# Patient Record
Sex: FEMALE | Employment: OTHER | URBAN - METROPOLITAN AREA
[De-identification: names, ages, dates, MRNs, and addresses within clinical notes are randomized per-mention and may not be internally consistent; named-entity substitution may affect disease eponyms.]

---

## 2017-01-30 ENCOUNTER — APPOINTMENT (OUTPATIENT)
Dept: GENERAL RADIOLOGY | Age: 72
DRG: 291 | End: 2017-01-30
Attending: INTERNAL MEDICINE
Payer: MEDICARE

## 2017-01-30 ENCOUNTER — HOSPITAL ENCOUNTER (INPATIENT)
Age: 72
LOS: 10 days | Discharge: NURSING, REHAB OR LONG-TERM CARE | DRG: 291 | End: 2017-02-09
Attending: INTERNAL MEDICINE | Admitting: HOSPITALIST
Payer: MEDICARE

## 2017-01-30 DIAGNOSIS — R93.89 INFILTRATE NOTED ON IMAGING STUDY: ICD-10-CM

## 2017-01-30 DIAGNOSIS — R06.03 ACUTE RESPIRATORY DISTRESS: Primary | ICD-10-CM

## 2017-01-30 DIAGNOSIS — I50.9 ACUTE CONGESTIVE HEART FAILURE, UNSPECIFIED CONGESTIVE HEART FAILURE TYPE: ICD-10-CM

## 2017-01-30 PROBLEM — R77.8 ELEVATED TROPONIN: Status: ACTIVE | Noted: 2017-01-30

## 2017-01-30 PROBLEM — R06.89 INSUFFICIENCY, RESPIRATORY, ACUTE: Status: ACTIVE | Noted: 2017-01-30

## 2017-01-30 PROBLEM — J96.11 CHRONIC RESPIRATORY FAILURE WITH HYPOXIA (HCC): Status: ACTIVE | Noted: 2017-01-30

## 2017-01-30 PROBLEM — E87.5 HYPERKALEMIA: Status: ACTIVE | Noted: 2017-01-30

## 2017-01-30 PROBLEM — J44.9 COPD (CHRONIC OBSTRUCTIVE PULMONARY DISEASE) (HCC): Status: ACTIVE | Noted: 2017-01-30

## 2017-01-30 LAB
ALBUMIN SERPL BCP-MCNC: 3.6 G/DL (ref 3.4–5)
ALBUMIN/GLOB SERPL: 0.9 {RATIO} (ref 0.8–1.7)
ALP SERPL-CCNC: 177 U/L (ref 45–117)
ALT SERPL-CCNC: 23 U/L (ref 13–56)
AMORPH CRY URNS QL MICRO: ABNORMAL
ANION GAP BLD CALC-SCNC: 5 MMOL/L (ref 3–18)
APPEARANCE UR: CLEAR
APTT PPP: 29.2 SEC (ref 23–36.4)
ARTERIAL PATENCY WRIST A: YES
AST SERPL W P-5'-P-CCNC: 21 U/L (ref 15–37)
ATRIAL RATE: 138 BPM
BACTERIA URNS QL MICRO: ABNORMAL /HPF
BASE EXCESS BLD CALC-SCNC: 4 MMOL/L
BASOPHILS # BLD AUTO: 0 K/UL (ref 0–0.1)
BASOPHILS # BLD: 0 % (ref 0–3)
BDY SITE: ABNORMAL
BILIRUB SERPL-MCNC: 1.5 MG/DL (ref 0.2–1)
BILIRUB UR QL: NEGATIVE
BNP SERPL-MCNC: ABNORMAL PG/ML (ref 0–900)
BUN SERPL-MCNC: 44 MG/DL (ref 7–18)
BUN/CREAT SERPL: 37 (ref 12–20)
CALCIUM SERPL-MCNC: 8.9 MG/DL (ref 8.5–10.1)
CALCULATED R AXIS, ECG10: 63 DEGREES
CALCULATED T AXIS, ECG11: 45 DEGREES
CHLORIDE SERPL-SCNC: 97 MMOL/L (ref 100–108)
CK MB CFR SERPL CALC: 4.4 % (ref 0–4)
CK MB SERPL-MCNC: 1.6 NG/ML (ref 0.5–3.6)
CK SERPL-CCNC: 36 U/L (ref 26–192)
CO2 SERPL-SCNC: 32 MMOL/L (ref 21–32)
COLOR UR: YELLOW
CREAT SERPL-MCNC: 1.2 MG/DL (ref 0.6–1.3)
DIAGNOSIS, 93000: NORMAL
DIFFERENTIAL METHOD BLD: ABNORMAL
EOSINOPHIL # BLD: 0 K/UL (ref 0–0.4)
EOSINOPHIL NFR BLD: 0 % (ref 0–5)
EPITH CASTS URNS QL MICRO: NEGATIVE /LPF (ref 0–5)
ERYTHROCYTE [DISTWIDTH] IN BLOOD BY AUTOMATED COUNT: 16.1 % (ref 11.6–14.5)
EST. AVERAGE GLUCOSE BLD GHB EST-MCNC: 183 MG/DL
GAS FLOW.O2 O2 DELIVERY SYS: ABNORMAL L/MIN
GLOBULIN SER CALC-MCNC: 3.9 G/DL (ref 2–4)
GLUCOSE SERPL-MCNC: 395 MG/DL (ref 74–99)
GLUCOSE UR STRIP.AUTO-MCNC: >1000 MG/DL
HBA1C MFR BLD: 8 % (ref 4.5–5.6)
HCO3 BLD-SCNC: 30.4 MMOL/L (ref 22–26)
HCT VFR BLD AUTO: 37.7 % (ref 35–45)
HGB BLD-MCNC: 11.7 G/DL (ref 12–16)
HGB UR QL STRIP: NEGATIVE
INR PPP: 2.6 (ref 0.8–1.2)
KETONES UR QL STRIP.AUTO: NEGATIVE MG/DL
LACTATE SERPL-SCNC: 1.4 MMOL/L (ref 0.4–2)
LEUKOCYTE ESTERASE UR QL STRIP.AUTO: NEGATIVE
LYMPHOCYTES # BLD AUTO: 1 % (ref 20–51)
LYMPHOCYTES # BLD: 0.1 K/UL (ref 0.8–3.5)
MAGNESIUM SERPL-MCNC: 2.6 MG/DL (ref 1.8–2.4)
MCH RBC QN AUTO: 32.1 PG (ref 24–34)
MCHC RBC AUTO-ENTMCNC: 31 G/DL (ref 31–37)
MCV RBC AUTO: 103.6 FL (ref 74–97)
MONOCYTES # BLD: 0.6 K/UL (ref 0–1)
MONOCYTES NFR BLD AUTO: 5 % (ref 2–9)
MUCOUS THREADS URNS QL MICRO: NEGATIVE /LPF
NEUTS BAND NFR BLD MANUAL: 1 % (ref 0–5)
NEUTS SEG # BLD: 11.1 K/UL (ref 1.8–8)
NEUTS SEG NFR BLD AUTO: 93 % (ref 42–75)
NITRITE UR QL STRIP.AUTO: NEGATIVE
PCO2 BLD: 60 MMHG (ref 35–45)
PH BLD: 7.31 [PH] (ref 7.35–7.45)
PH UR STRIP: 5 [PH] (ref 5–8)
PLATELET # BLD AUTO: 224 K/UL (ref 135–420)
PMV BLD AUTO: 10.3 FL (ref 9.2–11.8)
PO2 BLD: 218 MMHG (ref 80–100)
POTASSIUM SERPL-SCNC: 6.2 MMOL/L (ref 3.5–5.5)
PROT SERPL-MCNC: 7.5 G/DL (ref 6.4–8.2)
PROT UR STRIP-MCNC: ABNORMAL MG/DL
PROTHROMBIN TIME: 26.4 SEC (ref 11.5–15.2)
Q-T INTERVAL, ECG07: 330 MS
QRS DURATION, ECG06: 60 MS
QTC CALCULATION (BEZET), ECG08: 438 MS
RBC # BLD AUTO: 3.64 M/UL (ref 4.2–5.3)
RBC #/AREA URNS HPF: ABNORMAL /HPF (ref 0–5)
RBC MORPH BLD: ABNORMAL
SAO2 % BLD: 100 % (ref 92–97)
SERVICE CMNT-IMP: 10 L/MIN
SERVICE CMNT-IMP: ABNORMAL
SODIUM SERPL-SCNC: 134 MMOL/L (ref 136–145)
SP GR UR REFRACTOMETRY: 1.02 (ref 1–1.03)
SPECIMEN TYPE: ABNORMAL
TOTAL RESP. RATE, ITRR: 27
TROPONIN I SERPL-MCNC: 0.27 NG/ML (ref 0–0.06)
UROBILINOGEN UR QL STRIP.AUTO: 1 EU/DL (ref 0.2–1)
VENTRICULAR RATE, ECG03: 106 BPM
WBC # BLD AUTO: 11.9 K/UL (ref 4.6–13.2)
WBC URNS QL MICRO: NEGATIVE /HPF (ref 0–5)

## 2017-01-30 PROCEDURE — 71010 XR CHEST PORT: CPT

## 2017-01-30 PROCEDURE — 82803 BLOOD GASES ANY COMBINATION: CPT

## 2017-01-30 PROCEDURE — 77030034849

## 2017-01-30 PROCEDURE — 87040 BLOOD CULTURE FOR BACTERIA: CPT | Performed by: INTERNAL MEDICINE

## 2017-01-30 PROCEDURE — 74011250637 HC RX REV CODE- 250/637: Performed by: HOSPITALIST

## 2017-01-30 PROCEDURE — A9270 NON-COVERED ITEM OR SERVICE: HCPCS | Performed by: INTERNAL MEDICINE

## 2017-01-30 PROCEDURE — 83880 ASSAY OF NATRIURETIC PEPTIDE: CPT | Performed by: INTERNAL MEDICINE

## 2017-01-30 PROCEDURE — 74011250636 HC RX REV CODE- 250/636: Performed by: INTERNAL MEDICINE

## 2017-01-30 PROCEDURE — 94640 AIRWAY INHALATION TREATMENT: CPT

## 2017-01-30 PROCEDURE — 80053 COMPREHEN METABOLIC PANEL: CPT | Performed by: INTERNAL MEDICINE

## 2017-01-30 PROCEDURE — 36600 WITHDRAWAL OF ARTERIAL BLOOD: CPT

## 2017-01-30 PROCEDURE — 99285 EMERGENCY DEPT VISIT HI MDM: CPT

## 2017-01-30 PROCEDURE — 74011000250 HC RX REV CODE- 250: Performed by: INTERNAL MEDICINE

## 2017-01-30 PROCEDURE — 83036 HEMOGLOBIN GLYCOSYLATED A1C: CPT | Performed by: HOSPITALIST

## 2017-01-30 PROCEDURE — 81001 URINALYSIS AUTO W/SCOPE: CPT | Performed by: INTERNAL MEDICINE

## 2017-01-30 PROCEDURE — 83605 ASSAY OF LACTIC ACID: CPT | Performed by: INTERNAL MEDICINE

## 2017-01-30 PROCEDURE — 85025 COMPLETE CBC W/AUTO DIFF WBC: CPT | Performed by: INTERNAL MEDICINE

## 2017-01-30 PROCEDURE — 85730 THROMBOPLASTIN TIME PARTIAL: CPT | Performed by: INTERNAL MEDICINE

## 2017-01-30 PROCEDURE — 93005 ELECTROCARDIOGRAM TRACING: CPT

## 2017-01-30 PROCEDURE — 85610 PROTHROMBIN TIME: CPT | Performed by: INTERNAL MEDICINE

## 2017-01-30 PROCEDURE — 65610000006 HC RM INTENSIVE CARE

## 2017-01-30 PROCEDURE — 83735 ASSAY OF MAGNESIUM: CPT | Performed by: INTERNAL MEDICINE

## 2017-01-30 PROCEDURE — 74011636637 HC RX REV CODE- 636/637: Performed by: INTERNAL MEDICINE

## 2017-01-30 PROCEDURE — 82550 ASSAY OF CK (CPK): CPT | Performed by: INTERNAL MEDICINE

## 2017-01-30 RX ORDER — CALCIUM GLUCONATE 94 MG/ML
1 INJECTION, SOLUTION INTRAVENOUS ONCE
Status: COMPLETED | OUTPATIENT
Start: 2017-01-30 | End: 2017-01-30

## 2017-01-30 RX ORDER — LEVOFLOXACIN 5 MG/ML
750 INJECTION, SOLUTION INTRAVENOUS ONCE
Status: COMPLETED | OUTPATIENT
Start: 2017-01-30 | End: 2017-02-05

## 2017-01-30 RX ORDER — DEXTROSE 50 % IN WATER (D50W) INTRAVENOUS SYRINGE
25-50 AS NEEDED
Status: DISCONTINUED | OUTPATIENT
Start: 2017-01-30 | End: 2017-02-09 | Stop reason: HOSPADM

## 2017-01-30 RX ORDER — DOCUSATE SODIUM 100 MG/1
100 CAPSULE, LIQUID FILLED ORAL 2 TIMES DAILY
Status: DISCONTINUED | OUTPATIENT
Start: 2017-01-30 | End: 2017-02-09 | Stop reason: HOSPADM

## 2017-01-30 RX ORDER — BUDESONIDE 0.5 MG/2ML
500 INHALANT ORAL
Status: DISCONTINUED | OUTPATIENT
Start: 2017-01-30 | End: 2017-02-09 | Stop reason: HOSPADM

## 2017-01-30 RX ORDER — LEVALBUTEROL 1.25 MG/.5ML
1.25 SOLUTION, CONCENTRATE RESPIRATORY (INHALATION)
Status: DISCONTINUED | OUTPATIENT
Start: 2017-01-31 | End: 2017-02-09 | Stop reason: HOSPADM

## 2017-01-30 RX ORDER — ACETAMINOPHEN 325 MG/1
650 TABLET ORAL
Status: DISCONTINUED | OUTPATIENT
Start: 2017-01-30 | End: 2017-02-05

## 2017-01-30 RX ORDER — INSULIN GLARGINE 100 [IU]/ML
40 INJECTION, SOLUTION SUBCUTANEOUS
Status: DISCONTINUED | OUTPATIENT
Start: 2017-01-30 | End: 2017-02-02

## 2017-01-30 RX ORDER — FUROSEMIDE 10 MG/ML
40 INJECTION INTRAMUSCULAR; INTRAVENOUS 2 TIMES DAILY
Status: DISCONTINUED | OUTPATIENT
Start: 2017-01-30 | End: 2017-02-06

## 2017-01-30 RX ORDER — FUROSEMIDE 10 MG/ML
40 INJECTION INTRAMUSCULAR; INTRAVENOUS
Status: COMPLETED | OUTPATIENT
Start: 2017-01-30 | End: 2017-01-30

## 2017-01-30 RX ORDER — LEVOFLOXACIN 5 MG/ML
750 INJECTION, SOLUTION INTRAVENOUS EVERY 24 HOURS
Status: DISCONTINUED | OUTPATIENT
Start: 2017-01-31 | End: 2017-02-07 | Stop reason: ALTCHOICE

## 2017-01-30 RX ORDER — DIPHENHYDRAMINE HYDROCHLORIDE 50 MG/ML
12.5 INJECTION, SOLUTION INTRAMUSCULAR; INTRAVENOUS
Status: DISCONTINUED | OUTPATIENT
Start: 2017-01-30 | End: 2017-02-09 | Stop reason: HOSPADM

## 2017-01-30 RX ORDER — WARFARIN SODIUM 5 MG/1
2.5 TABLET ORAL EVERY EVENING
Status: DISCONTINUED | OUTPATIENT
Start: 2017-01-31 | End: 2017-01-31

## 2017-01-30 RX ORDER — IPRATROPIUM BROMIDE AND ALBUTEROL SULFATE 2.5; .5 MG/3ML; MG/3ML
3 SOLUTION RESPIRATORY (INHALATION)
Status: COMPLETED | OUTPATIENT
Start: 2017-01-30 | End: 2017-01-30

## 2017-01-30 RX ORDER — DILTIAZEM HYDROCHLORIDE 60 MG/1
60 TABLET, FILM COATED ORAL
Status: DISCONTINUED | OUTPATIENT
Start: 2017-01-30 | End: 2017-01-31

## 2017-01-30 RX ORDER — ONDANSETRON 2 MG/ML
4 INJECTION INTRAMUSCULAR; INTRAVENOUS
Status: DISCONTINUED | OUTPATIENT
Start: 2017-01-30 | End: 2017-02-09 | Stop reason: HOSPADM

## 2017-01-30 RX ORDER — ARFORMOTEROL TARTRATE 15 UG/2ML
15 SOLUTION RESPIRATORY (INHALATION)
Status: DISCONTINUED | OUTPATIENT
Start: 2017-01-30 | End: 2017-02-09 | Stop reason: HOSPADM

## 2017-01-30 RX ORDER — MAGNESIUM SULFATE 100 %
4 CRYSTALS MISCELLANEOUS AS NEEDED
Status: DISCONTINUED | OUTPATIENT
Start: 2017-01-30 | End: 2017-02-09 | Stop reason: HOSPADM

## 2017-01-30 RX ORDER — INSULIN LISPRO 100 [IU]/ML
INJECTION, SOLUTION INTRAVENOUS; SUBCUTANEOUS
Status: DISCONTINUED | OUTPATIENT
Start: 2017-01-31 | End: 2017-01-31

## 2017-01-30 RX ADMIN — DOCUSATE SODIUM 100 MG: 100 CAPSULE, LIQUID FILLED ORAL at 23:31

## 2017-01-30 RX ADMIN — IPRATROPIUM BROMIDE AND ALBUTEROL SULFATE 3 ML: .5; 3 SOLUTION RESPIRATORY (INHALATION) at 20:21

## 2017-01-30 RX ADMIN — SODIUM CHLORIDE 1000 ML: 9 INJECTION, SOLUTION INTRAVENOUS at 20:22

## 2017-01-30 RX ADMIN — LEVOFLOXACIN 750 MG: 5 INJECTION, SOLUTION INTRAVENOUS at 22:05

## 2017-01-30 RX ADMIN — CALCIUM GLUCONATE 1 G: 94 INJECTION, SOLUTION INTRAVENOUS at 22:05

## 2017-01-30 RX ADMIN — FUROSEMIDE 40 MG: 10 INJECTION, SOLUTION INTRAMUSCULAR; INTRAVENOUS at 22:05

## 2017-01-30 RX ADMIN — DILTIAZEM HYDROCHLORIDE 60 MG: 60 TABLET, FILM COATED ORAL at 23:31

## 2017-01-30 RX ADMIN — INSULIN HUMAN 8 UNITS: 100 INJECTION, SOLUTION PARENTERAL at 22:05

## 2017-01-30 NOTE — IP AVS SNAPSHOT
[place] 
 
 
 [address] [facility] [address] 
[phone] Patient: Karyn Nagy MRN: [id number] You are allergic to the following Allergen Reactions Latex Hives Adhesive Tape-Silicones Unknown (comments) Bees (Sting, Bee) Unknown (comments) Garlic Nausea and Vomiting Zoloft (Sertraline) Hives Recent Documentation Height Weight BMI OB Status Smoking Status 1.651 m 103.4 kg 37.94 kg/m2 Postmenopausal Never Smoker Emergency Contacts Name Discharge Info Relation Home Work Mobile MegaCarlos DISCHARGE CAREGIVER [3] Spouse [3] [phone] About your hospitalization You were admitted on:  January 30, 2017 You last received care in the:  [address] [place] [address] You were discharged on:  February 6, 2017 Unit phone number:  [phone] Why you were hospitalized Your primary diagnosis was:  Acute On Chronic Diastolic Chf (Congestive Heart Failure) (Hcc) Your diagnoses also included:  Hyperkalemia, Elevated Troponin, Paroxysmal Atrial Fibrillation (Hcc), Hypertension, Acute Respiratory Distress (Hcc), Copd (Chronic Obstructive Pulmonary Disease) (Hcc), Chf (Congestive Heart Failure) (Hcc), Insufficiency, Respiratory, Acute (Hcc), Infiltrate Noted On Imaging Study, Chronic Respiratory Failure With Hypoxia (Hcc), Copd Exacerbation (Hcc), Acute On Chronic Respiratory Failure With Hypoxia (Hcc), Chest Pain, Anxiety Providers Seen During Your Hospitalizations Provider Role Specialty Primary office phone Reema Barreto MD Attending Provider Emergency Medicine [phone] Tony Rader MD Attending Provider Internal Medicine [phone] Your Primary Care Physician (PCP) Primary Care Physician Office Phone Office Fax [place], [address]  [phone] Follow-up Information Follow up With Details Comments Contact Info [address]E [address] ([facility])   [address] [place] [address] 
[phone] Current Discharge Medication List  
  
ASK your doctor about these medications Dose & Instructions Dispensing Information Comments Morning Noon Evening Bedtime  
 albuterol-ipratropium 2.5 mg-0.5 mg/3 ml Nebu Commonly known as:  Everrett Barbie Your next dose is: Today, Tomorrow Other:  _________ Dose:  3 mL  
3 mL by Nebulization route every four (4) hours as needed for Shortness of Breath. Quantity:  30 Vial  
Refills:  0  
     
   
   
   
  
 amLODIPine 5 mg tablet Commonly known as:  Evangelina Gables Your next dose is: Today, Tomorrow Other:  _________ Dose:  5 mg Take 5 mg by mouth daily. Refills:  0  
     
   
   
   
  
 aspirin 81 mg tablet Your next dose is: Today, Tomorrow Other:  _________ Dose:  81 mg Take 81 mg by mouth. Refills:  0  
     
   
   
   
  
 carvedilol 25 mg tablet Commonly known as:  Gaylin Corunna Your next dose is: Today, Tomorrow Other:  _________ Dose:  25 mg Take 25 mg by mouth two (2) times daily (with meals). Refills:  0  
     
   
   
   
  
 codeine-butalbital-acetaminophen-caffeine -04-30 mg per capsule Commonly known as:  FIORICET WITH CODEINE Your next dose is: Today, Tomorrow Other:  _________ Take  by mouth every four (4) hours as needed for Headache. Refills:  0 FERROUS SULFATE Your next dose is: Today, Tomorrow Other:  _________  
   
   
 by Does Not Apply route. Refills:  0  
     
   
   
   
  
 furosemide 40 mg tablet Commonly known as:  LASIX Your next dose is: Today, Tomorrow Other:  _________ Dose:  40 mg Take 1 Tab by mouth two (2) times a day. Quantity:  60 Tab Refills:  0 insulin glargine 100 unit/mL injection Commonly known as:  LANTUS Your next dose is: Today, Tomorrow Other:  _________ Dose:  20 Units 20 Units by SubCUTAneous route nightly. Quantity:  1 Vial  
Refills:  0 LIPITOR PO Your next dose is: Today, Tomorrow Other:  _________ Take  by mouth. Refills:  0  
     
   
   
   
  
 lisinopril 5 mg tablet Commonly known as:  Aundra Kristal Your next dose is: Today, Tomorrow Other:  _________ Take  by mouth daily. Refills:  0 OXYCODONE (BULK) Your next dose is: Today, Tomorrow Other:  _________ Dose:  20 mg Take 20 mg by mouth every six (6) hours as needed. Refills:  0  
     
   
   
   
  
 pregabalin 75 mg capsule Commonly known as:  Crooks Clock Your next dose is: Today, Tomorrow Other:  _________ Take  by mouth. Refills:  0 PRILOSEC PO Your next dose is: Today, Tomorrow Other:  _________ Take  by mouth. Refills:  0  
     
   
   
   
  
 spironolactone 25 mg tablet Commonly known as:  ALDACTONE Your next dose is: Today, Tomorrow Other:  _________ Take  by mouth daily. Refills:  0  
     
   
   
   
  
 VITAMIN B-12 PO Your next dose is: Today, Tomorrow Other:  _________ Take  by mouth. Refills:  0  
     
   
   
   
  
 warfarin 2.5 mg tablet Commonly known as:  COUMADIN Your next dose is: Today, Tomorrow Other:  _________ Dose:  2.5 mg Take 2.5 mg by mouth daily. Refills:  0 Discharge Instructions Learning About COPD What is COPD? COPD is a lung disease that makes it hard to breathe. COPD stands for chronic obstructive pulmonary disease.  It is caused by damage to the lungs over many years, usually from smoking. COPD is a mix of two diseases: chronic bronchitis and emphysema. Other things that may put you at risk for COPD include breathing chemical fumes, dust, or air pollution over a long period of time. Secondhand smoke is also bad. In chronic bronchitis, the airways that carry air to the lungs (bronchial tubes) get inflamed and make a lot of mucus. This can narrow or block the airways, making it hard for you to breathe. In emphysema, the air sacs in your lungs are damaged and lose their stretch. Less air gets in and out of your lungs, which makes you feel short of breath. What can you expect when you have COPD? COPD gets worse over time. You cannot undo the damage to your lungs. Over time, you may find that: 
· You get short of breath even when you do simple things like get dressed or fix a meal. 
· It is hard to eat or exercise. · You lose weight and feel weaker. But there are things you can do to prevent more damage and feel better. What are the symptoms? The main symptoms are: · A cough that will not go away. · Mucus that comes up when you cough. · Shortness of breath that gets worse with activity. At times, your symptoms may suddenly flare up and get much worse. This is a called a COPD exacerbation (say \"egg-DINH--BAY-rubens\"). When this happens, your usual symptoms quickly get worse and stay bad. This can be dangerous. You may have to go to the hospital. 
How can you keep COPD from getting worse? Don't smoke. That is the best way to keep COPD from getting worse. If you already smoke, it is never too late to stop. If you need help quitting, talk to your doctor about stop-smoking programs and medicines. These can increase your chances of quitting for good. You can do other things to keep COPD from getting worse: · Avoid bad air. Air pollution, chemical fumes, and dust also can make COPD worse. · Get a flu shot every year.  A shot may keep the flu from turning into something more serious, like pneumonia. A flu shot also may lower your chances of having a COPD flare-up. · Get a pneumococcal shot. Most people need only one shot to prevent pneumonia, but doctors sometimes recommend a second shot for some people who got their first shot before they turned 72. Talk with your doctor about whether you need a second shot. How is COPD treated? COPD is treated with medicines and oxygen. You also can take steps at home to stay healthy and keep your COPD from getting worse. Medicines and oxygen therapy · You may be taking medicines such as: ¨ Bronchodilators. These help open your airways and make breathing easier. Bronchodilators are either short-acting (work for 6 to 9 hours) or long-acting (work for 24 hours). You inhale most bronchodilators, so they start to act quickly. Always carry your quick-relief inhaler with you in case you need it while you are away from home. ¨ Corticosteroids. These reduce airway inflammation. They come in pill or inhaled form. You must take these medicines every day for them to work well. · Take your medicines exactly as prescribed. Call your doctor if you think you are having a problem with your medicine. · Oxygen therapy boosts the amount of oxygen in your blood and helps you breathe easier. Use the flow rate your doctor has recommended, and do not change it without talking to your doctor first. 
Other care at home · If your doctor recommends it, get more exercise. Walking is a good choice. Bit by bit, increase the amount you walk every day. Try for at least 30 minutes on most days of the week. · Learn breathing methodssuch as breathing through pursed lipsto help you become less short of breath. · If your doctor has not set you up with a pulmonary rehabilitation program, talk to him or her about whether rehab is right for you.  Rehab includes exercise programs, education about your disease and how to manage it, help with diet and other changes, and emotional support. · Eat regular, healthy meals. Use bronchodilators about 1 hour before you eat to make it easier to eat. Eat several small meals instead of three large ones. Drink beverages at the end of the meal. Avoid foods that are hard to chew. Follow-up care is a key part of your treatment and safety. Be sure to make and go to all appointments, and call your doctor if you are having problems. It's also a good idea to know your test results and keep a list of the medicines you take. Where can you learn more? Go to http://perez-usrjit.info/. Enter V314 in the search box to learn more about \"Learning About COPD. \" 
Current as of: May 23, 2016 Content Version: 11.1 © 9848-0549 link bird. Care instructions adapted under license by [employer] (which disclaims liability or warranty for this information). If you have questions about a medical condition or this instruction, always ask your healthcare professional. [place] [address] any warranty or liability for your use of this information. Learning About COPD Triggers What are triggers? When you have COPD (chronic obstructive pulmonary disease), certain things can make your symptoms worse. These are called triggers. They include: · Cigarette smoke or air pollution. · Illnesses like colds, flu, or pneumonia. · Cleaning supplies or other chemicals. · Gases, particles, or fumes from wood or kerosene home heaters. Not all people have the same triggers. What may cause symptoms in one person may not be a problem for another person. How do triggers affect COPD? Triggers can make it harder for your lungs to work as they should and can lead to sudden difficulty breathing and other symptoms. When you are around a trigger, a COPD flare-up is more likely.  If your symptoms are severe, you may need emergency treatment or have to go to the hospital for treatment. If you know what your triggers are and can avoid them, you can reduce how often you have flare-ups and how much COPD affects your life. It's also important to be active and to take your daily medicines as prescribed. This helps prevent flare-ups too. What can you do to avoid triggers? The first thing is to know your triggers. When you are having symptoms, note the things around you that might be causing them. Then look for patterns in what may be triggering your symptoms. When you have your list of possible triggers, work with your doctor to find ways to avoid them. Here are some ways to avoid a few common triggers. · Do not smoke or allow others to smoke around you. If you need help quitting, talk to your doctor about stop-smoking programs and medicines. These can increase your chances of quitting for good. · If there is a lot of pollution, pollen, or dust outside, stay at home and keep your windows closed. Use an air conditioner or air filter in your home. Check your local weather report or newspaper for air quality and pollen reports. · Get a flu vaccine every year. Talk to your doctor about getting a pneumococcal shot. Wash your hands often to prevent infections. How can you manage a flare-up? Do not panic if you start to have a COPD flare-up. · If you have a COPD action plan, follow the plan. In general: ¨ Use your quick-relief inhaler as directed by your doctor. If your symptoms do not get better after you use your medicine, have someone take you to the emergency room. Call an ambulance if needed. ¨ Use a spacer with your metered-dose inhaler (MDI). If you have a nebulizer for inhaled medicine, use it. A spacer or nebulizer may help get more medicine to your lungs. ¨ If your doctor has given you other inhaled medicines or steroid pills, take them as directed. ¨ If your doctor has given you a prescription for an antibiotic, fill it if you need to. ¨ Call your doctor if you have to use your antibiotic or steroid pills. Where can you learn more? Go to http://perez-surjit.info/. Enter P874 in the search box to learn more about \"Learning About COPD Triggers. \" Current as of: July 21, 2016 Content Version: 11.1 © 4016-5665 [employer]underdata. Care instructions adapted under license by [employer] (which disclaims liability or warranty for this information). If you have questions about a medical condition or this instruction, always ask your healthcare professional. [address] any warranty or liability for your use of this information. Breathing Techniques for COPD: Care Instructions Your Care Instructions Breathing is hard when you have chronic obstructive pulmonary disease (COPD). You may take quick, short breaths. Breathing this way makes it harder to get air into your lungs. Learning new ways to control your breathing may help. You may feel better and be able to do more. You can try three basic ways to control your breathing. They are pursed-lip breathing, diaphragmatic breathing, and breathing while bending. Use these methods when you are more short of breath than normal. Practice them often so you can do them well. Follow-up care is a key part of your treatment and safety. Be sure to make and go to all appointments, and call your doctor if you are having problems. It's also a good idea to know your test results and keep a list of the medicines you take. How can you care for yourself at home? · Pursed-lip breathing helps you breathe more air out so that your next breath can be deeper. For this type of breathing, you breathe in through your nose and out through your mouth while almost closing your lips. Breathe in for about 2 seconds, and breathe out for 4 to 6 seconds. Pursed-lip breathing decreases shortness of breath and improves your ability to exercise. · Diaphragmatic breathing helps your lungs expand so that they take in more air. ¨ Lie on your back, or prop yourself up on several pillows. ¨ Put one hand on your belly and the other on your chest. When you breathe in, push your belly out as far as possible. You should feel the hand on your belly move out, while the hand on your chest does not move. ¨ When you breathe out, you should feel the hand on your belly move in. When you can do this type of breathing well while lying down, learn to do it while sitting or standing. Many people with COPD find this breathing method helpful. ¨ Practice diaphragmatic breathing for 20 minutes, 2 or 3 times a day. · Breathing while bending forward at the waist may make breathing easier. It can reduce shortness of breath while you exercise or rest. It helps the diaphragm move more easily. The diaphragm is a large muscle that separates your lungs from your belly. It helps draw air into your lungs as you breathe. · Do not smoke. Smoking makes COPD worse. If you need help quitting, talk to your doctor about stop-smoking programs and medicines. These can increase your chances of quitting for good. When should you call for help? Call your doctor now or seek immediate medical care if: 
· Your breathing methods do not help. · Your shortness of breath gets worse. · You cough up blood. · You have swelling in your belly and legs. · You have severe chest pain. Watch closely for changes in your health, and be sure to contact your doctor if you have any problems. Where can you learn more? Go to http://perez-surjit.info/. Enter F850 in the search box to learn more about \"Breathing Techniques for COPD: Care Instructions. \" Current as of: May 23, 2016 Content Version: 11.1 © 4645-0349 NextWidgets.  Care instructions adapted under license by [employer] (which disclaims liability or warranty for this information). If you have questions about a medical condition or this instruction, always ask your healthcare professional. [address] any warranty or liability for your use of this information. Discharge Orders None Introducing [facility] SERVICES! Jane Mondragon introduces Alsbridge patient portal. Now you can access parts of your medical record, email your doctor's office, and request medication refills online. 1. In your internet browser, go to [email or web address] 2. Click on the First Time User? Click Here link in the Sign In box. You will see the New Member Sign Up page. 3. Enter your Alsbridge Access Code exactly as it appears below. You will not need to use this code after youve completed the sign-up process. If you do not sign up before the expiration date, you must request a new code. · Alsbridge Access Code: [email or web address] Expires: 4/30/2017  8:46 PM 
 
4. Enter the last four digits of your Social Security Number (xxxx) and Date of Birth (mm/dd/yyyy) as indicated and click Submit. You will be taken to the next sign-up page. 5. Create a Alsbridge ID. This will be your Alsbridge login ID and cannot be changed, so think of one that is secure and easy to remember. 6. Create a Alsbridge password. You can change your password at any time. 7. Enter your Password Reset Question and Answer. This can be used at a later time if you forget your password. 8. Enter your e-mail address. You will receive e-mail notification when new information is available in [address]. 9. Click Sign Up. You can now view and download portions of your medical record. 10. Click the Download Summary menu link to download a portable copy of your medical information. If you have questions, please visit the Frequently Asked Questions section of the Alsbridge website. Remember, Alsbridge is NOT to be used for urgent needs. For medical emergencies, dial 911. Now available from your iPhone and Android! General Information Please provide this summary of care documentation to your next provider. Patient Signature:  ____________________________________________________________ Date:  ____________________________________________________________  
  
Debra Penngrove Provider Signature:  ____________________________________________________________ Date:  ____________________________________________________________

## 2017-01-30 NOTE — IP AVS SNAPSHOT
Current Discharge Medication List  
  
ASK your doctor about these medications Dose & Instructions Dispensing Information Comments Morning Noon Evening Bedtime  
 albuterol-ipratropium 2.5 mg-0.5 mg/3 ml Nebu Commonly known as:  Kaye Medicus Your next dose is: Today, Tomorrow Other:  ____________ Dose:  3 mL  
3 mL by Nebulization route every four (4) hours as needed for Shortness of Breath. Quantity:  30 Vial  
Refills:  0  
     
   
   
   
  
 amLODIPine 5 mg tablet Commonly known as:  Jaime Alstrom Your next dose is: Today, Tomorrow Other:  ____________ Dose:  5 mg Take 5 mg by mouth daily. Refills:  0  
     
   
   
   
  
 aspirin 81 mg tablet Your next dose is: Today, Tomorrow Other:  ____________ Dose:  81 mg Take 81 mg by mouth. Refills:  0  
     
   
   
   
  
 carvedilol 25 mg tablet Commonly known as:  Jestine Pellet Your next dose is: Today, Tomorrow Other:  ____________ Dose:  25 mg Take 25 mg by mouth two (2) times daily (with meals). Refills:  0  
     
   
   
   
  
 codeine-butalbital-acetaminophen-caffeine -23-30 mg per capsule Commonly known as:  FIORICET WITH CODEINE Your next dose is: Today, Tomorrow Other:  ____________ Take  by mouth every four (4) hours as needed for Headache. Refills:  0 FERROUS SULFATE Your next dose is: Today, Tomorrow Other:  ____________  
   
   
 by Does Not Apply route. Refills:  0  
     
   
   
   
  
 furosemide 40 mg tablet Commonly known as:  LASIX Your next dose is: Today, Tomorrow Other:  ____________ Dose:  40 mg Take 1 Tab by mouth two (2) times a day. Quantity:  60 Tab Refills:  0  
     
   
   
   
  
 insulin glargine 100 unit/mL injection Commonly known as:  LANTUS Your next dose is: Today, Tomorrow Other:  ____________ Dose:  20 Units 20 Units by SubCUTAneous route nightly. Quantity:  1 Vial  
Refills:  0 LIPITOR PO Your next dose is: Today, Tomorrow Other:  ____________ Take  by mouth. Refills:  0  
     
   
   
   
  
 lisinopril 5 mg tablet Commonly known as:  Ora Bee Your next dose is: Today, Tomorrow Other:  ____________ Take  by mouth daily. Refills:  0 OXYCODONE (BULK) Your next dose is: Today, Tomorrow Other:  ____________ Dose:  20 mg Take 20 mg by mouth every six (6) hours as needed. Refills:  0  
     
   
   
   
  
 pregabalin 75 mg capsule Commonly known as:  Tony Dany Your next dose is: Today, Tomorrow Other:  ____________ Take  by mouth. Refills:  0 PRILOSEC PO Your next dose is: Today, Tomorrow Other:  ____________ Take  by mouth. Refills:  0  
     
   
   
   
  
 spironolactone 25 mg tablet Commonly known as:  ALDACTONE Your next dose is: Today, Tomorrow Other:  ____________ Take  by mouth daily. Refills:  0  
     
   
   
   
  
 VITAMIN B-12 PO Your next dose is: Today, Tomorrow Other:  ____________ Take  by mouth. Refills:  0  
     
   
   
   
  
 warfarin 2.5 mg tablet Commonly known as:  COUMADIN Your next dose is: Today, Tomorrow Other:  ____________ Dose:  2.5 mg Take 2.5 mg by mouth daily. Refills:  0

## 2017-01-30 NOTE — Clinical Note
Status[de-identified] Inpatient [101] Type of Bed: Telemetry [19] Inpatient Hospitalization Certified Necessary for the Following Reasons: 8. Other (further clarification in H&P documentation) Admitting Diagnosis: Insufficiency, respiratory, acute (RUST 75.) [[id number]] Admitting Diagnosis: CHF (congestive heart failure) ([facility].) [[id number]] Admitting Diagnosis: Elevated troponin [[id number]] Admitting Diagnosis: COPD (chronic obstructive pulmonary disease) ([facility].) [[id number]] Admitting Diagnosis: Infiltrate noted on imaging study [[id number]] Admitting Physician: Anna Sauceda [[id number]] Attending Physician: Anna Sauceda [[id number]] Estimated Length of Stay: > or = to 2 Midnights Discharge Plan[de-identified] [facility]

## 2017-01-31 PROBLEM — J44.1 COPD EXACERBATION (HCC): Status: ACTIVE | Noted: 2017-01-31

## 2017-01-31 LAB
ALBUMIN SERPL BCP-MCNC: 3.4 G/DL (ref 3.4–5)
ALBUMIN/GLOB SERPL: 0.9 {RATIO} (ref 0.8–1.7)
ALP SERPL-CCNC: 162 U/L (ref 45–117)
ALT SERPL-CCNC: 24 U/L (ref 13–56)
ANION GAP BLD CALC-SCNC: 3 MMOL/L (ref 3–18)
AST SERPL W P-5'-P-CCNC: 17 U/L (ref 15–37)
BASOPHILS # BLD AUTO: 0 K/UL (ref 0–0.1)
BASOPHILS # BLD: 0 % (ref 0–3)
BILIRUB SERPL-MCNC: 1.4 MG/DL (ref 0.2–1)
BUN SERPL-MCNC: 42 MG/DL (ref 7–18)
BUN/CREAT SERPL: 36 (ref 12–20)
CALCIUM SERPL-MCNC: 9.1 MG/DL (ref 8.5–10.1)
CHLORIDE SERPL-SCNC: 99 MMOL/L (ref 100–108)
CK MB CFR SERPL CALC: 7.9 % (ref 0–4)
CK MB CFR SERPL CALC: 7.9 % (ref 0–4)
CK MB SERPL-MCNC: 2.2 NG/ML (ref 0.5–3.6)
CK MB SERPL-MCNC: 2.3 NG/ML (ref 0.5–3.6)
CK SERPL-CCNC: 28 U/L (ref 26–192)
CK SERPL-CCNC: 29 U/L (ref 26–192)
CO2 SERPL-SCNC: 35 MMOL/L (ref 21–32)
CREAT SERPL-MCNC: 1.16 MG/DL (ref 0.6–1.3)
DIFFERENTIAL METHOD BLD: ABNORMAL
EOSINOPHIL # BLD: 0 K/UL (ref 0–0.4)
EOSINOPHIL NFR BLD: 0 % (ref 0–5)
ERYTHROCYTE [DISTWIDTH] IN BLOOD BY AUTOMATED COUNT: 15.8 % (ref 11.6–14.5)
GLOBULIN SER CALC-MCNC: 3.7 G/DL (ref 2–4)
GLUCOSE BLD STRIP.AUTO-MCNC: 133 MG/DL (ref 70–110)
GLUCOSE BLD STRIP.AUTO-MCNC: 134 MG/DL (ref 70–110)
GLUCOSE BLD STRIP.AUTO-MCNC: 210 MG/DL (ref 70–110)
GLUCOSE BLD STRIP.AUTO-MCNC: 249 MG/DL (ref 70–110)
GLUCOSE BLD STRIP.AUTO-MCNC: 268 MG/DL (ref 70–110)
GLUCOSE SERPL-MCNC: 268 MG/DL (ref 74–99)
HCT VFR BLD AUTO: 37.4 % (ref 35–45)
HGB BLD-MCNC: 11.7 G/DL (ref 12–16)
INR PPP: 2.8 (ref 0.8–1.2)
LYMPHOCYTES # BLD AUTO: 1 % (ref 20–51)
LYMPHOCYTES # BLD: 0.1 K/UL (ref 0.8–3.5)
MAGNESIUM SERPL-MCNC: 2.2 MG/DL (ref 1.8–2.4)
MCH RBC QN AUTO: 32.2 PG (ref 24–34)
MCHC RBC AUTO-ENTMCNC: 31.3 G/DL (ref 31–37)
MCV RBC AUTO: 103 FL (ref 74–97)
MONOCYTES # BLD: 0 K/UL (ref 0–1)
MONOCYTES NFR BLD AUTO: 0 % (ref 2–9)
NEUTS SEG # BLD: 10.6 K/UL (ref 1.8–8)
NEUTS SEG NFR BLD AUTO: 99 % (ref 42–75)
PLATELET # BLD AUTO: 202 K/UL (ref 135–420)
PLATELET COMMENTS,PCOM: ABNORMAL
PMV BLD AUTO: 10.7 FL (ref 9.2–11.8)
POTASSIUM SERPL-SCNC: 5.1 MMOL/L (ref 3.5–5.5)
POTASSIUM SERPL-SCNC: 5.3 MMOL/L (ref 3.5–5.5)
POTASSIUM SERPL-SCNC: 5.6 MMOL/L (ref 3.5–5.5)
PROT SERPL-MCNC: 7.1 G/DL (ref 6.4–8.2)
PROTHROMBIN TIME: 28.1 SEC (ref 11.5–15.2)
RBC # BLD AUTO: 3.63 M/UL (ref 4.2–5.3)
RBC MORPH BLD: ABNORMAL
SODIUM SERPL-SCNC: 137 MMOL/L (ref 136–145)
TROPONIN I SERPL-MCNC: 0.41 NG/ML (ref 0–0.06)
TROPONIN I SERPL-MCNC: 0.63 NG/ML (ref 0–0.06)
WBC # BLD AUTO: 10.7 K/UL (ref 4.6–13.2)

## 2017-01-31 PROCEDURE — 85025 COMPLETE CBC W/AUTO DIFF WBC: CPT | Performed by: HOSPITALIST

## 2017-01-31 PROCEDURE — 74011250636 HC RX REV CODE- 250/636: Performed by: HOSPITALIST

## 2017-01-31 PROCEDURE — 85610 PROTHROMBIN TIME: CPT | Performed by: HOSPITALIST

## 2017-01-31 PROCEDURE — 65610000006 HC RM INTENSIVE CARE

## 2017-01-31 PROCEDURE — 84132 ASSAY OF SERUM POTASSIUM: CPT | Performed by: HOSPITALIST

## 2017-01-31 PROCEDURE — 74011250636 HC RX REV CODE- 250/636: Performed by: INTERNAL MEDICINE

## 2017-01-31 PROCEDURE — 94640 AIRWAY INHALATION TREATMENT: CPT

## 2017-01-31 PROCEDURE — 97166 OT EVAL MOD COMPLEX 45 MIN: CPT

## 2017-01-31 PROCEDURE — 74011250637 HC RX REV CODE- 250/637: Performed by: HOSPITALIST

## 2017-01-31 PROCEDURE — 82962 GLUCOSE BLOOD TEST: CPT

## 2017-01-31 PROCEDURE — 97535 SELF CARE MNGMENT TRAINING: CPT

## 2017-01-31 PROCEDURE — 77030009834 HC MSK O2 TRACH VYRM -A

## 2017-01-31 PROCEDURE — 74011000250 HC RX REV CODE- 250: Performed by: HOSPITALIST

## 2017-01-31 PROCEDURE — 94660 CPAP INITIATION&MGMT: CPT

## 2017-01-31 PROCEDURE — 83735 ASSAY OF MAGNESIUM: CPT | Performed by: HOSPITALIST

## 2017-01-31 PROCEDURE — 74011636637 HC RX REV CODE- 636/637: Performed by: HOSPITALIST

## 2017-01-31 PROCEDURE — 74011636637 HC RX REV CODE- 636/637: Performed by: INTERNAL MEDICINE

## 2017-01-31 PROCEDURE — 77010033711 HC HIGH FLOW OXYGEN

## 2017-01-31 PROCEDURE — 36415 COLL VENOUS BLD VENIPUNCTURE: CPT | Performed by: HOSPITALIST

## 2017-01-31 PROCEDURE — 82550 ASSAY OF CK (CPK): CPT | Performed by: HOSPITALIST

## 2017-01-31 PROCEDURE — C9113 INJ PANTOPRAZOLE SODIUM, VIA: HCPCS | Performed by: INTERNAL MEDICINE

## 2017-01-31 PROCEDURE — 74011000250 HC RX REV CODE- 250: Performed by: INTERNAL MEDICINE

## 2017-01-31 PROCEDURE — 77010033678 HC OXYGEN DAILY

## 2017-01-31 PROCEDURE — 93306 TTE W/DOPPLER COMPLETE: CPT

## 2017-01-31 PROCEDURE — 74011250637 HC RX REV CODE- 250/637: Performed by: INTERNAL MEDICINE

## 2017-01-31 RX ORDER — INSULIN LISPRO 100 [IU]/ML
5 INJECTION, SOLUTION INTRAVENOUS; SUBCUTANEOUS EVERY 6 HOURS
Status: DISCONTINUED | OUTPATIENT
Start: 2017-01-31 | End: 2017-01-31

## 2017-01-31 RX ORDER — DILTIAZEM HYDROCHLORIDE 30 MG/1
30 TABLET, FILM COATED ORAL
Status: DISCONTINUED | OUTPATIENT
Start: 2017-01-31 | End: 2017-02-05

## 2017-01-31 RX ORDER — INSULIN LISPRO 100 [IU]/ML
5 INJECTION, SOLUTION INTRAVENOUS; SUBCUTANEOUS
Status: DISCONTINUED | OUTPATIENT
Start: 2017-01-31 | End: 2017-02-01

## 2017-01-31 RX ORDER — WARFARIN SODIUM 5 MG/1
2.5 TABLET ORAL ONCE
Status: COMPLETED | OUTPATIENT
Start: 2017-01-31 | End: 2017-01-31

## 2017-01-31 RX ORDER — INSULIN LISPRO 100 [IU]/ML
INJECTION, SOLUTION INTRAVENOUS; SUBCUTANEOUS
Status: DISCONTINUED | OUTPATIENT
Start: 2017-01-31 | End: 2017-02-02

## 2017-01-31 RX ORDER — LORAZEPAM 0.5 MG/1
0.5 TABLET ORAL
Status: DISCONTINUED | OUTPATIENT
Start: 2017-01-31 | End: 2017-02-04

## 2017-01-31 RX ADMIN — LEVALBUTEROL 1.25 MG: 1.25 SOLUTION, CONCENTRATE RESPIRATORY (INHALATION) at 11:34

## 2017-01-31 RX ADMIN — LEVALBUTEROL 1.25 MG: 1.25 SOLUTION, CONCENTRATE RESPIRATORY (INHALATION) at 07:48

## 2017-01-31 RX ADMIN — ONDANSETRON HYDROCHLORIDE 4 MG: 2 INJECTION INTRAMUSCULAR; INTRAVENOUS at 22:17

## 2017-01-31 RX ADMIN — ARFORMOTEROL TARTRATE 15 MCG: 15 SOLUTION RESPIRATORY (INHALATION) at 07:47

## 2017-01-31 RX ADMIN — INSULIN GLARGINE 40 UNITS: 100 INJECTION, SOLUTION SUBCUTANEOUS at 00:34

## 2017-01-31 RX ADMIN — ARFORMOTEROL TARTRATE 15 MCG: 15 SOLUTION RESPIRATORY (INHALATION) at 20:34

## 2017-01-31 RX ADMIN — BUDESONIDE 500 MCG: 0.5 INHALANT RESPIRATORY (INHALATION) at 00:17

## 2017-01-31 RX ADMIN — METHYLPREDNISOLONE SODIUM SUCCINATE 40 MG: 40 INJECTION, POWDER, FOR SOLUTION INTRAMUSCULAR; INTRAVENOUS at 17:32

## 2017-01-31 RX ADMIN — FUROSEMIDE 40 MG: 10 INJECTION, SOLUTION INTRAMUSCULAR; INTRAVENOUS at 20:00

## 2017-01-31 RX ADMIN — ONDANSETRON HYDROCHLORIDE 4 MG: 2 INJECTION INTRAMUSCULAR; INTRAVENOUS at 02:10

## 2017-01-31 RX ADMIN — WARFARIN SODIUM 2.5 MG: 5 TABLET ORAL at 17:33

## 2017-01-31 RX ADMIN — DOCUSATE SODIUM 100 MG: 100 CAPSULE, LIQUID FILLED ORAL at 20:00

## 2017-01-31 RX ADMIN — INSULIN LISPRO 5 UNITS: 100 INJECTION, SOLUTION INTRAVENOUS; SUBCUTANEOUS at 08:11

## 2017-01-31 RX ADMIN — LEVALBUTEROL 1.25 MG: 1.25 SOLUTION, CONCENTRATE RESPIRATORY (INHALATION) at 04:54

## 2017-01-31 RX ADMIN — METHYLPREDNISOLONE SODIUM SUCCINATE 60 MG: 125 INJECTION, POWDER, FOR SOLUTION INTRAMUSCULAR; INTRAVENOUS at 00:34

## 2017-01-31 RX ADMIN — LEVALBUTEROL 1.25 MG: 1.25 SOLUTION, CONCENTRATE RESPIRATORY (INHALATION) at 20:34

## 2017-01-31 RX ADMIN — LORAZEPAM 0.5 MG: 0.5 TABLET ORAL at 22:49

## 2017-01-31 RX ADMIN — DILTIAZEM HYDROCHLORIDE 30 MG: 30 TABLET, FILM COATED ORAL at 16:43

## 2017-01-31 RX ADMIN — FUROSEMIDE 40 MG: 10 INJECTION, SOLUTION INTRAMUSCULAR; INTRAVENOUS at 08:31

## 2017-01-31 RX ADMIN — METHYLPREDNISOLONE SODIUM SUCCINATE 40 MG: 40 INJECTION, POWDER, FOR SOLUTION INTRAMUSCULAR; INTRAVENOUS at 22:54

## 2017-01-31 RX ADMIN — ARFORMOTEROL TARTRATE 15 MCG: 15 SOLUTION RESPIRATORY (INHALATION) at 00:17

## 2017-01-31 RX ADMIN — BUDESONIDE 500 MCG: 0.5 INHALANT RESPIRATORY (INHALATION) at 07:48

## 2017-01-31 RX ADMIN — DIPHENHYDRAMINE HYDROCHLORIDE 12.5 MG: 50 INJECTION, SOLUTION INTRAMUSCULAR; INTRAVENOUS at 22:51

## 2017-01-31 RX ADMIN — INSULIN LISPRO 6 UNITS: 100 INJECTION, SOLUTION INTRAVENOUS; SUBCUTANEOUS at 22:13

## 2017-01-31 RX ADMIN — DILTIAZEM HYDROCHLORIDE 30 MG: 30 TABLET, FILM COATED ORAL at 11:53

## 2017-01-31 RX ADMIN — FUROSEMIDE 40 MG: 10 INJECTION, SOLUTION INTRAMUSCULAR; INTRAVENOUS at 00:35

## 2017-01-31 RX ADMIN — METHYLPREDNISOLONE SODIUM SUCCINATE 40 MG: 40 INJECTION, POWDER, FOR SOLUTION INTRAMUSCULAR; INTRAVENOUS at 11:49

## 2017-01-31 RX ADMIN — SODIUM CHLORIDE 40 MG: 9 INJECTION, SOLUTION INTRAMUSCULAR; INTRAVENOUS; SUBCUTANEOUS at 10:10

## 2017-01-31 RX ADMIN — DOCUSATE SODIUM 100 MG: 100 CAPSULE, LIQUID FILLED ORAL at 08:31

## 2017-01-31 RX ADMIN — BUDESONIDE 500 MCG: 0.5 INHALANT RESPIRATORY (INHALATION) at 20:34

## 2017-01-31 RX ADMIN — INSULIN LISPRO 5 UNITS: 100 INJECTION, SOLUTION INTRAVENOUS; SUBCUTANEOUS at 12:41

## 2017-01-31 RX ADMIN — LEVOFLOXACIN 750 MG: 5 INJECTION, SOLUTION INTRAVENOUS at 22:12

## 2017-01-31 RX ADMIN — METHYLPREDNISOLONE SODIUM SUCCINATE 60 MG: 125 INJECTION, POWDER, FOR SOLUTION INTRAMUSCULAR; INTRAVENOUS at 06:44

## 2017-01-31 RX ADMIN — LEVALBUTEROL 1.25 MG: 1.25 SOLUTION, CONCENTRATE RESPIRATORY (INHALATION) at 15:39

## 2017-01-31 RX ADMIN — INSULIN LISPRO 5 UNITS: 100 INJECTION, SOLUTION INTRAVENOUS; SUBCUTANEOUS at 22:12

## 2017-01-31 RX ADMIN — LEVALBUTEROL 1.25 MG: 1.25 SOLUTION, CONCENTRATE RESPIRATORY (INHALATION) at 00:17

## 2017-01-31 RX ADMIN — INSULIN GLARGINE 40 UNITS: 100 INJECTION, SOLUTION SUBCUTANEOUS at 22:12

## 2017-01-31 RX ADMIN — INSULIN LISPRO 9 UNITS: 100 INJECTION, SOLUTION INTRAVENOUS; SUBCUTANEOUS at 06:44

## 2017-01-31 RX ADMIN — LORAZEPAM 0.5 MG: 0.5 TABLET ORAL at 17:33

## 2017-01-31 RX ADMIN — INSULIN LISPRO 5 UNITS: 100 INJECTION, SOLUTION INTRAVENOUS; SUBCUTANEOUS at 16:44

## 2017-01-31 NOTE — ED TRIAGE NOTES
Per EMS, called for difficulty breathing, pt on 5L NC continuous at home. Recently discharged from [facility] for COPD. Denies fevers or productive cough, just ongoing difficulty breathing. Denies CP  Sepsis Screening completed    ( xx )Patient meets SIRS criteria. (  )Patient does not meet SIRS criteria.       SIRS Criteria is achieved when two or more of the following are present   Temperature < 96.8°F (36°C) or > 100.9°F (38.3°C)   Heart Rate > 90 beats per minute   Respiratory Rate > 20 beats per minute   WBC count > 12,000 or <4,000 or > 10% bands

## 2017-01-31 NOTE — PROGRESS NOTES
Hospitalist Progress Note-critical care note     Patient: Michaelle Lauren MRN: [id number]  [facility]: [id number]    YOB: 1945  Age: 66 y.o. Sex: female    DOA: 1/30/2017 LOS:  LOS: 1 day            Chief complaint: chf exacerbation,  Acute respiratory distress, elevated trop, copd exacerbation , hyperkalemia     Assessment/Plan         Patient Active Problem List   Diagnosis Code    Acute on chronic diastolic CHF (congestive heart failure) ([facility]) I50.33    ALEJANDRINA (acute kidney injury) ([facility] [address] N17.9    Acute exacerbation of CHF (congestive heart failure) ([facility]) I50.9    DM (diabetes mellitus) ([facility] [address] E11.9    Hypertension I10    Respiratory failure ([facility] [address] J96.90    Acute coronary syndrome ([facility] [address] I24.9    Obstructive sleep apnea, adult G47.33    Paroxysmal atrial fibrillation ([facility]) I48.0    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 ([facility]) P79.22, E11.65    Dyspnea due to congestive heart failure ([facility]) I50.9    Aspiration pneumonia ([facility]) J69.0    Hyperkalemia E87.5    Elevated troponin R79.89    Acute respiratory distress ([facility]) R06.00    COPD (chronic obstructive pulmonary disease) ([facility]) J44.9    CHF (congestive heart failure) ([facility]) I50.9    Insufficiency, respiratory, acute ([facility]) R06.89    Infiltrate noted on imaging study R93.8    Chronic respiratory failure with hypoxia ([facility]) J96.11     1. Acute respiratory distress with chronic respiratory failure  Need  bipap over night,   - hx of intubation in 2016. Case discussed with Dr. Naresh Garland   Multiple factors, copd exacerbation, possible hcap, hermelinda, Pulmonary hypertension (moderated from 2016 echo and chf exacerbation   Case discussed with Dr. Naresh Garland  2. Copd exacerbation and possible hcap  Will continue breathing tx with xopenex. pulmocort and brovana, continue levaquin  3 afib with rvr  Rate controlled per cardizem now, optimize the electrolytes, on warfarin   4.  chf exacerbation -diastolic from previous echo  Will continue lasix ,repeated echo. Case discussed with Dr. Ivan Russell, he will see pt   5. Hyperkalemia  Improving K 5.6 now ,will continue monitor   6.elevated trop  Trending up, case discussed with  Dr. Ivan Russell, no chest pain   7. DM type II ,   -long term insulin ,   -on lantus, ssi, hypoglycemia protocol ,elevated due to steroid, will add 5 units insulin with steroid  8 HTN, accelerated  Continue home medication. 9. Pulmonary hypertension   moderate from 2016 echo, will reevaluate   10 hermelinda   Reported not on cpap     Subjective: sob better   Nurse: desat last night, resolved after bipap     Review of systems:    General: No fevers or chills. Cardiovascular: No chest pain or pressure. No palpitations. Pulmonary: shortness of breath a little bit better,   Gastrointestinal: No nausea, vomiting. Vital signs/Intake and Output:  Visit Vitals    /72    Pulse 75    Temp 97.9 °F (36.6 °C)    Resp 21    Ht 5' 5\" (1.651 m)    Wt 104.3 kg (230 lb)    SpO2 99%    BMI 38.27 kg/m2     Current Shift:     Last three shifts:  01/29 1901 - 01/31 0700  In: -   Out: 1072 [Urine:3550]    Physical Exam:  General: WD, WN. Alert, cooperative, no acute distress    HEENT: NC, Atraumatic. PERRLA, anicteric sclerae. bipap mask noted   Lungs: CTA Bilaterally. No Wheezing/Rhonchi/Rales. Heart:  Regular  rhythm,  No murmur, No Rubs, No Gallops  Abdomen: Soft, Non distended, Non tender.  +Bowel sounds,   Extremities: No c/c/e  Psych:   Not anxious or agitated. Neurologic:  No acute neurological deficit.              Labs: Results:       Chemistry Recent Labs      01/31/17   0416 01/30/17 2000   GLU  268*  395*   NA  137  134*   K  5.6*  6.2*   CL  99*  97*   CO2  35*  32   BUN  42*  44*   CREA  1.16  1.20   CA  9.1  8.9   AGAP  3  5   BUCR  36*  37*   AP  162*  177*   TP  7.1  7.5   ALB  3.4  3.6   GLOB  3.7  3.9   AGRAT  0.9  0.9      CBC w/Diff Recent Labs      01/31/17   0416  01/30/17 2000   WBC  10.7  11.9   RBC  3.63*  3.64*   HGB  11.7* 11.7*   HCT  37.4  37.7   PLT  202  224   GRANS  99*  93*   LYMPH  1*  1*   EOS  0  0      Cardiac Enzymes Recent Labs      01/31/17 0416 01/30/17 2000   CPK  29  36   CKND1  7.9*  4.4*      Coagulation Recent Labs      01/31/17 0416 01/30/17 2000   PTP  28.1*  26.4*   INR  2.8*  2.6*   APTT   --   29.2       Lipid Panel No results found for: CHOL, CHOLPOCT, CHOLX, CHLST, CHOLV, [id number], HDL, LDL, NLDLCT, DLDL, LDLC, DLDLP, 912789, VLDLC, VLDL, TGL, TGLX, TRIGL, [id number], TRIGP, TGLPOCT, 718410, CHHD, CHHDX   BNP No results for input(s): BNPP in the last 72 hours.    Liver Enzymes Recent Labs      01/31/17 0416   TP  7.1   ALB  3.4   AP  162*   SGOT  17      Thyroid Studies Lab Results   Component Value Date/Time    TSH 0.31 01/01/2016 10:20 PM        Procedures/imaging: see electronic medical records for all procedures/Xrays and details which were not copied into this note but were reviewed prior to creation of Anna Wynn MD

## 2017-01-31 NOTE — CONSULTS
[facility] Consult Note      Patient: Delta Horvath MRN: [id number]  SSN: [id number]    YOB: 1945  Age: 66 y.o. Sex: female    Date of Consultation: 01/31/17  Referring Physician: Gerhardt Natter MD  Reason for Consultation: Heart failure and abnormal troponin    Chief complain: Shortness of breath    HPI: 66year old female admitted with shortness of breath and being managed for COPD exacerbation and diastolic heart failure. She was recently at [facility] and recently seen in her cardiologist clinic. C/o worsening of shortness of breath for past few days, denies any orthopnea or PND. C?o leg swelling. Denies any chest pain or palpitation. C/o cough but not sure about fever. Denies excess of fluid intake at home.      Past Medical History   Diagnosis Date    A-fib [facility])     Arthritis     Back injury     Chronic obstructive pulmonary disease ([facility] [address] [facility]     Diabetes ([facility] [address] [facility]     Fibromyalgia     Heart failure (HCC)     Hypertension     MRSA (methicillin resistant Staphylococcus aureus)      Past Surgical History   Procedure Laterality Date    Pr cardiac surg procedure unlist      Hx coronary stent placement      Hx knee replacement      Hx orthopaedic       bilateral knee replacement     Current Facility-Administered Medications   Medication Dose Route Frequency    warfarin (COUMADIN) tablet 2.5 mg  2.5 mg Oral ONCE    dilTIAZem (CARDIZEM) IR tablet 30 mg  30 mg Oral TIDAC    methylPREDNISolone (PF) (SOLU-MEDROL) injection 40 mg  40 mg IntraVENous Q6H    pantoprazole (PROTONIX) 40 mg in sodium chloride 0.9 % 10 mL injection  40 mg IntraVENous DAILY    insulin lispro (HUMALOG) injection   SubCUTAneous AC&HS    insulin lispro (HUMALOG) injection 5 Units  5 Units SubCUTAneous AC&HS    levalbuterol (XOPENEX) nebulizer soln 1.25 mg/0.5 ml  1.25 mg Nebulization Q4H RT    arformoterol (BROVANA) neb solution 15 mcg  15 mcg Nebulization BID RT    budesonide (PULMICORT) 500 mcg/2 ml nebulizer suspension  500 mcg Nebulization BID RT    furosemide (LASIX) injection 40 mg  40 mg IntraVENous BID    acetaminophen (TYLENOL) tablet 650 mg  650 mg Oral Q4H PRN    diphenhydrAMINE (BENADRYL) injection 12.5 mg  12.5 mg IntraVENous Q4H PRN    ondansetron (ZOFRAN) injection 4 mg  4 mg IntraVENous Q4H PRN    docusate sodium (COLACE) capsule 100 mg  100 mg Oral BID    insulin glargine (LANTUS) injection 40 Units  40 Units SubCUTAneous QHS    glucose chewable tablet 16 g  4 Tab Oral PRN    glucagon (GLUCAGEN) injection 1 mg  1 mg IntraMUSCular PRN    dextrose (D50W) injection syrg 12.5-25 g  25-50 mL IntraVENous PRN    levoFLOXacin (LEVAQUIN) 750 mg in D5W IVPB  750 mg IntraVENous Q24H    WARFARIN INFORMATION NOTE (COUMADIN)   Other Rx Dosing/Monitoring       Allergies and Intolerances: Allergies   Allergen Reactions    Latex Hives    Adhesive Tape-Silicones Unknown (comments)    Bees [Sting, Bee] Unknown (comments)    Garlic Nausea and Vomiting    Zoloft [Sertraline] Hives       Family History:   History reviewed. No pertinent family history. Social History:   She  reports that she has never smoked. She does not have any smokeless tobacco history on file. She  reports that she does not drink alcohol. Review of Systems:     Gen: No fever, chills, malaise, weight loss/gain. Heent: No headache, rhinorrhea, epistaxis, ear pain, hearing loss, sinus pain, neck pain/stiffness, sore throat. Heart: Positive shortness of breath and leg edema, No chest pain, palpitations, pnd, or orthopnea. Resp: Positive cough, No hemoptysis, wheezing   GI: No nausea, vomiting, diarrhea, constipation, melena or hematochezia. : No urinary obstruction, dysuria or hematuria. Derm: No rash, new skin lesion or pruritis. Musc/skeletal: no bone or joint complains. Vasc: No edema, cyanosis or claudication. Endo: No heat/cold intolerance, no polyuria,polydipsia or polyphagia.    Neuro: No unilateral weakness, numbness, tingling. No seizures. Heme: No easy bruising or bleeding. Physical:   Patient Vitals for the past 6 hrs:   Temp Pulse Resp BP SpO2   01/31/17 1139 97.5 °F (36.4 °C) 85 21 134/69 92 %   01/31/17 1100 - 82 21 134/69 93 %   01/31/17 1000 - 76 23 149/64 96 %   01/31/17 0900 - 74 19 129/59 94 %   01/31/17 0831 - 70 - - -   01/31/17 0800 98.1 °F (36.7 °C) 86 25 122/64 90 %   01/31/17 0755 98.1 °F (36.7 °C) 82 23 137/72 96 %   01/31/17 0730 - 67 - 122/64 -   01/31/17 0700 - 75 21 137/72 99 %         Exam:   General Appearance: Comfortable, not using accessory muscles of respiration. HEENT: SIRIA. HEAD: Atraumatic  NECK: No JVD, no thyroidomeglay. CAROTIDS: No bruit   LUNGS: Air entry reduced bilateral, no wheezing   HEART: S1+S2 audible, irregularly irregular, no murmur, no pericardial rub. ABD: Non-tender, BS Audible    EXT: + leg edema, and no cyanosis. VASCULAR EXAM: Pulses are intact. PSYCHIATRIC EXAM: Mood is appropriate. MUSCULOSKELETAL: Grossly no joint deformity. NEUROLOGICAL: AAO times 3, Motor and sensory sytem intact.     Review of Data:   LABS:   Lab Results   Component Value Date/Time    WBC 10.7 01/31/2017 04:16 AM    HGB 11.7 01/31/2017 04:16 AM    HCT 37.4 01/31/2017 04:16 AM    PLATELET 015 57/66/1537 04:16 AM     Lab Results   Component Value Date/Time    Sodium 137 01/31/2017 04:16 AM    Potassium 5.3 01/31/2017 10:00 AM    Chloride 99 01/31/2017 04:16 AM    CO2 35 01/31/2017 04:16 AM    Glucose 268 01/31/2017 04:16 AM    BUN 42 01/31/2017 04:16 AM    Creatinine 1.16 01/31/2017 04:16 AM     No results found for: CHOL, CHOLX, CHLST, CHOLV, HDL, LDL, DLDL, LDLC, DLDLP, TGL, TGLX, TRIGL, TRIGP  No results found for: GPT  Lab Results   Component Value Date/Time    Hemoglobin A1c 8.0 01/30/2017 08:00 PM         Cardiology Procedures:   Results for orders placed or performed during the hospital encounter of 01/30/17   EKG, 12 LEAD, INITIAL   Result Value Ref Range    Ventricular Rate 106 BPM    Atrial Rate 138 BPM    QRS Duration 60 ms    Q-T Interval 330 ms    QTC Calculation (Bezet) 438 ms    Calculated R Axis 63 degrees    Calculated T Axis 45 degrees    Diagnosis       Atrial fibrillation with rapid ventricular response  Low voltage QRS  Cannot rule out Anteroseptal infarct (cited on or before 26-OCT-2011)  Abnormal ECG  When compared with ECG of 01-JAN-2016 16:24,  Atrial fibrillation has replaced Atrial flutter    Confirmed by Lucila Barber MD. ([id number]) on 1/30/2017 9:43:20 PM             Impression / Plan:    Patient Active Problem List   Diagnosis Code    Acute on chronic diastolic CHF (congestive heart failure) ([facility]) I50.33    ALEJANDRINA (acute kidney injury) ([facility] [address] N17.9    Acute exacerbation of CHF (congestive heart failure) ([facility] [address] I50.9    DM (diabetes mellitus) ([facility] [address] E11.9    Hypertension I10    Respiratory failure ([facility] [address] J96.90    Acute coronary syndrome ([facility]) I24.9    Obstructive sleep apnea, adult G47.33    Paroxysmal atrial fibrillation ([facility]) I48.0    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 ([facility]) C03.76, E11.65    Dyspnea due to congestive heart failure ([facility]) I50.9    Aspiration pneumonia ([facility]) J69.0    Hyperkalemia E87.5    Elevated troponin R79.89    Acute respiratory distress ([facility]) R06.00    COPD (chronic obstructive pulmonary disease) ([facility]) J44.9    CHF (congestive heart failure) ([facility]) I50.9    Insufficiency, respiratory, acute ([facility]) R06.89    Infiltrate noted on imaging study R93.8    Chronic respiratory failure with hypoxia ([facility]) J96.11    COPD exacerbation ([facility]) J44.1     EKG revealed atrial fibrillation, anteroseptal infarct age undetermined  Abnormal troponin    Last Echocardiogram was done in 01/2016  Continue current management  Echocardiogram  Further cardiac work up as clinically indicated  Continue management as per hospital medicine.          Signed By: Aide Lynch MD     January 31, 2017

## 2017-01-31 NOTE — PROGRESS NOTES
Pharmacy Dosing Services: warfarin    Consult for Warfarin Dosing by Pharmacy by Dr. Kobe Tenorio provided for this 66 y.o.  female , for indication of Venous Thrombosis. Day of Therapy continuing from home  Dose to achieve an INR goal of 2-3    Order entered for  Warfarin  2.5 (mg) ordered to be given 01/31/2017 at 18:00. Significant drug interactions: levofloxacin  LABS    PT/INR Lab Results   Component Value Date/Time    INR 2.6 01/30/2017 08:00 PM      Platelets Lab Results   Component Value Date/Time    PLATELET 154 92/52/3394 08:00 PM      H/H     Lab Results   Component Value Date/Time    HGB 11.7 01/30/2017 08:00 PM        Warfarin Administrations (last 168 hours)       None              Pharmacy to follow daily and will provide subsequent Warfarin dosing based on clinical status.   Niecy Calhoun, ROQUED)  Contact information

## 2017-01-31 NOTE — ED PROVIDER NOTES
HPI Comments: 8: 21 PM  Fatimah Bautista is a 66 y.o. female h/o CHF presenting to the ED C/O respiratory distress PTA. Pt is normally on 5L NC continuous at home. Pt was recently discharged from [facility] for COPD. Pt takes fluid pills. Pt was also seem 2 days ago at [address] [employer] and had a negative US for DVT. Pt states she never smoked tobacco. Pt denies CP, fever, chills, and cough, and any other Sx or complaints. Written by JILLIAN Wan, as dictated by Maria T Hall MD.      Patient is a 66 y.o. female presenting with respiratory distress syndrome. The history is provided by the patient. No  was used. Respiratory Distress   This is a new problem. Pertinent negatives include no fever, no headaches, no rhinorrhea, no sore throat, no cough, no chest pain, no vomiting, no abdominal pain and no rash. She has had prior ED visits. Associated medical issues include COPD. Past Medical History:   Diagnosis Date    A-fib [facility])     Arthritis     Back injury     Chronic obstructive pulmonary disease (HCC)     Diabetes ([facility] [address]     Fibromyalgia     Heart failure (HCC)     Hypertension     MRSA (methicillin resistant Staphylococcus aureus)        Past Surgical History:   Procedure Laterality Date    Pr cardiac surg procedure unlist      Hx coronary stent placement      Hx knee replacement      Hx orthopaedic       bilateral knee replacement         History reviewed. No pertinent family history. Social History     Social History    Marital status:      Spouse name: N/A    Number of children: N/A    Years of education: N/A     Occupational History    Not on file. Social History Main Topics    Smoking status: Never Smoker    Smokeless tobacco: Not on file    Alcohol use No    Drug use: No    Sexual activity: Not on file     Other Topics Concern    Not on file     Social History Narrative         ALLERGIES: Latex; Adhesive tape-silicones;  Bees [sting, bee]; Garlic; and Zoloft [sertraline]    Review of Systems   Constitutional: Negative for chills, fatigue and fever. HENT: Negative for rhinorrhea and sore throat. Respiratory: Positive for shortness of breath. Negative for cough. Cardiovascular: Negative for chest pain and palpitations. Gastrointestinal: Negative for abdominal pain, diarrhea, nausea and vomiting. Genitourinary: Negative for difficulty urinating and dysuria. Musculoskeletal: Negative for arthralgias and myalgias. Skin: Negative for color change and rash. Neurological: Negative for light-headedness and headaches. All other systems reviewed and are negative. Vitals:    01/30/17 2011   BP: (!) 150/117   Pulse: (!) 106   Resp: 30   Temp: 97.7 °F (36.5 °C)   SpO2: 100%   Weight: 104.3 kg (230 lb)   Height: 5' 5\" (1.651 m)            Physical Exam   Constitutional: She is oriented to person, place, and time. She appears well-developed and well-nourished. HENT:   Head: Normocephalic and atraumatic. Right Ear: External ear normal.   Left Ear: External ear normal.   Nose: Nose normal.   Mouth/Throat: Oropharynx is clear and moist.   Eyes: Conjunctivae and EOM are normal. Pupils are equal, round, and reactive to light. Right eye exhibits no discharge. Left eye exhibits no discharge. No scleral icterus. Neck: Normal range of motion. Neck supple. JVD (Questionable) present. No tracheal deviation present. Cardiovascular: Normal rate, regular rhythm, normal heart sounds and intact distal pulses. Pulmonary/Chest: She is in respiratory distress. She has no rales. Crackles right lower lobe. Accessory neck muscle use. No tracheal deviation. Abdominal: Soft. Bowel sounds are normal. She exhibits no distension. There is no tenderness. No HSM   Musculoskeletal: Normal range of motion. She exhibits edema. Trace edema right and 2+ pitting edema on the left. Neurological: She is alert and oriented to person, place, and time.  She has normal reflexes. No cranial nerve deficit. She exhibits normal muscle tone. Coordination normal.   No focal motor weakness. Skin: Skin is warm and dry. No rash noted. Psychiatric: She has a normal mood and affect. Her behavior is normal.   Nursing note and vitals reviewed. RESULTS:    EKG interpretation: (Preliminary)  Afib with rapid ventricular response, Rate: 106 bpm, No STEMI  EKG read by Cammy Joseph MD at 7:58 PM     9:01 PM  RADIOLOGY FINDINGS  Chest X-ray shows CHF with possible infiltrate right lower lobe, plus or minus left lower lobe. Pending review by Radiologist  Recorded by Pete Meter, ED Scribe, as dictated by Cammy Joseph MD       XR CHEST PORT   Final Result   Hypoinflation. Interval extubation and removal of NG tube. Improvement left  basilar process with mild residual. No other definitive change given low lung  Volumes. As read by the radiologist.        Labs Reviewed   CBC WITH AUTOMATED DIFF - Abnormal; Notable for the following:        Result Value    RBC 3.64 (*)     HGB 11.7 (*)     .6 (*)     RDW 16.1 (*)     NEUTROPHILS 93 (*)     LYMPHOCYTES 1 (*)     ABS. NEUTROPHILS 11.1 (*)     ABS. LYMPHOCYTES 0.1 (*)     All other components within normal limits   METABOLIC PANEL, COMPREHENSIVE - Abnormal; Notable for the following:     Sodium 134 (*)     Potassium 6.2 (*)     Chloride 97 (*)     Glucose 395 (*)     BUN 44 (*)     BUN/Creatinine ratio 37 (*)     GFR est AA 54 (*)     GFR est non-AA 44 (*)     Bilirubin, total 1.5 (*)     Alk.  phosphatase 177 (*)     All other components within normal limits   CARDIAC PANEL,(CK, CKMB & TROPONIN) - Abnormal; Notable for the following:     CK-MB Index 4.4 (*)     Troponin-I, Qt. 0.27 (*)     All other components within normal limits   MAGNESIUM - Abnormal; Notable for the following:     Magnesium 2.6 (*)     All other components within normal limits   URINALYSIS W/ RFLX MICROSCOPIC - Abnormal; Notable for the following: Protein TRACE (*)     Glucose >1000 (*)     All other components within normal limits   PROTHROMBIN TIME + INR - Abnormal; Notable for the following:     Prothrombin time 26.4 (*)     INR 2.6 (*)     All other components within normal limits   PRO-BNP - Abnormal; Notable for the following:     NT pro-BNP 13678 (*)     All other components within normal limits   URINE MICROSCOPIC ONLY - Abnormal; Notable for the following:     Bacteria FEW (*)     Amorphous Crystals FEW (*)     All other components within normal limits   POC G3 - Abnormal; Notable for the following:     pH (POC) 7.313 (*)     pCO2 (POC) 60.0 (*)     pO2 (POC) 218 (*)     HCO3 (POC) 30.4 (*)     sO2 (POC) 100 (*)     All other components within normal limits   CULTURE, BLOOD   PTT   LACTIC ACID, PLASMA   POTASSIUM   CARDIAC PANEL,(CK, CKMB & TROPONIN)   POC GLUCOSE       Recent Results (from the past 12 hour(s))   EKG, 12 LEAD, INITIAL    Collection Time: 01/30/17  7:58 PM   Result Value Ref Range    Ventricular Rate 106 BPM    Atrial Rate 138 BPM    QRS Duration 60 ms    Q-T Interval 330 ms    QTC Calculation (Bezet) 438 ms    Calculated R Axis 63 degrees    Calculated T Axis 45 degrees    Diagnosis       Atrial fibrillation with rapid ventricular response  Low voltage QRS  Cannot rule out Anteroseptal infarct (cited on or before 26-OCT-2011)  Abnormal ECG  When compared with ECG of 01-JAN-2016 16:24,  Atrial fibrillation has replaced Atrial flutter    Confirmed by Odilia Rowe MD. ([id number]) on 1/30/2017 9:43:20 PM     CBC WITH AUTOMATED DIFF    Collection Time: 01/30/17  8:00 PM   Result Value Ref Range    WBC 11.9 4.6 - 13.2 K/uL    RBC 3.64 (L) 4.20 - 5.30 M/uL    HGB 11.7 (L) 12.0 - 16.0 g/dL    HCT 37.7 35.0 - 45.0 %    .6 (H) 74.0 - 97.0 FL    MCH 32.1 24.0 - 34.0 PG    MCHC 31.0 31.0 - 37.0 g/dL    RDW 16.1 (H) 11.6 - 14.5 %    PLATELET 259 096 - 334 K/uL    MPV 10.3 9.2 - 11.8 FL    NEUTROPHILS 93 (H) 42 - 75 %    BANDS 1 0 - 5 % LYMPHOCYTES 1 (L) 20 - 51 %    MONOCYTES 5 2 - 9 %    EOSINOPHILS 0 0 - 5 %    BASOPHILS 0 0 - 3 %    ABS. NEUTROPHILS 11.1 (H) 1.8 - 8.0 K/UL    ABS. LYMPHOCYTES 0.1 (L) 0.8 - 3.5 K/UL    ABS. MONOCYTES 0.6 0 - 1.0 K/UL    ABS. EOSINOPHILS 0.0 0.0 - 0.4 K/UL    ABS. BASOPHILS 0.0 0.0 - 0.1 K/UL    RBC COMMENTS ANISOCYTOSIS  1+        RBC COMMENTS MICROCYTOSIS  1+        RBC COMMENTS MACROCYTOSIS  1+        DF MANUAL     METABOLIC PANEL, COMPREHENSIVE    Collection Time: 01/30/17  8:00 PM   Result Value Ref Range    Sodium 134 (L) 136 - 145 mmol/L    Potassium 6.2 (HH) 3.5 - 5.5 mmol/L    Chloride 97 (L) 100 - 108 mmol/L    CO2 32 21 - 32 mmol/L    Anion gap 5 3.0 - 18 mmol/L    Glucose 395 (H) 74 - 99 mg/dL    BUN 44 (H) 7.0 - 18 MG/DL    Creatinine 1.20 0.6 - 1.3 MG/DL    BUN/Creatinine ratio 37 (H) 12 - 20      GFR est AA 54 (L) >60 ml/min/1.73m2    GFR est non-AA 44 (L) >60 ml/min/1.73m2    Calcium 8.9 8.5 - 10.1 MG/DL    Bilirubin, total 1.5 (H) 0.2 - 1.0 MG/DL    ALT 23 13 - 56 U/L    AST 21 15 - 37 U/L    Alk.  phosphatase 177 (H) 45 - 117 U/L    Protein, total 7.5 6.4 - 8.2 g/dL    Albumin 3.6 3.4 - 5.0 g/dL    Globulin 3.9 2.0 - 4.0 g/dL    A-G Ratio 0.9 0.8 - 1.7     CARDIAC PANEL,(CK, CKMB & TROPONIN)    Collection Time: 01/30/17  8:00 PM   Result Value Ref Range    CK 36 26 - 192 U/L    CK - MB 1.6 0.5 - 3.6 ng/ml    CK-MB Index 4.4 (H) 0.0 - 4.0 %    Troponin-I, Qt. 0.27 (H) 0.00 - 0.06 NG/ML   MAGNESIUM    Collection Time: 01/30/17  8:00 PM   Result Value Ref Range    Magnesium 2.6 (H) 1.8 - 2.4 mg/dL   PTT    Collection Time: 01/30/17  8:00 PM   Result Value Ref Range    aPTT 29.2 23.0 - 36.4 SEC   PROTHROMBIN TIME + INR    Collection Time: 01/30/17  8:00 PM   Result Value Ref Range    Prothrombin time 26.4 (H) 11.5 - 15.2 sec    INR 2.6 (H) 0.8 - 1.2     PRO-BNP    Collection Time: 01/30/17  8:00 PM   Result Value Ref Range    NT pro-BNP 08028 (H) 0 - 900 PG/ML   LACTIC ACID, PLASMA    Collection Time: 01/30/17  8:00 PM   Result Value Ref Range    Lactic acid 1.4 0.4 - 2.0 MMOL/L   POC G3    Collection Time: 01/30/17  8:17 PM   Result Value Ref Range    Device: CPAP      pH (POC) 7.313 (L) 7.35 - 7.45      pCO2 (POC) 60.0 (H) 35.0 - 45.0 MMHG    pO2 (POC) 218 (H) 80 - 100 MMHG    HCO3 (POC) 30.4 (H) 22 - 26 MMOL/L    sO2 (POC) 100 (H) 92 - 97 %    Base excess (POC) 4 mmol/L    Allens test (POC) YES      Bleed In 10 L/min    Total resp. rate 27      Site RIGHT RADIAL      Specimen type (POC) ARTERIAL      Performed by Alyssa MCCARTHY/ TYRONE MICROSCOPIC    Collection Time: 01/30/17  8:56 PM   Result Value Ref Range    Color YELLOW      Appearance CLEAR      Specific gravity 1.018 1.005 - 1.030      pH (UA) 5.0 5.0 - 8.0      Protein TRACE (A) NEG mg/dL    Glucose >1000 (A) NEG mg/dL    Ketone NEGATIVE  NEG mg/dL    Bilirubin NEGATIVE  NEG      Blood NEGATIVE  NEG      Urobilinogen 1.0 0.2 - 1.0 EU/dL    Nitrites NEGATIVE  NEG      Leukocyte Esterase NEGATIVE  NEG     URINE MICROSCOPIC ONLY    Collection Time: 01/30/17  8:56 PM   Result Value Ref Range    WBC NEGATIVE  0 - 5 /hpf    RBC 0 to 2 0 - 5 /hpf    Epithelial cells NEGATIVE  0 - 5 /lpf    Bacteria FEW (A) NEG /hpf    Mucus NEGATIVE  NEG /lpf    Amorphous Crystals FEW (A) NEG          MDM  Number of Diagnoses or Management Options  Acute congestive heart failure, unspecified congestive heart failure type [facility]):   Acute respiratory distress ([address]): Infiltrate noted on imaging study:   Diagnosis management comments: DDx: CHF, COPD, MI, plural effusion, mass; doubt PE in the patient is taking Coumadin with a therapeutic INR.         Amount and/or Complexity of Data Reviewed  Clinical lab tests: ordered and reviewed  Tests in the radiology section of CPT®: ordered and reviewed (CXR)  Independent visualization of images, tracings, or specimens: yes (CXR, EKG)    Critical Care  Total time providing critical care: 30-74 minutes (60)    ED Course     MEDICATIONS GIVEN:  Medications   levoFLOXacin (LEVAQUIN) 750 mg in D5W IVPB (750 mg IntraVENous New Bag 1/30/17 2205)   levalbuterol (XOPENEX) nebulizer soln 1.25 mg/0.5 ml (not administered)   arformoterol (BROVANA) neb solution 15 mcg (not administered)   budesonide (PULMICORT) 500 mcg/2 ml nebulizer suspension (not administered)   dilTIAZem (CARDIZEM) IR tablet 60 mg (not administered)   sodium chloride 0.9 % bolus infusion 1,000 mL (1,000 mL IntraVENous New Bag 1/30/17 2022)   albuterol-ipratropium (DUO-NEB) 2.5 MG-0.5 MG/3 ML (3 mL Nebulization Given 1/30/17 2021)   calcium gluconate injection 1 g (1 g IntraVENous Given 1/30/17 2205)   insulin regular (NOVOLIN R, HUMULIN R) injection 8 Units (8 Units IntraVENous Given 1/30/17 2205)   furosemide (LASIX) injection 40 mg (40 mg IntraVENous Given 1/30/17 2205)        Procedures    PROGRESS NOTE:   8:20 PM  Initial assessment performed. Written by Collette Berg, ED Scribe, as dictated by Ada Lepe MD.    CONSULT NOTE:   9:49 Ari Mcmillan MD spoke with Lacey Marley MD   Specialty: Internal Medicine  Discussed pt's hx, disposition, and available diagnostic and imaging results. Reviewed care plans. Consulting physician agrees with plans as outlined. Agrees to admit to telemetry. Written by Collette Berg, ED Scribe, as dictated by Ada Lepe MD    ADMISSION NOTE:  9:49 PM  Patient is being admitted to the hospital by Lacey Marley MD. The results of their tests and reasons for their admission have been discussed with them and/or available family. They convey agreement and understanding for the need to be admitted and for their admission diagnosis. Written by Collette Berg, ED Scribe, as dictated by Ada Lepe MD.    CONDITIONS ON ADMISSION:  Urinary Tract Infection is not present at the time of admission. Pneumonia is present at the time of admission. MRSA is not present at the time of admission.  Wound infection is not present at the time of admission. Pressure Ulcer is not present at the time of admission. CLINICAL IMPRESSION    1. Acute respiratory distress (HCC)    2. Acute congestive heart failure, unspecified congestive heart failure type ([address]    3. Infiltrate noted on imaging study         Attestations: This note is prepared by Syl Davidson, acting as Scribe for Joel Jacobo MD.    Joel Jacobo MD: The scribe's documentation has been prepared under my direction and personally reviewed by me in its entirety. I confirm that the note above accurately reflects all work, treatment, procedures, and medical decision making performed by me.     8:50 PM  I have spent 60 minutes of critical care time involved in lab review, consultations with specialist, family decision-making, and documentation. During this entire length of time I was immediately available to the patient. Critical Care: The reason for providing this level of medical care for this critically ill patient was due a critical illness that impaired one or more vital organ systems such that there was a high probability of imminent or life threatening deterioration in the patients condition. This care involved high complexity decision making to assess, manipulate, and support vital system functions, to treat this degreee vital organ system failure and to prevent further life threatening deterioration of the patients condition.

## 2017-01-31 NOTE — ROUTINE PROCESS
0720- Bedside and Verbal shift change report given to Evelyn Brown RN (oncoming nurse) by Adeel Rush RN (offgoing nurse). Report included the following information SBAR, Kardex, Intake/Output, MAR, Recent Results and Cardiac Rhythm A-fib.     8647- Patient placed on ventimask for O2 levels of 88% on 6LNC. Will continue to monitor. 0984- O2 remains 88%-90%, patient placed on high flow O2 at 40L and 60%. Will continue to monitor. 1000- hi-flow NC decreased to 40L at 55% with O2 sats 94%-95%, will continue to monitor. 1139- hi-flow NC decrease to 40L NC at 50%. 1248- Lunch tray set up for patient. [id number] in at the bedside. 1418- Occupational health therapist in at the bedside. 1500- Echo in at the bedside. 1700- Patient resting quietly with television on, will continue to monitor. 1920- Bedside and Verbal shift change report given to Christina Castro RN (oncoming nurse) by Evelyn Brown RN (offgoing nurse).  Report included the following information SBAR, Kardex, Intake/Output, MAR, Recent Results and Cardiac Rhythm A-FIb.

## 2017-01-31 NOTE — INTERDISCIPLINARY ROUNDS
CRITICAL CARE INTERDISCIPLINARY ROUNDS    Patient Information:    Name:   Leeroy Bowers    Age:   66 y.o. Admission Date:   1/30/2017    Critical Care Day: 2    Surgery Date:      Attending Provider:   David Anna MD    Surgeon:        Consultant(s):   Bessie Harris    Critical Care Physician:  Bessie Harris    Code Status: Full Code    Problem List:     Patient Active Problem List   Diagnosis Code    Acute on chronic diastolic CHF (congestive heart failure) ([facility]) I50.33    ALEJANDRINA (acute kidney injury) ([facility] [address] N17.9    Acute exacerbation of CHF (congestive heart failure) ([facility] [address] I50.9    DM (diabetes mellitus) ([facility] [address] E11.9    Hypertension I10    Respiratory failure ([facility] [address] J96.90    Acute coronary syndrome ([facility] [address] I24.9    Obstructive sleep apnea, adult G47.33    Paroxysmal atrial fibrillation ([facility] [address] I48.0    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 ([facility]) H99.72, E11.65    Dyspnea due to congestive heart failure ([facility]) I50.9    Aspiration pneumonia ([facility]) J69.0    Hyperkalemia E87.5    Elevated troponin R79.89    Acute respiratory distress ([facility]) R06.00    COPD (chronic obstructive pulmonary disease) ([facility]) J44.9    CHF (congestive heart failure) ([facility]) I50.9    Insufficiency, respiratory, acute ([facility]) R06.89    Infiltrate noted on imaging study R93.8    Chronic respiratory failure with hypoxia ([facility]) J96.11    COPD exacerbation ([facility]) J44.1       Principal Problem:  Acute on chronic diastolic CHF (congestive heart failure) ([facility])    During rounds the following quality care indicators and evidence based practices were addressed :  DVT Prophylaxis and PUD Prophylaxis [place] Day 2 ([facility]) ; Central Line Day:na Isolation:na;  Antibiotic Stewardship: reviewed; Probiotics Necessary: na    Ventilator Bundle:      Sepsis Order Set:     Glycemic Control:   Recent Labs      01/31/17   0416  01/30/17 2000   GLU  268*  395*   ;  Recent Labs      01/30/17 2017   PHI  7.313*   PCO2I  60.0*   PO2I  218* Adjustments     Acute MI/PCI:   Not applicable    Door to Balloon: Admission Time: 1956      Heart Failure:    EF:%%     SCIP Measures for other Surgeries:   Not applicable     Pneumonia:    Not applicable    Interdisciplinary team rounds were held with the following team membersCare Management, Diabetes   Treatment Specialist, Nursing, Nutrition, Pharmacy, Physician and Respiratory Therapy. Plan of care discussed. Goals of Care/ Recommendations: Echo today, additional Lasix today, monitoring potassium, restart coumadin, diet changed to carb consistency. Care management aware that she is high risk for readmission and aware of her and her case. Will monitor another day . Need to discuss with goals of care with patient    See clinical pathway and/or care plan for interventions and desired outcomes.     Critical Care Discharge Status:  Red

## 2017-01-31 NOTE — H&P
History & Physical    Patient: Billy Martin MRN: [id number]  CSN: [id number]    YOB: 1945  Age: 66 y.o. Sex: female      DOA: 1/30/2017  Primary Care Provider:  Billy Wilder MD      Assessment/Plan     Patient Active Problem List   Diagnosis Code    Acute on chronic diastolic CHF (congestive heart failure) ([facility]) I50.33    ALEJANDRINA (acute kidney injury) ([facility] [address] N17.9    Acute exacerbation of CHF (congestive heart failure) ([facility]) I50.9    DM (diabetes mellitus) ([facility] [address] E11.9    Hypertension I10    Respiratory failure ([facility] [address] J96.90    Acute coronary syndrome ([facility] [address] I24.9    Obstructive sleep apnea, adult G47.33    Paroxysmal atrial fibrillation ([facility]) I48.0    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 ([facility]) W17.87, E11.65    Dyspnea due to congestive heart failure ([facility]) I50.9    Aspiration pneumonia ([facility]) J69.0    Hyperkalemia E87.5    Elevated troponin R79.89    Acute respiratory distress ([facility]) R06.00         Admit to ICU   1. Acute respiratory distress with chronic respiratory failure  She is off  bipap now, still labor breathing , hx of intubation in 2016. Case discussed with Dr. Evangelist Marcano   Multiple factors, copd exacerbation, possible hcap, hermelinda, Pulmonary hypertension (moderated from 2016 echo and chf exacerbation   2. Copd exacerbation and possible hcap  Will continue breathing tx with xopenex. pulmocort and brovana, continue levaquin  3 afib with rvr   will give cardizem now, optimize the electrolytes, on warfarin   4. chf exacerbation -diastolic from previous echo  Will continue lasix ,repeated echo  5. Hyperkalemia  Received lasix, insulin and breathing tx , Q6h monitor, no tall T, received ca gluconate  6.elevated trop   will monitor the trend ,echo   7. DM type II ,   -long term insulin ,   -on lantus, ssi, hypoglycemia protocol     8 HTN, accelerated  Continue home medication.   9. Pulmonary hypertension   moderate from 2016 echo, will reevaluate   10 hermelinda Reported not on cpap   11  full code        DVT : lovenox. ppi proph  CC: sob        HPI:     Patience Renee is a 66 y.o. female who hx of  Chf, a fib on warfarin, DM II, pulmonary hypertension,copd home O2 use  came to ED due to sob since d/c from [place],. She was admitted to [place] due to sob and was started there for five days, she was d.c home, her sob not improving,but worsening, reported that her medication for afib was hold. She can not breathing with wheezing. In Ed, she was found :  Pro-bnp was 67179. ekg : afib with rvr. . K 6.2. Cxr: hypoinflation . Trop 0.27 wbc 11.9 with left shift, lactic acid wnl. inr 2.6.  UA clear for UTI. ABG indicated hypercapnia, was put on bipap, given lasix, insulin, levaquin ,steroid and calcium gluconate    Denies any slurred speech/headache/cp/n/v/blurred vission/d/c/gait change/bleeding. Denies smoking/ any alcohol or drug use. On admission,  Visit Vitals    BP (!) 150/117 (BP 1 Location: Left arm, BP Patient Position: At rest)    Pulse (!) 106    Temp 97.7 °F (36.5 °C)    Resp 30    Ht 5' 5\" (1.651 m)    Wt 104.3 kg (230 lb)    SpO2 100%    BMI 38.27 kg/m2      O2 Device: BIPAP      Past Medical History   Diagnosis Date    A-fib [facility] [address]     Arthritis     Back injury     Chronic obstructive pulmonary disease (HCC)     Diabetes ([facility] [address]     Fibromyalgia     Heart failure (HCC)     Hypertension     MRSA (methicillin resistant Staphylococcus aureus)        Past Surgical History   Procedure Laterality Date    Pr cardiac surg procedure unlist      Hx coronary stent placement      Hx knee replacement      Hx orthopaedic       bilateral knee replacement       History reviewed. No pertinent family history.     Social History     Social History    Marital status:      Spouse name: N/A    Number of children: N/A    Years of education: N/A     Social History Main Topics    Smoking status: Never Smoker    Smokeless tobacco: None    Alcohol use No    Drug use: No    Sexual activity: Not Asked     Other Topics Concern    None     Social History Narrative       Prior to Admission medications    Medication Sig Start Date End Date Taking? Authorizing Provider   albuterol-ipratropium (DUO-NEB) 2.5 mg-0.5 mg/3 ml nebulizer solution 3 mL by Nebulization route every four (4) hours as needed for Shortness of Breath. 1/12/16  Yes Jerry Dunlap MD   codeine-butalbital-acetaminophen-caffeine (FIORICET WITH CODEINE) -01-30 mg per capsule Take  by mouth every four (4) hours as needed for Headache. Yes Paul Sloan MD   pregabalin (LYRICA) 75 mg capsule Take  by mouth. Yes Paul Sloan MD   lisinopril (PRINIVIL, ZESTRIL) 5 mg tablet Take  by mouth daily. Yes Paul Sloan MD   warfarin (COUMADIN) 2.5 mg tablet Take 2.5 mg by mouth daily. Yes Paul Sloan MD   amLODIPine (NORVASC) 5 mg tablet Take 5 mg by mouth daily. Yes Paul Sloan MD   carvedilol (COREG) 25 mg tablet Take 25 mg by mouth two (2) times daily (with meals). Yes Paul Sloan MD   spironolactone (ALDACTONE) 25 mg tablet Take  by mouth daily. Yes Paul Sloan MD   insulin glargine (LANTUS) 100 unit/mL injection 20 Units by SubCUTAneous route nightly. 9/27/15  Yes Toma Du MD   furosemide (LASIX) 40 mg tablet Take 1 Tab by mouth two (2) times a day. 9/25/15  Yes Toma Du MD   OXYCODONE HCL (OXYCODONE, BULK,) Take 20 mg by mouth every six (6) hours as needed. Yes Paul Sloan MD   ATORVASTATIN CALCIUM (LIPITOR PO) Take  by mouth. Yes Paul Sloan MD   aspirin 81 mg tablet Take 81 mg by mouth. Yes Paul Sloan MD   CYANOCOBALAMIN, VITAMIN B-12, (VITAMIN B-12 PO) Take  by mouth. Yes Paul Sloan MD   OMEPRAZOLE (PRILOSEC PO) Take  by mouth. Yes Paul Sloan MD   FERROUS SULFATE by Does Not Apply route.      Yes Paul Sloan MD       Allergies   Allergen Reactions    Latex Hives    Adhesive Tape-Silicones Unknown (comments)    Bees [Sting, Bee] Unknown (comments)    Garlic Nausea and Vomiting    Zoloft [Sertraline] Hives       Review of Systems  Gen: No fever, chills, malaise, weight loss/gain. Heent: No headache, rhinorrhea, epistaxis, ear pain, hearing loss, sinus pain, neck pain/stiffness, sore throat. Heart: No chest pain, + palpitations, GARIBAY, pnd, or orthopnea. Resp: No cough, hemoptysis, + wheezing and shortness of breath. GI: No nausea, vomiting, diarrhea, constipation, melena or hematochezia. : No urinary obstruction, dysuria or hematuria. Derm: No rash, new skin lesion or pruritis. Musc/skeletal: no bone or joint complains. Vasc: No edema, cyanosis or claudication. Endo: No heat/cold intolerance, no polyuria,polydipsia or polyphagia. Neuro: No unilateral weakness, numbness, tingling. No seizures. Heme: No easy bruising or bleeding. Physical Exam:     Physical Exam:  Visit Vitals    BP (!) 150/117 (BP 1 Location: Left arm, BP Patient Position: At rest)    Pulse (!) 106    Temp 97.7 °F (36.5 °C)    Resp 30    Ht 5' 5\" (1.651 m)    Wt 104.3 kg (230 lb)    SpO2 100%    BMI 38.27 kg/m2      O2 Device: BIPAP    Temp (24hrs), Av.7 °F (36.5 °C), Min:97.7 °F (36.5 °C), Max:97.7 °F (36.5 °C)             General:  Awake, cooperative, in acute  Distress. Labor breathing    Head:  Normocephalic, without obvious abnormality, atraumatic. Eyes:  Conjunctivae/corneas clear, sclera anicteric, PERRL, EOMs intact. Nose: Nares normal. No drainage or sinus tenderness. Throat: Lips, mucosa, and tongue normal. .   Neck: Supple, symmetrical, trachea midline, no adenopathy. Lungs:   Clear to auscultation bilaterally. Heart:  Irregular irrgular,  S1, S2 normal, + murmur, no  click, rub or gallop. Abdomen: Soft, non-tender. Bowel sounds normal. No masses,  No organomegaly. Extremities: Extremities normal, atraumatic, no cyanosis or edema. Pulses: 2+ and symmetric all extremities.    Skin: Skin color-pink, texture, turgor normal. No rashes or lesions. Capillary refill normal    Neurologic: CNII-XII intact. No focal motor or sensory deficit.        Labs Reviewed:    BMP:   Lab Results   Component Value Date/Time     (L) 01/30/2017 08:00 PM    K 6.2 (HH) 01/30/2017 08:00 PM    CL 97 (L) 01/30/2017 08:00 PM    CO2 32 01/30/2017 08:00 PM    AGAP 5 01/30/2017 08:00 PM     (H) 01/30/2017 08:00 PM    BUN 44 (H) 01/30/2017 08:00 PM    CREA 1.20 01/30/2017 08:00 PM    GFRAA 54 (L) 01/30/2017 08:00 PM    GFRNA 44 (L) 01/30/2017 08:00 PM     CMP:   Lab Results   Component Value Date/Time     (L) 01/30/2017 08:00 PM    K 6.2 (HH) 01/30/2017 08:00 PM    CL 97 (L) 01/30/2017 08:00 PM    CO2 32 01/30/2017 08:00 PM    AGAP 5 01/30/2017 08:00 PM     (H) 01/30/2017 08:00 PM    BUN 44 (H) 01/30/2017 08:00 PM    CREA 1.20 01/30/2017 08:00 PM    GFRAA 54 (L) 01/30/2017 08:00 PM    GFRNA 44 (L) 01/30/2017 08:00 PM    CA 8.9 01/30/2017 08:00 PM    MG 2.6 (H) 01/30/2017 08:00 PM    ALB 3.6 01/30/2017 08:00 PM    TP 7.5 01/30/2017 08:00 PM    GLOB 3.9 01/30/2017 08:00 PM    AGRAT 0.9 01/30/2017 08:00 PM    SGOT 21 01/30/2017 08:00 PM    ALT 23 01/30/2017 08:00 PM     CBC:   Lab Results   Component Value Date/Time    WBC 11.9 01/30/2017 08:00 PM    HGB 11.7 (L) 01/30/2017 08:00 PM    HCT 37.7 01/30/2017 08:00 PM     01/30/2017 08:00 PM     All Cardiac Markers in the last 24 hours:   Lab Results   Component Value Date/Time    CPK 36 01/30/2017 08:00 PM    CKMB 1.6 01/30/2017 08:00 PM    CKND1 4.4 (H) 01/30/2017 08:00 PM    TROIQ 0.27 (H) 01/30/2017 08:00 PM     Recent Glucose Results:   Lab Results   Component Value Date/Time     (H) 01/30/2017 08:00 PM     ABG:   Lab Results   Component Value Date/Time    PHI 7.313 (L) 01/30/2017 08:17 PM    PCO2I 60.0 (H) 01/30/2017 08:17 PM    PO2I 218 (H) 01/30/2017 08:17 PM    HCO3I 30.4 (H) 01/30/2017 08:17 PM     COAGS:   Lab Results   Component Value Date/Time    APTT 29.2 01/30/2017 08:00 PM    PTP 26. 4 (H) 01/30/2017 08:00 PM    INR 2.6 (H) 01/30/2017 08:00 PM     Liver Panel:   Lab Results   Component Value Date/Time    ALB 3.6 01/30/2017 08:00 PM    TP 7.5 01/30/2017 08:00 PM    GLOB 3.9 01/30/2017 08:00 PM    AGRAT 0.9 01/30/2017 08:00 PM    SGOT 21 01/30/2017 08:00 PM    ALT 23 01/30/2017 08:00 PM     (H) 01/30/2017 08:00 PM     Pancreatic Markers: No results found for: AMYLPOCT, AML, LIPPOCT, LPSE    Procedures/imaging: see electronic medical records for all procedures/Xrays and details which were not copied into this note but were reviewed prior to creation of Eulogio Peña MD, Internal Medicine     CC: Natalia Deras MD

## 2017-01-31 NOTE — CDMP QUERY
Please clarify if this patient is being treated/managed for:    => Acute on Chronic Respiratory Failure as evidenced by tachypnea, ABG results, requiring Bipap  =>Other Explanation of clinical findings  =>Unable to Determine (no explanation of clinical findings)    The medical record reflects the following clinical findings, treatment, and risk factors:    66yo female with COPD on home O2 @ 5L presented w/ worsening SOB. On arrival, her VS showed tachypnea RR 30, ER exam revealed \"Crackles right lower lobe. Accessory neck muscle use\". She was placed on Bipap and ABG's were obtained. ABG's= ph 7.313, PCo2 60, PO2 218 sats 100% on Bipap. She was transitioned to 5L/NC but later desatted to 88% and required Bipap again @ 60%. Please clarify and document your clinical opinion in the progress notes and discharge summary including the definitive and/or presumptive diagnosis, (suspected or probable), related to the above clinical findings. Please include clinical findings supporting your diagnosis. If you DECLINE this query or would like to communicate with [facility], please utilize the \"[facility] message box\" at the TOP of the Progress Note on the right.       Thank you,    Mars Hall, RN  [phone]

## 2017-01-31 NOTE — ROUTINE PROCESS
EMR entered and reviewed by Yudi Bui for the purpose of chart review in the course of performing edcuational functions and responsibilities related to performance improvement.

## 2017-01-31 NOTE — PROGRESS NOTES
Readmission Risk Assessment:     High Risk and MSSP/Good Help ACO patients    RRAT Score:  21 or greater    Initial Assessment: Chart reviewed and noted Pt currently in ICU. CM following for needs. Previously she has declines assistance in past. Pt would benefit from at a minimum [facility] services to follow along after dc to improve. Pt lives with spouse. Pt currently on Hi-flow O2 at this time. [facility] listing left at bedside. Emergency Contact:  See facesheet- Spouse    Pertinent Medical Hx:    See H&P    PCP/Specialists:  [employer]:  None noted    DME:     None noted    High Risk Care Transition Plan:  1. Evaluate for [facility] or H, SNF, acute rehab, community care coordination of Resources. 2. Involve patient/caregiver in assessment, planning, education and implement of intervention. 3. CM daily patient care huddles/interdisciplinary rounds. 4. PCP/Specialist appointment within 48 hours of discharge unless otherwise specified. 5. Facilitate transportation and logistics for follow-up appointments. 6. Medication reconciliation [id number] [facility]  7. Discharge follow-up phone call within 2  4 days (NN, [facility], Nursing) CM follow up as assigned. 8. Formal handoff between hospital provider and post-acute provider to transition patient  Handoff to [id number] Nurse Navigator or PCP practice.

## 2017-01-31 NOTE — PROGRESS NOTES
@0009 pt admitted from ED on monitor on 5L/nc spo2 93% , BP elevated at present. Davila in situ draining clear yellow urine in large amounts. Pt requesting food and was informed that she is NPO at present due to her respiratory distress. Pt wheezing , lungs sound diminished. Nursing assessment done and documented in appropriate flow sheets. Nursing management continues. @0220 pt spo2  Fluctuating ,remaining more in the 80's, pt short of breath  Dr. Andrés Yanez was called and ordered that pt be placed back on bipap , RT was called and pt was placed on bipap. Pt is much more calm and actually went off to sleep. Spo2 now 97% observation continues. @0400 pt asleep easily aroused vital signs stable , fl acc 0 . Nursing care continues. @0600 no changes care continues  @0740 Bedside and Verbal shift change report given to Keo Payton (oncoming nurse) by Jacquelyne Ormond (offgoing nurse). Report included the following information SBAR, Kardex, Intake/Output, MAR, Recent Results and Med Rec Status.    Alarm parameters reviewed, on and audible Appropriate for patient clinical condition

## 2017-01-31 NOTE — ROUTINE PROCESS
TRANSFER - OUT REPORT:    Verbal report given to [address], RN(name) on Lydia Issa  being transferred to ICU(unit) for routine progression of care       Report consisted of patients Situation, Background, Assessment and   Recommendations(SBAR). Information from the following report(s) SBAR was reviewed with the receiving nurse. Lines:   Peripheral IV 01/30/17 Left; Anterior Antecubital (Active)   Site Assessment Clean, dry, & intact 1/30/2017  8:00 PM   Phlebitis Assessment 0 1/30/2017  8:00 PM   Infiltration Assessment 0 1/30/2017  8:00 PM   Dressing Status Clean, dry, & intact 1/30/2017  8:00 PM   Dressing Type Tape;Transparent 1/30/2017  8:00 PM   Hub Color/Line Status Pink;Flushed 1/30/2017  8:00 PM   Action Taken Blood drawn 1/30/2017  8:00 PM        Opportunity for questions and clarification was provided.       Patient transported with:   Monitor  O2 @ 5 liters  Registered Nurse

## 2017-01-31 NOTE — CONSULTS
[facility]  Pulmonary, Critical Care, and Sleep Medicine    Name: Karyn Nagy MRN: [id number]   : 1945 Hospital: [facility] [place]   Date: 2017        Critical Care Initial Patient Consult      IMPRESSION:   1. Acute on chronic respiratory failure- secondary to exacerbated COPD with diastolic heart failure- improved with BiPAP. Doubt has pneumonia- recently treated as aspiration pneumonia- CXR now with residual changes  2. Pulm HTN- secondary to COPD, IMANI  3. Atrial fibrillation with RVR- rate controlled on Cardizem and anticoagulated on Coumadin  4. Diastolic heart failure- acute on chronic  5. Hyperkalemia- ACE and aldactone induced  6. DM type II ,   7. HTN, accelerated  8. IMANI- not on CPAP  9.  Elevated troponin   Patient Active Problem List   Diagnosis Code    Acute on chronic diastolic CHF (congestive heart failure) ([facility]) I50.33    ALEJANDRINA (acute kidney injury) ([facility] N17.9    Acute exacerbation of CHF (congestive heart failure) ([facility]) I50.9    DM (diabetes mellitus) ([facility] E11.9    Hypertension I10    Respiratory failure ([facility] J96.90    Acute coronary syndrome ([facility]) I24.9    Obstructive sleep apnea, adult G47.33    Paroxysmal atrial fibrillation ([facility]) I48.0    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 ([facility]) G29.16, E11.65    Dyspnea due to congestive heart failure ([facility]) I50.9    Aspiration pneumonia ([facility]) J69.0    Hyperkalemia E87.5    Elevated troponin R79.89    Acute respiratory distress ([facility]) R06.00    COPD (chronic obstructive pulmonary disease) ([facility]) J44.9    CHF (congestive heart failure) ([facility]) I50.9    Insufficiency, respiratory, acute ([facility]) R06.89    Infiltrate noted on imaging study R93.8    Chronic respiratory failure with hypoxia ([facility]) J96.11    COPD exacerbation ([facility]) J44.1        RECOMMENDATIONS:   · Will continue non invasive ventilatory support with BiPAP until further improved  · Bronchodilators- Conrad Pulmicort and Xopenex  · Adjust O2 to maintain SaO2 > 92%  · Continue Levaquin for LRTI- will check cultures and deescalate. Short 3-5 day course  · Continue Cardizem- adjust dose and continue anticoagulation  · Continue diuresis   · Correct hyperkalemia  · Monitor Qtc  · Glycemic control  · Stress ulcer and DVT Prophylaxis  · Davila bundle followed. · Full code  · Will evaluate for Home CPAP     Subjective/History: This patient has been seen and evaluated at the request of Dr. Nay Chun for  Acute respiratory failure. Patient is a 66 y.o. female who has hx of CHF, A fib on warfarin, DM II, pulmonary hypertension,COPD on home O2 use came to ED due to SOB since d/c from [facility],. She was admitted to [facility] due to SOB and was started there for five days, she was d.c home, her sob not improving,but worsening, reported that her medication for afib was hold. She was in respiratory extremis and improved after being placed on BiPAP  ER findings-Pro-bnp was 94386. ekg : afib with rvr. . K 6.2. Cxr: hypoinflation . Trop 0.27 wbc 11.9 with left shift, lactic acid wnl. inr 2.6. ABG indicated hypercapnia, was put on bipap, given lasix, insulin, levaquin ,steroid and calcium gluconate  Denies any slurred speech/headache/cp/n/v/blurred vission/d/c/gait change/bleeding. Denies smoking/ any alcohol or drug use. This am- feels much better with less SOB. Denies chest pain. Denies nausea, vomiting. The patient is critically ill but answers questions with head nodding.     Past Medical History   Diagnosis Date    A-fib [facility])     Arthritis     Back injury     Chronic obstructive pulmonary disease (HCC)     Diabetes ([facility]     Fibromyalgia     Heart failure (HCC)     Hypertension     MRSA (methicillin resistant Staphylococcus aureus)       Past Surgical History   Procedure Laterality Date    Pr cardiac surg procedure unlist      Hx coronary stent placement      Hx knee replacement      Hx orthopaedic bilateral knee replacement      Prior to Admission medications    Medication Sig Start Date End Date Taking? Authorizing Provider   albuterol-ipratropium (DUO-NEB) 2.5 mg-0.5 mg/3 ml nebulizer solution 3 mL by Nebulization route every four (4) hours as needed for Shortness of Breath. 1/12/16  Yes Meli Smith MD   codeine-butalbital-acetaminophen-caffeine (FIORICET WITH CODEINE) -72-30 mg per capsule Take  by mouth every four (4) hours as needed for Headache. Yes Paul Sloan MD   pregabalin (LYRICA) 75 mg capsule Take  by mouth. Yes Paul Sloan MD   lisinopril (PRINIVIL, ZESTRIL) 5 mg tablet Take  by mouth daily. Yes Paul Sloan MD   warfarin (COUMADIN) 2.5 mg tablet Take 2.5 mg by mouth daily. Yes Paul Sloan MD   amLODIPine (NORVASC) 5 mg tablet Take 5 mg by mouth daily. Yes Paul Sloan MD   carvedilol (COREG) 25 mg tablet Take 25 mg by mouth two (2) times daily (with meals). Yes Paul Sloan MD   spironolactone (ALDACTONE) 25 mg tablet Take  by mouth daily. Yes Paul Sloan MD   insulin glargine (LANTUS) 100 unit/mL injection 20 Units by SubCUTAneous route nightly. 9/27/15  Yes Avila Thomson MD   furosemide (LASIX) 40 mg tablet Take 1 Tab by mouth two (2) times a day. 9/25/15  Yes Avila Thomson MD   OXYCODONE HCL (OXYCODONE, BULK,) Take 20 mg by mouth every six (6) hours as needed. Yes Paul Sloan MD   ATORVASTATIN CALCIUM (LIPITOR PO) Take  by mouth. Yes Paul Slaon MD   aspirin 81 mg tablet Take 81 mg by mouth. Yes Paul Sloan MD   CYANOCOBALAMIN, VITAMIN B-12, (VITAMIN B-12 PO) Take  by mouth. Yes Paul Sloan MD   OMEPRAZOLE (PRILOSEC PO) Take  by mouth. Yes Paul Sloan MD   FERROUS SULFATE by Does Not Apply route.      Yes Paul Sloan MD     Current Facility-Administered Medications   Medication Dose Route Frequency    insulin lispro (HUMALOG) injection 5 Units  5 Units SubCUTAneous Q6H    warfarin (COUMADIN) tablet 2.5 mg  2.5 mg Oral ONCE    levalbuterol (XOPENEX) nebulizer soln 1.25 mg/0.5 ml  1.25 mg Nebulization Q4H RT    arformoterol (BROVANA) neb solution 15 mcg  15 mcg Nebulization BID RT    budesonide (PULMICORT) 500 mcg/2 ml nebulizer suspension  500 mcg Nebulization BID RT    dilTIAZem (CARDIZEM) IR tablet 60 mg  60 mg Oral TIDAC    furosemide (LASIX) injection 40 mg  40 mg IntraVENous BID    methylPREDNISolone (PF) (SOLU-MEDROL) injection 60 mg  60 mg IntraVENous Q6H    docusate sodium (COLACE) capsule 100 mg  100 mg Oral BID    insulin glargine (LANTUS) injection 40 Units  40 Units SubCUTAneous QHS    insulin lispro (HUMALOG) injection   SubCUTAneous AC&HS    levoFLOXacin (LEVAQUIN) 750 mg in D5W IVPB  750 mg IntraVENous Q24H    WARFARIN INFORMATION NOTE (COUMADIN)   Other Rx Dosing/Monitoring     Allergies   Allergen Reactions    Latex Hives    Adhesive Tape-Silicones Unknown (comments)    Bees [Sting, Bee] Unknown (comments)    Garlic Nausea and Vomiting    Zoloft [Sertraline] Hives      Social History   Substance Use Topics    Smoking status: Never Smoker    Smokeless tobacco: Not on file    Alcohol use No      History reviewed. No pertinent family history. Review of Systems:  A comprehensive review of systems was negative except for that written in the HPI.     Objective:   Vital Signs:    Visit Vitals    /59    Pulse 74    Temp 98.1 °F (36.7 °C)    Resp 19    Ht 5' 5\" (1.651 m)    Wt 104.3 kg (230 lb)    SpO2 94%    BMI 38.27 kg/m2       O2 Device: Hi flow nasal cannula   O2 Flow Rate (L/min): 40 l/min   Temp (24hrs), Av.9 °F (36.6 °C), Min:97.7 °F (36.5 °C), Max:98.1 °F (36.7 °C)       Intake/Output:   Last shift:      701 - 1900  In: -   Out: 150 [Urine:150]  Last 3 shifts: 1901 - 700  In: -   Out: 8382 [Urine:3550]    Intake/Output Summary (Last 24 hours) at 17 0912  Last data filed at 17 0800   Gross per 24 hour   Intake                0 ml   Output             3700 ml   Net            -3700 ml Ventilator Settings:  Mode Rate Tidal Volume Pressure FiO2 PEEP            60 %       Peak airway pressure:      Minute ventilation: 9.1 l/min        Physical Exam:    General:  Alert, cooperative, no distress, appears stated age. Head:  Normocephalic, without obvious abnormality, atraumatic. Eyes:  Conjunctivae/corneas clear. PERRL, EOMs intact. Nose: Nares normal. Septum midline. Mucosa normal. No drainage or sinus tenderness. Throat: Lips, mucosa, and tongue normal. Teeth and gums normal.   Neck: Supple, symmetrical, trachea midline, no adenopathy, thyroid: no enlargment/tenderness/nodules, no carotid bruit and no JVD. Back:   Symmetric, no curvature. ROM normal.   Lungs:   Bilateral rales and ronchi with expiratory wheezing   Chest wall:  No tenderness or deformity. Heart:  Regular rate and rhythm, S1, S2 normal, no murmur, click, rub or gallop. Abdomen:   Soft, non-tender. Bowel sounds normal. No masses,  No organomegaly. Extremities: Extremities normal, atraumatic, no cyanosis or edema. Pulses: 2+ and symmetric all extremities.    Skin: Skin color, texture, turgor normal. No rashes or lesions   Lymph nodes:      Cervical, supraclavicular, and axillary nodes normal.   Neurologic: Grossly nonfocal       Data:     Recent Results (from the past 24 hour(s))   EKG, 12 LEAD, INITIAL    Collection Time: 01/30/17  7:58 PM   Result Value Ref Range    Ventricular Rate 106 BPM    Atrial Rate 138 BPM    QRS Duration 60 ms    Q-T Interval 330 ms    QTC Calculation (Bezet) 438 ms    Calculated R Axis 63 degrees    Calculated T Axis 45 degrees    Diagnosis       Atrial fibrillation with rapid ventricular response  Low voltage QRS  Cannot rule out Anteroseptal infarct (cited on or before 26-OCT-2011)  Abnormal ECG  When compared with ECG of 01-JAN-2016 16:24,  Atrial fibrillation has replaced Atrial flutter    Confirmed by Cece Wilson MD. ([id number]) on 1/30/2017 9:43:20 PM     CBC WITH AUTOMATED DIFF Collection Time: 01/30/17  8:00 PM   Result Value Ref Range    WBC 11.9 4.6 - 13.2 K/uL    RBC 3.64 (L) 4.20 - 5.30 M/uL    HGB 11.7 (L) 12.0 - 16.0 g/dL    HCT 37.7 35.0 - 45.0 %    .6 (H) 74.0 - 97.0 FL    MCH 32.1 24.0 - 34.0 PG    MCHC 31.0 31.0 - 37.0 g/dL    RDW 16.1 (H) 11.6 - 14.5 %    PLATELET 726 852 - 515 K/uL    MPV 10.3 9.2 - 11.8 FL    NEUTROPHILS 93 (H) 42 - 75 %    BANDS 1 0 - 5 %    LYMPHOCYTES 1 (L) 20 - 51 %    MONOCYTES 5 2 - 9 %    EOSINOPHILS 0 0 - 5 %    BASOPHILS 0 0 - 3 %    ABS. NEUTROPHILS 11.1 (H) 1.8 - 8.0 K/UL    ABS. LYMPHOCYTES 0.1 (L) 0.8 - 3.5 K/UL    ABS. MONOCYTES 0.6 0 - 1.0 K/UL    ABS. EOSINOPHILS 0.0 0.0 - 0.4 K/UL    ABS. BASOPHILS 0.0 0.0 - 0.1 K/UL    RBC COMMENTS ANISOCYTOSIS  1+        RBC COMMENTS MICROCYTOSIS  1+        RBC COMMENTS MACROCYTOSIS  1+        DF MANUAL     METABOLIC PANEL, COMPREHENSIVE    Collection Time: 01/30/17  8:00 PM   Result Value Ref Range    Sodium 134 (L) 136 - 145 mmol/L    Potassium 6.2 (HH) 3.5 - 5.5 mmol/L    Chloride 97 (L) 100 - 108 mmol/L    CO2 32 21 - 32 mmol/L    Anion gap 5 3.0 - 18 mmol/L    Glucose 395 (H) 74 - 99 mg/dL    BUN 44 (H) 7.0 - 18 MG/DL    Creatinine 1.20 0.6 - 1.3 MG/DL    BUN/Creatinine ratio 37 (H) 12 - 20      GFR est AA 54 (L) >60 ml/min/1.73m2    GFR est non-AA 44 (L) >60 ml/min/1.73m2    Calcium 8.9 8.5 - 10.1 MG/DL    Bilirubin, total 1.5 (H) 0.2 - 1.0 MG/DL    ALT 23 13 - 56 U/L    AST 21 15 - 37 U/L    Alk.  phosphatase 177 (H) 45 - 117 U/L    Protein, total 7.5 6.4 - 8.2 g/dL    Albumin 3.6 3.4 - 5.0 g/dL    Globulin 3.9 2.0 - 4.0 g/dL    A-G Ratio 0.9 0.8 - 1.7     CARDIAC PANEL,(CK, CKMB & TROPONIN)    Collection Time: 01/30/17  8:00 PM   Result Value Ref Range    CK 36 26 - 192 U/L    CK - MB 1.6 0.5 - 3.6 ng/ml    CK-MB Index 4.4 (H) 0.0 - 4.0 %    Troponin-I, Qt. 0.27 (H) 0.00 - 0.06 NG/ML   MAGNESIUM    Collection Time: 01/30/17  8:00 PM   Result Value Ref Range    Magnesium 2.6 (H) 1.8 - 2.4 mg/dL   PTT    Collection Time: 01/30/17  8:00 PM   Result Value Ref Range    aPTT 29.2 23.0 - 36.4 SEC   PROTHROMBIN TIME + INR    Collection Time: 01/30/17  8:00 PM   Result Value Ref Range    Prothrombin time 26.4 (H) 11.5 - 15.2 sec    INR 2.6 (H) 0.8 - 1.2     PRO-BNP    Collection Time: 01/30/17  8:00 PM   Result Value Ref Range    NT pro-BNP 19733 (H) 0 - 900 PG/ML   LACTIC ACID, PLASMA    Collection Time: 01/30/17  8:00 PM   Result Value Ref Range    Lactic acid 1.4 0.4 - 2.0 MMOL/L   HEMOGLOBIN A1C WITH EAG    Collection Time: 01/30/17  8:00 PM   Result Value Ref Range    Hemoglobin A1c 8.0 (H) 4.5 - 5.6 %    Est. average glucose 183 mg/dL   POC G3    Collection Time: 01/30/17  8:17 PM   Result Value Ref Range    Device: CPAP      pH (POC) 7.313 (L) 7.35 - 7.45      pCO2 (POC) 60.0 (H) 35.0 - 45.0 MMHG    pO2 (POC) 218 (H) 80 - 100 MMHG    HCO3 (POC) 30.4 (H) 22 - 26 MMOL/L    sO2 (POC) 100 (H) 92 - 97 %    Base excess (POC) 4 mmol/L    Allens test (POC) YES      Bleed In 10 L/min    Total resp.  rate 27      Site RIGHT RADIAL      Specimen type (POC) ARTERIAL      Performed by Kamini Mendez    URINALYSIS W/ RFLX MICROSCOPIC    Collection Time: 01/30/17  8:56 PM   Result Value Ref Range    Color YELLOW      Appearance CLEAR      Specific gravity 1.018 1.005 - 1.030      pH (UA) 5.0 5.0 - 8.0      Protein TRACE (A) NEG mg/dL    Glucose >1000 (A) NEG mg/dL    Ketone NEGATIVE  NEG mg/dL    Bilirubin NEGATIVE  NEG      Blood NEGATIVE  NEG      Urobilinogen 1.0 0.2 - 1.0 EU/dL    Nitrites NEGATIVE  NEG      Leukocyte Esterase NEGATIVE  NEG     URINE MICROSCOPIC ONLY    Collection Time: 01/30/17  8:56 PM   Result Value Ref Range    WBC NEGATIVE  0 - 5 /hpf    RBC 0 to 2 0 - 5 /hpf    Epithelial cells NEGATIVE  0 - 5 /lpf    Bacteria FEW (A) NEG /hpf    Mucus NEGATIVE  NEG /lpf    Amorphous Crystals FEW (A) NEG     GLUCOSE, POC    Collection Time: 01/31/17 12:33 AM   Result Value Ref Range    Glucose (POC) 249 (H) 70 - 110 mg/dL   CARDIAC PANEL,(CK, CKMB & TROPONIN)    Collection Time: 01/31/17  4:16 AM   Result Value Ref Range    CK 29 26 - 192 U/L    CK - MB 2.3 0.5 - 3.6 ng/ml    CK-MB Index 7.9 (H) 0.0 - 4.0 %    Troponin-I, Qt. 0.41 (H) 0.00 - 8.20 NG/ML   METABOLIC PANEL, COMPREHENSIVE    Collection Time: 01/31/17  4:16 AM   Result Value Ref Range    Sodium 137 136 - 145 mmol/L    Potassium 5.6 (H) 3.5 - 5.5 mmol/L    Chloride 99 (L) 100 - 108 mmol/L    CO2 35 (H) 21 - 32 mmol/L    Anion gap 3 3.0 - 18 mmol/L    Glucose 268 (H) 74 - 99 mg/dL    BUN 42 (H) 7.0 - 18 MG/DL    Creatinine 1.16 0.6 - 1.3 MG/DL    BUN/Creatinine ratio 36 (H) 12 - 20      GFR est AA 56 (L) >60 ml/min/1.73m2    GFR est non-AA 46 (L) >60 ml/min/1.73m2    Calcium 9.1 8.5 - 10.1 MG/DL    Bilirubin, total 1.4 (H) 0.2 - 1.0 MG/DL    ALT 24 13 - 56 U/L    AST 17 15 - 37 U/L    Alk. phosphatase 162 (H) 45 - 117 U/L    Protein, total 7.1 6.4 - 8.2 g/dL    Albumin 3.4 3.4 - 5.0 g/dL    Globulin 3.7 2.0 - 4.0 g/dL    A-G Ratio 0.9 0.8 - 1.7     MAGNESIUM    Collection Time: 01/31/17  4:16 AM   Result Value Ref Range    Magnesium 2.2 1.8 - 2.4 mg/dL   CBC WITH AUTOMATED DIFF    Collection Time: 01/31/17  4:16 AM   Result Value Ref Range    WBC 10.7 4.6 - 13.2 K/uL    RBC 3.63 (L) 4.20 - 5.30 M/uL    HGB 11.7 (L) 12.0 - 16.0 g/dL    HCT 37.4 35.0 - 45.0 %    .0 (H) 74.0 - 97.0 FL    MCH 32.2 24.0 - 34.0 PG    MCHC 31.3 31.0 - 37.0 g/dL    RDW 15.8 (H) 11.6 - 14.5 %    PLATELET 132 005 - 523 K/uL    MPV 10.7 9.2 - 11.8 FL    NEUTROPHILS 99 (H) 42 - 75 %    LYMPHOCYTES 1 (L) 20 - 51 %    MONOCYTES 0 (L) 2 - 9 %    EOSINOPHILS 0 0 - 5 %    BASOPHILS 0 0 - 3 %    ABS. NEUTROPHILS 10.6 (H) 1.8 - 8.0 K/UL    ABS. LYMPHOCYTES 0.1 (L) 0.8 - 3.5 K/UL    ABS. MONOCYTES 0.0 0 - 1.0 K/UL    ABS. EOSINOPHILS 0.0 0.0 - 0.4 K/UL    ABS.  BASOPHILS 0.0 0.0 - 0.1 K/UL    PLATELET COMMENTS LARGE PLATELETS      RBC COMMENTS ANISOCYTOSIS  1+        RBC COMMENTS MACROCYTOSIS  1+        RBC COMMENTS OVALOCYTES  1+        RBC COMMENTS ACANTHOCYTES      DF MANUAL     PROTHROMBIN TIME + INR    Collection Time: 01/31/17  4:16 AM   Result Value Ref Range    Prothrombin time 28.1 (H) 11.5 - 15.2 sec    INR 2.8 (H) 0.8 - 1.2     GLUCOSE, POC    Collection Time: 01/31/17  6:36 AM   Result Value Ref Range    Glucose (POC) 268 (H) 70 - 110 mg/dL           Recent Labs      01/30/17 2017   HCO3I  30.4*   PCO2I  60.0*   PHI  7.313*   PO2I  218*     Telemetry:AFIB    Imaging:  I have personally reviewed the patients radiographs and have reviewed the reports:  CXR Results  (Last 48 hours)               01/30/17 2017  XR CHEST PORT Final result    Impression:  IMPRESSION:   Hypoinflation. Interval extubation and removal of NG tube. Improvement left   basilar process with mild residual. No other definitive change given low lung   volumes. Narrative:  AP portable chest, 1/30/2017 at 2008 hours:       INDICATION: Shortness of breath. Comparison 1/6/2016. Interval extubation and removal of the NG tube. Hypoinflation. Mild cardiomegaly. Some prominence of the left hilum potentially   vascular. Interstitial prominence is noted. Mild elevation right hemidiaphragm. No definite new focal consolidation.  Interval improvement of left basilar   density noted previously, atelectasis/infiltrate and/or effusion with perhaps   mild residual.                Best practice :    Glycemic control  Sress ulcer prophylaxis  DVT prophylaxis       Davila Bundle Followed       Total critical care time exclusive of procedures:  40 minutes  Richard Roberts MD

## 2017-01-31 NOTE — PROGRESS NOTES
met with patient completed the initial Spiritual Assessment of the patient, and offered Pastoral Care, see flow sheets for interventions.  extended the assurance of prayer and spiritual care materials. Patient does not have any Bahai/cultural needs that will affect patients preferences in health care. The patient was informed that chaplains will continue to follow and will provide pastoral care on an as needed/requested basis.       Desirae Villa

## 2017-01-31 NOTE — DIABETES MGMT
GLYCEMIC CONTROL & NUTRITION:    - Discussed in rounds, known h/o uncontrolled DM, on Lantus at home  - noted steroids on board (IV Solu -medrol 40 mg q 6 hours) & exacerbating hyperglycemia  - currently eating well, diet modified to Consistent CHO, Humalog modified to AC&HS  - pt with very recent admission (earlier this month)  - anticipate insulin needs to reduce by ~ 1/2 when steroids d/c      Recent Glucose Results:   Lab Results   Component Value Date/Time     (H) 01/31/2017 04:16 AM     (H) 01/30/2017 08:00 PM    GLUCPOC 268 (H) 01/31/2017 06:36 AM    GLUCPOC 249 (H) 01/31/2017 12:33 AM     Lab Results   Component Value Date/Time    Hemoglobin A1c 8.0 01/30/2017 08:00 PM    Hemoglobin A1c 8.7 01/01/2016 04:42 PM    Hemoglobin A1c 9.9 09/22/2015 01:40 AM             Sheeba Snyder, MPH, RD

## 2017-01-31 NOTE — PROGRESS NOTES
Problem: Self Care Deficits Care Plan (Adult)  Goal: *Acute Goals and Plan of Care (Insert Text)  IInitial OT STGs (1/31/2017) Within 7 days:    1. Patient will perform toilet transfer with CGA/Katherin & SPO2>90% in preparation for bowel and bladder management. 2. Patient will perform bowel and bladder management with CGA/Katherin & SPO2>90% for increased independence with ADLs. 3. Patient will grooming standing sinkside for 5 minutes for increased independence ADLs/IADLs. 4. Patient will perform LB/UB dressing with setupA w/ A/E PRN for increased independence with ADLs. 5. Patient will perform UE exercises with SBA for increased independence with ADLs. 6. Patient will utilize energy conservation techniques with 1 verbal cue for increased independence with ADLs. Outcome: Progressing Towards Goal  OCCUPATIONAL THERAPY EVALUATION     Patient: Car Brooke (18 y.o. female)  Date: 1/31/2017  Primary Diagnosis: Insufficiency, respiratory, acute (HCC)  CHF (congestive heart failure) (HCC)  Elevated troponin  COPD (chronic obstructive pulmonary disease) ([address]  Infiltrate noted on imaging study  Insufficiency, respiratory, acute (HCC)  CHF (congestive heart failure) (HCC)  Elevated troponin  COPD (chronic obstructive pulmonary disease) ([address]  Infiltrate noted on imaging study        Precautions:  Fall, Other (comment) (High Flow (SPO2>90%))      ASSESSMENT :  Based on the objective data described below, the patient presents with decreased functional strength, decreased functional balance, decreased overall activity tolerance limiting independence with ADLs. Pt resistive to get OOB siting back pain and supposed to get injection. Pt denies having COPD despite being on 5L home O2. Pt resistive to education regarding co-morbidities. Pt able to complete upright sitting in bed while performing simple ADLs w/ SPO2 92-95% w/ increased to 98% at rest s/p activity.  Instruction to pt on importance of mobility & OOB to build pulmonary function for activity for pt's goal to return home. Pt would benefit from continued OT services to address goals outlined in POC towards baseline functioning. Education: Energy Conservation/Work Simplification Techniques, adaptive strategies, pursed breathing, role of and purpose of OT, benefits of OOB, body mechanics for back protection. Patient will benefit from skilled intervention to address the above impairments. Patients rehabilitation potential is considered to be Good  Factors which may influence rehabilitation potential include:   [ ]             None noted  [X]             Mental ability/status  [X]             Medical condition  [ ]             Home/family situation and support systems  [ ]             Safety awareness  [ ]             Pain tolerance/management  [ ]             Other:        PLAN :  Recommendations and Planned Interventions:  [X]               Self Care Training                  [X]        Therapeutic Activities  [X]               Functional Mobility Training    [X]        Cognitive Retraining  [X]               Therapeutic Exercises           [X]        Endurance Activities  [X]               Balance Training                   [X]        Neuromuscular Re-Education  [ ]               Visual/Perceptual Training     [ ]   Home Safety Training  [X]               Patient Education                 [X]        Family Training/Education  [ ]               Other (comment):     Frequency/Duration: Patient will be followed by occupational therapy 1-2 times per day/4-7 days per week to address goals. Discharge Recommendations: To Be Determined  Further Equipment Recommendations for Discharge: discussed benefits of tub transfer bench and Rollator for Energy Conservation/Work Simplification Techniques however pt denies need. Will continue to assess DME needs. SUBJECTIVE:   Patient stated I can't my back hurts too much.       OBJECTIVE DATA SUMMARY:       Past Medical History Diagnosis Date    A-fib [facility])      Arthritis      Back injury      Chronic obstructive pulmonary disease (HCC)      Diabetes [facility]      Fibromyalgia      Heart failure (HCC)      Hypertension      MRSA (methicillin resistant Staphylococcus aureus)       Past Surgical History   Procedure Laterality Date    Pr cardiac surg procedure unlist        Hx coronary stent placement        Hx knee replacement        Hx orthopaedic           bilateral knee replacement     Barriers to Learning/Limitations: yes;  emotional  Compensate with: visual, verbal, tactile, kinesthetic cues/model     G-Codes (GP)  Self Care   Current  CL= 60-79%   Goal  CJ= 20-39%   D/C  CL= 60-79%  The severity rating is based on the Level of Assistance required for Functional Mobility and ADLs. Eval Complexity: History: MEDIUM Complexity : Expanded review of history including physical, cognitive and psychosocial  history ; Examination: MEDIUM Complexity : 3-5 performance deficits relating to physical, cognitive , or psychosocial skils that result in activity limitations and / or participation restrictions; Decision Making:MEDIUM Complexity : Patient may present with comorbidities that affect occupational performnce. Miniml to moderate modification of tasks or assistance (eg, physical or verbal ) with assesment(s) is necessary to enable patient to complete evaluation      Prior Level of Function/Home Situation: pt reports mod I for ADLs and IADLs as well as driving.  Pt reports not performing IADLs or driving when having a bad day  Home Situation  Home Environment: Private residence  # Steps to Enter: 1  Rails to Enter: No  One/Two Story Residence: One story  Living Alone: No  Support Systems: Spouse/Significant Other/Partner  Patient Expects to be Discharged to[de-identified] Private residence  Current DME Used/Available at Home: Walker, rolling, Shower chair, Oxygen, portable  Tub or Shower Type: Tub/Shower combination  [X] Right hand dominant          [ ]  Left hand dominant  Cognitive/Behavioral Status:  Neurologic State: Alert;Irritable  Orientation Level: Oriented X4  Cognition: Follows commands; Impaired decision making  Safety/Judgement: Lack of insight into deficits  Coordination:  Fine Motor Skills-Upper: Left Intact; Right Intact    Gross Motor Skills-Upper: Left Intact; Right Intact  Balance:  Sitting: Intact  Strength:  Grossly 4/5, but not adequate to manage habitus  Tone & Sensation:  WFL  Range of Motion:  Limited only by habitus  Functional Mobility and Transfers for ADLs:  Bed Mobility:  Supine to Sit: Contact guard assistance (with HOB elevated)  Transfers: declined OOB  ADL Assessment:   Feeding: Setup     Oral Facial Hygiene/Grooming: Setup     Bathing: Maximum assistance     Upper Body Dressing: Minimum assistance     Lower Body Dressing: Maximum assistance; Total assistance     Toileting: Maximum assistance; Total assistance     ADL Intervention:  Feeding  Feeding Assistance: Supervision/set-up     Grooming  Grooming Assistance: Supervision/set up;Maximum assistance  Washing Face: Supervision/set-up  Washing Hands: Supervision/set-up  Brushing Teeth: Supervision/set-up; Minimum assistance  Brushing/Combing Hair: Maximum assistance     Upper Body Bathing  Bathing Assistance: Maximum assistance     Lower Body Bathing  Bathing Assistance: Total assistance(dependent)     Upper Body Dressing Assistance  Dressing Assistance: Minimum assistance  Hospital Gown: Minimum  assistance     Lower Body Dressing Assistance  Dressing Assistance: Total assistance(dependent)  Underpants: Total assistance (dependent)  Socks: Total assistance (dependent)     Toileting  Toileting Assistance: Maximum assistance; Total assistance(dependent)  Bladder Hygiene: Maximum assistance; Total assistance (dependent)  Bowel Hygiene:  Total assistance (dependent)  Clothing Management: Moderate assistance     Cognitive Retraining  Problem Solving: Deductive reason; Identifying the problem  Safety/Judgement: Lack of insight into deficits     Pain:  Pre-treatment: 7/10  Post-treatment: 5/10  Activity Tolerance:   Pt requires frequent rest breaks. Pt able to tolerate ADLs with 2-3 rest breaks. Please refer to the flowsheet for vital signs taken during this treatment. After treatment:   [ ] Patient left in no apparent distress sitting up in chair  [X] Patient left in no apparent distress in bed  [X] Call bell left within reach  [X] Nursing notified  [ ] Caregiver present  [X] Bed alarm activated      COMMUNICATION/EDUCATION:   [X] Home safety education was provided and the patient/caregiver indicated understanding. [X] Patient/family have participated as able in goal setting and plan of care. [X] Patient/family agree to work toward stated goals and plan of care. [X] Patient understands intent and goals of therapy, but is neutral about his/her participation. [ ] Patient is unable to participate in goal setting and plan of care.      Thank you for this referral.  Alaina Ayoub, OTR/L  Time Calculation: 30 mins

## 2017-02-01 PROBLEM — J96.21 ACUTE ON CHRONIC RESPIRATORY FAILURE WITH HYPOXIA (HCC): Status: ACTIVE | Noted: 2017-02-01

## 2017-02-01 LAB
ALBUMIN SERPL BCP-MCNC: 3.2 G/DL (ref 3.4–5)
ALBUMIN/GLOB SERPL: 0.9 {RATIO} (ref 0.8–1.7)
ALP SERPL-CCNC: 125 U/L (ref 45–117)
ALT SERPL-CCNC: 18 U/L (ref 13–56)
ANION GAP BLD CALC-SCNC: 4 MMOL/L (ref 3–18)
AST SERPL W P-5'-P-CCNC: 15 U/L (ref 15–37)
BASOPHILS # BLD AUTO: 0 K/UL (ref 0–0.06)
BASOPHILS # BLD: 0 % (ref 0–2)
BILIRUB SERPL-MCNC: 1.4 MG/DL (ref 0.2–1)
BUN SERPL-MCNC: 45 MG/DL (ref 7–18)
BUN/CREAT SERPL: 31 (ref 12–20)
CALCIUM SERPL-MCNC: 9.3 MG/DL (ref 8.5–10.1)
CHLORIDE SERPL-SCNC: 98 MMOL/L (ref 100–108)
CK MB CFR SERPL CALC: 3.8 % (ref 0–4)
CK MB SERPL-MCNC: 0.9 NG/ML (ref 0.5–3.6)
CK SERPL-CCNC: 24 U/L (ref 26–192)
CO2 SERPL-SCNC: 35 MMOL/L (ref 21–32)
CREAT SERPL-MCNC: 1.47 MG/DL (ref 0.6–1.3)
DIFFERENTIAL METHOD BLD: ABNORMAL
EOSINOPHIL # BLD: 0 K/UL (ref 0–0.4)
EOSINOPHIL NFR BLD: 0 % (ref 0–5)
ERYTHROCYTE [DISTWIDTH] IN BLOOD BY AUTOMATED COUNT: 16.1 % (ref 11.6–14.5)
GLOBULIN SER CALC-MCNC: 3.5 G/DL (ref 2–4)
GLUCOSE BLD STRIP.AUTO-MCNC: 177 MG/DL (ref 70–110)
GLUCOSE BLD STRIP.AUTO-MCNC: 179 MG/DL (ref 70–110)
GLUCOSE BLD STRIP.AUTO-MCNC: 181 MG/DL (ref 70–110)
GLUCOSE BLD STRIP.AUTO-MCNC: 195 MG/DL (ref 70–110)
GLUCOSE SERPL-MCNC: 168 MG/DL (ref 74–99)
HCT VFR BLD AUTO: 35.3 % (ref 35–45)
HGB BLD-MCNC: 11.1 G/DL (ref 12–16)
INR PPP: 2.7 (ref 0.8–1.2)
LYMPHOCYTES # BLD AUTO: 3 % (ref 21–52)
LYMPHOCYTES # BLD: 0.5 K/UL (ref 0.9–3.6)
MAGNESIUM SERPL-MCNC: 2 MG/DL (ref 1.8–2.4)
MCH RBC QN AUTO: 32.1 PG (ref 24–34)
MCHC RBC AUTO-ENTMCNC: 31.4 G/DL (ref 31–37)
MCV RBC AUTO: 102 FL (ref 74–97)
MONOCYTES # BLD: 0.8 K/UL (ref 0.05–1.2)
MONOCYTES NFR BLD AUTO: 5 % (ref 3–10)
NEUTS SEG # BLD: 14.1 K/UL (ref 1.8–8)
NEUTS SEG NFR BLD AUTO: 92 % (ref 40–73)
PLATELET # BLD AUTO: 218 K/UL (ref 135–420)
PMV BLD AUTO: 10.3 FL (ref 9.2–11.8)
POTASSIUM SERPL-SCNC: 4.5 MMOL/L (ref 3.5–5.5)
POTASSIUM SERPL-SCNC: 4.6 MMOL/L (ref 3.5–5.5)
POTASSIUM SERPL-SCNC: 5.2 MMOL/L (ref 3.5–5.5)
POTASSIUM SERPL-SCNC: 5.4 MMOL/L (ref 3.5–5.5)
POTASSIUM SERPL-SCNC: 5.6 MMOL/L (ref 3.5–5.5)
PROT SERPL-MCNC: 6.7 G/DL (ref 6.4–8.2)
PROTHROMBIN TIME: 27.1 SEC (ref 11.5–15.2)
RBC # BLD AUTO: 3.46 M/UL (ref 4.2–5.3)
RBC MORPH BLD: ABNORMAL
SODIUM SERPL-SCNC: 137 MMOL/L (ref 136–145)
TROPONIN I SERPL-MCNC: 0.57 NG/ML (ref 0–0.06)
WBC # BLD AUTO: 15.4 K/UL (ref 4.6–13.2)

## 2017-02-01 PROCEDURE — 36415 COLL VENOUS BLD VENIPUNCTURE: CPT | Performed by: HOSPITALIST

## 2017-02-01 PROCEDURE — 77010033711 HC HIGH FLOW OXYGEN

## 2017-02-01 PROCEDURE — 94640 AIRWAY INHALATION TREATMENT: CPT

## 2017-02-01 PROCEDURE — 94660 CPAP INITIATION&MGMT: CPT

## 2017-02-01 PROCEDURE — 74011636637 HC RX REV CODE- 636/637: Performed by: HOSPITALIST

## 2017-02-01 PROCEDURE — 74011250637 HC RX REV CODE- 250/637: Performed by: INTERNAL MEDICINE

## 2017-02-01 PROCEDURE — 82550 ASSAY OF CK (CPK): CPT | Performed by: INTERNAL MEDICINE

## 2017-02-01 PROCEDURE — 74011636637 HC RX REV CODE- 636/637: Performed by: INTERNAL MEDICINE

## 2017-02-01 PROCEDURE — 74011000250 HC RX REV CODE- 250: Performed by: HOSPITALIST

## 2017-02-01 PROCEDURE — 77030011256 HC DRSG MEPILEX <16IN NO BORD MOLN -A

## 2017-02-01 PROCEDURE — 83735 ASSAY OF MAGNESIUM: CPT | Performed by: HOSPITALIST

## 2017-02-01 PROCEDURE — 74011250637 HC RX REV CODE- 250/637: Performed by: HOSPITALIST

## 2017-02-01 PROCEDURE — 97535 SELF CARE MNGMENT TRAINING: CPT

## 2017-02-01 PROCEDURE — 97162 PT EVAL MOD COMPLEX 30 MIN: CPT

## 2017-02-01 PROCEDURE — 82962 GLUCOSE BLOOD TEST: CPT

## 2017-02-01 PROCEDURE — 97530 THERAPEUTIC ACTIVITIES: CPT

## 2017-02-01 PROCEDURE — 74011250636 HC RX REV CODE- 250/636: Performed by: INTERNAL MEDICINE

## 2017-02-01 PROCEDURE — 85025 COMPLETE CBC W/AUTO DIFF WBC: CPT | Performed by: HOSPITALIST

## 2017-02-01 PROCEDURE — 84132 ASSAY OF SERUM POTASSIUM: CPT | Performed by: HOSPITALIST

## 2017-02-01 PROCEDURE — 85610 PROTHROMBIN TIME: CPT | Performed by: HOSPITALIST

## 2017-02-01 PROCEDURE — 74011000250 HC RX REV CODE- 250: Performed by: INTERNAL MEDICINE

## 2017-02-01 PROCEDURE — C9113 INJ PANTOPRAZOLE SODIUM, VIA: HCPCS | Performed by: INTERNAL MEDICINE

## 2017-02-01 PROCEDURE — 74011250636 HC RX REV CODE- 250/636: Performed by: HOSPITALIST

## 2017-02-01 PROCEDURE — 65610000006 HC RM INTENSIVE CARE

## 2017-02-01 RX ORDER — WARFARIN SODIUM 5 MG/1
2.5 TABLET ORAL ONCE
Status: COMPLETED | OUTPATIENT
Start: 2017-02-01 | End: 2017-02-01

## 2017-02-01 RX ORDER — INSULIN LISPRO 100 [IU]/ML
8 INJECTION, SOLUTION INTRAVENOUS; SUBCUTANEOUS
Status: DISCONTINUED | OUTPATIENT
Start: 2017-02-01 | End: 2017-02-02

## 2017-02-01 RX ORDER — CARVEDILOL 12.5 MG/1
25 TABLET ORAL 2 TIMES DAILY WITH MEALS
Status: DISCONTINUED | OUTPATIENT
Start: 2017-02-01 | End: 2017-02-09 | Stop reason: HOSPADM

## 2017-02-01 RX ADMIN — FUROSEMIDE 40 MG: 10 INJECTION, SOLUTION INTRAMUSCULAR; INTRAVENOUS at 09:00

## 2017-02-01 RX ADMIN — DILTIAZEM HYDROCHLORIDE 30 MG: 30 TABLET, FILM COATED ORAL at 08:13

## 2017-02-01 RX ADMIN — LEVALBUTEROL 1.25 MG: 1.25 SOLUTION, CONCENTRATE RESPIRATORY (INHALATION) at 07:46

## 2017-02-01 RX ADMIN — INSULIN LISPRO 8 UNITS: 100 INJECTION, SOLUTION INTRAVENOUS; SUBCUTANEOUS at 17:33

## 2017-02-01 RX ADMIN — LEVALBUTEROL 1.25 MG: 1.25 SOLUTION, CONCENTRATE RESPIRATORY (INHALATION) at 04:41

## 2017-02-01 RX ADMIN — LEVALBUTEROL 1.25 MG: 1.25 SOLUTION, CONCENTRATE RESPIRATORY (INHALATION) at 20:48

## 2017-02-01 RX ADMIN — INSULIN GLARGINE 40 UNITS: 100 INJECTION, SOLUTION SUBCUTANEOUS at 21:50

## 2017-02-01 RX ADMIN — LORAZEPAM 0.5 MG: 0.5 TABLET ORAL at 15:21

## 2017-02-01 RX ADMIN — SODIUM CHLORIDE 40 MG: 9 INJECTION, SOLUTION INTRAMUSCULAR; INTRAVENOUS; SUBCUTANEOUS at 08:14

## 2017-02-01 RX ADMIN — BUDESONIDE 500 MCG: 0.5 INHALANT RESPIRATORY (INHALATION) at 20:48

## 2017-02-01 RX ADMIN — LEVOFLOXACIN 750 MG: 5 INJECTION, SOLUTION INTRAVENOUS at 21:50

## 2017-02-01 RX ADMIN — LORAZEPAM 0.5 MG: 0.5 TABLET ORAL at 08:17

## 2017-02-01 RX ADMIN — BUDESONIDE 500 MCG: 0.5 INHALANT RESPIRATORY (INHALATION) at 07:46

## 2017-02-01 RX ADMIN — INSULIN LISPRO 8 UNITS: 100 INJECTION, SOLUTION INTRAVENOUS; SUBCUTANEOUS at 21:51

## 2017-02-01 RX ADMIN — INSULIN LISPRO 3 UNITS: 100 INJECTION, SOLUTION INTRAVENOUS; SUBCUTANEOUS at 21:51

## 2017-02-01 RX ADMIN — INSULIN LISPRO 3 UNITS: 100 INJECTION, SOLUTION INTRAVENOUS; SUBCUTANEOUS at 11:45

## 2017-02-01 RX ADMIN — DOCUSATE SODIUM 100 MG: 100 CAPSULE, LIQUID FILLED ORAL at 21:50

## 2017-02-01 RX ADMIN — ONDANSETRON HYDROCHLORIDE 4 MG: 2 INJECTION INTRAMUSCULAR; INTRAVENOUS at 06:04

## 2017-02-01 RX ADMIN — LEVALBUTEROL 1.25 MG: 1.25 SOLUTION, CONCENTRATE RESPIRATORY (INHALATION) at 00:52

## 2017-02-01 RX ADMIN — ARFORMOTEROL TARTRATE 15 MCG: 15 SOLUTION RESPIRATORY (INHALATION) at 07:46

## 2017-02-01 RX ADMIN — METHYLPREDNISOLONE SODIUM SUCCINATE 40 MG: 40 INJECTION, POWDER, FOR SOLUTION INTRAMUSCULAR; INTRAVENOUS at 06:04

## 2017-02-01 RX ADMIN — LORAZEPAM 0.5 MG: 0.5 TABLET ORAL at 03:47

## 2017-02-01 RX ADMIN — DOCUSATE SODIUM 100 MG: 100 CAPSULE, LIQUID FILLED ORAL at 08:13

## 2017-02-01 RX ADMIN — ONDANSETRON HYDROCHLORIDE 4 MG: 2 INJECTION INTRAMUSCULAR; INTRAVENOUS at 10:08

## 2017-02-01 RX ADMIN — FUROSEMIDE 40 MG: 10 INJECTION, SOLUTION INTRAMUSCULAR; INTRAVENOUS at 21:51

## 2017-02-01 RX ADMIN — CARVEDILOL 25 MG: 12.5 TABLET, FILM COATED ORAL at 17:33

## 2017-02-01 RX ADMIN — METHYLPREDNISOLONE SODIUM SUCCINATE 40 MG: 40 INJECTION, POWDER, FOR SOLUTION INTRAMUSCULAR; INTRAVENOUS at 11:44

## 2017-02-01 RX ADMIN — WARFARIN SODIUM 2.5 MG: 5 TABLET ORAL at 17:33

## 2017-02-01 RX ADMIN — METHYLPREDNISOLONE SODIUM SUCCINATE 40 MG: 40 INJECTION, POWDER, FOR SOLUTION INTRAMUSCULAR; INTRAVENOUS at 21:50

## 2017-02-01 RX ADMIN — LEVALBUTEROL 1.25 MG: 1.25 SOLUTION, CONCENTRATE RESPIRATORY (INHALATION) at 11:58

## 2017-02-01 RX ADMIN — DILTIAZEM HYDROCHLORIDE 30 MG: 30 TABLET, FILM COATED ORAL at 11:44

## 2017-02-01 RX ADMIN — INSULIN LISPRO 5 UNITS: 100 INJECTION, SOLUTION INTRAVENOUS; SUBCUTANEOUS at 08:23

## 2017-02-01 RX ADMIN — LORAZEPAM 0.5 MG: 0.5 TABLET ORAL at 20:28

## 2017-02-01 RX ADMIN — INSULIN LISPRO 8 UNITS: 100 INJECTION, SOLUTION INTRAVENOUS; SUBCUTANEOUS at 11:45

## 2017-02-01 RX ADMIN — LEVALBUTEROL 1.25 MG: 1.25 SOLUTION, CONCENTRATE RESPIRATORY (INHALATION) at 16:13

## 2017-02-01 RX ADMIN — INSULIN LISPRO 3 UNITS: 100 INJECTION, SOLUTION INTRAVENOUS; SUBCUTANEOUS at 17:33

## 2017-02-01 RX ADMIN — ARFORMOTEROL TARTRATE 15 MCG: 15 SOLUTION RESPIRATORY (INHALATION) at 20:48

## 2017-02-01 RX ADMIN — DILTIAZEM HYDROCHLORIDE 30 MG: 30 TABLET, FILM COATED ORAL at 17:33

## 2017-02-01 RX ADMIN — INSULIN LISPRO 3 UNITS: 100 INJECTION, SOLUTION INTRAVENOUS; SUBCUTANEOUS at 07:11

## 2017-02-01 NOTE — DIABETES MGMT
GLYCEMIC CONTROL & NUTRITION:    - DM education attempt, pt unavailable at time of visit        BELINDA Batista, MPH, RD

## 2017-02-01 NOTE — PROGRESS NOTES
Chart reviewed, noted 66 yr old  pt with Medicare cont in ICU on high flow oxygen; noted PT eval today with rec of HH vs SNF. Met with pt who stated that: she lived with her  and planned to return home at discharge; she had at home a tolling walker, cane, oxygen which she used at 5 liters (? Company name); and a CPAP (which she did not use as she did not like the mask). Discussed with pt PT rec of home with home health vs a short stay at a facility for strengthening before going home. Pt stated that she hoped to go home; but was agreeable to me sending info to area facilities (except not [facility] facilities) as a back up plan. Provided pt with lists of area SNFs and home health agencies. Referral given to CMS to send to facilities near pt. Will cont to follow for home with [facility] vs [facility]F.

## 2017-02-01 NOTE — PROGRESS NOTES
Problem: Self Care Deficits Care Plan (Adult)  Goal: *Acute Goals and Plan of Care (Insert Text)  IInitial OT STGs (1/31/2017) Within 7 days:    1. Patient will perform toilet transfer with CGA/Katherin & SPO2>90% in preparation for bowel and bladder management. 2. Patient will perform bowel and bladder management with CGA/Katherin & SPO2>90% for increased independence with ADLs. 3. Patient will grooming standing sinkside for 5 minutes for increased independence ADLs/IADLs. 4. Patient will perform LB/UB dressing with setupA w/ A/E PRN for increased independence with ADLs. 5. Patient will perform UE exercises with SBA for increased independence with ADLs. 6. Patient will utilize energy conservation techniques with 1 verbal cue for increased independence with ADLs. Outcome: Progressing Towards Goal  OCCUPATIONAL THERAPY TREATMENT     Patient: Issa Ruiz (38 y.o. female)  Date: 2/1/2017  Diagnosis: Insufficiency, respiratory, acute (HCC)  CHF (congestive heart failure) (HCC)  Elevated troponin  COPD (chronic obstructive pulmonary disease) ([facility]  Infiltrate noted on imaging study  Insufficiency, respiratory, acute (HCC)  CHF (congestive heart failure) (HCC)  Elevated troponin  COPD (chronic obstructive pulmonary disease) (HCC)  Infiltrate noted on imaging study Acute on chronic diastolic CHF (congestive heart failure) ([facility])       Precautions: Fall, Other (comment) (O2, high Flow)  Chart, occupational therapy assessment, plan of care, and goals were reviewed. ASSESSMENT:  Pt making good progress in ADLs w/ SPO2>90% during functional mob and ADLs. Pt continues on High Flow. Pt's spouse present. Pt able to complete LE dressing of socks using c-sitting and Ankle-over-knee technique while in bed. Pt requires extensive assist for hair combing d/t matting & decreased functional endurance to maintain UE's against gravity for task.  Pt continues to be limited by  functional endurance to sustain ADL tasks safely, decreased knowledge of Energy Conservation/Work Simplification techniques, balance in standing ADLs d/t decreased muscle endurance. Progression toward goals:  [X]          Improving appropriately and progressing toward goals  [ ]          Improving slowly and progressing toward goals  [ ]          Not making progress toward goals and plan of care will be adjusted       PLAN:  Patient continues to benefit from skilled intervention to address the above impairments. Continue treatment per established plan of care. Discharge Recommendations:  Home Health  Further Equipment Recommendations for Discharge:  transfer bench and rollator       SUBJECTIVE:   Patient stated I want to be able to go home.       OBJECTIVE DATA SUMMARY:   Cognitive/Behavioral Status:  Neurologic State: Alert, Appropriate for age  Orientation Level: Appropriate for age, Oriented X4  Cognition: Follows commands  Safety/Judgement: Lack of insight into deficits  Functional Mobility and Transfers for ADLs:              Bed Mobility:  Rolling: Contact guard assistance  Supine to Sit: Contact guard assistance              Transfers:  Sit to Stand: Contact guard assistance  Bed to Chair: Contact guard assistance  Balance:  Sitting: Intact  Standing: Intact; With support  Standing - Static: Good  Standing - Dynamic : Fair  ADL Intervention:  Feeding  Feeding Assistance: Independent     Grooming  Grooming Assistance: Maximum assistance  Brushing/Combing Hair: Maximum assistance     Lower Body Dressing Assistance  Dressing Assistance: Supervision/set up  Socks: Supervision/set-up     Pain:  Pain Scale 1: Numeric (0 - 10)  Pain Intensity 1: 0     Activity Tolerance:    Pt requires frequent rest breaks. Pt able to tolerate 1 minute(s) in standing. Pt able to tolerate ADLs with 3-4 rest breaks. Please refer to the flowsheet for vital signs taken during this treatment.   After treatment:   [X]  Patient left in no apparent distress sitting up in chair  [ ]  Patient left in no apparent distress in bed  [X]  Call bell left within reach  [X]  Nursing notified  [X]  Caregiver present  [ ]  Bed alarm activated     Thank you for this referral.  Alaina Ayoub, OTR/L  Time Calculation: 28 mins

## 2017-02-01 NOTE — PROGRESS NOTES
Progress Note  Pulmonary, Critical Care, and Sleep Medicine    Name: Leeroy Bowers MRN: [id number]   : 1945 Hospital: [facility] [place]   Date: 2017        IMPRESSION:   · Acute on chronic hypercapnic and hypoxemic respiratory failure requiring BIPAP  · Possible COPD exacerbation although pt and  deny history of COPD  · IMANI unable to tolerate BIPAP due to claustrophobia  · Pulmonary HTN likely Group 3 on the basis of long standing IMANI and hypoxemia  · Hyperkalemia  · Diastolic heart failure  · Leukocytosis steroid related  · DM II glycemic control suboptimal  · Elevated Troponin  · A fib now rate controlled on Cardizem      PLAN:   · Obtain records from [facility] including recent PFT's  · Titrate supplemental O2  · Taper steroids jac with DM history  · Diurese pt  · Strict glycemic control  · DVT and GI prophylaxis issues addressed  · Pt may benefit from repeat CPAP titration via nasal pillows, outpatient testing for this  · Discussed in ICU interdisciplinary rounds  · Discussed with pt and  at bedside     Subjective/Interval History:   I have reviewed the flowsheet and previous days notes. Reviewed interval history. Discussed management with nursing staff. Pt off BIPAP, feeling better on HFNC. Denies pain or new respiratory symptoms      ROS:Pertinent items are noted in HPI. Orders reviewed including medications. Changes made if indicated. Telemetry monitor reviewed at the bedside.   Objective:   Vital Signs:    Visit Vitals    /66    Pulse (!) 132    Temp 97.9 °F (36.6 °C)    Resp 30    Ht 5' 5\" (1.651 m)    Wt 104.3 kg (230 lb)    SpO2 97%    BMI 38.27 kg/m2       O2 Device: Hi flow nasal cannula   O2 Flow Rate (L/min): 40 l/min   Temp (24hrs), Av.6 °F (36.4 °C), Min:96.5 °F (35.8 °C), Max:98 °F (36.7 °C)       Intake/Output:   Last shift:      701 - 1900  In: -   Out: 100 [Urine:100]  Last 3 shifts: 1901 -  07  In: 590 [P.O.:440; I.V.:150]  Out: 6044 [Urine:6044]    Intake/Output Summary (Last 24 hours) at 02/01/17 1152  Last data filed at 02/01/17 0818   Gross per 24 hour   Intake              590 ml   Output             1569 ml   Net             -979 ml        Physical Exam:    General:  Alert, cooperative, in no distress, appears stated age. Head:  Normocephalic, without obvious abnormality, atraumatic. Eyes:  ANicteric, PERRL,   Nose: Nares normal.  Mucosa normal. No drainage or sinus tenderness. Throat: Lips, mucosa, and tongue normal.  NO Thrush   Neck: Supple, symmetrical, trachea midline, no adenopathy, thyroid: no enlargment/tenderness/nodules    Back:   Symmetric    Lungs:   Diminished breath sounds, no rales or wheezes   Chest wall:  No tenderness or deformity. NO intercostal retractions   Heart:  irregular, S1, S2 normal, no murmur, click, rub or gallop. Abdomen:   Soft, non-tender. Bowel sounds normal. No masses,  No organomegaly. NO paradoxical motion   Extremities: normal, atraumatic, no cyanosis or edema. Pulses: 2+ and symmetric all extremities. Skin: Skin color, texture, turgor normal. No rashes or lesions.  NO clubbing   Lymph nodes: Cervical  nodes normal.   Neurologic: Grossly nonfocal      :        Devices:             Drips:    DATA:  Labs:  Recent Labs      02/01/17   0415  01/31/17   0416  01/30/17 2000   WBC  15.4*  10.7  11.9   HGB  11.1*  11.7*  11.7*   HCT  35.3  37.4  37.7   PLT  218  202  224     Recent Labs      02/01/17   1022  02/01/17   0415  02/01/17   0001   01/31/17   0416  01/30/17 2000   NA   --   137   --    --   137  134*   K  5.6*  5.2  5.4   < >  5.6*  6.2*   CL   --   98*   --    --   99*  97*   CO2   --   35*   --    --   35*  32   GLU   --   168*   --    --   268*  395*   BUN   --   45*   --    --   42*  44*   CREA   --   1.47*   --    --   1.16  1.20   CA   --   9.3   --    --   9.1  8.9   MG   --   2.0   --    --   2.2  2.6*   ALB   --   3.2*   --    --   3.4  3.6   SGOT --   15   --    --   17  21   ALT   --   18   --    --   24  23   INR   --   2.7*   --    --   2.8*  2.6*    < > = values in this interval not displayed. No results for input(s): PH, PCO2, PO2, HCO3, FIO2 in the last 72 hours. Imaging:  []        I have personally reviewed the patients radiographs and reports  []         []        No change from prior, tubes and lines in adequate position  []        Improved   []        Worsening  High complexity decision making was performed during this consultation and evaluation.      Tran Agee MD

## 2017-02-01 NOTE — PROGRESS NOTES
Hospitalist Progress Note-critical care note     Patient: Car Brooke MRN: [id number]  [facility]: [id number]    YOB: 1945  Age: 66 y.o. Sex: female    DOA: 1/30/2017 LOS:  LOS: 2 days            Chief complaint: chf exacerbation,  Acute on chronic respiratory failure , elevated trop, copd exacerbation , hyperkalemia , HTN    Assessment/Plan         Patient Active Problem List   Diagnosis Code    Acute on chronic diastolic CHF (congestive heart failure) ([facility]) I50.33    ALEJANDRINA (acute kidney injury) ([facility] [address] N17.9    Acute exacerbation of CHF (congestive heart failure) ([facility]) I50.9    DM (diabetes mellitus) ([facility] [address] E11.9    Hypertension I10    Respiratory failure ([facility] [address] J96.90    Acute coronary syndrome ([facility]) I24.9    Obstructive sleep apnea, adult G47.33    Paroxysmal atrial fibrillation ([facility]) I48.0    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 ([facility]) G74.29, E11.65    Dyspnea due to congestive heart failure ([facility]) I50.9    Aspiration pneumonia ([facility]) J69.0    Hyperkalemia E87.5    Elevated troponin R79.89    Acute respiratory distress ([facility]) R06.00    COPD (chronic obstructive pulmonary disease) ([facility]) J44.9    CHF (congestive heart failure) ([facility]) I50.9    Insufficiency, respiratory, acute ([facility]) R06.89    Infiltrate noted on imaging study R93.8    Chronic respiratory failure with hypoxia ([facility]) J96.11    COPD exacerbation ([facility]) J44.1     1. Acute on chronic  respiratory failure   Improving, on high flow O2   - hx of intubation in 2016. Appreciated intensive input. Multiple factors, copd exacerbation, possible hcap, hermelinda, Pulmonary hypertension (moderated from 2016 echo and chf exacerbation   2. Copd exacerbation and possible hcap  Will continue breathing tx with xopenex. pulmocort and brovana, continue levaquin  3 afib with rvr  Rate controlled per cardizem now, optimize the electrolytes, on warfarin   4.  chf exacerbation -diastolic from previous echo  Will continue lasix ,ef wnl. Dr. Feng Pederson on board , add coreg   5. Hyperkalemia  Resolved, will continue home spirolactone   6.elevated trop  Trending up, case discussed with  Dr. Feng Pederson, no chest pain    7. DM type II ,   -long term insulin ,   -on lantus, ssi, hypoglycemia protocol ,elevated due to steroid, will increase to 8  units insulin with steroid  8 HTN, accelerated  Continue home medication. 9. Pulmonary hypertension   moderate from echo   10 hermelinda   Reported not on cpap-due to unable to tolerate   11 anxiety  Continue prn ativan     Subjective: sob better   Nurse: no acute issue      was at the bedside. A lot of concerns, all questions have been answered. 40  total min's spent on patient care including >50% on counseling/coordinating care. Discussed the above assessments. also discussed labs, medications and hospital course      Review of systems:    General: No fevers or chills. Cardiovascular: No chest pain or pressure. No palpitations. Pulmonary: shortness of breath better,   Gastrointestinal: No nausea, vomiting. Vital signs/Intake and Output:  Visit Vitals    BP (!) 168/134 (BP 1 Location: Left arm, BP Patient Position: At rest)    Pulse 96    Temp 98 °F (36.7 °C)    Resp 26    Ht 5' 5\" (1.651 m)    Wt 104.3 kg (230 lb)    SpO2 94%    BMI 38.27 kg/m2     Current Shift:  02/01 0701 - 02/01 1900  In: -   Out: 100 [Urine:100]  Last three shifts:  01/30 1901 - 02/01 0700  In: 590 [P.O.:440; I.V.:150]  Out: 6044 [Urine:6044]    Physical Exam:  General: WD, WN. Alert, cooperative, no acute distress    HEENT: NC, Atraumatic. PERRLA, anicteric sclerae. bipap mask noted   Lungs: CTA Bilaterally. No Wheezing/Rhonchi/Rales. Heart:  Irregular irregular ,  No murmur, No Rubs, No Gallops  Abdomen: Soft, Non distended, Non tender.  +Bowel sounds,   Extremities: No c/c/e  Psych:   anxious . No agitated. Neurologic:  No acute neurological deficit.              Labs: Results:       Chemistry Recent Labs      02/01/17 2469  02/01/17   0001  01/31/17   1757   01/31/17 0416  01/30/17 2000   GLU  168*   --    --    --   268*  395*   NA  137   --    --    --   137  134*   K  5.2  5.4  5.1   < >  5.6*  6.2*   CL  98*   --    --    --   99*  97*   CO2  35*   --    --    --   35*  32   BUN  45*   --    --    --   42*  44*   CREA  1.47*   --    --    --   1.16  1.20   CA  9.3   --    --    --   9.1  8.9   AGAP  4   --    --    --   3  5   BUCR  31*   --    --    --   36*  37*   AP  125*   --    --    --   162*  177*   TP  6.7   --    --    --   7.1  7.5   ALB  3.2*   --    --    --   3.4  3.6   GLOB  3.5   --    --    --   3.7  3.9   AGRAT  0.9   --    --    --   0.9  0.9    < > = values in this interval not displayed. CBC w/Diff Recent Labs      02/01/17 0415 01/31/17 0416 01/30/17 2000   WBC  15.4*  10.7  11.9   RBC  3.46*  3.63*  3.64*   HGB  11.1*  11.7*  11.7*   HCT  35.3  37.4  37.7   PLT  218  202  224   GRANS  92*  99*  93*   LYMPH  3*  1*  1*   EOS  0  0  0      Cardiac Enzymes Recent Labs      01/31/17   1000  01/31/17 0416   CPK  28  29   CKND1  7.9*  7.9*      Coagulation Recent Labs      02/01/17 0415 01/31/17 0416 01/30/17 2000   PTP  27.1*  28.1*  26.4*   INR  2.7*  2.8*  2.6*   APTT   --    --   29.2       Lipid Panel No results found for: CHOL, CHOLPOCT, CHOLX, CHLST, CHOLV, 945400, HDL, LDL, NLDLCT, DLDL, LDLC, DLDLP, 887828, VLDLC, VLDL, TGL, TGLX, TRIGL, [id number], TRIGP, TGLPOCT, [id number], CHHD, CHHDX   BNP No results for input(s): BNPP in the last 72 hours.    Liver Enzymes Recent Labs      02/01/17   0415   TP  6.7   ALB  3.2*   AP  125*   SGOT  15      Thyroid Studies Lab Results   Component Value Date/Time    TSH 0.31 01/01/2016 10:20 PM        Procedures/imaging: see electronic medical records for all procedures/Xrays and details which were not copied into this note but were reviewed prior to creation of Rama Phoenix, MD

## 2017-02-01 NOTE — INTERDISCIPLINARY ROUNDS
CRITICAL CARE INTERDISCIPLINARY ROUNDS    Patient Information:    Name:   Sirena Duenas    Age:   66 y.o. Admission Date:   1/30/2017    Critical Care Day: 3    Surgery Date:      Attending Provider:   Rai Birmingham MD    Surgeon:        Consultant(s):   Mary Flowers    Critical Care Physician:  Mary Flowers    Code Status: Full Code    Problem List:     Patient Active Problem List   Diagnosis Code    Acute on chronic diastolic CHF (congestive heart failure) ([facility] I50.33    ALEJANDRINA (acute kidney injury) ([facility] [address] N17.9    Acute exacerbation of CHF (congestive heart failure) ([facility] [address] I50.9    DM (diabetes mellitus) ([facility] [address] E11.9    Hypertension I10    Respiratory failure ([facility] [address] J96.90    Acute coronary syndrome ([facility] [address] I24.9    Obstructive sleep apnea, adult G47.33    Paroxysmal atrial fibrillation ([facility] [address] I48.0    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 ([facility]) C80.38, E11.65    Dyspnea due to congestive heart failure ([facility]) I50.9    Aspiration pneumonia ([facility]) J69.0    Hyperkalemia E87.5    Elevated troponin R79.89    Acute respiratory distress ([facility]) R06.00    COPD (chronic obstructive pulmonary disease) ([facility]) J44.9    CHF (congestive heart failure) ([facility]) I50.9    Insufficiency, respiratory, acute ([facility]) R06.89    Infiltrate noted on imaging study R93.8    Chronic respiratory failure with hypoxia ([facility]) J96.11    COPD exacerbation ([facility]) J44.1       Principal Problem:  Acute on chronic diastolic CHF (congestive heart failure) ([facility])    During rounds the following quality care indicators and evidence based practices were addressed :  DVT Prophylaxis and PUD Prophylaxis Davila Day 3 ([facility]) ; Central Line Day:na Isolation:na;  Antibiotic Stewardship: reviewed; Probiotics Necessary: na    Ventilator Bundle:      Sepsis Order Set:     Glycemic Control:   Recent Labs      02/01/17 0415  01/31/17   0416  01/30/17 2000   GLU  168*  268*  395*   ;  Recent Labs      01/30/17 2017   PHI  7.313*   PCO2I 60.0*   PO2I  218*    Adjustments     Acute MI/PCI:   Not applicable    Door to Balloon: Admission Time: 1956      Heart Failure:    EF:%% 60    SCIP Measures for other Surgeries:   Not applicable     Pneumonia:    Not applicable    Interdisciplinary team rounds were held with the following team members Care Management, Diabetes   Treatment Specialist, Nursing, Nutrition, Pharmacy, Physician and Respiratory Therapy. Plan of care discussed. Goals of Care/ Recommendations: insulin adjusted, cardiology to see. Continue current treatment. Dr Jeff Johnson will need to see    See clinical pathway and/or care plan for interventions and desired outcomes.     Critical Care Discharge Status:  Red

## 2017-02-01 NOTE — PROGRESS NOTES
requested by RN to pray with patient experiencing chronic anxiety.  responded to the patient with listening and guided prayer imagery.  will remain available to patient as requested.     [address], [address] office  [phone] pager

## 2017-02-01 NOTE — PROGRESS NOTES
Problem: Mobility Impaired (Adult and Pediatric)  Goal: *Acute Goals and Plan of Care (Insert Text)  Physical Therapy Goals  Initiated 2/1/2017 and to be accomplished within 7 day(s)  1. Patient will move from supine to sit and sit to supine in bed with supervision/set-up. 2. Patient will transfer from bed to chair and chair to bed with supervision/set-up using the least restrictive device. 3. Patient will perform sit to stand with supervision/set-up. 4. Patient will ambulate with supervision/set-up for >150 feet with the least restrictive device for safe home ambulation. 5. Patient will ascend/descend 1 step without handrail(s) with minimal assistance/contact guard assist for safe home entry. Outcome: Progressing Towards Goal  PHYSICAL THERAPY EVALUATION     Patient: Car Brooke (47 y.o. female)  Date: 2/1/2017  Primary Diagnosis: Insufficiency, respiratory, acute (HCC)  CHF (congestive heart failure) (HCC)  Elevated troponin  COPD (chronic obstructive pulmonary disease) ([address]  Infiltrate noted on imaging study  Insufficiency, respiratory, acute (HCC)  CHF (congestive heart failure) (HCC)  Elevated troponin  COPD (chronic obstructive pulmonary disease) ([address]  Infiltrate noted on imaging study        Precautions:   Fall      ASSESSMENT :  Based on the objective data described below, Patient present to PT with functional mobility impairments with regard to bed mobility, transfers, gait, stair negotiation, balance, weakness, and overall tolerance for activity. Pt on HiFlow NC at this time which limits her gait training distance away from bed. Pt required CGA for bed mobility and functional transfers. During gait training pt ambulated a total of 5 feet with RW use, CGA/Wilber demonstrating a slow/step-to pattern with increased trunk sway. Left pt sitting up in a chair as requested with all needs met and call bell within reach. Pt's SpO2 stayed at or above 91% on HiFlow during entire PT session with GARIBAY. Recommend HHPT vs. SNF at time of discharge depending on pt progress. Patient will benefit from skilled intervention to address the above impairments. Patients rehabilitation potential is considered to be Good  Factors which may influence rehabilitation potential include:   [ ]         None noted  [ ]         Mental ability/status  [X]         Medical condition  [ ]         Home/family situation and support systems  [ ]         Safety awareness  [ ]         Pain tolerance/management  [ ]         Other:        PLAN :  Recommendations and Planned Interventions:  [X]           Bed Mobility Training             [X]    Neuromuscular Re-Education  [X]           Transfer Training                   [ ]    Orthotic/Prosthetic Training  [X]           Gait Training                          [ ]    Modalities  [X]           Therapeutic Exercises          [ ]    Edema Management/Control  [X]           Therapeutic Activities            [X]    Patient and Family Training/Education  [ ]           Other (comment):     Frequency/Duration: Patient will be followed by physical therapy daily to address goals. Discharge Recommendations: [address]  Further Equipment Recommendations for Discharge: rolling walker       SUBJECTIVE:   Patient stated I really want to get up and walk.       OBJECTIVE DATA SUMMARY:       Past Medical History   Diagnosis Date    A-fib ([facility] [address]      Arthritis      Back injury      Chronic obstructive pulmonary disease (HCC)      Diabetes ([facility] [address]      Fibromyalgia      [facility]      Hypertension      MRSA (methicillin resistant Staphylococcus aureus)       Past Surgical History   Procedure Laterality Date    Pr cardiac surg procedure unlist        Hx coronary stent placement        Hx knee replacement        Hx orthopaedic           bilateral knee replacement     Barriers to Learning/Limitations: None  Compensate with: visual, verbal, tactile, kinesthetic cues/model  Prior Level of Function/Home Situation: Pt stated she used a RW for ambulation within the home  Home Situation  Home Environment: Private residence  # Steps to Enter: 1  Rails to Enter: No  One/Two Story Residence: One story  Living Alone: No  Support Systems: Spouse/Significant Other/Partner  Patient Expects to be Discharged to[de-identified] Private residence  Current DME Used/Available at Home: Walker, rolling  Tub or Shower Type: Tub/Shower combination  Critical Behavior:  Neurologic State: Alert; Appropriate for age  Orientation Level: Appropriate for age;Oriented X4  Cognition: Follows commands   Psychosocial  Patient Behaviors: Calm; Cooperative  Family  Behaviors: Calm; Cooperative;Supportive  Purposeful Interaction: Yes  Pt Identified Daily Priority: Clinical issues (comment)  Caritas Process: Nurture loving kindness;Establish trust;Teaching/learning; Attend basic human needs  Caring Interventions: Reassure  Reassure: Therapeutic listening;Caring rounds  Therapeutic Modalities: Intentional therapeutic touch   Family  Behaviors: Calm; Cooperative;Supportive   Strength:    Strength: Generally decreased, functional   Tone & Sensation:   Tone: Abnormal   Sensation: Impaired     Range Of Motion:  AROM: Generally decreased, functional   PROM: Within functional limits   Functional Mobility:  Bed Mobility:  Rolling: Contact guard assistance  Supine to Sit: Contact guard assistance   Transfers:  Sit to Stand: Contact guard assistance; Additional time  Stand to Sit: Contact guard assistance   Balance:   Sitting: Intact  Standing: Impaired  Standing - Static: Good  Standing - Dynamic : Fair  Ambulation/Gait Training:  Distance (ft): 5 Feet (ft)  Assistive Device: Walker, rolling;Gait belt  Ambulation - Level of Assistance: Contact guard assistance   Gait Description (WDL): Exceptions to WDL  Gait Abnormalities: Decreased step clearance   Base of Support: Widened  Stance: Weight shift  Speed/Kristin: Slow;Shuffled  Step Length: Right shortened;Left shortened  Swing Pattern: Right asymmetrical;Left asymmetrical   Interventions: Visual/Demos; Verbal cues; Tactile cues; Safety awareness training   Therapeutic Exercises: Ankle pumps x 20 reps, LAQ x 10 reps, heel slides x 10 reps  Standing marches with RW x 10 reps  Pain:  Pain Scale 1: Numeric (0 - 10)  Pain Intensity 1: 0   Activity Tolerance:   Fair+  Please refer to the flowsheet for vital signs taken during this treatment. After treatment:   [X]         Patient left in no apparent distress sitting up in chair  [ ]         Patient left in no apparent distress in bed  [X]         Call bell left within reach  [X]         Nursing notified  [X]         Caregiver present  [ ]         Bed alarm activated      COMMUNICATION/EDUCATION:   [X]         Fall prevention education was provided and the patient/caregiver indicated understanding. [X]         Patient/family have participated as able in goal setting and plan of care. [X]         Patient/family agree to work toward stated goals and plan of care. [ ]         Patient understands intent and goals of therapy, but is neutral about his/her participation. [ ]         Patient is unable to participate in goal setting and plan of care.      Thank you for this referral.  Jesse Michel, DPT      Time Calculation: 30 mins

## 2017-02-01 NOTE — PROGRESS NOTES
Pharmacy Dosing Services:  Warfarin    Consult for Warfarin Dosing by Pharmacy by Dr. Nathaly Flores provided for this 66 y.o.  female , for indication of Thromboembolism ( Prevent with Chronic A-Fib ). Dose to achieve an INR goal of 2-3    Order entered for  Warfarin  2.5 (mg) ordered to be given today at 18:00. Significant drug interactions:  Levofloxacin    LABS    PT/INR Lab Results   Component Value Date/Time    INR 2.7 02/01/2017 04:15 AM      Platelets Lab Results   Component Value Date/Time    PLATELET 292 39/07/4344 04:15 AM      H/H     Lab Results   Component Value Date/Time    HGB 11.1 02/01/2017 04:15 AM        Warfarin Administrations (last 168 hours)     Date/Time Action Medication Dose    01/31/17 1733 Given    warfarin (COUMADIN) tablet 2.5 mg 2.5 mg          Pharmacy to follow daily and will provide subsequent Warfarin dosing based on clinical status.   [facility]  Contact information     [phone]

## 2017-02-01 NOTE — PROGRESS NOTES
Report received and assumed care of pt. Assessment completed per flowsheet. Pt A&Ox4, denies pain at this time. Pt tachypneic with scattered expiratory wheezes, diminished in the bases, on HFNC. Davila intact and draining. VSS, will continue to monitor. 0000 - Pt obviously anxious and requesting somebody to \"talk with\", suggested that this RN contact the  and pt agreeable. Spoke with hospital  who will come and speak/pray with pt.

## 2017-02-01 NOTE — ROUTINE PROCESS
Bedside and Verbal shift change report given to Hugo Newton RN (oncoming nurse) by Radha Quinonez RN   (offgoing nurse). Report included the following information SBAR, Kardex, Intake/Output and MAR.

## 2017-02-02 LAB
ALBUMIN SERPL BCP-MCNC: 3.1 G/DL (ref 3.4–5)
ALBUMIN/GLOB SERPL: 0.9 {RATIO} (ref 0.8–1.7)
ALP SERPL-CCNC: 115 U/L (ref 45–117)
ALT SERPL-CCNC: 19 U/L (ref 13–56)
ANION GAP BLD CALC-SCNC: 5 MMOL/L (ref 3–18)
AST SERPL W P-5'-P-CCNC: 15 U/L (ref 15–37)
BASOPHILS # BLD AUTO: 0 K/UL (ref 0–0.06)
BASOPHILS # BLD: 0 % (ref 0–2)
BILIRUB SERPL-MCNC: 1.2 MG/DL (ref 0.2–1)
BUN SERPL-MCNC: 49 MG/DL (ref 7–18)
BUN/CREAT SERPL: 36 (ref 12–20)
CALCIUM SERPL-MCNC: 8.8 MG/DL (ref 8.5–10.1)
CHLORIDE SERPL-SCNC: 99 MMOL/L (ref 100–108)
CO2 SERPL-SCNC: 38 MMOL/L (ref 21–32)
CREAT SERPL-MCNC: 1.36 MG/DL (ref 0.6–1.3)
DIFFERENTIAL METHOD BLD: ABNORMAL
EOSINOPHIL # BLD: 0 K/UL (ref 0–0.4)
EOSINOPHIL NFR BLD: 0 % (ref 0–5)
ERYTHROCYTE [DISTWIDTH] IN BLOOD BY AUTOMATED COUNT: 16.3 % (ref 11.6–14.5)
GLOBULIN SER CALC-MCNC: 3.3 G/DL (ref 2–4)
GLUCOSE BLD STRIP.AUTO-MCNC: 162 MG/DL (ref 70–110)
GLUCOSE BLD STRIP.AUTO-MCNC: 185 MG/DL (ref 70–110)
GLUCOSE BLD STRIP.AUTO-MCNC: 221 MG/DL (ref 70–110)
GLUCOSE BLD STRIP.AUTO-MCNC: 69 MG/DL (ref 70–110)
GLUCOSE BLD STRIP.AUTO-MCNC: 99 MG/DL (ref 70–110)
GLUCOSE SERPL-MCNC: 56 MG/DL (ref 74–99)
HCT VFR BLD AUTO: 35.8 % (ref 35–45)
HGB BLD-MCNC: 11.3 G/DL (ref 12–16)
INR PPP: 2.2 (ref 0.8–1.2)
LYMPHOCYTES # BLD AUTO: 6 % (ref 21–52)
LYMPHOCYTES # BLD: 0.7 K/UL (ref 0.9–3.6)
MAGNESIUM SERPL-MCNC: 2.2 MG/DL (ref 1.8–2.4)
MCH RBC QN AUTO: 31.9 PG (ref 24–34)
MCHC RBC AUTO-ENTMCNC: 31.6 G/DL (ref 31–37)
MCV RBC AUTO: 101.1 FL (ref 74–97)
MONOCYTES # BLD: 0.4 K/UL (ref 0.05–1.2)
MONOCYTES NFR BLD AUTO: 3 % (ref 3–10)
NEUTS SEG # BLD: 11.5 K/UL (ref 1.8–8)
NEUTS SEG NFR BLD AUTO: 91 % (ref 40–73)
PLATELET # BLD AUTO: 218 K/UL (ref 135–420)
PMV BLD AUTO: 10.2 FL (ref 9.2–11.8)
POTASSIUM SERPL-SCNC: 3.9 MMOL/L (ref 3.5–5.5)
POTASSIUM SERPL-SCNC: 4.1 MMOL/L (ref 3.5–5.5)
POTASSIUM SERPL-SCNC: 4.2 MMOL/L (ref 3.5–5.5)
POTASSIUM SERPL-SCNC: 4.4 MMOL/L (ref 3.5–5.5)
PROT SERPL-MCNC: 6.4 G/DL (ref 6.4–8.2)
PROTHROMBIN TIME: 23.6 SEC (ref 11.5–15.2)
RBC # BLD AUTO: 3.54 M/UL (ref 4.2–5.3)
SODIUM SERPL-SCNC: 142 MMOL/L (ref 136–145)
WBC # BLD AUTO: 12.5 K/UL (ref 4.6–13.2)

## 2017-02-02 PROCEDURE — 36415 COLL VENOUS BLD VENIPUNCTURE: CPT | Performed by: HOSPITALIST

## 2017-02-02 PROCEDURE — 74011636637 HC RX REV CODE- 636/637: Performed by: INTERNAL MEDICINE

## 2017-02-02 PROCEDURE — C9113 INJ PANTOPRAZOLE SODIUM, VIA: HCPCS | Performed by: INTERNAL MEDICINE

## 2017-02-02 PROCEDURE — 65610000006 HC RM INTENSIVE CARE

## 2017-02-02 PROCEDURE — 74011250637 HC RX REV CODE- 250/637: Performed by: INTERNAL MEDICINE

## 2017-02-02 PROCEDURE — 74011250637 HC RX REV CODE- 250/637: Performed by: HOSPITALIST

## 2017-02-02 PROCEDURE — 85610 PROTHROMBIN TIME: CPT | Performed by: HOSPITALIST

## 2017-02-02 PROCEDURE — 74011250636 HC RX REV CODE- 250/636: Performed by: HOSPITALIST

## 2017-02-02 PROCEDURE — 84132 ASSAY OF SERUM POTASSIUM: CPT | Performed by: HOSPITALIST

## 2017-02-02 PROCEDURE — 82962 GLUCOSE BLOOD TEST: CPT

## 2017-02-02 PROCEDURE — 94660 CPAP INITIATION&MGMT: CPT

## 2017-02-02 PROCEDURE — 74011250636 HC RX REV CODE- 250/636: Performed by: INTERNAL MEDICINE

## 2017-02-02 PROCEDURE — 85025 COMPLETE CBC W/AUTO DIFF WBC: CPT | Performed by: HOSPITALIST

## 2017-02-02 PROCEDURE — 74011000250 HC RX REV CODE- 250: Performed by: HOSPITALIST

## 2017-02-02 PROCEDURE — 94640 AIRWAY INHALATION TREATMENT: CPT

## 2017-02-02 PROCEDURE — 83735 ASSAY OF MAGNESIUM: CPT | Performed by: HOSPITALIST

## 2017-02-02 PROCEDURE — 74011636637 HC RX REV CODE- 636/637: Performed by: HOSPITALIST

## 2017-02-02 PROCEDURE — 74011000250 HC RX REV CODE- 250: Performed by: INTERNAL MEDICINE

## 2017-02-02 PROCEDURE — 77010033711 HC HIGH FLOW OXYGEN

## 2017-02-02 RX ORDER — INSULIN LISPRO 100 [IU]/ML
INJECTION, SOLUTION INTRAVENOUS; SUBCUTANEOUS
Status: DISCONTINUED | OUTPATIENT
Start: 2017-02-02 | End: 2017-02-02

## 2017-02-02 RX ORDER — WARFARIN 3 MG/1
3 TABLET ORAL ONCE
Status: COMPLETED | OUTPATIENT
Start: 2017-02-02 | End: 2017-02-02

## 2017-02-02 RX ORDER — INSULIN LISPRO 100 [IU]/ML
5 INJECTION, SOLUTION INTRAVENOUS; SUBCUTANEOUS
Status: DISCONTINUED | OUTPATIENT
Start: 2017-02-02 | End: 2017-02-02

## 2017-02-02 RX ORDER — INSULIN GLARGINE 100 [IU]/ML
20 INJECTION, SOLUTION SUBCUTANEOUS
Status: DISCONTINUED | OUTPATIENT
Start: 2017-02-02 | End: 2017-02-03

## 2017-02-02 RX ORDER — INSULIN LISPRO 100 [IU]/ML
5 INJECTION, SOLUTION INTRAVENOUS; SUBCUTANEOUS
Status: DISCONTINUED | OUTPATIENT
Start: 2017-02-03 | End: 2017-02-02

## 2017-02-02 RX ORDER — TRAZODONE HYDROCHLORIDE 50 MG/1
25 TABLET ORAL
Status: DISCONTINUED | OUTPATIENT
Start: 2017-02-02 | End: 2017-02-03

## 2017-02-02 RX ORDER — INSULIN LISPRO 100 [IU]/ML
5 INJECTION, SOLUTION INTRAVENOUS; SUBCUTANEOUS
Status: DISCONTINUED | OUTPATIENT
Start: 2017-02-03 | End: 2017-02-05

## 2017-02-02 RX ORDER — HYDROMORPHONE HYDROCHLORIDE 2 MG/1
1 TABLET ORAL ONCE
Status: COMPLETED | OUTPATIENT
Start: 2017-02-02 | End: 2017-02-02

## 2017-02-02 RX ORDER — INSULIN LISPRO 100 [IU]/ML
INJECTION, SOLUTION INTRAVENOUS; SUBCUTANEOUS
Status: DISCONTINUED | OUTPATIENT
Start: 2017-02-02 | End: 2017-02-09 | Stop reason: HOSPADM

## 2017-02-02 RX ADMIN — INSULIN LISPRO 4 UNITS: 100 INJECTION, SOLUTION INTRAVENOUS; SUBCUTANEOUS at 21:42

## 2017-02-02 RX ADMIN — FUROSEMIDE 40 MG: 10 INJECTION, SOLUTION INTRAMUSCULAR; INTRAVENOUS at 08:50

## 2017-02-02 RX ADMIN — LEVALBUTEROL 1.25 MG: 1.25 SOLUTION, CONCENTRATE RESPIRATORY (INHALATION) at 20:32

## 2017-02-02 RX ADMIN — LORAZEPAM 0.5 MG: 0.5 TABLET ORAL at 04:36

## 2017-02-02 RX ADMIN — LORAZEPAM 0.5 MG: 0.5 TABLET ORAL at 13:42

## 2017-02-02 RX ADMIN — TRAZODONE HYDROCHLORIDE 25 MG: 50 TABLET ORAL at 23:32

## 2017-02-02 RX ADMIN — ARFORMOTEROL TARTRATE 15 MCG: 15 SOLUTION RESPIRATORY (INHALATION) at 20:32

## 2017-02-02 RX ADMIN — BUDESONIDE 500 MCG: 0.5 INHALANT RESPIRATORY (INHALATION) at 20:32

## 2017-02-02 RX ADMIN — WARFARIN SODIUM 3 MG: 3 TABLET ORAL at 17:40

## 2017-02-02 RX ADMIN — DILTIAZEM HYDROCHLORIDE 30 MG: 30 TABLET, FILM COATED ORAL at 17:40

## 2017-02-02 RX ADMIN — DILTIAZEM HYDROCHLORIDE 30 MG: 30 TABLET, FILM COATED ORAL at 12:36

## 2017-02-02 RX ADMIN — ONDANSETRON HYDROCHLORIDE 4 MG: 2 INJECTION INTRAMUSCULAR; INTRAVENOUS at 04:36

## 2017-02-02 RX ADMIN — CARVEDILOL 25 MG: 12.5 TABLET, FILM COATED ORAL at 08:50

## 2017-02-02 RX ADMIN — LEVALBUTEROL 1.25 MG: 1.25 SOLUTION, CONCENTRATE RESPIRATORY (INHALATION) at 03:45

## 2017-02-02 RX ADMIN — INSULIN GLARGINE 20 UNITS: 100 INJECTION, SOLUTION SUBCUTANEOUS at 21:42

## 2017-02-02 RX ADMIN — INSULIN LISPRO 2 UNITS: 100 INJECTION, SOLUTION INTRAVENOUS; SUBCUTANEOUS at 17:40

## 2017-02-02 RX ADMIN — LEVALBUTEROL 1.25 MG: 1.25 SOLUTION, CONCENTRATE RESPIRATORY (INHALATION) at 15:12

## 2017-02-02 RX ADMIN — CARVEDILOL 25 MG: 12.5 TABLET, FILM COATED ORAL at 17:40

## 2017-02-02 RX ADMIN — METHYLPREDNISOLONE SODIUM SUCCINATE 40 MG: 40 INJECTION, POWDER, FOR SOLUTION INTRAMUSCULAR; INTRAVENOUS at 13:42

## 2017-02-02 RX ADMIN — ARFORMOTEROL TARTRATE 15 MCG: 15 SOLUTION RESPIRATORY (INHALATION) at 07:26

## 2017-02-02 RX ADMIN — DOCUSATE SODIUM 100 MG: 100 CAPSULE, LIQUID FILLED ORAL at 08:50

## 2017-02-02 RX ADMIN — SODIUM CHLORIDE 40 MG: 9 INJECTION, SOLUTION INTRAMUSCULAR; INTRAVENOUS; SUBCUTANEOUS at 08:49

## 2017-02-02 RX ADMIN — DOCUSATE SODIUM 100 MG: 100 CAPSULE, LIQUID FILLED ORAL at 20:40

## 2017-02-02 RX ADMIN — LEVOFLOXACIN 750 MG: 5 INJECTION, SOLUTION INTRAVENOUS at 21:41

## 2017-02-02 RX ADMIN — LEVALBUTEROL 1.25 MG: 1.25 SOLUTION, CONCENTRATE RESPIRATORY (INHALATION) at 11:25

## 2017-02-02 RX ADMIN — BUDESONIDE 500 MCG: 0.5 INHALANT RESPIRATORY (INHALATION) at 07:26

## 2017-02-02 RX ADMIN — METHYLPREDNISOLONE SODIUM SUCCINATE 40 MG: 40 INJECTION, POWDER, FOR SOLUTION INTRAMUSCULAR; INTRAVENOUS at 05:25

## 2017-02-02 RX ADMIN — HYDROMORPHONE HYDROCHLORIDE 1 MG: 2 TABLET ORAL at 12:36

## 2017-02-02 RX ADMIN — DILTIAZEM HYDROCHLORIDE 30 MG: 30 TABLET, FILM COATED ORAL at 07:01

## 2017-02-02 RX ADMIN — METHYLPREDNISOLONE SODIUM SUCCINATE 40 MG: 40 INJECTION, POWDER, FOR SOLUTION INTRAMUSCULAR; INTRAVENOUS at 21:42

## 2017-02-02 RX ADMIN — FUROSEMIDE 40 MG: 10 INJECTION, SOLUTION INTRAMUSCULAR; INTRAVENOUS at 20:40

## 2017-02-02 RX ADMIN — LEVALBUTEROL 1.25 MG: 1.25 SOLUTION, CONCENTRATE RESPIRATORY (INHALATION) at 07:26

## 2017-02-02 NOTE — PROGRESS NOTES
Progress Note  Pulmonary, Critical Care, and Sleep Medicine    Name: Leeroy Bowers MRN: [id number]   : 1945 Hospital: [facility] [place]   Date: 2017        IMPRESSION:   · Acute on chronic hypercapnic and hypoxemic respiratory failure requiring BIPAP, improving and now on O2 NC  · Possible COPD exacerbation although pt and  deny history of COPD  · IMANI unable to tolerate CPAP due to claustrophobia  · Pulmonary HTN likely Group 3 on the basis of long standing IMANI and hypoxemia  · Hyperkalemia resolved  · Diastolic heart failure  · Leukocytosis steroid related improving  · DM II glycemic control suboptimal, hypoglycemia noted earlier today  · Elevated Troponin  · A fib now rate controlled on po Cardizem      PLAN:   · Obtain records from [facility] including recent PFT's  · Titrate supplemental O2  · Taper steroids jac with DM history  · Diurese pt  · Strict glycemic control, discontinue preprandial insulin  · DVT and GI prophylaxis issues addressed  · Pt may benefit from repeat CPAP titration via nasal pillows, outpatient testing for this  · Discussed with pt and  at bedside     Subjective/Interval History:   I have reviewed the flowsheet and previous days notes. Reviewed interval history. Discussed management with nursing staff. Pt off BIPAP, feeling better on HFNC. Denies pain or new respiratory symptoms      ROS:Pertinent items are noted in HPI. Orders reviewed including medications. Changes made if indicated. Telemetry monitor reviewed at the bedside.   Objective:   Vital Signs:    Visit Vitals    /86    Pulse 90    Temp 95.9 °F (35.5 °C)    Resp 26    Ht 5' 5\" (1.651 m)    Wt 104.3 kg (230 lb)    SpO2 94%    BMI 38.27 kg/m2       O2 Device: Hi flow nasal cannula   O2 Flow Rate (L/min): 40 l/min   Temp (24hrs), Av.2 °F (36.2 °C), Min:95.9 °F (35.5 °C), Max:97.9 °F (36.6 °C)       Intake/Output:   Last shift:      701 -  190  In: 240 [P.O.:240]  Out: -   Last 3 shifts: 01/31 1901 - 02/02 0700  In: 1020 [P.O.:720; I.V.:300]  Out: 3525 [Urine:3525]    Intake/Output Summary (Last 24 hours) at 02/02/17 7519  Last data filed at 02/02/17 8929   Gross per 24 hour   Intake             1110 ml   Output             2450 ml   Net            -1340 ml        Physical Exam:    General:  Asleep but easily rousable, cooperative, in no distress, appears stated age. Head:  Normocephalic, without obvious abnormality, atraumatic. Eyes:  ANicteric, PERRL,   Nose: Nares normal.  Mucosa normal. No drainage or sinus tenderness. Throat: Lips, mucosa, and tongue normal.  NO Thrush   Neck: Supple, symmetrical, trachea midline, no adenopathy, thyroid: no enlargment/tenderness/nodules    Back:   Symmetric    Lungs:   Diminished breath sounds, no rales or wheezes   Chest wall:  No tenderness or deformity. NO intercostal retractions   Heart:  irregular, S1, S2 normal, no murmur, click, rub or gallop. Abdomen:   Soft, non-tender. Bowel sounds normal. No masses,  No organomegaly. NO paradoxical motion   Extremities: normal, atraumatic, no cyanosis or edema. Pulses: 2+ and symmetric all extremities. Skin: Skin color, texture, turgor normal. No rashes or lesions.  NO clubbing   Lymph nodes: Cervical  nodes normal.   Neurologic: Grossly nonfocal      :        Devices:             Drips:    DATA:  Labs:  Recent Labs      02/02/17 0415 02/01/17 0415 01/31/17 0416   WBC  12.5  15.4*  10.7   HGB  11.3*  11.1*  11.7*   HCT  35.8  35.3  37.4   PLT  218  218  202     Recent Labs      02/02/17   0415  02/01/17   2220  02/01/17   1620   02/01/17   0415   01/31/17   0416   NA  142   --    --    --   137   --   137   K  3.9  4.5  4.6   < >  5.2   < >  5.6*   CL  99*   --    --    --   98*   --   99*   CO2  38*   --    --    --   35*   --   35*   GLU  56*   --    --    --   168*   --   268*   BUN  49*   --    --    --   45*   --   42*   CREA  1.36*   --    --    --   1.47*   --   1.16   CA  8.8 --    --    --   9.3   --   9.1   MG  2.2   --    --    --   2.0   --   2.2   ALB  3.1*   --    --    --   3.2*   --   3.4   SGOT  15   --    --    --   15   --   17   ALT  19   --    --    --   18   --   24   INR  2.2*   --    --    --   2.7*   --   2.8*    < > = values in this interval not displayed. No results for input(s): PH, PCO2, PO2, HCO3, FIO2 in the last 72 hours. Imaging:  []        I have personally reviewed the patients radiographs and reports  []         []        No change from prior, tubes and lines in adequate position  []        Improved   []        Worsening  High complexity decision making was performed during this consultation and evaluation.      Anna Zhu MD

## 2017-02-02 NOTE — PROGRESS NOTES
Problem: Mobility Impaired (Adult and Pediatric)  Goal: *Acute Goals and Plan of Care (Insert Text)  Physical Therapy Goals  Initiated 2/1/2017 and to be accomplished within 7 day(s)  1. Patient will move from supine to sit and sit to supine in bed with supervision/set-up. 2. Patient will transfer from bed to chair and chair to bed with supervision/set-up using the least restrictive device. 3. Patient will perform sit to stand with supervision/set-up. 4. Patient will ambulate with supervision/set-up for >150 feet with the least restrictive device for safe home ambulation. 5. Patient will ascend/descend 1 step without handrail(s) with minimal assistance/contact guard assist for safe home entry. Outcome: Not Progressing Towards Goal  PT session held due to:  [ ]  Nausea/vomiting  [X]  RN Communication/ suggestion  [X]  Placed on Bi-PAP  [ ]  Dialysis treatment in progress. Will f/u tomorrow, if appropriate. Thank you.   Jhony Torrez, PTA

## 2017-02-02 NOTE — PROGRESS NOTES
Problem: Self Care Deficits Care Plan (Adult)  Goal: *Acute Goals and Plan of Care (Insert Text)  IInitial OT STGs (1/31/2017) Within 7 days:    1. Patient will perform toilet transfer with CGA/Katherin & SPO2>90% in preparation for bowel and bladder management. 2. Patient will perform bowel and bladder management with CGA/Katherin & SPO2>90% for increased independence with ADLs. 3. Patient will grooming standing sinkside for 5 minutes for increased independence ADLs/IADLs. 4. Patient will perform LB/UB dressing with setupA w/ A/E PRN for increased independence with ADLs. 5. Patient will perform UE exercises with SBA for increased independence with ADLs. 6. Patient will utilize energy conservation techniques with 1 verbal cue for increased independence with ADLs. Occupational Therapy Treatment Attempt     Chart reviewed. Attempted Occupational Therapy Treatment, however, pt working hard to breathe. Pt was changed from HighFlow to BiPap this morning d/t effort. Pt now on nc with great effort to breathe. Discussed w/ RN/Claudia, in agreement to hold OOB and will give Anti anxiety Rx to assist pt's comfort. Will continue to follow.       Thank you for this referral.  Alaina Ayoub, OTR/L

## 2017-02-02 NOTE — PROGRESS NOTES
Hospitalist Progress Note-critical care note     Patient: Salud Melendrez MRN: [id number]  [facility]: [id number]    YOB: 1945  Age: 66 y.o. Sex: female    DOA: 1/30/2017 LOS:  LOS: 3 days            Chief complaint: chf exacerbation,  Acute on chronic respiratory failure , elevated trop, copd exacerbation , hyperkalemia , HTN    Assessment/Plan         Patient Active Problem List   Diagnosis Code    Acute on chronic diastolic CHF (congestive heart failure) ([facility]) I50.33    ALEJANDRINA (acute kidney injury) ([facility] N17.9    Acute exacerbation of CHF (congestive heart failure) ([facility]) I50.9    DM (diabetes mellitus) ([facility] [facility] E11.9    Hypertension I10    Respiratory failure ([facility] [facility] J96.90    Acute coronary syndrome ([facility]) I24.9    Obstructive sleep apnea, adult G47.33    Paroxysmal atrial fibrillation ([facility]) I48.0    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 ([facility]) X97.54, E11.65    Dyspnea due to congestive heart failure ([facility]) I50.9    Aspiration pneumonia ([facility]) J69.0    Hyperkalemia E87.5    Elevated troponin R79.89    Acute respiratory distress ([facility]) R06.00    COPD (chronic obstructive pulmonary disease) ([facility]) J44.9    CHF (congestive heart failure) ([facility]) I50.9    Insufficiency, respiratory, acute ([facility]) R06.89    Infiltrate noted on imaging study R93.8    Chronic respiratory failure with hypoxia ([facility]) J96.11    COPD exacerbation ([facility]) J44.1    Acute on chronic respiratory failure with hypoxia ([facility]) J96.21     1. Acute on chronic  respiratory failure   Improving, on high flow O2, continue weaning off   - hx of intubation in 2016. Appreciated intensive input. Multiple factors, copd exacerbation, possible hcap, hermelinda, Pulmonary hypertension (moderated from 2016 echo and chf exacerbation   2. Copd exacerbation and possible hcap  Will continue breathing tx with xopenex.  pulmocort and brovana, on  levaquin  3 afib with rvr  Rate controlled per cardizem now, optimize the electrolytes, on warfarin , INR 2.2   4. chf exacerbation -diastolic from previous echo  Will continue lasix ,ef wnl. Dr. Aleksey Hawley on board , echo reviewed, EF reserved   5. Hyperkalemia  Resolved,   6.elevated trop  Trending down. Dr. Aleksey Hawley on board. 7. DM type II ,   -long term insulin ,   -low AM. on lantus, will switch to ssi to three times before meal, hold bedtime  8 HTN, accelerated  Continue home medication. 9. Pulmonary hypertension   moderate from echo   10 hermelinda   Not compliance cpap at home   11 anxiety  Continue prn ativan   12. Insomnia   Will add trazodone prn for sleep   Subjective: sob better, need medication for sleep   Nurse: no acute issue       Review of systems:    General: No fevers or chills. Cardiovascular: No chest pain or pressure. No palpitations. Pulmonary: shortness of breath better,   Gastrointestinal: No nausea, vomiting. Vital signs/Intake and Output:  Visit Vitals    /86    Pulse 90    Temp 95.9 °F (35.5 °C)    Resp 26    Ht 5' 5\" (1.651 m)    Wt 104.3 kg (230 lb)    SpO2 94%    BMI 38.27 kg/m2     Current Shift:  02/02 0701 - 02/02 1900  In: 240 [P.O.:240]  Out: -   Last three shifts:  01/31 1901 - 02/02 0700  In: 1020 [P.O.:720; I.V.:300]  Out: 3066 [Urine:3525]    Physical Exam:  General: WD, WN. Alert, cooperative, no acute distress    HEENT: NC, Atraumatic. PERRLA, anicteric sclerae. bipap mask noted   Lungs: CTA Bilaterally. No Wheezing/Rhonchi/Rales. Heart:  Irregular irregular ,  No murmur, No Rubs, No Gallops  Abdomen: Soft, Non distended, Non tender.  +Bowel sounds,   Extremities: No c/c/e  Psych:   anxious . No agitated. Neurologic:  No acute neurological deficit.              Labs: Results:       Chemistry Recent Labs      02/02/17   0415  02/01/17   2220  02/01/17   1620   02/01/17   0415   01/31/17   0416   GLU  56*   --    --    --   168*   --   268*   NA  142   --    --    --   137   --   137   K  3.9  4.5  4.6   < >  5.2   < >  5.6*   CL  99*   --    --    -- 98*   --   99*   CO2  38*   --    --    --   35*   --   35*   BUN  49*   --    --    --   45*   --   42*   CREA  1.36*   --    --    --   1.47*   --   1.16   CA  8.8   --    --    --   9.3   --   9.1   AGAP  5   --    --    --   4   --   3   BUCR  36*   --    --    --   31*   --   36*   AP  115   --    --    --   125*   --   162*   TP  6.4   --    --    --   6.7   --   7.1   ALB  3.1*   --    --    --   3.2*   --   3.4   GLOB  3.3   --    --    --   3.5   --   3.7   AGRAT  0.9   --    --    --   0.9   --   0.9    < > = values in this interval not displayed. CBC w/Diff Recent Labs      02/02/17 0415 02/01/17 0415 01/31/17 0416   WBC  12.5  15.4*  10.7   RBC  3.54*  3.46*  3.63*   HGB  11.3*  11.1*  11.7*   HCT  35.8  35.3  37.4   PLT  218  218  202   GRANS  91*  92*  99*   LYMPH  6*  3*  1*   EOS  0  0  0      Cardiac Enzymes Recent Labs      02/01/17 0420 01/31/17   1000   CPK  24*  28   CKND1  3.8  7.9*      Coagulation Recent Labs      02/02/17 0415 02/01/17 0415 01/30/17   2000   PTP  23.6*  27.1*   < >  26.4*   INR  2.2*  2.7*   < >  2.6*   APTT   --    --    --   29.2    < > = values in this interval not displayed. Lipid Panel No results found for: CHOL, CHOLPOCT, CHOLX, CHLST, CHOLV, [id number], HDL, LDL, NLDLCT, DLDL, LDLC, DLDLP, [id number], VLDLC, VLDL, TGL, TGLX, TRIGL, [id number], TRIGP, TGLPOCT, [id number], CHHD, CHHDX   BNP No results for input(s): BNPP in the last 72 hours.    Liver Enzymes Recent Labs      02/02/17   0415   TP  6.4   ALB  3.1*   AP  115   SGOT  15      Thyroid Studies Lab Results   Component Value Date/Time    TSH 0.31 01/01/2016 10:20 PM        Procedures/imaging: see electronic medical records for all procedures/Xrays and details which were not copied into this note but were reviewed prior to creation of Rama Phoenix, MD

## 2017-02-02 NOTE — PROGRESS NOTES
Cardiology Progress Note        Patient: Issa Ruiz        Sex: female          DOA: 1/30/2017  YOB: 1945      Age:  66 y.o.        LOS:  LOS: 2 days      Patient seen and examined. Chart reviewed. Assessment/Plan     Acute on chronic diastolic heart failure  Acute respiratory failure  COPD  Atrial fibrillation with rapid ventricular rate  Abnormal troponin most likely secondary to heart failure, respiratory failure, atrial fibrillation with rapid ventricular rate, no evidence of ACS, however underlying CAD can not be ruled out. Moderate aortic stenosis   Hyperkalemia    Echocardiogram revealed moderate aortic stenosis as per velocity criteria, no wall motion abnormality and normal LVEF. Plan:    Continue IV diuretics. Continue coreg and Cardizem. Continue coumadin as per PT/INR  Continue management as per hospital medicine. Strict I/O, fluid restriction up to 1200 cc/day  Will follow up                Subjective:    cc: My breathing is better today. Had episode of palpitation this morning. REVIEW OF SYSTEMS:     General: No fevers or chills. Cardiovascular: Positive shortness of breath, leg swelling and palpitation, No chest pain,No orthopnea, No PND, No claudication  Pulmonary: Dyspnea   Gastrointestinal: No nausea, vomiting, bleeding  Neurology: No Dizziness    Objective:      Visit Vitals    /78    Pulse (!) 105    Temp 97.4 °F (36.3 °C)    Resp 26    Ht 5' 5\" (1.651 m)    Wt 104.3 kg (230 lb)    SpO2 93%    BMI 38.27 kg/m2     Body mass index is 38.27 kg/(m^2). Physical Exam:  General Appearance: Comfortable, not using accessory muscles of respiration. HEENT: SIRIA. HEAD: Atraumatic  NECK: No JVD, no thyroidomeglay. CAROTIDS: No bruit  LUNGS: occasional expiratory wheezing   HEART: S1+S2 irregularly irregular, audible, 2/6 systolic murmur in aortic area, no pericardial rub.      ABD: Non-tender, BS Audible    EXT: + leg edema, and no cyanosis. VASCULAR EXAM: Pulses are intact. PSYCHIATRIC EXAM: Mood is appropriate. MUSCULOSKELETAL: Grossly no joint deformity.   NEUROLOGICAL: AAO times 3, No motor and sensory deficit  Medication:  Current Facility-Administered Medications   Medication Dose Route Frequency    carvedilol (COREG) tablet 25 mg  25 mg Oral BID WITH MEALS    insulin lispro (HUMALOG) injection 8 Units  8 Units SubCUTAneous AC&HS    methylPREDNISolone (PF) (SOLU-MEDROL) injection 40 mg  40 mg IntraVENous Q8H    dilTIAZem (CARDIZEM) IR tablet 30 mg  30 mg Oral TIDAC    pantoprazole (PROTONIX) 40 mg in sodium chloride 0.9 % 10 mL injection  40 mg IntraVENous DAILY    insulin lispro (HUMALOG) injection   SubCUTAneous AC&HS    LORazepam (ATIVAN) tablet 0.5 mg  0.5 mg Oral Q4H PRN    levalbuterol (XOPENEX) nebulizer soln 1.25 mg/0.5 ml  1.25 mg Nebulization Q4H RT    arformoterol (BROVANA) neb solution 15 mcg  15 mcg Nebulization BID RT    budesonide (PULMICORT) 500 mcg/2 ml nebulizer suspension  500 mcg Nebulization BID RT    furosemide (LASIX) injection 40 mg  40 mg IntraVENous BID    acetaminophen (TYLENOL) tablet 650 mg  650 mg Oral Q4H PRN    diphenhydrAMINE (BENADRYL) injection 12.5 mg  12.5 mg IntraVENous Q4H PRN    ondansetron (ZOFRAN) injection 4 mg  4 mg IntraVENous Q4H PRN    docusate sodium (COLACE) capsule 100 mg  100 mg Oral BID    insulin glargine (LANTUS) injection 40 Units  40 Units SubCUTAneous QHS    glucose chewable tablet 16 g  4 Tab Oral PRN    glucagon (GLUCAGEN) injection 1 mg  1 mg IntraMUSCular PRN    dextrose (D50W) injection syrg 12.5-25 g  25-50 mL IntraVENous PRN    levoFLOXacin (LEVAQUIN) 750 mg in D5W IVPB  750 mg IntraVENous Q24H    WARFARIN INFORMATION NOTE (COUMADIN)   Other Rx Dosing/Monitoring               Lab/Data Reviewed:       Recent Labs      02/01/17 0415  01/31/17 0416  01/30/17 2000   WBC  15.4*  10.7  11.9   HGB  11.1*  11.7*  11.7*   HCT  35.3  37.4  37.7   PLT [address]      02/01/17   1620  02/01/17   1022  02/01/17   0415   01/31/17   0416  01/30/17 2000   NA   --    --   137   --   137  134*   K  4.6  5.6*  5.2   < >  5.6*  6.2*   CL   --    --   98*   --   99*  97*   CO2   --    --   35*   --   35*  32   GLU   --    --   168*   --   268*  395*   BUN   --    --   45*   --   42*  44*   CREA   --    --   1.47*   --   1.16  1.20   CA   --    --   9.3   --   9.1  8.9    < > = values in this interval not displayed.        Signed By: Damien Soliman MD     February 1, 2017

## 2017-02-02 NOTE — DIABETES MGMT
GLYCEMIC CONTROL & NUTRITION:    - Discussed in rounds, known h/o uncontrolled DM, on Lantus 20 units qhs at home  - noted steroids on board, weaning slowly (IV Solu -medrol 40 mg q 8 hours) & exacerbating hyperglycemia  - hypoglycemic event noted this AM on labs, uncharacteristic, recommend decrease basal insulin to Lantus 20 units qhs this evening and conservative mealtime dose of Humalog 5 units qac to start tomorrow, continue corrective coverage  - currently eating well, Consistent CHO diet  - pt with very recent admission (earlier this month), ed attempt 2/1, will attempt again  - anticipate insulin needs to reduce by ~ 1/2 when steroids d/c (received 81 units of insulin yesterday (40 Lantus, 41 Humalog)      Recent Glucose Results:   Lab Results   Component Value Date/Time    GLU 56 (L) 02/02/2017 04:15 AM    GLUCPOC 185 (H) 02/02/2017 11:24 AM    GLUCPOC 99 02/02/2017 07:10 AM    GLUCPOC 69 (L) 02/02/2017 06:52 AM     Lab Results   Component Value Date/Time    Hemoglobin A1c 8.0 01/30/2017 08:00 PM    Hemoglobin A1c 8.7 01/01/2016 04:42 PM    Hemoglobin A1c 9.9 09/22/2015 01:40 AM             Chloé Arredondo, MPH, RD

## 2017-02-02 NOTE — PROGRESS NOTES
1915: Bedside and Verbal shift change report given to ANUSHA Red, RN (oncoming nurse) by Dora Green, RN   (offgoing nurse). Report included the following information SBAR, Intake/Output, MAR, Recent Results, Cardiac Rhythm AFIB and Alarm Parameters . Pt sitting in chair at the side of the bed. Hi flow O2 in place. Pt tolerating. Call bell in reach. Nursing care continues. 2030: Assessment complete, see appropriate flow sheets. 2230: Incontinent care provided and pt assist to bedpan. Pt tolerated. Pt picking at her right arm, bleeding, small skin tear noted- applied mepilex. 0000: Pt sleeping. Reassessment, no change. 0400: Reassessment, no change. Pt requesting BIPAP to be removed. Hi flow O2 applied. O2 saturation 80's. Paged respiratory to increase O2 from 40-60%. Pt tolerating. Pt requesting zofran and ativan. 9392: Notified by ADAN Christiansen CNA- BS 69. Pt awake and alert, apple juice given. Will recheck. 0710: Recheck BS  99.    0730: Bedside and Verbal shift change report given to GAMALIEL Escobedo RN (oncoming nurse) by Dora Green, RN   (offgoing nurse). Report included the following information SBAR, Intake/Output, MAR, Recent Results and Cardiac Rhythm AFIB.

## 2017-02-02 NOTE — PROGRESS NOTES
0715 - Assumed care of pt.    0730 - RT in room with pt giving a breathing treatment. 0800 - Pt transitioned to [facility] by RT. Will assess to see how she tolerates. 0830 - Bear hugger placed on pt.      1100 - IDR complete. Continue with current plan of care. 1230 - Pt c/o 9/10 back pain. Dr Odilia Crooks informed. One time order for PO dilaudid placed. 1500 - Pt in bed. Tolerating NC, but having some dyspnea with exertion. 1925 - Shift report given to Kylie Bowden RN.

## 2017-02-02 NOTE — PROGRESS NOTES
Pharmacy Dosing Services: Warfarin     Consult for Warfarin Dosing by Pharmacy by Dr. Sharif Clonts provided for this 66 y.o.  female , for indication of Chronic Atrial Fibrillation. Dose to achieve an INR goal of 2-3    Order entered for Warfarin 3 mg to be given today at 18:00. Significant drug interactions: Levaquin   PT/INR Lab Results   Component Value Date/Time    INR 2.2 02/02/2017 04:15 AM      Platelets Lab Results   Component Value Date/Time    PLATELET 531 84/52/8074 04:15 AM      H/H     Lab Results   Component Value Date/Time    HGB 11.3 02/02/2017 04:15 AM        Warfarin Administrations (last 168 hours)       Date/Time Action Medication Dose      02/01/17 1733 Given    warfarin (COUMADIN) tablet 2.5 mg 2.5 mg    01/31/17 1733 Given    warfarin (COUMADIN) tablet 2.5 mg 2.5 mg          Pharmacy to follow daily and will provide subsequent Warfarin dosing based on clinical status.   Zaina Richard, [facility])  Contact information [phone]

## 2017-02-02 NOTE — INTERDISCIPLINARY ROUNDS
CRITICAL CARE INTERDISCIPLINARY ROUNDS    Patient Information:    Name:   Lydia Issa    Age:   66 y.o. Admission Date:   1/30/2017    Critical Care Day: 4    Surgery Date:      Attending Provider:   Carter Acosta MD    Surgeon:        Consultant(s):  Winsome Schaffer Physician:  Mare Jara  Code Status: Full Code    Problem List:     Patient Active Problem List   Diagnosis Code    Acute on chronic diastolic CHF (congestive heart failure) ([facility]) I50.33    ALEJANDRINA (acute kidney injury) ([facility] [address] N17.9    Acute exacerbation of CHF (congestive heart failure) ([facility] [address] I50.9    DM (diabetes mellitus) ([facility] [address] E11.9    Hypertension I10    Respiratory failure ([facility] [address] J96.90    Acute coronary syndrome ([facility] [address] I24.9    Obstructive sleep apnea, adult G47.33    Paroxysmal atrial fibrillation ([facility] [address] I48.0    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 ([facility]) K61.18, E11.65    Dyspnea due to congestive heart failure ([facility]) I50.9    Aspiration pneumonia ([facility]) J69.0    Hyperkalemia E87.5    Elevated troponin R79.89    Acute respiratory distress ([facility]) R06.00    COPD (chronic obstructive pulmonary disease) ([facility]) J44.9    CHF (congestive heart failure) ([facility]) I50.9    Insufficiency, respiratory, acute ([facility]) R06.89    Infiltrate noted on imaging study R93.8    Chronic respiratory failure with hypoxia ([facility]) J96.11    COPD exacerbation ([facility]) J44.1    Acute on chronic respiratory failure with hypoxia ([facility]) J96.21       Principal Problem:  Acute on chronic diastolic CHF (congestive heart failure) ([facility])    During rounds the following quality care indicators and evidence based practices were addressed :  DVT Prophylaxis and PUD Prophylaxis Davila Day na ([facility] na) ; Central Line Day:na Isolation:na;  Antibiotic Stewardship: reviewed; Probiotics Necessary: na    Ventilator Bundle:      Sepsis Order Set:     Glycemic Control:   Recent Labs      02/02/17   0415  02/01/17   0415  01/31/17   0416  01/30/17 2000 GLU  56*  168*  268*  395*   ;  Recent Labs      01/30/17 2017   PHI  7.313*   PCO2I  60.0*   PO2I  218*    Adjustments     Acute MI/PCI:   Not applicable    Door to Balloon: Admission Time: 1956      Heart Failure:    EF:%% 60    SCIP Measures for other Surgeries:   Not applicable     Pneumonia:    Not applicable    Interdisciplinary team rounds were held with the following team members Care Management, Diabetes   Treatment Specialist, Nursing, Nutrition, Pharmacy, Physician, Pastrol,  and Respiratory Therapy. Plan of care discussed. Goals of Care/ Recommendations: insulin adjusted, reduce mealtime to 5 units - give only if she eats 50%. See clinical pathway and/or care plan for interventions and desired outcomes.     Critical Care Discharge Status:  Red

## 2017-02-02 NOTE — PROGRESS NOTES
conducted a Follow up consultation and Spiritual Assessment for Sirena Duenas, who is a 66 y.o.,female. Patient was getting a breathing treatment but grabbed my hand and asked for a prayer. I said one and then she asked for someone to come back and see her tomorrow. I will pass on to the next shift. No family present during visit. The  provided the following Interventions:  Continued the relationship of care and support. Offered prayer and assurance of continued prayer on patients behalf. Chart reviewed. The following outcomes were achieved:  Patient expressed gratitude for Spiritual Care visit. Patient was reviewed in ICU Interdisciplinary Rounds. Assessment:  There are no further spiritual or Jehovah's witness issues which require Spiritual Care Services intervention at this time. Plan:  Chaplains will continue to follow and will provide pastoral care as needed or requested.  recommends bedside caregivers page the  on duty if patient shows signs of acute spiritual or emotional distress. [address], [place].    Board Certified   [phone] - Office

## 2017-02-03 PROBLEM — R07.9 CHEST PAIN: Status: ACTIVE | Noted: 2017-02-03

## 2017-02-03 LAB
ALBUMIN SERPL BCP-MCNC: 3.2 G/DL (ref 3.4–5)
ALBUMIN/GLOB SERPL: 0.9 {RATIO} (ref 0.8–1.7)
ALP SERPL-CCNC: 114 U/L (ref 45–117)
ALT SERPL-CCNC: 21 U/L (ref 13–56)
ANION GAP BLD CALC-SCNC: 6 MMOL/L (ref 3–18)
AST SERPL W P-5'-P-CCNC: 14 U/L (ref 15–37)
BASOPHILS # BLD AUTO: 0 K/UL (ref 0–0.06)
BASOPHILS # BLD: 0 % (ref 0–2)
BILIRUB SERPL-MCNC: 1.4 MG/DL (ref 0.2–1)
BUN SERPL-MCNC: 46 MG/DL (ref 7–18)
BUN/CREAT SERPL: 34 (ref 12–20)
CALCIUM SERPL-MCNC: 8.5 MG/DL (ref 8.5–10.1)
CHLORIDE SERPL-SCNC: 96 MMOL/L (ref 100–108)
CK MB CFR SERPL CALC: 4.3 % (ref 0–4)
CK MB SERPL-MCNC: 1 NG/ML (ref 0.5–3.6)
CK SERPL-CCNC: 23 U/L (ref 26–192)
CO2 SERPL-SCNC: 38 MMOL/L (ref 21–32)
CREAT SERPL-MCNC: 1.36 MG/DL (ref 0.6–1.3)
DIFFERENTIAL METHOD BLD: ABNORMAL
EOSINOPHIL # BLD: 0 K/UL (ref 0–0.4)
EOSINOPHIL NFR BLD: 0 % (ref 0–5)
ERYTHROCYTE [DISTWIDTH] IN BLOOD BY AUTOMATED COUNT: 15.9 % (ref 11.6–14.5)
GLOBULIN SER CALC-MCNC: 3.4 G/DL (ref 2–4)
GLUCOSE BLD STRIP.AUTO-MCNC: 174 MG/DL (ref 70–110)
GLUCOSE BLD STRIP.AUTO-MCNC: 198 MG/DL (ref 70–110)
GLUCOSE BLD STRIP.AUTO-MCNC: 219 MG/DL (ref 70–110)
GLUCOSE BLD STRIP.AUTO-MCNC: 250 MG/DL (ref 70–110)
GLUCOSE SERPL-MCNC: 197 MG/DL (ref 74–99)
HCT VFR BLD AUTO: 36.3 % (ref 35–45)
HGB BLD-MCNC: 11.4 G/DL (ref 12–16)
INR PPP: 1.8 (ref 0.8–1.2)
LYMPHOCYTES # BLD AUTO: 4 % (ref 21–52)
LYMPHOCYTES # BLD: 0.4 K/UL (ref 0.9–3.6)
MAGNESIUM SERPL-MCNC: 2.3 MG/DL (ref 1.8–2.4)
MCH RBC QN AUTO: 31.8 PG (ref 24–34)
MCHC RBC AUTO-ENTMCNC: 31.4 G/DL (ref 31–37)
MCV RBC AUTO: 101.4 FL (ref 74–97)
MONOCYTES # BLD: 0.3 K/UL (ref 0.05–1.2)
MONOCYTES NFR BLD AUTO: 3 % (ref 3–10)
NEUTS SEG # BLD: 9.1 K/UL (ref 1.8–8)
NEUTS SEG NFR BLD AUTO: 93 % (ref 40–73)
PLATELET # BLD AUTO: 191 K/UL (ref 135–420)
PMV BLD AUTO: 10.4 FL (ref 9.2–11.8)
POTASSIUM SERPL-SCNC: 3.9 MMOL/L (ref 3.5–5.5)
POTASSIUM SERPL-SCNC: 3.9 MMOL/L (ref 3.5–5.5)
POTASSIUM SERPL-SCNC: 4.2 MMOL/L (ref 3.5–5.5)
PROT SERPL-MCNC: 6.6 G/DL (ref 6.4–8.2)
PROTHROMBIN TIME: 20.2 SEC (ref 11.5–15.2)
RBC # BLD AUTO: 3.58 M/UL (ref 4.2–5.3)
SODIUM SERPL-SCNC: 140 MMOL/L (ref 136–145)
TROPONIN I SERPL-MCNC: 0.35 NG/ML (ref 0–0.06)
WBC # BLD AUTO: 9.9 K/UL (ref 4.6–13.2)

## 2017-02-03 PROCEDURE — C9113 INJ PANTOPRAZOLE SODIUM, VIA: HCPCS | Performed by: INTERNAL MEDICINE

## 2017-02-03 PROCEDURE — 74011250636 HC RX REV CODE- 250/636: Performed by: INTERNAL MEDICINE

## 2017-02-03 PROCEDURE — 36415 COLL VENOUS BLD VENIPUNCTURE: CPT | Performed by: HOSPITALIST

## 2017-02-03 PROCEDURE — 74011250637 HC RX REV CODE- 250/637: Performed by: INTERNAL MEDICINE

## 2017-02-03 PROCEDURE — 82550 ASSAY OF CK (CPK): CPT | Performed by: HOSPITALIST

## 2017-02-03 PROCEDURE — 65610000006 HC RM INTENSIVE CARE

## 2017-02-03 PROCEDURE — 74011250636 HC RX REV CODE- 250/636: Performed by: HOSPITALIST

## 2017-02-03 PROCEDURE — 82962 GLUCOSE BLOOD TEST: CPT

## 2017-02-03 PROCEDURE — 93005 ELECTROCARDIOGRAM TRACING: CPT

## 2017-02-03 PROCEDURE — 74011250637 HC RX REV CODE- 250/637: Performed by: HOSPITALIST

## 2017-02-03 PROCEDURE — 94640 AIRWAY INHALATION TREATMENT: CPT

## 2017-02-03 PROCEDURE — 77010033678 HC OXYGEN DAILY

## 2017-02-03 PROCEDURE — 74011000250 HC RX REV CODE- 250: Performed by: INTERNAL MEDICINE

## 2017-02-03 PROCEDURE — 83735 ASSAY OF MAGNESIUM: CPT | Performed by: HOSPITALIST

## 2017-02-03 PROCEDURE — 74011636637 HC RX REV CODE- 636/637: Performed by: HOSPITALIST

## 2017-02-03 PROCEDURE — 74011636637 HC RX REV CODE- 636/637: Performed by: INTERNAL MEDICINE

## 2017-02-03 PROCEDURE — 85025 COMPLETE CBC W/AUTO DIFF WBC: CPT | Performed by: HOSPITALIST

## 2017-02-03 PROCEDURE — 74011000250 HC RX REV CODE- 250: Performed by: HOSPITALIST

## 2017-02-03 PROCEDURE — 84132 ASSAY OF SERUM POTASSIUM: CPT | Performed by: HOSPITALIST

## 2017-02-03 PROCEDURE — 85610 PROTHROMBIN TIME: CPT | Performed by: HOSPITALIST

## 2017-02-03 RX ORDER — NITROGLYCERIN 0.4 MG/1
0.4 TABLET SUBLINGUAL AS NEEDED
Status: DISCONTINUED | OUTPATIENT
Start: 2017-02-03 | End: 2017-02-09 | Stop reason: HOSPADM

## 2017-02-03 RX ORDER — WARFARIN 2 MG/1
4 TABLET ORAL ONCE
Status: COMPLETED | OUTPATIENT
Start: 2017-02-03 | End: 2017-02-03

## 2017-02-03 RX ORDER — GUAIFENESIN 100 MG/5ML
324 LIQUID (ML) ORAL
Status: COMPLETED | OUTPATIENT
Start: 2017-02-03 | End: 2017-02-03

## 2017-02-03 RX ORDER — MORPHINE SULFATE 2 MG/ML
1 INJECTION, SOLUTION INTRAMUSCULAR; INTRAVENOUS ONCE
Status: DISCONTINUED | OUTPATIENT
Start: 2017-02-03 | End: 2017-02-03

## 2017-02-03 RX ORDER — INSULIN GLARGINE 100 [IU]/ML
25 INJECTION, SOLUTION SUBCUTANEOUS
Status: DISCONTINUED | OUTPATIENT
Start: 2017-02-03 | End: 2017-02-09 | Stop reason: HOSPADM

## 2017-02-03 RX ORDER — TRAZODONE HYDROCHLORIDE 50 MG/1
50 TABLET ORAL
Status: DISCONTINUED | OUTPATIENT
Start: 2017-02-03 | End: 2017-02-09 | Stop reason: HOSPADM

## 2017-02-03 RX ADMIN — ONDANSETRON HYDROCHLORIDE 4 MG: 2 INJECTION INTRAMUSCULAR; INTRAVENOUS at 21:33

## 2017-02-03 RX ADMIN — WARFARIN SODIUM 4 MG: 2 TABLET ORAL at 17:33

## 2017-02-03 RX ADMIN — METHYLPREDNISOLONE SODIUM SUCCINATE 40 MG: 40 INJECTION, POWDER, FOR SOLUTION INTRAMUSCULAR; INTRAVENOUS at 19:24

## 2017-02-03 RX ADMIN — TRAZODONE HYDROCHLORIDE 50 MG: 50 TABLET ORAL at 23:55

## 2017-02-03 RX ADMIN — FUROSEMIDE 40 MG: 10 INJECTION, SOLUTION INTRAMUSCULAR; INTRAVENOUS at 08:17

## 2017-02-03 RX ADMIN — INSULIN LISPRO 4 UNITS: 100 INJECTION, SOLUTION INTRAVENOUS; SUBCUTANEOUS at 21:28

## 2017-02-03 RX ADMIN — BUDESONIDE 500 MCG: 0.5 INHALANT RESPIRATORY (INHALATION) at 20:02

## 2017-02-03 RX ADMIN — ACETAMINOPHEN 650 MG: 325 TABLET, FILM COATED ORAL at 02:50

## 2017-02-03 RX ADMIN — ARFORMOTEROL TARTRATE 15 MCG: 15 SOLUTION RESPIRATORY (INHALATION) at 07:30

## 2017-02-03 RX ADMIN — INSULIN LISPRO 5 UNITS: 100 INJECTION, SOLUTION INTRAVENOUS; SUBCUTANEOUS at 11:41

## 2017-02-03 RX ADMIN — LEVOFLOXACIN 750 MG: 5 INJECTION, SOLUTION INTRAVENOUS at 21:26

## 2017-02-03 RX ADMIN — LORAZEPAM 0.5 MG: 0.5 TABLET ORAL at 19:37

## 2017-02-03 RX ADMIN — DILTIAZEM HYDROCHLORIDE 30 MG: 30 TABLET, FILM COATED ORAL at 08:17

## 2017-02-03 RX ADMIN — INSULIN LISPRO 5 UNITS: 100 INJECTION, SOLUTION INTRAVENOUS; SUBCUTANEOUS at 07:26

## 2017-02-03 RX ADMIN — SODIUM CHLORIDE 40 MG: 9 INJECTION, SOLUTION INTRAMUSCULAR; INTRAVENOUS; SUBCUTANEOUS at 08:17

## 2017-02-03 RX ADMIN — DILTIAZEM HYDROCHLORIDE 30 MG: 30 TABLET, FILM COATED ORAL at 17:06

## 2017-02-03 RX ADMIN — INSULIN LISPRO 2 UNITS: 100 INJECTION, SOLUTION INTRAVENOUS; SUBCUTANEOUS at 17:29

## 2017-02-03 RX ADMIN — LORAZEPAM 0.5 MG: 0.5 TABLET ORAL at 00:56

## 2017-02-03 RX ADMIN — DOCUSATE SODIUM 100 MG: 100 CAPSULE, LIQUID FILLED ORAL at 08:17

## 2017-02-03 RX ADMIN — INSULIN GLARGINE 25 UNITS: 100 INJECTION, SOLUTION SUBCUTANEOUS at 21:27

## 2017-02-03 RX ADMIN — LORAZEPAM 0.5 MG: 0.5 TABLET ORAL at 08:42

## 2017-02-03 RX ADMIN — NITROGLYCERIN 0.4 MG: 0.4 TABLET SUBLINGUAL at 09:49

## 2017-02-03 RX ADMIN — LEVALBUTEROL 1.25 MG: 1.25 SOLUTION, CONCENTRATE RESPIRATORY (INHALATION) at 20:02

## 2017-02-03 RX ADMIN — FUROSEMIDE 40 MG: 10 INJECTION, SOLUTION INTRAMUSCULAR; INTRAVENOUS at 21:28

## 2017-02-03 RX ADMIN — BUDESONIDE 500 MCG: 0.5 INHALANT RESPIRATORY (INHALATION) at 07:30

## 2017-02-03 RX ADMIN — LEVALBUTEROL 1.25 MG: 1.25 SOLUTION, CONCENTRATE RESPIRATORY (INHALATION) at 04:13

## 2017-02-03 RX ADMIN — DILTIAZEM HYDROCHLORIDE 30 MG: 30 TABLET, FILM COATED ORAL at 11:41

## 2017-02-03 RX ADMIN — NITROGLYCERIN 0.4 MG: 0.4 TABLET SUBLINGUAL at 09:40

## 2017-02-03 RX ADMIN — INSULIN LISPRO 2 UNITS: 100 INJECTION, SOLUTION INTRAVENOUS; SUBCUTANEOUS at 07:26

## 2017-02-03 RX ADMIN — ASPIRIN 81 MG 324 MG: 81 TABLET ORAL at 09:35

## 2017-02-03 RX ADMIN — CARVEDILOL 25 MG: 12.5 TABLET, FILM COATED ORAL at 08:17

## 2017-02-03 RX ADMIN — LEVALBUTEROL 1.25 MG: 1.25 SOLUTION, CONCENTRATE RESPIRATORY (INHALATION) at 15:15

## 2017-02-03 RX ADMIN — LEVALBUTEROL 1.25 MG: 1.25 SOLUTION, CONCENTRATE RESPIRATORY (INHALATION) at 00:02

## 2017-02-03 RX ADMIN — ACETAMINOPHEN 650 MG: 325 TABLET, FILM COATED ORAL at 21:33

## 2017-02-03 RX ADMIN — DOCUSATE SODIUM 100 MG: 100 CAPSULE, LIQUID FILLED ORAL at 21:28

## 2017-02-03 RX ADMIN — LEVALBUTEROL 1.25 MG: 1.25 SOLUTION, CONCENTRATE RESPIRATORY (INHALATION) at 07:30

## 2017-02-03 RX ADMIN — ARFORMOTEROL TARTRATE 15 MCG: 15 SOLUTION RESPIRATORY (INHALATION) at 20:02

## 2017-02-03 RX ADMIN — METHYLPREDNISOLONE SODIUM SUCCINATE 40 MG: 40 INJECTION, POWDER, FOR SOLUTION INTRAMUSCULAR; INTRAVENOUS at 07:30

## 2017-02-03 RX ADMIN — CARVEDILOL 25 MG: 12.5 TABLET, FILM COATED ORAL at 17:06

## 2017-02-03 RX ADMIN — LEVALBUTEROL 1.25 MG: 1.25 SOLUTION, CONCENTRATE RESPIRATORY (INHALATION) at 11:32

## 2017-02-03 RX ADMIN — INSULIN LISPRO 6 UNITS: 100 INJECTION, SOLUTION INTRAVENOUS; SUBCUTANEOUS at 11:41

## 2017-02-03 RX ADMIN — INSULIN LISPRO 5 UNITS: 100 INJECTION, SOLUTION INTRAVENOUS; SUBCUTANEOUS at 17:30

## 2017-02-03 NOTE — PROGRESS NOTES
conducted a Follow up consultation and Spiritual Assessment for Giacomo Garza, who is a 66 y.o.,female. The  provided the following Interventions:  Continued the relationship of care and support. Listened empathically. Provided prayer for patient's healing, recovery and for her life's grief. Chart reviewed. The following outcomes were achieved:  Patient expressed gratitude for pastoral care visit. Assessment:  Patient expressed her frustration concerning her ongoing health needs. She also expressed her continued difficulty with the loss of her son going on 12 years.  had prayer with her for recovery and peace.  also discussed with her the availability of grief support in the community. Patient appeared calmer, and appreciated spiritual support. There are no further spiritual or Hoahaoism issues which require Spiritual Care Services interventions at this time. Plan:  Chaplains will continue to follow and will provide pastoral care on an as needed/requested basis.  recommends bedside caregivers page  on duty if patient shows signs of acute spiritual or emotional distress. Rev.  [facility]  [phone]

## 2017-02-03 NOTE — PROGRESS NOTES
0730  Bedside and verbal report received from Armando Martinez RN. Assumed patient care. Patient sitting up in bed, VSS, NAD.      0844  Patient requested medication \"for my nerves\". Ativan 0.5mg administered per [facility].      0915  Patient reported feeling of heartburn. 9028  Patient reports continuing feeling of heartburn/left-sided chest pain. 0930 STAT 12-lead performed. Dr. Alec Moncada at bedside. Orders received for aspirin and sublingual nitro, as well as STAT cardiac panel. 0940  First nitro tablet administered. 8931  Patient continues to report chest pain, second nitro tablet administered. 2656  Patient reports that she \"feels better\" and has no chest pain at this time. 1102  Patient continues to deny chest pain. Patient sitting up in bed, watching tv, VSS, NAD.      1400  Patient declined to partake in PT as she prefers to work with a female therapist.  PT will attempt to accommodate patient preference. 1901  Bedside and Verbal shift change report given to Armando Martinez RN (oncoming nurse) by Haritha Rutledge. Blake Escobedo RN (offgoing nurse). Report included the following information SBAR, Kardex, Intake/Output, MAR, Recent Results and Cardiac Rhythm a-fib.

## 2017-02-03 NOTE — PROGRESS NOTES
Pharmacy Dosing Services: Warfarin    Consult for Warfarin Dosing by Pharmacy by Dr. Fatou Laird provided for this 66 y.o.  female , for indication of Atrial Fibrillation. Dose to achieve an INR goal of 2-3    INR = 1.8 (decreasing)    Order entered for  Warfarin  4 mg to be given today at 18:00. Significant drug interactions: levofloxacin  LABS    PT/INR Lab Results   Component Value Date/Time    INR 1.8 02/03/2017 04:15 AM      Platelets Lab Results   Component Value Date/Time    PLATELET 896 46/03/0910 04:15 AM      H/H     Lab Results   Component Value Date/Time    HGB 11.4 02/03/2017 04:15 AM        Warfarin Administrations (last 168 hours)       Date/Time Action Medication Dose      02/02/17 1740 Given    warfarin (COUMADIN) tablet 3 mg 3 mg    02/01/17 1733 Given    warfarin (COUMADIN) tablet 2.5 mg 2.5 mg    01/31/17 1733 Given    warfarin (COUMADIN) tablet 2.5 mg 2.5 mg          Pharmacy to follow daily and will provide subsequent Warfarin dosing based on clinical status.   YA Rudolph  Contact information [phone]

## 2017-02-03 NOTE — DIABETES MGMT
GLYCEMIC CONTROL & NUTRITION:    - Discussed in rounds, known h/o uncontrolled DM, on Lantus 20 units qhs at home  - noted steroids on board, weaning slowly (IV Solu -medrol 40 mg q 12 hours, from q 8 yesterday) & exacerbating hyperglycemia  - hypoglycemic event noted on AM (2/2), uncharacteristic, recommend decrease basal insulin to Lantus 20 units qhs this evening and conservative mealtime dose of Humalog 5 units qac to start tomorrow, continue corrective coverage  - currently eating well, Consistent CHO diet  - pt with very recent admission (earlier this month), ed attempt 2/1, will attempt again  - received 26 units of insulin yesterday (20 Lantus, 6 Humalog)  - recommend continue current regimen, as BG should begin to trend down with steroids weaning      Recent Glucose Results:   Lab Results   Component Value Date/Time     (H) 02/03/2017 04:15 AM    GLUCPOC 250 (H) 02/03/2017 11:23 AM    GLUCPOC 174 (H) 02/03/2017 06:48 AM    GLUCPOC 221 (H) 02/02/2017 09:19 PM     Lab Results   Component Value Date/Time    Hemoglobin A1c 8.0 01/30/2017 08:00 PM    Hemoglobin A1c 8.7 01/01/2016 04:42 PM    Hemoglobin A1c 9.9 09/22/2015 01:40 AM             Alexandra Zavala, MPH, RD

## 2017-02-03 NOTE — PROGRESS NOTES
INITIAL NUTRITION ASSESSMENT     RECOMMENDATIONS/PLAN:   - Suggest Glucerna Shakes BID to improve prot/kcal intake while appetite decreased  - Continue Consistent Carb 2000 kcal 2GM NA diet    REASON FOR ASSESSMENT:   [x] ICU admission  NUTRITION ASSESSMENT:   Client History: 66 yrs old Female admitted with acute on chronic respiratory failure (improving, on NC), IMANI, pulmonary HTN, CHF, DM2, Afib. PO intake is fair, variable since adm. PMHx: DM2, fibromyalgia, arthritis, COPD, CHF, HTN   Cultural/Druze Food Preferences: None Identified    FOOD/NUTRITION HISTORY  Diet History: no appetite changes PTA reported   Food Allergies:  [] NKFA     [x] Yes (garlic, latex)     NUTRITION INTAKE   Diet Order:  Consistent Carb 2000 kcal 2GM NA      Average PO Intake:       Patient Vitals for the past 100 hrs:   % Diet Eaten   02/03/17 0900 100 %   02/02/17 1746 25 %   02/02/17 0827 75 %   02/01/17 1254 45 %   01/31/17 1736 30 %   01/31/17 1332 45 %      Pertinent Medications:  [x] Reviewed; lasix, colace, abx, ppi, ativan, solu-medrol  Insulin:  [x] SSI   [x] Pre-meal: 5  [x]  Basal: 20    Pt expected to meet estimated nutrient needs through next review:          [x]  Yes     [] No;  ANTHROPOMETRICS  Height: 5' 5\" (165.1 cm)       Weight: 111.4 kg (245 lb 9.5 oz)    BMI: 38.4 kg/m^2  -  obese (30%-39.9% BMI)        Weight change: gaining wt x1 year                                  Comparison to Reference Standards:  IBW: 125 lbs      %IBW: 196%      AdjBW: 71 kg    NUTRITION-FOCUSED PHYSICAL ASSESSMENT  Skin: skin tear RUE, otherwise intact. 1+ edema to BLE.    GI: last BM 2/01    BIOCHEMICAL DATA & MEDICAL TESTS  Pertinent Labs:  [x] Reviewed; POC BG , BUN 46, HbA1c 8, Tbil 1.4, Alb 3.2     NUTRITION PRESCRIPTION  Calories: 3494-5569 kcal/day based on 25-30 kcal/kg AdjBW  Protein: 71-85 g/day based on 1-1.2 g/kg AdjBW  CHO: 222 g/day based on 50% of total energy  Fluid: 3583-5724 ml/day based on 1 kcal/ml NUTRITION DIAGNOSES:   1- At risk for inadequate oral intake related to respiratory failure as evidenced by PO intake of most meals <50%. 2- Altered nutrition related lab values related to DM2 as evidenced by POC BG , HbA1c 8    NUTRITION INTERVENTIONS:   INTERVENTIONS:        GOALS:  1. Add Glucerna Shakes BID, continue current diet 1. >50% PO intake meals/supplements, maintain dry wt, prevent skin breakdown by next review 1-3 days     LEARNING NEEDS (Diet, Supplementation, Food/Nutrient-Drug Interaction):   [x] None Identified  [] Education provided/documented (refer to Education section of EMR)  [] Identified and patient:  [] Declined     [] Was not appropriate/indicated  NUTRITION MONITORING /EVALUATION:   Follow PO intake  Monitor wt  Monitor for additional supplement needs    [x] Participated in Interdisciplinary Rounds  [x] [address] [place] [address] Reviewed/Documented  [x] Discharge Nutrition Plan: consistent cho low na    NUTRITION RISK:     [x]  At risk                     []  Not currently at risk     Will follow-up per policy.   Franco Chris RD  PAGER:  [phone]

## 2017-02-03 NOTE — PROGRESS NOTES
Progress Note  Pulmonary, Critical Care, and Sleep Medicine    Name: Karyn Nagy MRN: [id number]   : 1945 Hospital: [facility] [place]   Date: 2/3/2017        IMPRESSION:   · Acute on chronic hypercapnic and hypoxemic respiratory failure requiring BIPAP, improving and now on O2 NC  · Possible COPD exacerbation although pt and  deny history of COPD  · IMANI unable to tolerate CPAP due to claustrophobia  · Pulmonary HTN likely Group 3 on the basis of long standing IMANI and hypoxemia vs secondary to left sided failure jac with dilated left Atrium on latest Echo  · Hyperkalemia resolved  · Diastolic heart failure  · Leukocytosis steroid related improving  · DM II glycemic control suboptimal, hypoglycemia noted earlier today  · Elevated Troponin  · A fib now rate controlled on po Cardizem  · Insomnia       PLAN:   · Await  records from outside including recent PFT's  · Titrate supplemental O2  · Trazodone increased, prn Benzodiazepines  · Taper steroids jac with DM history  · Strict glycemic control, hold preprandial insulin  · DVT and GI prophylaxis issues addressed  · Pt may benefit from repeat CPAP titration via nasal pillows, outpatient testing for this  · Discussed with pt and family at bedside  · Discussed in ICU interdisciplinary rounds     Subjective/Interval History:   I have reviewed the flowsheet and previous days notes. Reviewed interval history. Discussed management with nursing staff. Pt off BIPAP, feeling better on HFNC. Denies pain or new respiratory symptoms. Complains of insomnia      ROS:Pertinent items are noted in HPI. Orders reviewed including medications. Changes made if indicated. Telemetry monitor reviewed at the bedside.   Objective:   Vital Signs:    Visit Vitals    BP (!) 157/93    Pulse (!) 112    Temp 98 °F (36.7 °C)    Resp (!) 31    Ht 5' 5\" (1.651 m)    Wt 111.4 kg (245 lb 9.5 oz)    SpO2 96%    BMI 40.87 kg/m2       O2 Device: Nasal cannula   O2 Flow Rate (L/min): 5.5 l/min   Temp (24hrs), Av.7 °F (36.5 °C), Min:97.5 °F (36.4 °C), Max:98.1 °F (36.7 °C)       Intake/Output:   Last shift:      701 - 1900  In: 120 [P.O.:120]  Out: -   Last 3 shifts: 1901 -  07  In: 730 [P.O.:580; I.V.:150]  Out: 3650 [Urine:3650]    Intake/Output Summary (Last 24 hours) at 17 1314  Last data filed at 17 0900   Gross per 24 hour   Intake              220 ml   Output             1700 ml   Net            -1480 ml        Physical Exam:    General:  Awake , cooperative, in no distress, appears anxious. Head:  Normocephalic, without obvious abnormality, atraumatic. Eyes:  ANicteric, PERRL,   Nose: Nares normal.  Mucosa normal. No drainage or sinus tenderness. Throat: Lips, mucosa, and tongue normal.  NO Thrush   Neck: Supple, symmetrical, trachea midline, no adenopathy, thyroid: no enlargment/tenderness/nodules    Back:   Symmetric    Lungs:   Diminished breath sounds, no rales or wheezes   Chest wall:  No tenderness or deformity. NO intercostal retractions   Heart:  irregular, S1, S2 normal, no murmur, click, rub or gallop. Abdomen:   Soft, non-tender. Bowel sounds normal. No masses,  No organomegaly. NO paradoxical motion   Extremities: normal, atraumatic, no cyanosis or edema. Pulses: 2+ and symmetric all extremities.    Skin: Skin color, texture, turgor normal. No rashes, large ecchymosis left proximal humerus, NO clubbing   Lymph nodes: Cervical  nodes normal.   Neurologic: Grossly nonfocal      :        Devices:             Drips:    DATA:  Labs:  Recent Labs      17   0415  17   0415  17   0415   WBC  9.9  12.5  15.4*   HGB  11.4*  11.3*  11.1*   HCT  36.3  35.8  35.3   PLT  191  218  218     Recent Labs      17   1027  17   0415  17   2210   17   0415   17   0415   NA   --   140   --    --   142   --   137   K  3.9  4.2  4.4   < >  3.9   < >  5.2   CL   --   96*   --    --   99* --   98*   CO2   --   38*   --    --   38*   --   35*   GLU   --   197*   --    --   56*   --   168*   BUN   --   46*   --    --   49*   --   45*   CREA   --   1.36*   --    --   1.36*   --   1.47*   CA   --   8.5   --    --   8.8   --   9.3   MG   --   2.3   --    --   2.2   --   2.0   ALB   --   3.2*   --    --   3.1*   --   3.2*   SGOT   --   14*   --    --   15   --   15   ALT   --   21   --    --   19   --   18   INR   --   1.8*   --    --   2.2*   --   2.7*    < > = values in this interval not displayed. No results for input(s): PH, PCO2, PO2, HCO3, FIO2 in the last 72 hours. Imaging:  []        I have personally reviewed the patients radiographs and reports  []         []        No change from prior, tubes and lines in adequate position  []        Improved   []        Worsening  High complexity decision making was performed during this consultation and evaluation.      Nancye Ganser, MD

## 2017-02-03 NOTE — PROGRESS NOTES
@4131 pt taken over awake alert oriented x4 sitting up in bed watching TV, denies pain , vital signs fluctuates, remains on NC /6L . Spo2 95%. Assessment done and documented in appropriate flow sheets. Nursing management continues. @2909 relatives allowed to visit with pt ,pt very happy ,nursing observation continues. @2200 no new changes care continues. @0000 pt unable to sleep meds given as prescribed. @0200 pt up watching TV ,no complaints verbalized, Nursing care continues. @0250 pt complaint of lower back pain , was assisted in repositioning use ed pillows small relief . Tylenol administered as prescribed observation continues. @0330 pt asleep easily aroused denies pain when asked. Nursing care continues. @0500 no changes care continues. @3492 Bedside and Verbal shift change report given to Myesha Maradiaga  (oncoming nurse) by Kristine Hooks (offgoing nurse).  Report included the following information SBAR, Kardex, Intake/Output, MAR, Recent Results and Med Rec Status   Alarm parameters reviewed, on and audible Appropriate for patient clinical condition

## 2017-02-03 NOTE — PROGRESS NOTES
Problem: Mobility Impaired (Adult and Pediatric)  Goal: *Acute Goals and Plan of Care (Insert Text)  Physical Therapy Goals  Initiated 2/1/2017 and to be accomplished within 7 day(s)  1. Patient will move from supine to sit and sit to supine in bed with supervision/set-up. 2. Patient will transfer from bed to chair and chair to bed with supervision/set-up using the least restrictive device. 3. Patient will perform sit to stand with supervision/set-up. 4. Patient will ambulate with supervision/set-up for >150 feet with the least restrictive device for safe home ambulation. 5. Patient will ascend/descend 1 step without handrail(s) with minimal assistance/contact guard assist for safe home entry. Outcome: Not Progressing Towards Goal     Attempted PT treatment session. Patient declined PT services due to not wishing to participate with a male therapist. Will attempt to coordinate with rehab staff members to accommodate the patient's preferences. Will follow up as patient's schedule permits.      Alix Childers PT

## 2017-02-03 NOTE — PROGRESS NOTES
Problem: Mobility Impaired (Adult and Pediatric)  Goal: *Acute Goals and Plan of Care (Insert Text)  Physical Therapy Goals  Initiated 2/1/2017 and to be accomplished within 7 day(s)  1. Patient will move from supine to sit and sit to supine in bed with supervision/set-up. 2. Patient will transfer from bed to chair and chair to bed with supervision/set-up using the least restrictive device. 3. Patient will perform sit to stand with supervision/set-up. 4. Patient will ambulate with supervision/set-up for >150 feet with the least restrictive device for safe home ambulation. 5. Patient will ascend/descend 1 step without handrail(s) with minimal assistance/contact guard assist for safe home entry. 2/3/17  PT treatment attempted. Pt being bathed currently by nursing. Will f/u tomorrow. Valencia Cardona, PT

## 2017-02-03 NOTE — PROGRESS NOTES
Cardiology Progress Note        Patient: Billy Martin        Sex: female          DOA: 1/30/2017  YOB: 1945      Age:  66 y.o.        LOS:  LOS: 3 days    Patient seen and examined. Chart reviewed. Assessment/Plan     Acute on chronic diastolic heart failure  Acute respiratory failure  COPD  Permanent atrial fibrillation   Abnormal troponin most likely secondary to heart failure, respiratory failure, atrial fibrillation with rapid ventricular rate, no evidence of ACS, however underlying CAD can not be ruled out. Moderate aortic stenosis   Hyperkalemia       Plan:       Continue IV diuretics. Continue coreg and Cardizem. Continue coumadin as per PT/INR  Continue management as per hospital medicine. Strict I/O, fluid restriction up to 1200 cc/day  Patient condition and plan discussed with patient and her . She will need ischemia work up once her acute condition resolves. Subjective:     Denies any chest pain or palpitation. REVIEW OF SYSTEMS:     General: No fevers or chills. Cardiovascular: Positive leg swelling, No chest pain,No palpitations, No orthopnea, No PND,  No claudication  Pulmonary: Positive dyspnea  Gastrointestinal: No nausea, vomiting, bleeding  Neurology: No Dizziness    Objective:      Visit Vitals    /63    Pulse 84    Temp 98.1 °F (36.7 °C)    Resp 22    Ht 5' 5\" (1.651 m)    Wt 111.4 kg (245 lb 9.5 oz)    SpO2 98%    BMI 40.87 kg/m2     Body mass index is 40.87 kg/(m^2). Physical Exam:  General Appearance: Comfortable, in mild distress, not using accessory muscles of respiration. HEENT: SIRIA. HEAD: Atraumatic  NECK: No JVD, no thyroidomeglay. CAROTIDS: No bruit  LUNGS: Clear bilaterally, occasional expiratory wwheezing   HEART: S1+S2 audible, irregularly irregular, no murmur, no pericardial rub. ABD: Non-tender, BS Audible    EXT: Left leg + edema, right leg trace edema, and no cyanosis.   VASCULAR EXAM: Pulses are intact. PSYCHIATRIC EXAM: Mood is appropriate. MUSCULOSKELETAL: Grossly no joint deformity.   NEUROLOGICAL: AAO times 3, No motor and sensory deficit  Medication:  Current Facility-Administered Medications   Medication Dose Route Frequency    traZODone (DESYREL) tablet 25 mg  25 mg Oral QHS PRN    insulin glargine (LANTUS) injection 20 Units  20 Units SubCUTAneous QHS    [START ON 2/3/2017] insulin lispro (HUMALOG) injection 5 Units  5 Units SubCUTAneous TIDAC    insulin lispro (HUMALOG) injection   SubCUTAneous AC&HS    carvedilol (COREG) tablet 25 mg  25 mg Oral BID WITH MEALS    methylPREDNISolone (PF) (SOLU-MEDROL) injection 40 mg  40 mg IntraVENous Q8H    dilTIAZem (CARDIZEM) IR tablet 30 mg  30 mg Oral TIDAC    pantoprazole (PROTONIX) 40 mg in sodium chloride 0.9 % 10 mL injection  40 mg IntraVENous DAILY    LORazepam (ATIVAN) tablet 0.5 mg  0.5 mg Oral Q4H PRN    levalbuterol (XOPENEX) nebulizer soln 1.25 mg/0.5 ml  1.25 mg Nebulization Q4H RT    arformoterol (BROVANA) neb solution 15 mcg  15 mcg Nebulization BID RT    budesonide (PULMICORT) 500 mcg/2 ml nebulizer suspension  500 mcg Nebulization BID RT    furosemide (LASIX) injection 40 mg  40 mg IntraVENous BID    acetaminophen (TYLENOL) tablet 650 mg  650 mg Oral Q4H PRN    diphenhydrAMINE (BENADRYL) injection 12.5 mg  12.5 mg IntraVENous Q4H PRN    ondansetron (ZOFRAN) injection 4 mg  4 mg IntraVENous Q4H PRN    docusate sodium (COLACE) capsule 100 mg  100 mg Oral BID    glucose chewable tablet 16 g  4 Tab Oral PRN    glucagon (GLUCAGEN) injection 1 mg  1 mg IntraMUSCular PRN    dextrose (D50W) injection syrg 12.5-25 g  25-50 mL IntraVENous PRN    levoFLOXacin (LEVAQUIN) 750 mg in D5W IVPB  750 mg IntraVENous Q24H    Warfarin - Pharmacy to dose   Other Rx Dosing/Monitoring               Lab/Data Reviewed:       Recent Labs      02/02/17   0415  02/01/17   0415  01/31/17   0416   WBC  12.5  15.4*  10.7   HGB  11.3* 11.1*  11.7*   HCT  35.8  35.3  37.4   PLT  218  218  202     Recent Labs      02/02/17   1639  02/02/17   1125  02/02/17   0415   02/01/17 0415 01/31/17 0416   NA   --    --   142   --   137   --   137   K  4.2  4.1  3.9   < >  5.2   < >  5.6*   CL   --    --   99*   --   98*   --   99*   CO2   --    --   38*   --   35*   --   35*   GLU   --    --   56*   --   168*   --   268*   BUN   --    --   49*   --   45*   --   42*   CREA   --    --   1.36*   --   1.47*   --   1.16   CA   --    --   8.8   --   9.3   --   9.1    < > = values in this interval not displayed.        Signed By: Eulis Curling, MD     February 2, 2017

## 2017-02-03 NOTE — INTERDISCIPLINARY ROUNDS
CRITICAL CARE INTERDISCIPLINARY ROUNDS    Patient Information:    Name:   Valentin Armenta    Age:   66 y.o. Admission Date:   1/30/2017    Critical Care Day: 5    Surgery Date:      Attending Provider:   Vin Frazier MD    Surgeon:        Consultant(s):  [facility] Physician:  Brigido Poole  Code Status: Full Code    Problem List:     Patient Active Problem List   Diagnosis Code    Acute on chronic diastolic CHF (congestive heart failure) ([facility]) I50.33    ALEJANDRINA (acute kidney injury) ([facility] [address] N17.9    Acute exacerbation of CHF (congestive heart failure) ([facility] [address] I50.9    DM (diabetes mellitus) ([facility] [address] [facility] E11.9    Hypertension I10    Respiratory failure ([facility] [address] [facility] J96.90    Acute coronary syndrome ([facility] [address] I24.9    Obstructive sleep apnea, adult G47.33    Paroxysmal atrial fibrillation ([facility] [address] I48.0    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 ([facility]) T44.03, E11.65    Dyspnea due to congestive heart failure ([facility]) I50.9    Aspiration pneumonia ([facility]) J69.0    Hyperkalemia E87.5    Elevated troponin R79.89    Acute respiratory distress ([facility]) R06.00    COPD (chronic obstructive pulmonary disease) ([facility]) J44.9    CHF (congestive heart failure) ([facility]) I50.9    Insufficiency, respiratory, acute ([facility]) R06.89    Infiltrate noted on imaging study R93.8    Chronic respiratory failure with hypoxia ([facility]) J96.11    COPD exacerbation ([facility]) J44.1    Acute on chronic respiratory failure with hypoxia ([facility]) J96.21       Principal Problem:  Acute on chronic diastolic CHF (congestive heart failure) ([facility])    During rounds the following quality care indicators and evidence based practices were addressed :  DVT Prophylaxis and PUD Prophylaxis Davila Day na ([facility] na) ; Central Line Day:na Isolation:na;  Antibiotic Stewardship: reviewed; Probiotics Necessary: na    Ventilator Bundle:      Sepsis Order Set:     Glycemic Control:   Recent Labs      02/03/17 0415  02/02/17 0415 02/01/17 0415   GLU  197*  56* 168*   ;  No results for input(s): PHI, PCO2I, PO2I in the last 72 hours. No lab exists for component: 3 Adjustments     Acute MI/PCI:   Not applicable    Door to Balloon: Admission Time: 1956      Heart Failure:    EF:%% 60    SCIP Measures for other Surgeries:   Not applicable     Pneumonia:    Not applicable    Interdisciplinary team rounds were held with the following team members Care Management, Diabetes   Treatment Specialist, Nursing, Nutrition, Pharmacy, Physician, Pastrol,  and Respiratory Therapy. Plan of care discussed. Goals of Care/ Recommendations: insulin adjusted, reduce mealtime to 5 units - give only if she eats 50%. Bipap - try with nasal mask. Reduce steroids today. See clinical pathway and/or care plan for interventions and desired outcomes.     Critical Care Discharge Status:  Red

## 2017-02-03 NOTE — PROGRESS NOTES
Cardiology Progress Note        Patient: Karen Banegas        Sex: female          DOA: 1/30/2017  YOB: 1945      Age:  66 y.o.        LOS:  LOS: 4 days      Patient seen and examined. Chart reviewed. Assessment/Plan     Acute on chronic diastolic heart failure  Acute respiratory failure  COPD  Permanent atrial fibrillation   Abnormal troponin most likely secondary to heart failure, respiratory failure, atrial fibrillation with rapid ventricular rate, no evidence of ACS, however underlying CAD can not be ruled out. Moderate aortic stenosis   Hyperkalemia  Chest pain mid sternal       C/o chest pain. Chest pain has both typical and atypical features of angina. EKG revealed Atrial fibrillation, anteroseptal infarct age undetermined, non specific ST T changes (No new changed)  Troponin is down trending     LVEF normal.   She is in good negative balance. Plan:         Continue IV diuretics. Continue coreg and Cardizem. Continue coumadin as per PT/INR  Continue management as per hospital medicine. Strict I/O, fluid restriction up to 1200 cc/day  Further cardiac work up as clinically indicated.                              Subjective:    cc: She had episdoe of chest pain this AM. Chest pain was midsternal, chest pain was like indigestion, no radiation, lasted for 15 minutes, no associated symptoms. She had similar symptoms in past at home at rest. My leg swelling is much better and breathing is much better. REVIEW OF SYSTEMS:     General: No fevers or chills.   Cardiovascular: Positive chest pain, leg swelling, No palpitations, No orthopnea, No PND,No claudication  Pulmonary: Positive dyspnea  Gastrointestinal: No nausea, vomiting, bleeding  Neurology: No Dizziness    Objective:      Visit Vitals    BP (!) 135/92    Pulse 99    Temp 97.9 °F (36.6 °C)    Resp (!) 32    Ht 5' 5\" (1.651 m)    Wt 111.4 kg (245 lb 9.5 oz)    SpO2 90%    BMI 40.87 kg/m2     Body mass index is 40.87 kg/(m^2). Physical Exam:  General Appearance: Comfortable, not using accessory muscles of respiration. HEENT: SIRIA. HEAD: Atraumatic  NECK: No JVD, no thyroidomeglay. CAROTIDS: No bruit  LUNGS: Clear bilaterally. HEART: S1+S2 audible, irregularly irregular, 3/6 systolic murmur in aortic area, no pericardial rub. ABD: Non-tender, BS Audible    EXT: trace leg edema, and no cyanosis. VASCULAR EXAM: Pulses are intact. PSYCHIATRIC EXAM: Mood is appropriate. MUSCULOSKELETAL: Grossly no joint deformity.   NEUROLOGICAL: AAO times 3, No motor and sensory deficit  Medication:  Current Facility-Administered Medications   Medication Dose Route Frequency    nitroglycerin (NITROSTAT) tablet 0.4 mg  0.4 mg SubLINGual PRN    methylPREDNISolone (PF) (SOLU-MEDROL) injection 40 mg  40 mg IntraVENous Q12H    traZODone (DESYREL) tablet 50 mg  50 mg Oral QHS PRN    insulin glargine (LANTUS) injection 25 Units  25 Units SubCUTAneous QHS    insulin lispro (HUMALOG) injection 5 Units  5 Units SubCUTAneous TIDAC    insulin lispro (HUMALOG) injection   SubCUTAneous AC&HS    carvedilol (COREG) tablet 25 mg  25 mg Oral BID WITH MEALS    dilTIAZem (CARDIZEM) IR tablet 30 mg  30 mg Oral TIDAC    pantoprazole (PROTONIX) 40 mg in sodium chloride 0.9 % 10 mL injection  40 mg IntraVENous DAILY    LORazepam (ATIVAN) tablet 0.5 mg  0.5 mg Oral Q4H PRN    levalbuterol (XOPENEX) nebulizer soln 1.25 mg/0.5 ml  1.25 mg Nebulization Q4H RT    arformoterol (BROVANA) neb solution 15 mcg  15 mcg Nebulization BID RT    budesonide (PULMICORT) 500 mcg/2 ml nebulizer suspension  500 mcg Nebulization BID RT    furosemide (LASIX) injection 40 mg  40 mg IntraVENous BID    acetaminophen (TYLENOL) tablet 650 mg  650 mg Oral Q4H PRN    diphenhydrAMINE (BENADRYL) injection 12.5 mg  12.5 mg IntraVENous Q4H PRN    ondansetron (ZOFRAN) injection 4 mg  4 mg IntraVENous Q4H PRN    docusate sodium (COLACE) capsule 100 mg 100 mg Oral BID    glucose chewable tablet 16 g  4 Tab Oral PRN    glucagon (GLUCAGEN) injection 1 mg  1 mg IntraMUSCular PRN    dextrose (D50W) injection syrg 12.5-25 g  25-50 mL IntraVENous PRN    levoFLOXacin (LEVAQUIN) 750 mg in D5W IVPB  750 mg IntraVENous Q24H    Warfarin - Pharmacy to dose   Other Rx Dosing/Monitoring               Lab/Data Reviewed:       Recent Labs      02/03/17 0415 02/02/17 0415 02/01/17 0415   WBC  9.9  12.5  15.4*   HGB  11.4*  11.3*  11.1*   HCT  36.3  35.8  35.3   PLT  191  218  218     Recent Labs      02/03/17   1027  02/03/17 0415 02/02/17 2210 02/02/17 0415 02/01/17 0415   NA   --   140   --    --   142   --   137   K  3.9  4.2  4.4   < >  3.9   < >  5.2   CL   --   96*   --    --   99*   --   98*   CO2   --   38*   --    --   38*   --   35*   GLU   --   197*   --    --   56*   --   168*   BUN   --   46*   --    --   49*   --   45*   CREA   --   1.36*   --    --   1.36*   --   1.47*   CA   --   8.5   --    --   8.8   --   9.3    < > = values in this interval not displayed.        Signed By: Aide Lynch MD     February 3, 2017

## 2017-02-04 PROBLEM — F41.9 ANXIETY: Status: ACTIVE | Noted: 2017-02-04

## 2017-02-04 LAB
ALBUMIN SERPL BCP-MCNC: 3 G/DL (ref 3.4–5)
ALBUMIN/GLOB SERPL: 0.9 {RATIO} (ref 0.8–1.7)
ALP SERPL-CCNC: 105 U/L (ref 45–117)
ALT SERPL-CCNC: 25 U/L (ref 13–56)
ANION GAP BLD CALC-SCNC: 5 MMOL/L (ref 3–18)
AST SERPL W P-5'-P-CCNC: 13 U/L (ref 15–37)
BASOPHILS # BLD AUTO: 0 K/UL (ref 0–0.06)
BASOPHILS # BLD: 0 % (ref 0–2)
BILIRUB SERPL-MCNC: 1.4 MG/DL (ref 0.2–1)
BUN SERPL-MCNC: 45 MG/DL (ref 7–18)
BUN/CREAT SERPL: 35 (ref 12–20)
CALCIUM SERPL-MCNC: 8.6 MG/DL (ref 8.5–10.1)
CHLORIDE SERPL-SCNC: 98 MMOL/L (ref 100–108)
CO2 SERPL-SCNC: 37 MMOL/L (ref 21–32)
CREAT SERPL-MCNC: 1.29 MG/DL (ref 0.6–1.3)
DIFFERENTIAL METHOD BLD: ABNORMAL
EOSINOPHIL # BLD: 0 K/UL (ref 0–0.4)
EOSINOPHIL NFR BLD: 0 % (ref 0–5)
ERYTHROCYTE [DISTWIDTH] IN BLOOD BY AUTOMATED COUNT: 15.7 % (ref 11.6–14.5)
GLOBULIN SER CALC-MCNC: 3.3 G/DL (ref 2–4)
GLUCOSE BLD STRIP.AUTO-MCNC: 179 MG/DL (ref 70–110)
GLUCOSE BLD STRIP.AUTO-MCNC: 181 MG/DL (ref 70–110)
GLUCOSE BLD STRIP.AUTO-MCNC: 249 MG/DL (ref 70–110)
GLUCOSE BLD STRIP.AUTO-MCNC: 282 MG/DL (ref 70–110)
GLUCOSE BLD STRIP.AUTO-MCNC: 386 MG/DL (ref 70–110)
GLUCOSE SERPL-MCNC: 195 MG/DL (ref 74–99)
HCT VFR BLD AUTO: 37 % (ref 35–45)
HGB BLD-MCNC: 11.7 G/DL (ref 12–16)
INR PPP: 1.8 (ref 0.8–1.2)
LYMPHOCYTES # BLD AUTO: 6 % (ref 21–52)
LYMPHOCYTES # BLD: 0.6 K/UL (ref 0.9–3.6)
MAGNESIUM SERPL-MCNC: 2.2 MG/DL (ref 1.8–2.4)
MCH RBC QN AUTO: 32 PG (ref 24–34)
MCHC RBC AUTO-ENTMCNC: 31.6 G/DL (ref 31–37)
MCV RBC AUTO: 101.1 FL (ref 74–97)
MONOCYTES # BLD: 0.3 K/UL (ref 0.05–1.2)
MONOCYTES NFR BLD AUTO: 3 % (ref 3–10)
NEUTS SEG # BLD: 9 K/UL (ref 1.8–8)
NEUTS SEG NFR BLD AUTO: 91 % (ref 40–73)
PLATELET # BLD AUTO: 195 K/UL (ref 135–420)
PMV BLD AUTO: 10.5 FL (ref 9.2–11.8)
POTASSIUM SERPL-SCNC: 4 MMOL/L (ref 3.5–5.5)
PROT SERPL-MCNC: 6.3 G/DL (ref 6.4–8.2)
PROTHROMBIN TIME: 20.1 SEC (ref 11.5–15.2)
RBC # BLD AUTO: 3.66 M/UL (ref 4.2–5.3)
SODIUM SERPL-SCNC: 140 MMOL/L (ref 136–145)
WBC # BLD AUTO: 9.9 K/UL (ref 4.6–13.2)

## 2017-02-04 PROCEDURE — 74011250636 HC RX REV CODE- 250/636: Performed by: HOSPITALIST

## 2017-02-04 PROCEDURE — 74011250637 HC RX REV CODE- 250/637: Performed by: HOSPITALIST

## 2017-02-04 PROCEDURE — 36415 COLL VENOUS BLD VENIPUNCTURE: CPT | Performed by: HOSPITALIST

## 2017-02-04 PROCEDURE — 74011250636 HC RX REV CODE- 250/636: Performed by: INTERNAL MEDICINE

## 2017-02-04 PROCEDURE — 74011000250 HC RX REV CODE- 250: Performed by: HOSPITALIST

## 2017-02-04 PROCEDURE — 74011000250 HC RX REV CODE- 250: Performed by: INTERNAL MEDICINE

## 2017-02-04 PROCEDURE — 74011636637 HC RX REV CODE- 636/637: Performed by: HOSPITALIST

## 2017-02-04 PROCEDURE — 82962 GLUCOSE BLOOD TEST: CPT

## 2017-02-04 PROCEDURE — 94660 CPAP INITIATION&MGMT: CPT

## 2017-02-04 PROCEDURE — 85610 PROTHROMBIN TIME: CPT | Performed by: HOSPITALIST

## 2017-02-04 PROCEDURE — 94640 AIRWAY INHALATION TREATMENT: CPT

## 2017-02-04 PROCEDURE — 65610000006 HC RM INTENSIVE CARE

## 2017-02-04 PROCEDURE — C9113 INJ PANTOPRAZOLE SODIUM, VIA: HCPCS | Performed by: INTERNAL MEDICINE

## 2017-02-04 PROCEDURE — 74011250637 HC RX REV CODE- 250/637: Performed by: INTERNAL MEDICINE

## 2017-02-04 PROCEDURE — 80053 COMPREHEN METABOLIC PANEL: CPT | Performed by: HOSPITALIST

## 2017-02-04 PROCEDURE — 74011636637 HC RX REV CODE- 636/637: Performed by: INTERNAL MEDICINE

## 2017-02-04 PROCEDURE — 77010033678 HC OXYGEN DAILY

## 2017-02-04 PROCEDURE — 85025 COMPLETE CBC W/AUTO DIFF WBC: CPT | Performed by: HOSPITALIST

## 2017-02-04 PROCEDURE — 97530 THERAPEUTIC ACTIVITIES: CPT

## 2017-02-04 PROCEDURE — 83735 ASSAY OF MAGNESIUM: CPT | Performed by: HOSPITALIST

## 2017-02-04 RX ORDER — CLONAZEPAM 0.5 MG/1
0.25 TABLET ORAL 2 TIMES DAILY
Status: DISCONTINUED | OUTPATIENT
Start: 2017-02-04 | End: 2017-02-09 | Stop reason: HOSPADM

## 2017-02-04 RX ORDER — WARFARIN SODIUM 5 MG/1
5 TABLET ORAL ONCE
Status: COMPLETED | OUTPATIENT
Start: 2017-02-04 | End: 2017-02-04

## 2017-02-04 RX ORDER — PREDNISONE 20 MG/1
20 TABLET ORAL 2 TIMES DAILY WITH MEALS
Status: DISCONTINUED | OUTPATIENT
Start: 2017-02-04 | End: 2017-02-05

## 2017-02-04 RX ORDER — LABETALOL HYDROCHLORIDE 5 MG/ML
10 INJECTION, SOLUTION INTRAVENOUS
Status: DISCONTINUED | OUTPATIENT
Start: 2017-02-04 | End: 2017-02-09 | Stop reason: HOSPADM

## 2017-02-04 RX ADMIN — ARFORMOTEROL TARTRATE 15 MCG: 15 SOLUTION RESPIRATORY (INHALATION) at 20:04

## 2017-02-04 RX ADMIN — LEVALBUTEROL 1.25 MG: 1.25 SOLUTION, CONCENTRATE RESPIRATORY (INHALATION) at 05:08

## 2017-02-04 RX ADMIN — LEVOFLOXACIN 750 MG: 5 INJECTION, SOLUTION INTRAVENOUS at 22:15

## 2017-02-04 RX ADMIN — SODIUM CHLORIDE 40 MG: 9 INJECTION, SOLUTION INTRAMUSCULAR; INTRAVENOUS; SUBCUTANEOUS at 08:42

## 2017-02-04 RX ADMIN — FUROSEMIDE 40 MG: 10 INJECTION, SOLUTION INTRAMUSCULAR; INTRAVENOUS at 08:42

## 2017-02-04 RX ADMIN — ACETAMINOPHEN 650 MG: 325 TABLET, FILM COATED ORAL at 22:16

## 2017-02-04 RX ADMIN — INSULIN LISPRO 4 UNITS: 100 INJECTION, SOLUTION INTRAVENOUS; SUBCUTANEOUS at 22:16

## 2017-02-04 RX ADMIN — BUDESONIDE 500 MCG: 0.5 INHALANT RESPIRATORY (INHALATION) at 08:10

## 2017-02-04 RX ADMIN — DILTIAZEM HYDROCHLORIDE 30 MG: 30 TABLET, FILM COATED ORAL at 11:31

## 2017-02-04 RX ADMIN — DOCUSATE SODIUM 100 MG: 100 CAPSULE, LIQUID FILLED ORAL at 22:16

## 2017-02-04 RX ADMIN — INSULIN LISPRO 6 UNITS: 100 INJECTION, SOLUTION INTRAVENOUS; SUBCUTANEOUS at 17:22

## 2017-02-04 RX ADMIN — CARVEDILOL 25 MG: 12.5 TABLET, FILM COATED ORAL at 08:43

## 2017-02-04 RX ADMIN — LEVALBUTEROL 1.25 MG: 1.25 SOLUTION, CONCENTRATE RESPIRATORY (INHALATION) at 08:10

## 2017-02-04 RX ADMIN — FUROSEMIDE 40 MG: 10 INJECTION, SOLUTION INTRAMUSCULAR; INTRAVENOUS at 22:15

## 2017-02-04 RX ADMIN — ACETAMINOPHEN 650 MG: 325 TABLET, FILM COATED ORAL at 17:02

## 2017-02-04 RX ADMIN — INSULIN LISPRO 5 UNITS: 100 INJECTION, SOLUTION INTRAVENOUS; SUBCUTANEOUS at 06:49

## 2017-02-04 RX ADMIN — INSULIN LISPRO 5 UNITS: 100 INJECTION, SOLUTION INTRAVENOUS; SUBCUTANEOUS at 11:31

## 2017-02-04 RX ADMIN — DILTIAZEM HYDROCHLORIDE 30 MG: 30 TABLET, FILM COATED ORAL at 17:02

## 2017-02-04 RX ADMIN — DOCUSATE SODIUM 100 MG: 100 CAPSULE, LIQUID FILLED ORAL at 08:42

## 2017-02-04 RX ADMIN — PREDNISONE 20 MG: 20 TABLET ORAL at 17:02

## 2017-02-04 RX ADMIN — LEVALBUTEROL 1.25 MG: 1.25 SOLUTION, CONCENTRATE RESPIRATORY (INHALATION) at 20:04

## 2017-02-04 RX ADMIN — INSULIN LISPRO 5 UNITS: 100 INJECTION, SOLUTION INTRAVENOUS; SUBCUTANEOUS at 17:21

## 2017-02-04 RX ADMIN — CLONAZEPAM 0.25 MG: 0.5 TABLET ORAL at 22:16

## 2017-02-04 RX ADMIN — WARFARIN SODIUM 5 MG: 5 TABLET ORAL at 18:21

## 2017-02-04 RX ADMIN — BUDESONIDE 500 MCG: 0.5 INHALANT RESPIRATORY (INHALATION) at 20:05

## 2017-02-04 RX ADMIN — LORAZEPAM 0.5 MG: 0.5 TABLET ORAL at 02:19

## 2017-02-04 RX ADMIN — DILTIAZEM HYDROCHLORIDE 30 MG: 30 TABLET, FILM COATED ORAL at 08:42

## 2017-02-04 RX ADMIN — INSULIN LISPRO 10 UNITS: 100 INJECTION, SOLUTION INTRAVENOUS; SUBCUTANEOUS at 11:32

## 2017-02-04 RX ADMIN — LEVALBUTEROL 1.25 MG: 1.25 SOLUTION, CONCENTRATE RESPIRATORY (INHALATION) at 00:00

## 2017-02-04 RX ADMIN — ONDANSETRON HYDROCHLORIDE 4 MG: 2 INJECTION INTRAMUSCULAR; INTRAVENOUS at 06:07

## 2017-02-04 RX ADMIN — METHYLPREDNISOLONE SODIUM SUCCINATE 40 MG: 40 INJECTION, POWDER, FOR SOLUTION INTRAMUSCULAR; INTRAVENOUS at 08:42

## 2017-02-04 RX ADMIN — INSULIN GLARGINE 25 UNITS: 100 INJECTION, SOLUTION SUBCUTANEOUS at 22:16

## 2017-02-04 RX ADMIN — INSULIN LISPRO 2 UNITS: 100 INJECTION, SOLUTION INTRAVENOUS; SUBCUTANEOUS at 06:50

## 2017-02-04 RX ADMIN — CLONAZEPAM 0.25 MG: 0.5 TABLET ORAL at 10:54

## 2017-02-04 RX ADMIN — ARFORMOTEROL TARTRATE 15 MCG: 15 SOLUTION RESPIRATORY (INHALATION) at 08:10

## 2017-02-04 RX ADMIN — CARVEDILOL 25 MG: 12.5 TABLET, FILM COATED ORAL at 18:21

## 2017-02-04 RX ADMIN — LEVALBUTEROL 1.25 MG: 1.25 SOLUTION, CONCENTRATE RESPIRATORY (INHALATION) at 15:34

## 2017-02-04 RX ADMIN — LEVALBUTEROL 1.25 MG: 1.25 SOLUTION, CONCENTRATE RESPIRATORY (INHALATION) at 11:54

## 2017-02-04 NOTE — PROGRESS NOTES
Problem: Mobility Impaired (Adult and Pediatric)  Goal: *Acute Goals and Plan of Care (Insert Text)  Physical Therapy Goals  Initiated 2/1/2017 and to be accomplished within 7 day(s)  1. Patient will move from supine to sit and sit to supine in bed with supervision/set-up. 2. Patient will transfer from bed to chair and chair to bed with supervision/set-up using the least restrictive device. 3. Patient will perform sit to stand with supervision/set-up. 4. Patient will ambulate with supervision/set-up for >150 feet with the least restrictive device for safe home ambulation. 5. Patient will ascend/descend 1 step without handrail(s) with minimal assistance/contact guard assist for safe home entry. Outcome: Progressing Towards Goal  PHYSICAL THERAPY TREATMENT     Patient: Kadi Carter (79 y.o. female)  Date: 2/4/2017  Diagnosis: Insufficiency, respiratory, acute (HCC)  CHF (congestive heart failure) (HCC)  Elevated troponin  COPD (chronic obstructive pulmonary disease) ([facility] [address]  Infiltrate noted on imaging study  Insufficiency, respiratory, acute (HCC)  CHF (congestive heart failure) (HCC)  Elevated troponin  COPD (chronic obstructive pulmonary disease) (HCC)  Infiltrate noted on imaging study Acute on chronic diastolic CHF (congestive heart failure) (HCC)  Precautions: Fall, Other (comment) (O2, high Flow)   Chart, physical therapy assessment, plan of care and goals were reviewed. ASSESSMENT:  Pt up in chair on PT arrival and in NAD. Pt reports chronic back pain rated 8/10 pre/post session and received pillow support to back upon completion of session for additional comfort. Pt able to transition to stand with CGA/additonal time. Upon standing, pt noted to have brief saturated to chair pad. Pt able to maintain good standing balance while brief removed, hygiene care needs met and chair pad changed. Pt's PurWick device exposed during removal of brief and nurse Tate Lee notified.   Pt left sitting in chair with nurse present for re-placement of urinary device. No further treatment received at this time as pt fatigued. O2 sat remain in mid 90's during session. Will continue PT for goals as set. Progression toward goals:  [ ]      Improving appropriately and progressing toward goals  [X]      Improving slowly and progressing toward goals  [ ]      Not making progress toward goals and plan of care will be adjusted       PLAN:  Patient continues to benefit from skilled intervention to address the above impairments. Continue treatment per established plan of care. Discharge Recommendations:  Andrés Nelson  Further Equipment Recommendations for Discharge:  N/A       SUBJECTIVE:   Patient stated I been doing my exercises.       OBJECTIVE DATA SUMMARY:   Critical Behavior:  Neurologic State: Alert, Appropriate for age, Eyes open spontaneously  Orientation Level: Oriented X4  Cognition: Appropriate decision making, Appropriate for age attention/concentration, Appropriate safety awareness, Follows commands  Safety/Judgement: Lack of insight into deficits  Functional Mobility Training:  Transfers:  Sit to Stand: Contact guard assistance; Additional time  Stand to Sit: Contact guard assistance  Balance:  Sitting: Intact  Standing: Impaired; With support  Standing - Static: Good  Pain:  Pain Scale 1: Numeric (0 - 10)  Pain Intensity 1: 0  Pain Location 1: Back  Pain Orientation 1: Lower  Pain Description 1: Aching  Pain Intervention(s) 1: Position (pillow support to back in sitting)  Activity Tolerance:   Fair  Please refer to the flowsheet for vital signs taken during this treatment.   After treatment:   [X] Patient left in no apparent distress sitting up in chair  [ ] Patient left in no apparent distress in bed  [X] Call bell left within reach  [X] Nursing present Derral Akash  [ ] Caregiver present  [ ] Bed alarm activated      David Garces, PT   Time Calculation: 14 mins

## 2017-02-04 NOTE — PROGRESS NOTES
0700  Report received from Emily Elder RN. Assumed patient care. 0945  Patient bathed and up in chair. All linens changed. 1240  PT at bedside. 1400  Patient returned to bed, tolerated sitting up in chair for several hours without issue. Patient stated that she was \"very comfortable\" in the chair. 5  Dr. Alejandro Brunner at bedside. 1921  Bedside and Verbal shift change report given to AZIZA Duckworth RN (oncoming nurse) by Yoli Salomon. Jessy Morton Rn (offgoing nurse). Report included the following information SBAR, Kardex, Intake/Output, MAR, Recent Results and Cardiac Rhythm a-fib.

## 2017-02-04 NOTE — PROGRESS NOTES
Pharmacy Dosing Services: Warfarin     Consult for Warfarin Dosing by Pharmacy by Dr. Susie Bellamy provided for this 66 y.o. female , for indication of Atrial Fibrillation. Dose to achieve an INR goal of 2-3     INR = 1.8 (decreasing)   Order entered for Warfarin 5mg to be given today at 18:00. Significant drug interactions: levofloxacin    LABS    PT/INR Lab Results   Component Value Date/Time    INR 1.8 02/04/2017 05:45 AM      Platelets Lab Results   Component Value Date/Time    PLATELET 750 91/89/0936 05:45 AM      H/H     Lab Results   Component Value Date/Time    HGB 11.7 02/04/2017 05:45 AM        Warfarin Administrations (last 168 hours)       Date/Time Action Medication Dose      02/03/17 1733 Given   [INR 1.8]    warfarin (COUMADIN) tablet 4 mg 4 mg    02/02/17 1740 Given    warfarin (COUMADIN) tablet 3 mg 3 mg    02/01/17 1733 Given    warfarin (COUMADIN) tablet 2.5 mg 2.5 mg    01/31/17 1733 Given    warfarin (COUMADIN) tablet 2.5 mg 2.5 mg              Pharmacy to follow daily and will provide subsequent Warfarin dosing based on clinical status.   Allegra Walsh, [address] [facility])  Contact information [phone]

## 2017-02-04 NOTE — PROGRESS NOTES
Problem: Mobility Impaired (Adult and Pediatric)  Goal: *Acute Goals and Plan of Care (Insert Text)  Physical Therapy Goals  Initiated 2/1/2017 and to be accomplished within 7 day(s)  1. Patient will move from supine to sit and sit to supine in bed with supervision/set-up. 2. Patient will transfer from bed to chair and chair to bed with supervision/set-up using the least restrictive device. 3. Patient will perform sit to stand with supervision/set-up. 4. Patient will ambulate with supervision/set-up for >150 feet with the least restrictive device for safe home ambulation. 5. Patient will ascend/descend 1 step without handrail(s) with minimal assistance/contact guard assist for safe home entry. 2/4/17  PT attempted. Respiratory therapist about to begin breathing treatment. Will f/u at later time.   Elan Mendoza, PT

## 2017-02-04 NOTE — PROGRESS NOTES
@1901 Pt taken over awake alert oriented x4 in bed watching TV denies pain at present. Remains on O2 via nasal cannula ,SpO2 98%. Bilateral feet remains edematous . Meplex to rt hand dry and intact. Assessment done and charted in relevant flow sheets . Nursing management continues. [email or web address] pt complaint of feeling of anxiety ativan administered as prescribed observation continues. @2000 pt calm verbalized of improved feelings. @0966 pt complaint of headache and nausea ,tylenol and  Zofran administered as prescribed nursing care continues. @8033 pt verbalized that she was ready to sleep and requested Trazodone ,medication administered as ordered, RT at bedside in process of medicating pt and applying  bipap as ordered. Nursing management continues. @0034 pt BP steadily increasing, Dr. Andrés Rodriguez was called , orders received for PRN labetalol with parameters. Pt observation continues not within parameters to receive to receive at this time. @4301 pt demanded to be taken off BIPAP same done and placed back on 2L/nc . RT informed ,Pt complaint of anxiety at this time and ativan administered as prescribed. @0400 pt asleep ,easily aroused, fl acc 0 vital signs improved,nursing management continues. @5577 pt complaint of nausea ,zofran administered as prescribed ,observation continues. @0700 Bedside and Verbal shift change report given to Greg Muse (oncoming nurse) by Yusuf Lerma (offgoing nurse). Report included the following information SBAR, Kardex, Intake/Output, MAR, Recent Results and Med Rec Status.    Alarm parameters reviewed, on and audible Appropriate for patient clinical condition

## 2017-02-04 NOTE — PROGRESS NOTES
Hospitalist Progress Note-critical care note     Patient: Jorge Luis Anglin MRN: [id number]  [facility]: [id number]    YOB: 1945  Age: 66 y.o. Sex: female    DOA: 1/30/2017 LOS:  LOS: 5 days            Chief complaint: chf exacerbation,  Acute on chronic respiratory failure , chest pain,  copd exacerbation ,  HTN, anxiety     Assessment/Plan         Patient Active Problem List   Diagnosis Code    Acute on chronic diastolic CHF (congestive heart failure) ([facility]) I50.33    ALEJANDRINA (acute kidney injury) ([facility] [address] N17.9    Acute exacerbation of CHF (congestive heart failure) ([facility]) I50.9    DM (diabetes mellitus) ([facility] [address] E11.9    Hypertension I10    Respiratory failure ([facility] [address] J96.90    Acute coronary syndrome ([facility] [address] I24.9    Obstructive sleep apnea, adult G47.33    Paroxysmal atrial fibrillation ([facility]) I48.0    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 ([facility]) S53.08, E11.65    Dyspnea due to congestive heart failure ([facility]) I50.9    Aspiration pneumonia ([facility]) J69.0    Hyperkalemia E87.5    Elevated troponin R79.89    Acute respiratory distress ([facility]) R06.00    COPD (chronic obstructive pulmonary disease) ([facility]) J44.9    CHF (congestive heart failure) ([facility]) I50.9    Insufficiency, respiratory, acute ([facility]) R06.89    Infiltrate noted on imaging study R93.8    Chronic respiratory failure with hypoxia ([facility]) J96.11    COPD exacerbation ([facility]) J44.1    Acute on chronic respiratory failure with hypoxia ([facility]) J96.21    Chest pain R07.9     1. Acute on chronic  respiratory failure   Improving, on nc O2 , continue weaning off and need bipap at night ,  - hx of intubation in 2016. Appreciated intensive input. Multiple factors, copd exacerbation, possible hcap, hermelinda, Pulmonary hypertension (moderated from 2016 echo and chf exacerbation   2. Copd exacerbation and possible hcap  On  breathing tx with xopenex.  pulmocort and brovana, on  levaquin  3 afib with rvr  Rate controlled per cardizem now, optimize the electrolytes, on warfarin ,   4. chf exacerbation -diastolic from previous echo  continue lasix ,ef wnl. Dr. Yanick Garcia on board , echo reviewed, EF reserved   5. Hyperkalemia  Resolved,   6. chest pain am  Case discussed with Dr. Yanick Garcia, resolved per sub L nitro ,repeated EKG no change, ce 0.35 ,lower than before   7. DM type II ,   -long term insulin ,   - will increase lantus to 25 ,  ssi to three times before meal, hold bedtime  8 HTN, accelerated  Continue home medication. 9. Pulmonary hypertension   moderate from echo   10 hermelinda   Not compliance cpap at home   11 anxiety  Scheduled klonipin   12. Insomnia   Continue trazodone prn for sleep     Subjective: feel fine, I used bipap for three hr  Nurse: does not like bipap , anxiety       Review of systems:    General: No fevers or chills. Cardiovascular: no  chest pain , . No palpitations. Pulmonary: shortness of breath better,   Gastrointestinal: No nausea, vomiting. Vital signs/Intake and Output:  Visit Vitals    /57    Pulse 88    Temp 97.9 °F (36.6 °C)    Resp 28    Ht 5' 5\" (1.651 m)    Wt 111.4 kg (245 lb 9.5 oz)    SpO2 94%    BMI 40.87 kg/m2     Current Shift:  02/04 0701 - 02/04 1900  In: 480 [P.O.:480]  Out: -   Last three shifts:  02/02 1901 - 02/04 0700  In: 120 [P.O.:120]  Out: 3350 [Urine:3350]    Physical Exam:  General: WD, WN. Alert, cooperative, no acute distress    HEENT: NC, Atraumatic. PERRLA, anicteric sclerae. bipap mask noted   Lungs: CTA Bilaterally. No Wheezing/Rhonchi/Rales. Heart:  Irregular irregular ,  No murmur, No Rubs, No Gallops  Abdomen: Soft, Non distended, Non tender.  +Bowel sounds,   Extremities: No c/c/e  Psych:   anxious . No agitated. Neurologic:  No acute neurological deficit.              Labs: Results:       Chemistry Recent Labs      02/04/17   0545  02/03/17   1613  02/03/17   1027  02/03/17   0415   02/02/17   0415   GLU  195*   --    --   197*   --   56*   NA  140   --    --   140   -- 142   K  4.0  3.9  3.9  4.2   < >  3.9   CL  98*   --    --   96*   --   99*   CO2  37*   --    --   38*   --   38*   BUN  45*   --    --   46*   --   49*   CREA  1.29   --    --   1.36*   --   1.36*   CA  8.6   --    --   8.5   --   8.8   AGAP  5   --    --   6   --   5   BUCR  35*   --    --   34*   --   36*   AP  105   --    --   114   --   115   TP  6.3*   --    --   6.6   --   6.4   ALB  3.0*   --    --   3.2*   --   3.1*   GLOB  3.3   --    --   3.4   --   3.3   AGRAT  0.9   --    --   0.9   --   0.9    < > = values in this interval not displayed. CBC w/Diff Recent Labs      02/04/17   0545  02/03/17 0415  02/02/17 0415   WBC  9.9  9.9  12.5   RBC  3.66*  3.58*  3.54*   HGB  11.7*  11.4*  11.3*   HCT  37.0  36.3  35.8   PLT  195  191  218   GRANS  91*  93*  91*   LYMPH  6*  4*  6*   EOS  0  0  0      Cardiac Enzymes Recent Labs      02/03/17   0415   CPK  23*   CKND1  4.3*      Coagulation Recent Labs      02/04/17   0545  02/03/17 0415   PTP  20.1*  20.2*   INR  1.8*  1.8*       Lipid Panel No results found for: CHOL, CHOLPOCT, CHOLX, CHLST, CHOLV, [id number], HDL, LDL, NLDLCT, DLDL, LDLC, DLDLP, [id number], VLDLC, VLDL, TGL, TGLX, TRIGL, [id number], TRIGP, TGLPOCT, [id number], CHHD, CHHDX   BNP No results for input(s): BNPP in the last 72 hours.    Liver Enzymes Recent Labs      02/04/17   0545   TP  6.3*   ALB  3.0*   AP  105   SGOT  13*      Thyroid Studies Lab Results   Component Value Date/Time    TSH 0.31 01/01/2016 10:20 PM        Procedures/imaging: see electronic medical records for all procedures/Xrays and details which were not copied into this note but were reviewed prior to creation of Catherne Harada, MD

## 2017-02-04 NOTE — PROGRESS NOTES
Cardiology Progress Note        Patient: Guero Marie        Sex: female          DOA: 1/30/2017  YOB: 1945      Age:  66 y.o.        LOS:  LOS: 5 days   Assessment/Plan     Principal Problem:    Acute on chronic diastolic CHF (congestive heart failure) ([address] (9/3/2014)    Active Problems:    Hypertension (9/27/2015)      Paroxysmal atrial fibrillation ([address] (1/4/2016)      Hyperkalemia (1/30/2017)      Elevated troponin (1/30/2017)      Acute respiratory distress (HCC) (1/30/2017)      COPD (chronic obstructive pulmonary disease) ([address] (1/30/2017)      CHF (congestive heart failure) ([address] (1/30/2017)      Insufficiency, respiratory, acute ([address] (1/30/2017)      Infiltrate noted on imaging study (1/30/2017)      Chronic respiratory failure with hypoxia (HCC) (1/30/2017)      COPD exacerbation ([address] (1/31/2017)      Acute on chronic respiratory failure with hypoxia ([address] (2/1/2017)      Chest pain (2/3/2017)      Anxiety (2/4/2017)        Plan:  Overall feeling better. Cardiac tele: Afib rate controlled and PVCs  Continue with current dose of coreg, Cardizem  Continue with current supportive treatment. Subjective:    cc:  No CP      REVIEW OF SYSTEMS: NO CP, +SOB, N,V,D, F,C  Objective:      Visit Vitals    /80    Pulse 89    Temp 97.9 °F (36.6 °C)    Resp 28    Ht 5' 5\" (1.651 m)    Wt 106.8 kg (235 lb 7.2 oz)    SpO2 94%    BMI 39.18 kg/m2     Body mass index is 39.18 kg/(m^2). Physical Exam:  General Appearance: Comfortable,  HEENT: SIRIA. HEAD: Atraumatic  NECK: No JVD,. LUNGS: Clear bilaterally. HEART: S1 variable +S2 audible,      ABD: Non-tender, BS Audible    EXT: mild  edema, and no cysnosis. VASCULAR EXAM: Pulses are intact. PSYCHIATRIC EXAM: Mood is appropriate.     Medication:  Current Facility-Administered Medications   Medication Dose Route Frequency    labetalol (NORMODYNE;TRANDATE) injection 10 mg  10 mg IntraVENous Q4H PRN    clonazePAM (KlonoPIN) tablet 0.25 mg  0.25 mg Oral BID    predniSONE (DELTASONE) tablet 20 mg  20 mg Oral BID WITH MEALS    nitroglycerin (NITROSTAT) tablet 0.4 mg  0.4 mg SubLINGual PRN    traZODone (DESYREL) tablet 50 mg  50 mg Oral QHS PRN    insulin glargine (LANTUS) injection 25 Units  25 Units SubCUTAneous QHS    insulin lispro (HUMALOG) injection 5 Units  5 Units SubCUTAneous TIDAC    insulin lispro (HUMALOG) injection   SubCUTAneous AC&HS    carvedilol (COREG) tablet 25 mg  25 mg Oral BID WITH MEALS    dilTIAZem (CARDIZEM) IR tablet 30 mg  30 mg Oral TIDAC    pantoprazole (PROTONIX) 40 mg in sodium chloride 0.9 % 10 mL injection  40 mg IntraVENous DAILY    levalbuterol (XOPENEX) nebulizer soln 1.25 mg/0.5 ml  1.25 mg Nebulization Q4H RT    arformoterol (BROVANA) neb solution 15 mcg  15 mcg Nebulization BID RT    budesonide (PULMICORT) 500 mcg/2 ml nebulizer suspension  500 mcg Nebulization BID RT    furosemide (LASIX) injection 40 mg  40 mg IntraVENous BID    acetaminophen (TYLENOL) tablet 650 mg  650 mg Oral Q4H PRN    diphenhydrAMINE (BENADRYL) injection 12.5 mg  12.5 mg IntraVENous Q4H PRN    ondansetron (ZOFRAN) injection 4 mg  4 mg IntraVENous Q4H PRN    docusate sodium (COLACE) capsule 100 mg  100 mg Oral BID    glucose chewable tablet 16 g  4 Tab Oral PRN    glucagon (GLUCAGEN) injection 1 mg  1 mg IntraMUSCular PRN    dextrose (D50W) injection syrg 12.5-25 g  25-50 mL IntraVENous PRN    levoFLOXacin (LEVAQUIN) 750 mg in D5W IVPB  750 mg IntraVENous Q24H    Warfarin - Pharmacy to dose   Other Rx Dosing/Monitoring               Lab/Data Reviewed: All lab results for the last 24 hours reviewed.      Recent Labs      02/04/17 0545  02/03/17 0415  02/02/17 0415   WBC  9.9  9.9  12.5   HGB  11.7*  11.4*  11.3*   HCT  37.0  36.3  35.8   PLT  195  191  218     Recent Labs      02/04/17 0545  02/03/17   1613  02/03/17   1027  02/03/17   0415   02/02/17   0415   NA 140   --    --   140   --   142   K  4.0  3.9  3.9  4.2   < >  3.9   CL  98*   --    --   96*   --   99*   CO2  37*   --    --   38*   --   38*   GLU  195*   --    --   197*   --   56*   BUN  45*   --    --   46*   --   49*   CREA  1.29   --    --   1.36*   --   1.36*   CA  8.6   --    --   8.5   --   8.8    < > = values in this interval not displayed.        Signed By: Thera Sicard, MD     February 4, 2017

## 2017-02-04 NOTE — PROGRESS NOTES
Progress Note  Pulmonary, Critical Care, and Sleep Medicine    Name: Salud Melendrez MRN: [id number]   : 1945 Hospital: [facility] [place]   Date: 2017        IMPRESSION:   · Acute on chronic hypercapnic and hypoxemic respiratory failure requiring BIPAP, improving and now on O2 NC  · Possible COPD/asthma exacerbation although pt and  deny history of COPD  · IMANI unable to tolerate CPAP due to claustrophobia  · Pulmonary HTN likely Group 3 on the basis of long standing IMANI and hypoxemia vs secondary to left sided failure jac with dilated left Atrium on latest Echo  · Hyperkalemia resolved  · Diastolic heart failure  · Leukocytosis steroid related improving  · DM II glycemic control suboptimal, hypoglycemia noted earlier today  · Elevated Troponin  · A fib now rate controlled on po Cardizem  · Insomnia       PLAN:   · Outpatient records reveal that patient has significant restrictive disease with FEV1 = 40% but no obstructive pattern but HRCT showed air trapping so patient treated empirically for asthma. · Titrate supplemental O2  · Trazodone increased, prn Benzodiazepines  · Taper steroids jac with DM history  · Strict glycemic control, hold preprandial insulin  · DVT and GI prophylaxis issues addressed  · Pt may benefit from repeat CPAP titration via nasal pillows, outpatient testing for this  · Discussed with pt at bedside  · Patient stable to transfer to floor. Subjective/Interval History:   I have reviewed the flowsheet and previous days notes. Reviewed interval history. Discussed management with nursing staff. Patient on bipap last night for about 3 hours but complaining that it is making her feel bloated and HA.        ROS:Pertinent items are noted in HPI. Orders reviewed including medications. Changes made if indicated. Telemetry monitor reviewed at the bedside.   Objective:   Vital Signs:    Visit Vitals    /69    Pulse 89    Temp 97.9 °F (36.6 °C)    Resp 28    Ht 5' 5\" (1.651 m)    Wt 111.4 kg (245 lb 9.5 oz)    SpO2 97%    BMI 40.87 kg/m2       O2 Device: Nasal cannula   O2 Flow Rate (L/min): 4.5 l/min   Temp (24hrs), Av.8 °F (36.6 °C), Min:97.6 °F (36.4 °C), Max:98 °F (36.7 °C)       Intake/Output:   Last shift:      701 - 1900  In: 480 [P.O.:480]  Out: -   Last 3 shifts: 1901 -  07  In: 120 [P.O.:120]  Out: 3350 [Urine:3350]    Intake/Output Summary (Last 24 hours) at 17 0856  Last data filed at 17 0830   Gross per 24 hour   Intake              600 ml   Output             2250 ml   Net            -1650 ml        Physical Exam:    General:  Awake , cooperative, in no distress, appears anxious. Head:  Normocephalic, without obvious abnormality, atraumatic. Eyes:  ANicteric, PERRL,   Nose: Nares normal.  Mucosa normal. No drainage or sinus tenderness. Throat: Lips, mucosa, and tongue normal.  NO Thrush   Neck: Supple, symmetrical, trachea midline, no adenopathy, thyroid: no enlargment/tenderness/nodules    Back:   Symmetric    Lungs:   Diminished breath sounds, no rales or wheezes   Chest wall:  No tenderness or deformity. NO intercostal retractions   Heart:  irregular, S1, S2 normal, no murmur, click, rub or gallop. Abdomen:   Soft, non-tender. Bowel sounds normal. No masses,  No organomegaly. NO paradoxical motion   Extremities: normal, atraumatic, no cyanosis or edema. Pulses: 2+ and symmetric all extremities.    Skin: Skin color, texture, turgor normal. No rashes, large ecchymosis left proximal humerus, NO clubbing   Lymph nodes: Cervical  nodes normal.   Neurologic: Grossly nonfocal      :        Devices:             Drips:    DATA:  Labs:  Recent Labs      17   0545  17   0415  17   0415   WBC  9.9  9.9  12.5   HGB  11.7*  11.4*  11.3*   HCT  37.0  36.3  35.8   PLT  195  191  218     Recent Labs      17   0545  17   1613  17   1027  17   0415   17   0415   NA  140   -- --   140   --   142   K  4.0  3.9  3.9  4.2   < >  3.9   CL  98*   --    --   96*   --   99*   CO2  37*   --    --   38*   --   38*   GLU  195*   --    --   197*   --   56*   BUN  45*   --    --   46*   --   49*   CREA  1.29   --    --   1.36*   --   1.36*   CA  8.6   --    --   8.5   --   8.8   MG  2.2   --    --   2.3   --   2.2   ALB  3.0*   --    --   3.2*   --   3.1*   SGOT  13*   --    --   14*   --   15   ALT  25   --    --   21   --   19   INR  1.8*   --    --   1.8*   --   2.2*    < > = values in this interval not displayed. No results for input(s): PH, PCO2, PO2, HCO3, FIO2 in the last 72 hours. Imaging:  []        I have personally reviewed the patients radiographs and reports  []         []        No change from prior, tubes and lines in adequate position  []        Improved   []        Worsening  High complexity decision making was performed during this consultation and evaluation.      Kings Tse MD

## 2017-02-05 LAB
ALBUMIN SERPL BCP-MCNC: 3 G/DL (ref 3.4–5)
ALBUMIN/GLOB SERPL: 0.9 {RATIO} (ref 0.8–1.7)
ALP SERPL-CCNC: 112 U/L (ref 45–117)
ALT SERPL-CCNC: 20 U/L (ref 13–56)
ANION GAP BLD CALC-SCNC: 4 MMOL/L (ref 3–18)
AST SERPL W P-5'-P-CCNC: 13 U/L (ref 15–37)
BACTERIA SPEC CULT: NORMAL
BASOPHILS # BLD AUTO: 0 K/UL (ref 0–0.06)
BASOPHILS # BLD: 0 % (ref 0–2)
BILIRUB SERPL-MCNC: 1.4 MG/DL (ref 0.2–1)
BUN SERPL-MCNC: 41 MG/DL (ref 7–18)
BUN/CREAT SERPL: 35 (ref 12–20)
CALCIUM SERPL-MCNC: 8.6 MG/DL (ref 8.5–10.1)
CHLORIDE SERPL-SCNC: 100 MMOL/L (ref 100–108)
CO2 SERPL-SCNC: 38 MMOL/L (ref 21–32)
CREAT SERPL-MCNC: 1.18 MG/DL (ref 0.6–1.3)
DIFFERENTIAL METHOD BLD: ABNORMAL
EOSINOPHIL # BLD: 0 K/UL (ref 0–0.4)
EOSINOPHIL NFR BLD: 0 % (ref 0–5)
ERYTHROCYTE [DISTWIDTH] IN BLOOD BY AUTOMATED COUNT: 15.5 % (ref 11.6–14.5)
GLOBULIN SER CALC-MCNC: 3.2 G/DL (ref 2–4)
GLUCOSE BLD STRIP.AUTO-MCNC: 195 MG/DL (ref 70–110)
GLUCOSE BLD STRIP.AUTO-MCNC: 209 MG/DL (ref 70–110)
GLUCOSE BLD STRIP.AUTO-MCNC: 221 MG/DL (ref 70–110)
GLUCOSE BLD STRIP.AUTO-MCNC: 240 MG/DL (ref 70–110)
GLUCOSE SERPL-MCNC: 195 MG/DL (ref 74–99)
HCT VFR BLD AUTO: 38.7 % (ref 35–45)
HGB BLD-MCNC: 12.2 G/DL (ref 12–16)
INR PPP: 1.9 (ref 0.8–1.2)
LYMPHOCYTES # BLD AUTO: 6 % (ref 21–52)
LYMPHOCYTES # BLD: 0.6 K/UL (ref 0.9–3.6)
MAGNESIUM SERPL-MCNC: 2.2 MG/DL (ref 1.8–2.4)
MCH RBC QN AUTO: 31.9 PG (ref 24–34)
MCHC RBC AUTO-ENTMCNC: 31.5 G/DL (ref 31–37)
MCV RBC AUTO: 101 FL (ref 74–97)
MONOCYTES # BLD: 0.7 K/UL (ref 0.05–1.2)
MONOCYTES NFR BLD AUTO: 7 % (ref 3–10)
NEUTS SEG # BLD: 8.9 K/UL (ref 1.8–8)
NEUTS SEG NFR BLD AUTO: 87 % (ref 40–73)
PLATELET # BLD AUTO: 195 K/UL (ref 135–420)
PMV BLD AUTO: 10.9 FL (ref 9.2–11.8)
POTASSIUM SERPL-SCNC: 3.9 MMOL/L (ref 3.5–5.5)
PROT SERPL-MCNC: 6.2 G/DL (ref 6.4–8.2)
PROTHROMBIN TIME: 21 SEC (ref 11.5–15.2)
RBC # BLD AUTO: 3.83 M/UL (ref 4.2–5.3)
SERVICE CMNT-IMP: NORMAL
SODIUM SERPL-SCNC: 142 MMOL/L (ref 136–145)
WBC # BLD AUTO: 10.3 K/UL (ref 4.6–13.2)

## 2017-02-05 PROCEDURE — 94640 AIRWAY INHALATION TREATMENT: CPT

## 2017-02-05 PROCEDURE — 77030032490 HC SLV COMPR SCD KNE COVD -B

## 2017-02-05 PROCEDURE — 74011636637 HC RX REV CODE- 636/637: Performed by: HOSPITALIST

## 2017-02-05 PROCEDURE — 74011636637 HC RX REV CODE- 636/637: Performed by: INTERNAL MEDICINE

## 2017-02-05 PROCEDURE — C9113 INJ PANTOPRAZOLE SODIUM, VIA: HCPCS | Performed by: INTERNAL MEDICINE

## 2017-02-05 PROCEDURE — 80053 COMPREHEN METABOLIC PANEL: CPT | Performed by: HOSPITALIST

## 2017-02-05 PROCEDURE — 85610 PROTHROMBIN TIME: CPT | Performed by: HOSPITALIST

## 2017-02-05 PROCEDURE — 97530 THERAPEUTIC ACTIVITIES: CPT

## 2017-02-05 PROCEDURE — 97116 GAIT TRAINING THERAPY: CPT

## 2017-02-05 PROCEDURE — 85025 COMPLETE CBC W/AUTO DIFF WBC: CPT | Performed by: HOSPITALIST

## 2017-02-05 PROCEDURE — 74011250637 HC RX REV CODE- 250/637: Performed by: INTERNAL MEDICINE

## 2017-02-05 PROCEDURE — 74011250636 HC RX REV CODE- 250/636: Performed by: INTERNAL MEDICINE

## 2017-02-05 PROCEDURE — 74011250636 HC RX REV CODE- 250/636: Performed by: HOSPITALIST

## 2017-02-05 PROCEDURE — 82962 GLUCOSE BLOOD TEST: CPT

## 2017-02-05 PROCEDURE — 83735 ASSAY OF MAGNESIUM: CPT | Performed by: HOSPITALIST

## 2017-02-05 PROCEDURE — 74011000250 HC RX REV CODE- 250: Performed by: INTERNAL MEDICINE

## 2017-02-05 PROCEDURE — 77010033678 HC OXYGEN DAILY

## 2017-02-05 PROCEDURE — 65660000000 HC RM CCU STEPDOWN

## 2017-02-05 PROCEDURE — 74011000250 HC RX REV CODE- 250: Performed by: HOSPITALIST

## 2017-02-05 PROCEDURE — 74011250637 HC RX REV CODE- 250/637: Performed by: HOSPITALIST

## 2017-02-05 PROCEDURE — 36415 COLL VENOUS BLD VENIPUNCTURE: CPT | Performed by: HOSPITALIST

## 2017-02-05 RX ORDER — ALPRAZOLAM 0.5 MG/1
0.5 TABLET ORAL
Status: DISCONTINUED | OUTPATIENT
Start: 2017-02-05 | End: 2017-02-09 | Stop reason: HOSPADM

## 2017-02-05 RX ORDER — PREDNISONE 20 MG/1
20 TABLET ORAL
Status: DISCONTINUED | OUTPATIENT
Start: 2017-02-06 | End: 2017-02-09 | Stop reason: HOSPADM

## 2017-02-05 RX ORDER — INSULIN LISPRO 100 [IU]/ML
8 INJECTION, SOLUTION INTRAVENOUS; SUBCUTANEOUS
Status: DISCONTINUED | OUTPATIENT
Start: 2017-02-05 | End: 2017-02-09 | Stop reason: HOSPADM

## 2017-02-05 RX ORDER — DILTIAZEM HYDROCHLORIDE 60 MG/1
60 TABLET, FILM COATED ORAL
Status: DISCONTINUED | OUTPATIENT
Start: 2017-02-06 | End: 2017-02-09 | Stop reason: HOSPADM

## 2017-02-05 RX ORDER — OXYCODONE AND ACETAMINOPHEN 5; 325 MG/1; MG/1
1 TABLET ORAL
Status: DISCONTINUED | OUTPATIENT
Start: 2017-02-05 | End: 2017-02-05

## 2017-02-05 RX ORDER — WARFARIN SODIUM 5 MG/1
5 TABLET ORAL ONCE
Status: COMPLETED | OUTPATIENT
Start: 2017-02-05 | End: 2017-02-05

## 2017-02-05 RX ORDER — PANTOPRAZOLE SODIUM 40 MG/1
40 TABLET, DELAYED RELEASE ORAL
Status: DISCONTINUED | OUTPATIENT
Start: 2017-02-06 | End: 2017-02-09 | Stop reason: HOSPADM

## 2017-02-05 RX ORDER — OXYCODONE AND ACETAMINOPHEN 5; 325 MG/1; MG/1
2 TABLET ORAL
Status: DISCONTINUED | OUTPATIENT
Start: 2017-02-05 | End: 2017-02-09 | Stop reason: HOSPADM

## 2017-02-05 RX ADMIN — INSULIN LISPRO 8 UNITS: 100 INJECTION, SOLUTION INTRAVENOUS; SUBCUTANEOUS at 12:36

## 2017-02-05 RX ADMIN — WARFARIN SODIUM 5 MG: 5 TABLET ORAL at 17:23

## 2017-02-05 RX ADMIN — CLONAZEPAM 0.25 MG: 0.5 TABLET ORAL at 08:13

## 2017-02-05 RX ADMIN — ACETAMINOPHEN 650 MG: 325 TABLET, FILM COATED ORAL at 02:11

## 2017-02-05 RX ADMIN — DILTIAZEM HYDROCHLORIDE 30 MG: 30 TABLET, FILM COATED ORAL at 06:52

## 2017-02-05 RX ADMIN — DILTIAZEM HYDROCHLORIDE 30 MG: 30 TABLET, FILM COATED ORAL at 16:29

## 2017-02-05 RX ADMIN — CARVEDILOL 25 MG: 12.5 TABLET, FILM COATED ORAL at 16:30

## 2017-02-05 RX ADMIN — LEVOFLOXACIN 750 MG: 5 INJECTION, SOLUTION INTRAVENOUS at 22:13

## 2017-02-05 RX ADMIN — INSULIN GLARGINE 25 UNITS: 100 INJECTION, SOLUTION SUBCUTANEOUS at 22:00

## 2017-02-05 RX ADMIN — OXYCODONE HYDROCHLORIDE AND ACETAMINOPHEN 2 TABLET: 5; 325 TABLET ORAL at 12:35

## 2017-02-05 RX ADMIN — INSULIN LISPRO 4 UNITS: 100 INJECTION, SOLUTION INTRAVENOUS; SUBCUTANEOUS at 17:19

## 2017-02-05 RX ADMIN — OXYCODONE HYDROCHLORIDE AND ACETAMINOPHEN 2 TABLET: 5; 325 TABLET ORAL at 23:41

## 2017-02-05 RX ADMIN — ARFORMOTEROL TARTRATE 15 MCG: 15 SOLUTION RESPIRATORY (INHALATION) at 19:54

## 2017-02-05 RX ADMIN — LEVALBUTEROL 1.25 MG: 1.25 SOLUTION, CONCENTRATE RESPIRATORY (INHALATION) at 15:28

## 2017-02-05 RX ADMIN — LEVALBUTEROL 1.25 MG: 1.25 SOLUTION, CONCENTRATE RESPIRATORY (INHALATION) at 19:54

## 2017-02-05 RX ADMIN — FUROSEMIDE 40 MG: 10 INJECTION, SOLUTION INTRAMUSCULAR; INTRAVENOUS at 21:00

## 2017-02-05 RX ADMIN — FUROSEMIDE 40 MG: 10 INJECTION, SOLUTION INTRAMUSCULAR; INTRAVENOUS at 08:13

## 2017-02-05 RX ADMIN — INSULIN LISPRO 4 UNITS: 100 INJECTION, SOLUTION INTRAVENOUS; SUBCUTANEOUS at 12:36

## 2017-02-05 RX ADMIN — LEVALBUTEROL 1.25 MG: 1.25 SOLUTION, CONCENTRATE RESPIRATORY (INHALATION) at 08:33

## 2017-02-05 RX ADMIN — LEVALBUTEROL 1.25 MG: 1.25 SOLUTION, CONCENTRATE RESPIRATORY (INHALATION) at 00:54

## 2017-02-05 RX ADMIN — DILTIAZEM HYDROCHLORIDE 30 MG: 30 TABLET, FILM COATED ORAL at 12:35

## 2017-02-05 RX ADMIN — PREDNISONE 20 MG: 20 TABLET ORAL at 08:13

## 2017-02-05 RX ADMIN — OXYCODONE HYDROCHLORIDE AND ACETAMINOPHEN 2 TABLET: 5; 325 TABLET ORAL at 09:58

## 2017-02-05 RX ADMIN — INSULIN LISPRO 5 UNITS: 100 INJECTION, SOLUTION INTRAVENOUS; SUBCUTANEOUS at 08:12

## 2017-02-05 RX ADMIN — INSULIN LISPRO 4 UNITS: 100 INJECTION, SOLUTION INTRAVENOUS; SUBCUTANEOUS at 06:52

## 2017-02-05 RX ADMIN — OXYCODONE HYDROCHLORIDE AND ACETAMINOPHEN 2 TABLET: 5; 325 TABLET ORAL at 16:31

## 2017-02-05 RX ADMIN — SODIUM CHLORIDE 40 MG: 9 INJECTION, SOLUTION INTRAMUSCULAR; INTRAVENOUS; SUBCUTANEOUS at 08:12

## 2017-02-05 RX ADMIN — ONDANSETRON HYDROCHLORIDE 4 MG: 2 INJECTION INTRAMUSCULAR; INTRAVENOUS at 06:20

## 2017-02-05 RX ADMIN — LEVALBUTEROL 1.25 MG: 1.25 SOLUTION, CONCENTRATE RESPIRATORY (INHALATION) at 12:15

## 2017-02-05 RX ADMIN — CARVEDILOL 25 MG: 12.5 TABLET, FILM COATED ORAL at 08:13

## 2017-02-05 RX ADMIN — OXYCODONE HYDROCHLORIDE AND ACETAMINOPHEN 1 TABLET: 5; 325 TABLET ORAL at 06:03

## 2017-02-05 RX ADMIN — DOCUSATE SODIUM 100 MG: 100 CAPSULE, LIQUID FILLED ORAL at 08:14

## 2017-02-05 RX ADMIN — CLONAZEPAM 0.25 MG: 0.5 TABLET ORAL at 21:08

## 2017-02-05 RX ADMIN — BUDESONIDE 500 MCG: 0.5 INHALANT RESPIRATORY (INHALATION) at 08:33

## 2017-02-05 RX ADMIN — INSULIN LISPRO 2 UNITS: 100 INJECTION, SOLUTION INTRAVENOUS; SUBCUTANEOUS at 22:12

## 2017-02-05 RX ADMIN — ARFORMOTEROL TARTRATE 15 MCG: 15 SOLUTION RESPIRATORY (INHALATION) at 08:33

## 2017-02-05 RX ADMIN — INSULIN LISPRO 8 UNITS: 100 INJECTION, SOLUTION INTRAVENOUS; SUBCUTANEOUS at 17:20

## 2017-02-05 RX ADMIN — BUDESONIDE 500 MCG: 0.5 INHALANT RESPIRATORY (INHALATION) at 19:54

## 2017-02-05 RX ADMIN — LEVALBUTEROL 1.25 MG: 1.25 SOLUTION, CONCENTRATE RESPIRATORY (INHALATION) at 04:00

## 2017-02-05 RX ADMIN — DOCUSATE SODIUM 100 MG: 100 CAPSULE, LIQUID FILLED ORAL at 21:00

## 2017-02-05 NOTE — ROUTINE PROCESS
TRANSFER - OUT REPORT:    Verbal report given to [email or web address] RN(name) on Elizabeth Veronica  being transferred to [address] [place] [address]) for routine progression of care       Report consisted of patients Situation, Background, Assessment and   Recommendations(SBAR). Information from the following report(s) SBAR, Kardex, Intake/Output, MAR, Recent Results, Med Rec Status and Cardiac Rhythm AFib (Controlled) was reviewed with the receiving nurse. Lines:   Peripheral IV 01/30/17 Right Forearm (Active)   Site Assessment Clean, dry, & intact 2/5/2017  8:30 AM   Phlebitis Assessment 0 2/5/2017  8:30 AM   Infiltration Assessment 0 2/5/2017  8:30 AM   Dressing Status Clean, dry, & intact 2/5/2017  8:30 AM   Dressing Type Transparent 2/5/2017  8:30 AM   Hub Color/Line Status Flushed;Patent;Capped 2/5/2017  8:30 AM   Action Taken Open ports on tubing capped 2/5/2017  8:30 AM   Alcohol Cap Used Yes 2/5/2017  8:30 AM       Peripheral IV 02/02/17 Right Hand (Active)   Site Assessment Clean, dry, & intact 2/5/2017  8:30 AM   Phlebitis Assessment 0 2/5/2017  8:30 AM   Infiltration Assessment 0 2/5/2017  8:30 AM   Dressing Status Clean, dry, & intact 2/5/2017  8:30 AM   Dressing Type Transparent 2/5/2017  8:30 AM   Hub Color/Line Status Flushed;Patent;Capped 2/5/2017  8:30 AM   Action Taken Open ports on tubing capped 2/5/2017  8:30 AM   Alcohol Cap Used Yes 2/5/2017  8:30 AM        Opportunity for questions and clarification was provided.       Patient transported with:   Monitor  O2 @ 2 liters  Patient-specific medications from Pharmacy  Registered Nurse  Tech

## 2017-02-05 NOTE — PROGRESS NOTES
Problem: Mobility Impaired (Adult and Pediatric)  Goal: *Acute Goals and Plan of Care (Insert Text)  Physical Therapy Goals  Initiated 2/1/2017 and to be accomplished within 7 day(s)  1. Patient will move from supine to sit and sit to supine in bed with supervision/set-up. 2. Patient will transfer from bed to chair and chair to bed with supervision/set-up using the least restrictive device. 3. Patient will perform sit to stand with supervision/set-up. 4. Patient will ambulate with supervision/set-up for >150 feet with the least restrictive device for safe home ambulation. 5. Patient will ascend/descend 1 step without handrail(s) with minimal assistance/contact guard assist for safe home entry. Outcome: Progressing Towards Goal  PHYSICAL THERAPY TREATMENT     Patient: Delta Horvath (36 y.o. female)  Date: 2/5/2017  Diagnosis: Insufficiency, respiratory, acute (HCC)  CHF (congestive heart failure) (HCC)  Elevated troponin  COPD (chronic obstructive pulmonary disease) ([facility] [address]  Infiltrate noted on imaging study  Insufficiency, respiratory, acute (HCC)  CHF (congestive heart failure) (HCC)  Elevated troponin  COPD (chronic obstructive pulmonary disease) (HCC)  Infiltrate noted on imaging study Acute on chronic diastolic CHF (congestive heart failure) (HCC)  Precautions: Fall, Other (comment) (O2, high Flow)   Chart, physical therapy assessment, plan of care and goals were reviewed. ASSESSMENT:  Pt showing steady improvement with mobility, and able to increase ambulation distance with RW. Pt amb 20' with RW CGA. VCs needed for correct RW management and safety with O2 line management. Pt presents with decrease tolerance to activity and fatigues quickly. Pt willing to remain sitting in chair post tx. Cont POC.    Progression toward goals:  [ ]      Improving appropriately and progressing toward goals  [X]      Improving slowly and progressing toward goals  [ ]      Not making progress toward goals and plan of care will be adjusted       PLAN:  Patient continues to benefit from skilled intervention to address the above impairments. Continue treatment per established plan of care. Discharge Recommendations:  Rehab  Further Equipment Recommendations for Discharge:  rolling walker       SUBJECTIVE:   Patient stated        OBJECTIVE DATA SUMMARY:   Critical Behavior:  Neurologic State: Alert  Orientation Level: Oriented X4  Cognition: Appropriate decision making, Appropriate for age attention/concentration, Appropriate safety awareness, Follows commands  Safety/Judgement: Lack of insight into deficits  Functional Mobility Training:  Transfers:  Sit to Stand: Contact guard assistance  Stand to Sit: Contact guard assistance  Balance:  Sitting: Intact  Standing: Impaired; With support  Standing - Static: Fair;Good  Standing - Dynamic : Fair  Ambulation/Gait Training:  Distance (ft): 20 Feet (ft)  Assistive Device: Walker, rolling;Gait belt  Ambulation - Level of Assistance: Contact guard assistance  Gait Abnormalities: Decreased step clearance  Base of Support: Widened  Stance: Weight shift  Speed/Kristin: Slow;Shuffled  Step Length: Right shortened;Left shortened  Swing Pattern: Left asymmetrical;Right asymmetrical  Interventions: Visual/Demos; Verbal cues; Safety awareness training     Pain:  Pain Scale 1: Numeric (0 - 10)  Pain Intensity 1: 8  Pain Location 1: Back  Pain Intervention(s) 1: Medication (see MAR)  Activity Tolerance:   Fair      After treatment:   [X] Patient left in no apparent distress sitting up in chair  [ ] Patient left in no apparent distress in bed  [X] Call bell left within reach  [ ] Nursing notified  [ ] Caregiver present  [ ] Bed alarm activated      Princess Alejandra PTA   Time Calculation: 23 mins

## 2017-02-05 NOTE — PROGRESS NOTES
Hospitalist Progress Note-critical care note     Patient: Lydia Issa MRN: [id number]  CSN: [id number]    YOB: 1945  Age: 66 y.o. Sex: female    DOA: 1/30/2017 LOS:  LOS: 6 days            Chief complaint: chf exacerbation,  Acute on chronic respiratory failure , chest pain,  copd exacerbation ,  HTN, anxiety     Assessment/Plan         Patient Active Problem List   Diagnosis Code    Acute on chronic diastolic CHF (congestive heart failure) ([facility]) I50.33    ALEJANDRINA (acute kidney injury) ([facility] [address] N17.9    Acute exacerbation of CHF (congestive heart failure) ([facility]) I50.9    DM (diabetes mellitus) ([facility] [address] E11.9    Hypertension I10    Respiratory failure ([facility] [address] J96.90    Acute coronary syndrome ([facility] [address] I24.9    Obstructive sleep apnea, adult G47.33    Paroxysmal atrial fibrillation ([facility]) I48.0    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 ([facility]) C25.08, E11.65    Dyspnea due to congestive heart failure ([facility]) I50.9    Aspiration pneumonia ([facility]) J69.0    Hyperkalemia E87.5    Elevated troponin R79.89    Acute respiratory distress ([facility]) R06.00    COPD (chronic obstructive pulmonary disease) ([facility]) J44.9    CHF (congestive heart failure) ([facility]) I50.9    Insufficiency, respiratory, acute ([facility]) R06.89    Infiltrate noted on imaging study R93.8    Chronic respiratory failure with hypoxia ([facility]) J96.11    COPD exacerbation ([facility]) J44.1    Acute on chronic respiratory failure with hypoxia ([facility]) J96.21    Chest pain R07.9    Anxiety F41.9     1. Acute on chronic  respiratory failure   Improving, on nc O2 , continue weaning off and need bipap at night -refused bipap last night   Multiple factors, copd exacerbation, possible hcap, hermleinda, Pulmonary hypertension (moderated from 2016 echo and chf exacerbation   2. Copd exacerbation and possible hcap  On  breathing tx with xopenex. pulmocort and brovana, on  Levaquin, improving.    3 afib with rvr  Rate controlled per cardizem now, optimize the electrolytes, on warfarin , inr  1.9 today, continue pharmacy dose   4. chf exacerbation -diastolic from previous echo  continue lasix ,ef wnl. Dr. Lana Sosa on board , echo reviewed, EF reserved   5. Hyperkalemia  Resolved,   7. DM type II ,   -long term insulin ,   - on  lantus to 25 ,  Ssi, increase premeal insulin   8 HTN, accelerated  Continue home medication. 9. Pulmonary hypertension   moderate from echo   10 hermelinda   Not compliance cpap at home   11 anxiety  Scheduled klonipin ,better controlled   12. Insomnia   Continue trazodone prn for sleep     Will be out of icu, PT/OT. Subjective: rough night ,   Nurse: refused bipap       Review of systems:    General: No fevers or chills. Cardiovascular: no  chest pain , . No palpitations. Pulmonary: shortness of breath better,   Gastrointestinal: No nausea, vomiting. Vital signs/Intake and Output:  Visit Vitals    /79 (BP 1 Location: Right arm, BP Patient Position: At rest;Supine)    Pulse 100    Temp 98.4 °F (36.9 °C)    Resp 22    Ht 5' 5\" (1.651 m)    Wt 105.6 kg (232 lb 12.9 oz)    SpO2 96%    BMI 38.74 kg/m2     Current Shift:  02/05 0701 - 02/05 1900  In: 240 [P.O.:240]  Out: -   Last three shifts:  02/03 1901 - 02/05 0700  In: 780 [P.O.:780]  Out: 3150 [Urine:3150]    Physical Exam:  General: WD, WN. Alert, cooperative, no acute distress    HEENT: NC, Atraumatic. PERRLA, anicteric sclerae. bipap mask noted   Lungs: CTA Bilaterally. No Wheezing/Rhonchi/Rales. Heart:  Irregular irregular ,  No murmur, No Rubs, No Gallops  Abdomen: Soft, Non distended, Non tender.  +Bowel sounds,   Extremities: No c/c/e  Psych:   anxious . No agitated. Neurologic:  No acute neurological deficit.              Labs: Results:       Chemistry Recent Labs      02/05/17   0455  02/04/17   0545  02/03/17   1613   02/03/17   0415   GLU  195*  195*   --    --   197*   NA  142  140   --    --   140   K  3.9  4.0  3.9   < >  4.2   CL  100  98*   --    --   96*   CO2 38*  37*   --    --   38*   BUN  41*  45*   --    --   46*   CREA  1.18  1.29   --    --   1.36*   CA  8.6  8.6   --    --   8.5   AGAP  4  5   --    --   6   BUCR  35*  35*   --    --   34*   AP  112  105   --    --   114   TP  6.2*  6.3*   --    --   6.6   ALB  3.0*  3.0*   --    --   3.2*   GLOB  3.2  3.3   --    --   3.4   AGRAT  0.9  0.9   --    --   0.9    < > = values in this interval not displayed. CBC w/Diff Recent Labs      02/05/17 0455 02/04/17 0545 02/03/17 0415   WBC  10.3  9.9  9.9   RBC  3.83*  3.66*  3.58*   HGB  12.2  11.7*  11.4*   HCT  38.7  37.0  36.3   PLT  195  195  191   GRANS  87*  91*  93*   LYMPH  6*  6*  4*   EOS  0  0  0      Cardiac Enzymes Recent Labs      02/03/17 0415   CPK  23*   CKND1  4.3*      Coagulation Recent Labs      02/05/17 0455 02/04/17 0545   PTP  21.0*  20.1*   INR  1.9*  1.8*       Lipid Panel No results found for: CHOL, CHOLPOCT, CHOLX, CHLST, CHOLV, [id number], HDL, LDL, NLDLCT, DLDL, LDLC, DLDLP, 241339, VLDLC, VLDL, TGL, TGLX, TRIGL, [id number], TRIGP, TGLPOCT, [id number], CHHD, CHHDX   BNP No results for input(s): BNPP in the last 72 hours.    Liver Enzymes Recent Labs      02/05/17 0455   TP  6.2*   ALB  3.0*   AP  112   SGOT  13*      Thyroid Studies Lab Results   Component Value Date/Time    TSH 0.31 01/01/2016 10:20 PM        Procedures/imaging: see electronic medical records for all procedures/Xrays and details which were not copied into this note but were reviewed prior to creation of Genesis Robles MD

## 2017-02-05 NOTE — ROUTINE PROCESS
Bedside and Verbal shift change report given to Ad Reza RN (oncoming nurse) by Tyra Sierra RN (offgoing nurse). Report included the following information SBAR, Kardex, Intake/Output, MAR, Recent Results, Med Rec Status and Cardiac Rhythm AFib (controlled).

## 2017-02-05 NOTE — PROGRESS NOTES
Pharmacy Dosing Services: IV - PO Conversion: Pantoprazole    This patient meets P & T approved criteria for conversion from IV to oral therapy for the following medication: Pantoprazole    The pharmacist has written the following order for the patient: Pantoprazole 40 mg po daily before breakfast.  Pharmacy will continue to monitor the patient's status and advise the physician if conversion back to IV therapy is recommended.     Signed Cynthia Nash, Pratima Zepeda Contact information: [phone]

## 2017-02-05 NOTE — PROGRESS NOTES
1945 Pt received from offgoing nurse without any signs or symptoms of distress. Pt vitals are stable and within normal limits. Pt bed in low position with wheels locked and call bell within reach. 2021 Assessment completed and documented in flow sheet. Pt denies any further needs at this time. Pt in NAD with bed in low position, wheels locked and call bell within reach. 2216 Pain medication administered as per PRN order for c/o pain. See flow sheets for follow up documentation. Scheduled medications administered as ordered. 3622 Made aware pt refusing bipap.  0650 Shift summary: Patient spent uneventful shift. No issues noted. See flow sheet and MAR.  0710 Bedside and Verbal shift change report given to Ken (oncoming nurse) by Jacky Eaton RN   (offgoing nurse). Report included the following information SBAR, Intake/Output and MAR.

## 2017-02-05 NOTE — PROGRESS NOTES
TRANSFER - IN REPORT:    Verbal report received from Ronnie Blake RN(name) on Jorge uLis Anglin  being received from ICU(unit) for routine progression of care      Report consisted of patients Situation, Background, Assessment and   Recommendations(SBAR). Information from the following report(s) SBAR, Kardex and MAR was reviewed with the receiving nurse. Opportunity for questions and clarification was provided. Assessment completed upon patients arrival to unit and care assumed.

## 2017-02-05 NOTE — PROGRESS NOTES
0958--Patient given 2 tabs PRN percocet for pain in back rated 8/10.     1045--Patient transfer to room 345.

## 2017-02-05 NOTE — PROGRESS NOTES
Progress Note  Pulmonary, Critical Care, and Sleep Medicine    Name: Kiran Saunders MRN: [id number]   : 1945 Hospital: [facility] [place]   Date: 2017        IMPRESSION:   · Acute on chronic hypercapnic and hypoxemic respiratory failure requiring BIPAP, improving and now on O2 NC  · Possible COPD/asthma exacerbation although pt and  deny history of COPD  · IMANI unable to tolerate CPAP due to claustrophobia  · Pulmonary HTN likely Group 3 on the basis of long standing IMANI and hypoxemia vs secondary to left sided failure jac with dilated left Atrium on latest Echo  · Hyperkalemia resolved  · Diastolic heart failure  · Leukocytosis steroid related improving  · DM II glycemic control suboptimal, hypoglycemia noted earlier today  · Elevated Troponin  · A fib now rate controlled on po Cardizem  · Insomnia       PLAN:   · Outpatient records reveal that patient has significant restrictive disease with FEV1 = 40% but no obstructive pattern but HRCT showed air trapping so patient treated empirically for asthma. · Increased percoset prn dosing given uncontrolled pain  · Titrate supplemental O2  · Trazodone increased, prn Benzodiazepines  · Decrease prednisone to 20 mg daily  · Strict glycemic control, hold preprandial insulin  · DVT and GI prophylaxis issues addressed  · Pt may benefit from repeat CPAP titration via nasal pillows, outpatient testing for this  · Discussed with pt at bedside  · Patient stable to transfer to floor. Subjective/Interval History:   I have reviewed the flowsheet and previous days notes. Reviewed interval history. Discussed management with nursing staff. Patient refusing BIPAP - this am states breathing much better and is complaining primarily of her chronic sciatica pain. ROS:Pertinent items are noted in HPI. Orders reviewed including medications. Changes made if indicated. Telemetry monitor reviewed at the bedside.   Objective:   Vital Signs:    Visit Vitals    BP (!) 148/120    Pulse 97    Temp 98.9 °F (37.2 °C)    Resp (!) 31    Ht 5' 5\" (1.651 m)    Wt 106.8 kg (235 lb 7.2 oz)    SpO2 96%    BMI 39.18 kg/m2       O2 Device: Nasal cannula   O2 Flow Rate (L/min): 4 l/min   Temp (24hrs), Av °F (36.7 °C), Min:97.7 °F (36.5 °C), Max:98.9 °F (37.2 °C)       Intake/Output:   Last shift:         Last 3 shifts:  1901 -  0700  In: 780 [P.O.:780]  Out: 3150 [Urine:3150]    Intake/Output Summary (Last 24 hours) at 17 0806  Last data filed at 17 0653   Gross per 24 hour   Intake              780 ml   Output             1900 ml   Net            -1120 ml        Physical Exam:    General:  Awake , cooperative, in no distress, appears anxious. Head:  Normocephalic, without obvious abnormality, atraumatic. Eyes:  ANicteric, PERRL,   Nose: Nares normal.  Mucosa normal. No drainage or sinus tenderness. Throat: Lips, mucosa, and tongue normal.  NO Thrush   Neck: Supple, symmetrical, trachea midline, no adenopathy, thyroid: no enlargment/tenderness/nodules    Back:   Symmetric    Lungs:   Diminished breath sounds, no rales or wheezes   Chest wall:  No tenderness or deformity. NO intercostal retractions   Heart:  irregular, S1, S2 normal, no murmur, click, rub or gallop. Abdomen:   Soft, non-tender. Bowel sounds normal. No masses,  No organomegaly. NO paradoxical motion   Extremities: normal, atraumatic, no cyanosis or edema. Pulses: 2+ and symmetric all extremities.    Skin: Skin color, texture, turgor normal. No rashes, large ecchymosis left proximal humerus, NO clubbing   Lymph nodes: Cervical  nodes normal.   Neurologic: Grossly nonfocal      :        Devices:             Drips:    DATA:  Labs:  Recent Labs      17   0455  17   0545  17   0415   WBC  10.3  9.9  9.9   HGB  12.2  11.7*  11.4*   HCT  38.7  37.0  36.3   PLT  195  195  191     Recent Labs      17   0455  17   0545  17   1613   17   0415   NA 142  140   --    --   140   K  3.9  4.0  3.9   < >  4.2   CL  100  98*   --    --   96*   CO2  38*  37*   --    --   38*   GLU  195*  195*   --    --   197*   BUN  41*  45*   --    --   46*   CREA  1.18  1.29   --    --   1.36*   CA  8.6  8.6   --    --   8.5   MG  2.2  2.2   --    --   2.3   ALB  3.0*  3.0*   --    --   3.2*   SGOT  13*  13*   --    --   14*   ALT  20  25   --    --   21   INR  1.9*  1.8*   --    --   1.8*    < > = values in this interval not displayed. No results for input(s): PH, PCO2, PO2, HCO3, FIO2 in the last 72 hours. Imaging:  []        I have personally reviewed the patients radiographs and reports  []         []        No change from prior, tubes and lines in adequate position  []        Improved   []        Worsening  High complexity decision making was performed during this consultation and evaluation.      Jodee Dow MD

## 2017-02-05 NOTE — PROGRESS NOTES
Pharmacy Dosing Services: Warfarin     Consult for Warfarin Dosing by Pharmacy by Dr. Nidia Guillermo provided for this 66 y.o. female , for indication of Atrial Fibrillation. Dose to achieve an INR goal of 2-3     INR = 1.8 (decreasing)   Order entered for Warfarin 5mg to be given today at 18:00. Significant drug interactions: levofloxacin  LABS    PT/INR Lab Results   Component Value Date/Time    INR 1.9 02/05/2017 04:55 AM      Platelets Lab Results   Component Value Date/Time    PLATELET 037 49/26/2122 04:55 AM      H/H     Lab Results   Component Value Date/Time    HGB 12.2 02/05/2017 04:55 AM        Warfarin Administrations (last 168 hours)       Date/Time Action Medication Dose      02/04/17 1821 Given    warfarin (COUMADIN) tablet 5 mg 5 mg    02/03/17 1733 Given   [INR 1.8]    warfarin (COUMADIN) tablet 4 mg 4 mg    02/02/17 1740 Given    warfarin (COUMADIN) tablet 3 mg 3 mg    02/01/17 1733 Given    warfarin (COUMADIN) tablet 2.5 mg 2.5 mg    01/31/17 1733 Given    warfarin (COUMADIN) tablet 2.5 mg 2.5 mg              Pharmacy to follow daily and will provide subsequent Warfarin dosing based on clinical status.   Eugenia Alarcon [facility])  Contact information

## 2017-02-05 NOTE — PROGRESS NOTES
Cardiology Progress Note        Patient: Fatimah Bautista        Sex: female          DOA: 1/30/2017  YOB: 1945      Age:  66 y.o.        LOS:  LOS: 6 days   Assessment/Plan     Principal Problem:    Acute on chronic diastolic CHF (congestive heart failure) ([address] (9/3/2014)    Active Problems:    Hypertension (9/27/2015)      Paroxysmal atrial fibrillation ([address] (1/4/2016)      Hyperkalemia (1/30/2017)      Elevated troponin (1/30/2017)      Acute respiratory distress (HCC) (1/30/2017)      COPD (chronic obstructive pulmonary disease) ([address] (1/30/2017)      CHF (congestive heart failure) ([address] (1/30/2017)      Insufficiency, respiratory, acute ([address] (1/30/2017)      Infiltrate noted on imaging study (1/30/2017)      Chronic respiratory failure with hypoxia (HCC) (1/30/2017)      COPD exacerbation ([address] (1/31/2017)      Acute on chronic respiratory failure with hypoxia ([address] (2/1/2017)      Chest pain (2/3/2017)      Anxiety (2/4/2017)        Plan:    cardiac tele: Afib rate suboptimals todaty  Will increase cardizem to 60 mg po TID  Continue with current dose of coreg, Cardizem  Continue with current supportive treatment. Dr. Cadence Sagastume will back tomorrow on Monday for further coverage and treatment. Subjective:    cc: No cp      REVIEW OF SYSTEMS: NO CP, SOB, N,V,D, F,C  Objective:      Visit Vitals    /67 (BP 1 Location: Right arm, BP Patient Position: At rest)    Pulse (!) 115    Temp 98.4 °F (36.9 °C)    Resp 20    Ht 5' 5\" (1.651 m)    Wt 105.6 kg (232 lb 12.9 oz)    SpO2 95%    BMI 38.74 kg/m2     Body mass index is 38.74 kg/(m^2). Physical Exam:  General Appearance: Comfortable, not using accessory muscles of respiration. HEENT: SIRIA. HEAD: Atraumatic  NECK: No JVD, no thyroidomeglay. LUNGS: Clear bilaterally. HEART: S1 variable +S2 audible,    ABD: Non-tender, BS Audible    EXT: No edema, and no cysnosis.   VASCULAR EXAM: Pulses are intact. PSYCHIATRIC EXAM: Mood is appropriate. Medication:  Current Facility-Administered Medications   Medication Dose Route Frequency    oxyCODONE-acetaminophen (PERCOCET) 5-325 mg per tablet 2 Tab  2 Tab Oral Q4H PRN    [START ON 2/6/2017] predniSONE (DELTASONE) tablet 20 mg  20 mg Oral DAILY WITH BREAKFAST    insulin lispro (HUMALOG) injection 8 Units  8 Units SubCUTAneous TIDAC    [START ON 2/6/2017] pantoprazole (PROTONIX) tablet 40 mg  40 mg Oral ACB    warfarin (COUMADIN) tablet 5 mg  5 mg Oral ONCE    [START ON 2/6/2017] dilTIAZem (CARDIZEM) IR tablet 60 mg  60 mg Oral TIDAC    labetalol (NORMODYNE;TRANDATE) injection 10 mg  10 mg IntraVENous Q4H PRN    clonazePAM (KlonoPIN) tablet 0.25 mg  0.25 mg Oral BID    nitroglycerin (NITROSTAT) tablet 0.4 mg  0.4 mg SubLINGual PRN    traZODone (DESYREL) tablet 50 mg  50 mg Oral QHS PRN    insulin glargine (LANTUS) injection 25 Units  25 Units SubCUTAneous QHS    insulin lispro (HUMALOG) injection   SubCUTAneous AC&HS    carvedilol (COREG) tablet 25 mg  25 mg Oral BID WITH MEALS    levalbuterol (XOPENEX) nebulizer soln 1.25 mg/0.5 ml  1.25 mg Nebulization Q4H RT    arformoterol (BROVANA) neb solution 15 mcg  15 mcg Nebulization BID RT    budesonide (PULMICORT) 500 mcg/2 ml nebulizer suspension  500 mcg Nebulization BID RT    furosemide (LASIX) injection 40 mg  40 mg IntraVENous BID    diphenhydrAMINE (BENADRYL) injection 12.5 mg  12.5 mg IntraVENous Q4H PRN    ondansetron (ZOFRAN) injection 4 mg  4 mg IntraVENous Q4H PRN    docusate sodium (COLACE) capsule 100 mg  100 mg Oral BID    glucose chewable tablet 16 g  4 Tab Oral PRN    glucagon (GLUCAGEN) injection 1 mg  1 mg IntraMUSCular PRN    dextrose (D50W) injection syrg 12.5-25 g  25-50 mL IntraVENous PRN    levoFLOXacin (LEVAQUIN) 750 mg in D5W IVPB  750 mg IntraVENous Q24H    Warfarin - Pharmacy to dose   Other Rx Dosing/Monitoring               Lab/Data Reviewed:   All lab results for the last 24 hours reviewed. Recent Labs      02/05/17 0455  02/04/17 0545  02/03/17   0415   WBC  10.3  9.9  9.9   HGB  12.2  11.7*  11.4*   HCT  38.7  37.0  36.3   PLT  195  195  191     Recent Labs      02/05/17 0455  02/04/17 0545  02/03/17   1613   02/03/17   0415   NA  142  140   --    --   140   K  3.9  4.0  3.9   < >  4.2   CL  100  98*   --    --   96*   CO2  38*  37*   --    --   38*   GLU  195*  195*   --    --   197*   BUN  41*  45*   --    --   46*   CREA  1.18  1.29   --    --   1.36*   CA  8.6  8.6   --    --   8.5    < > = values in this interval not displayed.        Signed By: Diane Schwartz MD     February 5, 2017

## 2017-02-06 LAB
ALBUMIN SERPL BCP-MCNC: 3.5 G/DL (ref 3.4–5)
ALBUMIN/GLOB SERPL: 1 {RATIO} (ref 0.8–1.7)
ALP SERPL-CCNC: 137 U/L (ref 45–117)
ALT SERPL-CCNC: 19 U/L (ref 13–56)
ANION GAP BLD CALC-SCNC: 4 MMOL/L (ref 3–18)
AST SERPL W P-5'-P-CCNC: 17 U/L (ref 15–37)
BASOPHILS # BLD AUTO: 0 K/UL (ref 0–0.06)
BASOPHILS # BLD: 0 % (ref 0–2)
BILIRUB SERPL-MCNC: 1.5 MG/DL (ref 0.2–1)
BUN SERPL-MCNC: 49 MG/DL (ref 7–18)
BUN/CREAT SERPL: 38 (ref 12–20)
CALCIUM SERPL-MCNC: 9.1 MG/DL (ref 8.5–10.1)
CHLORIDE SERPL-SCNC: 101 MMOL/L (ref 100–108)
CO2 SERPL-SCNC: 38 MMOL/L (ref 21–32)
CREAT SERPL-MCNC: 1.3 MG/DL (ref 0.6–1.3)
DIFFERENTIAL METHOD BLD: ABNORMAL
EOSINOPHIL # BLD: 0.1 K/UL (ref 0–0.4)
EOSINOPHIL NFR BLD: 1 % (ref 0–5)
ERYTHROCYTE [DISTWIDTH] IN BLOOD BY AUTOMATED COUNT: 15.6 % (ref 11.6–14.5)
GLOBULIN SER CALC-MCNC: 3.4 G/DL (ref 2–4)
GLUCOSE BLD STRIP.AUTO-MCNC: 142 MG/DL (ref 70–110)
GLUCOSE BLD STRIP.AUTO-MCNC: 144 MG/DL (ref 70–110)
GLUCOSE BLD STRIP.AUTO-MCNC: 195 MG/DL (ref 70–110)
GLUCOSE BLD STRIP.AUTO-MCNC: 262 MG/DL (ref 70–110)
GLUCOSE SERPL-MCNC: 160 MG/DL (ref 74–99)
HCT VFR BLD AUTO: 43.3 % (ref 35–45)
HGB BLD-MCNC: 13.5 G/DL (ref 12–16)
INR PPP: 2 (ref 0.8–1.2)
LYMPHOCYTES # BLD AUTO: 10 % (ref 21–52)
LYMPHOCYTES # BLD: 1.5 K/UL (ref 0.9–3.6)
MAGNESIUM SERPL-MCNC: 2.3 MG/DL (ref 1.8–2.4)
MCH RBC QN AUTO: 31.5 PG (ref 24–34)
MCHC RBC AUTO-ENTMCNC: 31.2 G/DL (ref 31–37)
MCV RBC AUTO: 101.2 FL (ref 74–97)
MONOCYTES # BLD: 1 K/UL (ref 0.05–1.2)
MONOCYTES NFR BLD AUTO: 7 % (ref 3–10)
NEUTS SEG # BLD: 12.4 K/UL (ref 1.8–8)
NEUTS SEG NFR BLD AUTO: 82 % (ref 40–73)
PLATELET # BLD AUTO: 198 K/UL (ref 135–420)
PMV BLD AUTO: 10.2 FL (ref 9.2–11.8)
POTASSIUM SERPL-SCNC: 4.4 MMOL/L (ref 3.5–5.5)
PROT SERPL-MCNC: 6.9 G/DL (ref 6.4–8.2)
PROTHROMBIN TIME: 22 SEC (ref 11.5–15.2)
RBC # BLD AUTO: 4.28 M/UL (ref 4.2–5.3)
SODIUM SERPL-SCNC: 143 MMOL/L (ref 136–145)
WBC # BLD AUTO: 15.1 K/UL (ref 4.6–13.2)

## 2017-02-06 PROCEDURE — 97116 GAIT TRAINING THERAPY: CPT

## 2017-02-06 PROCEDURE — 97530 THERAPEUTIC ACTIVITIES: CPT

## 2017-02-06 PROCEDURE — 74011250637 HC RX REV CODE- 250/637: Performed by: INTERNAL MEDICINE

## 2017-02-06 PROCEDURE — 74011250636 HC RX REV CODE- 250/636: Performed by: INTERNAL MEDICINE

## 2017-02-06 PROCEDURE — 74011250637 HC RX REV CODE- 250/637: Performed by: HOSPITALIST

## 2017-02-06 PROCEDURE — 74011636637 HC RX REV CODE- 636/637: Performed by: HOSPITALIST

## 2017-02-06 PROCEDURE — 65660000000 HC RM CCU STEPDOWN

## 2017-02-06 PROCEDURE — 82962 GLUCOSE BLOOD TEST: CPT

## 2017-02-06 PROCEDURE — 74011250636 HC RX REV CODE- 250/636: Performed by: HOSPITALIST

## 2017-02-06 PROCEDURE — 74011636637 HC RX REV CODE- 636/637: Performed by: INTERNAL MEDICINE

## 2017-02-06 PROCEDURE — 85610 PROTHROMBIN TIME: CPT | Performed by: HOSPITALIST

## 2017-02-06 PROCEDURE — 80053 COMPREHEN METABOLIC PANEL: CPT | Performed by: HOSPITALIST

## 2017-02-06 PROCEDURE — 85025 COMPLETE CBC W/AUTO DIFF WBC: CPT | Performed by: HOSPITALIST

## 2017-02-06 PROCEDURE — 36415 COLL VENOUS BLD VENIPUNCTURE: CPT | Performed by: HOSPITALIST

## 2017-02-06 PROCEDURE — 83735 ASSAY OF MAGNESIUM: CPT | Performed by: HOSPITALIST

## 2017-02-06 PROCEDURE — 74011000250 HC RX REV CODE- 250: Performed by: HOSPITALIST

## 2017-02-06 PROCEDURE — 77010033678 HC OXYGEN DAILY

## 2017-02-06 PROCEDURE — 74011250637 HC RX REV CODE- 250/637: Performed by: FAMILY MEDICINE

## 2017-02-06 PROCEDURE — 94660 CPAP INITIATION&MGMT: CPT

## 2017-02-06 PROCEDURE — 94640 AIRWAY INHALATION TREATMENT: CPT

## 2017-02-06 RX ORDER — WARFARIN SODIUM 5 MG/1
5 TABLET ORAL ONCE
Status: COMPLETED | OUTPATIENT
Start: 2017-02-06 | End: 2017-02-06

## 2017-02-06 RX ORDER — WARFARIN SODIUM 5 MG/1
5 TABLET ORAL ONCE
Status: DISCONTINUED | OUTPATIENT
Start: 2017-02-06 | End: 2017-02-06 | Stop reason: SDUPTHER

## 2017-02-06 RX ORDER — FUROSEMIDE 10 MG/ML
40 INJECTION INTRAMUSCULAR; INTRAVENOUS DAILY
Status: DISCONTINUED | OUTPATIENT
Start: 2017-02-07 | End: 2017-02-08

## 2017-02-06 RX ADMIN — INSULIN LISPRO 8 UNITS: 100 INJECTION, SOLUTION INTRAVENOUS; SUBCUTANEOUS at 11:45

## 2017-02-06 RX ADMIN — DILTIAZEM HYDROCHLORIDE 60 MG: 60 TABLET, FILM COATED ORAL at 17:15

## 2017-02-06 RX ADMIN — LEVALBUTEROL 1.25 MG: 1.25 SOLUTION, CONCENTRATE RESPIRATORY (INHALATION) at 03:22

## 2017-02-06 RX ADMIN — CARVEDILOL 25 MG: 12.5 TABLET, FILM COATED ORAL at 17:15

## 2017-02-06 RX ADMIN — BUDESONIDE 500 MCG: 0.5 INHALANT RESPIRATORY (INHALATION) at 19:40

## 2017-02-06 RX ADMIN — INSULIN LISPRO 8 UNITS: 100 INJECTION, SOLUTION INTRAVENOUS; SUBCUTANEOUS at 08:30

## 2017-02-06 RX ADMIN — CARVEDILOL 25 MG: 12.5 TABLET, FILM COATED ORAL at 08:27

## 2017-02-06 RX ADMIN — OXYCODONE HYDROCHLORIDE AND ACETAMINOPHEN 2 TABLET: 5; 325 TABLET ORAL at 08:28

## 2017-02-06 RX ADMIN — DILTIAZEM HYDROCHLORIDE 60 MG: 60 TABLET, FILM COATED ORAL at 11:45

## 2017-02-06 RX ADMIN — OXYCODONE HYDROCHLORIDE AND ACETAMINOPHEN 2 TABLET: 5; 325 TABLET ORAL at 22:53

## 2017-02-06 RX ADMIN — INSULIN LISPRO 2 UNITS: 100 INJECTION, SOLUTION INTRAVENOUS; SUBCUTANEOUS at 17:14

## 2017-02-06 RX ADMIN — ARFORMOTEROL TARTRATE 15 MCG: 15 SOLUTION RESPIRATORY (INHALATION) at 19:40

## 2017-02-06 RX ADMIN — DILTIAZEM HYDROCHLORIDE 60 MG: 60 TABLET, FILM COATED ORAL at 06:42

## 2017-02-06 RX ADMIN — BUDESONIDE 500 MCG: 0.5 INHALANT RESPIRATORY (INHALATION) at 09:09

## 2017-02-06 RX ADMIN — LEVALBUTEROL 1.25 MG: 1.25 SOLUTION, CONCENTRATE RESPIRATORY (INHALATION) at 11:35

## 2017-02-06 RX ADMIN — OXYCODONE HYDROCHLORIDE AND ACETAMINOPHEN 2 TABLET: 5; 325 TABLET ORAL at 11:45

## 2017-02-06 RX ADMIN — LEVALBUTEROL 1.25 MG: 1.25 SOLUTION, CONCENTRATE RESPIRATORY (INHALATION) at 00:00

## 2017-02-06 RX ADMIN — INSULIN LISPRO 8 UNITS: 100 INJECTION, SOLUTION INTRAVENOUS; SUBCUTANEOUS at 17:15

## 2017-02-06 RX ADMIN — CLONAZEPAM 0.25 MG: 0.5 TABLET ORAL at 22:54

## 2017-02-06 RX ADMIN — WARFARIN SODIUM 5 MG: 5 TABLET ORAL at 17:15

## 2017-02-06 RX ADMIN — DOCUSATE SODIUM 100 MG: 100 CAPSULE, LIQUID FILLED ORAL at 22:54

## 2017-02-06 RX ADMIN — ARFORMOTEROL TARTRATE 15 MCG: 15 SOLUTION RESPIRATORY (INHALATION) at 09:09

## 2017-02-06 RX ADMIN — INSULIN GLARGINE 25 UNITS: 100 INJECTION, SOLUTION SUBCUTANEOUS at 22:53

## 2017-02-06 RX ADMIN — LEVALBUTEROL 1.25 MG: 1.25 SOLUTION, CONCENTRATE RESPIRATORY (INHALATION) at 19:40

## 2017-02-06 RX ADMIN — DOCUSATE SODIUM 100 MG: 100 CAPSULE, LIQUID FILLED ORAL at 08:28

## 2017-02-06 RX ADMIN — LEVALBUTEROL 1.25 MG: 1.25 SOLUTION, CONCENTRATE RESPIRATORY (INHALATION) at 15:45

## 2017-02-06 RX ADMIN — OXYCODONE HYDROCHLORIDE AND ACETAMINOPHEN 2 TABLET: 5; 325 TABLET ORAL at 17:15

## 2017-02-06 RX ADMIN — LEVALBUTEROL 1.25 MG: 1.25 SOLUTION, CONCENTRATE RESPIRATORY (INHALATION) at 09:10

## 2017-02-06 RX ADMIN — INSULIN LISPRO 6 UNITS: 100 INJECTION, SOLUTION INTRAVENOUS; SUBCUTANEOUS at 11:45

## 2017-02-06 RX ADMIN — PANTOPRAZOLE SODIUM 40 MG: 40 TABLET, DELAYED RELEASE ORAL at 06:42

## 2017-02-06 RX ADMIN — TRAZODONE HYDROCHLORIDE 50 MG: 50 TABLET ORAL at 22:54

## 2017-02-06 RX ADMIN — FUROSEMIDE 40 MG: 10 INJECTION, SOLUTION INTRAMUSCULAR; INTRAVENOUS at 08:29

## 2017-02-06 RX ADMIN — LEVOFLOXACIN 750 MG: 5 INJECTION, SOLUTION INTRAVENOUS at 22:53

## 2017-02-06 RX ADMIN — ONDANSETRON HYDROCHLORIDE 4 MG: 2 INJECTION INTRAMUSCULAR; INTRAVENOUS at 15:15

## 2017-02-06 RX ADMIN — PREDNISONE 20 MG: 20 TABLET ORAL at 08:27

## 2017-02-06 RX ADMIN — ALPRAZOLAM 0.5 MG: 0.5 TABLET ORAL at 22:53

## 2017-02-06 RX ADMIN — CLONAZEPAM 0.25 MG: 0.5 TABLET ORAL at 08:27

## 2017-02-06 NOTE — ROUTINE PROCESS
Pt care received. Pt A/O x 4 Pt resting in bed. Denies pain. Pt stable. Call light within reach, bed in lowest position and locked.

## 2017-02-06 NOTE — PROGRESS NOTES
Problem: Self Care Deficits Care Plan (Adult)  Goal: *Acute Goals and Plan of Care (Insert Text)  IInitial OT STGs (1/31/2017) Within 7 days:    1. Patient will perform toilet transfer with CGA/Katherin & SPO2>90% in preparation for bowel and bladder management. 2. Patient will perform bowel and bladder management with CGA/Katherin & SPO2>90% for increased independence with ADLs. 3. Patient will grooming standing sinkside for 5 minutes for increased independence ADLs/IADLs. 4. Patient will perform LB/UB dressing with setupA w/ A/E PRN for increased independence with ADLs. 5. Patient will perform UE exercises with SBA for increased independence with ADLs. 6. Patient will utilize energy conservation techniques with 1 verbal cue for increased independence with ADLs. OCCUPATIONAL THERAPY TREATMENT     Patient: Valentin Armenta (89 y.o. female)  Date: 2/6/2017  Diagnosis: Insufficiency, respiratory, acute (HCC)  CHF (congestive heart failure) (HCC)  Elevated troponin  COPD (chronic obstructive pulmonary disease) (HCC)  Infiltrate noted on imaging study  Insufficiency, respiratory, acute (HCC)  CHF (congestive heart failure) (HCC)  Elevated troponin  COPD (chronic obstructive pulmonary disease) (HCC)  Infiltrate noted on imaging study Acute on chronic diastolic CHF (congestive heart failure) ([facility]       Precautions: Fall  Chart, occupational therapy assessment, plan of care, and goals were reviewed. ASSESSMENT:  Pt sitting EOB upon arrival. Pt completed LB dressing with socks this session and supervision sitting EOB. Pt CGA for sit to stand at bedside. Pt completed functional mobility with RW and CGA. Pt on 3L of nasal canula. HR in 80s after a couple minute rest period and O2 92. Pt continues to be limited by decreased activity tolerance for functional activity. Pt education on energy conservation tech and demonstrated understanding through verbal feedback. Pt left sitting EOB with nursing.    Progression toward goals:  [X]          Improving appropriately and progressing toward goals  [ ]          Improving slowly and progressing toward goals  [ ]          Not making progress toward goals and plan of care will be adjusted       PLAN:  Patient continues to benefit from skilled intervention to address the above impairments. Continue treatment per established plan of care. Discharge Recommendations:  Rehab vs Home health  Further Equipment Recommendations for Discharge:  N/A       SUBJECTIVE:   Patient stated My legs are weak today.       OBJECTIVE DATA SUMMARY:   Cognitive/Behavioral Status:  Neurologic State: Alert  Orientation Level: Oriented X4  Cognition: Follows commands  Safety/Judgement: Fall prevention  Functional Mobility and Transfers for ADLs:              Bed Mobility  N/A              Transfers:  Sit to Stand: Contact guard assistance  Balance:  Sitting: Intact  Standing: Impaired; With support  Standing - Static: Fair;Good  Standing - Dynamic : Fair  ADL Intervention:  Cognitive Retraining  Safety/Judgement: Fall prevention        Pain:  Pain Scale 1: Numeric (0 - 10)  Pain Intensity 1: 8  Pain Location 1: Back  Pain Orientation 1: Lower  Pain Description 1: Aching  Pain Intervention(s) 1: Medication (see MAR)     Activity Tolerance:    Fair -  Please refer to the flowsheet for vital signs taken during this treatment.   After treatment:   [ ]  Patient left in no apparent distress sitting up in chair  [X]  Patient left in no apparent distress in bed  [X]  Call bell left within reach  [ ]  Nursing notified  [ ]  Caregiver present  [ ]  Bed alarm activated     [facility] [place]  Time Calculation: 17 mins

## 2017-02-06 NOTE — PROGRESS NOTES
Pharmacy Dosing Services:  Warfarin    Consult for Warfarin Dosing by Pharmacy by Dr. Elian Marie provided for this 66 y.o.  female , for indication of Atrial Fibrillation. Dose to achieve an INR goal of 2-3    Order entered for  Warfarin  5 (mg) ordered to be given today at 18:00. Significant drug interactions:   Levofloxacin    LABS    PT/INR Lab Results   Component Value Date/Time    INR 2.0 02/06/2017 02:56 AM      Platelets Lab Results   Component Value Date/Time    PLATELET 828 86/84/1925 02:56 AM      H/H     Lab Results   Component Value Date/Time    HGB 13.5 02/06/2017 02:56 AM        Warfarin Administrations (last 168 hours)     Date/Time Action Medication Dose    02/05/17 1723 Given    warfarin (COUMADIN) tablet 5 mg 5 mg    02/04/17 1821 Given    warfarin (COUMADIN) tablet 5 mg 5 mg    02/03/17 1733 Given   [INR 1.8]    warfarin (COUMADIN) tablet 4 mg 4 mg    02/02/17 1740 Given    warfarin (COUMADIN) tablet 3 mg 3 mg    02/01/17 1733 Given    warfarin (COUMADIN) tablet 2.5 mg 2.5 mg    01/31/17 1733 Given    warfarin (COUMADIN) tablet 2.5 mg 2.5 mg          Pharmacy to follow daily and will provide subsequent Warfarin dosing based on clinical status.   Deepthi Gallegos, [facility]  Contact information    [phone]

## 2017-02-06 NOTE — PROGRESS NOTES
Spoked with patient at bedside,rehab bed at [facility] booked ,updates sent aware of possible bi-pap needed @[facility] when discharged.

## 2017-02-06 NOTE — DISCHARGE INSTRUCTIONS
Learning About COPD  What is COPD? COPD is a lung disease that makes it hard to breathe. COPD stands for chronic obstructive pulmonary disease. It is caused by damage to the lungs over many years, usually from smoking. COPD is a mix of two diseases: chronic bronchitis and emphysema. Other things that may put you at risk for COPD include breathing chemical fumes, dust, or air pollution over a long period of time. Secondhand smoke is also bad. In chronic bronchitis, the airways that carry air to the lungs (bronchial tubes) get inflamed and make a lot of mucus. This can narrow or block the airways, making it hard for you to breathe. In emphysema, the air sacs in your lungs are damaged and lose their stretch. Less air gets in and out of your lungs, which makes you feel short of breath. What can you expect when you have COPD? COPD gets worse over time. You cannot undo the damage to your lungs. Over time, you may find that:  · You get short of breath even when you do simple things like get dressed or fix a meal.  · It is hard to eat or exercise. · You lose weight and feel weaker. But there are things you can do to prevent more damage and feel better. What are the symptoms? The main symptoms are:  · A cough that will not go away. · Mucus that comes up when you cough. · Shortness of breath that gets worse with activity. At times, your symptoms may suddenly flare up and get much worse. This is a called a COPD exacerbation (say \"egg-DINH--BAY-rubens\"). When this happens, your usual symptoms quickly get worse and stay bad. This can be dangerous. You may have to go to the hospital.  How can you keep COPD from getting worse? Don't smoke. That is the best way to keep COPD from getting worse. If you already smoke, it is never too late to stop. If you need help quitting, talk to your doctor about stop-smoking programs and medicines. These can increase your chances of quitting for good.   You can do other things to keep COPD from getting worse:  · Avoid bad air. Air pollution, chemical fumes, and dust also can make COPD worse. · Get a flu shot every year. A shot may keep the flu from turning into something more serious, like pneumonia. A flu shot also may lower your chances of having a COPD flare-up. · Get a pneumococcal shot. Most people need only one shot to prevent pneumonia, but doctors sometimes recommend a second shot for some people who got their first shot before they turned 72. Talk with your doctor about whether you need a second shot. How is COPD treated? COPD is treated with medicines and oxygen. You also can take steps at home to stay healthy and keep your COPD from getting worse. Medicines and oxygen therapy  · You may be taking medicines such as:  ¨ Bronchodilators. These help open your airways and make breathing easier. Bronchodilators are either short-acting (work for 6 to 9 hours) or long-acting (work for 24 hours). You inhale most bronchodilators, so they start to act quickly. Always carry your quick-relief inhaler with you in case you need it while you are away from home. ¨ Corticosteroids. These reduce airway inflammation. They come in pill or inhaled form. You must take these medicines every day for them to work well. · Take your medicines exactly as prescribed. Call your doctor if you think you are having a problem with your medicine. · Oxygen therapy boosts the amount of oxygen in your blood and helps you breathe easier. Use the flow rate your doctor has recommended, and do not change it without talking to your doctor first.  Other care at home  · If your doctor recommends it, get more exercise. Walking is a good choice. Bit by bit, increase the amount you walk every day. Try for at least 30 minutes on most days of the week. · Learn breathing methods--such as breathing through pursed lips--to help you become less short of breath.   · If your doctor has not set you up with a pulmonary rehabilitation program, talk to him or her about whether rehab is right for you. Rehab includes exercise programs, education about your disease and how to manage it, help with diet and other changes, and emotional support. · Eat regular, healthy meals. Use bronchodilators about 1 hour before you eat to make it easier to eat. Eat several small meals instead of three large ones. Drink beverages at the end of the meal. Avoid foods that are hard to chew. Follow-up care is a key part of your treatment and safety. Be sure to make and go to all appointments, and call your doctor if you are having problems. It's also a good idea to know your test results and keep a list of the medicines you take. Where can you learn more? Go to http://perez-surjit.info/. Enter V314 in the search box to learn more about \"Learning About COPD. \"  Current as of: May 23, 2016  Content Version: 11.1  © 1383-3662 [employer]. Care instructions adapted under license by [employer] (which disclaims liability or warranty for this information). If you have questions about a medical condition or this instruction, always ask your healthcare professional. [place] [address] any warranty or liability for your use of this information. Learning About COPD Triggers  What are triggers? When you have COPD (chronic obstructive pulmonary disease), certain things can make your symptoms worse. These are called triggers. They include:  · Cigarette smoke or air pollution. · Illnesses like colds, flu, or pneumonia. · Cleaning supplies or other chemicals. · Gases, particles, or fumes from wood or kerosene home heaters. Not all people have the same triggers. What may cause symptoms in one person may not be a problem for another person. How do triggers affect COPD? Triggers can make it harder for your lungs to work as they should and can lead to sudden difficulty breathing and other symptoms. When you are around a trigger, a COPD flare-up is more likely. If your symptoms are severe, you may need emergency treatment or have to go to the hospital for treatment. If you know what your triggers are and can avoid them, you can reduce how often you have flare-ups and how much COPD affects your life. It's also important to be active and to take your daily medicines as prescribed. This helps prevent flare-ups too. What can you do to avoid triggers? The first thing is to know your triggers. When you are having symptoms, note the things around you that might be causing them. Then look for patterns in what may be triggering your symptoms. When you have your list of possible triggers, work with your doctor to find ways to avoid them. Here are some ways to avoid a few common triggers. · Do not smoke or allow others to smoke around you. If you need help quitting, talk to your doctor about stop-smoking programs and medicines. These can increase your chances of quitting for good. · If there is a lot of pollution, pollen, or dust outside, stay at home and keep your windows closed. Use an air conditioner or air filter in your home. Check your local weather report or newspaper for air quality and pollen reports. · Get a flu vaccine every year. Talk to your doctor about getting a pneumococcal shot. Wash your hands often to prevent infections. How can you manage a flare-up? Do not panic if you start to have a COPD flare-up. · If you have a COPD action plan, follow the plan. In general:  ¨ Use your quick-relief inhaler as directed by your doctor. If your symptoms do not get better after you use your medicine, have someone take you to the emergency room. Call an ambulance if needed. ¨ Use a spacer with your metered-dose inhaler (MDI). If you have a nebulizer for inhaled medicine, use it. A spacer or nebulizer may help get more medicine to your lungs.   ¨ If your doctor has given you other inhaled medicines or steroid pills, take them as directed. ¨ If your doctor has given you a prescription for an antibiotic, fill it if you need to. ¨ Call your doctor if you have to use your antibiotic or steroid pills. Where can you learn more? Go to http://perez-surjit.info/. Enter P965 in the search box to learn more about \"Learning About COPD Triggers. \"  Current as of: July 21, 2016  Content Version: 11.1  © 2006-2016 [employer]. Care instructions adapted under license by [employer] (which disclaims liability or warranty for this information). If you have questions about a medical condition or this instruction, always ask your healthcare professional. [address] any warranty or liability for your use of this information. Breathing Techniques for COPD: Care Instructions  Your Care Instructions    Breathing is hard when you have chronic obstructive pulmonary disease (COPD). You may take quick, short breaths. Breathing this way makes it harder to get air into your lungs. Learning new ways to control your breathing may help. You may feel better and be able to do more. You can try three basic ways to control your breathing. They are pursed-lip breathing, diaphragmatic breathing, and breathing while bending. Use these methods when you are more short of breath than normal. Practice them often so you can do them well. Follow-up care is a key part of your treatment and safety. Be sure to make and go to all appointments, and call your doctor if you are having problems. It's also a good idea to know your test results and keep a list of the medicines you take. How can you care for yourself at home? · Pursed-lip breathing helps you breathe more air out so that your next breath can be deeper. For this type of breathing, you breathe in through your nose and out through your mouth while almost closing your lips.  Breathe in for about 2 seconds, and breathe out for 4 to 6 seconds. Pursed-lip breathing decreases shortness of breath and improves your ability to exercise. · Diaphragmatic breathing helps your lungs expand so that they take in more air. ¨ Lie on your back, or prop yourself up on several pillows. ¨ Put one hand on your belly and the other on your chest. When you breathe in, push your belly out as far as possible. You should feel the hand on your belly move out, while the hand on your chest does not move. ¨ When you breathe out, you should feel the hand on your belly move in. When you can do this type of breathing well while lying down, learn to do it while sitting or standing. Many people with COPD find this breathing method helpful. ¨ Practice diaphragmatic breathing for 20 minutes, 2 or 3 times a day. · Breathing while bending forward at the waist may make breathing easier. It can reduce shortness of breath while you exercise or rest. It helps the diaphragm move more easily. The diaphragm is a large muscle that separates your lungs from your belly. It helps draw air into your lungs as you breathe. · Do not smoke. Smoking makes COPD worse. If you need help quitting, talk to your doctor about stop-smoking programs and medicines. These can increase your chances of quitting for good. When should you call for help? Call your doctor now or seek immediate medical care if:  · Your breathing methods do not help. · Your shortness of breath gets worse. · You cough up blood. · You have swelling in your belly and legs. · You have severe chest pain. Watch closely for changes in your health, and be sure to contact your doctor if you have any problems. Where can you learn more? Go to http://perez-surjit.info/. Enter I567 in the search box to learn more about \"Breathing Techniques for COPD: Care Instructions. \"  Current as of: May 23, 2016  Content Version: 11.1  © 6491-7246 [employer].  Care instructions adapted under license by [employer] (which disclaims liability or warranty for this information). If you have questions about a medical condition or this instruction, always ask your healthcare professional. [address] any warranty or liability for your use of this information.

## 2017-02-06 NOTE — PROGRESS NOTES
Problem: Mobility Impaired (Adult and Pediatric)  Goal: *Acute Goals and Plan of Care (Insert Text)  Physical Therapy Goals  Initiated 2/1/2017 and to be accomplished within 7 day(s)  1. Patient will move from supine to sit and sit to supine in bed with supervision/set-up. 2. Patient will transfer from bed to chair and chair to bed with supervision/set-up using the least restrictive device. 3. Patient will perform sit to stand with supervision/set-up. 4. Patient will ambulate with supervision/set-up for >150 feet with the least restrictive device for safe home ambulation. 5. Patient will ascend/descend 1 step without handrail(s) with minimal assistance/contact guard assist for safe home entry. Outcome: Progressing Towards Goal  PHYSICAL THERAPY TREATMENT     Patient: Michaelle Lauren (3 y.o. female)  Date: 2/6/2017  Diagnosis: Insufficiency, respiratory, acute (HCC)  CHF (congestive heart failure) (HCC)  Elevated troponin  COPD (chronic obstructive pulmonary disease) ([facility] [address]  Infiltrate noted on imaging study  Insufficiency, respiratory, acute (HCC)  CHF (congestive heart failure) (HCC)  Elevated troponin  COPD (chronic obstructive pulmonary disease) (HCC)  Infiltrate noted on imaging study Acute on chronic diastolic CHF (congestive heart failure) (HCC)  Precautions: Fall, Other (comment) (O2, high Flow)   Chart, physical therapy assessment, plan of care and goals were reviewed. ASSESSMENT:  Pt on 3L O2 and WNLs during PT tx. Pt continues to present with decrease tolerance to activity and fatigue quickly. Pt increase ambulation distance to 76' however multiple standing rest needed. Notable dyspnea noted on exertion. Pt also becomes increasingly unsafe towards end of distance, in an attempt to sit down quickly. Recommend rehab placement due to above. However pt wishes to return home. Therefore will increase frequency to Q/BID. Cont POC.     Progression toward goals:  [ ]      Improving appropriately and progressing toward goals  [X]      Improving slowly and progressing toward goals  [ ]      Not making progress toward goals and plan of care will be adjusted       PLAN:  Patient continues to benefit from skilled intervention to address the above impairments. Continue treatment per established plan of care. Discharge Recommendations:  Rehab or home health pending progress   Further Equipment Recommendations for Discharge:  rolling walker       SUBJECTIVE:   Patient stated \" I need to do this more times during the day        OBJECTIVE DATA SUMMARY:   Critical Behavior:  Neurologic State: Alert  Orientation Level: Oriented X4  Cognition: Follows commands  Safety/Judgement: Lack of insight into deficits  Functional Mobility Training:  Transfers:  Sit to Stand: Contact guard assistance  Stand to Sit: Contact guard assistance  Balance:  Sitting: Intact  Standing: Impaired; With support  Standing - Static: Fair;Good  Standing - Dynamic : Fair  Ambulation/Gait Training:  Distance (ft): 75 Feet (ft)  Assistive Device: Walker, rolling;Gait belt  Ambulation - Level of Assistance: Contact guard assistance  Gait Abnormalities: Decreased step clearance  Base of Support: Widened  Stance: Weight shift  Speed/Kristin: Slow;Shuffled  Step Length: Left shortened;Right shortened  Swing Pattern: Right asymmetrical;Left asymmetrical  Interventions: Verbal cues; Tactile cues; Safety awareness training     Pain:  Pain Scale 1: Numeric (0 - 10)  Pain Intensity 1: 8  Pain Location 1: Back  Pain Orientation 1: Lower  Pain Description 1: Aching  Pain Intervention(s) 1: Medication (see MAR)  Activity Tolerance:   Fair      After treatment:   [ ] Patient left in no apparent distress sitting up in chair  [X] Patient left in no apparent distress in bed(EOB)   [X] Call bell left within reach  [ ] Nursing notified  [ ] Caregiver present  [ ] Bed alarm activated      Brie Franks PTA   Time Calculation: 23 mins

## 2017-02-06 NOTE — ROUTINE PROCESS
Bedside and Verbal shift change report given to Ama Smith RN (oncoming nurse) by Jamia Valencia RN (offgoing nurse). Report included the following information SBAR, Kardex, Intake/Output, MAR, Recent Results and Cardiac Rhythm afib.

## 2017-02-06 NOTE — PROGRESS NOTES
Cardiology Progress Note        Patient: Elizabeth Veronica        Sex: female          DOA: 1/30/2017  YOB: 1945      Age:  66 y.o.        LOS:  LOS: 7 days    Patient seen and examined. Chart reviewed. Assessment/Plan     Acute on chronic diastolic heart failure - Total urine out put 19027 cc since admission. Acute respiratory failure  COPD  Permanent atrial fibrillation - rate is under good control  Abnormal troponin most likely secondary to heart failure, respiratory failure, atrial fibrillation with rapid ventricular rate, no evidence of ACS, however underlying CAD can not be ruled out. Moderate aortic stenosis   Hyperkalemia  Chest pain mid sternal resolved       Plan:          Change lasix to 40 mg IV . Continue coreg and Cardizem. Continue coumadin as per PT/INR  Continue management as per hospital medicine. Strict I/O, fluid restriction up to 1200 cc/day  Further cardiac work up as clinically indicated.                            Subjective:    cc: My breathing is much better. Denies any chest pain. REVIEW OF SYSTEMS:     General: No fevers or chills. Cardiovascular: No chest pain,No palpitations, No orthopnea, No PND, No leg swelling, No claudication  Pulmonary: No dyspnea   Gastrointestinal: No nausea, vomiting, bleeding  Neurology: No Dizziness    Objective:      Visit Vitals    /63 (BP 1 Location: Right arm, BP Patient Position: Sitting)    Pulse 75    Temp 98.1 °F (36.7 °C)    Resp 20    Ht 5' 5\" (1.651 m)    Wt 103.4 kg (228 lb)    SpO2 95%    BMI 37.94 kg/m2     Body mass index is 37.94 kg/(m^2). Physical Exam:  General Appearance: Comfortable, obese, not using accessory muscles of respiration. HEENT: SIRIA. HEAD: Atraumatic  NECK: No JVD, no thyroidomeglay. CAROTIDS: No bruit  LUNGS: Clear bilaterally. HEART: S1+S2 irregularly irregular, audible, 3/6 murmur in aortic area, no pericardial rub.      ABD: Non-tender, BS Audible    EXT: Trace leg edema, and no cyanosis. VASCULAR EXAM: Pulses are intact. PSYCHIATRIC EXAM: Mood is appropriate. MUSCULOSKELETAL: Grossly no joint deformity. NEUROLOGICAL: AAO times 3, No motor and sensory deficit.   Medication:  Current Facility-Administered Medications   Medication Dose Route Frequency    warfarin (COUMADIN) tablet 5 mg  5 mg Oral ONCE    [START ON 2/7/2017] furosemide (LASIX) injection 40 mg  40 mg IntraVENous DAILY    oxyCODONE-acetaminophen (PERCOCET) 5-325 mg per tablet 2 Tab  2 Tab Oral Q4H PRN    predniSONE (DELTASONE) tablet 20 mg  20 mg Oral DAILY WITH BREAKFAST    insulin lispro (HUMALOG) injection 8 Units  8 Units SubCUTAneous TIDAC    pantoprazole (PROTONIX) tablet 40 mg  40 mg Oral ACB    dilTIAZem (CARDIZEM) IR tablet 60 mg  60 mg Oral TIDAC    ALPRAZolam (XANAX) tablet 0.5 mg  0.5 mg Oral Q8H PRN    labetalol (NORMODYNE;TRANDATE) injection 10 mg  10 mg IntraVENous Q4H PRN    clonazePAM (KlonoPIN) tablet 0.25 mg  0.25 mg Oral BID    nitroglycerin (NITROSTAT) tablet 0.4 mg  0.4 mg SubLINGual PRN    traZODone (DESYREL) tablet 50 mg  50 mg Oral QHS PRN    insulin glargine (LANTUS) injection 25 Units  25 Units SubCUTAneous QHS    insulin lispro (HUMALOG) injection   SubCUTAneous AC&HS    carvedilol (COREG) tablet 25 mg  25 mg Oral BID WITH MEALS    levalbuterol (XOPENEX) nebulizer soln 1.25 mg/0.5 ml  1.25 mg Nebulization Q4H RT    arformoterol (BROVANA) neb solution 15 mcg  15 mcg Nebulization BID RT    budesonide (PULMICORT) 500 mcg/2 ml nebulizer suspension  500 mcg Nebulization BID RT    diphenhydrAMINE (BENADRYL) injection 12.5 mg  12.5 mg IntraVENous Q4H PRN    ondansetron (ZOFRAN) injection 4 mg  4 mg IntraVENous Q4H PRN    docusate sodium (COLACE) capsule 100 mg  100 mg Oral BID    glucose chewable tablet 16 g  4 Tab Oral PRN    glucagon (GLUCAGEN) injection 1 mg  1 mg IntraMUSCular PRN    dextrose (D50W) injection syrg 12.5-25 g  25-50 mL IntraVENous PRN    levoFLOXacin (LEVAQUIN) 750 mg in D5W IVPB  750 mg IntraVENous Q24H    Warfarin - Pharmacy to dose   Other Rx Dosing/Monitoring               Lab/Data Reviewed:       Recent Labs      02/06/17 0256  02/05/17 0455  02/04/17 0545   WBC  15.1*  10.3  9.9   HGB  13.5  12.2  11.7*   HCT  43.3  38.7  37.0   PLT  198  195  195     Recent Labs      02/06/17 0256 02/05/17 0455  02/04/17 0545   NA  143  142  140   K  4.4  3.9  4.0   CL  101  100  98*   CO2  38*  38*  37*   GLU  160*  195*  195*   BUN  49*  41*  45*   CREA  1.30  1.18  1.29   CA  9.1  8.6  8.6       Signed By: Mendoza Edwards MD     February 6, 2017

## 2017-02-06 NOTE — PROGRESS NOTES
Progress Note  Pulmonary, Critical Care, and Sleep Medicine    Name: Elpidio Caicedo MRN: [id number]   : 1945 Hospital: [facility] [place]   Date: 2017        IMPRESSION:   · Acute on chronic hypercapnic and hypoxemic respiratory failure requiring BIPAP, improving and now on O2 NC  · Possible COPD/asthma exacerbation although pt and  deny history of COPD  · IMANI unable to tolerate CPAP due to claustrophobia  · Pulmonary HTN likely Group 3 on the basis of long standing IMANI and hypoxemia vs secondary to left sided failure ajc with dilated left Atrium on latest Echo  · Hyperkalemia resolved  · Diastolic heart failure  · Leukocytosis steroid related improving  · DM II glycemic control suboptimal, hypoglycemia noted earlier today  · Elevated Troponin  · A fib now rate controlled on po Cardizem  · Insomnia       PLAN:   · Continue night time bipap support  · Outpatient records reveal that patient has significant restrictive disease with FEV1 = 40% but no obstructive pattern but HRCT showed air trapping so patient treated empirically for asthma. · Increased percoset prn dosing given uncontrolled pain  · Titrate supplemental O2  · Trazodone increased, prn Benzodiazepines  · Decrease prednisone to 20 mg daily  · Strict glycemic control, hold preprandial insulin  · DVT and GI prophylaxis issues addressed  · Repeat overnight sleep study and cpap titration study with nasal pillow. · Discussed with pt at bedside  ·      Subjective/Interval History:   Pt used the bipap about 3 hrs per patient. Now on nasal cannula. Feels near her baseline. No pain. No wheezing. NO cough. No fever. Tolerating po. Does not like the bipap. Understands that will be needing another sleep study as out patient. Pt wants to focus on weight loss. Interested in talking to nurtitionis for dietary chages. ROS:Pertinent items are noted in HPI. Orders reviewed including medications. Changes made if indicated.    Telemetry monitor reviewed at the bedside. Objective:   Vital Signs:    Visit Vitals    /62 (BP 1 Location: Right arm, BP Patient Position: At rest)    Pulse 75    Temp 98.1 °F (36.7 °C)    Resp 20    Ht 5' 5\" (1.651 m)    Wt 103.4 kg (228 lb)    SpO2 97%    BMI 37.94 kg/m2       O2 Device: Nasal cannula   O2 Flow Rate (L/min): 3 l/min   Temp (24hrs), Av.6 °F (36.4 °C), Min:97.2 °F (36.2 °C), Max:98.1 °F (36.7 °C)       Intake/Output:   Last shift:         Last 3 shifts:  1901 -  0700  In: 540 [P.O.:540]  Out: 1900 [Urine:1900]  No intake or output data in the 24 hours ending 17 1211     Physical Exam:    General:  Awake , cooperative, in no distress, appears anxious. Head:  Normocephalic, without obvious abnormality, atraumatic. Eyes:  ANicteric, PERRL,   Nose: Nares normal.  Mucosa normal. No drainage or sinus tenderness. Throat: Lips, mucosa, and tongue normal.  NO Thrush   Neck: Supple, symmetrical, trachea midline, no adenopathy, thyroid: no enlargment/tenderness/nodules    Back:   Symmetric    Lungs:   Diminished breath sounds, no rales or wheezes   Chest wall:  No tenderness or deformity. NO intercostal retractions   Heart:  irregular, S1, S2 normal, no murmur, click, rub or gallop. Abdomen:   Soft, non-tender. Bowel sounds normal. No masses,  No organomegaly. NO paradoxical motion   Extremities: normal, atraumatic, no cyanosis or edema. Pulses: 2+ and symmetric all extremities.    Skin: Skin color, texture, turgor normal. No rashes, large ecchymosis left proximal humerus, NO clubbing   Lymph nodes: Cervical  nodes normal.   Neurologic: Grossly nonfocal      :        Devices:             Drips:    DATA:  Labs:  Recent Labs      17   0256  17   0455  17   0545   WBC  15.1*  10.3  9.9   HGB  13.5  12.2  11.7*   HCT  43.3  38.7  37.0   PLT  198  195  195     Recent Labs      17   0256  17   0455  17   0545   NA  143  142  140   K  4.4 3.9  4.0   CL  101  100  98*   CO2  38*  38*  37*   GLU  160*  195*  195*   BUN  49*  41*  45*   CREA  1.30  1.18  1.29   CA  9.1  8.6  8.6   MG  2.3  2.2  2.2   ALB  3.5  3.0*  3.0*   SGOT  17  13*  13*   ALT  19  20  25   INR  2.0*  1.9*  1.8*     No results for input(s): PH, PCO2, PO2, HCO3, FIO2 in the last 72 hours. Imaging:  []        I have personally reviewed the patients radiographs and reports  []         []        No change from prior, tubes and lines in adequate position  []        Improved   []        Worsening  High complexity decision making was performed during this consultation and evaluation.      Ari Gonzales MD

## 2017-02-06 NOTE — ROUTINE PROCESS
Bedside shift change report given to Alec Beavers (oncoming nurse) by  Kim Osborne (offgoing nurse). Report included the following information SBAR, Kardex and MAR.

## 2017-02-06 NOTE — PROGRESS NOTES
1942:  Assumed care. Pt awake, alert and oriented. Pt resting in bed with no acute signs of distress. Call bell and telephone within reach. White board updated. Shift Summary:  Pt had uneventful shift. Pt in bed resting with no signs of distress or c/o pain.

## 2017-02-06 NOTE — PROGRESS NOTES
NUTRITION FOLLOW-UP    RECOMMENDATIONS/PLAN:   - Continue Consistent Carb 2000 kcal 2GM NA diet with Glucerna Shakes BID    NUTRITION ASSESSMENT:   Client Update: 66 yrs old Female with acute on chronic respiratory failure, COPD exacerbation, possible HCAP, DM2.  PO intake of meals is good overall, pt would like to continue Glucerna Shakes BID in case of variable appetite. Pt voiced her overall frustrations regarding chronic/acute conditions and perception of lack of progress. FOOD/NUTRITION INTAKE   Diet Order:  Consistent Carb 2000 kcal 2GM NA   Supplements: Glucerna shake BID   Food Allergies: garlic, latex  Average PO Intake:      Patient Vitals for the past 100 hrs:   % Diet Eaten   02/05/17 0830 100 %   02/04/17 1245 85 %   02/04/17 0830 100 %   02/03/17 0900 100 %   02/02/17 1746 25 %   Pertinent Medications:  [x] Reviewed; Insulin:  [x] SSI     [x] Pre-meal:  8    [x]  Basal:  25   Cultural/Denominational Food Preferences: None Identified ANTHROPOMETRICS  Height: 5' 5\" (165.1 cm)       Weight: 103.4 kg (228 lb)         BMI: 38.4 kg/m^2 obese (30%-39.9% BMI)   Adjusted Body Weight: 71 kg     NUTRITION-FOCUSED PHYSICAL ASSESSMENT  Skin: tear to R arm, otherwise intact      GI: no c/o N/V    BIOCHEMICAL DATA & MEDICAL TESTS  Pertinent Labs:  [x] Reviewed; BUN 49, POC -386      NUTRITION PRESCRIPTION  Calories: 8150-0396 kcal/day based on 25-30 kcal/kg AdjBW  Protein: 71-85 g/day based on 1-1.2 g/kg AdjBW  CHO: 222 g/day based on 50% of total energy  Fluid: 6128-7940 ml/day based on 1 kcal/ml      NUTRITION DIAGNOSES:   1- At risk for inadequate oral intake related to respiratory failure as evidenced by PO intake of most meals <50%. - progressing  2- Altered nutrition related lab values related to DM2 as evidenced by POC BG , HbA1c 8 - ongoing    NUTRITION INTERVENTIONS:   INTERVENTIONS:        GOALS:  1.  Continue current diet/supplements, provide encouragement 1. >50% PO intake most meals/supplements by next review 3-5 days     LEARNING NEEDS (Diet, Supplementation, Food/Nutrient-Drug Interaction):   [x] None Identified     [] Education provided & documented        Identified and patient:   [] Declined      [] Was not appropriate/indicated        NUTRITION MONITORING /EVALUATION:   Follow PO intake  Monitor wt  Monitor renal labs, electrolytes, fluid status    Previous Recommendations Implemented: yes        Previous Goals Met:  yes       [] Participated in Interdisciplinary Rounds    [x] Interdisciplinary Care Plan Reviewed  [x] Discharge Nutrition Plan:  Consistent cho low na    NUTRITION RISK:           [x] At risk                        [] Not currently at risk        Will follow-up per policy.   Darryl Peña RD  PAGER:  [phone]

## 2017-02-06 NOTE — PROGRESS NOTES
Hospitalist Progress Note-critical care note     Patient: Claudetta Cruel MRN: [id number]  [facility]: [id number]    YOB: 1945  Age: 66 y.o. Sex: female    DOA: 1/30/2017 LOS:  LOS: 7 days            Chief complaint: chf exacerbation,  Acute on chronic respiratory failure , chest pain,  copd exacerbation ,  HTN, anxiety     Assessment/Plan         Patient Active Problem List   Diagnosis Code    Acute on chronic diastolic CHF (congestive heart failure) ([facility]) I50.33    ALEJANDRINA (acute kidney injury) ([facility] [address] N17.9    Acute exacerbation of CHF (congestive heart failure) ([facility]) I50.9    DM (diabetes mellitus) ([facility] [address] E11.9    Hypertension I10    Respiratory failure ([facility] [address] J96.90    [facility] I24.9    Obstructive sleep apnea, adult G47.33    Paroxysmal atrial fibrillation ([facility]) I48.0    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 ([facility]) S37.71, E11.65    Dyspnea due to congestive heart failure ([facility]) I50.9    Aspiration pneumonia ([facility]) J69.0    Hyperkalemia E87.5    Elevated troponin R79.89    Acute respiratory distress ([facility]) R06.00    COPD (chronic obstructive pulmonary disease) ([facility]) J44.9    CHF (congestive heart failure) ([facility]) I50.9    Insufficiency, respiratory, acute ([facility]) R06.89    Infiltrate noted on imaging study R93.8    Chronic respiratory failure with hypoxia ([facility]) J96.11    COPD exacerbation ([facility]) J44.1    Acute on chronic respiratory failure with hypoxia ([facility]) J96.21    Chest pain R07.9    Anxiety F41.9     1. Acute on chronic  respiratory failure   Improving on  3 L nc O2 , need bipap at night -not compliance very well. Multiple factors, copd exacerbation, possible hcap, hermelinda, Pulmonary hypertension (moderated from 2016 echo and chf exacerbation   2. Copd exacerbation and possible hcap  On  breathing tx with xopenex. pulmocort and brovana, on  Levaquin, improving.  Tapering off steroid  3 afib with rvr  Rate controlled per cardizem now, optimize the electrolytes, on warfarin , inr 2.  continue pharmacy dose   4. chf exacerbation -diastolic from previous echo  continue lasix cards on board   5. Hyperkalemia  Resolved,   7. DM type II ,   -long term insulin ,   - on  lantus to 25 ,  Ssi, increase premeal insulin   8 HTN, accelerated  Continue home medication. 9. Pulmonary hypertension   moderate from echo   10 hermelinda   Not compliance cpap at home   11 anxiety  Scheduled klonipin ,better controlled   12. Insomnia   Continue trazodone prn for sleep     Need rehab replacement with bipap at night, case discussed with CM and RN. Subjective: feel fine , I need several more days   Nurse: no acute issue last night , on 3 L NC O2 now      Review of systems:    General: No fevers or chills. Cardiovascular: no  chest pain , . No palpitations. Pulmonary: shortness of breath better,   Gastrointestinal: No nausea, vomiting. Vital signs/Intake and Output:  Visit Vitals    /62 (BP 1 Location: Right arm, BP Patient Position: At rest)    Pulse 75    Temp 98.1 °F (36.7 °C)    Resp 20    Ht 5' 5\" (1.651 m)    Wt 103.4 kg (228 lb)    SpO2 97%    BMI 37.94 kg/m2     Current Shift:     Last three shifts:  02/04 1901 - 02/06 0700  In: 540 [P.O.:540]  Out: 1900 [Urine:1900]    Physical Exam:  General: WD, WN. Alert, cooperative, no acute distress    HEENT: NC, Atraumatic. PERRLA, anicteric sclerae. bipap mask noted   Lungs: CTA Bilaterally. No Wheezing/Rhonchi/Rales. Heart:  Irregular irregular ,  No murmur, No Rubs, No Gallops  Abdomen: Soft, Non distended, Non tender.  +Bowel sounds,   Extremities: No c/c/e  Psych:   anxious . No agitated. Neurologic:  No acute neurological deficit.              Labs: Results:       Chemistry Recent Labs      02/06/17   0256  02/05/17   0455  02/04/17   0545   GLU  160*  195*  195*   NA  143  142  140   K  4.4  3.9  4.0   CL  101  100  98*   CO2  38*  38*  37*   BUN  49*  41*  45*   CREA  1.30  1.18  1.29   CA  9.1  8.6  8.6 AGAP  4  4  5   BUCR  38*  35*  35*   AP  137*  112  105   TP  6.9  6.2*  6.3*   ALB  3.5  3.0*  3.0*   GLOB  3.4  3.2  3.3   AGRAT  1.0  0.9  0.9      CBC w/Diff Recent Labs      02/06/17 0256  02/05/17   0455  02/04/17   0545   WBC  15.1*  10.3  9.9   RBC  4.28  3.83*  3.66*   HGB  13.5  12.2  11.7*   HCT  43.3  38.7  37.0   PLT  198  195  195   GRANS  82*  87*  91*   LYMPH  10*  6*  6*   EOS  1  0  0      Cardiac Enzymes No results for input(s): CPK, CKND1, ALESSANDRA in the last 72 hours. No lab exists for component: CKRMB, TROIP   Coagulation Recent Labs      02/06/17 0256 02/05/17   0455   PTP  22.0*  21.0*   INR  2.0*  1.9*       Lipid Panel No results found for: CHOL, CHOLPOCT, CHOLX, CHLST, CHOLV, [id number], HDL, LDL, NLDLCT, DLDL, LDLC, DLDLP, [id number], VLDLC, VLDL, TGL, TGLX, TRIGL, [id number], TRIGP, TGLPOCT, [id number], CHHD, CHHDX   BNP No results for input(s): BNPP in the last 72 hours.    Liver Enzymes Recent Labs      02/06/17   0256   TP  6.9   ALB  3.5   AP  137*   SGOT  17      Thyroid Studies Lab Results   Component Value Date/Time    TSH 0.31 01/01/2016 10:20 PM        Procedures/imaging: see electronic medical records for all procedures/Xrays and details which were not copied into this note but were reviewed prior to creation of Sidra Orta MD

## 2017-02-07 LAB
ATRIAL RATE: 208 BPM
CALCULATED R AXIS, ECG10: 51 DEGREES
CALCULATED T AXIS, ECG11: 51 DEGREES
DIAGNOSIS, 93000: NORMAL
GLUCOSE BLD STRIP.AUTO-MCNC: 130 MG/DL (ref 70–110)
GLUCOSE BLD STRIP.AUTO-MCNC: 203 MG/DL (ref 70–110)
GLUCOSE BLD STRIP.AUTO-MCNC: 215 MG/DL (ref 70–110)
GLUCOSE BLD STRIP.AUTO-MCNC: 87 MG/DL (ref 70–110)
INR PPP: 2.6 (ref 0.8–1.2)
MAGNESIUM SERPL-MCNC: 2.4 MG/DL (ref 1.8–2.4)
PROTHROMBIN TIME: 26.3 SEC (ref 11.5–15.2)
Q-T INTERVAL, ECG07: 354 MS
QRS DURATION, ECG06: 68 MS
QTC CALCULATION (BEZET), ECG08: 454 MS
VENTRICULAR RATE, ECG03: 99 BPM

## 2017-02-07 PROCEDURE — 74011250637 HC RX REV CODE- 250/637: Performed by: INTERNAL MEDICINE

## 2017-02-07 PROCEDURE — 74011636637 HC RX REV CODE- 636/637: Performed by: INTERNAL MEDICINE

## 2017-02-07 PROCEDURE — 97530 THERAPEUTIC ACTIVITIES: CPT

## 2017-02-07 PROCEDURE — 65660000000 HC RM CCU STEPDOWN

## 2017-02-07 PROCEDURE — 82962 GLUCOSE BLOOD TEST: CPT

## 2017-02-07 PROCEDURE — 36415 COLL VENOUS BLD VENIPUNCTURE: CPT | Performed by: HOSPITALIST

## 2017-02-07 PROCEDURE — 74011250636 HC RX REV CODE- 250/636: Performed by: INTERNAL MEDICINE

## 2017-02-07 PROCEDURE — 94640 AIRWAY INHALATION TREATMENT: CPT

## 2017-02-07 PROCEDURE — 74011000250 HC RX REV CODE- 250: Performed by: HOSPITALIST

## 2017-02-07 PROCEDURE — 83735 ASSAY OF MAGNESIUM: CPT | Performed by: HOSPITALIST

## 2017-02-07 PROCEDURE — 74011250637 HC RX REV CODE- 250/637: Performed by: HOSPITALIST

## 2017-02-07 PROCEDURE — 94760 N-INVAS EAR/PLS OXIMETRY 1: CPT

## 2017-02-07 PROCEDURE — 85610 PROTHROMBIN TIME: CPT | Performed by: HOSPITALIST

## 2017-02-07 PROCEDURE — 97535 SELF CARE MNGMENT TRAINING: CPT

## 2017-02-07 PROCEDURE — 74011250637 HC RX REV CODE- 250/637: Performed by: FAMILY MEDICINE

## 2017-02-07 PROCEDURE — 97168 OT RE-EVAL EST PLAN CARE: CPT

## 2017-02-07 PROCEDURE — 74011636637 HC RX REV CODE- 636/637: Performed by: HOSPITALIST

## 2017-02-07 PROCEDURE — 97116 GAIT TRAINING THERAPY: CPT

## 2017-02-07 PROCEDURE — 77010033678 HC OXYGEN DAILY

## 2017-02-07 RX ORDER — WARFARIN 1 MG/1
2 TABLET ORAL ONCE
Status: COMPLETED | OUTPATIENT
Start: 2017-02-07 | End: 2017-02-07

## 2017-02-07 RX ORDER — LIDOCAINE 50 MG/G
1 PATCH TOPICAL EVERY 24 HOURS
Status: DISCONTINUED | OUTPATIENT
Start: 2017-02-07 | End: 2017-02-09 | Stop reason: HOSPADM

## 2017-02-07 RX ADMIN — INSULIN LISPRO 8 UNITS: 100 INJECTION, SOLUTION INTRAVENOUS; SUBCUTANEOUS at 16:48

## 2017-02-07 RX ADMIN — TRAZODONE HYDROCHLORIDE 50 MG: 50 TABLET ORAL at 22:23

## 2017-02-07 RX ADMIN — INSULIN LISPRO 6 UNITS: 100 INJECTION, SOLUTION INTRAVENOUS; SUBCUTANEOUS at 22:22

## 2017-02-07 RX ADMIN — ARFORMOTEROL TARTRATE 15 MCG: 15 SOLUTION RESPIRATORY (INHALATION) at 20:23

## 2017-02-07 RX ADMIN — OXYCODONE HYDROCHLORIDE AND ACETAMINOPHEN 2 TABLET: 5; 325 TABLET ORAL at 22:23

## 2017-02-07 RX ADMIN — CARVEDILOL 25 MG: 12.5 TABLET, FILM COATED ORAL at 08:51

## 2017-02-07 RX ADMIN — INSULIN LISPRO 4 UNITS: 100 INJECTION, SOLUTION INTRAVENOUS; SUBCUTANEOUS at 12:29

## 2017-02-07 RX ADMIN — LEVALBUTEROL 1.25 MG: 1.25 SOLUTION, CONCENTRATE RESPIRATORY (INHALATION) at 00:01

## 2017-02-07 RX ADMIN — BUDESONIDE 500 MCG: 0.5 INHALANT RESPIRATORY (INHALATION) at 20:23

## 2017-02-07 RX ADMIN — DILTIAZEM HYDROCHLORIDE 60 MG: 60 TABLET, FILM COATED ORAL at 16:47

## 2017-02-07 RX ADMIN — OXYCODONE HYDROCHLORIDE AND ACETAMINOPHEN 2 TABLET: 5; 325 TABLET ORAL at 12:28

## 2017-02-07 RX ADMIN — FUROSEMIDE 40 MG: 10 INJECTION, SOLUTION INTRAMUSCULAR; INTRAVENOUS at 08:51

## 2017-02-07 RX ADMIN — WARFARIN SODIUM 2 MG: 1 TABLET ORAL at 18:07

## 2017-02-07 RX ADMIN — LEVALBUTEROL 1.25 MG: 1.25 SOLUTION, CONCENTRATE RESPIRATORY (INHALATION) at 20:23

## 2017-02-07 RX ADMIN — CLONAZEPAM 0.25 MG: 0.5 TABLET ORAL at 08:51

## 2017-02-07 RX ADMIN — DILTIAZEM HYDROCHLORIDE 60 MG: 60 TABLET, FILM COATED ORAL at 12:28

## 2017-02-07 RX ADMIN — LEVALBUTEROL 1.25 MG: 1.25 SOLUTION, CONCENTRATE RESPIRATORY (INHALATION) at 11:25

## 2017-02-07 RX ADMIN — OXYCODONE HYDROCHLORIDE AND ACETAMINOPHEN 2 TABLET: 5; 325 TABLET ORAL at 04:08

## 2017-02-07 RX ADMIN — PANTOPRAZOLE SODIUM 40 MG: 40 TABLET, DELAYED RELEASE ORAL at 06:19

## 2017-02-07 RX ADMIN — ARFORMOTEROL TARTRATE 15 MCG: 15 SOLUTION RESPIRATORY (INHALATION) at 07:10

## 2017-02-07 RX ADMIN — LEVALBUTEROL 1.25 MG: 1.25 SOLUTION, CONCENTRATE RESPIRATORY (INHALATION) at 07:10

## 2017-02-07 RX ADMIN — LEVALBUTEROL 1.25 MG: 1.25 SOLUTION, CONCENTRATE RESPIRATORY (INHALATION) at 15:01

## 2017-02-07 RX ADMIN — DOCUSATE SODIUM 100 MG: 100 CAPSULE, LIQUID FILLED ORAL at 20:18

## 2017-02-07 RX ADMIN — LEVALBUTEROL 1.25 MG: 1.25 SOLUTION, CONCENTRATE RESPIRATORY (INHALATION) at 04:59

## 2017-02-07 RX ADMIN — CARVEDILOL 25 MG: 12.5 TABLET, FILM COATED ORAL at 16:47

## 2017-02-07 RX ADMIN — INSULIN LISPRO 8 UNITS: 100 INJECTION, SOLUTION INTRAVENOUS; SUBCUTANEOUS at 12:29

## 2017-02-07 RX ADMIN — PREDNISONE 20 MG: 20 TABLET ORAL at 08:51

## 2017-02-07 RX ADMIN — INSULIN GLARGINE 25 UNITS: 100 INJECTION, SOLUTION SUBCUTANEOUS at 22:23

## 2017-02-07 RX ADMIN — ALPRAZOLAM 0.5 MG: 0.5 TABLET ORAL at 16:47

## 2017-02-07 RX ADMIN — OXYCODONE HYDROCHLORIDE AND ACETAMINOPHEN 2 TABLET: 5; 325 TABLET ORAL at 18:07

## 2017-02-07 RX ADMIN — LEVALBUTEROL 1.25 MG: 1.25 SOLUTION, CONCENTRATE RESPIRATORY (INHALATION) at 23:53

## 2017-02-07 RX ADMIN — OXYCODONE HYDROCHLORIDE AND ACETAMINOPHEN 2 TABLET: 5; 325 TABLET ORAL at 08:51

## 2017-02-07 RX ADMIN — BUDESONIDE 500 MCG: 0.5 INHALANT RESPIRATORY (INHALATION) at 07:10

## 2017-02-07 RX ADMIN — CLONAZEPAM 0.25 MG: 0.5 TABLET ORAL at 20:17

## 2017-02-07 RX ADMIN — INSULIN LISPRO 8 UNITS: 100 INJECTION, SOLUTION INTRAVENOUS; SUBCUTANEOUS at 08:51

## 2017-02-07 RX ADMIN — DOCUSATE SODIUM 100 MG: 100 CAPSULE, LIQUID FILLED ORAL at 08:51

## 2017-02-07 RX ADMIN — DILTIAZEM HYDROCHLORIDE 60 MG: 60 TABLET, FILM COATED ORAL at 06:19

## 2017-02-07 NOTE — ROUTINE PROCESS
Bedside and Verbal shift change report given to Marlin Greene (oncoming nurse) by Oxana Velazco RN  (offgoing nurse). Report given with SBAR, Kardex, Intake/Output and Recent Results.

## 2017-02-07 NOTE — PROGRESS NOTES
conducted a Follow up consultation and Spiritual Assessment for Guero Marie, who is a 66 y.o.,female. Patient is afraid of dying if she goes to Rehab. Tried to make her see   the good she might get there vs home at this time. .    The  provided the following Interventions:  Continued the relationship of care and support. Listened empathically. Offered prayer and assurance of continued prayer on patients behalf. Chart reviewed. The following outcomes were achieved:  Patient expressed gratitude for pastoral care visit. Assessment:  There are no spiritual or Congregational issues which require intervention at this time. Plan:  Chaplains will continue to follow and will provide pastoral care on an as needed/requested basis.  recommends bedside caregivers page  on duty if patient shows signs of acute spiritual or emotional distress.        Sister Keo Akbar [place], [address]  [phone]

## 2017-02-07 NOTE — PROGRESS NOTES
0800. sitting in bed, assessment completed. C/o pain. Medicated as ordered.  1200. Pt called c/o pain med, medicated as ordered.  1600. Pt in chair , talking to visitors. No sign of distress noted, very anxious about going to rehab, wanted to talk w/ Dr. Ryder Most regarding her d/c/  1800. C/o pain, medicated w Percocet as ordered. 1930. Bedside and Verbal shift change report given to Kirk Oneill RN (oncoming nurse) by Lilly Lopes RN   (offgoing nurse). Report included the following information SBAR, Intake/Output, MAR and Recent Results.

## 2017-02-07 NOTE — INTERDISCIPLINARY ROUNDS
IDR/SLIDR Summary          Patient: Guido Rivas MRN: [id number]    Age: 66 y.o. YOB: 1945 Room/Bed: [facility]   Admit Diagnosis: Insufficiency, respiratory, acute (HCC)  CHF (congestive heart failure) (HCC)  Elevated troponin  COPD (chronic obstructive pulmonary disease) (HCC)  Infiltrate noted on imaging study  Insufficiency, respiratory, acute (HCC)  CHF (congestive heart failure) (HCC)  Elevated troponin  COPD (chronic obstructive pulmonary disease) (HCC)  Infiltrate noted on imaging study  Principal Diagnosis: Acute on chronic diastolic CHF (congestive heart failure) (HCC)   Goals: Continue to work w/skilled therapists, continue to encourage snf at d/c  Readmission: YES  Quality Measure: CHF and COPD  VTE Prophylaxis: Mechanical  Influenza Vaccine screening completed? YES  Pneumococcal Vaccine screening completed? YES  Mobility needs: Yes   Nutrition plan:Yes  Consults: P. T and O.T. Financial concerns:No  Escalated to CM? YES  RRAT Score: 39   Interventions:  None   Testing due for pt today? NO  LOS: 8 days Expected length of stay 4.9 days  Discharge plan: Rehab   PCP: Zorita Crigler, MD  Transportation needs: No    Days before discharge:two or more days before discharge   Discharge disposition: Rehab  Present for rounds:  Dr. Onelia Lynne. Abdiaziz Larsen, Bhaskar Walker CM, Rafael Patino, PharmD, Adrianna Quezada RN, TIMI Johnson, OT,   and ZAHRA Tanner, PT.         Signed:     Monique Yanez RN  2/7/2017  2:52 AM

## 2017-02-07 NOTE — ROUTINE PROCESS
Bedside and Verbal shift change report given to Ute Malone RN (oncoming nurse) by Michelle Can RN   (offgoing nurse). Report included the following information SBAR, Kardex, Intake/Output and MAR.

## 2017-02-07 NOTE — PROGRESS NOTES
Problem: Self Care Deficits Care Plan (Adult)  Goal: *Acute Goals and Plan of Care (Insert Text)  IInitial OT STGs (1/31/2017) Within 7 days:    1. Patient will perform toilet transfer with CGA/Katherin & SPO2>90% in preparation for bowel and bladder management. 2. Patient will perform bowel and bladder management with CGA/Katherin & SPO2>90% for increased independence with ADLs. 3. Patient will grooming standing sinkside for 5 minutes for increased independence ADLs/IADLs. 4. Patient will perform LB/UB dressing with setupA w/ A/E PRN for increased independence with ADLs. 5. Patient will perform UE exercises with SBA for increased independence with ADLs. 6. Patient will utilize energy conservation techniques with 1 verbal cue for increased independence with ADLs. Occupational Therapy Goals  Initiated 2/7/2017 within 7 day(s). 1. Patient will perform grooming with supervision/set-up 2. Patient will perform upper body dressing and lower body dressing with supervision/set-up. 3. Patient will perform standing sink side for 5 minutes with supervision/set-up. 4. Patient will perform toilet transfers with supervision/set-up. 5. Patient will perform all aspects of toileting with supervision/set-up. 6. Patient will participate in upper extremity therapeutic exercise/activities with supervision/set-up for 10 minutes. 7. Patient will utilize energy conservation techniques during functional activities with verbal cues.    Outcome: Progressing Towards Goal  OCCUPATIONAL THERAPY REEVALUATION     Patient: Patience Renee (2 y.o. female)  Date: 2/7/2017  Diagnosis: Insufficiency, respiratory, acute (HCC)  CHF (congestive heart failure) (HCC)  Elevated troponin  COPD (chronic obstructive pulmonary disease) ([facility] [address]  Infiltrate noted on imaging study  Insufficiency, respiratory, acute (HCC)  CHF (congestive heart failure) (HCC)  Elevated troponin  COPD (chronic obstructive pulmonary disease) (HCC)  Infiltrate noted on imaging study Acute on chronic diastolic CHF (congestive heart failure) ([facility] [address]       Precautions: Fall      ASSESSMENT :  Based on the objective data described below, the patient presents with continued decreased ability to perform ADL tasks due to decreased functional strength, decreased functional balance, and decreased overall activity tolerance. Pt seated in chair upon entering, agreeable to therapy. Pt donned gown like robe with min A. Pt completed sit to stand transfer with CGA. Pt completed functional/bathroom mobility with CGA using RW. Pt required min A to transfer off toilet. Pt completed grooming while standing sink side for <1 minute with CGA. SpO2 dropped to 85%, remaining on 3L during activity. While seated in chair, pt completed UE exercises to improve UE strength and overall activity tolerance. Pt left seated in chair with needs in reach. Patient will benefit from skilled intervention to address the above impairments.   Patients rehabilitation potential is considered to be Good  Factors which may influence rehabilitation potential include:   [ ]                None noted  [ ]                Mental ability/status  [X]                Medical condition  [ ]                Home/family situation and support systems  [ ]                Safety awareness  [ ]                Pain tolerance/management  [ ]                Other:           PLAN :  Recommendations and Planned Interventions:  [X]                  Self Care Training                  [X]           Therapeutic Activities  [X]                  Functional Mobility Training    [ ]           Cognitive Retraining  [X]                  Therapeutic Exercises           [X]           Endurance Activities  [X]                  Balance Training                   [X]           Neuromuscular Re-Education  [ ]                  Visual/Perceptual Training     [X]      Home Safety Training  [X]                  Patient Education                 [X]           Family Training/Education  [ ]                  Other (comment):     Frequency/Duration: Patient will be followed by occupational therapy 3-5 times a week to address goals. Discharge Recommendations: Rehab  Further Equipment Recommendations for Discharge: TBD by next level of care       SUBJECTIVE:   Patient stated I don't want to go to rehab.       OBJECTIVE DATA SUMMARY:   Hospital course since last seen and reason for reevaluation: Pt has been on OT caseload x1 week, now requiring weekly re-evaluation     Cognitive/Behavioral Status:  Neurologic State: Alert  Orientation Level: Oriented X4  Cognition: Follows commands  Safety/Judgement: Fall prevention  Coordination:   WNL          Balance:  Sitting: Intact  Standing: Impaired; With support  Standing - Static: Good  Standing - Dynamic : Fair  Strength:  Generally decreased, functional     Tone & Sensation:  WNL     Range of Motion:  Generally decreased, functional      Functional Mobility and Transfers for ADLs:     Transfers:  Sit to Stand: Contact guard assistance; Additional time              Toilet Transfer : Minimum assistance              Bathroom Mobility: Contact guard assistance  ADL Assessment:  Oral Facial Hygiene/Grooming: Contact guard assistance     Upper Body Dressing: Minimum assistance     Toileting: Minimum assistance     ADL Intervention:  Grooming  Grooming Assistance: Contact guard assistance  Washing Hands: Contact guard assistance     Upper Body Dressing Assistance  Dressing Assistance: Minimum assistance (due to lines)  Hospital Gown: Minimum  assistance     Cognitive Retraining  Safety/Judgement: Fall prevention        Pain:   Pre treatment pain level: 6  Post treatment pain level:  6     Activity Tolerance:   fair  Please refer to the flowsheet for vital signs taken during this treatment.   After treatment:   [X] Patient left in no apparent distress sitting up in chair  [ ] Patient left in no apparent distress in bed  [X] Call bell left within reach  [X] Nursing notified  [ ] Caregiver present  [ ] Bed alarm activated      COMMUNICATION/EDUCATION: energy conservation, UE exercises   [ ]    Home safety education was provided and the patient/caregiver indicated understanding. [X]    Patient/family have participated as able in goal setting and plan of care. [X]    Patient/family agree to work toward stated goals and plan of care. [ ]    Patient understands intent and goals of therapy, but is neutral about his/her participation. [ ]    Patient is unable to participate in goal setting and plan of care. This patients plan of care is appropriate for delegation to hospitals.      Thank you for this referral.  Kendra Porras MS OTR/L  Time Calculation: 23 mins

## 2017-02-07 NOTE — PROGRESS NOTES
Problem: Mobility Impaired (Adult and Pediatric)  Goal: *Acute Goals and Plan of Care (Insert Text)  Physical Therapy Goals  Initiated 2/1/2017 and to be accomplished within 7 day(s)  1. Patient will move from supine to sit and sit to supine in bed with supervision/set-up. 2. Patient will transfer from bed to chair and chair to bed with supervision/set-up using the least restrictive device. 3. Patient will perform sit to stand with supervision/set-up. 4. Patient will ambulate with supervision/set-up for >150 feet with the least restrictive device for safe home ambulation. 5. Patient will ascend/descend 1 step without handrail(s) with minimal assistance/contact guard assist for safe home entry. Outcome: Progressing Towards Goal  PHYSICAL THERAPY TREATMENT     Patient: Richard Barrera (9 y.o. female)  Date: 2/7/2017  Diagnosis: Insufficiency, respiratory, acute (HCC)  CHF (congestive heart failure) (HCC)  Elevated troponin  COPD (chronic obstructive pulmonary disease) ([address]  Infiltrate noted on imaging study  Insufficiency, respiratory, acute (HCC)  CHF (congestive heart failure) (HCC)  Elevated troponin  COPD (chronic obstructive pulmonary disease) (HCC)  Infiltrate noted on imaging study Acute on chronic diastolic CHF (congestive heart failure) ([address]  Precautions: Fall   Chart, physical therapy assessment, plan of care and goals were reviewed. ASSESSMENT:  Pt motivated to participate with PT. Pt amb 76' with RW CGA. Balance remains increasingly unsteady with exertion/ fatigue. SpO2 also decreases to 86% on 3L. VCs needed for correct breathing technique. Pt has multiple questions about possible going to rehab. Pt with increase anxiety, and tearful at times. Pt misses her . Additional time spent answering multiple questions. Pt also requesting therapist to pray with her about this hospitalization. Pt unable to progress at this time and rehab remains appropriate.  Cont POC. (enoucarged pt to have spouse present to see how she performs with therapy so they can make decision together. . Rehab or home health)      Progression toward goals:  [ ]      Improving appropriately and progressing toward goals  [X]      Improving slowly and progressing toward goals  [ ]      Not making progress toward goals and plan of care will be adjusted       PLAN:  Patient continues to benefit from skilled intervention to address the above impairments. Continue treatment per established plan of care. Discharge Recommendations:  Rehab or home health pending progress  Further Equipment Recommendations for Discharge:  rolling walker       SUBJECTIVE:   Patient stated  I knew some people that  at the nursing home       OBJECTIVE DATA SUMMARY:   Critical Behavior:  Neurologic State: Alert  Orientation Level: Oriented X4  Cognition: Follows commands  Safety/Judgement: Fall prevention  Functional Mobility Training:     Transfers:  Sit to Stand: Contact guard assistance  Stand to Sit: Contact guard assistance  Balance:  Sitting: Intact  Standing: Impaired; With support  Standing - Static: Good  Standing - Dynamic : Fair  Ambulation/Gait Training:  Distance (ft): 75 Feet (ft)  Assistive Device: Walker, rolling;Gait belt  Ambulation - Level of Assistance: Contact guard assistance  Gait Abnormalities: Decreased step clearance; Antalgic; Shuffling gait  Base of Support: Widened  Stance: Weight shift  Speed/Kristin: Slow;Shuffled  Step Length: Right shortened;Left shortened  Swing Pattern: Left asymmetrical;Right asymmetrical  Interventions: Verbal cues; Tactile cues; Safety awareness training     Pain:  Pain Scale 1: Numeric (0 - 10)  Pain Intensity 1: 6  Pain Location 1: Back  Activity Tolerance:   Fair-     After treatment:   [X] Patient left in no apparent distress sitting up in chair  [ ] Patient left in no apparent distress in bed  [X] Call bell left within reach  [X] Nursing notified  [ ] Caregiver present  [ ] Bed alarm activated Vandana Muhammad, PTA   Time Calculation: 54 mins

## 2017-02-07 NOTE — PROGRESS NOTES
Progress Note  Pulmonary, Critical Care, and Sleep Medicine    Name: Patience Renee MRN: [id number]   : 1945 Hospital: [facility] [place]   Date: 2017        IMPRESSION:   · Acute on chronic hypercapnic and hypoxemic respiratory failure requiring BIPAP, improving and now on O2 NC  · Possible COPD/asthma exacerbation although pt and  deny history of COPD  · IMANI unable to tolerate CPAP due to claustrophobia  · Pulmonary HTN likely Group 3 on the basis of long standing IMANI and hypoxemia vs secondary to left sided failure jac with dilated left [facility] on latest Echo  · Diastolic heart failure  · Leukocytosis steroid related improving  · DM II glycemic control suboptimal, hypoglycemia noted earlier today  · Elevated Troponin  · A fib now rate controlled on po Cardizem  · Insomnia   · sciatica      PLAN:   · Continue night time bipap support  · Outpatient records reveal that patient has significant restrictive disease with FEV1 = 40% but no obstructive pattern but HRCT showed air trapping so patient treated empirically for asthma. · Increased percoset prn dosing given uncontrolled pain  · Titrate supplemental O2  · Trazodone increased, prn Benzodiazepines  · Decrease prednisone to 20 mg daily  · Strict glycemic control, hold preprandial insulin  · DVT and GI prophylaxis issues addressed  · Repeat overnight sleep study and cpap titration study with nasal pillow. · Discussed with pt at bedside  · Lidocaine patch     Subjective/Interval History:   Reports sciatic pain   NO issues overnight  Tolerating po. .      ROS:Pertinent items are noted in HPI. Orders reviewed including medications. Changes made if indicated. Telemetry monitor reviewed at the bedside.   Objective:   Vital Signs:    Visit Vitals    /54 (BP 1 Location: Left arm, BP Patient Position: Sitting)    Pulse 81    Temp 97.7 °F (36.5 °C)    Resp 18    Ht 5' 5\" (1.651 m)    Wt 104.1 kg (229 lb 9.6 oz)    SpO2 98%    BMI 38.21 kg/m2       O2 Device: Nasal cannula   O2 Flow Rate (L/min): 3 l/min   Temp (24hrs), Av.7 °F (36.5 °C), Min:97 °F (36.1 °C), Max:98.1 °F (36.7 °C)       Intake/Output:   Last shift:      701 - 1900  In: 600 [P.O.:600]  Out: 100 [Urine:100]  Last 3 shifts: 1901 - 700  In: 720 [P.O.:720]  Out: 300 [Urine:300]    Intake/Output Summary (Last 24 hours) at 17 1337  Last data filed at 17 1256   Gross per 24 hour   Intake             1080 ml   Output              400 ml   Net              680 ml        Physical Exam:    General:  Awake , cooperative, in no distress, appears anxious. Head:  Normocephalic, without obvious abnormality, atraumatic. Eyes:  ANicteric, PERRL,   Nose: Nares normal.  Mucosa normal. No drainage or sinus tenderness. Throat: Lips, mucosa, and tongue normal.  NO Thrush   Neck: Supple, symmetrical, trachea midline, no adenopathy, thyroid: no enlargment/tenderness/nodules    Back:   Symmetric    Lungs:   Diminished breath sounds, no rales or wheezes   Chest wall:  No tenderness or deformity. NO intercostal retractions   Heart:  irregular, S1, S2 normal, no murmur, click, rub or gallop. Abdomen:   Soft, non-tender. Bowel sounds normal. No masses,  No organomegaly. NO paradoxical motion   Extremities: normal, atraumatic, no cyanosis or edema. Pulses: 2+ and symmetric all extremities.    Skin: Skin color, texture, turgor normal. No rashes, large ecchymosis left proximal humerus, NO clubbing   Lymph nodes: Cervical  nodes normal.   Neurologic: Grossly nonfocal      :        Devices:             Drips:    DATA:  Labs:  Recent Labs      17   0256  17   0455   WBC  15.1*  10.3   HGB  13.5  12.2   HCT  43.3  38.7   PLT  198  195     Recent Labs      17   0503  17   0256  17   0455   NA   --   143  142   K   --   4.4  3.9   CL   --   101  100   CO2   --   38*  38*   GLU   --   160*  195*   BUN   --   49*  41*   CREA   --   1.30 1.18   CA   --   9.1  8.6   MG  2.4  2.3  2.2   ALB   --   3.5  3.0*   SGOT   --   17  13*   ALT   --   19  20   INR  2.6*  2.0*  1.9*     No results for input(s): PH, PCO2, PO2, HCO3, FIO2 in the last 72 hours. Imaging:  []        I have personally reviewed the patients radiographs and reports  []         []        No change from prior, tubes and lines in adequate position  []        Improved   []        Worsening  High complexity decision making was performed during this consultation and evaluation.      Macrina Salcido MD

## 2017-02-07 NOTE — PROGRESS NOTES
Hospitalist Progress Note-critical care note     Patient: Elpidio Caicedo MRN: [id number]  CSN: [id number]    YOB: 1945  Age: 66 y.o. Sex: female    DOA: 1/30/2017 LOS:  LOS: 8 days            Chief complaint: chf exacerbation,  Acute on chronic respiratory failure , chest pain,  copd exacerbation ,  HTN, anxiety     Assessment/Plan         Patient Active Problem List   Diagnosis Code    Acute on chronic diastolic CHF (congestive heart failure) ([facility]) I50.33    ALEJANDRINA (acute kidney injury) ([facility] [address] N17.9    Acute exacerbation of CHF (congestive heart failure) ([facility]) I50.9    DM (diabetes mellitus) ([facility] [address] E11.9    Hypertension I10    Respiratory failure ([facility] [address] J96.90    Acute coronary syndrome ([facility] [address] I24.9    Obstructive sleep apnea, adult G47.33    Paroxysmal atrial fibrillation ([facility]) I48.0    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 ([facility]) J78.49, E11.65    Dyspnea due to congestive heart failure ([facility]) I50.9    Aspiration pneumonia ([facility]) J69.0    Hyperkalemia E87.5    Elevated troponin R79.89    Acute respiratory distress ([facility]) R06.00    COPD (chronic obstructive pulmonary disease) ([facility]) J44.9    CHF (congestive heart failure) ([facility]) I50.9    Insufficiency, respiratory, acute ([facility]) R06.89    Infiltrate noted on imaging study R93.8    Chronic respiratory failure with hypoxia ([facility]) J96.11    COPD exacerbation ([facility]) J44.1    Acute on chronic respiratory failure with hypoxia ([facility]) J96.21    Chest pain R07.9    Anxiety F41.9     1. Acute on chronic  respiratory failure   Continue Improving on  3 L nc O2 , need bipap at night -refused bipap at night   Multiple factors, copd exacerbation, possible hcap, hermelinda, Pulmonary hypertension (moderated from 2016 echo and chf exacerbation   2. Copd exacerbation and possible hcap  On  breathing tx with xopenex. pulmocort and brovana, off   Levaquin, improving.  Tapering off steroid  3 afib with rvr  Rate controlled per cardizem now, optimize the electrolytes, on warfarin , inr 2.6 .  continue pharmacy dose   4. chf exacerbation -diastolic from previous echo  continue lasix cards on board   5. Hyperkalemia  Resolved,   7. DM type II ,   -long term insulin ,   - on  lantus to 25 ,  Ssi, increase premeal insulin   8 HTN, accelerated  Continue home medication. 9. Pulmonary hypertension   moderate from echo   10 hermelinda   Not compliance cpap at home   11 anxiety  Scheduled klonipin ,better controlled   12. Insomnia   Continue trazodone prn for sleep     Does not want to go to rehab, will continue PT/ OT,   Subjective: feel fine , no body give me bipap   Nurse: no acute issue last night ,       Review of systems:    General: No fevers or chills. Cardiovascular: no  chest pain , . No palpitations. Pulmonary: shortness of breath improving   Gastrointestinal: No nausea, vomiting. Vital signs/Intake and Output:  Visit Vitals    /54 (BP 1 Location: Left arm, BP Patient Position: Sitting)    Pulse 81    Temp 97.7 °F (36.5 °C)    Resp 18    Ht 5' 5\" (1.651 m)    Wt 104.1 kg (229 lb 9.6 oz)    SpO2 98%    BMI 38.21 kg/m2     Current Shift:  02/07 0701 - 02/07 1900  In: 600 [P.O.:600]  Out: 100 [Urine:100]  Last three shifts:  02/05 1901 - 02/07 0700  In: 5 [P.O.:720]  Out: 300 [Urine:300]    Physical Exam:  General: WD, WN. Alert, cooperative, no acute distress    HEENT: NC, Atraumatic. PERRLA, anicteric sclerae. bipap mask noted   Lungs: CTA Bilaterally. No Wheezing/Rhonchi/Rales. Heart:  Irregular irregular ,  No murmur, No Rubs, No Gallops  Abdomen: Soft, Non distended, Non tender.  +Bowel sounds,   Extremities: No c/c/e  Psych:   No anxious . No agitated. Neurologic:  No acute neurological deficit.              Labs: Results:       Chemistry Recent Labs      02/06/17   0256  02/05/17   0455   GLU  160*  195*   NA  143  142   K  4.4  3.9   CL  101  100   CO2  38*  38*   BUN  49*  41*   CREA  1.30  1.18   CA  9.1  8.6   AGAP  4  4 BUCR  38*  35*   AP  137*  112   TP  6.9  6.2*   ALB  3.5  3.0*   GLOB  3.4  3.2   AGRAT  1.0  0.9      CBC w/Diff Recent Labs      02/06/17   0256  02/05/17   0455   WBC  15.1*  10.3   RBC  4.28  3.83*   HGB  13.5  12.2   HCT  43.3  38.7   PLT  198  195   GRANS  82*  87*   LYMPH  10*  6*   EOS  1  0      Cardiac Enzymes No results for input(s): CPK, CKND1, ALESSANDRA in the last 72 hours. No lab exists for component: CKRMB, TROIP   Coagulation Recent Labs      02/07/17   0503  02/06/17   0256   PTP  26.3*  22.0*   INR  2.6*  2.0*       Lipid Panel No results found for: CHOL, CHOLPOCT, CHOLX, CHLST, CHOLV, [id number], HDL, LDL, NLDLCT, DLDL, LDLC, DLDLP, 658089, VLDLC, VLDL, TGL, TGLX, TRIGL, [id number], TRIGP, TGLPOCT, [id number], CHHD, CHHDX   BNP No results for input(s): BNPP in the last 72 hours.    Liver Enzymes Recent Labs      02/06/17   0256   TP  6.9   ALB  3.5   AP  137*   SGOT  17      Thyroid Studies Lab Results   Component Value Date/Time    TSH 0.31 01/01/2016 10:20 PM        Procedures/imaging: see electronic medical records for all procedures/Xrays and details which were not copied into this note but were reviewed prior to creation of Keny Pelaez MD

## 2017-02-07 NOTE — PROGRESS NOTES
Pharmacy Dosing Services:  Warfarin    Consult for Warfarin Dosing by Pharmacy by Dr. Gustabo Valentin provided for this 66 y.o.  female , for indication of Atrial Fibrillation. Dose to achieve an INR goal of 2-3    Order entered for  Warfarin  2 (mg) ordered to be given today at 18:00. Significant drug interactions: Levofloxacin    LABS    PT/INR Lab Results   Component Value Date/Time    INR 2.6 02/07/2017 05:03 AM      Platelets Lab Results   Component Value Date/Time    PLATELET 575 41/29/0676 02:56 AM      H/H     Lab Results   Component Value Date/Time    HGB 13.5 02/06/2017 02:56 AM        Warfarin Administrations (last 168 hours)     Date/Time Action Medication Dose    02/06/17 1715 Given    warfarin (COUMADIN) tablet 5 mg 5 mg    02/05/17 1723 Given    warfarin (COUMADIN) tablet 5 mg 5 mg    02/04/17 1821 Given    warfarin (COUMADIN) tablet 5 mg 5 mg    02/03/17 1733 Given   [INR 1.8]    warfarin (COUMADIN) tablet 4 mg 4 mg    02/02/17 1740 Given    warfarin (COUMADIN) tablet 3 mg 3 mg    02/01/17 1733 Given    warfarin (COUMADIN) tablet 2.5 mg 2.5 mg    01/31/17 1733 Given    warfarin (COUMADIN) tablet 2.5 mg 2.5 mg          Pharmacy to follow daily and will provide subsequent Warfarin dosing based on clinical status.   Hortencia Gruber, [facility]  Contact information     [phone]

## 2017-02-07 NOTE — PROGRESS NOTES
Problem: Mobility Impaired (Adult and Pediatric)  Goal: *Acute Goals and Plan of Care (Insert Text)  Physical Therapy Goals  Initiated 2/1/2017 and to be accomplished within 7 day(s)  1. Patient will move from supine to sit and sit to supine in bed with supervision/set-up. 2. Patient will transfer from bed to chair and chair to bed with supervision/set-up using the least restrictive device. 3. Patient will perform sit to stand with supervision/set-up. 4. Patient will ambulate with supervision/set-up for >150 feet with the least restrictive device for safe home ambulation. 5. Patient will ascend/descend 1 step without handrail(s) with minimal assistance/contact guard assist for safe home entry. Pt refused PT due to:  [ ]  Nausea/vomiting  [X]  About to eat  [X]  Just finishing with OT  [X]  Rounding with MD/Nursing  [ ]  Off Unit  Will f/u later as schedule allows. Thank you.   Vandana Muhammad, PTA

## 2017-02-07 NOTE — PROGRESS NOTES
Cardiology Progress Note        Patient: Cornel Pelletier        Sex: female          DOA: 1/30/2017  YOB: 1945      Age:  66 y.o.        LOS:  LOS: 8 days    Patient seen and examined. Chart reviewed. Assessment/Plan     Acute on chronic diastolic heart failure compensated   Acute on chronic respiratory failure improving   COPD  Permanent atrial fibrillation - rate is under good control  Abnormal troponin most likely secondary to heart failure, respiratory failure, atrial fibrillation with rapid ventricular rate, no evidence of ACS, however underlying CAD can not be ruled out. Moderate aortic stenosis   Hyperkalemia  Chest pain mid sternal resolved       Plan:          Continue current management. Continue coumadin as per PT/INR  Continue management as per hospital medicine. Strict I/O, fluid restriction up to 1200 cc/day                          Subjective:    cc: My breathing is much better, leg swelling improved. Denies any chest pain. REVIEW OF SYSTEMS:     General: No fevers or chills. Cardiovascular: Positive leg swelling, No chest pain,No palpitations, No orthopnea, No PND, No claudication  Pulmonary: No dyspnea  Gastrointestinal: No nausea, vomiting, bleeding  Neurology: No Dizziness    Objective:      Visit Vitals    /54 (BP 1 Location: Left arm, BP Patient Position: Sitting)    Pulse 81    Temp 97.7 °F (36.5 °C)    Resp 18    Ht 5' 5\" (1.651 m)    Wt 104.1 kg (229 lb 9.6 oz)    SpO2 98%    BMI 38.21 kg/m2     Body mass index is 38.21 kg/(m^2). Physical Exam:  General Appearance: Comfortable, obese, not using accessory muscles of respiration. HEENT: SIRIA. HEAD: Atraumatic  NECK: No JVD, no thyroidomeglay. CAROTIDS: No bruit  LUNGS: Clear bilaterally, no creps, no wheezing    HEART: S1+S2 audible, irregularly irregular, 3/6 systolic murmur in aortic area, no pericardial rub.      ABD: Non-tender, BS Audible    EXT: + leg edema right more than left, and no cyanosis. VASCULAR EXAM: Pulses are intact. PSYCHIATRIC EXAM: Mood is appropriate. MUSCULOSKELETAL: Grossly no joint deformity.   NEUROLOGICAL: AAO times 3,No motor and sensory deficit  Medication:  Current Facility-Administered Medications   Medication Dose Route Frequency    warfarin (COUMADIN) tablet 2 mg  2 mg Oral ONCE    furosemide (LASIX) injection 40 mg  40 mg IntraVENous DAILY    oxyCODONE-acetaminophen (PERCOCET) 5-325 mg per tablet 2 Tab  2 Tab Oral Q4H PRN    predniSONE (DELTASONE) tablet 20 mg  20 mg Oral DAILY WITH BREAKFAST    insulin lispro (HUMALOG) injection 8 Units  8 Units SubCUTAneous TIDAC    pantoprazole (PROTONIX) tablet 40 mg  40 mg Oral ACB    dilTIAZem (CARDIZEM) IR tablet 60 mg  60 mg Oral TIDAC    ALPRAZolam (XANAX) tablet 0.5 mg  0.5 mg Oral Q8H PRN    labetalol (NORMODYNE;TRANDATE) injection 10 mg  10 mg IntraVENous Q4H PRN    clonazePAM (KlonoPIN) tablet 0.25 mg  0.25 mg Oral BID    nitroglycerin (NITROSTAT) tablet 0.4 mg  0.4 mg SubLINGual PRN    traZODone (DESYREL) tablet 50 mg  50 mg Oral QHS PRN    insulin glargine (LANTUS) injection 25 Units  25 Units SubCUTAneous QHS    insulin lispro (HUMALOG) injection   SubCUTAneous AC&HS    carvedilol (COREG) tablet 25 mg  25 mg Oral BID WITH MEALS    levalbuterol (XOPENEX) nebulizer soln 1.25 mg/0.5 ml  1.25 mg Nebulization Q4H RT    arformoterol (BROVANA) neb solution 15 mcg  15 mcg Nebulization BID RT    budesonide (PULMICORT) 500 mcg/2 ml nebulizer suspension  500 mcg Nebulization BID RT    diphenhydrAMINE (BENADRYL) injection 12.5 mg  12.5 mg IntraVENous Q4H PRN    ondansetron (ZOFRAN) injection 4 mg  4 mg IntraVENous Q4H PRN    docusate sodium (COLACE) capsule 100 mg  100 mg Oral BID    glucose chewable tablet 16 g  4 Tab Oral PRN    glucagon (GLUCAGEN) injection 1 mg  1 mg IntraMUSCular PRN    dextrose (D50W) injection syrg 12.5-25 g  25-50 mL IntraVENous PRN    levoFLOXacin (LEVAQUIN) 750 mg in D5W IVPB  750 mg IntraVENous Q24H    Warfarin - Pharmacy to dose   Other Rx Dosing/Monitoring               Lab/Data Reviewed:       Recent Labs      02/06/17   0256  02/05/17 0455   WBC  15.1*  10.3   HGB  13.5  12.2   HCT  43.3  38.7   PLT  198  195     Recent Labs      02/06/17   0256  02/05/17 0455   NA  143  142   K  4.4  3.9   CL  101  100   CO2  38*  38*   GLU  160*  195*   BUN  49*  41*   CREA  1.30  1.18   CA  9.1  8.6       Signed By: Jared Tucker MD     February 7, 2017

## 2017-02-08 LAB
BASOPHILS # BLD AUTO: 0 K/UL (ref 0–0.06)
BASOPHILS # BLD: 0 % (ref 0–2)
DIFFERENTIAL METHOD BLD: ABNORMAL
EOSINOPHIL # BLD: 0.1 K/UL (ref 0–0.4)
EOSINOPHIL NFR BLD: 1 % (ref 0–5)
ERYTHROCYTE [DISTWIDTH] IN BLOOD BY AUTOMATED COUNT: 15.9 % (ref 11.6–14.5)
GLUCOSE BLD STRIP.AUTO-MCNC: 117 MG/DL (ref 70–110)
GLUCOSE BLD STRIP.AUTO-MCNC: 167 MG/DL (ref 70–110)
GLUCOSE BLD STRIP.AUTO-MCNC: 330 MG/DL (ref 70–110)
GLUCOSE BLD STRIP.AUTO-MCNC: 82 MG/DL (ref 70–110)
HCT VFR BLD AUTO: 40 % (ref 35–45)
HGB BLD-MCNC: 12.4 G/DL (ref 12–16)
INR PPP: 2.5 (ref 0.8–1.2)
LYMPHOCYTES # BLD AUTO: 7 % (ref 21–52)
LYMPHOCYTES # BLD: 0.9 K/UL (ref 0.9–3.6)
MAGNESIUM SERPL-MCNC: 2.6 MG/DL (ref 1.8–2.4)
MCH RBC QN AUTO: 31.6 PG (ref 24–34)
MCHC RBC AUTO-ENTMCNC: 31 G/DL (ref 31–37)
MCV RBC AUTO: 102 FL (ref 74–97)
MONOCYTES # BLD: 1.5 K/UL (ref 0.05–1.2)
MONOCYTES NFR BLD AUTO: 11 % (ref 3–10)
NEUTS SEG # BLD: 11.3 K/UL (ref 1.8–8)
NEUTS SEG NFR BLD AUTO: 81 % (ref 40–73)
PLATELET # BLD AUTO: 165 K/UL (ref 135–420)
PMV BLD AUTO: 10.6 FL (ref 9.2–11.8)
PROTHROMBIN TIME: 25.7 SEC (ref 11.5–15.2)
RBC # BLD AUTO: 3.92 M/UL (ref 4.2–5.3)
WBC # BLD AUTO: 13.9 K/UL (ref 4.6–13.2)

## 2017-02-08 PROCEDURE — 94760 N-INVAS EAR/PLS OXIMETRY 1: CPT

## 2017-02-08 PROCEDURE — 82962 GLUCOSE BLOOD TEST: CPT

## 2017-02-08 PROCEDURE — 36415 COLL VENOUS BLD VENIPUNCTURE: CPT | Performed by: HOSPITALIST

## 2017-02-08 PROCEDURE — 74011250637 HC RX REV CODE- 250/637: Performed by: INTERNAL MEDICINE

## 2017-02-08 PROCEDURE — 77010033678 HC OXYGEN DAILY

## 2017-02-08 PROCEDURE — 97535 SELF CARE MNGMENT TRAINING: CPT

## 2017-02-08 PROCEDURE — 74011636637 HC RX REV CODE- 636/637: Performed by: HOSPITALIST

## 2017-02-08 PROCEDURE — 94660 CPAP INITIATION&MGMT: CPT

## 2017-02-08 PROCEDURE — 83735 ASSAY OF MAGNESIUM: CPT | Performed by: HOSPITALIST

## 2017-02-08 PROCEDURE — 85610 PROTHROMBIN TIME: CPT | Performed by: HOSPITALIST

## 2017-02-08 PROCEDURE — 97110 THERAPEUTIC EXERCISES: CPT

## 2017-02-08 PROCEDURE — 74011000250 HC RX REV CODE- 250: Performed by: HOSPITALIST

## 2017-02-08 PROCEDURE — 74011250637 HC RX REV CODE- 250/637: Performed by: HOSPITALIST

## 2017-02-08 PROCEDURE — 97116 GAIT TRAINING THERAPY: CPT

## 2017-02-08 PROCEDURE — 85025 COMPLETE CBC W/AUTO DIFF WBC: CPT | Performed by: HOSPITALIST

## 2017-02-08 PROCEDURE — 94640 AIRWAY INHALATION TREATMENT: CPT

## 2017-02-08 PROCEDURE — 74011636637 HC RX REV CODE- 636/637: Performed by: INTERNAL MEDICINE

## 2017-02-08 PROCEDURE — 65660000000 HC RM CCU STEPDOWN

## 2017-02-08 RX ORDER — FUROSEMIDE 40 MG/1
40 TABLET ORAL 2 TIMES DAILY
Status: DISCONTINUED | OUTPATIENT
Start: 2017-02-08 | End: 2017-02-09

## 2017-02-08 RX ORDER — FUROSEMIDE 40 MG/1
40 TABLET ORAL DAILY
Status: DISCONTINUED | OUTPATIENT
Start: 2017-02-09 | End: 2017-02-08

## 2017-02-08 RX ORDER — WARFARIN 1 MG/1
2 TABLET ORAL ONCE
Status: COMPLETED | OUTPATIENT
Start: 2017-02-08 | End: 2017-02-08

## 2017-02-08 RX ORDER — SIMVASTATIN 10 MG/1
10 TABLET, FILM COATED ORAL
Status: DISCONTINUED | OUTPATIENT
Start: 2017-02-08 | End: 2017-02-09 | Stop reason: HOSPADM

## 2017-02-08 RX ADMIN — SIMVASTATIN 10 MG: 10 TABLET, FILM COATED ORAL at 22:30

## 2017-02-08 RX ADMIN — CARVEDILOL 25 MG: 12.5 TABLET, FILM COATED ORAL at 17:07

## 2017-02-08 RX ADMIN — OXYCODONE HYDROCHLORIDE AND ACETAMINOPHEN 2 TABLET: 5; 325 TABLET ORAL at 14:33

## 2017-02-08 RX ADMIN — BUDESONIDE 500 MCG: 0.5 INHALANT RESPIRATORY (INHALATION) at 07:41

## 2017-02-08 RX ADMIN — DOCUSATE SODIUM 100 MG: 100 CAPSULE, LIQUID FILLED ORAL at 20:31

## 2017-02-08 RX ADMIN — WARFARIN SODIUM 2 MG: 1 TABLET ORAL at 17:07

## 2017-02-08 RX ADMIN — INSULIN LISPRO 12 UNITS: 100 INJECTION, SOLUTION INTRAVENOUS; SUBCUTANEOUS at 12:25

## 2017-02-08 RX ADMIN — DILTIAZEM HYDROCHLORIDE 60 MG: 60 TABLET, FILM COATED ORAL at 17:07

## 2017-02-08 RX ADMIN — LEVALBUTEROL 1.25 MG: 1.25 SOLUTION, CONCENTRATE RESPIRATORY (INHALATION) at 07:41

## 2017-02-08 RX ADMIN — LEVALBUTEROL 1.25 MG: 1.25 SOLUTION, CONCENTRATE RESPIRATORY (INHALATION) at 20:26

## 2017-02-08 RX ADMIN — OXYCODONE HYDROCHLORIDE AND ACETAMINOPHEN 2 TABLET: 5; 325 TABLET ORAL at 10:01

## 2017-02-08 RX ADMIN — CLONAZEPAM 0.25 MG: 0.5 TABLET ORAL at 20:31

## 2017-02-08 RX ADMIN — FUROSEMIDE 40 MG: 40 TABLET ORAL at 20:31

## 2017-02-08 RX ADMIN — OXYCODONE HYDROCHLORIDE AND ACETAMINOPHEN 2 TABLET: 5; 325 TABLET ORAL at 02:24

## 2017-02-08 RX ADMIN — PANTOPRAZOLE SODIUM 40 MG: 40 TABLET, DELAYED RELEASE ORAL at 06:22

## 2017-02-08 RX ADMIN — TRAZODONE HYDROCHLORIDE 50 MG: 50 TABLET ORAL at 22:30

## 2017-02-08 RX ADMIN — OXYCODONE HYDROCHLORIDE AND ACETAMINOPHEN 2 TABLET: 5; 325 TABLET ORAL at 17:54

## 2017-02-08 RX ADMIN — PREDNISONE 20 MG: 20 TABLET ORAL at 10:04

## 2017-02-08 RX ADMIN — LEVALBUTEROL 1.25 MG: 1.25 SOLUTION, CONCENTRATE RESPIRATORY (INHALATION) at 15:26

## 2017-02-08 RX ADMIN — INSULIN LISPRO 8 UNITS: 100 INJECTION, SOLUTION INTRAVENOUS; SUBCUTANEOUS at 17:06

## 2017-02-08 RX ADMIN — DILTIAZEM HYDROCHLORIDE 60 MG: 60 TABLET, FILM COATED ORAL at 06:22

## 2017-02-08 RX ADMIN — INSULIN LISPRO 3 UNITS: 100 INJECTION, SOLUTION INTRAVENOUS; SUBCUTANEOUS at 17:06

## 2017-02-08 RX ADMIN — DOCUSATE SODIUM 100 MG: 100 CAPSULE, LIQUID FILLED ORAL at 10:05

## 2017-02-08 RX ADMIN — INSULIN GLARGINE 25 UNITS: 100 INJECTION, SOLUTION SUBCUTANEOUS at 22:29

## 2017-02-08 RX ADMIN — OXYCODONE HYDROCHLORIDE AND ACETAMINOPHEN 2 TABLET: 5; 325 TABLET ORAL at 22:30

## 2017-02-08 RX ADMIN — CARVEDILOL 25 MG: 12.5 TABLET, FILM COATED ORAL at 12:27

## 2017-02-08 RX ADMIN — ARFORMOTEROL TARTRATE 15 MCG: 15 SOLUTION RESPIRATORY (INHALATION) at 20:26

## 2017-02-08 RX ADMIN — LEVALBUTEROL 1.25 MG: 1.25 SOLUTION, CONCENTRATE RESPIRATORY (INHALATION) at 04:34

## 2017-02-08 RX ADMIN — LEVALBUTEROL 1.25 MG: 1.25 SOLUTION, CONCENTRATE RESPIRATORY (INHALATION) at 11:43

## 2017-02-08 RX ADMIN — ARFORMOTEROL TARTRATE 15 MCG: 15 SOLUTION RESPIRATORY (INHALATION) at 07:41

## 2017-02-08 RX ADMIN — INSULIN LISPRO 8 UNITS: 100 INJECTION, SOLUTION INTRAVENOUS; SUBCUTANEOUS at 12:25

## 2017-02-08 RX ADMIN — CLONAZEPAM 0.25 MG: 0.5 TABLET ORAL at 10:04

## 2017-02-08 RX ADMIN — BUDESONIDE 500 MCG: 0.5 INHALANT RESPIRATORY (INHALATION) at 20:26

## 2017-02-08 RX ADMIN — DILTIAZEM HYDROCHLORIDE 60 MG: 60 TABLET, FILM COATED ORAL at 12:25

## 2017-02-08 NOTE — PROGRESS NOTES
Problem: Self Care Deficits Care Plan (Adult)  Goal: *Acute Goals and Plan of Care (Insert Text)  IInitial OT STGs (1/31/2017) Within 7 days:    1. Patient will perform toilet transfer with CGA/Katherin & SPO2>90% in preparation for bowel and bladder management. 2. Patient will perform bowel and bladder management with CGA/Katherin & SPO2>90% for increased independence with ADLs. 3. Patient will grooming standing sinkside for 5 minutes for increased independence ADLs/IADLs. 4. Patient will perform LB/UB dressing with setupA w/ A/E PRN for increased independence with ADLs. 5. Patient will perform UE exercises with SBA for increased independence with ADLs. 6. Patient will utilize energy conservation techniques with 1 verbal cue for increased independence with ADLs. Occupational Therapy Goals  Initiated 2/7/2017 within 7 day(s). 1. Patient will perform grooming with supervision/set-up 2. Patient will perform upper body dressing and lower body dressing with supervision/set-up. 3. Patient will perform standing sink side for 5 minutes with supervision/set-up. 4. Patient will perform toilet transfers with supervision/set-up. 5. Patient will perform all aspects of toileting with supervision/set-up. 6. Patient will participate in upper extremity therapeutic exercise/activities with supervision/set-up for 10 minutes. 7. Patient will utilize energy conservation techniques during functional activities with verbal cues.    Outcome: Progressing Towards Goal  OCCUPATIONAL THERAPY TREATMENT     Patient: Sirena Duenas (51 y.o. female)  Date: 2/8/2017  Diagnosis: Insufficiency, respiratory, acute (HCC)  CHF (congestive heart failure) (HCC)  Elevated troponin  COPD (chronic obstructive pulmonary disease) ([facility] [address]  Infiltrate noted on imaging study  Insufficiency, respiratory, acute (HCC)  CHF (congestive heart failure) (HCC)  Elevated troponin  COPD (chronic obstructive pulmonary disease) (HCC)  Infiltrate noted on imaging study Acute on chronic diastolic CHF (congestive heart failure) ([facility] [address]       Precautions: Fall  Chart, occupational therapy assessment, plan of care, and goals were reviewed. ASSESSMENT:  Pt seated EOB upon entering, agreeable to therapy. Pt completed UE exercises while seated EOB to improve UE strength and overall activity tolerance. Pt completed sit to stand transfer, functional/bathroom mobility, and grooming while standing at sink with CGA using RW. Once returned to EOB, SpO2 was 85% after activity on supplemental O2. Pt encouraged to complete pursed lip breathing, SpO2 increased to 95%. Pt left seated EOB with needs in reach. EDUCATION: energy conservation, pursed lip breathing  Progression toward goals:  [ ]          Improving appropriately and progressing toward goals  [X]          Improving slowly and progressing toward goals  [ ]          Not making progress toward goals and plan of care will be adjusted       PLAN:  Patient continues to benefit from skilled intervention to address the above impairments. Continue treatment per established plan of care. Discharge Recommendations:  Rehab  Further Equipment Recommendations for Discharge:  TBD by next level of care       SUBJECTIVE:   Patient stated .      OBJECTIVE DATA SUMMARY:      G CODES:    Cognitive/Behavioral Status:  Neurologic State: Alert  Orientation Level: Oriented X4  Cognition: Follows commands  Safety/Judgement: Fall prevention  Functional Mobility and Transfers for ADLs:              Transfers:  Sit to Stand: Contact guard assistance              Bathroom Mobility: Contact guard assistance                 Balance:  Sitting: Intact  Standing: Impaired; With support  Standing - Static: Good  Standing - Dynamic : Fair  ADL Intervention:  Grooming  Grooming Assistance: Contact guard assistance  Brushing Teeth: Contact guard assistance     Lower Body Dressing Assistance  Dressing Assistance: Maximum assistance  Socks: Maximum assistance  Leg Crossed Method Used: No  Position Performed: Seated edge of bed     Cognitive Retraining  Safety/Judgement: Fall prevention     Therapeutic Exercises:   BUE AROM x10 reps: shoulder flexion, chest presses, elbow flexion/extension, finger flexion/extension     Pain:  Pre Treatment: 10  Post Treatment: 10     Activity Tolerance:    fair  Please refer to the flowsheet for vital signs taken during this treatment.   After treatment:   [ ]  Patient left in no apparent distress sitting up in chair  [X]  Patient left in no apparent distress seated EOB  [X]  Call bell left within reach  [ ]  Nursing notified  [ ]  Caregiver present  [ ]  Bed alarm activated     Faisal Call MS OTR/L  Time Calculation: 23 mins

## 2017-02-08 NOTE — PROGRESS NOTES
1950: Assumed care of pt.; Arrived to find pt. Sitting up in chair visiting with spouse; Vital signs stable; No C/o pain; Cardiac rhythm: AFib; Bilateral lung sounds diminished on 3L via NC; Call bell left at reach; bed at lowest position; and wheels locked    0607: Shift Summary: Uneventful shift; Vital signs remained stable; Pain addressed with PRN Percocet; PM meds administered without difficulty; Sliding scale changed to very insulin resistant; Call bell left at reach; bed at lowest position; and wheels locked     Bedside shift change report given to CAMILA Best (oncoming nurse) by AZIZA Downing (offgoing nurse). Report included the following information SBAR and Kardex.

## 2017-02-08 NOTE — PROGRESS NOTES
Pharmacy Dosing Services: Warfarin    Consult for Warfarin Dosing by Pharmacy by Dr. Dara Hoover provided for this 66 y.o.  female , for indication of Atrial Fibrillation. Dose to achieve an INR goal of 2-3    Order entered for Warfarin 2 mg to be given today at 18:00. Significant drug interactions: n/a  -  levofloxacin discontinued. LABS    PT/INR Lab Results   Component Value Date/Time    INR 2.5 02/08/2017 03:12 AM      Platelets Lab Results   Component Value Date/Time    PLATELET 357 34/51/8814 03:12 AM      H/H     Lab Results   Component Value Date/Time    HGB 12.4 02/08/2017 03:12 AM        Warfarin Administrations (last 168 hours)       Date/Time Action Medication Dose      02/07/17 1807 Given    warfarin (COUMADIN) tablet 2 mg 2 mg    02/06/17 1715 Given    warfarin (COUMADIN) tablet 5 mg 5 mg    02/05/17 1723 Given    warfarin (COUMADIN) tablet 5 mg 5 mg    02/04/17 1821 Given    warfarin (COUMADIN) tablet 5 mg 5 mg    02/03/17 1733 Given   [INR 1.8]    warfarin (COUMADIN) tablet 4 mg 4 mg    02/02/17 1740 Given    warfarin (COUMADIN) tablet 3 mg 3 mg    02/01/17 1733 Given    warfarin (COUMADIN) tablet 2.5 mg 2.5 mg          Pharmacy to follow daily and will provide subsequent Warfarin dosing based on clinical status.   [facility] [place]  Contact information [phone]

## 2017-02-08 NOTE — PROGRESS NOTES
Problem: Mobility Impaired (Adult and Pediatric)  Goal: *Acute Goals and Plan of Care (Insert Text)  Physical Therapy Goals  Initiated 2/1/2017 and to be accomplished within 7 day(s)  1. Patient will move from supine to sit and sit to supine in bed with supervision/set-up. 2. Patient will transfer from bed to chair and chair to bed with supervision/set-up using the least restrictive device. 3. Patient will perform sit to stand with supervision/set-up. 4. Patient will ambulate with supervision/set-up for >150 feet with the least restrictive device for safe home ambulation. 5. Patient will ascend/descend 1 step without handrail(s) with minimal assistance/contact guard assist for safe home entry. Outcome: Progressing Towards Goal  PHYSICAL THERAPY TREATMENT     Patient: Kiran Saunders (47 y.o. female)  Date: 2/8/2017  Diagnosis: Insufficiency, respiratory, acute (HCC)  CHF (congestive heart failure) (HCC)  Elevated troponin  COPD (chronic obstructive pulmonary disease) ([address]  Infiltrate noted on imaging study  Insufficiency, respiratory, acute (HCC)  CHF (congestive heart failure) (HCC)  Elevated troponin  COPD (chronic obstructive pulmonary disease) (HCC)  Infiltrate noted on imaging study Acute on chronic diastolic CHF (congestive heart failure) ([address]  Precautions: Fall   Chart, physical therapy assessment, plan of care and goals were reviewed. ASSESSMENT:  Pt continues to be very motivated to participate with PT at this time. Pt's SpO2 prior to start of PT session was 93% prior to PT session on 3L via NC; during gait training pt's SpO2 dropped to 88% and pt was noted to be SOB, once seated after gait training her SpO2 went up to 98% on 3L. During gait training pt increased her ambulation distance to 80 feet with RW use, CGA demonstrating a wide DEAN with increased trunk sway. Left pt sitting up at the EOB with all needs met and CNA in the room. Recommend SNF at time of discharge.    Progression toward goals:  [X]      Improving appropriately and progressing toward goals  [ ]      Improving slowly and progressing toward goals  [ ]      Not making progress toward goals and plan of care will be adjusted       PLAN:  Patient continues to benefit from skilled intervention to address the above impairments. Continue treatment per established plan of care. Discharge Recommendations:  Skilled Nursing Facility  Further Equipment Recommendations for Discharge:  rolling walker       SUBJECTIVE:   Patient stated I really thought about it and I want to go to rehab, but NOT [facility].       OBJECTIVE DATA SUMMARY:   Critical Behavior:  Neurologic State: Alert, Appropriate for age  Orientation Level: Oriented X4  Cognition: Appropriate decision making, Appropriate for age attention/concentration, Appropriate safety awareness, Follows commands  Safety/Judgement: Fall prevention  Functional Mobility Training:  Transfers:  Sit to Stand: Contact guard assistance; Additional time  Stand to Sit: Contact guard assistance; Additional time  Balance:  Sitting: Intact  Standing: Impaired; With support  Standing - Static: Good  Standing - Dynamic : Fair  Ambulation/Gait Training:  Distance (ft): 80 Feet (ft)  Assistive Device: Walker, rolling;Gait belt  Ambulation - Level of Assistance: Contact guard assistance   Gait Abnormalities: Decreased step clearance;Trunk sway increased   Base of Support: Widened  Stance: Weight shift  Speed/Kristin: Slow;Shuffled  Step Length: Right shortened;Left shortened  Swing Pattern: Right asymmetrical;Left asymmetrical   Interventions: Visual/Demos; Verbal cues; Tactile cues; Safety awareness training   Pain:  Pain Scale 1: Numeric (0 - 10)  Pain Intensity 1: 0  Pain Location 1: Back   Pain Intervention(s) 1: Medication (see MAR)  Activity Tolerance:   Fair  Please refer to the flowsheet for vital signs taken during this treatment.   After treatment:   [ ] Patient left in no apparent distress sitting up in chair  [X] Patient left in no apparent distress in bed  [X] Call bell left within reach  [X] Nursing notified  [ ] Caregiver present  [ ] Bed alarm activated        Yasmine Lowry PT, DPT      Time Calculation: 17 mins

## 2017-02-08 NOTE — PROGRESS NOTES
Hospitalist Progress Note-critical care note     Patient: Ghassan Washington MRN: [id number]  CSN: [id number]    YOB: 1945  Age: 66 y.o. Sex: female    DOA: 1/30/2017 LOS:  LOS: 9 days            Chief complaint: chf exacerbation,  Acute on chronic respiratory failure , chest pain,  copd exacerbation ,  HTN, anxiety     Assessment/Plan         Patient Active Problem List   Diagnosis Code    Acute on chronic diastolic CHF (congestive heart failure) ([facility]) I50.33    ALEJANDRINA (acute kidney injury) ([facility] [address] N17.9    Acute exacerbation of CHF (congestive heart failure) ([facility]) I50.9    DM (diabetes mellitus) ([facility] [address] E11.9    Hypertension I10    Respiratory failure ([facility] [address] J96.90    [facility] I24.9    Obstructive sleep apnea, adult G47.33    Paroxysmal atrial fibrillation ([facility]) I48.0    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 ([facility]) A42.02, E11.65    Dyspnea due to congestive heart failure ([facility]) I50.9    Aspiration pneumonia ([facility]) J69.0    Hyperkalemia E87.5    Elevated troponin R79.89    Acute respiratory distress ([facility]) R06.00    COPD (chronic obstructive pulmonary disease) ([facility]) J44.9    CHF (congestive heart failure) ([facility]) I50.9    Insufficiency, respiratory, acute ([facility]) R06.89    Infiltrate noted on imaging study R93.8    Chronic respiratory failure with hypoxia ([facility]) J96.11    COPD exacerbation ([facility]) J44.1    Acute on chronic respiratory failure with hypoxia ([facility]) J96.21    Chest pain R07.9    Anxiety F41.9     1. Acute on chronic  respiratory failure   Continue Improving on  3 L nc O2 , need bipap at night -bipap at night   Multiple factors, copd exacerbation, possible hcap, hermelinda, Pulmonary hypertension (moderated from 2016 echo and chf exacerbation   2. Copd exacerbation and possible hcap  On  breathing tx with xopenex.  pulmocort and brovana, off   Levaquin,Tapering off steroid  3 afib with rvr  Rate controlled per cardizem now, optimize the electrolytes, on warfarin , inr 2.5 .  continue pharmacy dose   4. chf exacerbation -diastolic from previous echo  continue lasix cards on board ,stable   5. Hyperkalemia  Resolved,   7. DM type II ,   -long term insulin ,   - on  lantus to 25 ,  Ssi,  premeal insulin and ssui   8 HTN, accelerated  Continue home medication. 9. Pulmonary hypertension   moderate from echo   10 hermelinda   Not compliance cpap at home   11 anxiety  Scheduled klonipin ,better controlled   12. Insomnia   Continue trazodone prn for sleep        Subjective: feel fine ,  Nurse: no acute issue last night ,       Review of systems:    General: No fevers or chills. Cardiovascular: no  chest pain , . No palpitations. Pulmonary: shortness of breath improving   Gastrointestinal: No nausea, vomiting. Vital signs/Intake and Output:  Visit Vitals    /49 (BP 1 Location: Left arm, BP Patient Position: Sitting)    Pulse 90    Temp 97.4 °F (36.3 °C)    Resp 17    Ht 5' 5\" (1.651 m)    Wt 105.2 kg (232 lb)    SpO2 96%    BMI 38.61 kg/m2     Current Shift:  02/08 0701 - 02/08 1900  In: 600 [P.O.:600]  Out: 225 [Urine:225]  Last three shifts:  02/06 1901 - 02/08 0700  In: 1200 [P.O.:1200]  Out: 700 [Urine:700]    Physical Exam:  General: WD, WN. Alert, cooperative, no acute distress    HEENT: NC, Atraumatic. PERRLA, anicteric sclerae. bipap mask noted   Lungs: CTA Bilaterally. No Wheezing/Rhonchi/Rales. Heart:  Irregular irregular ,  No murmur, No Rubs, No Gallops  Abdomen: Soft, Non distended, Non tender.  +Bowel sounds,   Extremities: No c/c/e  Psych:   No anxious . No agitated. Neurologic:  No acute neurological deficit.              Labs: Results:       Chemistry Recent Labs      02/06/17   0256   GLU  160*   NA  143   K  4.4   CL  101   CO2  38*   BUN  49*   CREA  1.30   CA  9.1   AGAP  4   BUCR  38*   AP  137*   TP  6.9   ALB  3.5   GLOB  3.4   AGRAT  1.0      CBC w/Diff Recent Labs      02/08/17   0312  02/06/17   0256   WBC  13.9* 15.1*   RBC  3.92*  4.28   HGB  12.4  13.5   HCT  40.0  43.3   PLT  165  198   GRANS  81*  82*   LYMPH  7*  10*   EOS  1  1      Cardiac Enzymes No results for input(s): CPK, CKND1, ALESSANDRA in the last 72 hours. No lab exists for component: CKRMB, TROIP   Coagulation Recent Labs      02/08/17   0312  02/07/17   0503   PTP  25.7*  26.3*   INR  2.5*  2.6*       Lipid Panel No results found for: CHOL, CHOLPOCT, CHOLX, CHLST, CHOLV, [id number], HDL, LDL, NLDLCT, DLDL, LDLC, DLDLP, [id number], VLDLC, VLDL, TGL, TGLX, TRIGL, [id number], TRIGP, TGLPOCT, [id number], CHHD, CHHDX   BNP No results for input(s): BNPP in the last 72 hours.    Liver Enzymes Recent Labs      02/06/17   0256   TP  6.9   ALB  3.5   AP  137*   SGOT  17      Thyroid Studies Lab Results   Component Value Date/Time    TSH 0.31 01/01/2016 10:20 PM        Procedures/imaging: see electronic medical records for all procedures/Xrays and details which were not copied into this note but were reviewed prior to creation of Prakash Melendez MD

## 2017-02-08 NOTE — INTERDISCIPLINARY ROUNDS
IDR/SLIDR Summary          Patient: Ghassan Washington MRN: [id number]    Age: 66 y.o. YOB: 1945 Room/Bed: [facility]/   Admit Diagnosis: Insufficiency, respiratory, acute (HCC)  CHF (congestive heart failure) (HCC)  Elevated troponin  COPD (chronic obstructive pulmonary disease) (HCC)  Infiltrate noted on imaging study  Insufficiency, respiratory, acute (HCC)  CHF (congestive heart failure) (HCC)  Elevated troponin  COPD (chronic obstructive pulmonary disease) (HCC)  Infiltrate noted on imaging study  Principal Diagnosis: Acute on chronic diastolic CHF (congestive heart failure) (HCC)   Goals: continue current treatment, looking for snf   Readmission: NO  Quality Measure: CHF and COPD  VTE Prophylaxis: Mechanical  Influenza Vaccine screening completed? YES  Mobility needs: Yes   Nutrition plan:Yes  Consults: P. T    Financial concerns:No  Escalated to CM? YES  RRAT Score: 39   Interventions:  None   Testing due for pt today? NO  LOS: 9 days Expected length of stay 4.6 days  Discharge plan: To snf   PCP: Sangita Bell MD  Transportation needs: Yes    Days before discharge: One more day   Discharge disposition: SNF  Present for rounds:  Dr. Deangelo Arredondo RN BELINDA Farley, [facility].       Signed:     Merlyn Lechuga RN  2/8/2017  12:12 PM

## 2017-02-08 NOTE — PROGRESS NOTES
Progress Note  Pulmonary, Critical Care, and Sleep Medicine    Name: Tanya Siddiqui MRN: [id number]   : 1945 Hospital: [facility] [place]   Date: 2017        IMPRESSION:   · Acute on chronic hypercapnic and hypoxemic respiratory failure requiring BIPAP, improving and now on O2 NC  · Possible COPD/asthma exacerbation although pt and  deny history of COPD  · IMANI    · Pulmonary HTN likely Group 3 on the basis of long standing IMANI and hypoxemia vs secondary to left sided failure jac with dilated left Atrium on latest Echo  · Diastolic heart failure  · DM II glycemic control suboptimal, hypoglycemia noted earlier today  · A fib now rate controlled on po Cardizem  · Insomnia   · sciatica      PLAN:   · Continue night time bipap support  · Outpatient records reveal that patient has significant restrictive disease with FEV1 = 40% but no obstructive pattern but HRCT showed air trapping so patient treated empirically for asthma. ·  percocet prn   · Titrate supplemental O2  · Trazodone increased, prn Benzodiazepines  · Decrease prednisone to 20 mg daily  · Strict glycemic control, hold preprandial insulin  · DVT and GI prophylaxis issues addressed  · Repeat overnight sleep study and cpap titration study with nasal pillow. · Discussed with pt at bedside  · Lidocaine patch     Subjective/Interval History:   Sciatic pain improved with lidocaine patch. Tolerating the bipap more. Tolerating po  Sitting comfortably in bed. ROS:Pertinent items are noted in HPI. Orders reviewed including medications. Changes made if indicated. Telemetry monitor reviewed at the bedside.   Objective:   Vital Signs:    Visit Vitals    /59 (BP 1 Location: Left arm, BP Patient Position: Sitting)    Pulse 75    Temp 98.3 °F (36.8 °C)    Resp 18    Ht 5' 5\" (1.651 m)    Wt 105.2 kg (232 lb)    SpO2 94%    BMI 38.61 kg/m2       O2 Device: Nasal cannula   O2 Flow Rate (L/min): 3 l/min   Temp (24hrs), Av.9 °F (36.6 °C), Min:97.2 °F (36.2 °C), Max:98.3 °F (36.8 °C)       Intake/Output:   Last shift:      02/08 0701 - 02/08 1900  In: 600 [P.O.:600]  Out: 25 [Urine:25]  Last 3 shifts: 02/06 1901 - 02/08 0700  In: 1200 [P.O.:1200]  Out: 700 [Urine:700]    Intake/Output Summary (Last 24 hours) at 02/08/17 1419  Last data filed at 02/08/17 1301   Gross per 24 hour   Intake              960 ml   Output              325 ml   Net              635 ml        Physical Exam:    General:  Awake , cooperative, in no distress, appears anxious. Head:  Normocephalic, without obvious abnormality, atraumatic. Eyes:  ANicteric, PERRL,   Nose: Nares normal.  Mucosa normal. No drainage or sinus tenderness. Throat: Lips, mucosa, and tongue normal.  NO Thrush   Neck: Supple, symmetrical, trachea midline, no adenopathy, thyroid: no enlargment/tenderness/nodules    Back:   Symmetric    Lungs:   Diminished breath sounds, no rales or wheezes   Chest wall:  No tenderness or deformity. NO intercostal retractions   Heart:  irregular, S1, S2 normal, no murmur, click, rub or gallop. Abdomen:   Soft, non-tender. Bowel sounds normal. No masses,  No organomegaly. NO paradoxical motion   Extremities: normal, atraumatic, no cyanosis or edema. Pulses: 2+ and symmetric all extremities.    Skin: Skin color, texture, turgor normal. No rashes, large ecchymosis left proximal humerus, NO clubbing   Lymph nodes: Cervical  nodes normal.   Neurologic: Grossly nonfocal      :        Devices:             Drips:    DATA:  Labs:  Recent Labs      02/08/17   0312  02/06/17   0256   WBC  13.9*  15.1*   HGB  12.4  13.5   HCT  40.0  43.3   PLT  165  198     Recent Labs      02/08/17   0312  02/07/17   0503  02/06/17   0256   NA   --    --   143   K   --    --   4.4   CL   --    --   101   CO2   --    --   38*   GLU   --    --   160*   BUN   --    --   49*   CREA   --    --   1.30   CA   --    --   9.1   MG  2.6*  2.4  2.3   ALB   --    --   3.5   SGOT   --    -- 17   ALT   --    --   19   INR  2.5*  2.6*  2.0*     No results for input(s): PH, PCO2, PO2, HCO3, FIO2 in the last 72 hours. Imaging:  []        I have personally reviewed the patients radiographs and reports  []         []        No change from prior, tubes and lines in adequate position  []        Improved   []        Worsening  High complexity decision making was performed during this consultation and evaluation.      Blue Dolan MD

## 2017-02-08 NOTE — PROGRESS NOTES
0700 Assumed pt care, pt is alert and oriented x4, lung sounds are clear but diminished on 3L nc. VSS, afib on the monitor. Will continue to monitor pt.     1009 Administered percocet po prn for back pain. See doc flow sheet for additional information. Shift Summary- Pt experienced an uneventful shift.

## 2017-02-09 VITALS
RESPIRATION RATE: 16 BRPM | SYSTOLIC BLOOD PRESSURE: 120 MMHG | HEART RATE: 94 BPM | DIASTOLIC BLOOD PRESSURE: 50 MMHG | WEIGHT: 233.69 LBS | OXYGEN SATURATION: 98 % | TEMPERATURE: 98 F | BODY MASS INDEX: 38.93 KG/M2 | HEIGHT: 65 IN

## 2017-02-09 LAB
ALBUMIN SERPL BCP-MCNC: 3 G/DL (ref 3.4–5)
ALBUMIN/GLOB SERPL: 1.1 {RATIO} (ref 0.8–1.7)
ALP SERPL-CCNC: 116 U/L (ref 45–117)
ALT SERPL-CCNC: 14 U/L (ref 13–56)
ANION GAP BLD CALC-SCNC: 5 MMOL/L (ref 3–18)
AST SERPL W P-5'-P-CCNC: 15 U/L (ref 15–37)
BILIRUB SERPL-MCNC: 1 MG/DL (ref 0.2–1)
BUN SERPL-MCNC: 58 MG/DL (ref 7–18)
BUN/CREAT SERPL: 36 (ref 12–20)
CALCIUM SERPL-MCNC: 8.6 MG/DL (ref 8.5–10.1)
CHLORIDE SERPL-SCNC: 105 MMOL/L (ref 100–108)
CO2 SERPL-SCNC: 34 MMOL/L (ref 21–32)
CREAT SERPL-MCNC: 1.59 MG/DL (ref 0.6–1.3)
GLOBULIN SER CALC-MCNC: 2.7 G/DL (ref 2–4)
GLUCOSE BLD STRIP.AUTO-MCNC: 142 MG/DL (ref 70–110)
GLUCOSE BLD STRIP.AUTO-MCNC: 176 MG/DL (ref 70–110)
GLUCOSE BLD STRIP.AUTO-MCNC: 252 MG/DL (ref 70–110)
GLUCOSE SERPL-MCNC: 105 MG/DL (ref 74–99)
INR PPP: 2.3 (ref 0.8–1.2)
POTASSIUM SERPL-SCNC: 4.9 MMOL/L (ref 3.5–5.5)
PROT SERPL-MCNC: 5.7 G/DL (ref 6.4–8.2)
PROTHROMBIN TIME: 24.3 SEC (ref 11.5–15.2)
SODIUM SERPL-SCNC: 144 MMOL/L (ref 136–145)

## 2017-02-09 PROCEDURE — 74011250637 HC RX REV CODE- 250/637: Performed by: INTERNAL MEDICINE

## 2017-02-09 PROCEDURE — 94640 AIRWAY INHALATION TREATMENT: CPT

## 2017-02-09 PROCEDURE — 97164 PT RE-EVAL EST PLAN CARE: CPT

## 2017-02-09 PROCEDURE — 74011636637 HC RX REV CODE- 636/637: Performed by: INTERNAL MEDICINE

## 2017-02-09 PROCEDURE — 74011000250 HC RX REV CODE- 250: Performed by: HOSPITALIST

## 2017-02-09 PROCEDURE — 82962 GLUCOSE BLOOD TEST: CPT

## 2017-02-09 PROCEDURE — 94760 N-INVAS EAR/PLS OXIMETRY 1: CPT

## 2017-02-09 PROCEDURE — 36415 COLL VENOUS BLD VENIPUNCTURE: CPT | Performed by: HOSPITALIST

## 2017-02-09 PROCEDURE — 85610 PROTHROMBIN TIME: CPT | Performed by: HOSPITALIST

## 2017-02-09 PROCEDURE — 74011250636 HC RX REV CODE- 250/636: Performed by: HOSPITALIST

## 2017-02-09 PROCEDURE — 74011250637 HC RX REV CODE- 250/637: Performed by: FAMILY MEDICINE

## 2017-02-09 PROCEDURE — 74011636637 HC RX REV CODE- 636/637: Performed by: HOSPITALIST

## 2017-02-09 PROCEDURE — 74011250637 HC RX REV CODE- 250/637: Performed by: HOSPITALIST

## 2017-02-09 PROCEDURE — 80053 COMPREHEN METABOLIC PANEL: CPT | Performed by: HOSPITALIST

## 2017-02-09 PROCEDURE — 97116 GAIT TRAINING THERAPY: CPT

## 2017-02-09 PROCEDURE — 94660 CPAP INITIATION&MGMT: CPT

## 2017-02-09 RX ORDER — INSULIN LISPRO 100 [IU]/ML
INJECTION, SOLUTION INTRAVENOUS; SUBCUTANEOUS
Qty: 1 VIAL | Refills: 3 | Status: SHIPPED
Start: 2017-02-09

## 2017-02-09 RX ORDER — FUROSEMIDE 40 MG/1
40 TABLET ORAL DAILY
Qty: 120 TAB | Refills: 1 | Status: ON HOLD
Start: 2017-02-09 | End: 2017-03-23

## 2017-02-09 RX ORDER — ARFORMOTEROL TARTRATE 15 UG/2ML
15 SOLUTION RESPIRATORY (INHALATION) 2 TIMES DAILY
Qty: 1 VIAL | Refills: 3 | Status: SHIPPED | OUTPATIENT
Start: 2017-02-09

## 2017-02-09 RX ORDER — PREDNISONE 20 MG/1
20 TABLET ORAL DAILY
Qty: 5 TAB | Refills: 0 | Status: SHIPPED | OUTPATIENT
Start: 2017-02-09 | End: 2017-02-14

## 2017-02-09 RX ORDER — TRAZODONE HYDROCHLORIDE 50 MG/1
50 TABLET ORAL
Qty: 60 TAB | Refills: 1 | Status: ON HOLD | OUTPATIENT
Start: 2017-02-09 | End: 2017-03-23

## 2017-02-09 RX ORDER — DILTIAZEM HYDROCHLORIDE 60 MG/1
60 TABLET, FILM COATED ORAL
Qty: 180 TAB | Refills: 3 | Status: SHIPPED | OUTPATIENT
Start: 2017-02-09 | End: 2017-03-24

## 2017-02-09 RX ORDER — INSULIN GLARGINE 100 [IU]/ML
25 INJECTION, SOLUTION SUBCUTANEOUS
Qty: 1 VIAL | Refills: 3 | Status: ON HOLD
Start: 2017-02-09 | End: 2017-03-23

## 2017-02-09 RX ORDER — PANTOPRAZOLE SODIUM 40 MG/1
40 TABLET, DELAYED RELEASE ORAL
Qty: 60 TAB | Refills: 1 | Status: SHIPPED
Start: 2017-02-09

## 2017-02-09 RX ORDER — OXYCODONE AND ACETAMINOPHEN 5; 325 MG/1; MG/1
2 TABLET ORAL
Qty: 45 TAB | Refills: 0 | Status: SHIPPED | OUTPATIENT
Start: 2017-02-09 | End: 2017-03-24

## 2017-02-09 RX ORDER — WARFARIN 2.5 MG/1
2.5 TABLET ORAL ONCE
Status: DISCONTINUED | OUTPATIENT
Start: 2017-02-09 | End: 2017-02-09 | Stop reason: HOSPADM

## 2017-02-09 RX ORDER — DOCUSATE SODIUM 100 MG/1
100 CAPSULE, LIQUID FILLED ORAL 2 TIMES DAILY
Qty: 60 CAP | Refills: 2 | Status: SHIPPED
Start: 2017-02-09 | End: 2017-05-10

## 2017-02-09 RX ORDER — FUROSEMIDE 40 MG/1
40 TABLET ORAL DAILY
Status: DISCONTINUED | OUTPATIENT
Start: 2017-02-10 | End: 2017-02-09 | Stop reason: HOSPADM

## 2017-02-09 RX ORDER — SIMVASTATIN 10 MG/1
10 TABLET, FILM COATED ORAL
Qty: 90 TAB | Refills: 3 | Status: SHIPPED
Start: 2017-02-09

## 2017-02-09 RX ORDER — CLONAZEPAM 0.5 MG/1
0.25 TABLET ORAL 2 TIMES DAILY
Qty: 60 TAB | Refills: 0 | Status: SHIPPED | OUTPATIENT
Start: 2017-02-09 | End: 2017-03-24

## 2017-02-09 RX ORDER — INSULIN LISPRO 100 [IU]/ML
INJECTION, SOLUTION INTRAVENOUS; SUBCUTANEOUS
Qty: 1 VIAL | Refills: 3 | Status: SHIPPED
Start: 2017-02-09 | End: 2017-03-24

## 2017-02-09 RX ORDER — BUDESONIDE 0.5 MG/2ML
500 INHALANT ORAL 2 TIMES DAILY
Qty: 1 EACH | Refills: 3 | Status: SHIPPED | OUTPATIENT
Start: 2017-02-09

## 2017-02-09 RX ADMIN — DILTIAZEM HYDROCHLORIDE 60 MG: 60 TABLET, FILM COATED ORAL at 11:46

## 2017-02-09 RX ADMIN — INSULIN LISPRO 3 UNITS: 100 INJECTION, SOLUTION INTRAVENOUS; SUBCUTANEOUS at 11:47

## 2017-02-09 RX ADMIN — LEVALBUTEROL 1.25 MG: 1.25 SOLUTION, CONCENTRATE RESPIRATORY (INHALATION) at 00:11

## 2017-02-09 RX ADMIN — LEVALBUTEROL 1.25 MG: 1.25 SOLUTION, CONCENTRATE RESPIRATORY (INHALATION) at 07:17

## 2017-02-09 RX ADMIN — LEVALBUTEROL 1.25 MG: 1.25 SOLUTION, CONCENTRATE RESPIRATORY (INHALATION) at 11:06

## 2017-02-09 RX ADMIN — ALPRAZOLAM 0.5 MG: 0.5 TABLET ORAL at 13:40

## 2017-02-09 RX ADMIN — LEVALBUTEROL 1.25 MG: 1.25 SOLUTION, CONCENTRATE RESPIRATORY (INHALATION) at 03:44

## 2017-02-09 RX ADMIN — ONDANSETRON HYDROCHLORIDE 4 MG: 2 INJECTION INTRAMUSCULAR; INTRAVENOUS at 07:18

## 2017-02-09 RX ADMIN — CARVEDILOL 25 MG: 12.5 TABLET, FILM COATED ORAL at 08:52

## 2017-02-09 RX ADMIN — FUROSEMIDE 40 MG: 40 TABLET ORAL at 08:54

## 2017-02-09 RX ADMIN — ARFORMOTEROL TARTRATE 15 MCG: 15 SOLUTION RESPIRATORY (INHALATION) at 07:21

## 2017-02-09 RX ADMIN — INSULIN LISPRO 8 UNITS: 100 INJECTION, SOLUTION INTRAVENOUS; SUBCUTANEOUS at 08:54

## 2017-02-09 RX ADMIN — PREDNISONE 20 MG: 20 TABLET ORAL at 08:54

## 2017-02-09 RX ADMIN — OXYCODONE HYDROCHLORIDE AND ACETAMINOPHEN 2 TABLET: 5; 325 TABLET ORAL at 04:51

## 2017-02-09 RX ADMIN — OXYCODONE HYDROCHLORIDE AND ACETAMINOPHEN 2 TABLET: 5; 325 TABLET ORAL at 08:52

## 2017-02-09 RX ADMIN — INSULIN LISPRO 8 UNITS: 100 INJECTION, SOLUTION INTRAVENOUS; SUBCUTANEOUS at 11:47

## 2017-02-09 RX ADMIN — DOCUSATE SODIUM 100 MG: 100 CAPSULE, LIQUID FILLED ORAL at 08:54

## 2017-02-09 RX ADMIN — BUDESONIDE 500 MCG: 0.5 INHALANT RESPIRATORY (INHALATION) at 07:20

## 2017-02-09 RX ADMIN — DILTIAZEM HYDROCHLORIDE 60 MG: 60 TABLET, FILM COATED ORAL at 07:18

## 2017-02-09 RX ADMIN — CLONAZEPAM 0.25 MG: 0.5 TABLET ORAL at 08:53

## 2017-02-09 RX ADMIN — PANTOPRAZOLE SODIUM 40 MG: 40 TABLET, DELAYED RELEASE ORAL at 08:53

## 2017-02-09 NOTE — PROGRESS NOTES
0700 Assumed pt care, pt is alert and oriented x4, afib on the monitor, lungs are clear but diminished on 3L nc, VSS.     0857 Administered 2 tabs percocet for back pain rated as an 8 on a 1-10 pain scale. See doc flow sheet for additional info.

## 2017-02-09 NOTE — PROGRESS NOTES
0598: Shift Summary: Uneventful Shift; Vital signs remained stable; Back pain addressed with PRN Percocet; Cardiac rhythm: controlled Afib; Bilateral lung sounds diminished, respirations shallow;  Call bell left at reach; bed at lowest position; and wheels locked     0824: Bedside shift change report given to CAMILA Best (oncoming nurse) by AZIZA De La Torre (offgoing nurse). Report included the following information SBAR and Kardex.

## 2017-02-09 NOTE — PROGRESS NOTES
Late entry for 2-8-17: met with pt about her plan for tem rehab at [facility]. Pt stated she had changed her mind: she did not want to go to [place] but wanted to go to [facility] instead. Referral placed to [facility]. Provided [place] with BiPAP settings. Per response from [facility], they can accept pt today after noon. Will cont to follow.

## 2017-02-09 NOTE — ROUTINE PROCESS
Bedside and Verbal shift change report given to AZIZA Porras (oncoming nurse) by Estefania Coulter   (offgoing nurse). Report included the following information SBAR, Kardex, ED Summary, Procedure Summary, Intake/Output, MAR, Recent Results and Cardiac Rhythm afib.

## 2017-02-09 NOTE — ROUTINE PROCESS
TRANSFER - OUT REPORT:    Verbal report given to riana Cerda(name) on Cornel Pelletier  being transferred to [facility]) for routine progression of care       Report consisted of patients Situation, Background, Assessment and   Recommendations(SBAR). Information from the following report(s) SBAR, Kardex, ED Summary, Procedure Summary, Intake/Output, MAR, Recent Results and Cardiac Rhythm afib was reviewed with the receiving nurse. Lines:   Peripheral IV 02/06/17 Right Arm (Active)   Site Assessment Clean, dry, & intact 2/9/2017  7:58 AM   Phlebitis Assessment 0 2/9/2017  7:58 AM   Infiltration Assessment 0 2/9/2017  7:58 AM   Dressing Status Clean, dry, & intact 2/9/2017  7:58 AM   Dressing Type Tape;Transparent 2/9/2017  7:58 AM   Hub Color/Line Status Blue;Flushed;Capped 2/9/2017  7:58 AM   Action Taken Open ports on tubing capped 2/9/2017  7:58 AM   Alcohol Cap Used Yes 2/9/2017  7:58 AM        Opportunity for questions and clarification was provided.       Patient transported with:   O2 @ 3 liters

## 2017-02-09 NOTE — DIABETES MGMT
Diabetes Patient/Family Education Record    Factors That  May Influence Patients Ability  to Learn or  Comply With  Recommendations:    []   Language barrier    []   Cultural needs   [x]   Motivation    []   Cognitive limitation    []   Physical   []   Education    []   Physiological factors   []   Hearing/vision/speaking impairment   []   Mosque beliefs    []   Financial factors   []  Other:   []  No factors identified at this time.      Person Instructed:   [x]   Patient   []   Family   []  Other     Preference for Learning:   [x]   Verbal   [x]   Written   []  Demonstration     Level of Comprehension & Competence:    []  Good                                      [x] Fair                                     []  Poor                             [x]  Needs Reinforcement   []  Teachback completed    Education Component:   [x]  Medication management, confirmation of home regimen   [x]  Nutritional management, encouragement to eat   []  Exercise   []  Signs, symptoms, and treatment of hyperglycemia and hypoglycemia   []  Prevention, recognition and treatment of hyperglycemia and hypoglycemia   []  Importance of blood glucose monitoring and how to obtain a blood glucose meter    []  Instruction on use of blood glucose meter   [x]  Discuss the importance of HbA1C monitoring and provide patient with  results   []  Sick day guidelines   []  Proper use and disposal of lancets, needles, syringes or insulin pens (if appropriate)   []  Potential long-term complications (retinopathy, kidney disease, neuropathy, heart disease, stroke, vascular disease, foot care)   [] Provide emergency contact number and contact number for more information    [x]  Goal:  Patient/family will demonstrate understanding of Diabetes Self Management Skills by: (date) _4/1/17___  Plan for post-discharge education or self-management support:    [x] Outpatient class schedule provided            [] Patient Declined    [] Scheduled for outpatient classes (date) _______         M.  Real Moreno, MPH, RD

## 2017-02-09 NOTE — PROGRESS NOTES
Cardiology Progress Note        Patient: Karen Payment        Sex: female          DOA: 1/30/2017  YOB: 1945      Age:  66 y.o.        LOS:  LOS: 9 days      Patient seen and examined. Chart reviewed. Assessment/Plan     Acute on chronic diastolic heart failure compensated   Acute on chronic respiratory failure improving   COPD  Permanent atrial fibrillation - rate is under good control  Abnormal troponin most likely secondary to heart failure, respiratory failure, atrial fibrillation with rapid ventricular rate, no evidence of ACS, however underlying CAD can not be ruled out. Moderate aortic stenosis   Hyperkalemia         Plan:      Continue Cardizem and coreg. Change Lasix to 40 mg PO BID  Add Zocor 10 mg PO q HS. (Abnormal troponin and AS)  CMP and mg in AM  Continue current management. Continue coumadin as per PT/INR  Continue management as per hospital medicine. Strict I/O, fluid restriction up to 1200 cc/day                   Subjective:    Denies any shortness of breath. C/o leg edema, denies any orthopnea or PND. REVIEW OF SYSTEMS:     General: No fevers or chills. Cardiovascular: Positive leg edema, No chest pain,No palpitations, No orthopnea, No PND,  No claudication  Pulmonary: No dyspnea  Gastrointestinal: No nausea, vomiting, bleeding  Neurology: No Dizziness    Objective:      Visit Vitals    /49 (BP 1 Location: Left arm, BP Patient Position: Sitting)    Pulse 90    Temp 97.4 °F (36.3 °C)    Resp 17    Ht 5' 5\" (1.651 m)    Wt 105.2 kg (232 lb)    SpO2 96%    BMI 38.61 kg/m2     Body mass index is 38.61 kg/(m^2). Physical Exam:  General Appearance: Comfortable, not using accessory muscles of respiration. HEENT: SIRIA. HEAD: Atraumatic  NECK: No JVD, no thyroidomeglay. CAROTIDS: No bruit  LUNGS: Clear bilaterally. HEART: S1+S2 audible, irregularly [id number],5/8 systolic murmur in aortic area, no pericardial rub.      ABD: Non-tender, BS Audible    EXT: ++ leg edema, and no cyanosis. VASCULAR EXAM: Pulses are intact. PSYCHIATRIC EXAM: Mood is appropriate. MUSCULOSKELETAL: Grossly no joint deformity.   NEUROLOGICAL: AAO times 3, No motor and sensory deficit  Medication:  Current Facility-Administered Medications   Medication Dose Route Frequency    simvastatin (ZOCOR) tablet 10 mg  10 mg Oral QHS    furosemide (LASIX) tablet 40 mg  40 mg Oral BID    lidocaine (LIDODERM) 5 % patch 1 Patch  1 Patch TransDERmal Q24H    oxyCODONE-acetaminophen (PERCOCET) 5-325 mg per tablet 2 Tab  2 Tab Oral Q4H PRN    predniSONE (DELTASONE) tablet 20 mg  20 mg Oral DAILY WITH BREAKFAST    insulin lispro (HUMALOG) injection 8 Units  8 Units SubCUTAneous TIDAC    pantoprazole (PROTONIX) tablet 40 mg  40 mg Oral ACB    dilTIAZem (CARDIZEM) IR tablet 60 mg  60 mg Oral TIDAC    ALPRAZolam (XANAX) tablet 0.5 mg  0.5 mg Oral Q8H PRN    labetalol (NORMODYNE;TRANDATE) injection 10 mg  10 mg IntraVENous Q4H PRN    clonazePAM (KlonoPIN) tablet 0.25 mg  0.25 mg Oral BID    nitroglycerin (NITROSTAT) tablet 0.4 mg  0.4 mg SubLINGual PRN    traZODone (DESYREL) tablet 50 mg  50 mg Oral QHS PRN    insulin glargine (LANTUS) injection 25 Units  25 Units SubCUTAneous QHS    insulin lispro (HUMALOG) injection   SubCUTAneous AC&HS    carvedilol (COREG) tablet 25 mg  25 mg Oral BID WITH MEALS    levalbuterol (XOPENEX) nebulizer soln 1.25 mg/0.5 ml  1.25 mg Nebulization Q4H RT    arformoterol (BROVANA) neb solution 15 mcg  15 mcg Nebulization BID RT    budesonide (PULMICORT) 500 mcg/2 ml nebulizer suspension  500 mcg Nebulization BID RT    diphenhydrAMINE (BENADRYL) injection 12.5 mg  12.5 mg IntraVENous Q4H PRN    ondansetron (ZOFRAN) injection 4 mg  4 mg IntraVENous Q4H PRN    docusate sodium (COLACE) capsule 100 mg  100 mg Oral BID    glucose chewable tablet 16 g  4 Tab Oral PRN    glucagon (GLUCAGEN) injection 1 mg  1 mg IntraMUSCular PRN    dextrose (D50W) injection syrg 12.5-25 g  25-50 mL IntraVENous PRN    Warfarin - Pharmacy to dose   Other Rx Dosing/Monitoring               Lab/Data Reviewed:       Recent Labs      02/08/17   0312  02/06/17   0256   WBC  13.9*  15.1*   HGB  12.4  13.5   HCT  40.0  43.3   PLT  165  198     Recent Labs      02/06/17 0256   NA  143   K  4.4   CL  101   CO2  38*   GLU  160*   BUN  49*   CREA  1.30   CA  9.1       Signed By: Sincere Dow MD     February 8, 2017

## 2017-02-09 NOTE — PROGRESS NOTES
Progress Note  Pulmonary, Critical Care, and Sleep Medicine    Name: Valentin Armenta MRN: [id number]   : 1945 Hospital: [facility] [place]   Date: 2017        IMPRESSION:   · Acute on chronic hypercapnic and hypoxemic respiratory failure requiring BIPAP, improving and now on O2 NC  · Possible COPD/asthma exacerbation although pt and  deny history of COPD  · IMANI    · Pulmonary HTN likely Group 3 on the basis of long standing IMANI and hypoxemia vs secondary to left sided failure jac with dilated left At[facility] on latest Echo  · Diastolic heart failure  · DM II glycemic control suboptimal, hypoglycemia noted earlier today  · A fib now rate controlled on po Cardizem  · Insomnia   · sciatica      PLAN:   · Continue night time bipap support  · Outpatient records reveal that patient has significant restrictive disease with FEV1 = 40% but no obstructive pattern but HRCT showed air trapping so patient treated empirically for asthma. ·  percocet prn   · Titrate supplemental O2  · Trazodone increased, prn Benzodiazepines  · Decrease prednisone to 20 mg daily  · Strict glycemic control, hold preprandial insulin  · DVT and GI prophylaxis issues addressed  · Repeat overnight sleep study and cpap titration study with nasal pillow. · Discussed with pt at bedside  · Lidocaine patch  · Pt/ot     Subjective/Interval History:   Continue to feel better  Tolerating po  On nasal cannula. Denies sob  No issues overnight    ROS:Pertinent items are noted in HPI. Orders reviewed including medications. Changes made if indicated. Telemetry monitor reviewed at the bedside.   Objective:   Vital Signs:    Visit Vitals    /46 (BP 1 Location: Left arm, BP Patient Position: Sitting)    Pulse 85    Temp 98 °F (36.7 °C)    Resp 16    Ht 5' 5\" (1.651 m)    Wt 106 kg (233 lb 11 oz)    SpO2 94%    BMI 38.89 kg/m2       O2 Device: Nasal cannula   O2 Flow Rate (L/min): 3 l/min   Temp (24hrs), Av.7 °F (36.5 °C), Min:97.4 °F (36.3 °C), Max:98 °F (36.7 °C)       Intake/Output:   Last shift:      02/09 0701 - 02/09 1900  In: -   Out: 600 [Urine:600]  Last 3 shifts: 02/07 1901 - 02/09 0700  In: 1520 [P.O.:1520]  Out: 1400 [Urine:1400]    Intake/Output Summary (Last 24 hours) at 02/09/17 1234  Last data filed at 02/09/17 1229   Gross per 24 hour   Intake              680 ml   Output             1675 ml   Net             -995 ml        Physical Exam:    General:  Awake , cooperative, in no distress, appears anxious. Head:  Normocephalic, without obvious abnormality, atraumatic. Eyes:  ANicteric, PERRL,   Nose: Nares normal.  Mucosa normal. No drainage or sinus tenderness. Throat: Lips, mucosa, and tongue normal.  NO Thrush   Neck: Supple, symmetrical, trachea midline, no adenopathy, thyroid: no enlargment/tenderness/nodules    Back:   Symmetric    Lungs:   Diminished breath sounds, no rales or wheezes   Chest wall:  No tenderness or deformity. NO intercostal retractions   Heart:  irregular, S1, S2 normal, no murmur, click, rub or gallop. Abdomen:   Soft, non-tender. Bowel sounds normal. No masses,  No organomegaly. NO paradoxical motion   Extremities: normal, atraumatic, no cyanosis or edema. Pulses: 2+ and symmetric all extremities.    Skin: Skin color, texture, turgor normal. No rashes, large ecchymosis left proximal humerus, NO clubbing   Lymph nodes: Cervical  nodes normal.   Neurologic: Grossly nonfocal      :        Devices:             Drips:    DATA:  Labs:  Recent Labs      02/08/17   0312   WBC  13.9*   HGB  12.4   HCT  40.0   PLT  165     Recent Labs      02/09/17   0223  02/08/17   0312  02/07/17   0503   NA  144   --    --    K  4.9   --    --    CL  105   --    --    CO2  34*   --    --    GLU  105*   --    --    BUN  58*   --    --    CREA  1.59*   --    --    CA  8.6   --    --    MG   --   2.6*  2.4   ALB  3.0*   --    --    SGOT  15   --    --    ALT  14   --    --    INR  2.3*  2.5*  2.6*     No results for input(s): PH, PCO2, PO2, HCO3, FIO2 in the last 72 hours. Imaging:  []        I have personally reviewed the patients radiographs and reports  []         []        No change from prior, tubes and lines in adequate position  []        Improved   []        Worsening  High complexity decision making was performed during this consultation and evaluation.      Paula Kendrick MD

## 2017-02-09 NOTE — DISCHARGE SUMMARY
[facility]  Hospitalist Division    Discharge Summary      Patient: Kiran Saunders MRN: [id number]  CSN: [id number]    YOB: 1945  Age: 66 y.o.   Sex: female    DOA: 1/30/2017 LOS:  LOS: 10 days   Discharge Date: 02/09/17     PCP:  Jeaneth Ceballos MD    Chief Complaint:    Chief Complaint   Patient presents with    Respiratory Distress     Acute on chronic diastolic CHF (congestive heart failure) ([facility]    Admission Diagnosis:   Hospital Problems as of 2/9/2017  Date Reviewed: 9/21/2015          Codes Class Noted - Resolved POA    Anxiety ICD-10-CM: F41.9  ICD-9-CM: 300.00  2/4/2017 - Present Unknown        Chest pain ICD-10-CM: R07.9  ICD-9-CM: 786.50  2/3/2017 - Present Unknown        Acute on chronic respiratory failure with hypoxia (HCC) ICD-10-CM: J96.21  ICD-9-CM: 518.84, 799.02  2/1/2017 - Present Unknown        COPD exacerbation ([facility] ICD-10-CM: J44.1  ICD-9-CM: 491.21  1/31/2017 - Present Unknown        Hyperkalemia ICD-10-CM: E87.5  ICD-9-CM: 276.7  1/30/2017 - Present Unknown        Elevated troponin ICD-10-CM: R79.89  ICD-9-CM: 790.6  1/30/2017 - Present Unknown        Acute respiratory distress (HCC) ICD-10-CM: R06.00  ICD-9-CM: 518.82  1/30/2017 - Present Unknown        COPD (chronic obstructive pulmonary disease) ([facility] ICD-10-CM: J44.9  ICD-9-CM: 496  1/30/2017 - Present Unknown        CHF (congestive heart failure) (HCC) ICD-10-CM: I50.9  ICD-9-CM: 428.0  1/30/2017 - Present Unknown        Insufficiency, respiratory, acute ([facility] ICD-10-CM: R06.89  ICD-9-CM: 518.82  1/30/2017 - Present Unknown        Infiltrate noted on imaging study ICD-10-CM: R93.8  ICD-9-CM: 793.99  1/30/2017 - Present Unknown        Chronic respiratory failure with hypoxia (HCC) ICD-10-CM: J96.11  ICD-9-CM: 518.83, 799.02  1/30/2017 - Present Unknown        Paroxysmal atrial fibrillation (HCC) ICD-10-CM: I48.0  ICD-9-CM: 427.31  1/4/2016 - Present Yes        Hypertension (Chronic) ICD-10-CM: I10  ICD-9-CM: 401.9  9/27/2015 - Present Yes        * (Principal)Acute on chronic diastolic CHF (congestive heart failure) (HCC) ICD-10-CM: I50.33  ICD-9-CM: 428.33, 428.0  9/3/2014 - Present Yes              Discharge Diagnoses:    1. Acute on chronic respiratory failure   Continue Improving on 3 L nc O2 , need bipap at night -bipap at night   Multiple factors, copd exacerbation, possible hcap, hermelinda, Pulmonary hypertension (moderated from 2016 echo and chf exacerbation   2. Restrictive lung disease and possible COPD/Asthma with HCAP  On breathing tx with xopenex. pulmocort and brovana, off Levaquin,Tapering off steroid  3 afib with rvr  Rate controlled per cardizem now, optimize the electrolytes, on warfarin , inr 2.5 . continue pharmacy dose   4. chf exacerbation -diastolic from previous echo  continue lasix cards on board ,stable   5. Hyperkalemia  Resolved,   7. DM type II ,   -long term insulin ,   - on lantus to 25 , Ssi, premeal insulin and ssui   8 HTN, accelerated  Continue home medication. 9. Pulmonary hypertension   moderate from echo   10 hermelinda   Not compliance cpap at home   11 anxiety  Scheduled klonipin ,better controlled   12. Insomnia   Continue trazodone prn for sleep     Hospital Course:   66 y.o. female who has hx of CHF, A fib on warfarin, DM II, pulmonary hypertension,COPD on home O2 use came to ED due to SOB since d/c from [place],. She was admitted to [place] due to SOB and was started there for five days, she was d.c home, her sob not improving,but worsening, reported that her medication for afib was hold. She was in respiratory extremis and improved after being placed on BiPAP ER findings-Pro-bnp was 32175. ekg : afib with rvr. . K 6.2. Cxr: hypoinflation . Trop 0.27 wbc 11.9 with left shift, lactic acid wnl. inr 2.6. ABG indicated hypercapnia, was put on bipap, given lasix, insulin, levaquin ,steroid and calcium gluconate. Cardiology and Pulmonology consulted.   She was weaned off of Bipap however stil requires this at night. Patient was diuresed and her AfibRVR controlled on cardizem which was converted to IR cardizem TID. To complete 5 more days of 20mg prednisone. Her insulin was titrated. She is appropriate for discharge to SNF. bipap setting unknown. Significant Diagnostic Studies:  TTE 1/31  CXR 1/30    Consults:  Pulm  Cardiology    Operative Procedures:  none    Discharge Condition:   Good    Discharge Medications:    Current Discharge Medication List      START taking these medications    Details   traZODone (DESYREL) 50 mg tablet Take 1 Tab by mouth nightly. Qty: 60 Tab, Refills: 1      arformoterol (BROVANA) 15 mcg/2 mL nebu neb solution 2 mL by Nebulization route two (2) times a day. Qty: 1 Vial, Refills: 3      budesonide (PULMICORT) 0.5 mg/2 mL nbsp 2 mL by Nebulization route two (2) times a day. Qty: 1 Each, Refills: 3      clonazePAM (KLONOPIN) 0.5 mg tablet Take 0.5 Tabs by mouth two (2) times a day. Max Daily Amount: 0.5 mg.  Qty: 60 Tab, Refills: 0      dilTIAZem (CARDIZEM) 60 mg tablet Take 1 Tab by mouth Before breakfast, lunch, and dinner. Qty: 180 Tab, Refills: 3      docusate sodium (COLACE) 100 mg capsule Take 1 Cap by mouth two (2) times a day for 90 days. Qty: 60 Cap, Refills: 2      !! insulin lispro (HUMALOG) 100 unit/mL injection AC and HS  Indications: type 2 diabetes mellitus  Qty: 1 Vial, Refills: 3      !! insulin lispro (HUMALOG) 100 unit/mL injection AC  Indications: type 2 diabetes mellitus  Qty: 1 Vial, Refills: 3      oxyCODONE-acetaminophen (PERCOCET) 5-325 mg per tablet Take 2 Tabs by mouth every four (4) hours as needed. Max Daily Amount: 12 Tabs. Qty: 45 Tab, Refills: 0      pantoprazole (PROTONIX) 40 mg tablet Take 1 Tab by mouth Daily (before breakfast). Qty: 60 Tab, Refills: 1      simvastatin (ZOCOR) 10 mg tablet Take 1 Tab by mouth nightly.   Qty: 90 Tab, Refills: 3      predniSONE (DELTASONE) 20 mg tablet Take 1 Tab by mouth daily for 5 days. Indications: ASTHMA EXACERBATION  Qty: 5 Tab, Refills: 0       !! - Potential duplicate medications found. Please discuss with provider. CONTINUE these medications which have CHANGED    Details   furosemide (LASIX) 40 mg tablet Take 1 Tab by mouth daily. Qty: 120 Tab, Refills: 1      insulin glargine (LANTUS) 100 unit/mL injection 25 Units by SubCUTAneous route nightly. Qty: 1 Vial, Refills: 3         CONTINUE these medications which have NOT CHANGED    Details   albuterol-ipratropium (DUO-NEB) 2.5 mg-0.5 mg/3 ml nebulizer solution 3 mL by Nebulization route every four (4) hours as needed for Shortness of Breath. Qty: 30 Vial, Refills: 0      codeine-butalbital-acetaminophen-caffeine (FIORICET WITH CODEINE) -89-30 mg per capsule Take  by mouth every four (4) hours as needed for Headache. pregabalin (LYRICA) 75 mg capsule Take  by mouth.      warfarin (COUMADIN) 2.5 mg tablet Take 2.5 mg by mouth daily. carvedilol (COREG) 25 mg tablet Take 25 mg by mouth two (2) times daily (with meals). aspirin 81 mg tablet Take 81 mg by mouth. CYANOCOBALAMIN, VITAMIN B-12, (VITAMIN B-12 PO) Take  by mouth. FERROUS SULFATE by Does Not Apply route. STOP taking these medications       lisinopril (PRINIVIL, ZESTRIL) 5 mg tablet Comments:   Reason for Stopping:         amLODIPine (NORVASC) 5 mg tablet Comments:   Reason for Stopping:         spironolactone (ALDACTONE) 25 mg tablet Comments:   Reason for Stopping:         OXYCODONE HCL (OXYCODONE, BULK,) Comments:   Reason for Stopping:         ATORVASTATIN CALCIUM (LIPITOR PO) Comments:   Reason for Stopping:         OMEPRAZOLE (PRILOSEC PO) Comments:   Reason for Stopping: Follow-Up And Discharge Instructions: Follow-up Information     Follow up With Details Comments [address]  The SNF is responsible for making arrangements for the PCP visit while in house. [address]  [phone]              Wound Care:   NA      [facility]  Hospitalist Division        Time Spent:  40m    Cc: Zorita Crigler, MD

## 2017-02-09 NOTE — PROGRESS NOTES
Problem: Mobility Impaired (Adult and Pediatric)  Goal: *Acute Goals and Plan of Care (Insert Text)  Physical Therapy Goals  Initiated 2/1/2017 and to be accomplished within 7 day(s)  1. Patient will move from supine to sit and sit to supine in bed with supervision/set-up. 2. Patient will transfer from bed to chair and chair to bed with supervision/set-up using the least restrictive device. 3. Patient will perform sit to stand with supervision/set-up. 4. Patient will ambulate with supervision/set-up for >150 feet with the least restrictive device for safe home ambulation. 5. Patient will ascend/descend 1 step without handrail(s) with minimal assistance/contact guard assist for safe home entry. Physical Therapy Goals  Initiated 2/9/2017 and to be accomplished within 7 day(s)  1. Patient will move from supine to sit and sit to supine in bed with supervision/set-up. 2. Patient will transfer from bed to chair and chair to bed with supervision/set-up using the least restrictive device. 3. Patient will perform sit to stand with supervision/set-up. 4. Patient will ambulate with supervision/set-up for >150 feet with the least restrictive device for safe home ambulation. 5. Patient will ascend/descend 1 step without handrail(s) with minimal assistance/contact guard assist for safe home entry.    Outcome: Progressing Towards Goal  PHYSICAL THERAPY RE-EVALUATION AND TREATMENT     Patient: Michaelle Lauren (81 y.o. female)  Date: 2/9/2017  Diagnosis: Insufficiency, respiratory, acute (HCC)  CHF (congestive heart failure) (HCC)  Elevated troponin  COPD (chronic obstructive pulmonary disease) ([address]  Infiltrate noted on imaging study  Insufficiency, respiratory, acute (HCC)  CHF (congestive heart failure) (HCC)  Elevated troponin  COPD (chronic obstructive pulmonary disease) (HCC)  Infiltrate noted on imaging study Acute on chronic diastolic CHF (congestive heart failure) ([address]  Precautions: Fall ASSESSMENT:  Patient continues to present to PT with functional mobility impairments with regard to bed mobility, transfers, gait, stair negotiation, balance, weakness, and overall tolerance for activity. Pt very motivated to participate with PT at this time. Her SpO2 on 3L via NC was 96%, during gait training on 3L her SpO2 dropped to 88% with SOB noted. During gait training pt was able to ambulate a total of 80 feet with RW use, CGA/Wilber demonstrating a slow netta, decreased step clearance and increased trunk sway; pt did require standing rest breaks due to decreased tolerance to activity. Left pt in bed with all needs met and call bell within reach. Continue to recommend rehab at time of discharge. Patient's progression toward goals since last assessment: Fair       PLAN:  Goals have been updated based on progression since last assessment. Patient continues to benefit from skilled intervention to address the above impairments. Continue to follow the patient daily to address goals. Planned Interventions:  [X]     Bed Mobility Training          [X]     Neuromuscular Re-Education  [X]     Transfer Training                [ ]    Orthotic/Prosthetic Training  [X]     Gait Training                       [ ]     Modalities  [X]     Therapeutic Exercises       [ ]     Edema Management/Control  [X]     Therapeutic Activities         [X]     Patient and Family Training/Education  [ ]     Other (comment):  Discharge Recommendations: Andrés Nelson  Further Equipment Recommendations for Discharge: rolling walker       SUBJECTIVE:   Patient stated I am feeling a little better but I still need rehab.       OBJECTIVE DATA SUMMARY:   Critical Behavior:  Neurologic State: Alert, Appropriate for age  Orientation Level: Appropriate for age, Oriented X4  Cognition: Appropriate decision making, Appropriate for age attention/concentration, Appropriate safety awareness, Follows commands  Safety/Judgement: Awareness of environment, Fall prevention, Good awareness of safety precautions, Insight into deficits  Functional Mobility Training:  Transfers:  Sit to Stand: Contact guard assistance; Additional time  Stand to Sit: Contact guard assistance; Additional time   Balance:  Sitting: Intact  Standing: Impaired; With support  Standing - Static: Good  Standing - Dynamic : Fair  Ambulation/Gait Training:  Distance (ft): 80 Feet (ft) (with 3 standing rest breaks)  Assistive Device: Walker, rolling;Gait belt  Ambulation - Level of Assistance: Minimal assistance   Gait Description (WDL): Exceptions to WDL  Gait Abnormalities: Antalgic;Decreased step clearance;Trunk sway increased   Base of Support: Widened  Stance: Weight shift  Speed/Kristin: Pace decreased (<100 feet/min)  Step Length: Right shortened;Left shortened  Swing Pattern: Right asymmetrical;Left asymmetrical   Interventions: Visual/Demos; Verbal cues; Tactile cues; Safety awareness training   Therapeutic Exercises:   HEP included ankle pumps, LAQ, marching x 5 reps  Pain:  Pain Scale 1: Numeric (0 - 10)  Pain Intensity 1: 8 (8/10 prior to PT and 9/10 post PT)  Pain Location 1: Back  Pain Orientation 1: Lower   Pain Intervention(s) 1: Rest;Repositioned  Activity Tolerance:   Fair  Please refer to the flowsheet for vital signs taken during this treatment.   After treatment:   [ ]  Patient left in no apparent distress sitting up in chair  [X]  Patient left in no apparent distress in bed  [X]  Call bell left within reach  [X]  Nursing notified  [ ]  Caregiver present  [ ]  Bed alarm activated     eMg Lowry PT, DPT      Time Calculation: 25 mins

## 2017-02-09 NOTE — PROGRESS NOTES
Pharmacy Dosing Services: Warfarin    Consult for Warfarin Dosing by Pharmacy by Dr. Maura Thornton provided for this 66 y.o.  female , for indication of Atrial Fibrillation. Dose to achieve an INR goal of 2-3    Order entered for Warfarin 2.5 mg to be given today at 18:00. Significant drug interactions: n/a  LABS    PT/INR Lab Results   Component Value Date/Time    INR 2.3 02/09/2017 02:23 AM      Platelets Lab Results   Component Value Date/Time    PLATELET 778 10/94/1596 03:12 AM      H/H     Lab Results   Component Value Date/Time    HGB 12.4 02/08/2017 03:12 AM        Warfarin Administrations (last 168 hours)       Date/Time Action Medication Dose      02/08/17 1707 Given    warfarin (COUMADIN) tablet 2 mg 2 mg    02/07/17 1807 Given    warfarin (COUMADIN) tablet 2 mg 2 mg    02/06/17 1715 Given    warfarin (COUMADIN) tablet 5 mg 5 mg    02/05/17 1723 Given    warfarin (COUMADIN) tablet 5 mg 5 mg    02/04/17 1821 Given    warfarin (COUMADIN) tablet 5 mg 5 mg    02/03/17 1733 Given   [INR 1.8]    warfarin (COUMADIN) tablet 4 mg 4 mg    02/02/17 1740 Given    warfarin (COUMADIN) tablet 3 mg 3 mg          Pharmacy to follow daily and will provide subsequent Warfarin dosing based on clinical status.   YA Magana  Contact information [phone]

## 2017-02-09 NOTE — PROGRESS NOTES
Chart reviewed, noted plan for discharge today. Per e D/C, [facility] can accept today after noon. Met with pt who was in agreement with going to [place] [id number] today and requested stretcher ambulance. Pt was made aware she may incur a charge. Pt declined [facility] offer to contact family about pt's dc plan for today and stated she would contact them herself. Plan: d/c to [facility] [address] [place] [phone] via str ambulance. Pt will need her Discharge Summary, Med Rec, and scripts for controlled meds to accompany her. Pt's nurse to call report to [place] at: [phone].

## 2017-02-10 NOTE — PROGRESS NOTES
Cardiology Progress Note        Patient: Maldonado Angeles        Sex: female          DOA: 1/30/2017  YOB: 1945      Age:  66 y.o.        LOS:  LOS: 10 days      Patient seen and examined. Chart reviewed. Assessment/Plan     Acute on chronic diastolic heart failure compensated   Acute on chronic respiratory failure improving   COPD  Permanent atrial fibrillation - rate is under good control  Abnormal troponin most likely secondary to heart failure, respiratory failure, atrial fibrillation with rapid ventricular rate, no evidence of ACS, however underlying CAD can not be ruled out. Moderate aortic stenosis   Hyperkalemia resolved.           Plan:      Continue Cardizem and coreg. Change Lasix to 40 mg PO once a day. Continue Zocor 10 mg PO q HS. (Abnormal troponin and AS)  CMP and Mg reviewed. Continue current management. Continue coumadin as per PT/INR  Continue management as per hospital medicine. Strict I/O, fluid restriction up to 1200 cc/day  Cardiology sign off, will follow up in office. Subjective:    cc: My breathing is much better. Leg swelling improved. Denies any chest pain. REVIEW OF SYSTEMS:     General: No fevers or chills. Cardiovascular: Positive Leg swelling, No chest pain,No palpitations, No orthopnea, No PND,  No claudication  Pulmonary: No dyspnea   Gastrointestinal: No nausea, vomiting, bleeding  Neurology: No Dizziness    Objective:      Visit Vitals    /50 (BP 1 Location: Left arm, BP Patient Position: Sitting)    Pulse 94    Temp 98 °F (36.7 °C)    Resp 16    Ht 5' 5\" (1.651 m)    Wt 106 kg (233 lb 11 oz)    SpO2 98%    BMI 38.89 kg/m2     Body mass index is 38.89 kg/(m^2). Physical Exam:  General Appearance: Comfortable, obese, not using accessory muscles of respiration. HEENT: SIRIA. HEAD: Atraumatic  NECK: No JVD, no thyroidomeglay. CAROTIDS: No bruit  LUNGS: Clear bilaterally.    HEART: S1+S2 irregularly irregular, 3/6 systolic murmur in aortic area, no pericardial rub. ABD: Non-tender, BS Audible    EXT: + bilateral edema, and no cyanosis. VASCULAR EXAM: Pulses are intact. PSYCHIATRIC EXAM: Mood is appropriate. MUSCULOSKELETAL: Grossly no joint deformity. NEUROLOGICAL: AAO times 3, No motor and sensory deficit. Medication:  No current facility-administered medications for this encounter. Current Outpatient Prescriptions   Medication Sig    traZODone (DESYREL) 50 mg tablet Take 1 Tab by mouth nightly.  arformoterol (BROVANA) 15 mcg/2 mL nebu neb solution 2 mL by Nebulization route two (2) times a day.  budesonide (PULMICORT) 0.5 mg/2 mL nbsp 2 mL by Nebulization route two (2) times a day.  clonazePAM (KLONOPIN) 0.5 mg tablet Take 0.5 Tabs by mouth two (2) times a day. Max Daily Amount: 0.5 mg.    dilTIAZem (CARDIZEM) 60 mg tablet Take 1 Tab by mouth Before breakfast, lunch, and dinner.  docusate sodium (COLACE) 100 mg capsule Take 1 Cap by mouth two (2) times a day for 90 days.  furosemide (LASIX) 40 mg tablet Take 1 Tab by mouth daily.  insulin lispro (HUMALOG) 100 unit/mL injection AC and HS  Indications: type 2 diabetes mellitus    insulin lispro (HUMALOG) 100 unit/mL injection AC  Indications: type 2 diabetes mellitus    oxyCODONE-acetaminophen (PERCOCET) 5-325 mg per tablet Take 2 Tabs by mouth every four (4) hours as needed. Max Daily Amount: 12 Tabs.  pantoprazole (PROTONIX) 40 mg tablet Take 1 Tab by mouth Daily (before breakfast).  simvastatin (ZOCOR) 10 mg tablet Take 1 Tab by mouth nightly.  insulin glargine (LANTUS) 100 unit/mL injection 25 Units by SubCUTAneous route nightly.  predniSONE (DELTASONE) 20 mg tablet Take 1 Tab by mouth daily for 5 days. Indications: ASTHMA EXACERBATION    albuterol-ipratropium (DUO-NEB) 2.5 mg-0.5 mg/3 ml nebulizer solution 3 mL by Nebulization route every four (4) hours as needed for Shortness of Breath.  codeine-butalbital-acetaminophen-caffeine (FIORICET WITH CODEINE) -56-30 mg per capsule Take  by mouth every four (4) hours as needed for Headache.  pregabalin (LYRICA) 75 mg capsule Take  by mouth.  warfarin (COUMADIN) 2.5 mg tablet Take 2.5 mg by mouth daily.  carvedilol (COREG) 25 mg tablet Take 25 mg by mouth two (2) times daily (with meals).  aspirin 81 mg tablet Take 81 mg by mouth.  CYANOCOBALAMIN, VITAMIN B-12, (VITAMIN B-12 PO) Take  by mouth.  FERROUS SULFATE by Does Not Apply route.                  Lab/Data Reviewed:       Recent Labs      02/08/17 0312   WBC  13.9*   HGB  12.4   HCT  40.0   PLT  165     Recent Labs      02/09/17   0223   NA  144   K  4.9   CL  105   CO2  34*   GLU  105*   BUN  58*   CREA  1.59*   CA  8.6       Signed By: Janes Irving MD     February 9, 2017

## 2017-02-10 NOTE — PROGRESS NOTES
Problem: Self Care Deficits Care Plan (Adult)  Goal: *Acute Goals and Plan of Care (Insert Text)  IInitial OT STGs (1/31/2017) Within 7 days:    1. Patient will perform toilet transfer with CGA/Katherin & SPO2>90% in preparation for bowel and bladder management. 2. Patient will perform bowel and bladder management with CGA/Katherin & SPO2>90% for increased independence with ADLs. 3. Patient will grooming standing sinkside for 5 minutes for increased independence ADLs/IADLs. 4. Patient will perform LB/UB dressing with setupA w/ A/E PRN for increased independence with ADLs. 5. Patient will perform UE exercises with SBA for increased independence with ADLs. 6. Patient will utilize energy conservation techniques with 1 verbal cue for increased independence with ADLs. Occupational Therapy Goals  Initiated 2/7/2017 within 7 day(s). 1. Patient will perform grooming with supervision/set-up 2. Patient will perform upper body dressing and lower body dressing with supervision/set-up. 3. Patient will perform standing sink side for 5 minutes with supervision/set-up. 4. Patient will perform toilet transfers with supervision/set-up. 5. Patient will perform all aspects of toileting with supervision/set-up. 6. Patient will participate in upper extremity therapeutic exercise/activities with supervision/set-up for 10 minutes. 7. Patient will utilize energy conservation techniques during functional activities with verbal cues. Outcome: Not Met  Addendum to chart:      Goals were ongoing at time of discharge. Will resolve this plan of care as patient transitioned to next level of care. Pt discharged to SNF.      Roya Lopez MS OTR/L

## 2017-02-25 ENCOUNTER — APPOINTMENT (OUTPATIENT)
Dept: GENERAL RADIOLOGY | Age: 72
DRG: 286 | End: 2017-02-25
Attending: EMERGENCY MEDICINE
Payer: MEDICARE

## 2017-02-25 ENCOUNTER — HOSPITAL ENCOUNTER (INPATIENT)
Age: 72
LOS: 26 days | Discharge: NURSING, REHAB OR LONG-TERM CARE | DRG: 286 | End: 2017-03-24
Attending: EMERGENCY MEDICINE | Admitting: INTERNAL MEDICINE
Payer: MEDICARE

## 2017-02-25 DIAGNOSIS — R09.02 HYPOXEMIA: ICD-10-CM

## 2017-02-25 DIAGNOSIS — F41.9 ANXIETY: ICD-10-CM

## 2017-02-25 DIAGNOSIS — I50.9 DYSPNEA DUE TO CONGESTIVE HEART FAILURE (HCC): ICD-10-CM

## 2017-02-25 DIAGNOSIS — J96.21 ACUTE ON CHRONIC RESPIRATORY FAILURE WITH HYPOXIA (HCC): ICD-10-CM

## 2017-02-25 DIAGNOSIS — I50.9 ACUTE ON CHRONIC CONGESTIVE HEART FAILURE, UNSPECIFIED CONGESTIVE HEART FAILURE TYPE: Chronic | ICD-10-CM

## 2017-02-25 DIAGNOSIS — J44.1 ACUTE EXACERBATION OF CHRONIC OBSTRUCTIVE PULMONARY DISEASE (COPD) (HCC): Primary | ICD-10-CM

## 2017-02-25 DIAGNOSIS — I27.20 PULMONARY HYPERTENSION, MODERATE TO SEVERE (HCC): ICD-10-CM

## 2017-02-25 PROBLEM — N39.0 UTI (URINARY TRACT INFECTION): Status: ACTIVE | Noted: 2017-02-25

## 2017-02-25 LAB
ALBUMIN SERPL BCP-MCNC: 3.3 G/DL (ref 3.4–5)
ALBUMIN/GLOB SERPL: 0.9 {RATIO} (ref 0.8–1.7)
ALP SERPL-CCNC: 147 U/L (ref 45–117)
ALT SERPL-CCNC: 21 U/L (ref 13–56)
ANION GAP BLD CALC-SCNC: 5 MMOL/L (ref 3–18)
APPEARANCE UR: CLEAR
ARTERIAL PATENCY WRIST A: YES
AST SERPL W P-5'-P-CCNC: 13 U/L (ref 15–37)
BACTERIA URNS QL MICRO: ABNORMAL /HPF
BASE EXCESS BLD CALC-SCNC: 10 MMOL/L
BASOPHILS # BLD AUTO: 0 K/UL (ref 0–0.1)
BASOPHILS # BLD: 0 % (ref 0–3)
BDY SITE: ABNORMAL
BILIRUB SERPL-MCNC: 1.2 MG/DL (ref 0.2–1)
BILIRUB UR QL: NEGATIVE
BNP SERPL-MCNC: 3585 PG/ML (ref 0–900)
BUN SERPL-MCNC: 43 MG/DL (ref 7–18)
BUN/CREAT SERPL: 31 (ref 12–20)
CALCIUM SERPL-MCNC: 8.6 MG/DL (ref 8.5–10.1)
CHLORIDE SERPL-SCNC: 100 MMOL/L (ref 100–108)
CK MB CFR SERPL CALC: 1.4 % (ref 0–4)
CK MB CFR SERPL CALC: 2.8 % (ref 0–4)
CK MB SERPL-MCNC: 1 NG/ML (ref 5–25)
CK MB SERPL-MCNC: 1.5 NG/ML (ref 5–25)
CK SERPL-CCNC: 53 U/L (ref 26–192)
CK SERPL-CCNC: 72 U/L (ref 26–192)
CO2 SERPL-SCNC: 37 MMOL/L (ref 21–32)
COLOR UR: YELLOW
CREAT SERPL-MCNC: 1.4 MG/DL (ref 0.6–1.3)
DIFFERENTIAL METHOD BLD: ABNORMAL
EOSINOPHIL # BLD: 0.1 K/UL (ref 0–0.4)
EOSINOPHIL NFR BLD: 2 % (ref 0–5)
EPITH CASTS URNS QL MICRO: 0 /LPF (ref 0–5)
ERYTHROCYTE [DISTWIDTH] IN BLOOD BY AUTOMATED COUNT: 17.5 % (ref 11.6–14.5)
EST. AVERAGE GLUCOSE BLD GHB EST-MCNC: 174 MG/DL
GAS FLOW.O2 O2 DELIVERY SYS: ABNORMAL L/MIN
GLOBULIN SER CALC-MCNC: 3.7 G/DL (ref 2–4)
GLUCOSE BLD STRIP.AUTO-MCNC: 82 MG/DL (ref 70–110)
GLUCOSE SERPL-MCNC: 114 MG/DL (ref 74–99)
GLUCOSE UR STRIP.AUTO-MCNC: NEGATIVE MG/DL
HBA1C MFR BLD: 7.7 % (ref 4.5–5.6)
HCO3 BLD-SCNC: 35.3 MMOL/L (ref 22–26)
HCT VFR BLD AUTO: 39.9 % (ref 35–45)
HGB BLD-MCNC: 12.2 G/DL (ref 12–16)
HGB UR QL STRIP: NEGATIVE
HYALINE CASTS URNS QL MICRO: ABNORMAL /LPF (ref 0–2)
INR PPP: 2.1 (ref 0.8–1.2)
KETONES UR QL STRIP.AUTO: NEGATIVE MG/DL
LEUKOCYTE ESTERASE UR QL STRIP.AUTO: ABNORMAL
LYMPHOCYTES # BLD AUTO: 2 % (ref 20–51)
LYMPHOCYTES # BLD: 0.1 K/UL (ref 0.8–3.5)
MAGNESIUM SERPL-MCNC: 2.2 MG/DL (ref 1.8–2.4)
MCH RBC QN AUTO: 31.9 PG (ref 24–34)
MCHC RBC AUTO-ENTMCNC: 30.6 G/DL (ref 31–37)
MCV RBC AUTO: 104.5 FL (ref 74–97)
MONOCYTES # BLD: 0.7 K/UL (ref 0–1)
MONOCYTES NFR BLD AUTO: 12 % (ref 2–9)
NEUTS BAND NFR BLD MANUAL: 3 % (ref 0–5)
NEUTS SEG # BLD: 4.6 K/UL (ref 1.8–8)
NEUTS SEG NFR BLD AUTO: 81 % (ref 42–75)
NITRITE UR QL STRIP.AUTO: POSITIVE
O2/TOTAL GAS SETTING VFR VENT: 28 %
PCO2 BLD: 60.7 MMHG (ref 35–45)
PEEP RESPIRATORY: 6 CMH2O
PH BLD: 7.37 [PH] (ref 7.35–7.45)
PH UR STRIP: 5 [PH] (ref 5–8)
PIP ISTAT,IPIP: 16
PLATELET # BLD AUTO: 164 K/UL (ref 135–420)
PLATELET COMMENTS,PCOM: ABNORMAL
PMV BLD AUTO: 11 FL (ref 9.2–11.8)
PO2 BLD: 57 MMHG (ref 80–100)
POTASSIUM SERPL-SCNC: 4.6 MMOL/L (ref 3.5–5.5)
PROT SERPL-MCNC: 7 G/DL (ref 6.4–8.2)
PROT UR STRIP-MCNC: NEGATIVE MG/DL
PROTHROMBIN TIME: 22.9 SEC (ref 11.5–15.2)
RBC # BLD AUTO: 3.82 M/UL (ref 4.2–5.3)
RBC #/AREA URNS HPF: ABNORMAL /HPF (ref 0–5)
RBC MORPH BLD: ABNORMAL
SAO2 % BLD: 88 % (ref 92–97)
SERVICE CMNT-IMP: ABNORMAL
SODIUM SERPL-SCNC: 142 MMOL/L (ref 136–145)
SP GR UR REFRACTOMETRY: 1.01 (ref 1–1.03)
SPECIMEN TYPE: ABNORMAL
SPONTANEOUS TIMED, IST: YES
TOTAL RESP. RATE, ITRR: 24
TROPONIN I SERPL-MCNC: 0.31 NG/ML (ref 0–0.06)
TROPONIN I SERPL-MCNC: 0.31 NG/ML (ref 0–0.06)
UROBILINOGEN UR QL STRIP.AUTO: 1 EU/DL (ref 0.2–1)
WBC # BLD AUTO: 5.7 K/UL (ref 4.6–13.2)
WBC MORPH BLD: ABNORMAL
WBC URNS QL MICRO: ABNORMAL /HPF (ref 0–5)

## 2017-02-25 PROCEDURE — 99285 EMERGENCY DEPT VISIT HI MDM: CPT

## 2017-02-25 PROCEDURE — 77030035694 HC MSK BIPAP FLL FAC PERFMAX PHIL -B

## 2017-02-25 PROCEDURE — 87086 URINE CULTURE/COLONY COUNT: CPT | Performed by: INTERNAL MEDICINE

## 2017-02-25 PROCEDURE — 74011250636 HC RX REV CODE- 250/636: Performed by: EMERGENCY MEDICINE

## 2017-02-25 PROCEDURE — 94640 AIRWAY INHALATION TREATMENT: CPT

## 2017-02-25 PROCEDURE — 36600 WITHDRAWAL OF ARTERIAL BLOOD: CPT

## 2017-02-25 PROCEDURE — 87077 CULTURE AEROBIC IDENTIFY: CPT | Performed by: INTERNAL MEDICINE

## 2017-02-25 PROCEDURE — 74011250636 HC RX REV CODE- 250/636: Performed by: INTERNAL MEDICINE

## 2017-02-25 PROCEDURE — 82962 GLUCOSE BLOOD TEST: CPT

## 2017-02-25 PROCEDURE — 74011250637 HC RX REV CODE- 250/637: Performed by: INTERNAL MEDICINE

## 2017-02-25 PROCEDURE — 96374 THER/PROPH/DIAG INJ IV PUSH: CPT

## 2017-02-25 PROCEDURE — 77030005514 HC CATH URETH FOL14 BARD -A

## 2017-02-25 PROCEDURE — 81001 URINALYSIS AUTO W/SCOPE: CPT | Performed by: EMERGENCY MEDICINE

## 2017-02-25 PROCEDURE — 82803 BLOOD GASES ANY COMBINATION: CPT

## 2017-02-25 PROCEDURE — 51702 INSERT TEMP BLADDER CATH: CPT

## 2017-02-25 PROCEDURE — 82550 ASSAY OF CK (CPK): CPT | Performed by: EMERGENCY MEDICINE

## 2017-02-25 PROCEDURE — 74011000250 HC RX REV CODE- 250: Performed by: INTERNAL MEDICINE

## 2017-02-25 PROCEDURE — 74011000250 HC RX REV CODE- 250: Performed by: EMERGENCY MEDICINE

## 2017-02-25 PROCEDURE — 87186 SC STD MICRODIL/AGAR DIL: CPT | Performed by: INTERNAL MEDICINE

## 2017-02-25 PROCEDURE — 93005 ELECTROCARDIOGRAM TRACING: CPT

## 2017-02-25 PROCEDURE — 83735 ASSAY OF MAGNESIUM: CPT | Performed by: EMERGENCY MEDICINE

## 2017-02-25 PROCEDURE — 94660 CPAP INITIATION&MGMT: CPT

## 2017-02-25 PROCEDURE — 77030013140 HC MSK NEB VYRM -A

## 2017-02-25 PROCEDURE — 74011000258 HC RX REV CODE- 258: Performed by: INTERNAL MEDICINE

## 2017-02-25 PROCEDURE — 71010 XR CHEST PORT: CPT

## 2017-02-25 PROCEDURE — 85025 COMPLETE CBC W/AUTO DIFF WBC: CPT | Performed by: EMERGENCY MEDICINE

## 2017-02-25 PROCEDURE — 94762 N-INVAS EAR/PLS OXIMTRY CONT: CPT

## 2017-02-25 PROCEDURE — 85610 PROTHROMBIN TIME: CPT | Performed by: INTERNAL MEDICINE

## 2017-02-25 PROCEDURE — 80053 COMPREHEN METABOLIC PANEL: CPT | Performed by: EMERGENCY MEDICINE

## 2017-02-25 PROCEDURE — 83036 HEMOGLOBIN GLYCOSYLATED A1C: CPT | Performed by: INTERNAL MEDICINE

## 2017-02-25 PROCEDURE — 83880 ASSAY OF NATRIURETIC PEPTIDE: CPT | Performed by: EMERGENCY MEDICINE

## 2017-02-25 RX ORDER — FUROSEMIDE 10 MG/ML
40 INJECTION INTRAMUSCULAR; INTRAVENOUS EVERY 12 HOURS
Status: DISCONTINUED | OUTPATIENT
Start: 2017-02-25 | End: 2017-03-01

## 2017-02-25 RX ORDER — FAMOTIDINE 20 MG/1
20 TABLET, FILM COATED ORAL 2 TIMES DAILY
Status: DISCONTINUED | OUTPATIENT
Start: 2017-02-25 | End: 2017-02-28

## 2017-02-25 RX ORDER — IPRATROPIUM BROMIDE AND ALBUTEROL SULFATE 2.5; .5 MG/3ML; MG/3ML
3 SOLUTION RESPIRATORY (INHALATION)
Status: COMPLETED | OUTPATIENT
Start: 2017-02-25 | End: 2017-02-25

## 2017-02-25 RX ORDER — LISINOPRIL 5 MG/1
5 TABLET ORAL DAILY
Status: DISCONTINUED | OUTPATIENT
Start: 2017-02-26 | End: 2017-02-28

## 2017-02-25 RX ORDER — DEXTROSE 50 % IN WATER (D50W) INTRAVENOUS SYRINGE
25-50 AS NEEDED
Status: DISCONTINUED | OUTPATIENT
Start: 2017-02-25 | End: 2017-03-24 | Stop reason: HOSPADM

## 2017-02-25 RX ORDER — FUROSEMIDE 10 MG/ML
80 INJECTION INTRAMUSCULAR; INTRAVENOUS
Status: COMPLETED | OUTPATIENT
Start: 2017-02-25 | End: 2017-02-25

## 2017-02-25 RX ORDER — MAGNESIUM SULFATE 100 %
4 CRYSTALS MISCELLANEOUS AS NEEDED
Status: DISCONTINUED | OUTPATIENT
Start: 2017-02-25 | End: 2017-03-24 | Stop reason: HOSPADM

## 2017-02-25 RX ORDER — IPRATROPIUM BROMIDE AND ALBUTEROL SULFATE 2.5; .5 MG/3ML; MG/3ML
3 SOLUTION RESPIRATORY (INHALATION)
Status: DISCONTINUED | OUTPATIENT
Start: 2017-02-25 | End: 2017-02-26

## 2017-02-25 RX ORDER — INSULIN LISPRO 100 [IU]/ML
INJECTION, SOLUTION INTRAVENOUS; SUBCUTANEOUS
Status: DISCONTINUED | OUTPATIENT
Start: 2017-02-25 | End: 2017-03-24 | Stop reason: HOSPADM

## 2017-02-25 RX ORDER — ACETAMINOPHEN 325 MG/1
650 TABLET ORAL
Status: DISCONTINUED | OUTPATIENT
Start: 2017-02-25 | End: 2017-03-24 | Stop reason: HOSPADM

## 2017-02-25 RX ORDER — WARFARIN SODIUM 5 MG/1
2.5 TABLET ORAL ONCE
Status: COMPLETED | OUTPATIENT
Start: 2017-02-25 | End: 2017-02-25

## 2017-02-25 RX ADMIN — IPRATROPIUM BROMIDE AND ALBUTEROL SULFATE 3 ML: .5; 3 SOLUTION RESPIRATORY (INHALATION) at 15:56

## 2017-02-25 RX ADMIN — CEFTRIAXONE 1 G: 1 INJECTION, POWDER, FOR SOLUTION INTRAMUSCULAR; INTRAVENOUS at 19:13

## 2017-02-25 RX ADMIN — IPRATROPIUM BROMIDE AND ALBUTEROL SULFATE 3 ML: .5; 3 SOLUTION RESPIRATORY (INHALATION) at 20:27

## 2017-02-25 RX ADMIN — FUROSEMIDE 40 MG: 10 INJECTION, SOLUTION INTRAMUSCULAR; INTRAVENOUS at 22:56

## 2017-02-25 RX ADMIN — METHYLPREDNISOLONE SODIUM SUCCINATE 40 MG: 40 INJECTION, POWDER, FOR SOLUTION INTRAMUSCULAR; INTRAVENOUS at 22:56

## 2017-02-25 RX ADMIN — FAMOTIDINE 20 MG: 20 TABLET ORAL at 21:13

## 2017-02-25 RX ADMIN — FUROSEMIDE 80 MG: 10 INJECTION, SOLUTION INTRAMUSCULAR; INTRAVENOUS at 16:35

## 2017-02-25 RX ADMIN — WARFARIN SODIUM 2.5 MG: 5 TABLET ORAL at 21:13

## 2017-02-25 NOTE — PROGRESS NOTES
PATIENT ARRIVED IN ER IN ACUTE RESPIRATORY DISTRESS.  SPO2 84% ON 6L NC.  PLACED ON BIPAP @ 16/6 AND RAPIDLY TITRATED FROM 100% TO 28%. BS DIMINISHED BILATERALLY. DR. Karma Leslie IN TO ASSESS PATIENT.

## 2017-02-25 NOTE — H&P
History & Physical    Patient: Claudetta Cruel MRN: [id number]  CSN: [id number]    YOB: 1945  Age: 66 y.o. Sex: female      DOA: 2/25/2017  Primary Care Provider:  Carmita Raman MD      Assessment/Plan     Patient Active Problem List   Diagnosis Code    Acute on chronic diastolic CHF (congestive heart failure) ([facility]) I50.33    ALEJANDRINA (acute kidney injury) ([facility] N17.9    Acute exacerbation of CHF (congestive heart failure) ([facility]) I50.9    DM (diabetes mellitus) ([facility] E11.9    Hypertension I10    Respiratory failure ([facility] J96.90    Acute coronary syndrome ([facility]) I24.9    Obstructive sleep apnea, adult G47.33    Paroxysmal atrial fibrillation ([facility]) I48.0    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 ([facility]) W41.46, E11.65    Dyspnea due to congestive heart failure ([facility]) I50.9    Aspiration pneumonia ([facility]) J69.0    Hyperkalemia E87.5    Elevated troponin R79.89    Acute respiratory distress ([facility]) R06.00    COPD (chronic obstructive pulmonary disease) ([facility]) J44.9    CHF (congestive heart failure) ([facility]) I50.9    Insufficiency, respiratory, acute ([facility]) R06.89    Infiltrate noted on imaging study R93.8    Chronic respiratory failure with hypoxia ([facility]) J96.11    COPD exacerbation ([facility]) J44.1    Acute on chronic respiratory failure with hypoxia ([facility]) J96.21    Chest pain R07.9    Anxiety F41.9    CHF NYHA class IV ([facility]) I50.9       67 y/o female with respiratory decompensation multifactorial in origin with multiple hospital stays in the past 12 months. Acute hypoxemic hypercarbic respiratory failure. CHF diastolic acute. Pulmonary HTN moderate  TnI elevation chronic. UTI  IMANI. Possible Asthma. Decreased FEV1. DM2. HTN. Anxiety. Insomnia. Echo 2/1/17 - EF 55-60. RVCP 55mmhg. Pulm art pressure 52mmhg. Admit ICU given bipap and delicacy of her respiratory status. Resume NIPPV. Titrate. IV diuresis. Substantial edema. Albumin ok. TnI elevated. Was elevated at similar level last visit. Trend. Rx steroids and nebs. DM surveillance and treatment. Continue Warfarin for Afib chronic and dvt prevention. Rx rocephin for possible uti. GI px famotidine. Was followed by [facility] last time. Will see if they can look in on this complex patient this admission. Plan d/w  at bedside. 50 minutes critical care time spent in the direct eval and tx of this patient with severe multisystemic pathology that requires admission to ICU setting. Multiple organ systems at risk. Risk elevated overall for decompensation and or death. High complexity decision making utilized. CC: dyspnea       HPI:     Jorge Luis Anglin is a 66 y.o. female who was recently discharged after a prolonged hospital stays for respiratory failure requiring intubation, chf diastolic, pulmonary hypertension, possible hermelinda and possible asthma. Dc'd to SNF 1/30/17. Was doing very well.  noted slow insidious decline since transfer to SNF. He identified some problems. He tells despite her having an order for nighttime bipap, this was never applied to her. He tells that the staff said that nobodyknew how to operate the machine. He noted that day by day she was becoming more edematous. Despite reaching out to the care team there he doses not believe anything was done about it e.g adjusting lasix etc.  In the ER today she was dyspneic. She required NIPPV. She remains on bipap. I have been asked to admit her for continuation of care.       Past Medical History:   Diagnosis Date    A-fib [facility])     Arthritis     Back injury     Chronic obstructive pulmonary disease (HCC)     Diabetes ([facility]     Fibromyalgia     Heart failure (HCC)     Hypertension     MRSA (methicillin resistant Staphylococcus aureus)        Past Surgical History:   Procedure Laterality Date    CARDIAC SURG PROCEDURE UNLIST      HX CORONARY STENT PLACEMENT      HX KNEE REPLACEMENT      HX ORTHOPAEDIC      bilateral knee replacement       History reviewed. No pertinent family history. Social History     Social History    Marital status:      Spouse name: N/A    Number of children: N/A    Years of education: N/A     Social History Main Topics    Smoking status: Never Smoker    Smokeless tobacco: None    Alcohol use No    Drug use: No    Sexual activity: Not Asked     Other Topics Concern    None     Social History Narrative       Prior to Admission medications    Medication Sig Start Date End Date Taking? Authorizing Provider   traZODone (DESYREL) 50 mg tablet Take 1 Tab by mouth nightly. 2/9/17   Elmon Pries, DO   arformoterol (BROVANA) 15 mcg/2 mL nebu neb solution 2 mL by Nebulization route two (2) times a day. 2/9/17   Elmon Pries, DO   budesonide (PULMICORT) 0.5 mg/2 mL nbsp 2 mL by Nebulization route two (2) times a day. 2/9/17   Elmon Pries, DO   clonazePAM (KLONOPIN) 0.5 mg tablet Take 0.5 Tabs by mouth two (2) times a day. Max Daily Amount: 0.5 mg. 2/9/17   Elmon Pries, DO   dilTIAZem (CARDIZEM) 60 mg tablet Take 1 Tab by mouth Before breakfast, lunch, and dinner. 2/9/17   Elmon Pries, DO   docusate sodium (COLACE) 100 mg capsule Take 1 Cap by mouth two (2) times a day for 90 days. 2/9/17 5/10/17  Elmon Pries, DO   furosemide (LASIX) 40 mg tablet Take 1 Tab by mouth daily. 2/9/17   Elmon Pries, DO   insulin lispro (HUMALOG) 100 unit/mL injection AC and HS  Indications: type 2 diabetes mellitus 2/9/17   Elmon Pries, DO   insulin lispro (HUMALOG) 100 unit/mL injection AC  Indications: type 2 diabetes mellitus 2/9/17   Elmon Pries, DO   oxyCODONE-acetaminophen (PERCOCET) 5-325 mg per tablet Take 2 Tabs by mouth every four (4) hours as needed. Max Daily Amount: 12 Tabs. 2/9/17   Elmon Pries, DO   pantoprazole (PROTONIX) 40 mg tablet Take 1 Tab by mouth Daily (before breakfast). 2/9/17   Melanie Slade,    simvastatin (ZOCOR) 10 mg tablet Take 1 Tab by mouth nightly. 2/9/17   Melanie Slade, DO   insulin glargine (LANTUS) 100 unit/mL injection 25 Units by SubCUTAneous route nightly. 2/9/17   Melanie Slade, DO   albuterol-ipratropium (DUO-NEB) 2.5 mg-0.5 mg/3 ml nebulizer solution 3 mL by Nebulization route every four (4) hours as needed for Shortness of Breath. 1/12/16   Ty Mcginnis MD   codeine-butalbital-acetaminophen-caffeine (FIORICET WITH CODEINE) -42-30 mg per capsule Take  by mouth every four (4) hours as needed for Headache. Paul Sloan MD   pregabalin (LYRICA) 75 mg capsule Take  by mouth. Paul Sloan MD   warfarin (COUMADIN) 2.5 mg tablet Take 2.5 mg by mouth daily. Paul Sloan MD   carvedilol (COREG) 25 mg tablet Take 25 mg by mouth two (2) times daily (with meals). Paul Sloan MD   aspirin 81 mg tablet Take 81 mg by mouth. Paul Sloan MD   CYANOCOBALAMIN, VITAMIN B-12, (VITAMIN B-12 PO) Take  by mouth. Paul Sloan MD   FERROUS SULFATE by Does Not Apply route. Paul Sloan MD       Allergies   Allergen Reactions    Latex Hives    Adhesive Tape-Silicones Unknown (comments)    Bees [Sting, Bee] Unknown (comments)    Garlic Nausea and Vomiting    Zoloft [Sertraline] Hives       Review of Systems  Gen: No fever. + weight gain (fluid) and weakness. Heent: No headache, rhinorrhea, epistaxis, ear pain, hearing loss, sinus pain, neck pain/stiffness, sore throat. Heart: No chest pain, palpitations, GARIBAY, pnd, or orthopnea. Resp: + sob   GI: No nausea, vomiting, diarrhea, constipation, melena or hematochezia. : No urinary obstruction, dysuria or hematuria. Derm: No rash, new skin lesion or pruritis. Musc/skeletal: no bone or joint complains. + worsening edema. Vasc: No edema, cyanosis or claudication. Endo: No heat/cold intolerance, no polyuria,polydipsia or polyphagia.    Neuro: No unilateral weakness, numbness, tingling. No seizures. Heme: No easy bruising or bleeding. Physical Exam:     Physical Exam:  Visit Vitals    BP 98/46    Pulse (!) 53    Temp 98.3 °F (36.8 °C)    Resp 17    SpO2 90%    O2 Flow Rate (L/min): 6 l/min O2 Device: BIPAP    Temp (24hrs), Av.3 °F (36.8 °C), Min:98.3 °F (36.8 °C), Max:98.3 °F (36.8 °C)    701 -  1900  In: -   Out: 600 [Urine:600]        General:  Awake, cooperative, no distress. .  Bipap mask in place. Head:  Normocephalic, without obvious abnormality, atraumatic. Eyes:  Conjunctivae/corneas clear, sclera anicteric, PERRL, EOMs intact. Nose: Nares normal. No drainage or sinus tenderness. Throat: Lips, mucosa, and tongue normal.    Neck: Elevated jvp       Lungs:   Clear to auscultation bilaterally. Rales bases. Decreased diapharagmatic excursion. Heart:  Regular rate and rhythm, S1, S2 normal, no murmur, click, rub or gallop. Abdomen: Soft, non-tender. Bowel sounds normal. No masses,  No organomegaly. Extremities: Pitting edema above knees. Weeping edema. Pulses: 2+ and symmetric all extremities. Labs Reviewed: All lab results for the last 24 hours reviewed. Recent Results (from the past 24 hour(s))   EKG, 12 LEAD, INITIAL    Collection Time: 17  3:15 PM   Result Value Ref Range    Ventricular Rate 64 BPM    Atrial Rate 441 BPM    QRS Duration 72 ms    Q-T Interval 410 ms    QTC Calculation (Bezet) 422 ms    Calculated R Axis 41 degrees    Calculated T Axis 46 degrees    Diagnosis       Atrial fibrillation  Low voltage QRS  Septal infarct (cited on or before 26-OCT-2011)  Possible Lateral infarct , age undetermined  Abnormal ECG  When compared with ECG of 2017 09:31,  Vent.  rate has decreased BY  35 BPM  Borderline criteria for Lateral infarct are now present     POC G3    Collection Time: 17  4:09 PM   Result Value Ref Range    Device: BIPAP      FIO2 (POC) 28 %    pH (POC) 7.373 7.35 - 7.45      pCO2 (POC) 60.7 (H) 35.0 - 45.0 MMHG    pO2 (POC) 57 (L) 80 - 100 MMHG    HCO3 (POC) 35.3 (H) 22 - 26 MMOL/L    sO2 (POC) 88 (L) 92 - 97 %    Base excess (POC) 10 mmol/L    PEEP/CPAP (POC) 6.0 cmH2O    PIP (POC) 16      Allens test (POC) YES      Total resp. rate 24      Site RIGHT RADIAL      Specimen type (POC) ARTERIAL      Performed by Omayra Double     Spontaneous timed YES     CBC WITH AUTOMATED DIFF    Collection Time: 02/25/17  4:21 PM   Result Value Ref Range    WBC 5.7 4.6 - 13.2 K/uL    RBC 3.82 (L) 4.20 - 5.30 M/uL    HGB 12.2 12.0 - 16.0 g/dL    HCT 39.9 35.0 - 45.0 %    .5 (H) 74.0 - 97.0 FL    MCH 31.9 24.0 - 34.0 PG    MCHC 30.6 (L) 31.0 - 37.0 g/dL    RDW 17.5 (H) 11.6 - 14.5 %    PLATELET 253 263 - 946 K/uL    MPV 11.0 9.2 - 11.8 FL    NEUTROPHILS 81 (H) 42 - 75 %    BAND NEUTROPHILS 3 0 - 5 %    LYMPHOCYTES 2 (L) 20 - 51 %    MONOCYTES 12 (H) 2 - 9 %    EOSINOPHILS 2 0 - 5 %    BASOPHILS 0 0 - 3 %    ABS. NEUTROPHILS 4.6 1.8 - 8.0 K/UL    ABS. LYMPHOCYTES 0.1 (L) 0.8 - 3.5 K/UL    ABS. MONOCYTES 0.7 0 - 1.0 K/UL    ABS. EOSINOPHILS 0.1 0.0 - 0.4 K/UL    ABS.  BASOPHILS 0.0 0.0 - 0.1 K/UL    PLATELET COMMENTS LARGE PLATELETS      RBC COMMENTS ANISOCYTOSIS  1+        RBC COMMENTS MACROCYTOSIS  1+        RBC COMMENTS POLYCHROMASIA  1+        WBC COMMENTS TOXIC GRANULATION      DF MANUAL     METABOLIC PANEL, COMPREHENSIVE    Collection Time: 02/25/17  4:21 PM   Result Value Ref Range    Sodium 142 136 - 145 mmol/L    Potassium 4.6 3.5 - 5.5 mmol/L    Chloride 100 100 - 108 mmol/L    CO2 37 (H) 21 - 32 mmol/L    Anion gap 5 3.0 - 18 mmol/L    Glucose 114 (H) 74 - 99 mg/dL    BUN 43 (H) 7.0 - 18 MG/DL    Creatinine 1.40 (H) 0.6 - 1.3 MG/DL    BUN/Creatinine ratio 31 (H) 12 - 20      GFR est AA 45 (L) >60 ml/min/1.73m2    GFR est non-AA 37 (L) >60 ml/min/1.73m2    Calcium 8.6 8.5 - 10.1 MG/DL    Bilirubin, total 1.2 (H) 0.2 - 1.0 MG/DL    ALT (SGPT) 21 13 - 56 U/L    AST (SGOT) 13 (L) 15 - 37 U/L    Alk. phosphatase 147 (H) 45 - 117 U/L    Protein, total 7.0 6.4 - 8.2 g/dL    Albumin 3.3 (L) 3.4 - 5.0 g/dL    Globulin 3.7 2.0 - 4.0 g/dL    A-G Ratio 0.9 0.8 - 1.7     CARDIAC PANEL,(CK, CKMB & TROPONIN)    Collection Time: 02/25/17  4:21 PM   Result Value Ref Range    CK 53 26 - 192 U/L    CK - MB 1.5 <3.6 ng/ml    CK-MB Index 2.8 0.0 - 4.0 %    Troponin-I, Qt. 0.31 (H) 0.00 - 0.06 NG/ML   PRO-BNP    Collection Time: 02/25/17  4:21 PM   Result Value Ref Range    NT pro-BNP 3585 (H) 0 - 900 PG/ML   URINALYSIS W/ RFLX MICROSCOPIC    Collection Time: 02/25/17  4:42 PM   Result Value Ref Range    Color YELLOW      Appearance CLEAR      Specific gravity 1.012 1.005 - 1.030      pH (UA) 5.0 5.0 - 8.0      Protein NEGATIVE  NEG mg/dL    Glucose NEGATIVE  NEG mg/dL    Ketone NEGATIVE  NEG mg/dL    Bilirubin NEGATIVE  NEG      Blood NEGATIVE  NEG      Urobilinogen 1.0 0.2 - 1.0 EU/dL    Nitrites POSITIVE (A) NEG      Leukocyte Esterase SMALL (A) NEG     URINE MICROSCOPIC ONLY    Collection Time: 02/25/17  4:42 PM   Result Value Ref Range    WBC 0 to 3 0 - 5 /hpf    RBC 0 to 3 0 - 5 /hpf    Epithelial cells 0 0 - 5 /lpf    Bacteria 2+ (A) NEG /hpf    Hyaline cast 0 to 3 0 - 2 /lpf       Procedures/imaging: see electronic medical records for all procedures/Xrays and details which were not copied into this note but were reviewed prior to creation of Plan    50 minutes of critical care time spent in the direct evaluation and treatment of this high risk patient. The reason for providing this level of medical care for this critically ill patient was due a critical illness that impaired one or more vital organ systems such that there was a high probability of imminent or life threatening deterioration in the patients condition.  This care involved high complexity decision making to assess, manipulate, and support vital system functions, to treat this degreee vital organ system failure and to prevent further life threatening deterioration of the patients condition.       CC: Jaylen Ordonez MD

## 2017-02-25 NOTE — ED NOTES
Per [address] [place] [address] nurse who called ED, pt has had increased edema from her toes to her hips, weight gain and increased shortness of breath since yesterday, stated pt's lasix dose had been increased from 40 mg to 80 mg yesterday, noted to have labored breathing, 4 + edema.

## 2017-02-25 NOTE — Clinical Note
Status[de-identified] Inpatient [101] Type of Bed: Telemetry [19] Inpatient Hospitalization Certified Necessary for the Following Reasons: 1. Patient Failed outpatient treatment (further clarification in H&P documentation) Admitting Diagnosis: CHF NYHA class IV, acute, [facility] [[id number]] Admitting Physician: Tanner Rao [[id number]] Attending Physician: Tanner Rao [[id number]] Estimated Length of Stay: > or = to 2 Midnights Discharge Plan[de-identified] Extended Care Facility (e.g. Adult Home, Nursing Home, etc.)

## 2017-02-25 NOTE — ED PROVIDER NOTES
HPI Comments: 3:31 PM   Leeroy Bowers is a 66 y.o. female h/o COPD presenting to the ED via EMS C/O SOB, yesterday. Symptoms include fatigue, dry cough, and BLE edema. Pt is normally on 3 L nasal cannula and uses a nebulizer treatment at the nursing home. Pt Pt was admitted twice for the same symptoms one month of the go. Pt's Lasix dose was increased from 40 mg to 80 mg yesterday. SMHx of cardiac stents. Pt denies fever, vomiting, and any other Sx or complaints. Written by Sofie Kussmaul, ED Scribcarrie, as dictated by Cisco Mendoza MD  .      Patient is a 66 y.o. female presenting with respiratory distress syndrome. The history is provided by the EMS personnel and the patient. No  was used. Respiratory Distress   This is a recurrent problem. The current episode started yesterday. Associated symptoms include cough (Dry) and leg swelling. Pertinent negatives include no fever, no headaches, no rhinorrhea, no sore throat, no wheezing, no chest pain, no vomiting and no abdominal pain. Associated medical issues include COPD. Past Medical History:   Diagnosis Date    A-fib [facility])     Arthritis     Back injury     Chronic obstructive pulmonary disease (HCC)     Diabetes ([facility] [address]     Fibromyalgia     Heart failure (HCC)     Hypertension     MRSA (methicillin resistant Staphylococcus aureus)        Past Surgical History:   Procedure Laterality Date    CARDIAC SURG PROCEDURE UNLIST      HX CORONARY STENT PLACEMENT      HX KNEE REPLACEMENT      HX ORTHOPAEDIC      bilateral knee replacement         History reviewed. No pertinent family history. Social History     Social History    Marital status:      Spouse name: N/A    Number of children: N/A    Years of education: N/A     Occupational History    Not on file.      Social History Main Topics    Smoking status: Never Smoker    Smokeless tobacco: Not on file    Alcohol use No    Drug use: No    Sexual activity: Not on file     Other Topics Concern    Not on file     Social History Narrative         ALLERGIES: Latex; Adhesive tape-silicones; Bees [sting, bee]; Garlic; and Zoloft [sertraline]    Review of Systems   Constitutional: Positive for fatigue. Negative for appetite change, chills and fever. HENT: Negative for congestion, rhinorrhea and sore throat. Eyes: Negative for pain, discharge and visual disturbance. Respiratory: Positive for cough (Dry) and shortness of breath. Negative for chest tightness and wheezing. Cardiovascular: Positive for leg swelling. Negative for chest pain. Gastrointestinal: Negative for abdominal pain, diarrhea, nausea and vomiting. Endocrine: Negative for polydipsia and polyuria. Genitourinary: Negative for difficulty urinating, dysuria, frequency, hematuria, menstrual problem, pelvic pain, urgency, vaginal bleeding and vaginal discharge. Musculoskeletal: Negative for arthralgias, back pain and myalgias. Skin: Negative for color change. Neurological: Negative for weakness, numbness and headaches. Hematological: Does not bruise/bleed easily. Psychiatric/Behavioral: Negative for decreased concentration. All other systems reviewed and are negative. Vitals:    02/25/17 1530 02/25/17 1533 02/25/17 1556 02/25/17 1630   BP: 144/57   98/46   Pulse: (!) 53      Resp: 28   17   Temp:       SpO2: 100% 100% 95% 90%            Physical Exam   Nursing note and vitals reviewed. GEN:  Appears to be in mild to moderate respiratory distress; Alert and Oriented; Appears well hydrated. Can speak is short sentences. Pt is on a BiPAP machine. SKIN:  Cool, pink and dry, no rashes. No petechia. Good skin turgor. HEAD:  Normocephalic and Atraumatic;    ENT: Oral mucosa moist, pharynx clear;   NECK:  Supple. Trachea is Midline; No adenopathy. HEART:  Regular rate and rhythm No murmur. No rubs. Sounds distant. LUNGS:  Prolonged expiration with diffused wheezes.    ABD:  Normoactive bowel sounds. Soft, obese and mildly tender. No organomegaly. No hernias. No peritoneal signs. BACK: No obvious bony spinal defects or changes; Non tender paraspinous muscles  : Normal inspection; no rash; NO CVAT  EXT:  No obvious soft tissue tenderness or sites of bony tenderness; Joints- good ROM. LE 2 + pitting edema bilaterally. NEURO: CN- intact; No focal motor deficits; Cerebellar function- intact; DTR's - 2+      MDM  Number of Diagnoses or Management Options  Acute exacerbation of chronic obstructive pulmonary disease (COPD) ([facility] [address]):   Diagnosis management comments: INITIAL CLINICAL IMPRESSION and PLANS:  The patient presents with the primary complaint(s) of: SOB. The presentation, to include historical aspects and clinical findings appear to be consistent with the DX of exacerbation of COPD. However, other possible DX's to consider as primary, associated with, or exacerbated by include:    1. PNA  2.   ACS  3.  bradycardia effusion    Considering the above, my initial management plan to evaluate and therapeutic interventions include: Obtain Lab Studies,, Obtain Radiologic studies,, Initiate Medications and or IV Fluids, and Obtain additional historical data from other sources  As well as those noted in the orders:    Elba Petersen MD        Amount and/or Complexity of Data Reviewed  Clinical lab tests: ordered and reviewed  Tests in the radiology section of CPT®: ordered and reviewed (CXR)  Obtain history from someone other than the patient: yes (EMS)  Review and summarize past medical records: yes  Discuss the patient with other providers: yes (Dr. Gómez Ross (Internal Medicine))  Independent visualization of images, tracings, or specimens: yes (CXR, EKG)    Critical Care  Total time providing critical care: 30-74 minutes (30)    ED Course     CONSULT NOTE:   7:03 PM  Bin Morgan MD spoke with Aneudy Garcia MD   Specialty: Internal Medicine   Discussed pt's hx, disposition, and available diagnostic and imaging results over the telephone. Reviewed care plans. Agrees with admission to teley. Written by Anisha Jeter ED Scribe, as dictated by   I have reviewed and agree with the documentation as written by the scribe which accurately reflects my interaction with the patient, family, and consultants as appropriate, as well as the details of any procedure that was performed. Sim Roberts MD.     Procedures    ED CLINICAL SUMMARY - ADMIT   6:55 PM    ED CLINICAL COURSE:       Intervention)s) while in ED:  ACTIONS / APPROACH: Based on the presenting SUBACUTE history of dyspnea My initial focus was to Determine the cause and extent of the problem, Initiate Treatment as Appropriate and STABILIZE THE PATIENT'S CONDITION . Details of actions taken are noted below. SPECIFICS REGARDING APPROACH: the patient's condition of hypoxemia warranted continued use of Bipap and nebs. 1. DIAGNOSTIC RESULTS    EKG interpretation: (Preliminary)  A-fib. Rate: 64 bpm. Low voltage QRS   EKG read by Sim Roberts MD at 3:15 PM    CARDIAC MONITOR NOTE:  6:40  PM   Cardiac Rhythm: A-fib  Rate: 64 bpm  Intervention: None   Written by Anisha Jeter ED Scribe, as dictated by Sim Roberts MD.     XR CHEST PORT   Final Result   1. Underexpanded lungs with associated bronchovascular crowding and central  pulmonary vascular prominence, similar to prior. 2. Cardiomegaly, also similar to prior. As read by the radiologist.         Labs Reviewed   CBC WITH AUTOMATED DIFF - Abnormal; Notable for the following:        Result Value    RBC 3.82 (*)     .5 (*)     MCHC 30.6 (*)     RDW 17.5 (*)     NEUTROPHILS 81 (*)     LYMPHOCYTES 2 (*)     MONOCYTES 12 (*)     ABS.  LYMPHOCYTES 0.1 (*)     All other components within normal limits   METABOLIC PANEL, COMPREHENSIVE - Abnormal; Notable for the following:     CO2 37 (*)     Glucose 114 (*)     BUN 43 (*)     Creatinine 1.40 (*)     BUN/Creatinine ratio 31 (*)     GFR est AA 45 (*)     GFR est non-AA 37 (*)     Bilirubin, total 1.2 (*)     AST (SGOT) 13 (*)     Alk. phosphatase 147 (*)     Albumin 3.3 (*)     All other components within normal limits   CARDIAC PANEL,(CK, CKMB & TROPONIN) - Abnormal; Notable for the following:     Troponin-I, Qt. 0.31 (*)     All other components within normal limits   URINALYSIS W/ RFLX MICROSCOPIC - Abnormal; Notable for the following:     Nitrites POSITIVE (*)     Leukocyte Esterase SMALL (*)     All other components within normal limits   PRO-BNP - Abnormal; Notable for the following:     NT pro-BNP 3585 (*)     All other components within normal limits   URINE MICROSCOPIC ONLY - Abnormal; Notable for the following:     Bacteria 2+ (*)     All other components within normal limits   POC G3 - Abnormal; Notable for the following:     pCO2 (POC) 60.7 (*)     pO2 (POC) 57 (*)     HCO3 (POC) 35.3 (*)     sO2 (POC) 88 (*)     All other components within normal limits   CULTURE, URINE   MAGNESIUM   HEMOGLOBIN A1C WITH EAG   PROTHROMBIN TIME + INR        Recent Results (from the past 12 hour(s))   EKG, 12 LEAD, INITIAL    Collection Time: 02/25/17  3:15 PM   Result Value Ref Range    Ventricular Rate 64 BPM    Atrial Rate 441 BPM    QRS Duration 72 ms    Q-T Interval 410 ms    QTC Calculation (Bezet) 422 ms    Calculated R Axis 41 degrees    Calculated T Axis 46 degrees    Diagnosis       Atrial fibrillation  Low voltage QRS  Septal infarct (cited on or before 26-OCT-2011)  Possible Lateral infarct , age undetermined  Abnormal ECG  When compared with ECG of 03-FEB-2017 09:31,  Vent.  rate has decreased BY  35 BPM  Borderline criteria for Lateral infarct are now present     POC G3    Collection Time: 02/25/17  4:09 PM   Result Value Ref Range    Device: BIPAP      FIO2 (POC) 28 %    pH (POC) 7.373 7.35 - 7.45      pCO2 (POC) 60.7 (H) 35.0 - 45.0 MMHG    pO2 (POC) 57 (L) 80 - 100 MMHG    HCO3 (POC) 35.3 (H) 22 - 26 MMOL/L    sO2 (POC) 88 (L) 92 - 97 %    Base excess (POC) 10 mmol/L    PEEP/CPAP (POC) 6.0 cmH2O    PIP (POC) 16      Allens test (POC) YES      Total resp. rate 24      Site RIGHT RADIAL      Specimen type (POC) ARTERIAL      Performed by Floresita Altamirano     Spontaneous timed YES     CBC WITH AUTOMATED DIFF    Collection Time: 02/25/17  4:21 PM   Result Value Ref Range    WBC 5.7 4.6 - 13.2 K/uL    RBC 3.82 (L) 4.20 - 5.30 M/uL    HGB 12.2 12.0 - 16.0 g/dL    HCT 39.9 35.0 - 45.0 %    .5 (H) 74.0 - 97.0 FL    MCH 31.9 24.0 - 34.0 PG    MCHC 30.6 (L) 31.0 - 37.0 g/dL    RDW 17.5 (H) 11.6 - 14.5 %    PLATELET 311 293 - 611 K/uL    MPV 11.0 9.2 - 11.8 FL    NEUTROPHILS 81 (H) 42 - 75 %    BAND NEUTROPHILS 3 0 - 5 %    LYMPHOCYTES 2 (L) 20 - 51 %    MONOCYTES 12 (H) 2 - 9 %    EOSINOPHILS 2 0 - 5 %    BASOPHILS 0 0 - 3 %    ABS. NEUTROPHILS 4.6 1.8 - 8.0 K/UL    ABS. LYMPHOCYTES 0.1 (L) 0.8 - 3.5 K/UL    ABS. MONOCYTES 0.7 0 - 1.0 K/UL    ABS. EOSINOPHILS 0.1 0.0 - 0.4 K/UL    ABS. BASOPHILS 0.0 0.0 - 0.1 K/UL    PLATELET COMMENTS LARGE PLATELETS      RBC COMMENTS ANISOCYTOSIS  1+        RBC COMMENTS MACROCYTOSIS  1+        RBC COMMENTS POLYCHROMASIA  1+        WBC COMMENTS TOXIC GRANULATION      DF MANUAL     METABOLIC PANEL, COMPREHENSIVE    Collection Time: 02/25/17  4:21 PM   Result Value Ref Range    Sodium 142 136 - 145 mmol/L    Potassium 4.6 3.5 - 5.5 mmol/L    Chloride 100 100 - 108 mmol/L    CO2 37 (H) 21 - 32 mmol/L    Anion gap 5 3.0 - 18 mmol/L    Glucose 114 (H) 74 - 99 mg/dL    BUN 43 (H) 7.0 - 18 MG/DL    Creatinine 1.40 (H) 0.6 - 1.3 MG/DL    BUN/Creatinine ratio 31 (H) 12 - 20      GFR est AA 45 (L) >60 ml/min/1.73m2    GFR est non-AA 37 (L) >60 ml/min/1.73m2    Calcium 8.6 8.5 - 10.1 MG/DL    Bilirubin, total 1.2 (H) 0.2 - 1.0 MG/DL    ALT (SGPT) 21 13 - 56 U/L    AST (SGOT) 13 (L) 15 - 37 U/L    Alk.  phosphatase 147 (H) 45 - 117 U/L    Protein, total 7.0 6.4 - 8.2 g/dL    Albumin 3.3 (L) 3.4 - 5.0 g/dL Globulin 3.7 2.0 - 4.0 g/dL    A-G Ratio 0.9 0.8 - 1.7     CARDIAC PANEL,(CK, CKMB & TROPONIN)    Collection Time: 02/25/17  4:21 PM   Result Value Ref Range    CK 53 26 - 192 U/L    CK - MB 1.5 <3.6 ng/ml    CK-MB Index 2.8 0.0 - 4.0 %    Troponin-I, Qt. 0.31 (H) 0.00 - 0.06 NG/ML   PRO-BNP    Collection Time: 02/25/17  4:21 PM   Result Value Ref Range    NT pro-BNP 3585 (H) 0 - 900 PG/ML   MAGNESIUM    Collection Time: 02/25/17  4:21 PM   Result Value Ref Range    Magnesium 2.2 1.8 - 2.4 mg/dL   URINALYSIS W/ RFLX MICROSCOPIC    Collection Time: 02/25/17  4:42 PM   Result Value Ref Range    Color YELLOW      Appearance CLEAR      Specific gravity 1.012 1.005 - 1.030      pH (UA) 5.0 5.0 - 8.0      Protein NEGATIVE  NEG mg/dL    Glucose NEGATIVE  NEG mg/dL    Ketone NEGATIVE  NEG mg/dL    Bilirubin NEGATIVE  NEG      Blood NEGATIVE  NEG      Urobilinogen 1.0 0.2 - 1.0 EU/dL    Nitrites POSITIVE (A) NEG      Leukocyte Esterase SMALL (A) NEG     URINE MICROSCOPIC ONLY    Collection Time: 02/25/17  4:42 PM   Result Value Ref Range    WBC 0 to 3 0 - 5 /hpf    RBC 0 to 3 0 - 5 /hpf    Epithelial cells 0 0 - 5 /lpf    Bacteria 2+ (A) NEG /hpf    Hyaline cast 0 to 3 0 - 2 /lpf       2.  MEDICATIONS GIVEN:   Medications   lisinopril (PRINIVIL, ZESTRIL) tablet 5 mg (not administered)   furosemide (LASIX) injection 40 mg (not administered)   acetaminophen (TYLENOL) tablet 650 mg (not administered)   famotidine (PEPCID) tablet 20 mg (not administered)   cefTRIAXone (ROCEPHIN) 1 g in 0.9% sodium chloride (MBP/ADV) 50 mL MBP (1 g IntraVENous New Bag 2/25/17 1913)   methylPREDNISolone (PF) (SOLU-MEDROL) injection 40 mg (not administered)   albuterol-ipratropium (DUO-NEB) 2.5 MG-0.5 MG/3 ML (not administered)   insulin lispro (HUMALOG) injection (not administered)   glucose chewable tablet 16 g (not administered)   glucagon (GLUCAGEN) injection 1 mg (not administered)   dextrose (D50W) injection syrg 12.5-25 g (not administered) furosemide (LASIX) injection 80 mg (80 mg IntraVENous Given 2/25/17 1635)   albuterol-ipratropium (DUO-NEB) 2.5 MG-0.5 MG/3 ML (3 mL Nebulization Given 2/25/17 8636)           Response to Intervention(s):   IMPROVED       Unanticipated Developments: NONE     ED COURSE - General Comment:  During the ED course I had re-evaluated the patient, answered their and /or their family's questions regarding my clinical impression, the patient's condition and plans for therapeutic interventions. The patient's ED course was uneventful and remained stable throughout. CLINICAL IMPRESSION AND DISCUSSION:     I reviewed our electronic medical record system for any past medical records that were available that may contribute to the patients current condition, the nursing notes and vital signs from today's visit. Based on the clinical presentation, findings and results of diagnostic studies, as well as developments while in the ED,  I suspect the following: For the presentation as noted above    The most likely cause or diagnosis is: COPD exacerbation      DISCUSSION REGARDING DX: the pt has multiple risk factors for decompensation of either her COPD and / or CHF. Currently it appears to be more related to COPD exacerbation. At this time there is no clinical evidence to support other pertinent diagnostic considerations such as:  N/A    Finally, other diagnoses  during this visit are noted below in Clinical Impression. CONCERNS / CONSIDERATIONS / JUSTIFICATION:  Pt will require ongoing intervention to improve ventilation. DISPOSITION DECISION:     ADMIT:  Based the my repeated evaluations to assess the patient's clinical response, the existence of underlying and / or new clinical conditions,and / or specific logistic considerations, I feel that hospitalization is warranted to continue ongoing monitoring and patient care. I have reviewed this information and my rationale to the patient and/or their family.   The patient expresses agreement and understanding for the need to be admitted and of the admission diagnosis. Consultation will be made now with the inpatient physician for hospitalization. Patient's condition upon admission from the ED: CONDITION STABILIZED    CONDITIONS ON ADMISSION:  Deep Vein Thrombosis is not present at the time of admission. Thrombosis is not present at the time of admission. Urinary Tract Infection is not present at the time of admission. Pneumonia is not present at the time of admission. MRSA is not present at the time of admission. Wound infection is not present at the time of admission. Pressure Ulcer is not present at the time of admission. CLINICAL IMPRESSION  1. Acute exacerbation of chronic obstructive pulmonary disease (COPD) ([facility] [address]    2. Hypoxemia        PLAN:  1. ADMIT TO:  Telemetry         Vanna Richard MD    7:28 PM  I have spent 30 minutes of critical care time involved in lab review, consultations with specialist, family decision-making, and documentation. During this entire length of time I was immediately available to the patient. Critical Care: The reason for providing this level of medical care for this critically ill patient was due a critical illness that impaired one or more vital organ systems such that there was a high probability of imminent or life threatening deterioration in the patients condition. This care involved high complexity decision making to assess, manipulate, and support vital system functions, to treat this degreee vital organ system failure and to prevent further life threatening deterioration of the patients condition. ATTESTATIONS:  This note is prepared by Joya Yepez, acting as Scribe for Vanna Richard MD.    Vanna Richard MD: The scribe's documentation has been prepared under my direction and personally reviewed by me in its entirety.  I confirm that the note above accurately reflects all work, treatment, procedures, and medical decision making performed by me.

## 2017-02-25 NOTE — ED NOTES
Pt lying on streacher, reports no pain at this time, no distress noted, Pt remains on monitor and call bell within reach, denies needing bathroom,  room safety check completed, informed of status, awaiting results, will continue to monitor.

## 2017-02-26 LAB
ANION GAP BLD CALC-SCNC: 6 MMOL/L (ref 3–18)
BUN SERPL-MCNC: 37 MG/DL (ref 7–18)
BUN/CREAT SERPL: 32 (ref 12–20)
CALCIUM SERPL-MCNC: 8.6 MG/DL (ref 8.5–10.1)
CHLORIDE SERPL-SCNC: 101 MMOL/L (ref 100–108)
CK MB CFR SERPL CALC: ABNORMAL % (ref 0–4)
CK MB SERPL-MCNC: <1 NG/ML (ref 5–25)
CK SERPL-CCNC: 20 U/L (ref 26–192)
CK SERPL-CCNC: 23 U/L (ref 26–192)
CK SERPL-CCNC: 30 U/L (ref 26–192)
CO2 SERPL-SCNC: 36 MMOL/L (ref 21–32)
CREAT SERPL-MCNC: 1.14 MG/DL (ref 0.6–1.3)
ERYTHROCYTE [DISTWIDTH] IN BLOOD BY AUTOMATED COUNT: 17.4 % (ref 11.6–14.5)
GLUCOSE BLD STRIP.AUTO-MCNC: 107 MG/DL (ref 70–110)
GLUCOSE BLD STRIP.AUTO-MCNC: 209 MG/DL (ref 70–110)
GLUCOSE BLD STRIP.AUTO-MCNC: 279 MG/DL (ref 70–110)
GLUCOSE BLD STRIP.AUTO-MCNC: 307 MG/DL (ref 70–110)
GLUCOSE SERPL-MCNC: 113 MG/DL (ref 74–99)
HCT VFR BLD AUTO: 37.1 % (ref 35–45)
HGB BLD-MCNC: 11.6 G/DL (ref 12–16)
INR PPP: 2.2 (ref 0.8–1.2)
MAGNESIUM SERPL-MCNC: 1.8 MG/DL (ref 1.8–2.4)
MCH RBC QN AUTO: 32.1 PG (ref 24–34)
MCHC RBC AUTO-ENTMCNC: 31.3 G/DL (ref 31–37)
MCV RBC AUTO: 102.8 FL (ref 74–97)
PHOSPHATE SERPL-MCNC: 4.2 MG/DL (ref 2.5–4.9)
PLATELET # BLD AUTO: 153 K/UL (ref 135–420)
PMV BLD AUTO: 10.2 FL (ref 9.2–11.8)
POTASSIUM SERPL-SCNC: 4.1 MMOL/L (ref 3.5–5.5)
PROTHROMBIN TIME: 23.3 SEC (ref 11.5–15.2)
RBC # BLD AUTO: 3.61 M/UL (ref 4.2–5.3)
SODIUM SERPL-SCNC: 143 MMOL/L (ref 136–145)
TROPONIN I SERPL-MCNC: 0.32 NG/ML (ref 0–0.06)
WBC # BLD AUTO: 4.6 K/UL (ref 4.6–13.2)

## 2017-02-26 PROCEDURE — 74011250636 HC RX REV CODE- 250/636: Performed by: FAMILY MEDICINE

## 2017-02-26 PROCEDURE — 85027 COMPLETE CBC AUTOMATED: CPT | Performed by: INTERNAL MEDICINE

## 2017-02-26 PROCEDURE — 74011250637 HC RX REV CODE- 250/637: Performed by: INTERNAL MEDICINE

## 2017-02-26 PROCEDURE — 80048 BASIC METABOLIC PNL TOTAL CA: CPT | Performed by: INTERNAL MEDICINE

## 2017-02-26 PROCEDURE — 74011000250 HC RX REV CODE- 250: Performed by: INTERNAL MEDICINE

## 2017-02-26 PROCEDURE — 83735 ASSAY OF MAGNESIUM: CPT | Performed by: INTERNAL MEDICINE

## 2017-02-26 PROCEDURE — 74011250637 HC RX REV CODE- 250/637: Performed by: FAMILY MEDICINE

## 2017-02-26 PROCEDURE — 77010033711 HC HIGH FLOW OXYGEN

## 2017-02-26 PROCEDURE — 36415 COLL VENOUS BLD VENIPUNCTURE: CPT | Performed by: INTERNAL MEDICINE

## 2017-02-26 PROCEDURE — 74011636637 HC RX REV CODE- 636/637: Performed by: INTERNAL MEDICINE

## 2017-02-26 PROCEDURE — 82550 ASSAY OF CK (CPK): CPT | Performed by: INTERNAL MEDICINE

## 2017-02-26 PROCEDURE — 74011250636 HC RX REV CODE- 250/636: Performed by: INTERNAL MEDICINE

## 2017-02-26 PROCEDURE — 77010033678 HC OXYGEN DAILY

## 2017-02-26 PROCEDURE — 94640 AIRWAY INHALATION TREATMENT: CPT

## 2017-02-26 PROCEDURE — 74011000258 HC RX REV CODE- 258: Performed by: INTERNAL MEDICINE

## 2017-02-26 PROCEDURE — 84100 ASSAY OF PHOSPHORUS: CPT | Performed by: INTERNAL MEDICINE

## 2017-02-26 PROCEDURE — 82962 GLUCOSE BLOOD TEST: CPT

## 2017-02-26 PROCEDURE — 65660000000 HC RM CCU STEPDOWN

## 2017-02-26 PROCEDURE — 85610 PROTHROMBIN TIME: CPT | Performed by: INTERNAL MEDICINE

## 2017-02-26 PROCEDURE — 77030011256 HC DRSG MEPILEX <16IN NO BORD MOLN -A

## 2017-02-26 RX ORDER — WARFARIN SODIUM 5 MG/1
2.5 TABLET ORAL ONCE
Status: COMPLETED | OUTPATIENT
Start: 2017-02-26 | End: 2017-02-26

## 2017-02-26 RX ORDER — METOLAZONE 5 MG/1
5 TABLET ORAL DAILY
Status: DISCONTINUED | OUTPATIENT
Start: 2017-02-26 | End: 2017-02-27

## 2017-02-26 RX ORDER — CARVEDILOL 12.5 MG/1
25 TABLET ORAL 2 TIMES DAILY WITH MEALS
Status: DISCONTINUED | OUTPATIENT
Start: 2017-02-26 | End: 2017-02-28

## 2017-02-26 RX ORDER — BUDESONIDE 0.5 MG/2ML
500 INHALANT ORAL 2 TIMES DAILY
Status: DISCONTINUED | OUTPATIENT
Start: 2017-02-26 | End: 2017-02-26

## 2017-02-26 RX ORDER — DOCUSATE SODIUM 100 MG/1
100 CAPSULE, LIQUID FILLED ORAL 2 TIMES DAILY
Status: DISCONTINUED | OUTPATIENT
Start: 2017-02-26 | End: 2017-03-24 | Stop reason: HOSPADM

## 2017-02-26 RX ORDER — OXYCODONE HYDROCHLORIDE 5 MG/1
10 TABLET ORAL
Status: DISCONTINUED | OUTPATIENT
Start: 2017-02-26 | End: 2017-02-28

## 2017-02-26 RX ORDER — TRAZODONE HYDROCHLORIDE 50 MG/1
50 TABLET ORAL
Status: DISCONTINUED | OUTPATIENT
Start: 2017-02-26 | End: 2017-02-28

## 2017-02-26 RX ORDER — INSULIN GLARGINE 100 [IU]/ML
25 INJECTION, SOLUTION SUBCUTANEOUS
Status: DISCONTINUED | OUTPATIENT
Start: 2017-02-26 | End: 2017-03-03

## 2017-02-26 RX ORDER — DILTIAZEM HYDROCHLORIDE 60 MG/1
60 TABLET, FILM COATED ORAL
Status: DISCONTINUED | OUTPATIENT
Start: 2017-02-26 | End: 2017-02-28

## 2017-02-26 RX ORDER — BUDESONIDE 0.5 MG/2ML
500 INHALANT ORAL
Status: DISCONTINUED | OUTPATIENT
Start: 2017-02-26 | End: 2017-03-24 | Stop reason: HOSPADM

## 2017-02-26 RX ORDER — PREGABALIN 75 MG/1
75 CAPSULE ORAL 2 TIMES DAILY
Status: DISCONTINUED | OUTPATIENT
Start: 2017-02-26 | End: 2017-03-09

## 2017-02-26 RX ORDER — ARFORMOTEROL TARTRATE 15 UG/2ML
15 SOLUTION RESPIRATORY (INHALATION) 2 TIMES DAILY
Status: DISCONTINUED | OUTPATIENT
Start: 2017-02-26 | End: 2017-02-26

## 2017-02-26 RX ORDER — FUROSEMIDE 10 MG/ML
20 INJECTION INTRAMUSCULAR; INTRAVENOUS ONCE
Status: COMPLETED | OUTPATIENT
Start: 2017-02-26 | End: 2017-02-26

## 2017-02-26 RX ORDER — GUAIFENESIN 100 MG/5ML
81 LIQUID (ML) ORAL DAILY
Status: DISCONTINUED | OUTPATIENT
Start: 2017-02-26 | End: 2017-03-24 | Stop reason: HOSPADM

## 2017-02-26 RX ORDER — ONDANSETRON 2 MG/ML
4 INJECTION INTRAMUSCULAR; INTRAVENOUS
Status: DISCONTINUED | OUTPATIENT
Start: 2017-02-26 | End: 2017-03-24 | Stop reason: HOSPADM

## 2017-02-26 RX ORDER — SIMVASTATIN 10 MG/1
10 TABLET, FILM COATED ORAL
Status: DISCONTINUED | OUTPATIENT
Start: 2017-02-26 | End: 2017-03-24 | Stop reason: HOSPADM

## 2017-02-26 RX ORDER — CLONAZEPAM 0.5 MG/1
0.25 TABLET ORAL 2 TIMES DAILY
Status: DISCONTINUED | OUTPATIENT
Start: 2017-02-26 | End: 2017-03-09

## 2017-02-26 RX ORDER — IPRATROPIUM BROMIDE AND ALBUTEROL SULFATE 2.5; .5 MG/3ML; MG/3ML
3 SOLUTION RESPIRATORY (INHALATION)
Status: DISCONTINUED | OUTPATIENT
Start: 2017-02-26 | End: 2017-03-24 | Stop reason: HOSPADM

## 2017-02-26 RX ORDER — ARFORMOTEROL TARTRATE 15 UG/2ML
15 SOLUTION RESPIRATORY (INHALATION)
Status: DISCONTINUED | OUTPATIENT
Start: 2017-02-26 | End: 2017-03-24 | Stop reason: HOSPADM

## 2017-02-26 RX ADMIN — INSULIN LISPRO 4 UNITS: 100 INJECTION, SOLUTION INTRAVENOUS; SUBCUTANEOUS at 11:47

## 2017-02-26 RX ADMIN — FUROSEMIDE 40 MG: 10 INJECTION, SOLUTION INTRAMUSCULAR; INTRAVENOUS at 08:15

## 2017-02-26 RX ADMIN — CLONAZEPAM 0.25 MG: 0.5 TABLET ORAL at 09:44

## 2017-02-26 RX ADMIN — CARVEDILOL 25 MG: 12.5 TABLET, FILM COATED ORAL at 17:52

## 2017-02-26 RX ADMIN — ONDANSETRON HYDROCHLORIDE 4 MG: 2 INJECTION INTRAMUSCULAR; INTRAVENOUS at 15:35

## 2017-02-26 RX ADMIN — DILTIAZEM HYDROCHLORIDE 60 MG: 60 TABLET, FILM COATED ORAL at 12:54

## 2017-02-26 RX ADMIN — CEFTRIAXONE 1 G: 1 INJECTION, POWDER, FOR SOLUTION INTRAMUSCULAR; INTRAVENOUS at 22:32

## 2017-02-26 RX ADMIN — PREGABALIN 75 MG: 75 CAPSULE ORAL at 09:43

## 2017-02-26 RX ADMIN — CARVEDILOL 25 MG: 12.5 TABLET, FILM COATED ORAL at 09:43

## 2017-02-26 RX ADMIN — CLONAZEPAM 0.25 MG: 0.5 TABLET ORAL at 22:33

## 2017-02-26 RX ADMIN — INSULIN LISPRO 12 UNITS: 100 INJECTION, SOLUTION INTRAVENOUS; SUBCUTANEOUS at 22:33

## 2017-02-26 RX ADMIN — IPRATROPIUM BROMIDE AND ALBUTEROL SULFATE 3 ML: .5; 3 SOLUTION RESPIRATORY (INHALATION) at 23:26

## 2017-02-26 RX ADMIN — INSULIN GLARGINE 25 UNITS: 100 INJECTION, SOLUTION SUBCUTANEOUS at 22:33

## 2017-02-26 RX ADMIN — FAMOTIDINE 20 MG: 20 TABLET ORAL at 22:32

## 2017-02-26 RX ADMIN — METHYLPREDNISOLONE SODIUM SUCCINATE 40 MG: 40 INJECTION, POWDER, FOR SOLUTION INTRAMUSCULAR; INTRAVENOUS at 07:03

## 2017-02-26 RX ADMIN — SIMVASTATIN 10 MG: 20 TABLET, FILM COATED ORAL at 22:32

## 2017-02-26 RX ADMIN — ARFORMOTEROL TARTRATE 15 MCG: 15 SOLUTION RESPIRATORY (INHALATION) at 19:25

## 2017-02-26 RX ADMIN — ASPIRIN 81 MG CHEWABLE TABLET 81 MG: 81 TABLET CHEWABLE at 09:44

## 2017-02-26 RX ADMIN — DOCUSATE SODIUM 100 MG: 100 CAPSULE, LIQUID FILLED ORAL at 09:43

## 2017-02-26 RX ADMIN — IPRATROPIUM BROMIDE AND ALBUTEROL SULFATE 3 ML: .5; 3 SOLUTION RESPIRATORY (INHALATION) at 07:43

## 2017-02-26 RX ADMIN — LISINOPRIL 5 MG: 5 TABLET ORAL at 08:14

## 2017-02-26 RX ADMIN — METOLAZONE 5 MG: 5 TABLET ORAL at 12:54

## 2017-02-26 RX ADMIN — DOCUSATE SODIUM 100 MG: 100 CAPSULE, LIQUID FILLED ORAL at 22:32

## 2017-02-26 RX ADMIN — FAMOTIDINE 20 MG: 20 TABLET ORAL at 08:14

## 2017-02-26 RX ADMIN — WARFARIN SODIUM 2.5 MG: 5 TABLET ORAL at 17:52

## 2017-02-26 RX ADMIN — DILTIAZEM HYDROCHLORIDE 60 MG: 60 TABLET, FILM COATED ORAL at 17:52

## 2017-02-26 RX ADMIN — IPRATROPIUM BROMIDE AND ALBUTEROL SULFATE 3 ML: .5; 3 SOLUTION RESPIRATORY (INHALATION) at 20:41

## 2017-02-26 RX ADMIN — DILTIAZEM HYDROCHLORIDE 60 MG: 60 TABLET, FILM COATED ORAL at 09:43

## 2017-02-26 RX ADMIN — FUROSEMIDE 20 MG: 10 INJECTION, SOLUTION INTRAMUSCULAR; INTRAVENOUS at 22:33

## 2017-02-26 RX ADMIN — OXYCODONE HYDROCHLORIDE 10 MG: 5 TABLET ORAL at 18:12

## 2017-02-26 RX ADMIN — TRAZODONE HYDROCHLORIDE 50 MG: 50 TABLET ORAL at 22:32

## 2017-02-26 RX ADMIN — BUDESONIDE 500 MCG: 0.5 INHALANT RESPIRATORY (INHALATION) at 19:25

## 2017-02-26 RX ADMIN — INSULIN LISPRO 6 UNITS: 100 INJECTION, SOLUTION INTRAVENOUS; SUBCUTANEOUS at 17:52

## 2017-02-26 RX ADMIN — IPRATROPIUM BROMIDE AND ALBUTEROL SULFATE 3 ML: .5; 3 SOLUTION RESPIRATORY (INHALATION) at 11:06

## 2017-02-26 RX ADMIN — ACETAMINOPHEN 650 MG: 325 TABLET ORAL at 08:19

## 2017-02-26 RX ADMIN — PREGABALIN 75 MG: 75 CAPSULE ORAL at 22:32

## 2017-02-26 NOTE — ED NOTES
Pt lying on streacher, placed on Bipap by respiratory and sats in frankie 90's, pt tolerated well, reports no pain at this time, no distress noted, Pt remains on monitor and call bell within reach, denies needing bathroom,  room safety check completed, informed of status, awaiting results, will continue to monitor.

## 2017-02-26 NOTE — PROGRESS NOTES
Pharmacy Dosing Services: warfarin     Consult for Warfarin Dosing by Pharmacy by Dr. Pam Peters provided for this 66 y.o. female , for indication of Atrial Fibrillation and VTE prophylaxis  Dose to achieve an INR goal of 2-3     Order entered for Warfarin 2.5 (mg) ordered to be given today at 18:00. Pt home dose is 2.5mg daily     Significant drug interactions: none  LABS    PT/INR Lab Results   Component Value Date/Time    INR 2.2 02/26/2017 06:35 AM      Platelets Lab Results   Component Value Date/Time    PLATELET 473 25/71/2851 06:35 AM      H/H     Lab Results   Component Value Date/Time    HGB 11.6 02/26/2017 06:35 AM        Warfarin Administrations (last 168 hours)       Date/Time Action Medication Dose      02/25/17 2113 Given    warfarin (COUMADIN) tablet 2.5 mg 2.5 mg              Pharmacy to follow daily and will provide subsequent Warfarin dosing based on clinical status.   [facility] [place])  Contact information [phone]

## 2017-02-26 NOTE — WOUND CARE
Patient seen during ICU rounds. The patient has open blistered areas to the left thigh measuring 3 x 2.5 x 0.1 cm and to the right calf measuring 2 x 0.7 x 0.1 cm. Xeroform and mepilex borders were applied. The patient refused further skin assessment at this time due to SOB. Wound care will continue to follow this patient during this admission.

## 2017-02-26 NOTE — PROGRESS NOTES

## 2017-02-26 NOTE — PROGRESS NOTES
Patient remains in ED on BiPAP, settings as documented in flowsheet. Will continue to monitor/follow patient.

## 2017-02-26 NOTE — ED NOTES
TRANSFER - OUT REPORT:    Verbal report given to Waleska Mike RN) on Guido Rivas  being transferred to ICU(unit) for routine progression of care       Report consisted of patients Situation, Background, Assessment and   Recommendations(SBAR). Information from the following report(s) ED Summary was reviewed with the receiving nurse. Lines:   Peripheral IV 91/93/70 Right Cephalic (Active)   Site Assessment Clean, dry, & intact 2/25/2017  4:20 PM   Phlebitis Assessment 0 2/25/2017  4:20 PM   Infiltration Assessment 0 2/25/2017  4:20 PM   Dressing Status Clean, dry, & intact 2/25/2017  4:20 PM   Dressing Type Transparent 2/25/2017  4:20 PM   Hub Color/Line Status Blue;Flushed 2/25/2017  4:20 PM   Action Taken Blood drawn 2/25/2017  4:20 PM   Alcohol Cap Used Yes 2/25/2017  4:20 PM       Peripheral IV 02/26/17 Left Antecubital (Active)        Opportunity for questions and clarification was provided. Patient transported with:   Monitor  BIPAP  Tech and Nurse.

## 2017-02-26 NOTE — ROUTINE PROCESS
TRANSFER - IN REPORT:    Verbal report received from ATIYA Perera (name) on Rebeca Barrera  being received from ICU (unit) for routine progression of care      Report consisted of patients Situation, Background, Assessment and   Recommendations(SBAR). Information from the following report(s) SBAR, Kardex, Intake/Output, MAR, Recent Results and Cardiac Rhythm afib was reviewed with the receiving nurse. Opportunity for questions and clarification was provided. Assessment completed upon patients arrival to unit and care assumed.

## 2017-02-26 NOTE — PROGRESS NOTES
0530 hrs. New admission to unit via ED. Pt attached to Bipap and is tolerating treatment well. Monitoring in A fib HR . BP stable. Alert and orientated x4.3 areas of blistering on lower legs covered with meplex. Denies pain. Afebrile. 0720 hrs Bedside and Verbal shift change report given to MICKEY maldonado  (oncoming nurse) by Maria Luisa Mccall (offgoing nurse). Report included the following information SBAR, Kardex, MAR and Recent Results.

## 2017-02-26 NOTE — PROGRESS NOTES
0730 Bedside and Verbal shift change report given to ATIYA Patino (oncoming nurse) by SUNNY Botello (offgoing nurse). Report included the following information SBAR, Kardex, Procedure Summary, Intake/Output, MAR, Recent Results and Cardiac Rhythm A Fib.    0800- Initial shift assessment complete. Patient complains of 4/10 pain. Medicated per MAR. Denies SOB. BP and O2 sats stable, currently in A Fib. Will continue to monitor. 1100- Patient complaining of 8/10 pain to back and legs. Spoke with Dr Tiny Palm, states he will place orders. Upon reassessment of pain, patient is sleeping. 1200- Reassessment complete. No changes to previous assessment. Will continue to monitor. 1500- Patient complaining of nausea. Spoke with Dr Tiny Palm and received orders. Asked Dr Tiny Palm for pain medication that covers a pain scale 7-10 and he states he will not order any further pain meds at this time. Patient currently has PRN orders for pain scale ratings 1-6, MD aware. 1600- Reassessment complete. No changes to previous assessment. Will continue to monitor. 12- TRANSFER - OUT REPORT:    Verbal report given to Berta Pa RN(name) on Giacomo Garza  being transferred to Telemetry (unit) for routine progression of care       Report consisted of patients Situation, Background, Assessment and   Recommendations(SBAR). Information from the following report(s) SBAR, Kardex, Intake/Output, MAR, Recent Results and Cardiac Rhythm A Fib was reviewed with the receiving nurse.     Lines:   Peripheral IV 45/11/16 Right Cephalic (Active)   Site Assessment Clean, dry, & intact 2/26/2017  4:00 PM   Phlebitis Assessment 0 2/26/2017  4:00 PM   Infiltration Assessment 0 2/26/2017  4:00 PM   Dressing Status Clean, dry, & intact 2/26/2017  4:00 PM   Dressing Type Transparent 2/26/2017  4:00 PM   Hub Color/Line Status Capped 2/26/2017  4:00 PM   Action Taken Open ports on tubing capped 2/26/2017  4:00 PM   Alcohol Cap Used Yes 2/26/2017  4:00 PM       Peripheral IV 02/26/17 Left Antecubital (Active)   Site Assessment Clean, dry, & intact 2/26/2017  4:00 PM   Phlebitis Assessment 0 2/26/2017  4:00 PM   Infiltration Assessment 0 2/26/2017  4:00 PM   Dressing Status Clean, dry, & intact 2/26/2017  4:00 PM   Dressing Type Transparent 2/26/2017  4:00 PM   Hub Color/Line Status Capped 2/26/2017  4:00 PM   Action Taken Open ports on tubing capped 2/26/2017  4:00 PM   Alcohol Cap Used Yes 2/26/2017  4:00 PM        Opportunity for questions and clarification was provided.       Patient transported with:   Monitor  O2 @ 6 liters  Registered Nurse

## 2017-02-26 NOTE — ED NOTES
Bedside and Verbal shift change report given to Radha Fairbanks (oncoming nurse) by Chuy Cohn RN   (offgoing nurse). Report included the following information ED Summary, MAR and Recent Results.

## 2017-02-26 NOTE — PROGRESS NOTES
Pharmacy Dosing Services: warfarin    Consult for Warfarin Dosing by Pharmacy by Dr. Nito Vidales provided for this 66 y.o.  female , for indication of Atrial Fibrillation and VTE prophylaxis  Dose to achieve an INR goal of 2-3    Order entered for  Warfarin  2.5 (mg) ordered to be given today at 18:00. Pt home dose is 2.5mg daily    Significant drug interactions: none  LABS    PT/INR Lab Results   Component Value Date/Time    INR 2.1 02/25/2017 04:21 PM      Platelets Lab Results   Component Value Date/Time    PLATELET 641 36/24/3701 04:21 PM      H/H     Lab Results   Component Value Date/Time    HGB 12.2 02/25/2017 04:21 PM        Warfarin Administrations (last 168 hours)       None              Pharmacy to follow daily and will provide subsequent Warfarin dosing based on clinical status.   Daniel Cash, [facility])  Contact information [phone]

## 2017-02-26 NOTE — PROGRESS NOTES
Hospitalist Progress Note    Patient: Guero Marie MRN: [id number]  CSN: [id number]    YOB: 1945  Age: 66 y.o. Sex: female    DOA: 2/25/2017 LOS:  LOS: 0 days          Chief Complaint:dyspnea          Assessment/Plan     Acute hypoxemic hypercarbic respiratory failure. CHF diastolic acute. Pulmonary HTN moderate  TnI elevation chronic. Afib chronic on Warfarin  UTI  IMANI. Possible Asthma. Decreased FEV1. DM2. HTN. Anxiety. Insomnia. Echo 2/1/17 - EF 55-60. RVCP 55mmhg. Pulm art pressure 52mmhg. Looking better. Off NIPPV and now on o2 via NC. Exam with rales in the bases improved and some expiratory wheezing. Denies known hx of asthma, copd and or smoking. Plan:    1. Continue diuresis. Follow volume status. 2.  Continue nebs/steroids. Wean as tolerated. 3.  Continue bipap at night. 4. Resume rate controlling meds for afib. 5.  Complete course abx for uti. 6.  Follow dm2. Adjust prn. Controlled presently. 7.  Discussed need to use bipap at night (was not getting this at [facility]) and to monitor volume status meticulously. Cautioned her to watch for increased edema and changes in weight. Counseled to adjust diuretics in response to this. Naturally will need to partner with a CHF clinic or medical office capable of handling this tricky situation. 8. Order Pt/OT for tomorrow. 9.  Transfer to Telemetry remy. 10. DVT px via Warfarin.   GI px.       Patient Active Problem List   Diagnosis Code    Acute on chronic diastolic CHF (congestive heart failure) ([facility]) I50.33    ALEJANDRINA (acute kidney injury) ([address] N17.9    Acute exacerbation of CHF (congestive heart failure) ([facility]) I50.9    DM (diabetes mellitus) ([address] E11.9    Hypertension I10    Respiratory failure ([address] J96.90    Acute coronary syndrome ([facility]) I24.9    Obstructive sleep apnea, adult G47.33    Paroxysmal atrial fibrillation ([address] I48.0    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 ([address] E11.29, E11.65    Dyspnea due to congestive heart failure ([facility]) I50.9    Aspiration pneumonia ([facility]) J69.0    Hyperkalemia E87.5    Elevated troponin R79.89    COPD (chronic obstructive pulmonary disease) ([facility]) J44.9    CHF (congestive heart failure) ([facility]) I50.9    Insufficiency, respiratory, acute ([facility]) R06.89    Infiltrate noted on imaging study R93.8    COPD exacerbation ([facility]) J44.1    Acute on chronic respiratory failure with hypoxia ([facility]) J96.21    Chest pain R07.9    Anxiety F41.9    UTI (urinary tract infection) N39.0       Subjective:    Off bipap. Feeling much better. Review of systems:    Constitutional: denies fevers, chills, myalgias  Respiratory: denies SOB, cough  Cardiovascular: denies chest pain, palpitations  Gastrointestinal: denies nausea, vomiting, diarrhea      Vital signs/Intake and Output:  Visit Vitals    /70    Pulse 93    Temp 97.8 °F (36.6 °C)    Resp 25    Wt 116.4 kg (256 lb 9.6 oz)    SpO2 95%    Breastfeeding No    BMI 42.7 kg/m2     Current Shift:     Last three shifts:  02/24 1901 - 02/26 0700  In: 50 [P.O.:50]  Out: 3275 [Urine:3275]    Exam:    General: NAD  Head/Neck: MMM  CVS:Regular rate and rhythm, no M/R/G, S1/S2 heard, no thrill  Lungs:No distress. Rales in bases. Improving. Exp wheezes auscultated mild - mod. Abdomen: Soft, bs+  Extremities: + edema.                 Labs: Results:       Chemistry Recent Labs      02/26/17   0635  02/25/17   1621   GLU  113*  114*   NA  143  142   K  4.1  4.6   CL  101  100   CO2  36*  37*   BUN  37*  43*   CREA  1.14  1.40*   CA  8.6  8.6   AGAP  6  5   BUCR  32*  31*   AP   --   147*   TP   --   7.0   ALB   --   3.3*   GLOB   --   3.7   AGRAT   --   0.9      CBC w/Diff Recent Labs      02/26/17   0635  02/25/17   1621   WBC  4.6  5.7   RBC  3.61*  3.82*   HGB  11.6*  12.2   HCT  37.1  39.9   PLT  153  164   GRANS   --   81*   LYMPH   --   2*   EOS   --   2      Cardiac Enzymes Recent Labs      02/26/17 8362  02/25/17   2200   CPK  30  72   CKND1  Cannot be calulated  1.4      Coagulation Recent Labs      02/26/17   0635  02/25/17   1621   PTP  23.3*  22.9*   INR  2.2*  2.1*       Lipid Panel No results found for: CHOL, CHOLPOCT, CHOLX, CHLST, CHOLV, [id number], HDL, LDL, NLDLCT, DLDL, LDLC, DLDLP, [id number], VLDLC, VLDL, TGL, TGLX, TRIGL, [id number], TRIGP, TGLPOCT, [id number], CHHD, CHHDX   BNP No results for input(s): BNPP in the last 72 hours.    Liver Enzymes Recent Labs      02/25/17   1621   TP  7.0   ALB  3.3*   AP  147*   SGOT  13*      Thyroid Studies Lab Results   Component Value Date/Time    TSH 0.31 01/01/2016 10:20 PM        Procedures/imaging: see electronic medical records for all procedures/Xrays and details which were not copied into this note but were reviewed prior to creation of Nabil Pablo MD

## 2017-02-26 NOTE — ED NOTES
Pt yelling, this nurse went into room, pt had accidentally disconnected her Bipap and was very anxious and stating she was short of breath. Placed pt back on Bipap and stayed with her until she was calmed. Pt easily redirected and is resting comfortable in bed at this time.

## 2017-02-26 NOTE — ED NOTES
Called to pt's room, pt requesting to be repositioned. Pt was repositioned and given a quick sip of water to drink and vitals updated. 900 cc of urine drained from cath bag.

## 2017-02-26 NOTE — ED NOTES
Pt resting on stretcher at this time with eyes closed. Pt Continues on Bipap and appears to be resting comfortably at this time.

## 2017-02-26 NOTE — CONSULTS
[facility]  Pulmonary, Critical Care, and Sleep Medicine    Name: Elizabeth Veronica MRN: [id number]   : 1945 Hospital: [facility]   Date: 2017        Critical Care Initial Patient Consult    Requesting MD:                                                  Reason for CC Consult:  IMPRESSION:   · Acute hypoxic respiratory failure, likely due to pulmonary congestion in setting of moderate pulm htn  · Moderate pulmonary  Hypertension  · OHS/IMANI, PCO2 of 60 likely at her baseline as she is chronic retainer of CO2  · Acute diastolic  Heart failure  · UTI  · Pafib. RECOMMENDATIONS:   · No need for sedation  · Prn and qhs bipap support  · brovana and pulmicort, convert duoneb to prn  · dilurese with lasix and metolazone  · No need for systemic steroid  · Continue ceftriaxone for uti   · Continue coumadin  · Resume home insulin  · GI  proph with pepci  · No need for dvt proh with coumaind  · Ok to transfer to telemtry     Subjective/History: This patient has been seen and evaluated at the request of Dr. Emily Gentile for acute respiratory failure   Patient is a 66 y.o. female with mulitple medical problems listed below including moderate pulm htn, imani/ohs and copd  was recently discharged from this facility to SNF. Pt was discharged on BIPAP qhs. Pt reports that she was not able to get  Her bipap therapy from SNF. Day prior to admission, she finally had bipap but was not functioning. Also noted increase in leg swellings   Pt reports her breathing status continue to declined henc brought to ED. Pt was placed on bipap overnight. This am transition to HFNC. Feeling much better. Pt was also diuresed 3.2L   NO chest pain, no palpittiaon. No fever or chills.          Past Medical History:   Diagnosis Date    A-fib [facility])     Arthritis     Back injury     Chronic obstructive pulmonary disease (HCC)     Diabetes [facility]     Fibromyalgia     Heart failure ([facility]     Hypertension  MRSA (methicillin resistant Staphylococcus aureus)       Past Surgical History:   Procedure Laterality Date    CARDIAC SURG PROCEDURE UNLIST      HX CORONARY STENT PLACEMENT      HX KNEE REPLACEMENT      HX ORTHOPAEDIC      bilateral knee replacement      Prior to Admission medications    Medication Sig Start Date End Date Taking? Authorizing Provider   arformoterol (BROVANA) 15 mcg/2 mL nebu neb solution 2 mL by Nebulization route two (2) times a day. 2/9/17  Yes Hans Benson, DO   budesonide (PULMICORT) 0.5 mg/2 mL nbsp 2 mL by Nebulization route two (2) times a day. 2/9/17  Yes Hans Camera, DO   clonazePAM (KLONOPIN) 0.5 mg tablet Take 0.5 Tabs by mouth two (2) times a day. Max Daily Amount: 0.5 mg. 2/9/17  Yes Hans Benson, DO   dilTIAZem (CARDIZEM) 60 mg tablet Take 1 Tab by mouth Before breakfast, lunch, and dinner. 2/9/17  Yes Hans Benson, DO   docusate sodium (COLACE) 100 mg capsule Take 1 Cap by mouth two (2) times a day for 90 days. 2/9/17 5/10/17 Yes Hans Benson, DO   furosemide (LASIX) 40 mg tablet Take 1 Tab by mouth daily. 2/9/17  Yes Hans Benson, DO   pantoprazole (PROTONIX) 40 mg tablet Take 1 Tab by mouth Daily (before breakfast). 2/9/17  Yes Hans Benson, DO   simvastatin (ZOCOR) 10 mg tablet Take 1 Tab by mouth nightly. 2/9/17  Yes Hans Benson, DO   insulin glargine (LANTUS) 100 unit/mL injection 25 Units by SubCUTAneous route nightly. 2/9/17  Yes Hans Benson, DO   albuterol-ipratropium (DUO-NEB) 2.5 mg-0.5 mg/3 ml nebulizer solution 3 mL by Nebulization route every four (4) hours as needed for Shortness of Breath. 1/12/16  Yes Joseph Mueller MD   pregabalin (LYRICA) 75 mg capsule Take  by mouth. Yes Paul Sloan MD   warfarin (COUMADIN) 2.5 mg tablet Take 2.5 mg by mouth daily. Yes Paul Sloan MD   carvedilol (COREG) 25 mg tablet Take 25 mg by mouth two (2) times daily (with meals).    Yes Phys Other, MD   aspirin 81 mg tablet Take 81 mg by mouth. Yes Paul Sloan MD   FERROUS SULFATE by Does Not Apply route. Yes Paul Sloan MD   traZODone (DESYREL) 50 mg tablet Take 1 Tab by mouth nightly. 2/9/17   Lyell Liner, DO   insulin lispro (HUMALOG) 100 unit/mL injection AC and HS  Indications: type 2 diabetes mellitus 2/9/17   Lyell Liner, DO   insulin lispro (HUMALOG) 100 unit/mL injection AC  Indications: type 2 diabetes mellitus 2/9/17   Lyell Liner, DO   oxyCODONE-acetaminophen (PERCOCET) 5-325 mg per tablet Take 2 Tabs by mouth every four (4) hours as needed. Max Daily Amount: 12 Tabs. 2/9/17   Lyell Liner, DO   codeine-butalbital-acetaminophen-caffeine (FIORICET WITH CODEINE) -95-30 mg per capsule Take  by mouth every four (4) hours as needed for Headache. Paul Sloan MD   CYANOCOBALAMIN, VITAMIN B-12, (VITAMIN B-12 PO) Take  by mouth.       Paul Sloan MD     Current Facility-Administered Medications   Medication Dose Route Frequency    clonazePAM (KlonoPIN) tablet 0.25 mg  0.25 mg Oral BID    dilTIAZem (CARDIZEM) IR tablet 60 mg  60 mg Oral TIDAC    docusate sodium (COLACE) capsule 100 mg  100 mg Oral BID    insulin glargine (LANTUS) injection 25 Units  25 Units SubCUTAneous QHS    simvastatin (ZOCOR) tablet 10 mg  10 mg Oral QHS    carvedilol (COREG) tablet 25 mg  25 mg Oral BID WITH MEALS    pregabalin (LYRICA) capsule 75 mg  75 mg Oral BID    aspirin chewable tablet 81 mg  81 mg Oral DAILY    arformoterol (BROVANA) neb solution 15 mcg  15 mcg Nebulization BID RT    budesonide (PULMICORT) 500 mcg/2 ml nebulizer suspension  500 mcg Nebulization BID RT    lisinopril (PRINIVIL, ZESTRIL) tablet 5 mg  5 mg Oral DAILY    furosemide (LASIX) injection 40 mg  40 mg IntraVENous Q12H    famotidine (PEPCID) tablet 20 mg  20 mg Oral BID    cefTRIAXone (ROCEPHIN) 1 g in 0.9% sodium chloride (MBP/ADV) 50 mL MBP  1 g IntraVENous Q24H    methylPREDNISolone (PF) (SOLU-MEDROL) injection 40 mg  40 mg IntraVENous Q8H    albuterol-ipratropium (DUO-NEB) 2.5 MG-0.5 MG/3 ML  3 mL Nebulization QID RT    insulin lispro (HUMALOG) injection   SubCUTAneous AC&HS     Allergies   Allergen Reactions    Latex Hives    Adhesive Tape-Silicones Unknown (comments)    Bees [Sting, Bee] Unknown (comments)    Garlic Nausea and Vomiting    Zoloft [Sertraline] Hives      Social History   Substance Use Topics    Smoking status: Never Smoker    Smokeless tobacco: Not on file    Alcohol use No      History reviewed. No pertinent family history. Review of Systems:  A comprehensive review of systems was negative except for that written in the HPI. Objective:   Vital Signs:    Visit Vitals    /56 (BP 1 Location: Left arm, BP Patient Position: At rest;Head of bed elevated (Comment degrees))    Pulse 95    Temp 98.2 °F (36.8 °C)    Resp 18    Wt 116.4 kg (256 lb 9.6 oz)    SpO2 94%    Breastfeeding No    BMI 42.7 kg/m2       O2 Device: Hi flow nasal cannula   O2 Flow Rate (L/min): 35 l/min   Temp (24hrs), Av.8 °F (36.6 °C), Min:95.5 °F (35.3 °C), Max:98.5 °F (36.9 °C)       Intake/Output:   Last shift:       07 - 1900  In: 240 [P.O.:240]  Out: -   Last 3 shifts:  190 -  0700  In: 50 [P.O.:50]  Out: 3275 [Urine:3275]    Intake/Output Summary (Last 24 hours) at 17 1218  Last data filed at 17 1005   Gross per 24 hour   Intake              290 ml   Output             3275 ml   Net            -2985 ml     Hemodynamics:   . @MAP     . @CVP       Ventilator Settings:  Mode Rate Tidal Volume Pressure FiO2 PEEP            50 %       Peak airway pressure:      Minute ventilation: 10.5 l/min      ARDS network Guidelines: Lung protective strategy and Pl pressure goals____________    Physical Exam:    General:  Alert, cooperative, no distress, appears stated age. Head:  Normocephalic, without obvious abnormality, atraumatic.    Eyes: Conjunctivae/corneas clear. PERRL, EOMs intact. Nose: Nares normal. Septum midline. Mucosa normal. No drainage or sinus tenderness. Throat: Lips, mucosa, and tongue normal. Teeth and gums normal.   Neck: Supple, symmetrical, trachea midline, no adenopathy, thyroid: no enlargment/tenderness/nodules, no carotid bruit and no JVD. Back:   Symmetric, no curvature. ROM normal.   Lungs:   Clear to auscultation bilaterally. Chest wall:  No tenderness or deformity. Heart:  Regular rate and rhythm, S1, S2 normal, no murmur, click, rub or gallop. Abdomen:   Soft, non-tender. Bowel sounds normal. No masses,  No organomegaly. Extremities: Extremities normal, atraumatic, no cyanosis or edema. Pulses: 2+ and symmetric all extremities. Skin: Skin color, texture, turgor normal. No rashes or lesions   Lymph nodes: Cervical, supraclavicular, and axillary nodes normal.   Neurologic: Grossly nonfocal       Data:     Recent Results (from the past 24 hour(s))   EKG, 12 LEAD, INITIAL    Collection Time: 02/25/17  3:15 PM   Result Value Ref Range    Ventricular Rate 64 BPM    Atrial Rate 441 BPM    QRS Duration 72 ms    Q-T Interval 410 ms    QTC Calculation (Bezet) 422 ms    Calculated R Axis 41 degrees    Calculated T Axis 46 degrees    Diagnosis       Atrial fibrillation  Low voltage QRS  Septal infarct (cited on or before 26-OCT-2011)  Possible Lateral infarct , age undetermined  Abnormal ECG  When compared with ECG of 03-FEB-2017 09:31,  Vent.  rate has decreased BY  35 BPM  Borderline criteria for Lateral infarct are now present     POC G3    Collection Time: 02/25/17  4:09 PM   Result Value Ref Range    Device: BIPAP      FIO2 (POC) 28 %    pH (POC) 7.373 7.35 - 7.45      pCO2 (POC) 60.7 (H) 35.0 - 45.0 MMHG    pO2 (POC) 57 (L) 80 - 100 MMHG    HCO3 (POC) 35.3 (H) 22 - 26 MMOL/L    sO2 (POC) 88 (L) 92 - 97 %    Base excess (POC) 10 mmol/L    PEEP/CPAP (POC) 6.0 cmH2O    PIP (POC) 16      Allens test (POC) YES Total resp. rate 24      Site RIGHT RADIAL      Specimen type (POC) ARTERIAL      Performed by Omayra Double     Spontaneous timed YES     CBC WITH AUTOMATED DIFF    Collection Time: 02/25/17  4:21 PM   Result Value Ref Range    WBC 5.7 4.6 - 13.2 K/uL    RBC 3.82 (L) 4.20 - 5.30 M/uL    HGB 12.2 12.0 - 16.0 g/dL    HCT 39.9 35.0 - 45.0 %    .5 (H) 74.0 - 97.0 FL    MCH 31.9 24.0 - 34.0 PG    MCHC 30.6 (L) 31.0 - 37.0 g/dL    RDW 17.5 (H) 11.6 - 14.5 %    PLATELET 860 539 - 316 K/uL    MPV 11.0 9.2 - 11.8 FL    NEUTROPHILS 81 (H) 42 - 75 %    BAND NEUTROPHILS 3 0 - 5 %    LYMPHOCYTES 2 (L) 20 - 51 %    MONOCYTES 12 (H) 2 - 9 %    EOSINOPHILS 2 0 - 5 %    BASOPHILS 0 0 - 3 %    ABS. NEUTROPHILS 4.6 1.8 - 8.0 K/UL    ABS. LYMPHOCYTES 0.1 (L) 0.8 - 3.5 K/UL    ABS. MONOCYTES 0.7 0 - 1.0 K/UL    ABS. EOSINOPHILS 0.1 0.0 - 0.4 K/UL    ABS. BASOPHILS 0.0 0.0 - 0.1 K/UL    PLATELET COMMENTS LARGE PLATELETS      RBC COMMENTS ANISOCYTOSIS  1+        RBC COMMENTS MACROCYTOSIS  1+        RBC COMMENTS POLYCHROMASIA  1+        WBC COMMENTS TOXIC GRANULATION      DF MANUAL     METABOLIC PANEL, COMPREHENSIVE    Collection Time: 02/25/17  4:21 PM   Result Value Ref Range    Sodium 142 136 - 145 mmol/L    Potassium 4.6 3.5 - 5.5 mmol/L    Chloride 100 100 - 108 mmol/L    CO2 37 (H) 21 - 32 mmol/L    Anion gap 5 3.0 - 18 mmol/L    Glucose 114 (H) 74 - 99 mg/dL    BUN 43 (H) 7.0 - 18 MG/DL    Creatinine 1.40 (H) 0.6 - 1.3 MG/DL    BUN/Creatinine ratio 31 (H) 12 - 20      GFR est AA 45 (L) >60 ml/min/1.73m2    GFR est non-AA 37 (L) >60 ml/min/1.73m2    Calcium 8.6 8.5 - 10.1 MG/DL    Bilirubin, total 1.2 (H) 0.2 - 1.0 MG/DL    ALT (SGPT) 21 13 - 56 U/L    AST (SGOT) 13 (L) 15 - 37 U/L    Alk.  phosphatase 147 (H) 45 - 117 U/L    Protein, total 7.0 6.4 - 8.2 g/dL    Albumin 3.3 (L) 3.4 - 5.0 g/dL    Globulin 3.7 2.0 - 4.0 g/dL    A-G Ratio 0.9 0.8 - 1.7     CARDIAC PANEL,(CK, CKMB & TROPONIN)    Collection Time: 02/25/17 4:21 PM   Result Value Ref Range    CK 53 26 - 192 U/L    CK - MB 1.5 <3.6 ng/ml    CK-MB Index 2.8 0.0 - 4.0 %    Troponin-I, Qt. 0.31 (H) 0.00 - 0.06 NG/ML   PRO-BNP    Collection Time: 02/25/17  4:21 PM   Result Value Ref Range    NT pro-BNP 3585 (H) 0 - 900 PG/ML   MAGNESIUM    Collection Time: 02/25/17  4:21 PM   Result Value Ref Range    Magnesium 2.2 1.8 - 2.4 mg/dL   HEMOGLOBIN A1C WITH EAG    Collection Time: 02/25/17  4:21 PM   Result Value Ref Range    Hemoglobin A1c 7.7 (H) 4.5 - 5.6 %    Est. average glucose 174 mg/dL   PROTHROMBIN TIME + INR    Collection Time: 02/25/17  4:21 PM   Result Value Ref Range    Prothrombin time 22.9 (H) 11.5 - 15.2 sec    INR 2.1 (H) 0.8 - 1.2     URINALYSIS W/ RFLX MICROSCOPIC    Collection Time: 02/25/17  4:42 PM   Result Value Ref Range    Color YELLOW      Appearance CLEAR      Specific gravity 1.012 1.005 - 1.030      pH (UA) 5.0 5.0 - 8.0      Protein NEGATIVE  NEG mg/dL    Glucose NEGATIVE  NEG mg/dL    Ketone NEGATIVE  NEG mg/dL    Bilirubin NEGATIVE  NEG      Blood NEGATIVE  NEG      Urobilinogen 1.0 0.2 - 1.0 EU/dL    Nitrites POSITIVE (A) NEG      Leukocyte Esterase SMALL (A) NEG     URINE MICROSCOPIC ONLY    Collection Time: 02/25/17  4:42 PM   Result Value Ref Range    WBC 0 to 3 0 - 5 /hpf    RBC 0 to 3 0 - 5 /hpf    Epithelial cells 0 0 - 5 /lpf    Bacteria 2+ (A) NEG /hpf    Hyaline cast 0 to 3 0 - 2 /lpf   CARDIAC PANEL,(CK, CKMB & TROPONIN)    Collection Time: 02/25/17 10:00 PM   Result Value Ref Range    CK 72 26 - 192 U/L    CK - MB 1.0 <3.6 ng/ml    CK-MB Index 1.4 0.0 - 4.0 %    Troponin-I, Qt. 0.31 (H) 0.00 - 0.06 NG/ML   GLUCOSE, POC    Collection Time: 02/25/17 10:02 PM   Result Value Ref Range    Glucose (POC) 82 70 - 110 mg/dL   GLUCOSE, POC    Collection Time: 02/26/17  6:34 AM   Result Value Ref Range    Glucose (POC) 107 70 - 110 mg/dL   CBC W/O DIFF    Collection Time: 02/26/17  6:35 AM   Result Value Ref Range    WBC 4.6 4.6 - 13.2 K/uL RBC 3.61 (L) 4.20 - 5.30 M/uL    HGB 11.6 (L) 12.0 - 16.0 g/dL    HCT 37.1 35.0 - 45.0 %    .8 (H) 74.0 - 97.0 FL    MCH 32.1 24.0 - 34.0 PG    MCHC 31.3 31.0 - 37.0 g/dL    RDW 17.4 (H) 11.6 - 14.5 %    PLATELET 189 066 - 683 K/uL    MPV 10.2 9.2 - 82.6 FL   METABOLIC PANEL, BASIC    Collection Time: 17  6:35 AM   Result Value Ref Range    Sodium 143 136 - 145 mmol/L    Potassium 4.1 3.5 - 5.5 mmol/L    Chloride 101 100 - 108 mmol/L    CO2 36 (H) 21 - 32 mmol/L    Anion gap 6 3.0 - 18 mmol/L    Glucose 113 (H) 74 - 99 mg/dL    BUN 37 (H) 7.0 - 18 MG/DL    Creatinine 1.14 0.6 - 1.3 MG/DL    BUN/Creatinine ratio 32 (H) 12 - 20      GFR est AA 57 (L) >60 ml/min/1.73m2    GFR est non-AA 47 (L) >60 ml/min/1.73m2    Calcium 8.6 8.5 - 10.1 MG/DL   MAGNESIUM    Collection Time: 17  6:35 AM   Result Value Ref Range    Magnesium 1.8 1.8 - 2.4 mg/dL   PHOSPHORUS    Collection Time: 17  6:35 AM   Result Value Ref Range    Phosphorus 4.2 2.5 - 4.9 MG/DL   CARDIAC PANEL,(CK, CKMB & TROPONIN)    Collection Time: 17  6:35 AM   Result Value Ref Range    CK 30 26 - 192 U/L    CK - MB <1.0 <3.6 ng/ml    CK-MB Index Cannot be calulated 0.0 - 4.0 %    Troponin-I, Qt. 0.32 (H) 0.00 - 0.06 NG/ML   PROTHROMBIN TIME + INR    Collection Time: 17  6:35 AM   Result Value Ref Range    Prothrombin time 23.3 (H) 11.5 - 15.2 sec    INR 2.2 (H) 0.8 - 1.2     GLUCOSE, POC    Collection Time: 17 11:29 AM   Result Value Ref Range    Glucose (POC) 209 (H) 70 - 110 mg/dL             Telemetry:afib      Imaging:  I have personally reviewed the patients radiographs and have reviewed the reports:        2017  4:51 PM - Pardeep, Card Result In       Narrative      [facility], [address] [place] [address]  [phone]    Transthoracic Echocardiogram    Patient: Gomez Velarde  MRN: [id number]  ACCT #: [de-identified]  : 1945  Age: 66 years  Gender: Female  Height: 65 in  Weight: 229.5 lb  BSA: 2.1 mï¾²  BP: 137 / 51 mmHg  Study date: 31-Jan-2017  Status: Routine  Location: Bedside  [facility] #: [id number]    Allergies: LATEX, ADHESIVE TAPE-SILICONES, STING, BEE, GARLIC, SERTRALINE    Referring_Ordering Physician: Mehul Bourne MD  Interpreting Physician: Davide Pabon. Kathleen Pang MD  Technologist: Paula Uribe    SUMMARY:  Left ventricle: The ventricle was mildly dilated. Systolic function was  normal. Ejection fraction was estimated in the range of 55 % to 60 %. No  obvious wall motion abnormalities identified in the views obtained. can  not comment on diastolic function due to monophasic mitral doppler inflow  due to atrial fibrillation. Right ventricle: Systolic pressure was mildly increased. Estimated peak  pressure was 55 mmHg. Left atrium: The atrium is severely dilated. LA volume index was 78 ml/mï¾². Right atrium: The atrium is mildly dilated. Mitral valve: The posterior leaflet is thickened, calcified with reduced  mobility. Aortic valve: Aortic valve not well seen. Can not comment on strucutre of  aortic valve. Aortic valve is thickened and calcified. Peak velocity is  3.23 m/sec with mean gradient of 22.40 mm hg. Moderate aortic stenosis  with velocity criteria. Mild aortic regurgitation. Tricuspid valve: Pulmonary artery systolic pressure: 52 mmHg. COMPARISONS:  Comparison was made with the previous study of 02-Jan-2016. INDICATIONS: Atrial fibrillation, CHF. HISTORY: Prior history: Risk factors: hypertension. PROCEDURE: This was a routine study. The study included complete 2D  imaging, M-mode, complete spectral Doppler, and color Doppler. The heart  rate was 89 bpm, at the start of the study. Systolic blood pressure was  137 mmHg, at the start of the study. Diastolic blood pressure was 51 mmHg,  at the start of the study. Image quality was fair. LEFT VENTRICLE: The ventricle was mildly dilated.  Systolic function was  normal. Ejection fraction was estimated in the range of 55 % to 60 %. No  obvious wall motion abnormalities identified in the views obtained. can  not comment on diastolic function due to monophasic mitral doppler inflow  due to atrial fibrillation. Wall thickness was normal. DOPPLER:  Indeterminate diastolic function. RIGHT VENTRICLE: The size was normal. Systolic function was normal.  DOPPLER: Systolic pressure was mildly increased. Estimated peak pressure  was 55 mmHg. LEFT ATRIUM: The atrium is severely dilated. LA volume index was 78 ml/mï¾². RIGHT ATRIUM: The atrium is mildly dilated. MITRAL VALVE: There was mild annular dilatation. Normal valve structure. There was normal leaflet separation. DOPPLER: There was no evidence for  stenosis. The posterior leaflet is thickened, calcified with reduced  mobility. AORTIC VALVE: The valve was probably trileaflet. Leaflets exhibited mildly  increased thickness, mild calcification, and mildly reduced cuspal  separation. DOPPLER: Aortic valve not well seen. Can not comment on  strucutre of aortic valve. Aortic valve is thickened and calcified. Peak  velocity is 3.23 m/sec with mean gradient of 22.40 mm hg. Moderate aortic  stenosis with velocity criteria. Mild aortic regurgitation. There was mild  regurgitation. TRICUSPID VALVE: Normal valve structure. There was normal leaflet  separation. DOPPLER: There was no evidence for tricuspid stenosis. There  was no regurgitation. Tricuspid regurgitation peak velocity: 3.4 m/sec. Right atrial pressure estimate: 8 mmHg. Pulmonary artery systolic  pressure: 52 mmHg. PULMONIC VALVE: Not well visualized, but normal Doppler findings. AORTA: The root exhibited normal size. SYSTEMIC VEINS: IVC: The inferior vena cava was not well visualized. PERICARDIUM: No significant pericardial effusion identified. MEASUREMENT TABLES    2D measurements  Right atrium   (Reference normals)  Area sys   23 cmï¾²   (8.3-19. 5)    Doppler measurements  Aortic valve   (Reference normals)  Valve area, cont   1 cmï¾²   (--)    SYSTEM MEASUREMENT TABLES    2D  Ao Diam: 3.3 cm  LVOT Diam: 2.1 cm  EF(Teich): 66.4 %  IVSd: 0.9 cm  LVIDd: 5.4 cm  LVIDs: 3.4 cm  LVPWd: 1 cm  LAESV Index (A-L): 78.2 ml/m2  LVEDV MOD A2C: 97.7 ml  LVEDV MOD A4C: 86.1 ml  LVESV MOD A2C: 42.1 ml  LVESV MOD A4C: 38.9 ml  RA Area: 22.6 cm2  RVIDd: 3.7 cm    CW  AV Vmean: 2 m/s  AV maxP.6 mmHg  AV meanP.7 mmHg  TR Vmax: 3.4 m/s  TR maxP.3 mmHg    PW  SANIA (VTI): 1 cm2  LVOT meanP.4 mmHg    Prepared and E-signed by    Misty Lovell MD  Signed 2017 16:51:06     esults    XR CHEST PORT (Accession [id number]) (Order [id number])         Allergies        Unspecified: Latex; Adhesive Tape-silicones; Bees [Sting, Bee];  Garlic;  Zoloft [Sertraline]       Result Information      Status Provider Status        Final result (Exam End: 2017  4:37 PM) Open        Study Result      Chest, single view     Indication: Shortness of breath, pulmonary edema.     Comparison: Several prior exams, most recently 2017.     Findings: Portable upright AP view of the chest was obtained. The lungs are  considerably underexpanded, similar to prior, with associated bronchovascular  crowding as well as prominence of the central pulmonary vasculature. No  pneumothorax or large pleural effusion. Cardiac enlargement as well as an  atherosclerotic and tortuous thoracic aorta are not substantially changed from  prior. No acute osseous abnormality identified.     IMPRESSION  IMPRESSION:  1. Underexpanded lungs with associated bronchovascular crowding and central  pulmonary vascular prominence, similar to prior. 2. Cardiomegaly, also similar to prior. Best practice :  Not applicableCardiovascular:  An arterial systolic blood pressure (SBP) of < or equal to 90mm Hg or a mean arterial pressure (MAP) < or equal to 70mm Hg for at least 1 hour despite adequate fluid resuscitation or adequate intravascular volume status; or the need for vasopressors to maintain SBP>or equal to 90mm Hg or MAP > or equal to 70mm Hg. Continue current therapy        Total critical care time exclusive of procedures: 30 minutes  Ankit Tai MD  2/26/2017, 12:18 PM

## 2017-02-27 ENCOUNTER — APPOINTMENT (OUTPATIENT)
Dept: GENERAL RADIOLOGY | Age: 72
DRG: 286 | End: 2017-02-27
Attending: INTERNAL MEDICINE
Payer: MEDICARE

## 2017-02-27 LAB
ANION GAP BLD CALC-SCNC: 5 MMOL/L (ref 3–18)
BUN SERPL-MCNC: 53 MG/DL (ref 7–18)
BUN/CREAT SERPL: 31 (ref 12–20)
CALCIUM SERPL-MCNC: 8.5 MG/DL (ref 8.5–10.1)
CHLORIDE SERPL-SCNC: 99 MMOL/L (ref 100–108)
CK MB CFR SERPL CALC: ABNORMAL % (ref 0–4)
CK MB SERPL-MCNC: <1 NG/ML (ref 5–25)
CK SERPL-CCNC: 34 U/L (ref 26–192)
CO2 SERPL-SCNC: 37 MMOL/L (ref 21–32)
CREAT SERPL-MCNC: 1.73 MG/DL (ref 0.6–1.3)
ERYTHROCYTE [DISTWIDTH] IN BLOOD BY AUTOMATED COUNT: 18.1 % (ref 11.6–14.5)
GLUCOSE BLD STRIP.AUTO-MCNC: 121 MG/DL (ref 70–110)
GLUCOSE BLD STRIP.AUTO-MCNC: 186 MG/DL (ref 70–110)
GLUCOSE BLD STRIP.AUTO-MCNC: 220 MG/DL (ref 70–110)
GLUCOSE BLD STRIP.AUTO-MCNC: 251 MG/DL (ref 70–110)
GLUCOSE SERPL-MCNC: 265 MG/DL (ref 74–99)
HCT VFR BLD AUTO: 36.8 % (ref 35–45)
HGB BLD-MCNC: 11.8 G/DL (ref 12–16)
INR PPP: 2.3 (ref 0.8–1.2)
MAGNESIUM SERPL-MCNC: 2.1 MG/DL (ref 1.8–2.4)
MCH RBC QN AUTO: 33.1 PG (ref 24–34)
MCHC RBC AUTO-ENTMCNC: 32.1 G/DL (ref 31–37)
MCV RBC AUTO: 103.1 FL (ref 74–97)
PLATELET # BLD AUTO: 179 K/UL (ref 135–420)
PMV BLD AUTO: 10.7 FL (ref 9.2–11.8)
POTASSIUM SERPL-SCNC: 4.9 MMOL/L (ref 3.5–5.5)
PROTHROMBIN TIME: 24.2 SEC (ref 11.5–15.2)
RBC # BLD AUTO: 3.57 M/UL (ref 4.2–5.3)
SODIUM SERPL-SCNC: 141 MMOL/L (ref 136–145)
TROPONIN I SERPL-MCNC: 0.27 NG/ML (ref 0–0.06)
WBC # BLD AUTO: 6.7 K/UL (ref 4.6–13.2)

## 2017-02-27 PROCEDURE — 74011000250 HC RX REV CODE- 250: Performed by: INTERNAL MEDICINE

## 2017-02-27 PROCEDURE — 94640 AIRWAY INHALATION TREATMENT: CPT

## 2017-02-27 PROCEDURE — 74011636637 HC RX REV CODE- 636/637: Performed by: INTERNAL MEDICINE

## 2017-02-27 PROCEDURE — 74011250637 HC RX REV CODE- 250/637: Performed by: INTERNAL MEDICINE

## 2017-02-27 PROCEDURE — 82550 ASSAY OF CK (CPK): CPT | Performed by: INTERNAL MEDICINE

## 2017-02-27 PROCEDURE — 82962 GLUCOSE BLOOD TEST: CPT

## 2017-02-27 PROCEDURE — 36415 COLL VENOUS BLD VENIPUNCTURE: CPT | Performed by: INTERNAL MEDICINE

## 2017-02-27 PROCEDURE — 77010033711 HC HIGH FLOW OXYGEN

## 2017-02-27 PROCEDURE — 74011000258 HC RX REV CODE- 258: Performed by: INTERNAL MEDICINE

## 2017-02-27 PROCEDURE — 82803 BLOOD GASES ANY COMBINATION: CPT

## 2017-02-27 PROCEDURE — 97161 PT EVAL LOW COMPLEX 20 MIN: CPT

## 2017-02-27 PROCEDURE — 94660 CPAP INITIATION&MGMT: CPT

## 2017-02-27 PROCEDURE — 71010 XR CHEST PORT: CPT

## 2017-02-27 PROCEDURE — 74011250636 HC RX REV CODE- 250/636: Performed by: INTERNAL MEDICINE

## 2017-02-27 PROCEDURE — 76450000000

## 2017-02-27 PROCEDURE — 74011250637 HC RX REV CODE- 250/637: Performed by: FAMILY MEDICINE

## 2017-02-27 PROCEDURE — 85027 COMPLETE CBC AUTOMATED: CPT | Performed by: INTERNAL MEDICINE

## 2017-02-27 PROCEDURE — 80048 BASIC METABOLIC PNL TOTAL CA: CPT | Performed by: INTERNAL MEDICINE

## 2017-02-27 PROCEDURE — 36600 WITHDRAWAL OF ARTERIAL BLOOD: CPT

## 2017-02-27 PROCEDURE — 97165 OT EVAL LOW COMPLEX 30 MIN: CPT

## 2017-02-27 PROCEDURE — 65270000029 HC RM PRIVATE

## 2017-02-27 PROCEDURE — 85610 PROTHROMBIN TIME: CPT | Performed by: INTERNAL MEDICINE

## 2017-02-27 PROCEDURE — 83735 ASSAY OF MAGNESIUM: CPT | Performed by: INTERNAL MEDICINE

## 2017-02-27 RX ORDER — WARFARIN 2.5 MG/1
2.5 TABLET ORAL ONCE
Status: COMPLETED | OUTPATIENT
Start: 2017-02-27 | End: 2017-02-27

## 2017-02-27 RX ORDER — METOLAZONE 5 MG/1
5 TABLET ORAL DAILY
Status: DISCONTINUED | OUTPATIENT
Start: 2017-02-28 | End: 2017-02-28

## 2017-02-27 RX ORDER — FUROSEMIDE 10 MG/ML
40 INJECTION INTRAMUSCULAR; INTRAVENOUS ONCE
Status: COMPLETED | OUTPATIENT
Start: 2017-02-27 | End: 2017-02-27

## 2017-02-27 RX ADMIN — CLONAZEPAM 0.25 MG: 0.5 TABLET ORAL at 09:01

## 2017-02-27 RX ADMIN — INSULIN LISPRO 3 UNITS: 100 INJECTION, SOLUTION INTRAVENOUS; SUBCUTANEOUS at 18:28

## 2017-02-27 RX ADMIN — OXYCODONE HYDROCHLORIDE 10 MG: 5 TABLET ORAL at 03:41

## 2017-02-27 RX ADMIN — DOCUSATE SODIUM 100 MG: 100 CAPSULE, LIQUID FILLED ORAL at 09:01

## 2017-02-27 RX ADMIN — CLONAZEPAM 0.25 MG: 0.5 TABLET ORAL at 20:57

## 2017-02-27 RX ADMIN — DILTIAZEM HYDROCHLORIDE 60 MG: 60 TABLET, FILM COATED ORAL at 13:35

## 2017-02-27 RX ADMIN — ASPIRIN 81 MG CHEWABLE TABLET 81 MG: 81 TABLET CHEWABLE at 09:01

## 2017-02-27 RX ADMIN — FUROSEMIDE 40 MG: 10 INJECTION, SOLUTION INTRAMUSCULAR; INTRAVENOUS at 21:08

## 2017-02-27 RX ADMIN — CEFTRIAXONE 1 G: 1 INJECTION, POWDER, FOR SOLUTION INTRAMUSCULAR; INTRAVENOUS at 18:29

## 2017-02-27 RX ADMIN — FUROSEMIDE 40 MG: 10 INJECTION, SOLUTION INTRAMUSCULAR; INTRAVENOUS at 13:34

## 2017-02-27 RX ADMIN — IPRATROPIUM BROMIDE AND ALBUTEROL SULFATE 3 ML: .5; 3 SOLUTION RESPIRATORY (INHALATION) at 15:10

## 2017-02-27 RX ADMIN — BUDESONIDE 500 MCG: 0.5 INHALANT RESPIRATORY (INHALATION) at 20:44

## 2017-02-27 RX ADMIN — FUROSEMIDE 40 MG: 10 INJECTION, SOLUTION INTRAMUSCULAR; INTRAVENOUS at 18:29

## 2017-02-27 RX ADMIN — OXYCODONE HYDROCHLORIDE 10 MG: 5 TABLET ORAL at 09:01

## 2017-02-27 RX ADMIN — INSULIN LISPRO 6 UNITS: 100 INJECTION, SOLUTION INTRAVENOUS; SUBCUTANEOUS at 13:34

## 2017-02-27 RX ADMIN — OXYCODONE HYDROCHLORIDE 10 MG: 5 TABLET ORAL at 13:43

## 2017-02-27 RX ADMIN — INSULIN GLARGINE 25 UNITS: 100 INJECTION, SOLUTION SUBCUTANEOUS at 22:13

## 2017-02-27 RX ADMIN — CARVEDILOL 25 MG: 12.5 TABLET, FILM COATED ORAL at 20:59

## 2017-02-27 RX ADMIN — FAMOTIDINE 20 MG: 20 TABLET ORAL at 20:57

## 2017-02-27 RX ADMIN — BUDESONIDE 500 MCG: 0.5 INHALANT RESPIRATORY (INHALATION) at 08:21

## 2017-02-27 RX ADMIN — WARFARIN SODIUM 2.5 MG: 2.5 TABLET ORAL at 20:59

## 2017-02-27 RX ADMIN — FAMOTIDINE 20 MG: 20 TABLET ORAL at 09:01

## 2017-02-27 RX ADMIN — ARFORMOTEROL TARTRATE 15 MCG: 15 SOLUTION RESPIRATORY (INHALATION) at 08:21

## 2017-02-27 RX ADMIN — SIMVASTATIN 10 MG: 20 TABLET, FILM COATED ORAL at 21:04

## 2017-02-27 RX ADMIN — DILTIAZEM HYDROCHLORIDE 60 MG: 60 TABLET, FILM COATED ORAL at 20:58

## 2017-02-27 RX ADMIN — INSULIN LISPRO 9 UNITS: 100 INJECTION, SOLUTION INTRAVENOUS; SUBCUTANEOUS at 08:10

## 2017-02-27 RX ADMIN — TRAZODONE HYDROCHLORIDE 50 MG: 50 TABLET ORAL at 21:04

## 2017-02-27 NOTE — CONSULTS
[facility]: [phone]  Former[facility]: [phone]   [facility]: [phone]    Patient Name: Claudetta Cruel  YOB: 1945    Date of Initial Consult: 2/27/17   Reason for Consult: Care Decisions  Requesting Provider: Dr. Colleen Driscoll   Primary Care Physician: Carmita Raman MD      SUMMARY:   Claudetta Cruel is a 66y.o. year old with a past history ofCHF, DM, COPD, HTN, CKD, Osteoarthritis, Afib/on coumadin, CAD with cardiac stenting, Pulmonary HTN, Sleep apnea but not using CPAP who was admitted on 2/25/2017 from [facility]  with a diagnosis of:    Acute on chronic respiratory failure/ pulmonary edema  Pulmonary HTN  Aortic stenosis  Hypotension in setting of diuresis  Acute on chronic CHF  UTI  Morbid obesity  DM2  Debility      Current medical issues leading to Palliative Medicine involvement include: Care Decisions in setting of disease progression and multiple hospitalizations. Presented to ED with increased SOB, weight gain, edema to feet which began while at [facility] for rehab after last hospitalization. At baseline she is on O2 at 3L NC. Initially on bipap now on HF oxygen. 2 hospitalization in past month at [facility] for respiratory failure however has had a total of 10 hospitalizations (most at [facility]) since January 2016. Patient and spouse have seen functional decline especially since September. Up until that time she was able to perform ADLs, fix meals. The longest she is been able to stay out of the hospital and at home has been only a couple of months. Was on ventilator back in January and has little recall of this. PALLIATIVE DIAGNOSES:   1. Acute on chronic CHF  2. Acute on chronic respiratory failure  3. Dyspnea  4. Anxiety       PLAN:   1. Palliative Medicine team Luke Fitzgerald MD, [facility] NP) met with patient and spouse at bedside. She is alert, well oriented and able to participate in our discussion. They have some understanding of her current medical condition but were unable to clearly understand how her illness can be attributed to multifactorial causes including progressive lung, heart and renal disease. We presented an overview and how this contributes to her multiple hospitalizations in spite of them trying to [facility] and do what we are told to do\". We addressed how the reason for her numerous hospitalizations are due to problems that are no longer curative but only temporarily \"fixable\" before they reoccur. She expressed a strong desire to \"get well and go home\". She added that she would be okay with further hospitalizations. She also stated \"I am not ready to go\" (die). She stated she worries about death and dying. In fact, she thought this was what we were here to talk about today and was upset initially by our visit until we explained that our goal is to make sure all have a clear understanding of what she wants so we can best support her [address].  informed us that she lost her sister, her mother in law this past year. Lost a son many years ago in a skiing accident. There may be complicated grief especially over loss of son. There is likely denial of the extent of her illness which may be related to her expressed fear of dying. During past hospitalization, she expressed fear of being buried. She said she had told her  she wanted to be buried upright with her head above ground.  asked to see. She may benefit from additional counseling which exceeds what can be offered in an inpatient setting. Psychosocial/Functional status:   55+ years. Functional decline since 2016.  2 sons--one  about 10 years ago in a skiing accident. Owned an appliance store. Uses cane and walker for past 5 years    2. Advance Care Planning: No AD--reluctant to complete in past. Encouraged to complete and offered assistance with this.         Code Status: FULL CODE  Not addressed at this visit    Durable DNR status: NA    4. Symptoms: Dyspnea: Improved and \"feeling better\". 5. Disposition: TBD    6. Initial consult note routed to primary continuity provider. 7. Communicated plan of care with: Palliative IDT, patient, spouse. GOALS OF CARE:     [====Goals of Care====]  GOALS OF CARE:  Patient / health care proxy stated goals: Continue with current level of care. \"I want to get better and go home\".        TREATMENT PREFERENCES:   Code Status: Full Code    Advance Care Planning:  Advance Care Planning 1/7/2016   Patient's Healthcare Decision Maker is: Legal Next of Jenna Reece   Primary Decision Maker Name Gabriele Isidro Decision Maker Phone Number [phone]   Primary Decision Maker Relationship to Patient Spouse   Confirm Advance Directive None   Patient Would Like to Complete Advance Directive Yes       Other:    The palliative care team has discussed with patient / health care proxy about goals of care / treatment preferences for patient.  [====Goals of Care====]      Advance Care Planning 1/7/2016   Patient's Healthcare Decision Maker is: Legal Next of Jenna Reece   Primary Decision Maker Name Gabriele Isidro Decision Maker Phone Number [phone]   Primary Decision Maker Relationship to Patient Spouse   Confirm Advance Directive None   Patient Would Like to Complete Advance Directive Yes       Spouse: [facility] [phone]       HISTORY:     History obtained from: chart, patient, spouse    CHIEF COMPLAINT: Dyspnea, fluid retention    HPI/SUBJECTIVE:    The patient is:   [x] Verbal and participatory  [] Non-participatory due to:      SEE SUMMARY    Clinical Pain Assessment (nonverbal scale for nonverbal patients): Pain: 0         FUNCTIONAL ASSESSMENT:     Palliative Performance Scale (PPS):  PPS: 40       PSYCHOSOCIAL/SPIRITUAL SCREENING:     Advance Care Planning:  Advance Care Planning 1/7/2016   Patient's [address] is: Legal Next of [address] [place] [address] Name Helen Xie Decision Maker Phone Number [phone]   Primary Decision Maker Relationship to Patient Spouse   Confirm Advance Directive None   Patient Would Like to Complete Advance Directive Yes        Any spiritual / Religion concerns:  [] Yes /  [] No  None expressed but may have fear of dying. Plan to ask  to visit. Caregiver Burnout:  [] Yes /  [x] No /  [] No Caregiver Present      Anticipatory grief assessment:   [x] Normal  / [] Maladaptive       ESAS Anxiety: Anxiety: 4     ESAS Depression:   Not assessed this visit. REVIEW OF SYSTEMS:     Positive and pertinent negative findings in ROS are noted above in HPI. The following systems were [x] reviewed / [] unable to be reviewed as noted in HPI   Other findings are noted below. Systems: constitutional, ears/nose/mouth/throat, respiratory, gastrointestinal, genitourinary, musculoskeletal, integumentary, neurologic, psychiatric, endocrine. Positive findings noted below. Modified ESAS Completed by: provider   Fatigue: 6 Drowsiness: 0     Pain: 0   Anxiety: 4       Dyspnea: 3                    PHYSICAL EXAM:     Wt Readings from Last 3 Encounters:   02/25/17 116.4 kg (256 lb 9.6 oz)   02/09/17 106 kg (233 lb 11 oz)   01/12/16 100.7 kg (222 lb)     Blood pressure 127/54, pulse 98, temperature 97.9 °F (36.6 °C), resp. rate 20, weight 116.4 kg (256 lb 9.6 oz), SpO2 98 %, not currently breastfeeding. Pain:  Pain Scale 1: Numeric (0 - 10)  Pain Intensity 1: 8     Pain Location 1: Leg, Back  Pain Orientation 1: Right, Lower, Left  Pain Description 1: Aching  Pain Intervention(s) 1: Medication (see MAR)  Last bowel movement: No BM documented since date of admission. Constitutional: NAD. Alert, tearful at times, pleasant.   Eyes: pupils equal, anicteric  ENMT: no nasal discharge, moist mucous membranes  Cardiovascular: regular rhythm  Respiratory: breathing mildly labored, symmetric  Gastrointestinal:   Musculoskeletal:   Skin: warm, dry  Neurologic: following commands, moving all extremities  Psychiatric: full affect, no hallucinations  Other: On HF O2        HISTORY:     Principal Problem:    Acute on chronic respiratory failure with hypoxia ([facility]) (2/1/2017)    Active Problems:    Acute on chronic diastolic CHF (congestive heart failure) ([facility] (9/3/2014)      DM (diabetes mellitus) ([facility] (9/22/2015)      Hypertension (9/27/2015)      Paroxysmal atrial fibrillation ([facility]) (1/4/2016)      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 ([facility]) (7/2/1504)      Elevated troponin (1/30/2017)      COPD (chronic obstructive pulmonary disease) ([facility] (1/30/2017)      UTI (urinary tract infection) (2/25/2017)      Past Medical History:   Diagnosis Date    A-fib ([facility]     Arthritis     Back injury     [facility]     Diabetes ([facility]     Fibromyalgia     Heart failure ([facility]     Hypertension     MRSA (methicillin resistant Staphylococcus aureus)       Past Surgical History:   Procedure Laterality Date    CARDIAC SURG PROCEDURE UNLIST      HX CORONARY STENT PLACEMENT      HX KNEE REPLACEMENT      HX ORTHOPAEDIC      bilateral knee replacement      History reviewed. No pertinent family history. History reviewed, no pertinent family history.   Social History   Substance Use Topics    Smoking status: Never Smoker    Smokeless tobacco: Not on file    Alcohol use No     Allergies   Allergen Reactions    Latex Hives    Adhesive Tape-Silicones Unknown (comments)    Bees [Sting, Bee] Unknown (comments)    Garlic Nausea and Vomiting    Zoloft [Sertraline] Hives      Current Facility-Administered Medications   Medication Dose Route Frequency    [START ON 2/28/2017] metOLazone (ZAROXOLYN) tablet 5 mg  5 mg Oral DAILY    clonazePAM (KlonoPIN) tablet 0.25 mg  0.25 mg Oral BID    dilTIAZem (CARDIZEM) IR tablet 60 mg  60 mg Oral TIDAC    docusate sodium (COLACE) capsule 100 mg  100 mg Oral BID    insulin glargine (LANTUS) injection 25 Units  25 Units SubCUTAneous QHS    simvastatin (ZOCOR) tablet 10 mg  10 mg Oral QHS    carvedilol (COREG) tablet 25 mg  25 mg Oral BID WITH MEALS    pregabalin (LYRICA) capsule 75 mg  75 mg Oral BID    aspirin chewable tablet 81 mg  81 mg Oral DAILY    arformoterol (BROVANA) neb solution 15 mcg  15 mcg Nebulization BID RT    budesonide (PULMICORT) 500 mcg/2 ml nebulizer suspension  500 mcg Nebulization BID RT    oxyCODONE IR (ROXICODONE) tablet 10 mg  10 mg Oral Q4H PRN    albuterol-ipratropium (DUO-NEB) 2.5 MG-0.5 MG/3 ML  3 mL Nebulization Q4H PRN    ondansetron (ZOFRAN) injection 4 mg  4 mg IntraVENous Q6H PRN    traZODone (DESYREL) tablet 50 mg  50 mg Oral QHS    lisinopril (PRINIVIL, ZESTRIL) tablet 5 mg  5 mg Oral DAILY    furosemide (LASIX) injection 40 mg  40 mg IntraVENous Q12H    acetaminophen (TYLENOL) tablet 650 mg  650 mg Oral Q4H PRN    famotidine (PEPCID) tablet 20 mg  20 mg Oral BID    cefTRIAXone (ROCEPHIN) 1 g in 0.9% sodium chloride (MBP/ADV) 50 mL MBP  1 g IntraVENous Q24H    insulin lispro (HUMALOG) injection   SubCUTAneous AC&HS    glucose chewable tablet 16 g  4 Tab Oral PRN    glucagon (GLUCAGEN) injection 1 mg  1 mg IntraMUSCular PRN    dextrose (D50W) injection syrg 12.5-25 g  25-50 mL IntraVENous PRN    warfarin pharmacy to dose   Other PRN        LAB AND IMAGING FINDINGS:     Lab Results   Component Value Date/Time    WBC 6.7 02/27/2017 06:12 AM    HGB 11.8 02/27/2017 06:12 AM    PLATELET 576 86/22/0531 06:12 AM     Lab Results   Component Value Date/Time    Sodium 141 02/27/2017 06:12 AM    Potassium 4.9 02/27/2017 06:12 AM    Chloride 99 02/27/2017 06:12 AM    CO2 37 02/27/2017 06:12 AM    BUN 53 02/27/2017 06:12 AM    Creatinine 1.73 02/27/2017 06:12 AM    Calcium 8.5 02/27/2017 06:12 AM    Magnesium 2.1 02/27/2017 06:12 AM    Phosphorus 4.2 02/26/2017 06:35 AM      Lab Results   Component Value Date/Time    AST (SGOT) 13 02/25/2017 04:21 PM    Alk.  phosphatase 147 02/25/2017 04:21 PM    Protein, total 7.0 02/25/2017 04:21 PM    Albumin 3.3 02/25/2017 04:21 PM    Globulin 3.7 02/25/2017 04:21 PM     Lab Results   Component Value Date/Time    INR 2.3 02/27/2017 06:12 AM    Prothrombin time 24.2 02/27/2017 06:12 AM    aPTT 29.2 01/30/2017 08:00 PM      No results found for: IRON, FE, TIBC, IBCT, PSAT, FERR   No results found for: PH, PCO2, PO2  No components found for: [place]   Lab Results   Component Value Date/Time    CK 34 02/27/2017 06:00 AM    CK - MB <1.0 02/27/2017 06:00 AM              Total time: 70  Counseling / coordination time: 60  > 50% counseling / coordination?: hank Fofana Mai MS, FNP-BC, [facility] Palliative Medicine Nurse Practitioner

## 2017-02-27 NOTE — PROGRESS NOTES
56 Notified Dr Matias Mata of BP of 97/72. Pt asymptomatic. Ordered to decrease IV lasix to 20mg IV for the evening dose. [id number] Shift Summary: Hypotension monitored overnight and pt remained asymptomatic. This AM notified Dr Joseph Gould during rounds of low BP and HR ranging in 40's to 50's overnight. Ordered to hold all hypertensive medications this AM. Information passed onto the oncoming nurse [facility] RN.

## 2017-02-27 NOTE — PROGRESS NOTES
Hospitalist Progress Note    Patient: Princess Roman MRN: [id number]  [facility]: [id number]    YOB: 1945  Age: 66 y.o. Sex: female    DOA: 2/25/2017 LOS:  LOS: 1 day            Assessment/Plan   1. Acute on chronic hypoxemic/ hypercarbic respiratory failure due w pulmonary edema in setting of moderate pulmonary hypertension and aortic stenosis  2. Hypotension in setting of diuresis  3. IMANI  4. Acute diastolic CHF  5. UTI  6. Afib  7. DM2  8. Morbid obesity  9. DM2  10. debility  11.  Hypoalbuminemia    Plan:  - hold AM antihypertensives  - hold metolazone and give lasix if able later this morning  -  Continue basal insulin at current dose, monitor now steroids have been discontinued  - PT/OT   - consult palliative care   - full code, on warfarin which pharmacy is dosing      Patient Active Problem List   Diagnosis Code    Acute on chronic diastolic CHF (congestive heart failure) ([facility]) I50.33    ALEJANDRINA (acute kidney injury) ([facility] N17.9    Acute exacerbation of CHF (congestive heart failure) ([facility]) I50.9    DM [facility]diabetes mellitus) [facility] E11.9    Hypertension I10    Respiratory failure ([facility]) J96.90    Acute coronary syndrome ([facility]) I24.9    Obstructive sleep apnea, adult G47.33    Paroxysmal atrial fibrillation ([facility]) I48.0    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 ([facility]) I60.14, E11.65    Dyspnea due to congestive heart failure ([facility]) I50.9    Aspiration pneumonia ([facility]) J69.0    Hyperkalemia E87.5    Elevated troponin R79.89    COPD (chronic obstructive pulmonary disease) ([facility]) J44.9    CHF (congestive heart failure) ([facility]) I50.9    Insufficiency, respiratory, acute ([facility]) R06.89    Infiltrate noted on imaging study R93.8    COPD exacerbation ([facility]) J44.1    Acute on chronic respiratory failure with hypoxia ([facility]) J96.21    Chest pain R07.9    Anxiety F41.9    UTI (urinary tract infection) N39.0               Subjective:    cc: shortness of breath  Pt w dyspnea this AM, tolerated BIPAP overnight  Denies chest pain, palpitations, nasuea or vomiting  + anxious, asks for stronger anxiolytic    REVIEW OF SYSTEMS:  General: No fevers or chills. Cardiovascular: No chest pain or pressure. No palpitations. Pulmonary: + shortness of breath. Gastrointestinal: No nausea, vomiting. Objective:        Vital signs/Intake and Output:  Visit Vitals    /43 (BP 1 Location: Left arm, BP Patient Position: At rest;Supine; Head of bed elevated (Comment degrees))    Pulse 89    Temp 98.9 °F (37.2 °C)    Resp 20    Wt 116.4 kg (256 lb 9.6 oz)    SpO2 100%    Breastfeeding No    BMI 42.7 kg/m2     Current Shift:     Last three shifts:  02/25 1901 - 02/27 0700  In: 390 [P.O.:290; I.V.:100]  Out: 6756 [Urine:3475]    Body mass index is 42.7 kg/(m^2).     Physical Exam:  GEN: Alert and oriented times three NAD, obese  EYES: conjunctiva normal, lids with out lesions  HEENT: MMM, No thyromegaly, no lymphadenopathy  HEART: irreg irreg +S1 +S2, no JVD, pulses 2+ distally  LUNGS:  +rales - rhonchi, equal expansion  ABDOMEN: + BS, soft NT/ND no organomegaly,  No rebound  EXTREMITIES: ++ edema cyanosis, cap refill normal   SKIN: no rashes or skin breakdown, no nodules, normal turgor  Current Facility-Administered Medications   Medication Dose Route Frequency    clonazePAM (KlonoPIN) tablet 0.25 mg  0.25 mg Oral BID    dilTIAZem (CARDIZEM) IR tablet 60 mg  60 mg Oral TIDAC    docusate sodium (COLACE) capsule 100 mg  100 mg Oral BID    insulin glargine (LANTUS) injection 25 Units  25 Units SubCUTAneous QHS    simvastatin (ZOCOR) tablet 10 mg  10 mg Oral QHS    carvedilol (COREG) tablet 25 mg  25 mg Oral BID WITH MEALS    pregabalin (LYRICA) capsule 75 mg  75 mg Oral BID    aspirin chewable tablet 81 mg  81 mg Oral DAILY    arformoterol (BROVANA) neb solution 15 mcg  15 mcg Nebulization BID RT    budesonide (PULMICORT) 500 mcg/2 ml nebulizer suspension  500 mcg Nebulization BID RT    oxyCODONE IR (ROXICODONE) tablet 10 mg  10 mg Oral Q4H PRN    albuterol-ipratropium (DUO-NEB) 2.5 MG-0.5 MG/3 ML  3 mL Nebulization Q4H PRN    metOLazone (ZAROXOLYN) tablet 5 mg  5 mg Oral DAILY    ondansetron (ZOFRAN) injection 4 mg  4 mg IntraVENous Q6H PRN    traZODone (DESYREL) tablet 50 mg  50 mg Oral QHS    lisinopril (PRINIVIL, ZESTRIL) tablet 5 mg  5 mg Oral DAILY    furosemide (LASIX) injection 40 mg  40 mg IntraVENous Q12H    acetaminophen (TYLENOL) tablet 650 mg  650 mg Oral Q4H PRN    famotidine (PEPCID) tablet 20 mg  20 mg Oral BID    cefTRIAXone (ROCEPHIN) 1 g in 0.9% sodium chloride (MBP/ADV) 50 mL MBP  1 g IntraVENous Q24H    insulin lispro (HUMALOG) injection   SubCUTAneous AC&HS    glucose chewable tablet 16 g  4 Tab Oral PRN    glucagon (GLUCAGEN) injection 1 mg  1 mg IntraMUSCular PRN    dextrose (D50W) injection syrg 12.5-25 g  25-50 mL IntraVENous PRN    warfarin pharmacy to dose   Other PRN         All the patient's labs over the past 24 hours were reviewed both during my initial daily workflow process and at the time notated as \"note time\" in [address] [facility]. (It is not time stamped separately in this workflow.)  Select labs are listed below.         Labs: Results:       Chemistry Recent Labs      02/27/17 0612 02/26/17 0635  02/25/17   1621   GLU  265*  113*  114*   NA  141  143  142   K  4.9  4.1  4.6   CL  99*  101  100   CO2  37*  36*  37*   BUN  53*  37*  43*   CREA  1.73*  1.14  1.40*   CA  8.5  8.6  8.6   AGAP  5  6  5   BUCR  31*  32*  31*   AP   --    --   147*   TP   --    --   7.0   ALB   --    --   3.3*   GLOB   --    --   3.7   AGRAT   --    --   0.9      CBC w/Diff Recent Labs      02/27/17 0612 02/26/17   0635  02/25/17   1621   WBC  6.7  4.6  5.7   RBC  3.57*  3.61*  3.82*   HGB  11.8*  11.6*  12.2   HCT  36.8  37.1  39.9   PLT  179  153  164   GRANS   --    --   81*   LYMPH   --    --   2*   EOS   --    --   2      Cardiac Enzymes Recent Labs      02/27/17   0600  02/26/17   2150   CPK  34  20*   CKND1  Cannot be calulated  Cannot be calulated      Coagulation Recent Labs      02/27/17   0612  02/26/17   0635   PTP  24.2*  23.3*   INR  2.3*  2.2*               Liver Enzymes Recent Labs      02/25/17   1621   TP  7.0   ALB  3.3*   AP  147*   SGOT  13*      Thyroid Studies Lab Results   Component Value Date/Time    TSH 0.31 01/01/2016 10:20 PM        Procedures/imaging: see electronic medical records for all procedures/Xrays and details which were not copied into this note but were reviewed prior to creation of [address] [place] [address] Rd, DO  Internal Medicine/Geriatrics

## 2017-02-27 NOTE — PROGRESS NOTES
1800- Pt c/o of difficulty breathing respiratory called and came up to assess the and place patient on Bipap 70% sat 95%. Dr. Elaine Marshall called and updated on patient status. Orders received for Lasix 40mg IV onetime dose and SAT chest X-ray. Orders read back and carried out. Family at bedside. 1930- Pt is doing better O2 at 97% on Bipap Respiratory call to come to reassess the patient to see if she can return to high flow. No complications at this time.

## 2017-02-27 NOTE — PROGRESS NOTES
Problem: Mobility Impaired (Adult and Pediatric)  Goal: *Acute Goals and Plan of Care (Insert Text)  Physical Therapy Goals  Initiated 2/27/2017 and to be accomplished within 7 day(s)  1. Patient will move from supine to sit and sit to supine , scoot up and down and roll side to side in bed with supervision/set-up. 2. Patient will transfer from bed to chair and chair to bed with minimal assistance/contact guard assist using the least restrictive device. 3. Patient will perform sit to stand with minimal assistance/contact guard assist.  4. Patient will ambulate with minimal assistance/contact guard assist for 50 feet with the least restrictive device. PHYSICAL THERAPY EVALUATION     Patient: King Vito (31 y.o. female)  Date: 2/27/2017  Primary Diagnosis: CHF NYHA class IV, acute, diastolic (HCC)  CHF NYHA class IV, acute, diastolic (HCC)     Precautions:   Fall      ASSESSMENT :  Based on the objective data below, patient presents to PT with functional mobility impairments with regard to bed mobility, transfers, gait, strength, balance,  and activity tolerance. Patient supine in bed on high flow O2 and agreeable to participate with PT and OT. Supine to sit with SBA and extra time. Pt sat on EOB without LOB, unable to stand from low bed position, even with assist but with bed raised required only Min a X 2. Pt took 3-4 steps forward and back and sidestepped toward [facility] with Min A/CGA X 2. Pt fatigued and requested to sit on EOB. SAT's remained above 90% throughout session. Pt requested to remain sitting on EOB. Pt left with needs in reach, nurse Eulalio aware. Recommend SNF at discharge. Patient will benefit from skilled intervention to address the above impairments.   Patients rehabilitation potential is considered to be Fair  Factors which may influence rehabilitation potential include:   [ ]         None noted  [ ]         Mental ability/status  [X]         Medical condition  [ ]         Home/family situation and support systems  [ ]         Safety awareness  [ ]         Pain tolerance/management  [ ]         Other:        PLAN :  Recommendations and Planned Interventions:  [X]           Bed Mobility Training             [ ]    Neuromuscular Re-Education  [X]           Transfer Training                   [ ]    Orthotic/Prosthetic Training  [X]           Gait Training                          [ ]    Modalities  [X]           Therapeutic Exercises          [ ]    Edema Management/Control  [X]           Therapeutic Activities            [X]    Patient and Family Training/Education  [ ]           Other (comment):     Frequency/Duration: Patient will be followed by physical therapy daily to address goals. Discharge Recommendations: Andrés Nelson  Further Equipment Recommendations for Discharge: N/A       SUBJECTIVE:   Patient stated Am I doing OK? Shannon Moreno      OBJECTIVE DATA SUMMARY:       Past Medical History:   Diagnosis Date    A[facility]fib [facility]      Arthritis      Back injury      Chronic obstructive pulmonary disease ([facility]      Diabetes ([facility]      Fibromyalgia      Heart failure ([facility]      Hypertension      MRSA (methicillin resistant Staphylococcus aureus)       Past Surgical History:   Procedure Laterality Date    CARDIAC SURG PROCEDURE UNLIST        HX CORONARY STENT PLACEMENT        HX KNEE REPLACEMENT        HX ORTHOPAEDIC         bilateral knee replacement     Barriers to Learning/Limitations: None  Compensate with: N/A  Prior Level of Function/Home Situation: Patient came form SNF and was beginning to work on ambulation with RW, per pt.   Home Situation  Home Environment: Private residence  # Steps to Enter: 1  Rails to Enter: Yes  Hand Rails : Left  One/Two Story Residence: One story  Living Alone: No  Support Systems: Spouse/Significant Other/Partner  Patient Expects to be Discharged to[de-identified] Rehabilitation facility  Current DME Used/Available at Home: Walker, rolling, Tauna Carrington, straight, Commode, bedside, Grab bars  Tub or Shower Type: Tub/Shower combination  Critical Behavior:  Neurologic State: Alert  Psychosocial  Purposeful Interaction: Yes  Pt Identified Daily Priority: Clinical issues (comment)  Caritas Process: Establish trust;Teaching/learning;Supportive expression  Caring Interventions: Reassure  Reassure: Therapeutic listening; Informing;Caring rounds  Therapeutic Modalities: Humor  Strength:    Strength: Generally decreased, functional  Tone & Sensation:   Tone: Normal  Sensation: Intact   Range Of Motion:  AROM: Generally decreased, functional  Functional Mobility:  Bed Mobility:  Supine to Sit: Stand-by asssistance; Additional time  Scooting: Stand-by asssistance; Additional time  Transfers:  Sit to Stand: Minimum assistance;Assist x2 (elevated bed)  Stand to Sit: Contact guard assistance (vc)  Balance:   Sitting: Intact  Standing: Intact; With support  Ambulation/Gait Training:  Distance (ft): 1 Feet (ft)  Assistive Device: Walker, rolling;Gait belt  Ambulation - Level of Assistance: Minimal assistance;Contact guard assistance;Assist x2 (for safety)  Base of Support: Widened  Speed/Kristin: Slow  Interventions: Safety awareness training;Verbal cues  Therapeutic Exercises:   Patient performed 10 reps seated LAQ, verbally instructed in HS, QS and hip abd/add to do when supine. Pain:  Pain Scale 1: Numeric (0 - 10)  Pain Intensity 1: 8  Pain Location 1: Leg;Back  Pain Orientation 1: Right; Lower; Left  Pain Description 1: Aching  Pain Intervention(s) 1: Medication (see MAR)  Activity Tolerance:   fair  Please refer to the flowsheet for vital signs taken during this treatment.   After treatment:   [ ] Patient left in no apparent distress sitting up in chair  [X] Patient left sitting on EOB  [ ] Patient left in no apparent distress in bed  [ ] Patient declined to be OOB at this time due to   [X] Call bell left within reach  [X] Nursing notified(Eulalio)  [ ] Caregiver present  [ ] Bed alarm activated  [ ] CPM placed on  knee with degrees of PROM      COMMUNICATION/EDUCATION:   [X]         Fall prevention education was provided and the patient/caregiver indicated understanding. [X]         Patient/family have participated as able in goal setting and plan of care. [X]         Patient/family agree to work toward stated goals and plan of care. [ ]         Patient understands intent and goals of therapy, but is neutral about his/her participation. [ ]         Patient is unable to participate in goal setting and plan of care. Thank you for this referral.  Linh Walsh, PT   Time Calculation: 19 mins      Mobility  Current  CK= 40-59%   Goal  CJ= 20-39%. The severity rating is based on the Level of Assistance required for Functional Mobility and ADLs.      Eval Complexity: History: LOW Complexity : Zero comorbidities / personal factors that will impact the outcome / POCExam:LOW Complexity : 1-2 Standardized tests and measures addressing body structure, function, activity limitation and / or participation in recreation  Presentation: LOW Complexity : Stable, uncomplicated  Clinical Decision Making:Low Complexity    Overall Complexity:LOW

## 2017-02-27 NOTE — WOUND CARE
Wound care in for assessment based on gilbert score. No change from assessment on 2/26/17. Patient states posterior skin is intact but does not wish to proceed with assessment due to SOB. Mepilex dressings to lower extremities remain intact. Discussed skin breakdown prevention strategies with the patient. Wound care will continue to follow this patient as needed during this admission.

## 2017-02-27 NOTE — ROUTINE PROCESS
Bedside and Verbal shift change report given to PRAVEEN Morris  (oncoming nurse) by Marlen Bowers RN  (offgoing nurse). Report given with SBAR, Kardex, Intake/Output and Recent Results.

## 2017-02-27 NOTE — PALLIATIVE CARE
[facility] [address]  [phone]    Date Consult Received: 02/27/17  Consult placed by:  Dr. Elaine Marshall  Reason for consult: Goals of care    Patient summary:  Karyn Nagy is a 66y.o. year old with a past history of CHF, ALEJANDRINA, DM, HTN, ACS, IMANI, P A-Fib, COPD, who was admitted on 2/25/2017 from home with a diagnosis of Acute on chronic hypoxemic/ hypercarbic respiratory failure due w pulmonary edema in setting of moderate pulmonary hypertension and aortic stenosis  2. Hypotension in setting of diuresis  3. IMANI  4. Acute diastolic CHF  5. UTI  6. Afib  7. DM2  8. Morbid obesity  9. DM2  10. debility  11. Hypoalbuminemia    She is currently in this location: 342/01.     Is this Primary Palliative Care?  __ Basic pain management   __ Initial resuscitation discussion  __ Routine advance care planning (advance directive, healthcare power of )   __ Basic questions about hospice benefit/services  __ Entering and managing comfort care orders for patients who are comfort care only  __ Following through with details of post-discharge disposition once goals are clear    IF YES,  ACTION:     Is this consultation out of scope of Palliative Medicine?   __ Chronic nonmalignant pain with no serious/life-limiting illness   __ Assessment and management of a substance-abuse disorder    IF YES, ACTION:    Is this a specialty Palliative Medicine Consultation?  __ Advanced pain management  in patients with advanced illness  X Communication about prognosis in advanced illness including care options  __ Complex resuscitation discussions  __ ED consultations that by addressing care goals may impact location of admission  __ Family meeting in ICU patients with limited prognosis or advanced illness   __ Psychosocial and spiritual support for patients and families with distress from advanced illness    Is this consultation:  __ Emergent*  PM Team notified to see by close of business day*  *Patient experiencing intolerable escalating symptoms due to advanced illness    __ Urgent**  PM Team notified to see within 24 hours   **Emergent or Urgent is NOT based on time of discharge    X Routine  PM Team reviews medical information, introduces service, and schedules meeting time within 72 hours    ACTION/Notes:  Mrs. Aftab Maldonado is well known to PM team from previous admissions. She was reclining in bed when I entered the room. She stated she remembered me visiting her before and thanked me for visiting. She stated she was feeling much better this morning until the doctor had \"really upset me\". She then asked if I had come to talk about \"the death mess\". I don't feel up to talking about dieing, I want to live\". I explained palliative medicine and that we would like to discuss her goals of care. She stated that she understood but wanted her  present when the PM team came to speak with her. I told her I could call her  and she stated agreement. Call placed to Mr. Aftab Maldonado who stated that he could be here at [address] today. __ Advance Care Planning Flowsheet assessed and updated  No Advance Directive. She has been reluctant to complete an Advance Directive in the past. We can readdress that at meeting today and assist if she agrees to complete one. X Referring team notified of actions. Spoke with nurse, Mike Mccoy about plan.     Donny Melendez RN

## 2017-02-27 NOTE — PROGRESS NOTES
conducted an initial consultation and Spiritual Assessment for Marco Arcos, who is a 66 y.o.,female. Patients Primary Language is: [place]. According to the patients EMR Shinto Affiliation is: No Yazidism. The reason the Patient came to the hospital is:   Patient Active Problem List    Diagnosis Date Noted    UTI (urinary tract infection) 02/25/2017    Anxiety 02/04/2017    Chest pain 02/03/2017    Acute on chronic respiratory failure with hypoxia ([address] 02/01/2017    COPD exacerbation ([address] 01/31/2017    Hyperkalemia 01/30/2017    Elevated troponin 01/30/2017    COPD (chronic obstructive pulmonary disease) ([address] 01/30/2017    CHF (congestive heart failure) ([address] 01/30/2017    Insufficiency, respiratory, acute ([address] 01/30/2017    Infiltrate noted on imaging study 01/30/2017    Aspiration pneumonia ([address] 01/06/2016    Obstructive sleep apnea, adult 01/04/2016    Paroxysmal atrial fibrillation ([address] 01/04/2016    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 ([address] 72/58/3204    Dyspnea due to congestive heart failure ([address] 01/04/2016    Respiratory failure ([address] 01/01/2016    Acute coronary syndrome ([address] 01/01/2016    Hypertension 09/27/2015    DM (diabetes mellitus) ([address] 09/22/2015    ALEJANDRINA (acute kidney injury) ([address] 09/21/2015    Acute exacerbation of CHF (congestive heart failure) ([address] 09/21/2015    Acute on chronic diastolic CHF (congestive heart failure) ([address] 09/03/2014        The  provided the following Interventions:  Initiated a relationship of care and support. Explored issues of roshni, belief, spirituality and Orthodox/ritual needs while hospitalized. Listened empathically. Provided chaplaincy education. Provided information about Spiritual Care Services. Offered assurance of prayer on patient's behalf. Chart reviewed. The following outcomes where achieved:   confirmed Patient's Shinto Affiliation.   Patient processed feeling about current hospitalization. Patient expressed gratitude for 's visit. Assessment:   visited with patient and offered support, patient does not have a Adventist relationship at this time. Patient hopes to get well and go home soon.  received a referral from Palliative Medicine to see patient due to her expressed concern about dying. When  asked patient concerning her fear of dying, she stated she was not ready to talk about that.  encouraged patient that if she changed her mind and wished to discuss this or related issue please call for a . Patient does not have any Yarsanism/cultural needs that will affect patients preferences in health care. Plan:  Chaplains will continue to follow and will provide pastoral care on an as needed/requested basis.  recommends bedside caregivers page  on duty if patient shows signs of acute spiritual or emotional distress. .  Thierry Gonzales  [facility]  [phone]

## 2017-02-27 NOTE — ROUTINE PROCESS
Bedside and Verbal shift change report given to EDITH Whatley RN (oncoming nurse) by Hercules Furbish. Saint Filler RN (offgoing nurse). Report included the following information SBAR, Kardex, Intake/Output, MAR, Recent Results and Cardiac Rhythm afib.

## 2017-02-27 NOTE — PROGRESS NOTES
Problem: Self Care Deficits Care Plan (Adult)  Goal: *Acute Goals and Plan of Care (Insert Text)  Occupational Therapy Goals  Initiated 2/27/2017 within 7 day(s). 1. Patient will perform grooming with supervision/set-up 2. Patient will perform upper body dressing with supervision/set-up. 3. Patient will perform lower body dressing with minimal assistance/contact guard assist.  4. Patient will perform toilet transfers with supervision/set-up. 5. Patient will perform all aspects of toileting with supervision/set-up. 6. Patient will participate in upper extremity therapeutic exercise/activities with supervision/set-up for 10 minutes. 7. Patient will utilize energy conservation techniques during functional activities with verbal cues. Outcome: Progressing Towards Goal  OCCUPATIONAL THERAPY EVALUATION     Patient: Karen Banegas (77 y.o. female)  Date: 2/27/2017  Primary Diagnosis: CHF NYHA class IV, acute, diastolic (HCC)  CHF NYHA class IV, acute, diastolic (HCC)        Precautions:   Fall      ASSESSMENT :  Based on the objective data described below, the patient presents with decreased functional strength, decreased functional balance, decreased overall activity tolerance limiting independence with ADLs. Pt supine in bed upon entering, agreeable to therapy. Pt completed supine to sit transfer with SBA. Seated EOB, pt was able to adjust L sock. Pt completed sit to stand transfer from elevated bed with min Ax2. Pt deferred any further ADL tasks as she was going to receive a SPA treatment later. Pt left seated EOB with needs in reach. Pt educated on appropriate exercises to be completed throughout the day. Pt would benefit from continued OT services to improve safety and independence with ADL tasks/transfers. Education: Role of OT in acute care, plan of care     Patient will benefit from skilled intervention to address the above impairments.   Patients rehabilitation potential is considered to be Good  Factors which may influence rehabilitation potential include:   [ ]             None noted  [ ]             Mental ability/status  [X]             Medical condition  [ ]             Home/family situation and support systems  [ ]             Safety awareness  [ ]             Pain tolerance/management  [ ]             Other:        PLAN :  Recommendations and Planned Interventions:  [X]               Self Care Training                  [X]        Therapeutic Activities  [X]               Functional Mobility Training    [ ]        Cognitive Retraining  [X]               Therapeutic Exercises           [X]        Endurance Activities  [X]               Balance Training                   [X]        Neuromuscular Re-Education  [ ]               Visual/Perceptual Training     [X]   Home Safety Training  [X]               Patient Education                 [X]        Family Training/Education  [ ]               Other (comment):     Frequency/Duration: Patient will be followed by occupational therapy 3-5 times a week to address goals. Discharge Recommendations: Andrés Nelson  Further Equipment Recommendations for Discharge: TBD by next level of care       SUBJECTIVE:   Patient stated .      OBJECTIVE DATA SUMMARY:       Past Medical History:   Diagnosis Date    [facility]fib [facility]      Arthritis      Back injury      [facility] obstructive [facility] disease [facility]      Diabetes [facility]      Fibromyalgia      Heart failure ([facility]      Hypertension      MRSA (methicillin resistant Staphylococcus aureus)       Past Surgical History:   Procedure Laterality Date    CARDIAC SURG PROCEDURE UNLIST        HX CORONARY STENT PLACEMENT        HX KNEE REPLACEMENT        HX ORTHOPAEDIC         bilateral knee replacement     Barriers to Learning/Limitations: None  Compensate with: visual, verbal, tactile, kinesthetic cues/model  GCODES (GO)Self Care  Current  CL= 60-79%   Goal  CJ= 20-39%.   The severity rating is based on the Other modified barthel index  Prior Level of Function/Home Situation: Pt was in rehab prior to admission. Pt reports she was ambulating short distances. Pt reports receiving assist with ADLs PRN. Prior to recent admission pt was independent  Home Situation  Home Environment: Private residence  # Steps to Enter: 1  Rails to Enter: Yes  Hand Rails : Left  One/Two Story Residence: One story  Living Alone: No  Support Systems: Spouse/Significant Other/Partner  Patient Expects to be Discharged to[de-identified] Rehabilitation facility  Current DME Used/Available at Home: Walker, rolling, Cane, straight, Commode, bedside, Grab bars  Tub or Shower Type: Tub/Shower combination     Cognitive/Behavioral Status:  Neurologic State: Alert  Orientation Level: Oriented X4  Cognition: Follows commands  Safety/Judgement: Fall prevention      Coordination:  Coordination: Generally decreased, functional  Fine Motor Skills-Upper: Left Intact; Right Intact    Gross Motor Skills-Upper: Left Intact; Right Intact  Balance:  Sitting: Intact  Standing: Intact; With support  Strength:  Strength: Generally decreased, functional     Tone & Sensation:  Tone: Normal  Sensation: Intact     Range of Motion:  AROM: Generally decreased, functional  PROM: Generally decreased, functional     Functional Mobility and Transfers for ADLs:  Bed Mobility:  Supine to Sit: Stand-by asssistance; Additional time  Scooting: Stand-by asssistance; Additional time  Transfers:  Sit to Stand: Minimum assistance;Assist x2 (elevated bed)     ADL Assessment:   Lower Body Dressing: Moderate assistance     ADL Intervention:  Lower Body Dressing Assistance  Dressing Assistance: Moderate assistance  Socks: Moderate assistance     Cognitive Retraining  Safety/Judgement: Fall prevention     Therapeutic Exercise:  BUE      Pain:  Pre-treatment: 0  Post-treatment: 0  Activity Tolerance:   fair  Please refer to the flowsheet for vital signs taken during this treatment.   After treatment:   [ ] Patient left in no apparent distress sitting up in chair  [X] Patient left in no apparent distress EOB  [X] Call bell left within reach  [ ] Nursing notified  [ ] Caregiver present  [ ] Bed alarm activated      COMMUNICATION/EDUCATION:   [ ] Home safety education was provided and the patient/caregiver indicated understanding. [X] Patient/family have participated as able in goal setting and plan of care. [X] Patient/family agree to work toward stated goals and plan of care. [ ] Patient understands intent and goals of therapy, but is neutral about his/her participation. [ ] Patient is unable to participate in goal setting and plan of care.      Thank you for this referral.  Haris Guzman MS OTR/L  Time Calculation: 20 mins

## 2017-02-27 NOTE — PROGRESS NOTES
Pharmacy Dosing Services: Warfarin    Consult for Warfarin Dosing by Pharmacy by Dr. Sg Davis provided for this 66 y.o.  female , for indication of Atrial Fibrillation. Dose to achieve an INR goal of 2-3    Order entered for Warfarin 2.5 mg to be given today at 18:00. Significant drug interactions: aspirin 81 mg  LABS    PT/INR Lab Results   Component Value Date/Time    INR 2.3 02/27/2017 06:12 AM      Platelets Lab Results   Component Value Date/Time    PLATELET 358 66/17/0678 06:12 AM      H/H     Lab Results   Component Value Date/Time    HGB 11.8 02/27/2017 06:12 AM        Warfarin Administrations (last 168 hours)       Date/Time Action Medication Dose      02/26/17 1752 Given    warfarin (COUMADIN) tablet 2.5 mg 2.5 mg    02/25/17 2113 Given    warfarin (COUMADIN) tablet 2.5 mg 2.5 mg          Pharmacy to follow daily and will provide subsequent Warfarin dosing based on clinical status.   YA Price  Contact information [phone]

## 2017-02-27 NOTE — PROGRESS NOTES
02/26/17 2047   Oxygen Therapy   O2 Sat (%) 94 %   O2 Device Hi flow nasal cannula   O2 Flow Rate (L/min) 30 l/min   O2 Temperature 87.8 °F (31 °C)   FIO2 (%) 50 %  (increased for anxiety/room transfer/wheezing/sob/will titr8)   Transfer from ICU to room 342

## 2017-02-28 ENCOUNTER — APPOINTMENT (OUTPATIENT)
Dept: GENERAL RADIOLOGY | Age: 72
DRG: 286 | End: 2017-02-28
Attending: INTERNAL MEDICINE
Payer: MEDICARE

## 2017-02-28 ENCOUNTER — APPOINTMENT (OUTPATIENT)
Dept: ULTRASOUND IMAGING | Age: 72
DRG: 286 | End: 2017-02-28
Attending: INTERNAL MEDICINE
Payer: MEDICARE

## 2017-02-28 PROBLEM — E66.2 OBESITY HYPOVENTILATION SYNDROME (HCC): Status: ACTIVE | Noted: 2017-02-28

## 2017-02-28 PROBLEM — I27.20 PULMONARY HYPERTENSION, MODERATE TO SEVERE (HCC): Status: ACTIVE | Noted: 2017-02-28

## 2017-02-28 LAB
ANION GAP BLD CALC-SCNC: 4 MMOL/L (ref 3–18)
ARTERIAL PATENCY WRIST A: YES
AVAPS, IAVAPS: YES
BASE EXCESS BLD CALC-SCNC: 12 MMOL/L
BASE EXCESS BLD CALC-SCNC: 12 MMOL/L
BASE EXCESS BLD CALC-SCNC: 13 MMOL/L
BASE EXCESS BLD CALC-SCNC: 14 MMOL/L
BDY SITE: ABNORMAL
BODY TEMPERATURE: 98.6
BUN SERPL-MCNC: 60 MG/DL (ref 7–18)
BUN/CREAT SERPL: 34 (ref 12–20)
CALCIUM SERPL-MCNC: 8.2 MG/DL (ref 8.5–10.1)
CHLORIDE SERPL-SCNC: 101 MMOL/L (ref 100–108)
CK MB CFR SERPL CALC: 5.2 % (ref 0–4)
CK MB CFR SERPL CALC: 5.9 % (ref 0–4)
CK MB CFR SERPL CALC: ABNORMAL % (ref 0–4)
CK MB SERPL-MCNC: 1 NG/ML (ref 5–25)
CK MB SERPL-MCNC: 1.4 NG/ML (ref 5–25)
CK MB SERPL-MCNC: <1 NG/ML (ref 5–25)
CK SERPL-CCNC: 14 U/L (ref 26–192)
CK SERPL-CCNC: 17 U/L (ref 26–192)
CK SERPL-CCNC: 27 U/L (ref 26–192)
CO2 SERPL-SCNC: 37 MMOL/L (ref 21–32)
CREAT SERPL-MCNC: 1.74 MG/DL (ref 0.6–1.3)
ERYTHROCYTE [DISTWIDTH] IN BLOOD BY AUTOMATED COUNT: 17.7 % (ref 11.6–14.5)
GAS FLOW.O2 O2 DELIVERY SYS: ABNORMAL L/MIN
GLUCOSE BLD STRIP.AUTO-MCNC: 129 MG/DL (ref 70–110)
GLUCOSE BLD STRIP.AUTO-MCNC: 130 MG/DL (ref 70–110)
GLUCOSE BLD STRIP.AUTO-MCNC: 136 MG/DL (ref 70–110)
GLUCOSE BLD STRIP.AUTO-MCNC: 170 MG/DL (ref 70–110)
GLUCOSE SERPL-MCNC: 142 MG/DL (ref 74–99)
HCO3 BLD-SCNC: 38.4 MMOL/L (ref 22–26)
HCO3 BLD-SCNC: 39.4 MMOL/L (ref 22–26)
HCO3 BLD-SCNC: 40.4 MMOL/L (ref 22–26)
HCO3 BLD-SCNC: 41.6 MMOL/L (ref 22–26)
HCT VFR BLD AUTO: 39.6 % (ref 35–45)
HGB BLD-MCNC: 11.7 G/DL (ref 12–16)
INR PPP: 1.6 (ref 0.8–1.2)
MAGNESIUM SERPL-MCNC: 2.2 MG/DL (ref 1.8–2.4)
MCH RBC QN AUTO: 31.9 PG (ref 24–34)
MCHC RBC AUTO-ENTMCNC: 29.5 G/DL (ref 31–37)
MCV RBC AUTO: 107.9 FL (ref 74–97)
O2/TOTAL GAS SETTING VFR VENT: 0.6 %
O2/TOTAL GAS SETTING VFR VENT: 0.6 %
O2/TOTAL GAS SETTING VFR VENT: 50 %
O2/TOTAL GAS SETTING VFR VENT: 70 %
PCO2 BLD: 106.2 MMHG (ref 35–45)
PCO2 BLD: 78.5 MMHG (ref 35–45)
PCO2 BLD: 93.8 MMHG (ref 35–45)
PCO2 BLD: 94.5 MMHG (ref 35–45)
PEEP RESPIRATORY: 5 CMH2O
PEEP RESPIRATORY: 6 CMH2O
PEEP RESPIRATORY: 6 CMH2O
PEEP RESPIRATORY: 8 CMH2O
PH BLD: 7.2 [PH] (ref 7.35–7.45)
PH BLD: 7.23 [PH] (ref 7.35–7.45)
PH BLD: 7.24 [PH] (ref 7.35–7.45)
PH BLD: 7.3 [PH] (ref 7.35–7.45)
PIP ISTAT,IPIP: 15
PLATELET # BLD AUTO: 172 K/UL (ref 135–420)
PMV BLD AUTO: 10.4 FL (ref 9.2–11.8)
PO2 BLD: 61 MMHG (ref 80–100)
PO2 BLD: 69 MMHG (ref 80–100)
PO2 BLD: 82 MMHG (ref 80–100)
PO2 BLD: 91 MMHG (ref 80–100)
POTASSIUM SERPL-SCNC: 5 MMOL/L (ref 3.5–5.5)
PRESSURE SUPPORT SETTING VENT: 9 CMH2O
PROTHROMBIN TIME: 18.5 SEC (ref 11.5–15.2)
RBC # BLD AUTO: 3.67 M/UL (ref 4.2–5.3)
SAO2 % BLD: 84 % (ref 92–97)
SAO2 % BLD: 90 % (ref 92–97)
SAO2 % BLD: 92 % (ref 92–97)
SAO2 % BLD: 94 % (ref 92–97)
SERVICE CMNT-IMP: ABNORMAL
SODIUM SERPL-SCNC: 142 MMOL/L (ref 136–145)
SPECIMEN TYPE: ABNORMAL
TOTAL RESP. RATE, ITRR: 19
TOTAL RESP. RATE, ITRR: 22
TOTAL RESP. RATE, ITRR: 23
TOTAL RESP. RATE, ITRR: 25
TROPONIN I SERPL-MCNC: 0.45 NG/ML (ref 0–0.06)
TROPONIN I SERPL-MCNC: 0.5 NG/ML (ref 0–0.06)
TROPONIN I SERPL-MCNC: 0.53 NG/ML (ref 0–0.06)
WBC # BLD AUTO: 6.4 K/UL (ref 4.6–13.2)

## 2017-02-28 PROCEDURE — 74011250637 HC RX REV CODE- 250/637: Performed by: INTERNAL MEDICINE

## 2017-02-28 PROCEDURE — 36600 WITHDRAWAL OF ARTERIAL BLOOD: CPT

## 2017-02-28 PROCEDURE — 74011250636 HC RX REV CODE- 250/636: Performed by: INTERNAL MEDICINE

## 2017-02-28 PROCEDURE — 65610000006 HC RM INTENSIVE CARE

## 2017-02-28 PROCEDURE — C9113 INJ PANTOPRAZOLE SODIUM, VIA: HCPCS | Performed by: INTERNAL MEDICINE

## 2017-02-28 PROCEDURE — 74011000250 HC RX REV CODE- 250: Performed by: INTERNAL MEDICINE

## 2017-02-28 PROCEDURE — 83735 ASSAY OF MAGNESIUM: CPT | Performed by: INTERNAL MEDICINE

## 2017-02-28 PROCEDURE — 85027 COMPLETE CBC AUTOMATED: CPT | Performed by: INTERNAL MEDICINE

## 2017-02-28 PROCEDURE — 82962 GLUCOSE BLOOD TEST: CPT

## 2017-02-28 PROCEDURE — 94660 CPAP INITIATION&MGMT: CPT

## 2017-02-28 PROCEDURE — 80048 BASIC METABOLIC PNL TOTAL CA: CPT | Performed by: INTERNAL MEDICINE

## 2017-02-28 PROCEDURE — 77010033711 HC HIGH FLOW OXYGEN

## 2017-02-28 PROCEDURE — 32555 ASPIRATE PLEURA W/ IMAGING: CPT

## 2017-02-28 PROCEDURE — 74011636637 HC RX REV CODE- 636/637: Performed by: INTERNAL MEDICINE

## 2017-02-28 PROCEDURE — 85610 PROTHROMBIN TIME: CPT | Performed by: INTERNAL MEDICINE

## 2017-02-28 PROCEDURE — 74011000258 HC RX REV CODE- 258: Performed by: INTERNAL MEDICINE

## 2017-02-28 PROCEDURE — 71010 XR CHEST PORT: CPT

## 2017-02-28 PROCEDURE — 76604 US EXAM CHEST: CPT

## 2017-02-28 PROCEDURE — 94640 AIRWAY INHALATION TREATMENT: CPT

## 2017-02-28 PROCEDURE — 36415 COLL VENOUS BLD VENIPUNCTURE: CPT | Performed by: INTERNAL MEDICINE

## 2017-02-28 PROCEDURE — 36591 DRAW BLOOD OFF VENOUS DEVICE: CPT

## 2017-02-28 PROCEDURE — 82550 ASSAY OF CK (CPK): CPT | Performed by: INTERNAL MEDICINE

## 2017-02-28 PROCEDURE — 05HN33Z INSERTION OF INFUSION DEVICE INTO LEFT INTERNAL JUGULAR VEIN, PERCUTANEOUS APPROACH: ICD-10-PCS | Performed by: INTERNAL MEDICINE

## 2017-02-28 RX ORDER — DOBUTAMINE HYDROCHLORIDE 200 MG/100ML
1 INJECTION INTRAVENOUS
Status: DISCONTINUED | OUTPATIENT
Start: 2017-02-28 | End: 2017-02-28

## 2017-02-28 RX ORDER — METOPROLOL TARTRATE 25 MG/1
12.5 TABLET, FILM COATED ORAL EVERY 6 HOURS
Status: DISCONTINUED | OUTPATIENT
Start: 2017-02-28 | End: 2017-03-01

## 2017-02-28 RX ORDER — HYDROCORTISONE SODIUM SUCCINATE 100 MG/2ML
25 INJECTION, POWDER, FOR SOLUTION INTRAMUSCULAR; INTRAVENOUS EVERY 6 HOURS
Status: DISCONTINUED | OUTPATIENT
Start: 2017-02-28 | End: 2017-03-01

## 2017-02-28 RX ORDER — NYSTATIN 100000 [USP'U]/G
POWDER TOPICAL 2 TIMES DAILY
Status: DISCONTINUED | OUTPATIENT
Start: 2017-02-28 | End: 2017-03-24 | Stop reason: HOSPADM

## 2017-02-28 RX ORDER — MORPHINE SULFATE 2 MG/ML
1 INJECTION, SOLUTION INTRAMUSCULAR; INTRAVENOUS
Status: COMPLETED | OUTPATIENT
Start: 2017-02-28 | End: 2017-03-01

## 2017-02-28 RX ORDER — HYDROCORTISONE SODIUM SUCCINATE 100 MG/2ML
50 INJECTION, POWDER, FOR SOLUTION INTRAMUSCULAR; INTRAVENOUS ONCE
Status: COMPLETED | OUTPATIENT
Start: 2017-02-28 | End: 2017-02-28

## 2017-02-28 RX ORDER — NOREPINEPHRINE BITARTRATE/D5W 8 MG/250ML
2-16 PLASTIC BAG, INJECTION (ML) INTRAVENOUS
Status: DISCONTINUED | OUTPATIENT
Start: 2017-02-28 | End: 2017-03-01

## 2017-02-28 RX ORDER — MIDODRINE HYDROCHLORIDE 2.5 MG/1
10 TABLET ORAL EVERY 8 HOURS
Status: DISCONTINUED | OUTPATIENT
Start: 2017-02-28 | End: 2017-02-28

## 2017-02-28 RX ORDER — WARFARIN 4 MG/1
4 TABLET ORAL ONCE
Status: COMPLETED | OUTPATIENT
Start: 2017-02-28 | End: 2017-02-28

## 2017-02-28 RX ORDER — WARFARIN 2 MG/1
4 TABLET ORAL ONCE
Status: DISCONTINUED | OUTPATIENT
Start: 2017-02-28 | End: 2017-02-28 | Stop reason: SDUPTHER

## 2017-02-28 RX ORDER — IPRATROPIUM BROMIDE 0.5 MG/2.5ML
0.5 SOLUTION RESPIRATORY (INHALATION)
Status: DISCONTINUED | OUTPATIENT
Start: 2017-02-28 | End: 2017-03-04

## 2017-02-28 RX ORDER — DIGOXIN 0.25 MG/ML
125 INJECTION INTRAMUSCULAR; INTRAVENOUS DAILY
Status: DISCONTINUED | OUTPATIENT
Start: 2017-03-01 | End: 2017-03-02

## 2017-02-28 RX ORDER — DIGOXIN 0.25 MG/ML
250 INJECTION INTRAMUSCULAR; INTRAVENOUS
Status: DISCONTINUED | OUTPATIENT
Start: 2017-02-28 | End: 2017-02-28 | Stop reason: CLARIF

## 2017-02-28 RX ORDER — DIGOXIN 0.25 MG/ML
250 INJECTION INTRAMUSCULAR; INTRAVENOUS
Status: COMPLETED | OUTPATIENT
Start: 2017-02-28 | End: 2017-02-28

## 2017-02-28 RX ADMIN — MEROPENEM 1 G: 1 INJECTION, POWDER, FOR SOLUTION INTRAVENOUS at 12:10

## 2017-02-28 RX ADMIN — IPRATROPIUM BROMIDE 0.5 MG: 0.5 SOLUTION RESPIRATORY (INHALATION) at 15:29

## 2017-02-28 RX ADMIN — Medication 7 MCG/MIN: at 18:02

## 2017-02-28 RX ADMIN — IPRATROPIUM BROMIDE 0.5 MG: 0.5 SOLUTION RESPIRATORY (INHALATION) at 21:25

## 2017-02-28 RX ADMIN — SODIUM CHLORIDE 500 ML: 900 INJECTION, SOLUTION INTRAVENOUS at 02:16

## 2017-02-28 RX ADMIN — INSULIN LISPRO 3 UNITS: 100 INJECTION, SOLUTION INTRAVENOUS; SUBCUTANEOUS at 06:41

## 2017-02-28 RX ADMIN — IPRATROPIUM BROMIDE 0.5 MG: 0.5 SOLUTION RESPIRATORY (INHALATION) at 11:19

## 2017-02-28 RX ADMIN — ACETAMINOPHEN 650 MG: 325 TABLET ORAL at 21:44

## 2017-02-28 RX ADMIN — NYSTATIN: 100000 POWDER TOPICAL at 10:51

## 2017-02-28 RX ADMIN — BUDESONIDE 500 MCG: 0.5 INHALANT RESPIRATORY (INHALATION) at 21:24

## 2017-02-28 RX ADMIN — ARFORMOTEROL TARTRATE 15 MCG: 15 SOLUTION RESPIRATORY (INHALATION) at 21:25

## 2017-02-28 RX ADMIN — HYDROCORTISONE SODIUM SUCCINATE 25 MG: 100 INJECTION, POWDER, FOR SOLUTION INTRAMUSCULAR; INTRAVENOUS at 19:39

## 2017-02-28 RX ADMIN — DIGOXIN 250 MCG: 0.25 INJECTION INTRAMUSCULAR; INTRAVENOUS at 09:59

## 2017-02-28 RX ADMIN — SODIUM CHLORIDE 40 MG: 9 INJECTION INTRAMUSCULAR; INTRAVENOUS; SUBCUTANEOUS at 12:10

## 2017-02-28 RX ADMIN — HYDROCORTISONE SODIUM SUCCINATE 25 MG: 100 INJECTION, POWDER, FOR SOLUTION INTRAMUSCULAR; INTRAVENOUS at 16:14

## 2017-02-28 RX ADMIN — HYDROCORTISONE SODIUM SUCCINATE 50 MG: 100 INJECTION, POWDER, FOR SOLUTION INTRAMUSCULAR; INTRAVENOUS at 08:00

## 2017-02-28 RX ADMIN — BUDESONIDE 500 MCG: 0.5 INHALANT RESPIRATORY (INHALATION) at 07:16

## 2017-02-28 RX ADMIN — WARFARIN SODIUM 4 MG: 4 TABLET ORAL at 21:44

## 2017-02-28 RX ADMIN — MIDODRINE HYDROCHLORIDE 10 MG: 2.5 TABLET ORAL at 05:00

## 2017-02-28 RX ADMIN — SIMVASTATIN 10 MG: 20 TABLET, FILM COATED ORAL at 21:45

## 2017-02-28 RX ADMIN — FUROSEMIDE 40 MG: 10 INJECTION, SOLUTION INTRAMUSCULAR; INTRAVENOUS at 09:41

## 2017-02-28 RX ADMIN — HYDROCORTISONE SODIUM SUCCINATE 25 MG: 100 INJECTION, POWDER, FOR SOLUTION INTRAMUSCULAR; INTRAVENOUS at 23:40

## 2017-02-28 RX ADMIN — MEROPENEM 1 G: 1 INJECTION, POWDER, FOR SOLUTION INTRAVENOUS at 23:40

## 2017-02-28 RX ADMIN — ACETAMINOPHEN 650 MG: 325 TABLET ORAL at 16:32

## 2017-02-28 RX ADMIN — FUROSEMIDE 40 MG: 10 INJECTION, SOLUTION INTRAMUSCULAR; INTRAVENOUS at 21:43

## 2017-02-28 RX ADMIN — Medication 2 MCG/MIN: at 04:22

## 2017-02-28 RX ADMIN — ARFORMOTEROL TARTRATE 15 MCG: 15 SOLUTION RESPIRATORY (INHALATION) at 07:16

## 2017-02-28 RX ADMIN — INSULIN GLARGINE 25 UNITS: 100 INJECTION, SOLUTION SUBCUTANEOUS at 21:43

## 2017-02-28 RX ADMIN — NYSTATIN: 100000 POWDER TOPICAL at 21:54

## 2017-02-28 RX ADMIN — CLONAZEPAM 0.25 MG: 0.5 TABLET ORAL at 21:45

## 2017-02-28 NOTE — PROCEDURES
Kassidy [facility] Pulmonary Specialist  Central Line Procedure Note    Indication: Need for vasopressors      Risks, benefits, alternatives explained and consent obtained. Patient positioned in Trendelenburg. Central line Bundle:    Hand hygiene performed. Full sterile barrier precautions used. 7-Step Sterility Protocol followed. (cap, mask sterile gown, sterile gloves, large sterile sheet, hand hygiene, 2% chlorhexidine for cutaneous antisepsis)  5 mL 1% Lidocaine placed at insertion site. Using ultrasound guidance,   Right internal jugular cannulated x 1 attempt(s) utilizing the modified Seldinger technique. No difficulty. Wire advanced and confirmed to be in IJV on U/S  Good blood return. Catheter secured & Biopatch applied. Sterile Tegaderm placed. Yes  CXR pending.       .me  8:37 AM

## 2017-02-28 NOTE — PROGRESS NOTES
Pharmacist Renal Dosing Progress Note for meropenem    The following medication: Meropenem was automatically dose-adjusted per THE [facility] P&T Committee Protocol, with respect to renal function. Consult provided for this   66 y.o. , female , for the indication of UTI  Dose adjusted to: Meropenem 1 gram IV q12h    Pt Weight:   Wt Readings from Last 1 Encounters:   02/28/17 115.4 kg (254 lb 6.6 oz)     Previous Regimen   Meropenem 1 gram IV q8h   Serum Creatinine Lab Results   Component Value Date/Time    Creatinine 1.74 02/28/2017 05:10 AM       Creatinine Clearance Estimated Creatinine Clearance: 37.1 mL/min (based on Cr of 1.74). BUN Lab Results   Component Value Date/Time    BUN 60 02/28/2017 05:10 AM           Pharmacy to continue to monitor patient daily. Will make dosage adjustments based upon changing renal function.   Signed Arcelia Lundborg, [address] information: [phone]

## 2017-02-28 NOTE — PROGRESS NOTES
Hospitalist Progress Note    Patient: Gahssan Washington MRN: [id number]  CSN: [id number]    YOB: 1945  Age: 66 y.o. Sex: female    DOA: 2/25/2017 LOS:  LOS: 2 days          Chief Complaint: resp fx. Assessment/Plan     Acute hypoxemic hypercarbic respiratory failure. CHF diastolic acute on admission. Pulmonary HTN moderate  TnI elevation chronic. Afib chronic on Warfarin  UTI  IMANI/Obesity Hypoventilation syndrome. Possible Asthma. Decreased FEV1. DM2. HTN. Anxiety. Insomnia. Echo 2/1/17 - EF 55-60. RVCP 55mmhg. Pulm art pressure 52mmhg.      67 y/o female admitted with respiratory failure from diastolic chf, imani, possible obesity and hypoventilation syndrome. Required bipap on admission. Was off bipap and transferred to floor. Last night transferred back to the icu for respiratory failure and hypotension. Bipap reinitiated. Discussed case with Pulm CC. Discussed asking Cardiology to see the patient given her difficult to control diastolic chf.    -Continue diuresis.  -Possible thoracentesis per Pulm CC.  -Cardiology to see for diastolic chf. Please note she has had >10 admissions in the past year for similar decompensations. -DM surveillance and mgmt.  -Continue abx for GNR urine. -DVT and GI px.         Patient Active Problem List   Diagnosis Code    Acute on chronic diastolic CHF (congestive heart failure) (HCC) I50.33    ALEJANDRINA (acute kidney injury) ([facility] N17.9    Acute exacerbation of CHF (congestive heart failure) ([facility]) I50.9    DM (diabetes mellitus) ([facility] E11.9    Hypertension I10    Respiratory failure (HCC) J96.90    Acute coronary syndrome (HCC) I24.9    Obstructive sleep apnea, adult G47.33    Paroxysmal atrial fibrillation (HCC) I48.0    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 (HCC) F59.55, E11.65    Dyspnea due to congestive heart failure (HCC) I50.9    Aspiration pneumonia (HCC) J69.0    Hyperkalemia E87.5    Elevated troponin R79.89    COPD (chronic obstructive pulmonary disease) ([facility]) J44.9    CHF (congestive heart failure) ([facility]) I50.9    Insufficiency, respiratory, acute ([facility]) R06.89    Infiltrate noted on imaging study R93.8    COPD exacerbation ([facility]) J44.1    Acute on chronic respiratory failure with hypoxia ([facility]) J96.21    Chest pain R07.9    Anxiety F41.9    UTI (urinary tract infection) N39.0       Subjective:    Seen and examined. Case discussed with [address]. Review of systems:    Constitutional: denies fevers, chills, myalgias  Respiratory: denies SOB, cough  Cardiovascular: denies chest pain, palpitations  Gastrointestinal: denies nausea, vomiting, diarrhea      Vital signs/Intake and Output:  Visit Vitals    BP 97/53    Pulse 76    Temp 97.9 °F (36.6 °C)    Resp 13    Wt 115.4 kg (254 lb 6.6 oz)    SpO2 93%    Breastfeeding No    BMI 42.34 kg/m2     Current Shift:     Last three shifts:  02/26 1901 - 02/28 0700  In: 879.5 [P.O.:240; I.V.:639.5]  Out: 2050 [Urine:2050]    Exam:    General: nad  Head/Neck: mmm  CVS:s1s2   Lungs:Ctab/l  Abdomen: Soft, bs+y  Extremities: No edema.                   Labs: Results:       Chemistry Recent Labs      02/28/17   0510  02/27/17   0612  02/26/17   0635  02/25/17   1621   GLU  142*  265*  113*  114*   NA  142  141  143  142   K  5.0  4.9  4.1  4.6   CL  101  99*  101  100   CO2  37*  37*  36*  37*   BUN  60*  53*  37*  43*   CREA  1.74*  1.73*  1.14  1.40*   CA  8.2*  8.5  8.6  8.6   AGAP  4  5  6  5   BUCR  34*  31*  32*  31*   AP   --    --    --   147*   TP   --    --    --   7.0   ALB   --    --    --   3.3*   GLOB   --    --    --   3.7   AGRAT   --    --    --   0.9      CBC w/Diff Recent Labs      02/28/17   0510  02/27/17   0612  02/26/17   0635  02/25/17   1621   WBC  6.4  6.7  4.6  5.7   RBC  3.67*  3.57*  3.61*  3.82*   HGB  11.7*  11.8*  11.6*  12.2   HCT  39.6  36.8  37.1  39.9   PLT  172  179  153  164   GRANS   --    --    --   81*   LYMPH   -- --    --   2*   EOS   --    --    --   2      Cardiac Enzymes Recent Labs      02/28/17   0510  02/27/17   0600   CPK  27  34   CKND1  5.2*  Cannot be calulated      Coagulation Recent Labs      02/28/17   0510  02/27/17   0612   PTP  18.5*  24.2*   INR  1.6*  2.3*       Lipid Panel No results found for: CHOL, CHOLPOCT, CHOLX, CHLST, CHOLV, [id number], HDL, LDL, NLDLCT, DLDL, LDLC, DLDLP, 302101, VLDLC, VLDL, TGL, TGLX, TRIGL, [id number], TRIGP, TGLPOCT, [id number], CHHD, CHHDX   BNP No results for input(s): BNPP in the last 72 hours.    Liver Enzymes Recent Labs      02/25/17   1621   TP  7.0   ALB  3.3*   AP  147*   SGOT  13*      Thyroid Studies Lab Results   Component Value Date/Time    TSH 0.31 01/01/2016 10:20 PM        Procedures/imaging: see electronic medical records for all procedures/Xrays and details which were not copied into this note but were reviewed prior to creation of Ricardo Fowler MD

## 2017-02-28 NOTE — PROGRESS NOTES
Palliative Medicine Progress Note  [facility]: [phone]  [facility]: [phone]   [facility]: [phone]    Patient Name: Karyn Nagy  YOB: 1945    Date of Initial Consult: 2/27/17   Reason for Consult: Care Decisions  Requesting Provider: Dr. Elaine Marshall   Primary Care Physician: Jaylen Ordonez MD      SUMMARY:   Karyn Nagy is a 66y.o. year old with a past history ofCHF, DM, COPD, HTN, CKD, Osteoarthritis, Afib/on coumadin, CAD with cardiac stenting, Pulmonary HTN, Sleep apnea but not using CPAP who was admitted on 2/25/2017 from [facility]  with a diagnosis of:    Acute on chronic respiratory failure/ pulmonary edema  Pulmonary HTN  Aortic stenosis  Hypotension in setting of diuresis  Acute on chronic CHF  UTI  Morbid obesity  DM2  Debility      Current medical issues leading to Palliative Medicine involvement include: Care Decisions in setting of disease progression and multiple hospitalizations. Presented to ED with increased SOB, weight gain, edema to feet which began while at [facility] for rehab after last hospitalization. At baseline she is on O2 at 3L [place]. Initially on bipap now on HF oxygen. 2 hospitalization in past month at [facility] for respiratory failure however has had a total of 10 hospitalizations (most at [facility]) since January 2016. Patient and spouse have seen functional decline especially since September. Up until that time she was able to perform ADLs, fix meals. The longest she is been able to stay out of the hospital and at home has been only a couple of months. Was on ventilator back in January and has little recall of this. 2/28/17: Decompensated overnight, moved to ICU on BIPAP. Received diuretic, then hypotensive, now receiving fluid. Alert and anxious, but denies SOB.  just left. PALLIATIVE DIAGNOSES:   1. Acute on chronic CHF  2. Acute on chronic respiratory failure  3. Dyspnea  4. Anxiety       PLAN:   1.  Palliative Medicine team Jagruti Mixon MD, [facility] NP) met with patient and spouse at bedside. She is alert, well oriented and able to participate in our discussion. They have some understanding of her current medical condition but were unable to clearly understand how her illness can be attributed to multifactorial causes including progressive lung, heart and renal disease. We presented an overview and how this contributes to her multiple hospitalizations in spite of them trying to [facility] and do what we are told to do\". We addressed how the reason for her numerous hospitalizations are due to problems that are no longer curative but only temporarily \"fixable\" before they reoccur. She expressed a strong desire to \"get well and go home\". She added that she would be okay with further hospitalizations. She also stated \"I am not ready to go\" (die). She stated she worries about death and dying. In fact, she thought this was what we were here to talk about today and was upset initially by our visit until we explained that our goal is to make sure all have a clear understanding of what she wants so we can best support her [address]  informed us that she lost her sister, her mother in law this past year. Lost a son many years ago in a skiing accident. There may be complicated grief especially over loss of son. There is likely denial of the extent of her illness which may be related to her expressed fear of dying. During past hospitalization, she expressed fear of being buried. She said she had told her  she wanted to be buried upright with her head above ground.  asked to see. She may benefit from additional counseling which exceeds what can be offered in an inpatient setting.    2/28/17: Tenuous respiratory status. Prognosis for remaining out of hospital once stable enough for d/c remains poor. Will continue to provide support. Psychosocial/Functional status:   55+ years.   Functional decline since 2016.  2 sons--one  about 10 years ago in a skiing accident. Owned an appliance store. Uses cane and walker for past 5 years    2. Advance Care Planning: No AD--reluctant to complete in past. Encouraged to complete and offered assistance with this. Code Status: FULL CODE  Not addressed at this visit    Durable DNR status: NA    4. Symptoms: Dyspnea: Improved and \"feeling better\". 5. Disposition: TBD    6. Initial consult note routed to primary continuity provider. 7. Communicated plan of care with: Palliative IDT, patient, spouse. GOALS OF CARE:     [====Goals of Care====]  GOALS OF CARE:  Patient / health care proxy stated goals: Continue with current level of care. \"I want to get better and go home\".        TREATMENT PREFERENCES:   Code Status: Full Code    Advance Care Planning:  Advance Care Planning 2017   Patient's Healthcare Decision Maker is: Legal Next of Jenna 68   Primary Decision Maker Name -   Primary Decision Maker Phone Number -   Primary Decision Maker Relationship to Patient -   Confirm Advance Directive None   Patient Would Like to Complete Advance Directive -       Other:    The palliative care team has discussed with patient / health care proxy about goals of care / treatment preferences for patient.  [====Goals of Care====]      Advance Care Planning 2017   Patient's Healthcare Decision Maker is: Legal Next of Jenna 68   Primary Decision Maker Name -   Primary Decision [address] Phone Number -   Primary Decision Maker Relationship to Patient -   Confirm Advance Directive None   Patient Would Like to Complete Advance Directive -       Spouse: Duron Miriam [phone]       HISTORY:     History obtained from: chart, patient, spouse    CHIEF COMPLAINT: Dyspnea, fluid retention    HPI/SUBJECTIVE:    The patient is:   [x] Verbal and participatory  [] Non-participatory due to:      SEE SUMMARY    Clinical Pain Assessment (nonverbal scale for nonverbal patients): Pain: 0         FUNCTIONAL ASSESSMENT:     Palliative Performance Scale (PPS):  PPS: 40       PSYCHOSOCIAL/SPIRITUAL SCREENING:     Advance Care Planning:  Advance Care Planning 2/27/2017   Patient's Healthcare Decision Maker is: Legal Next of Kin   Primary Decision Maker Name -   Primary Decision Maker Phone Number -   Primary Decision Maker Relationship to Patient -   Confirm Advance Directive None   Patient Would Like to Complete Advance Directive -        Any spiritual / Druze concerns:  [] Yes /  [] No  None expressed but may have fear of dying. Plan to ask  to visit. Caregiver Burnout:  [] Yes /  [x] No /  [] No Caregiver Present      Anticipatory grief assessment:   [x] Normal  / [] Maladaptive       ESAS Anxiety: Anxiety: 4     ESAS Depression:   Not assessed this visit. REVIEW OF SYSTEMS:     Positive and pertinent negative findings in ROS are noted above in HPI. The following systems were [x] reviewed / [] unable to be reviewed as noted in HPI   Other findings are noted below. Systems: constitutional, ears/nose/mouth/throat, respiratory, gastrointestinal, genitourinary, musculoskeletal, integumentary, neurologic, psychiatric, endocrine. Positive findings noted below. Modified ESAS Completed by: provider   Fatigue: 6 Drowsiness: 0     Pain: 0   Anxiety: 4       Dyspnea: 3                    PHYSICAL EXAM:     Wt Readings from Last 3 Encounters:   02/28/17 115.4 kg (254 lb 6.6 oz)   02/09/17 106 kg (233 lb 11 oz)   01/12/16 100.7 kg (222 lb)     Blood pressure 123/69, pulse 80, temperature 97.2 °F (36.2 °C), resp. rate 20, weight 115.4 kg (254 lb 6.6 oz), SpO2 99 %, not currently breastfeeding.   Pain:  Pain Scale 1: Numeric (0 - 10)  Pain Intensity 1: 0     Pain Location 1: Leg, Back  Pain Orientation 1: Lower, Left, Right  Pain Description 1: Aching  Pain Intervention(s) 1: Medication (see MAR)  Last bowel movement: No BM documented since date of admission. Constitutional: NAD. Alert, tearful at times, pleasant. Eyes: pupils equal, anicteric  ENMT: no nasal discharge, moist mucous membranes  Cardiovascular: regular rhythm  Respiratory: breathing mildly labored, symmetric  Gastrointestinal:   Musculoskeletal:   Skin: warm, dry  Neurologic: following commands, moving all extremities  Psychiatric: full affect, no hallucinations  Other: On HF O2        HISTORY:     Principal Problem:    Acute on chronic respiratory failure with hypoxia ([address] (2/1/2017)    Active Problems:    Acute on chronic diastolic CHF (congestive heart failure) ([address] (9/3/2014)      DM (diabetes mellitus) ([address] (9/22/2015)      Hypertension (9/27/2015)      Obstructive sleep apnea, adult (1/4/2016)      Overview: CPAP/BIPAP intolerant      Paroxysmal atrial fibrillation ([address] (1/4/2016)      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 (HCC) (2/7/7148)      Elevated troponin (1/30/2017)      COPD (chronic obstructive pulmonary disease) ([address] (1/30/2017)      UTI (urinary tract infection) (2/25/2017)      Obesity hypoventilation syndrome ([address] (2/28/2017)      Pulmonary hypertension, moderate to severe ([address] (2/28/2017)      Past Medical History:   Diagnosis Date    A-fib ([address]     Arthritis     Back injury     Chronic obstructive pulmonary disease ([address]     Diabetes ([address]     Fibromyalgia     Heart failure ([address]     Hypertension     MRSA (methicillin resistant Staphylococcus aureus)     Obesity hypoventilation syndrome ([address] 2/28/2017    Pulmonary hypertension, moderate to severe ([address] 2/28/2017      Past Surgical History:   Procedure Laterality Date    CARDIAC SURG PROCEDURE UNLIST      HX CORONARY STENT PLACEMENT      HX KNEE REPLACEMENT      HX ORTHOPAEDIC      bilateral knee replacement      History reviewed. No pertinent family history. History reviewed, no pertinent family history.   Social History   Substance Use Topics    Smoking status: Never Smoker    Smokeless tobacco: Not on file    Alcohol use No     Allergies   Allergen Reactions    Latex Hives    Adhesive Tape-Silicones Unknown (comments)    Bees [Sting, Bee] Unknown (comments)    Garlic Nausea and Vomiting    Zoloft [Sertraline] Hives      Current Facility-Administered Medications   Medication Dose Route Frequency    nystatin (MYCOSTATIN) 100,000 unit/gram powder   Topical BID    NOREPINephrine (LEVOPHED) 8,000 mcg in dextrose 5% 250 mL infusion  2-16 mcg/min IntraVENous TITRATE    ipratropium (ATROVENT) 0.02 % nebulizer solution 0.5 mg  0.5 mg Nebulization QID RT    [START ON 3/1/2017] digoxin (LANOXIN) injection 125 mcg  125 mcg IntraVENous DAILY    metoprolol tartrate (LOPRESSOR) tablet 12.5 mg  12.5 mg Oral Q6H    hydrocortisone Sod Succ (PF) (SOLU-CORTEF) injection 25 mg  25 mg IntraVENous Q6H    meropenem (MERREM) 1 g in 0.9% sodium chloride (MBP/ADV) 50 mL MBP  1 g IntraVENous Q8H    pantoprazole (PROTONIX) 40 mg in sodium chloride 0.9 % 10 mL injection  40 mg IntraVENous DAILY    clonazePAM (KlonoPIN) tablet 0.25 mg  0.25 mg Oral BID    docusate sodium (COLACE) capsule 100 mg  100 mg Oral BID    insulin glargine (LANTUS) injection 25 Units  25 Units SubCUTAneous QHS    simvastatin (ZOCOR) tablet 10 mg  10 mg Oral QHS    pregabalin (LYRICA) capsule 75 mg  75 mg Oral BID    aspirin chewable tablet 81 mg  81 mg Oral DAILY    arformoterol (BROVANA) neb solution 15 mcg  15 mcg Nebulization BID RT    budesonide (PULMICORT) 500 mcg/2 ml nebulizer suspension  500 mcg Nebulization BID RT    albuterol-ipratropium (DUO-NEB) 2.5 MG-0.5 MG/3 ML  3 mL Nebulization Q4H PRN    ondansetron (ZOFRAN) injection 4 mg  4 mg IntraVENous Q6H PRN    furosemide (LASIX) injection 40 mg  40 mg IntraVENous Q12H    acetaminophen (TYLENOL) tablet 650 mg  650 mg Oral Q4H PRN    insulin lispro (HUMALOG) injection   SubCUTAneous AC&HS    glucose chewable tablet 16 g  4 Tab Oral PRN    glucagon (GLUCAGEN) injection 1 mg  1 mg IntraMUSCular PRN    dextrose (D50W) injection syrg 12.5-25 g  25-50 mL IntraVENous PRN    warfarin pharmacy to dose   Other PRN        LAB AND IMAGING FINDINGS:     Lab Results   Component Value Date/Time    WBC 6.4 02/28/2017 05:10 AM    HGB 11.7 02/28/2017 05:10 AM    PLATELET 036 23/85/9248 05:10 AM     Lab Results   Component Value Date/Time    Sodium 142 02/28/2017 05:10 AM    Potassium 5.0 02/28/2017 05:10 AM    Chloride 101 02/28/2017 05:10 AM    CO2 37 02/28/2017 05:10 AM    BUN 60 02/28/2017 05:10 AM    Creatinine 1.74 02/28/2017 05:10 AM    Calcium 8.2 02/28/2017 05:10 AM    Magnesium 2.2 02/28/2017 05:10 AM    Phosphorus 4.2 02/26/2017 06:35 AM      Lab Results   Component Value Date/Time    AST (SGOT) 13 02/25/2017 04:21 PM    Alk.  phosphatase 147 02/25/2017 04:21 PM    Protein, total 7.0 02/25/2017 04:21 PM    Albumin 3.3 02/25/2017 04:21 PM    Globulin 3.7 02/25/2017 04:21 PM     Lab Results   Component Value Date/Time    INR 1.6 02/28/2017 05:10 AM    Prothrombin time 18.5 02/28/2017 05:10 AM    aPTT 29.2 01/30/2017 08:00 PM      No results found for: IRON, FE, TIBC, IBCT, PSAT, FERR   No results found for: PH, PCO2, PO2  No components found for: [place]   Lab Results   Component Value Date/Time    CK 27 02/28/2017 05:10 AM    CK - MB 1.4 02/28/2017 05:10 AM              Total time: 550  Counseling / coordination time: 12  > 50% counseling / coordination?: hank Calvillo MD  Palliative Medicine

## 2017-02-28 NOTE — INTERDISCIPLINARY ROUNDS
CRITICAL CARE INTERDISCIPLINARY ROUNDS    Patient Information:    Name:   Car Brooke    Age:   66 y.o. Admission Date:   2/25/2017    Critical Care Day: 2    Attending Provider:   Cherry Steele MD    Critical Care Physician:  Rafy Patel    Code Status: Full Code    Problem List:     Patient Active Problem List   Diagnosis Code    Acute on chronic diastolic CHF (congestive heart failure) ([facility]) I50.33    ALEJANDRINA (acute kidney injury) ([facility] N17.9    Acute exacerbation of CHF (congestive heart failure) ([facility] [address] [facility] I50.9    DM (diabetes mellitus) ([facility] [address] [facility] E11.9    Hypertension I10    Respiratory failure ([facility] [facility] J96.90    Acute coronary syndrome ([facility] [address] [facility] I24.9    Obstructive sleep apnea, adult G47.33    Paroxysmal atrial fibrillation ([facility]) I48.0    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 ([facility]) E47.55, E11.65    Dyspnea due to congestive heart failure ([facility]) I50.9    Aspiration pneumonia ([facility]) J69.0    Hyperkalemia E87.5    Elevated troponin R79.89    COPD (chronic obstructive pulmonary disease) ([facility]) J44.9    CHF (congestive heart failure) ([facility]) I50.9    Insufficiency, respiratory, acute ([facility]) R06.89    Infiltrate noted on imaging study R93.8    COPD exacerbation ([facility]) J44.1    Acute on chronic respiratory failure with hypoxia ([facility]) J96.21    Chest pain R07.9    Anxiety F41.9    UTI (urinary tract infection) N39.0    Obesity hypoventilation syndrome ([facility]) E66.2    Pulmonary hypertension, moderate to severe ([facility]) I27.2       Principal Problem:  Acute on chronic respiratory failure with hypoxia (HCC)    During rounds the following quality care indicators and evidence based practices were addressed :  DVT Prophylaxis, PUD Prophylaxis and Critical Care Interventions Drains and Lines Davila Day- Day 3; (M-Care-Needs completed) ;  Central Line Day: 1;  Antibiotic Stewardship: Rocephin; Probiotics Necessary: N/A    Glycemic Control:   Recent Labs      02/28/17   0510  02/27/17   9130 02/26/17   0635  02/25/17   1621   GLU  142*  265*  113*  114*   ;  Recent Labs      02/28/17   1021  02/28/17   0520  02/28/17   0131   PHI  7.297*  7.242*  7.228*   PCO2I  78.5*  93.8*  94.5*   PO2I  69*  61*  82    Adjustments     Interdisciplinary team rounds were held with the following team membersNursing, Nutrition, Pharmacy, Physician and Respiratory Therapy. Plan of care discussed. Goals of Care/ Recommendations: Central line placement today. Continue Bi-pap. Wean and D/C pressors. See clinical pathway and/or care plan for interventions and desired outcomes.     Critical Care Discharge Status:  Red

## 2017-02-28 NOTE — PROGRESS NOTES
TRANSFER - IN REPORT:    Verbal report received from EDITH Arredondo RN(name) on Leeroy Bowers  being received from Tele(unit) for change in patient condition(Resp distress)      Report consisted of patients Situation, Background, Assessment and   Recommendations(SBAR). Information from the following report(s) SBAR, Intake/Output, MAR, Recent Results, Cardiac Rhythm Afib and Alarm Parameters  was reviewed with the receiving nurse. Opportunity for questions and clarification was provided. Assessment completed upon patients arrival to unit and care assumed.

## 2017-02-28 NOTE — PROGRESS NOTES
Problem: Mobility Impaired (Adult and Pediatric)  Goal: *Acute Goals and Plan of Care (Insert Text)  Physical Therapy Goals  Initiated 2/27/2017 and to be accomplished within 7 day(s)  1. Patient will move from supine to sit and sit to supine , scoot up and down and roll side to side in bed with supervision/set-up. 2. Patient will transfer from bed to chair and chair to bed with minimal assistance/contact guard assist using the least restrictive device. 3. Patient will perform sit to stand with minimal assistance/contact guard assist.  4. Patient will ambulate with minimal assistance/contact guard assist for 50 feet with the least restrictive device. Outcome: Not Progressing Towards Goal  PT session held due to:  [ ]  Nausea/vomiting  [X]  Transferred to ICU with Bi-PAP  [ ]  Extreme Pain  [ ]  Dialysis treatment in progress. Will f/u tomorrow. Thank you.   Conchita Minor, PTA

## 2017-02-28 NOTE — PROGRESS NOTES
INITIAL NUTRITION ASSESSMENT     RECOMMENDATIONS/PLAN:   - When medically appropriate suggest diet advancement to 'Diabetic Consistent Carb 2GM NA'    REASON FOR ASSESSMENT:   [x] ICU admission    NUTRITION ASSESSMENT:   Client History: 66 yrs old Female admitted with acute respiratory failure with acute CHF exacerbation in setting of obesity hypoventilation syndrome. Also with pulmonary HTN, pleural effusion, and UTI. Tolerated bipap overnight and transitioned to HFNC this AM. NPO today, if respiratory status worsens pt may be intubated per MD note. Previously with variable PO intake of cardiac diet x2 days, good appetite and PO intake PTA. PMHx: COPD, DM, fibromyalgia, CHF, HTN, arthritis   Cultural/Caodaism Food Preferences: None Identified    FOOD/NUTRITION HISTORY  Diet History: denies appetite change PTA   Food Allergies:  [] NKFA     [x] Yes (garlic, latex)     NUTRITION INTAKE   Diet Order:  NPO      Average PO Intake of Cardiac diet 2/26-27:        % Diet Eaten   02/27/17 0839 90 %   02/26/17 1005 25 %   Pertinent Medications:  [x] Reviewed; colace, abx, pepcid, lasix, metolazone, midodrine, levophed, zofran, zocor, lyrica, warfarin  Insulin:  [x] SSI  [] Pre-meal   [x]  Basal   [] Drip  [] None  Pt expected to meet estimated nutrient needs through next review:          []  Yes     [x] No; no source of nutrition currently    BIOCHEMICAL DATA & MEDICAL TESTS  Pertinent Labs:  [x] Reviewed; POC BG , CO2 37, BUN 60, HbA1c 7.7,   ANTHROPOMETRICS  Height:  5'5\"       Weight: 115.4 kg (254 lb 6.6 oz)    BMI:  42.3 kg/m^2  -  morbidly obese (Greater than or = to 40% BMI)        Weight change: stable                                  Comparison to Reference Standards:  IBW: 125 lbs      %IBW: 203%      AdjBW: 71 kg    NUTRITION-FOCUSED PHYSICAL ASSESSMENT  Skin: intact aside from some excoriation, 3+ weeping/pitting edema to BLE.      GI: last BM 2/25    NUTRITION PRESCRIPTION  Calories: 7936-0024 kcal/day based on 11-14 kcal/kg actual wt  Protein: 113-143 g/day based on 2-2.5 g/kg IBW  CHO: 158 g/day based on 50% of total energy  Fluid: 2925 ml/day based on 1500 + (15 ml/kg >20 kg)      NUTRITION DIAGNOSES:   1- At risk of inadequate oral intake related to respiratory failure as evidenced by NPO x1 day    2- Altered nutrition related lab values related to DM2 as evidenced by POC BG  mg/dl, HbA1c 7.7  3- Morbid obesity related to h/o excessive energy intake and inadequate physical activity as evidenced by BMI 42.3 kg/m^2 and 203% of IBW. NUTRITION INTERVENTIONS:   INTERVENTIONS:        GOALS:  1. [facility] 1. Diet initiated by next review 1-3 days     LEARNING NEEDS (Diet, Supplementation, Food/Nutrient-Drug Interaction):   [] None Identified  [] Education provided/documented (refer to Education section of EMR)  [x] Identified and patient:  [] Declined     [x] Was not appropriate/indicated  NUTRITION MONITORING /EVALUATION:   Follow PO intake  Monitor wt  Monitor for additional supplement needs    [x] Participated in Interdisciplinary Rounds  [x] [facility] Reviewed/Documented  [x] Discharge Nutrition Plan: consistent cho low sodium    NUTRITION RISK:     [x]  At risk                     []  Not currently at risk     Will follow-up per policy.   Leonie Durham RD  PAGER:  [phone]

## 2017-02-28 NOTE — PROGRESS NOTES
2010:  Assumed care of patient. Patient resting in bed with BIPAP in place. RR 25-35, states breathing somewhat improved from before.  at bedside. Call bell and phone left in reach. 2030:  Patient requesting to be placed back on HFNC and taken off BiPap. RT aware. 2110:  Patient's oxygen 94% on HFNC. RR in 30s; still dyspneic at rest.  Scheduled dose of lasix given. 2200:  O2 remaining 94%. Patient still looks dyspneic at rest, however, declining shortness of breath. Patient declined to be put back on BiPap.    2300:  Rounded on patient with respiratory therapy. Patient's oxygen has remained 94-95%, but patient remains with dsypenia at rest though she denies shortness of breath. Placed back on BiPap. RR 27. Will discuss with on call MD. Allyson Rodriguez 325 since 1900 after two doses of 40mg IV lasix. 2340:  Reviewed above with Dr. Camille Mariscal including BiPap settings per respiratory, lasix given, repeat CXR, and oxygen saturations. Ordered for stat ABGs then transfer to ICU.    2345:  RT notified of ordered transfer to ICU and orders for ABGs. 2353:  Called and updated  that patient being transferred to ICU. RT at bedside to obtain ABGs. 0000: ABG results reviewed with Dr. Camille Mariscal. Okay to intubate patient if patient agreeable. 0002:  Patient requesting to remain on BiPap for the time being. Transferring to ICU on BiPap with RT.    0020:  Patient taken to ICU with all belongings including cell phone, , and glasses.

## 2017-02-28 NOTE — PROGRESS NOTES
Problem: Self Care Deficits Care Plan (Adult)  Goal: *Acute Goals and Plan of Care (Insert Text)  Occupational Therapy Goals  Initiated 2/27/2017 within 7 day(s). 1. Patient will perform grooming with supervision/set-up 2. Patient will perform upper body dressing with supervision/set-up. 3. Patient will perform lower body dressing with minimal assistance/contact guard assist.  4. Patient will perform toilet transfers with supervision/set-up. 5. Patient will perform all aspects of toileting with supervision/set-up. 6. Patient will participate in upper extremity therapeutic exercise/activities with supervision/set-up for 10 minutes. 7. Patient will utilize energy conservation techniques during functional activities with verbal cues. Occupational Therapy Treatment Attempt     Chart reviewed. Attempted Occupational Therapy Treatment, however, pt transferred to ICU and now on BiPAP & unable to tolerated tx at this time. (Pt was on HFNC & hypotensive this a.m.). Will continue to follow.       Thank you for this referral.  Alaina Ayoub, OTR/L

## 2017-02-28 NOTE — CALL BACK NOTE
Received a call from the nurse earlier stating patient was having some labored breathing. She was recently transferred out of the ICU. Due to concerns of her fluid retention secondary to CHF, lasix 40mg IV was given around 7pm. She put out about 350cc's. No other symptoms at this time. Vitals at the time   HR 90 /77; RR 25; O2 97% on BiPAP  General: Mild distress but feels better since being on BiPAP  Lung: Bibasilar exp sounds   CV: no rubs murmurs gallops, S1S2  Abd: obese, soft, NT, no masses     A/P  Worsening of her breathing from Pulm vascular congestion from fluid over. BiPAP adjustments made, doing better now. Transferred to ICU for further care. Monitor I/Os. Will hold off on giving more diuretic due to borderline low BP at this time of 90's/50s. Confirmed her code status, she is a full code. Her  notified upon her request. Will cont to monitor her. Update at 4am  Due to persistent low BP and with hx of Mod Aortic Stenosis. Nurse notified me she had urinated about 1000cc by then from the lasix that was given yesterday evening. I ordered 500cc IVF at first with minimal response and then Levophed was started.

## 2017-02-28 NOTE — PROGRESS NOTES
5024  Report received from ZAHRA Matthews RN. Assumed patient care. 3064  Dr. Nancy Lopez bedside for central line placement. Consent on file. 9922  X-ray at bedside. 8834  Dr. Nancy Lopez viewed x-ray, states line is okay to use.    0920  US at bedside with Radiologist.   Dr. Carmen Valenzuela states there is not enough fluid for thoracentesis at this time. 80  Dr. Tisha Singh at bedside, no new orders at this time. 1052  Wound care at bedside. 1501  Bedside and Verbal shift change report given to BELINDA Rendon RN (oncoming nurse) by Yusuf Basurto. Joy Shah RN (offgoing nurse). Report included the following information SBAR, Kardex, Intake/Output, MAR and Cardiac Rhythm a-fib.

## 2017-02-28 NOTE — PROGRESS NOTES
Settings changed on the BIPAP to help deliver a larger VT.  AVAPS set with a target Tidal Volume of 450.   Follow-up ABG ordered for the AM.

## 2017-02-28 NOTE — CONSULTS
[facility]  Pulmonary, Critical Care, and Sleep Medicine    Name: Lydia Issa MRN: [id number]   : 1945 Hospital: [facility] [place]   Date: 2017        Critical Care Initial Patient Consult    Requesting MD:                                                  Reason for CC Consult:  IMPRESSION:   · Acute hypoxic respiratory failure, likely due to pulmonary congestion in setting of moderate pulm htn  · Likely severe HFPEF given severe LA dilation and today's CXR  · Moderate pulmonary  Hypertension, likely combined group 2 (HFPEF) and group 3 (OHS)  · OHS, decompensated resulting in hypercapnic respiratory failure  · Acute diastolic  Heart failure  · UTI  · Pafib. · Pleural effusion  · Recurrent admissions for resp failure (10x this year)      RECOMMENDATIONS:   · Neuro: minimize sedatives/narcotics, as these will exacerbate hypercapnia  · CV: likely severe HFPEF given CXR with e/o hypervolemia and TTE with severe LA dilation. Please consult cardiology. Hold BP meds, which are likely contributing to hypotension. Start digoxin and low dose metoprolol for rate control; NE for hypotension; will start low dose replacement steroids given persistently low MAP despite 14 mcg/NE. May require ionotrope Rx. · Resp: hypercapnic failure, likely 2/2 decompensated OHS. UTI may be contributing, as is CHF. Minimize sedation as above. Slow improvement with NIPPV. Will have left sided effusion drained today to improve ventilation. Add atrovent nebs and continue other nebs. If no significant improvement, will need intubation and possible trach for refractory OHS  · GI: NPO for now, PPI  · Renal: Cardio-renal syndrome. Cannot diurese aggresively right now due to hypotension unless pressors on board. Will DC metolazone but continue lasix, as she still is overloaded.  May require ionotropes  · ID: GNR x2 in urine, on empiric ceftriaxone; will change to madyson given possible ESBL risk  · Heme: will reverse INR with [facility] prior to thoracentesis if IR needs this; no Hx of CVA with her Afib  · Endo: glucose control adequate on lantus. Prior TFTs do not suppor hypothyroidism as contributor to Carmita Cardona MD  PCCM  50 min    ADDENDUM: ABG slowly improving. More alert. No effusion on rads exam with U/S. Had discussion with family today and informed them of likely end-stage CHF. Family appreciative of honesty, and will be discussing goals. Additional 25 min     Subjective/History: This patient has been seen and evaluated at the request of Dr. Nazanin Bowers for acute respiratory failure   Patient is a 66 y.o. female with mulitple medical problems listed below including moderate pulm htn, hermelinda/ohs and copd  was recently discharged from this facility to SNF. Pt was discharged on BIPAP qhs. Pt reports that she was not able to get  Her bipap therapy from SNF. Day prior to admission, she finally had bipap but was not functioning. Also noted increase in leg swellings   Pt reports her breathing status continue to declined henc brought to ED. Pt was placed on bipap overnight. This am transition to HFNC. Feeling much better. Pt was also diuresed 3.2L   NO chest pain, no palpittiaon. No fever or chills. 2/28: pt transferred back to ICU with hypotension and respiratory distress. CXR demonstrated CHF; ABG with acute on chronic hypercapnia. Past Medical History:   Diagnosis Date    A-fib [facility])     Arthritis     Back injury     Chronic obstructive pulmonary disease (HCC)     Diabetes ([facility] [address]     Fibromyalgia     Heart failure (HCC)     Hypertension     MRSA (methicillin resistant Staphylococcus aureus)       Past Surgical History:   Procedure Laterality Date    CARDIAC SURG PROCEDURE UNLIST      HX CORONARY STENT PLACEMENT      HX KNEE REPLACEMENT      HX ORTHOPAEDIC      bilateral knee replacement      Prior to Admission medications    Medication Sig Start Date End Date Taking?  Authorizing Provider   arformoterol (BROVANA) 15 mcg/2 mL nebu neb solution 2 mL by Nebulization route two (2) times a day. 2/9/17  Yes Pamela Mata, DO   budesonide (PULMICORT) 0.5 mg/2 mL nbsp 2 mL by Nebulization route two (2) times a day. 2/9/17  Yes Pamela Mata, DO   clonazePAM (KLONOPIN) 0.5 mg tablet Take 0.5 Tabs by mouth two (2) times a day. Max Daily Amount: 0.5 mg. 2/9/17  Yes Pamela Mata, DO   dilTIAZem (CARDIZEM) 60 mg tablet Take 1 Tab by mouth Before breakfast, lunch, and dinner. 2/9/17  Yes Pamela Mata, DO   docusate sodium (COLACE) 100 mg capsule Take 1 Cap by mouth two (2) times a day for 90 days. 2/9/17 5/10/17 Yes Pamela Mata, DO   furosemide (LASIX) 40 mg tablet Take 1 Tab by mouth daily. 2/9/17  Yes Pamela Mata, DO   pantoprazole (PROTONIX) 40 mg tablet Take 1 Tab by mouth Daily (before breakfast). 2/9/17  Yes Pamela Mata, DO   simvastatin (ZOCOR) 10 mg tablet Take 1 Tab by mouth nightly. 2/9/17  Yes Pamela Mata, DO   insulin glargine (LANTUS) 100 unit/mL injection 25 Units by SubCUTAneous route nightly. 2/9/17  Yes Pamela Mata, DO   albuterol-ipratropium (DUO-NEB) 2.5 mg-0.5 mg/3 ml nebulizer solution 3 mL by Nebulization route every four (4) hours as needed for Shortness of Breath. 1/12/16  Yes Goldie Peterson MD   pregabalin (LYRICA) 75 mg capsule Take  by mouth. Yes Paul Sloan MD   warfarin (COUMADIN) 2.5 mg tablet Take 2.5 mg by mouth daily. Yes Paul Sloan MD   carvedilol (COREG) 25 mg tablet Take 25 mg by mouth two (2) times daily (with meals). Yes Paul Sloan MD   aspirin 81 mg tablet Take 81 mg by mouth. Yes Paul Sloan MD   FERROUS SULFATE by Does Not Apply route. Yes Paul Sloan MD   traZODone (DESYREL) 50 mg tablet Take 1 Tab by mouth nightly.  2/9/17   Pamela Mata,    insulin lispro (HUMALOG) 100 unit/mL injection AC and HS  Indications: type 2 diabetes mellitus 2/9/17   Elinor Burden Krysten Shields, DO   insulin lispro (HUMALOG) 100 unit/mL injection AC  Indications: type 2 diabetes mellitus 2/9/17   Agustina Landon, DO   oxyCODONE-acetaminophen (PERCOCET) 5-325 mg per tablet Take 2 Tabs by mouth every four (4) hours as needed. Max Daily Amount: 12 Tabs. 2/9/17   Agustina Landon, DO   codeine-butalbital-acetaminophen-caffeine (FIORICET WITH CODEINE) -42-30 mg per capsule Take  by mouth every four (4) hours as needed for Headache. Paul Sloan MD   CYANOCOBALAMIN, VITAMIN B-12, (VITAMIN B-12 PO) Take  by mouth.       Paul Sloan MD     Current Facility-Administered Medications   Medication Dose Route Frequency    nystatin (MYCOSTATIN) 100,000 unit/gram powder   Topical BID    NOREPINephrine (LEVOPHED) 8,000 mcg in dextrose 5% 250 mL infusion  2-16 mcg/min IntraVENous TITRATE    midodrine (PROAMITINE) tablet 10 mg  10 mg Oral Q8H    metOLazone (ZAROXOLYN) tablet 5 mg  5 mg Oral DAILY    clonazePAM (KlonoPIN) tablet 0.25 mg  0.25 mg Oral BID    dilTIAZem (CARDIZEM) IR tablet 60 mg  60 mg Oral TIDAC    docusate sodium (COLACE) capsule 100 mg  100 mg Oral BID    insulin glargine (LANTUS) injection 25 Units  25 Units SubCUTAneous QHS    simvastatin (ZOCOR) tablet 10 mg  10 mg Oral QHS    carvedilol (COREG) tablet 25 mg  25 mg Oral BID WITH MEALS    pregabalin (LYRICA) capsule 75 mg  75 mg Oral BID    aspirin chewable tablet 81 mg  81 mg Oral DAILY    arformoterol (BROVANA) neb solution 15 mcg  15 mcg Nebulization BID RT    budesonide (PULMICORT) 500 mcg/2 ml nebulizer suspension  500 mcg Nebulization BID RT    traZODone (DESYREL) tablet 50 mg  50 mg Oral QHS    lisinopril (PRINIVIL, ZESTRIL) tablet 5 mg  5 mg Oral DAILY    furosemide (LASIX) injection 40 mg  40 mg IntraVENous Q12H    famotidine (PEPCID) tablet 20 mg  20 mg Oral BID    cefTRIAXone (ROCEPHIN) 1 g in 0.9% sodium chloride (MBP/ADV) 50 mL MBP  1 g IntraVENous Q24H    insulin lispro (HUMALOG) injection   SubCUTAneous AC&HS     Allergies   Allergen Reactions    Latex Hives    Adhesive Tape-Silicones Unknown (comments)    Bees [Sting, Bee] Unknown (comments)    Garlic Nausea and Vomiting    Zoloft [Sertraline] Hives      Social History   Substance Use Topics    Smoking status: Never Smoker    Smokeless tobacco: Not on file    Alcohol use No      History reviewed. No pertinent family history. Review of Systems:  A comprehensive review of systems was negative except for that written in the HPI. Objective:   Vital Signs:    Visit Vitals    BP 98/45    Pulse 80    Temp 97.9 °F (36.6 °C)    Resp 18    Wt 115.4 kg (254 lb 6.6 oz)    SpO2 98%    Breastfeeding No    BMI 42.34 kg/m2       O2 Device: BIPAP   O2 Flow Rate (L/min): 40 l/min   Temp (24hrs), Av.8 °F (36.6 °C), Min:97.3 °F (36.3 °C), Max:98.9 °F (37.2 °C)       Intake/Output:   Last shift:         Last 3 shifts:  1901 -  0700  In: 879.5 [P.O.:240; I.V.:639.5]  Out: 0 [Urine:0]    Intake/Output Summary (Last 24 hours) at 17 0743  Last data filed at 17 0650   Gross per 24 hour   Intake           829.53 ml   Output             2050 ml   Net         -1220.47 ml     Hemodynamics:   . @MAP     . @CVP       Ventilator Settings:  Mode Rate Tidal Volume Pressure FiO2 PEEP            50 % (decreased)       Peak airway pressure:      Minute ventilation: 8.9 l/min      ARDS network Guidelines: Lung protective strategy and Pl pressure goals____________    Physical Exam:    General:  Alert, cooperative, no distress, appears stated age. Head:  Normocephalic, without obvious abnormality, atraumatic. Eyes:  Conjunctivae/corneas clear. PERRL, EOMs intact. Nose: Nares normal. Septum midline. Mucosa normal. No drainage or sinus tenderness.    Throat: Lips, mucosa, and tongue normal. Teeth and gums normal.   Neck: Supple, symmetrical, trachea midline, no adenopathy, thyroid: no enlargment/tenderness/nodules, no carotid bruit and no JVD. Back:   Symmetric, no curvature. ROM normal.   Lungs:   Clear to auscultation bilaterally. Chest wall:  No tenderness or deformity. Heart:  Regular rate and rhythm, S1, S2 normal, no murmur, click, rub or gallop. Abdomen:   Soft, non-tender. Bowel sounds normal. No masses,  No organomegaly. Extremities: Extremities normal, atraumatic, no cyanosis or edema. Pulses: 2+ and symmetric all extremities. Skin: Skin color, texture, turgor normal. No rashes or lesions   Lymph nodes: Cervical, supraclavicular, and axillary nodes normal.   Neurologic: Grossly nonfocal       Data:     Recent Results (from the past 24 hour(s))   GLUCOSE, POC    Collection Time: 02/27/17 12:14 PM   Result Value Ref Range    Glucose (POC) 220 (H) 70 - 110 mg/dL   GLUCOSE, POC    Collection Time: 02/27/17  4:22 PM   Result Value Ref Range    Glucose (POC) 186 (H) 70 - 110 mg/dL   GLUCOSE, POC    Collection Time: 02/27/17 10:04 PM   Result Value Ref Range    Glucose (POC) 121 (H) 70 - 110 mg/dL   POC G3    Collection Time: 02/27/17 11:56 PM   Result Value Ref Range    Device: BIPAP      FIO2 (POC) 70 %    pH (POC) 7.201 (LL) 7.35 - 7.45      pCO2 (POC) 106.2 (H) 35.0 - 45.0 MMHG    pO2 (POC) 91 80 - 100 MMHG    HCO3 (POC) 41.6 (H) 22 - 26 MMOL/L    sO2 (POC) 94 92 - 97 %    Base excess (POC) 14 mmol/L    PEEP/CPAP (POC) 5 cmH2O    PIP (POC) 15      Pressure support 9 cmH2O    Allens test (POC) YES      Total resp. rate 25      Site RIGHT RADIAL      Specimen type (POC) ARTERIAL      Performed by Fausto Cottrell    POC G3    Collection Time: 02/28/17  1:31 AM   Result Value Ref Range    Device: BIPAP      FIO2 (POC) 0.60 %    pH (POC) 7.228 (LL) 7.35 - 7.45      pCO2 (POC) 94.5 (H) 35.0 - 45.0 MMHG    pO2 (POC) 82 80 - 100 MMHG    HCO3 (POC) 39.4 (H) 22 - 26 MMOL/L    sO2 (POC) 92 92 - 97 %    Base excess (POC) 12 mmol/L    PEEP/CPAP (POC) 8 cmH2O    Allens test (POC) YES      Total resp.  rate 22      Site RIGHT RADIAL      Patient temp. 98.6      Specimen type (POC) ARTERIAL      Performed by FLAKITA Gudino     MAGNESIUM    Collection Time: 02/28/17  5:10 AM   Result Value Ref Range    Magnesium 2.2 1.8 - 2.4 mg/dL   PROTHROMBIN TIME + INR    Collection Time: 02/28/17  5:10 AM   Result Value Ref Range    Prothrombin time 18.5 (H) 11.5 - 15.2 sec    INR 1.6 (H) 0.8 - 1.2     CARDIAC PANEL,(CK, CKMB & TROPONIN)    Collection Time: 02/28/17  5:10 AM   Result Value Ref Range    CK 27 26 - 192 U/L    CK - MB 1.4 <3.6 ng/ml    CK-MB Index 5.2 (H) 0.0 - 4.0 %    Troponin-I, Qt. 0.45 (H) 0.00 - 0.06 NG/ML   CBC W/O DIFF    Collection Time: 02/28/17  5:10 AM   Result Value Ref Range    WBC 6.4 4.6 - 13.2 K/uL    RBC 3.67 (L) 4.20 - 5.30 M/uL    HGB 11.7 (L) 12.0 - 16.0 g/dL    HCT 39.6 35.0 - 45.0 %    .9 (H) 74.0 - 97.0 FL    MCH 31.9 24.0 - 34.0 PG    MCHC 29.5 (L) 31.0 - 37.0 g/dL    RDW 17.7 (H) 11.6 - 14.5 %    PLATELET 934 620 - 998 K/uL    MPV 10.4 9.2 - 96.5 FL   METABOLIC PANEL, BASIC    Collection Time: 02/28/17  5:10 AM   Result Value Ref Range    Sodium 142 136 - 145 mmol/L    Potassium 5.0 3.5 - 5.5 mmol/L    Chloride 101 100 - 108 mmol/L    CO2 37 (H) 21 - 32 mmol/L    Anion gap 4 3.0 - 18 mmol/L    Glucose 142 (H) 74 - 99 mg/dL    BUN 60 (H) 7.0 - 18 MG/DL    Creatinine 1.74 (H) 0.6 - 1.3 MG/DL    BUN/Creatinine ratio 34 (H) 12 - 20      GFR est AA 35 (L) >60 ml/min/1.73m2    GFR est non-AA 29 (L) >60 ml/min/1.73m2    Calcium 8.2 (L) 8.5 - 10.1 MG/DL   POC G3    Collection Time: 02/28/17  5:20 AM   Result Value Ref Range    Device: BIPAP      FIO2 (POC) 0.60 %    pH (POC) 7.242 (LL) 7.35 - 7.45      pCO2 (POC) 93.8 (H) 35.0 - 45.0 MMHG    pO2 (POC) 61 (L) 80 - 100 MMHG    HCO3 (POC) 40.4 (H) 22 - 26 MMOL/L    sO2 (POC) 84 (L) 92 - 97 %    Base excess (POC) 13 mmol/L    PEEP/CPAP (POC) 6 cmH2O    Allens test (POC) YES      Total resp. rate 23      Site LEFT RADIAL      Patient temp. 98.6      Specimen type (POC) ARTERIAL      Performed by FLAKITA Brandt YES     GLUCOSE, POC    Collection Time: 17  6:24 AM   Result Value Ref Range    Glucose (POC) 170 (H) 70 - 110 mg/dL             Telemetry:afib      Imaging:  I have personally reviewed the patients radiographs and have reviewed the reports:        2017  4:51 PM - Pardeep, Card Result In       Narrative      [facility] [place]  [address] [facility], [address] [facility]  [phone]    Transthoracic Echocardiogram    Patient: Alicia Zapata  MRN: [id number]  ACCT #: [de-identified]  : 1945  Age: 66 years  Gender: Female  Height: 65 in  Weight: 229.5 lb  BSA: 2.1 mï¾²  BP: 137 / 51 mmHg  Study date: 2017  Status: Routine  Location: Bedside  [facility] ACC #: [id number]    Allergies: LATEX, ADHESIVE TAPE-SILICONES, STING, BEE, GARLIC, SERTRALINE    Referring_Ordering Physician: Benji Best MD  Interpreting Physician: Ariel Lovell MD  Technologist: Cliff Dejesus    SUMMARY:  Left ventricle: The ventricle was mildly dilated. Systolic function was  normal. Ejection fraction was estimated in the range of 55 % to 60 %. No  obvious wall motion abnormalities identified in the views obtained. can  not comment on diastolic function due to monophasic mitral doppler inflow  due to atrial fibrillation. Right ventricle: Systolic pressure was mildly increased. Estimated peak  pressure was 55 mmHg. Left atrium: The atrium is severely dilated. LA volume index was 78 ml/mï¾². Right atrium: The atrium is mildly dilated. Mitral valve: The posterior leaflet is thickened, calcified with reduced  mobility. Aortic valve: Aortic valve not well seen. Can not comment on strucutre of  aortic valve. Aortic valve is thickened and calcified. Peak velocity is  3.23 m/sec with mean gradient of 22.40 mm hg. Moderate aortic stenosis  with velocity criteria. Mild aortic regurgitation.     Tricuspid valve: Pulmonary artery systolic pressure: 52 mmHg. COMPARISONS:  Comparison was made with the previous study of 02-Jan-2016. INDICATIONS: Atrial fibrillation, CHF. HISTORY: Prior history: Risk factors: hypertension. PROCEDURE: This was a routine study. The study included complete 2D  imaging, M-mode, complete spectral Doppler, and color Doppler. The heart  rate was 89 bpm, at the start of the study. Systolic blood pressure was  137 mmHg, at the start of the study. Diastolic blood pressure was 51 mmHg,  at the start of the study. Image quality was fair. LEFT VENTRICLE: The ventricle was mildly dilated. Systolic function was  normal. Ejection fraction was estimated in the range of 55 % to 60 %. No  obvious wall motion abnormalities identified in the views obtained. can  not comment on diastolic function due to monophasic mitral doppler inflow  due to atrial fibrillation. Wall thickness was normal. DOPPLER:  Indeterminate diastolic function. RIGHT VENTRICLE: The size was normal. Systolic function was normal.  DOPPLER: Systolic pressure was mildly increased. Estimated peak pressure  was 55 mmHg. LEFT ATRIUM: The atrium is severely dilated. LA volume index was 78 ml/mï¾². RIGHT ATRIUM: The atrium is mildly dilated. MITRAL VALVE: There was mild annular dilatation. Normal valve structure. There was normal leaflet separation. DOPPLER: There was no evidence for  stenosis. The posterior leaflet is thickened, calcified with reduced  mobility. AORTIC VALVE: The valve was probably trileaflet. Leaflets exhibited mildly  increased thickness, mild calcification, and mildly reduced cuspal  separation. DOPPLER: Aortic valve not well seen. Can not comment on  strucutre of aortic valve. Aortic valve is thickened and calcified. Peak  velocity is 3.23 m/sec with mean gradient of 22.40 mm hg. Moderate aortic  stenosis with velocity criteria. Mild aortic regurgitation. There was mild  regurgitation.     TRICUSPID VALVE: Normal valve structure. There was normal leaflet  separation. DOPPLER: There was no evidence for tricuspid stenosis. There  was no regurgitation. Tricuspid regurgitation peak velocity: 3.4 m/sec. Right atrial pressure estimate: 8 mmHg. Pulmonary artery systolic  pressure: 52 mmHg. PULMONIC VALVE: Not well visualized, but normal Doppler findings. AORTA: The root exhibited normal size. SYSTEMIC VEINS: IVC: The inferior vena cava was not well visualized. PERICARDIUM: No significant pericardial effusion identified. MEASUREMENT TABLES    2D measurements  Right atrium   (Reference normals)  Area sys   23 cmï¾²   (8.3-19. 5)    Doppler measurements  Aortic valve   (Reference normals)  Valve area, cont   1 cmï¾²   (--)    SYSTEM MEASUREMENT TABLES    2D  Ao Diam: 3.3 cm  LVOT Diam: 2.1 cm  EF(Teich): 66.4 %  IVSd: 0.9 cm  LVIDd: 5.4 cm  LVIDs: 3.4 cm  LVPWd: 1 cm  LAESV Index (A-L): 78.2 ml/m2  LVEDV MOD A2C: 97.7 ml  LVEDV MOD A4C: 86.1 ml  LVESV MOD A2C: 42.1 ml  LVESV MOD A4C: 38.9 ml  RA Area: 22.6 cm2  RVIDd: 3.7 cm    CW  AV Vmean: 2 m/s  AV maxP.6 mmHg  AV meanP.7 mmHg  TR Vmax: 3.4 m/s  TR maxP.3 mmHg    PW  SANIA (VTI): 1 cm2  LVOT meanP.4 mmHg    Prepared and E-signed by    Monica Wilkes MD  Signed 2017 16:51:06     esults    XR CHEST PORT (Accession [id number]) (Order [id number])         Allergies        Unspecified: Latex; Adhesive Tape-silicones; Bees [Sting, Bee];  Garlic;  Zoloft [Sertraline]       Result Information      Status Provider Status        Final result (Exam End: 2017  4:37 PM) Open        Study Result      Chest, single view     Indication: Shortness of breath, pulmonary edema.     Comparison: Several prior exams, most recently 2017.     Findings: Portable upright AP view of the chest was obtained. The lungs are  considerably underexpanded, similar to prior, with associated bronchovascular  crowding as well as prominence of the central pulmonary vasculature. No  pneumothorax or large pleural effusion. Cardiac enlargement as well as an  atherosclerotic and tortuous thoracic aorta are not substantially changed from  prior. No acute osseous abnormality identified.     IMPRESSION  IMPRESSION:  1. Underexpanded lungs with associated bronchovascular crowding and central  pulmonary vascular prominence, similar to prior. 2. Cardiomegaly, also similar to prior. Best practice :  Not applicableCardiovascular: An arterial systolic blood pressure (SBP) of < or equal to 90mm Hg or a mean arterial pressure (MAP) < or equal to 70mm Hg for at least 1 hour despite adequate fluid resuscitation or adequate intravascular volume status; or the need for vasopressors to maintain SBP>or equal to 90mm Hg or MAP > or equal to 70mm Hg. Continue current therapy        Total critical care time exclusive of procedures: 50 minutes  Samuel Winslow MD  2/28/2017, 12:18 PM

## 2017-02-28 NOTE — PROGRESS NOTES
0030: Pt arrived to ICU. Pt on BIPAP at this time, RT at the bedside. Pt answering questions appropriately, states she wants to try bipap before being intubated. Lungs coarse, O2 maintaining >90. Skin is weeping, dressings to bilateral legs changed. Kate area reddened and moist. Applied 4 x 4 at this time. New orders received from Dr. Andrea Moya for nystatin, see MAR.     0100: Dr. Andrea Moya on the phone with pt spouse, updated on transfer to ICU. Spoke with Dr. Andrea Moya regarding pt low BP's, states due to BIPAP placement. Will continue to monitor. 0200: Called Dr. Andrea Moya regard pt BP now in the 70's- new orders received. See MAR.     0300: 500 ml NS bolus complete. Spouse at the bedside. 0400: Updated Dr. Andrea Moya regarding BP 64/37 after recheck. New orders being placed. See MAR.     8830: New orders received for AM labs from Dr. Andrea Moya. Nader, RT update Dr. Andrea Moya with current ABG results. 0715: Bedside and Verbal shift change report given to ZAHRA Robertson RN (oncoming nurse) by Sienna Ness RN   (offgoing nurse). Report included the following information SBAR, Intake/Output, MAR, Med Rec Status and Cardiac Rhythm AFib. Verified IV gtt.  remains at the bedside.

## 2017-02-28 NOTE — ROUTINE PROCESS
TRANSFER - OUT REPORT:    Verbal report given to Daniella Abreu RN (name) on Guero Marie  being transferred to ICU (unit) for urgent transfer       Report consisted of patients Situation, Background, Assessment and   Recommendations(SBAR). Information from the following report(s) SBAR, ED Summary, Intake/Output, MAR, Recent Results and Cardiac Rhythm AFIB was reviewed with the receiving nurse. Lines:   Peripheral IV 71/89/99 Right Cephalic (Active)   Site Assessment Clean, dry, & intact 2/27/2017  8:05 PM   Phlebitis Assessment 0 2/27/2017  8:05 PM   Infiltration Assessment 0 2/27/2017  8:05 PM   Dressing Status Clean, dry, & intact 2/27/2017  8:05 PM   Dressing Type Tape;Transparent 2/27/2017  8:05 PM   Hub Color/Line Status Blue;Capped 2/27/2017  8:05 PM   Action Taken Open ports on tubing capped 2/27/2017  9:04 AM   Alcohol Cap Used Yes 2/27/2017  8:05 PM       Peripheral IV 02/26/17 Left Antecubital (Active)   Site Assessment Clean, dry, & intact 2/27/2017  8:05 PM   Phlebitis Assessment 0 2/27/2017  8:05 PM   Infiltration Assessment 0 2/27/2017  8:05 PM   Dressing Status Clean, dry, & intact 2/27/2017  8:05 PM   Dressing Type Tape;Transparent 2/27/2017  8:05 PM   Hub Color/Line Status Pink;Capped 2/27/2017  8:05 PM   Action Taken Open ports on tubing capped 2/27/2017  9:04 AM   Alcohol Cap Used Yes 2/27/2017  8:05 PM        Opportunity for questions and clarification was provided.       Patient transported with:   Monitor  Registered Nurse   Respiratory therapy and BiPap

## 2017-02-28 NOTE — PROGRESS NOTES
Pharmacy Dosing Services: Warfarin    Consult for Warfarin Dosing by Pharmacy by Dr. Ana Lilia Schmitt provided for this 66 y.o.  female , for indication of Atrial Fibrillation. Dose to achieve an INR goal of 2-3    Order entered for Warfarin 4 mg to be given today at 18:00. Significant drug interactions: aspirin 81 mg  LABS    PT/INR Lab Results   Component Value Date/Time    INR 1.6 02/28/2017 05:10 AM      Platelets Lab Results   Component Value Date/Time    PLATELET 935 97/48/7504 05:10 AM      H/H     Lab Results   Component Value Date/Time    HGB 11.7 02/28/2017 05:10 AM        Warfarin Administrations (last 168 hours)       Date/Time Action Medication Dose      02/27/17 2059 Given    warfarin (COUMADIN) tablet 2.5 mg 2.5 mg    02/26/17 1752 Given    warfarin (COUMADIN) tablet 2.5 mg 2.5 mg    02/25/17 2113 Given    warfarin (COUMADIN) tablet 2.5 mg 2.5 mg          Pharmacy to follow daily and will provide subsequent Warfarin dosing based on clinical status.   Towana Goldberg, [address]  Contact information [phone]

## 2017-02-28 NOTE — WOUND CARE
Pt seen by wound care for daily rounds. Pt noted to be wheezing from generalized edema, with multiple small blisters to BLE. Large open blister noted to left medial thigh, mepilex border in place with xeroform. Pt also noted to have MASD to left pannus surrounded by large area of ecchymosis. Wound care will continue to follow pt daily while in ICU.

## 2017-02-28 NOTE — ROUTINE PROCESS
Bedside and Verbal shift change report given to [employer], RN by Natalie Omalley RN. Report included the following information SBAR, Kardex, OR Summary, Intake/Output and MAR.

## 2017-03-01 LAB
ALBUMIN SERPL BCP-MCNC: 2.6 G/DL (ref 3.4–5)
ALBUMIN/GLOB SERPL: 0.8 {RATIO} (ref 0.8–1.7)
ALP SERPL-CCNC: 89 U/L (ref 45–117)
ALT SERPL-CCNC: 13 U/L (ref 13–56)
ANION GAP BLD CALC-SCNC: 6 MMOL/L (ref 3–18)
ARTERIAL PATENCY WRIST A: YES
AST SERPL W P-5'-P-CCNC: 12 U/L (ref 15–37)
AVAPS, IAVAPS: YES
BACTERIA SPEC CULT: ABNORMAL
BACTERIA SPEC CULT: ABNORMAL
BASE EXCESS BLD CALC-SCNC: 21 MMOL/L
BDY SITE: ABNORMAL
BILIRUB SERPL-MCNC: 0.7 MG/DL (ref 0.2–1)
BUN SERPL-MCNC: 57 MG/DL (ref 7–18)
BUN/CREAT SERPL: 40 (ref 12–20)
CALCIUM SERPL-MCNC: 8.5 MG/DL (ref 8.5–10.1)
CHLORIDE SERPL-SCNC: 100 MMOL/L (ref 100–108)
CK MB CFR SERPL CALC: ABNORMAL % (ref 0–4)
CK MB SERPL-MCNC: <1 NG/ML (ref 5–25)
CK SERPL-CCNC: 12 U/L (ref 26–192)
CK SERPL-CCNC: 13 U/L (ref 26–192)
CK SERPL-CCNC: 17 U/L (ref 26–192)
CO2 SERPL-SCNC: 41 MMOL/L (ref 21–32)
CREAT SERPL-MCNC: 1.43 MG/DL (ref 0.6–1.3)
ERYTHROCYTE [DISTWIDTH] IN BLOOD BY AUTOMATED COUNT: 17 % (ref 11.6–14.5)
GAS FLOW.O2 O2 DELIVERY SYS: ABNORMAL L/MIN
GLOBULIN SER CALC-MCNC: 3.2 G/DL (ref 2–4)
GLUCOSE BLD STRIP.AUTO-MCNC: 105 MG/DL (ref 70–110)
GLUCOSE BLD STRIP.AUTO-MCNC: 126 MG/DL (ref 70–110)
GLUCOSE BLD STRIP.AUTO-MCNC: 212 MG/DL (ref 70–110)
GLUCOSE BLD STRIP.AUTO-MCNC: 92 MG/DL (ref 70–110)
GLUCOSE SERPL-MCNC: 104 MG/DL (ref 74–99)
HCO3 BLD-SCNC: 44.8 MMOL/L (ref 22–26)
HCT VFR BLD AUTO: 35.3 % (ref 35–45)
HGB BLD-MCNC: 11 G/DL (ref 12–16)
INR PPP: 1.6 (ref 0.8–1.2)
MAGNESIUM SERPL-MCNC: 2.1 MG/DL (ref 1.8–2.4)
MCH RBC QN AUTO: 32 PG (ref 24–34)
MCHC RBC AUTO-ENTMCNC: 31.2 G/DL (ref 31–37)
MCV RBC AUTO: 102.6 FL (ref 74–97)
O2/TOTAL GAS SETTING VFR VENT: 50 %
PCO2 BLD: 65.5 MMHG (ref 35–45)
PEEP RESPIRATORY: 12 CMH2O
PH BLD: 7.44 [PH] (ref 7.35–7.45)
PLATELET # BLD AUTO: 177 K/UL (ref 135–420)
PMV BLD AUTO: 10.6 FL (ref 9.2–11.8)
PO2 BLD: 65 MMHG (ref 80–100)
POTASSIUM SERPL-SCNC: 3.4 MMOL/L (ref 3.5–5.5)
PROT SERPL-MCNC: 5.8 G/DL (ref 6.4–8.2)
PROTHROMBIN TIME: 18.2 SEC (ref 11.5–15.2)
RBC # BLD AUTO: 3.44 M/UL (ref 4.2–5.3)
SAO2 % BLD: 92 % (ref 92–97)
SERVICE CMNT-IMP: ABNORMAL
SERVICE CMNT-IMP: ABNORMAL
SODIUM SERPL-SCNC: 147 MMOL/L (ref 136–145)
SPECIMEN TYPE: ABNORMAL
TOTAL RESP. RATE, ITRR: 22
TROPONIN I SERPL-MCNC: 0.42 NG/ML (ref 0–0.06)
TROPONIN I SERPL-MCNC: 0.44 NG/ML (ref 0–0.06)
TROPONIN I SERPL-MCNC: 0.46 NG/ML (ref 0–0.06)
WBC # BLD AUTO: 4.8 K/UL (ref 4.6–13.2)

## 2017-03-01 PROCEDURE — 74011250637 HC RX REV CODE- 250/637: Performed by: INTERNAL MEDICINE

## 2017-03-01 PROCEDURE — 36600 WITHDRAWAL OF ARTERIAL BLOOD: CPT

## 2017-03-01 PROCEDURE — 74011636637 HC RX REV CODE- 636/637: Performed by: INTERNAL MEDICINE

## 2017-03-01 PROCEDURE — 36415 COLL VENOUS BLD VENIPUNCTURE: CPT | Performed by: INTERNAL MEDICINE

## 2017-03-01 PROCEDURE — 74011000250 HC RX REV CODE- 250: Performed by: INTERNAL MEDICINE

## 2017-03-01 PROCEDURE — 83735 ASSAY OF MAGNESIUM: CPT | Performed by: INTERNAL MEDICINE

## 2017-03-01 PROCEDURE — 82803 BLOOD GASES ANY COMBINATION: CPT

## 2017-03-01 PROCEDURE — 74011250636 HC RX REV CODE- 250/636: Performed by: INTERNAL MEDICINE

## 2017-03-01 PROCEDURE — C9113 INJ PANTOPRAZOLE SODIUM, VIA: HCPCS | Performed by: INTERNAL MEDICINE

## 2017-03-01 PROCEDURE — 92610 EVALUATE SWALLOWING FUNCTION: CPT

## 2017-03-01 PROCEDURE — 74011250637 HC RX REV CODE- 250/637: Performed by: HOSPITALIST

## 2017-03-01 PROCEDURE — 85610 PROTHROMBIN TIME: CPT | Performed by: INTERNAL MEDICINE

## 2017-03-01 PROCEDURE — 74011250636 HC RX REV CODE- 250/636: Performed by: HOSPITALIST

## 2017-03-01 PROCEDURE — 36591 DRAW BLOOD OFF VENOUS DEVICE: CPT

## 2017-03-01 PROCEDURE — 97535 SELF CARE MNGMENT TRAINING: CPT

## 2017-03-01 PROCEDURE — 80053 COMPREHEN METABOLIC PANEL: CPT | Performed by: INTERNAL MEDICINE

## 2017-03-01 PROCEDURE — 82962 GLUCOSE BLOOD TEST: CPT

## 2017-03-01 PROCEDURE — 94660 CPAP INITIATION&MGMT: CPT

## 2017-03-01 PROCEDURE — 94640 AIRWAY INHALATION TREATMENT: CPT

## 2017-03-01 PROCEDURE — 85027 COMPLETE CBC AUTOMATED: CPT | Performed by: INTERNAL MEDICINE

## 2017-03-01 PROCEDURE — 74011000258 HC RX REV CODE- 258: Performed by: INTERNAL MEDICINE

## 2017-03-01 PROCEDURE — 97116 GAIT TRAINING THERAPY: CPT

## 2017-03-01 PROCEDURE — 77010033678 HC OXYGEN DAILY

## 2017-03-01 PROCEDURE — 82550 ASSAY OF CK (CPK): CPT | Performed by: INTERNAL MEDICINE

## 2017-03-01 PROCEDURE — 65610000006 HC RM INTENSIVE CARE

## 2017-03-01 PROCEDURE — 77030011256 HC DRSG MEPILEX <16IN NO BORD MOLN -A

## 2017-03-01 PROCEDURE — 97530 THERAPEUTIC ACTIVITIES: CPT

## 2017-03-01 RX ORDER — ENOXAPARIN SODIUM 100 MG/ML
40 INJECTION SUBCUTANEOUS EVERY 24 HOURS
Status: DISCONTINUED | OUTPATIENT
Start: 2017-03-01 | End: 2017-03-01

## 2017-03-01 RX ORDER — POTASSIUM CHLORIDE 20MEQ/15ML
40 LIQUID (ML) ORAL
Status: DISCONTINUED | OUTPATIENT
Start: 2017-03-01 | End: 2017-03-01

## 2017-03-01 RX ORDER — CARVEDILOL 3.12 MG/1
6.25 TABLET ORAL 2 TIMES DAILY WITH MEALS
Status: DISCONTINUED | OUTPATIENT
Start: 2017-03-01 | End: 2017-03-02

## 2017-03-01 RX ORDER — FUROSEMIDE 10 MG/ML
20 INJECTION INTRAMUSCULAR; INTRAVENOUS EVERY 12 HOURS
Status: DISCONTINUED | OUTPATIENT
Start: 2017-03-01 | End: 2017-03-02

## 2017-03-01 RX ORDER — ENOXAPARIN SODIUM 150 MG/ML
120 INJECTION SUBCUTANEOUS EVERY 12 HOURS
Status: DISCONTINUED | OUTPATIENT
Start: 2017-03-01 | End: 2017-03-02

## 2017-03-01 RX ORDER — HYDROCORTISONE SODIUM SUCCINATE 100 MG/2ML
25 INJECTION, POWDER, FOR SOLUTION INTRAMUSCULAR; INTRAVENOUS EVERY 8 HOURS
Status: DISCONTINUED | OUTPATIENT
Start: 2017-03-01 | End: 2017-03-02

## 2017-03-01 RX ORDER — POTASSIUM CHLORIDE 7.45 MG/ML
10 INJECTION INTRAVENOUS
Status: DISCONTINUED | OUTPATIENT
Start: 2017-03-01 | End: 2017-03-01

## 2017-03-01 RX ORDER — FUROSEMIDE 10 MG/ML
10 INJECTION INTRAMUSCULAR; INTRAVENOUS ONCE
Status: COMPLETED | OUTPATIENT
Start: 2017-03-01 | End: 2017-03-01

## 2017-03-01 RX ORDER — WARFARIN SODIUM 5 MG/1
2.5 TABLET ORAL
Status: DISCONTINUED | OUTPATIENT
Start: 2017-03-01 | End: 2017-03-01 | Stop reason: SDUPTHER

## 2017-03-01 RX ORDER — WARFARIN 4 MG/1
4 TABLET ORAL ONCE
Status: COMPLETED | OUTPATIENT
Start: 2017-03-01 | End: 2017-03-01

## 2017-03-01 RX ADMIN — FUROSEMIDE 10 MG: 10 INJECTION, SOLUTION INTRAMUSCULAR; INTRAVENOUS at 16:55

## 2017-03-01 RX ADMIN — ARFORMOTEROL TARTRATE 15 MCG: 15 SOLUTION RESPIRATORY (INHALATION) at 19:45

## 2017-03-01 RX ADMIN — DOCUSATE SODIUM 100 MG: 100 CAPSULE, LIQUID FILLED ORAL at 21:07

## 2017-03-01 RX ADMIN — METOPROLOL TARTRATE 12.5 MG: 25 TABLET ORAL at 09:45

## 2017-03-01 RX ADMIN — PREGABALIN 75 MG: 75 CAPSULE ORAL at 21:08

## 2017-03-01 RX ADMIN — HYDROCORTISONE SODIUM SUCCINATE 25 MG: 100 INJECTION, POWDER, FOR SOLUTION INTRAMUSCULAR; INTRAVENOUS at 16:54

## 2017-03-01 RX ADMIN — CEFTRIAXONE 1 G: 1 INJECTION, POWDER, FOR SOLUTION INTRAMUSCULAR; INTRAVENOUS at 10:09

## 2017-03-01 RX ADMIN — CHLOROTHIAZIDE SODIUM 500 MG: 500 INJECTION, POWDER, LYOPHILIZED, FOR SOLUTION INTRAVENOUS at 16:51

## 2017-03-01 RX ADMIN — Medication 1 MG: at 01:22

## 2017-03-01 RX ADMIN — INSULIN LISPRO 6 UNITS: 100 INJECTION, SOLUTION INTRAVENOUS; SUBCUTANEOUS at 22:04

## 2017-03-01 RX ADMIN — BUDESONIDE 500 MCG: 0.5 INHALANT RESPIRATORY (INHALATION) at 07:57

## 2017-03-01 RX ADMIN — ACETAMINOPHEN 650 MG: 325 TABLET ORAL at 21:08

## 2017-03-01 RX ADMIN — CLONAZEPAM 0.25 MG: 0.5 TABLET ORAL at 09:45

## 2017-03-01 RX ADMIN — CARVEDILOL 6.25 MG: 3.12 TABLET, FILM COATED ORAL at 10:12

## 2017-03-01 RX ADMIN — ARFORMOTEROL TARTRATE 15 MCG: 15 SOLUTION RESPIRATORY (INHALATION) at 07:57

## 2017-03-01 RX ADMIN — DOCUSATE SODIUM 100 MG: 100 CAPSULE, LIQUID FILLED ORAL at 09:45

## 2017-03-01 RX ADMIN — IPRATROPIUM BROMIDE 0.5 MG: 0.5 SOLUTION RESPIRATORY (INHALATION) at 07:57

## 2017-03-01 RX ADMIN — IPRATROPIUM BROMIDE 0.5 MG: 0.5 SOLUTION RESPIRATORY (INHALATION) at 11:55

## 2017-03-01 RX ADMIN — ASPIRIN 81 MG CHEWABLE TABLET 81 MG: 81 TABLET CHEWABLE at 09:45

## 2017-03-01 RX ADMIN — IPRATROPIUM BROMIDE 0.5 MG: 0.5 SOLUTION RESPIRATORY (INHALATION) at 19:44

## 2017-03-01 RX ADMIN — HYDROCORTISONE SODIUM SUCCINATE 25 MG: 100 INJECTION, POWDER, FOR SOLUTION INTRAMUSCULAR; INTRAVENOUS at 07:07

## 2017-03-01 RX ADMIN — NYSTATIN: 100000 POWDER TOPICAL at 09:54

## 2017-03-01 RX ADMIN — DIGOXIN 125 MCG: 0.25 INJECTION INTRAMUSCULAR; INTRAVENOUS at 09:49

## 2017-03-01 RX ADMIN — PREGABALIN 75 MG: 75 CAPSULE ORAL at 09:45

## 2017-03-01 RX ADMIN — ENOXAPARIN SODIUM 40 MG: 40 INJECTION SUBCUTANEOUS at 10:07

## 2017-03-01 RX ADMIN — CLONAZEPAM 0.25 MG: 0.5 TABLET ORAL at 21:59

## 2017-03-01 RX ADMIN — INSULIN GLARGINE 25 UNITS: 100 INJECTION, SOLUTION SUBCUTANEOUS at 22:04

## 2017-03-01 RX ADMIN — HYDROCORTISONE SODIUM SUCCINATE 25 MG: 100 INJECTION, POWDER, FOR SOLUTION INTRAMUSCULAR; INTRAVENOUS at 22:07

## 2017-03-01 RX ADMIN — ENOXAPARIN SODIUM 120 MG: 120 INJECTION SUBCUTANEOUS at 21:09

## 2017-03-01 RX ADMIN — POTASSIUM CHLORIDE 40 MEQ: 20 SOLUTION ORAL at 10:08

## 2017-03-01 RX ADMIN — METOPROLOL TARTRATE 12.5 MG: 25 TABLET ORAL at 02:43

## 2017-03-01 RX ADMIN — SIMVASTATIN 10 MG: 20 TABLET, FILM COATED ORAL at 21:59

## 2017-03-01 RX ADMIN — CARVEDILOL 6.25 MG: 3.12 TABLET, FILM COATED ORAL at 16:54

## 2017-03-01 RX ADMIN — NYSTATIN: 100000 POWDER TOPICAL at 21:10

## 2017-03-01 RX ADMIN — WARFARIN SODIUM 4 MG: 4 TABLET ORAL at 19:08

## 2017-03-01 RX ADMIN — SODIUM CHLORIDE 40 MG: 9 INJECTION INTRAMUSCULAR; INTRAVENOUS; SUBCUTANEOUS at 09:45

## 2017-03-01 RX ADMIN — BUDESONIDE 500 MCG: 0.5 INHALANT RESPIRATORY (INHALATION) at 19:44

## 2017-03-01 RX ADMIN — POTASSIUM CHLORIDE 10 MEQ: 10 INJECTION, SOLUTION INTRAVENOUS at 10:05

## 2017-03-01 NOTE — PROGRESS NOTES
1930: Bedside and Verbal shift change report received from BELINDA Rendon RN. Report included the following information SBAR, Intake/Output, MAR, Recent Results, Cardiac Rhythm afib and Alarm Parameters . Spouse at the bedside. Verified IV gtt. Pt remains on BIPAP, tolerating well. Nursing care continues. 2000: Assessment complete, see appropriate flow sheets. 2200: Son at the bedside, expressed concerns regarding mother (pt) and the many recent readmissions. Wants clarification on what we are doing to [facility] her better\". Updated him on current treatment plan. Notified him to speak with father who has recently had conversation with Dr. Curtis Scherer on pt current status. 2230: Updated Dr. Maggi Godfrey on pt complaints of pain- tylenol given, 1 mg morphine ordered once if needed for breakthrough pain. Notified him of refused and held medications due to low BP. Pt remains on levophed IV gtt. 2345: Pt sleeping at this time. No distress noted. Medications given, see MAR. Reassessment, no changes. 3    0400: Reassessment, no changes. Pt having a difficult time getting comfortable. Frequent turning. 9731: Levophed IV gtt stopped. MAP maintaining >65. Will continue to monitor. 0730: Bedside and Verbal shift change report given to ZAHRA Robertson RN (oncoming nurse) by Ranjit Yap RN (offgoing nurse). Report included the following information SBAR, Intake/Output, MAR, Recent Results, Cardiac Rhythm AFIB and Alarm Parameters .

## 2017-03-01 NOTE — PROGRESS NOTES
Problem: Self Care Deficits Care Plan (Adult)  Goal: *Acute Goals and Plan of Care (Insert Text)  Occupational Therapy Goals  Initiated 2/27/2017 within 7 day(s). 1. Patient will perform grooming with supervision/set-up 2. Patient will perform upper body dressing with supervision/set-up. 3. Patient will perform lower body dressing with minimal assistance/contact guard assist.  4. Patient will perform toilet transfers with supervision/set-up. 5. Patient will perform all aspects of toileting with supervision/set-up. 6. Patient will participate in upper extremity therapeutic exercise/activities with supervision/set-up for 10 minutes. 7. Patient will utilize energy conservation techniques during functional activities with verbal cues. Outcome: Progressing Towards Goal  OCCUPATIONAL THERAPY TREATMENT     Patient: Karyn Nagy (87 y.o. female)  Date: 3/1/2017  Diagnosis: CHF NYHA class IV, acute, diastolic (HCC)  CHF NYHA class IV, acute, diastolic (HCC) Acute on chronic respiratory failure with hypoxia (HCC)       Precautions: Fall (SPO2)  Chart, occupational therapy assessment, plan of care, and goals were reviewed. ASSESSMENT:  Pt making great progress. Pt c-sitting in bed w/ SPO2 @ 91-92% and eager to be OOB. Hair grooming with min/modA for back and applying hairclimb total A; donning socks w/ setup and able to sit EOB SBA w/ ~30 seconds to recover back SPO2 >90%. Pt able to perform functional transfer to bedside chair w/ increased time needed to recover SPO2 (~1 minute). Pt performed functional transfer again w/ improved SPO2 to 95%. Pt required total A for LLE sock adjustment in chair position. Pt setup for self feeding and able to maintain SPO2. Pt reporting feeling good to sit up in chair w/ NCP & RN aware of pt's position and encouragement to maintain OOB and use of BSC for toileting.   Progression toward goals:  [X]          Improving appropriately and progressing toward goals  [ ] Improving slowly and progressing toward goals  [ ]          Not making progress toward goals and plan of care will be adjusted       PLAN:  Patient continues to benefit from skilled intervention to address the above impairments. Continue treatment per established plan of care. Discharge Recommendations:  Rehab  Further Equipment Recommendations for Discharge:  TBD at Rehab       SUBJECTIVE:   Patient stated I don't have to go back to bed yet, do I?      OBJECTIVE DATA SUMMARY:   Cognitive/Behavioral Status:  Neurologic State: Alert  Orientation Level: Oriented to person, Oriented to place, Disoriented to situation, Disoriented to time  Cognition: Follows commands  Safety/Judgement: Fall prevention  Functional Mobility and Transfers for ADLs:              Bed Mobility:  Rolling: Stand-by asssistance  Supine to Sit: Stand-by asssistance  Sit to Supine: Stand-by asssistance  Scooting: Stand-by asssistance              Transfers:  Sit to Stand: Contact guard assistance   Bed to Chair: Contact guard assistance;Minimum assistance  Balance:  Sitting: Intact  Standing: Intact; With support  ADL Intervention:  Feeding  Feeding Assistance: Independent     Grooming  Grooming Assistance: Minimum assistance; Moderate assistance  Washing Hands: Supervision/set-up  Brushing/Combing Hair: Minimum assistance; Moderate assistance     Lower Body Dressing Assistance  Socks: Supervision/set-up (c-sitting in bed; total assist while in chair)     Pain:  Pain Scale 1: Numeric (0 - 10)  Pain Intensity 1: 0     Activity Tolerance:    Pt requires multiple rest breaks. Early termination of ADLs d/t fatigue. Requires assistance w/ ADLs above shoulder level and below knee level in seated position w/ decrease in SPO2. Please refer to the flowsheet for vital signs taken during this treatment.   After treatment:   [X]  Patient left in no apparent distress sitting up in chair  [ ]  Patient left in no apparent distress in bed  [X]  Call bell left within reach  [X]  Nursing notified  [ ]  Caregiver present  [ ]  Bed alarm activated     Thank you for this referral.  Alaina Ayoub, OTR/L  Time Calculation: 40 mins

## 2017-03-01 NOTE — PROGRESS NOTES
Chart reviewed, noted 66 yr old female with Medicare ins admitted from [facility] for acute on chronic resp failure; noted pt was d/c'd from [facility] to [place] on 2-9-17; noted Physical Therapy rec of SNF. Met with pt in ICU who stated that she planned to return to [facility] for cont'd rehab. FOC completed; referral placed to CMS. Will await response.

## 2017-03-01 NOTE — PROGRESS NOTES
Pharmacy Dosing Services: Warfarin    Consult for Warfarin Dosing by Pharmacy by Dr. Matilde Moreno provided for this 66 y.o.  female , for indication of Atrial Fibrillation. Dose to achieve an INR goal of 2-3    Order entered for  Warfarin  4 (mg) ordered to be given today at 18:00. Significant drug interactions: aspirin 81mg  Patient also on lovenox 120 mg sq every 12h until INR therapeutic  LABS     PT/INR Lab Results   Component Value Date/Time    INR 1.6 03/01/2017 04:15 AM      Platelets Lab Results   Component Value Date/Time    PLATELET 820 72/12/5942 04:15 AM      H/H     Lab Results   Component Value Date/Time    HGB 11.0 03/01/2017 04:15 AM        Warfarin Administrations (last 168 hours)       Date/Time Action Medication Dose      02/28/17 2144 Given    warfarin (COUMADIN) tablet 4 mg 4 mg    02/27/17 2059 Given    warfarin (COUMADIN) tablet 2.5 mg 2.5 mg    02/26/17 1752 Given    warfarin (COUMADIN) tablet 2.5 mg 2.5 mg    02/25/17 2113 Given    warfarin (COUMADIN) tablet 2.5 mg 2.5 mg              Pharmacy to follow daily and will provide subsequent Warfarin dosing based on clinical status.   ROQUE AlexanderD  Contact information [phone]

## 2017-03-01 NOTE — PROGRESS NOTES
1500 Bedside shift change report given to Barron Gómez (oncoming nurse) by Tello Sarabia RN (offgoing nurse). Report included the following information SBAR, MAR, Cardiac Rhythm Afib and Alarm Parameters      1600 Assessment complete see emr    1630 Removed mask and provided mouth care and oral intake. Replied mask. Tolerate well.    1700 Family at bedside. 1730 Oral care provided    1900 Labs drawn and brought  To lab.    1905 Bedside shift change report given to [address] [place] [address] (oncoming nurse) by Kathy Penny RN   (offgoing nurse). Report included the following information SBAR, MAR, Recent Results, Cardiac Rhythm Afib and Alarm Parameters .

## 2017-03-01 NOTE — PROGRESS NOTES
Palliative Medicine Progress Note  [facility]: [phone] ([phone])  [facility]: [phone]   [facility]: [phone]    Patient Name: Salud Melendrez  YOB: 1945    Date of Initial Consult: 2/27/17   Reason for Consult: Care Decisions  Requesting Provider: Dr. Holden Matthews   Primary Care Physician: Rigoberto Lakhani MD      SUMMARY:   Salud Melendrez is a 66y.o. year old with a past history ofCHF, DM, COPD, HTN, CKD, Osteoarthritis, Afib/on coumadin, CAD with cardiac stenting, Pulmonary HTN, Sleep apnea but not using CPAP who was admitted on 2/25/2017 from [facility]  with a diagnosis of:    Acute on chronic respiratory failure/ pulmonary edema  Pulmonary HTN  Aortic stenosis  Hypotension in setting of diuresis  Acute on chronic CHF  UTI  Morbid obesity  DM2  Debility      Current medical issues leading to Palliative Medicine involvement include: Care Decisions in setting of disease progression and multiple hospitalizations. Presented to ED with increased SOB, weight gain, edema to feet which began while at [facility] for rehab after last hospitalization. At baseline she is on O2 at 3L NC. Initially on bipap now on HF oxygen. 2 hospitalization in past month at [facility] for respiratory failure however has had a total of 10 hospitalizations (most at [facility]) since January 2016. Patient and spouse have seen functional decline especially since September. Up until that time she was able to perform ADLs, fix meals. The longest she is been able to stay out of the hospital and at home has been only a couple of months. Was on ventilator back in January and has little recall of this. 2/28/17: Decompensated overnight, moved to ICU on BIPAP. Received diuretic, then hypotensive, now receiving fluid. Alert and anxious, but denies SOB.  just left. 3/1/17: Improving. Off Bipap and on O2NC at this time. PALLIATIVE DIAGNOSES:   1. Acute on chronic CHF  2.  Acute on chronic respiratory failure  3. Dyspnea  4. Anxiety       PLAN:   1. Palliative Medicine team Debbie Duvall MD, Formerly named [facility] NP) met with patient and spouse at bedside. She is alert, well oriented and able to participate in our discussion. They have some understanding of her current medical condition but were unable to clearly understand how her illness can be attributed to multifactorial causes including progressive lung, heart and renal disease. We presented an overview and how this contributes to her multiple hospitalizations in spite of them trying to [facility] and do what we are told to do\". We addressed how the reason for her numerous hospitalizations are due to problems that are no longer curative but only temporarily \"fixable\" before they reoccur. She expressed a strong desire to \"get well and go home\". She added that she would be okay with further hospitalizations. She also stated \"I am not ready to go\" (die). She stated she worries about death and dying. In fact, she thought this was what we were here to talk about today and was upset initially by our visit until we explained that our goal is to make sure all have a clear understanding of what she wants so we can best support her [address].  informed us that she lost her sister, her mother in law this past year. Lost a son many years ago in a skiing accident. There may be complicated grief especially over loss of son. There is likely denial of the extent of her illness which may be related to her expressed fear of dying. During past hospitalization, she expressed fear of being buried. She said she had told her  she wanted to be buried upright with her head above ground.  asked to see. She may benefit from additional counseling which exceeds what can be offered in an inpatient setting.    2/28/17: Tenuous respiratory status. Prognosis for remaining out of hospital once stable enough for d/c remains poor. Will continue to provide support.     3/1/17: Experiencing psychosocial distress. Provided listening presence for patient to elaborate on her fear of dying. She expressed being \"unsure where you go\". Wanted to talk about her son Joseph Alvarado who  25 years ago at age 27 in a Jet ski accident. She expressed how she was unable to protect him and how greatly she still misses him. Allowed to express feelings of frustration, anger, sadness. Spoke about multiple deaths of family members including sister and MIL this past year. She is likely experiencing a complicated grief pattern. She expressed a great fear of dying herself--fear of the unknown, worries about how her spouse and other son Dave Santana will react. This may contribute to a significant denial of her own health issues. Several times she stated that during her multiple hospitalizations \"they have not told me what is wrong or how to fix it\". Gently reminded her that we had just discussed this with her on Monday in detail and that with each hospitalization this would have been addressed with her. Reminded her that she has been following the recommendations for treatment of her condition by taking her medications, etc. Will continue to follow for support. Will plan to see if a counselor from the [facility] would visit with her --if she agrees. Psychosocial/Functional status:   55+ years. Functional decline since 2016.  2 sons--one  about 10 years ago in a skiing accident. Owned an appliance store. Uses cane and walker for past 5 years    2. Advance Care Planning: No AD--reluctant to complete in past. Encouraged to complete and offered assistance with this. Code Status: FULL CODE  Not addressed at this visit    Durable DNR status: NA    4. Symptoms: Dyspnea: Improved and \"feeling better\". 5. Disposition: TBD    6. Initial consult note routed to primary continuity provider.     7. Communicated plan of care with: Palliative IDT, patient, nurse       GOALS OF CARE: [====Goals of Care====]  GOALS OF CARE:  Patient / health care proxy stated goals: Continue with current level of care. \"I want to get better and go home\". TREATMENT PREFERENCES:   Code Status: Full Code    Advance Care Planning:  Advance Care Planning 2/27/2017   Patient's Healthcare Decision Maker is: Legal Next of [address] 68   Primary Decision Maker Name -   Primary Decision Maker Phone Number -   Primary Decision Maker Relationship to Patient -   Confirm Advance Directive None   Patient Would Like to Complete Advance Directive -       Other:    The palliative care team has discussed with patient / health care proxy about goals of care / treatment preferences for patient.  [====Goals of Care====]      Advance Care Planning 2/27/2017   Patient's Healthcare Decision Maker is: Legal Next of Jenna 68   Primary Decision Maker Name -   Primary Decision [address] Phone Number -   Primary Decision Maker Relationship to Patient -   Confirm Advance Directive None   Patient Would Like to Complete Advance Directive -       Spouse: Oliverio Pineda [phone]       HISTORY:     History obtained from: chart, patient, spouse    CHIEF COMPLAINT: Dyspnea, fluid retention    HPI/SUBJECTIVE:    The patient is:   [x] Verbal and participatory  [] Non-participatory due to:      SEE SUMMARY    Clinical Pain Assessment (nonverbal scale for nonverbal patients): Pain: 0         FUNCTIONAL ASSESSMENT:     Palliative Performance Scale (PPS):  PPS: 40       PSYCHOSOCIAL/SPIRITUAL SCREENING:     Advance Care Planning:  Advance Care Planning 2/27/2017   Patient's [address] is: Legal Next of Kin   Primary Decision Maker Name -   Primary Decision [address] Phone Number -   Primary Decision Maker Relationship to Patient -   Confirm Advance Directive None   Patient Would Like to Complete Advance Directive -        Any spiritual / Amish concerns:  [] Yes /  [] No  None expressed but may have fear of dying.  Plan to ask  to visit. Caregiver Burnout:  [] Yes /  [x] No /  [] No Caregiver Present      Anticipatory grief assessment:   [x] Normal  / [] Maladaptive       ESAS Anxiety: Anxiety: 4     ESAS Depression:   Not assessed this visit. REVIEW OF SYSTEMS:     Positive and pertinent negative findings in ROS are noted above in HPI. The following systems were [x] reviewed / [] unable to be reviewed as noted in HPI   Other findings are noted below. Systems: constitutional, ears/nose/mouth/throat, respiratory, gastrointestinal, genitourinary, musculoskeletal, integumentary, neurologic, psychiatric, endocrine. Positive findings noted below. Modified ESAS Completed by: provider   Fatigue: 6 Drowsiness: 0     Pain: 0   Anxiety: 4 Nausea: 0     Dyspnea: 2                    PHYSICAL EXAM:     Wt Readings from Last 3 Encounters:   03/01/17 115.2 kg (253 lb 15.5 oz)   02/09/17 106 kg (233 lb 11 oz)   01/12/16 100.7 kg (222 lb)     Blood pressure (!) 114/39, pulse 75, temperature 97.1 °F (36.2 °C), resp. rate 23, weight 115.2 kg (253 lb 15.5 oz), SpO2 96 %, not currently breastfeeding. Pain:  Pain Scale 1: Numeric (0 - 10)  Pain Intensity 1: 0     Pain Location 1: Back  Pain Orientation 1: Lower  Pain Description 1: Aching  Pain Intervention(s) 1: Rest, Repositioned, Emotional support  Last bowel movement: No BM documented since date of admission. Constitutional: NAD. Alert, tearful at times, pleasant.   Eyes: pupils equal, anicteric  ENMT: no nasal discharge, moist mucous membranes  Cardiovascular: Irregular rhythm  Respiratory: breathing mildly labored, symmetric  Gastrointestinal: Abdomen soft/ non tender. + bowel sounds  Musculoskeletal:   Skin: warm, dry  Neurologic: following commands, moving all extremities  Psychiatric: full affect, no hallucinations  Other: On O2 NC        HISTORY:     Principal Problem:    Acute on chronic respiratory failure with hypoxia ([facility] (2/1/2017)    Active Problems:    Acute on chronic diastolic CHF (congestive heart failure) ([facility] (9/3/2014)      DM (diabetes mellitus) ([facility] [address] (9/22/2015)      Hypertension (9/27/2015)      Obstructive sleep apnea, adult (1/4/2016)      Overview: CPAP/BIPAP intolerant      Paroxysmal atrial fibrillation ([address] (1/4/2016)      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 (HCC) (3/6/8727)      Elevated troponin (1/30/2017)      COPD (chronic obstructive pulmonary disease) ([address] (1/30/2017)      UTI (urinary tract infection) (2/25/2017)      Obesity hypoventilation syndrome ([address] (2/28/2017)      Pulmonary hypertension, moderate to severe ([address] (2/28/2017)      Past Medical History:   Diagnosis Date    A-fib ([facility] [facility]     Arthritis     Back injury     Chronic obstructive pulmonary disease ([facility] [address]     Diabetes ([facility] [address] [facility]     Fibromyalgia     Heart failure ([facility] [address] [facility]     Hypertension     MRSA (methicillin resistant Staphylococcus aureus)     Obesity hypoventilation syndrome ([address] 2/28/2017    Pulmonary hypertension, moderate to severe ([address] 2/28/2017      Past Surgical History:   Procedure Laterality Date    CARDIAC SURG PROCEDURE UNLIST      HX CORONARY STENT PLACEMENT      HX KNEE REPLACEMENT      HX ORTHOPAEDIC      bilateral knee replacement      History reviewed. No pertinent family history. History reviewed, no pertinent family history.   Social History   Substance Use Topics    Smoking status: Never Smoker    Smokeless tobacco: Not on file    Alcohol use No     Allergies   Allergen Reactions    Latex Hives    Adhesive Tape-Silicones Unknown (comments)    Bees [Sting, Bee] Unknown (comments)    Garlic Nausea and Vomiting    Zoloft [Sertraline] Hives      Current Facility-Administered Medications   Medication Dose Route Frequency    hydrocortisone Sod Succ (PF) (SOLU-CORTEF) injection 25 mg  25 mg IntraVENous Q8H    carvedilol (COREG) tablet 6.25 mg  6.25 mg Oral BID WITH MEALS    furosemide (LASIX) injection 20 mg  20 mg IntraVENous Q12H    [START ON 3/2/2017] chlorothiazide (DIURIL) 500 mg in 0.9% sodium chloride 50 mL IVPB  500 mg IntraVENous DAILY    cefTRIAXone (ROCEPHIN) 1 g in 0.9% sodium chloride (MBP/ADV) 50 mL MBP  1 g IntraVENous Q24H    enoxaparin (LOVENOX) injection 40 mg  40 mg SubCUTAneous Q24H    chlorothiazide (DIURIL) 500 mg in 0.9% sodium chloride 50 mL IVPB  500 mg IntraVENous ONCE    furosemide (LASIX) injection 10 mg  10 mg IntraVENous ONCE    nystatin (MYCOSTATIN) 100,000 unit/gram powder   Topical BID    ipratropium (ATROVENT) 0.02 % nebulizer solution 0.5 mg  0.5 mg Nebulization QID RT    digoxin (LANOXIN) injection 125 mcg  125 mcg IntraVENous DAILY    metoprolol tartrate (LOPRESSOR) tablet 12.5 mg  12.5 mg Oral Q6H    pantoprazole (PROTONIX) 40 mg in sodium chloride 0.9 % 10 mL injection  40 mg IntraVENous DAILY    clonazePAM (KlonoPIN) tablet 0.25 mg  0.25 mg Oral BID    docusate sodium (COLACE) capsule 100 mg  100 mg Oral BID    insulin glargine (LANTUS) injection 25 Units  25 Units SubCUTAneous QHS    simvastatin (ZOCOR) tablet 10 mg  10 mg Oral QHS    pregabalin (LYRICA) capsule 75 mg  75 mg Oral BID    aspirin chewable tablet 81 mg  81 mg Oral DAILY    arformoterol (BROVANA) neb solution 15 mcg  15 mcg Nebulization BID RT    budesonide (PULMICORT) 500 mcg/2 ml nebulizer suspension  500 mcg Nebulization BID RT    albuterol-ipratropium (DUO-NEB) 2.5 MG-0.5 MG/3 ML  3 mL Nebulization Q4H PRN    ondansetron (ZOFRAN) injection 4 mg  4 mg IntraVENous Q6H PRN    acetaminophen (TYLENOL) tablet 650 mg  650 mg Oral Q4H PRN    insulin lispro (HUMALOG) injection   SubCUTAneous AC&HS    glucose chewable tablet 16 g  4 Tab Oral PRN    glucagon (GLUCAGEN) injection 1 mg  1 mg IntraMUSCular PRN    dextrose (D50W) injection syrg 12.5-25 g  25-50 mL IntraVENous PRN    warfarin pharmacy to dose   Other PRN        LAB AND IMAGING FINDINGS:     Lab Results   Component Value Date/Time    WBC 4.8 03/01/2017 04:15 AM    HGB 11.0 03/01/2017 04:15 AM    PLATELET 411 61/00/7664 04:15 AM     Lab Results   Component Value Date/Time    Sodium 147 03/01/2017 04:15 AM    Potassium 3.4 03/01/2017 04:15 AM    Chloride 100 03/01/2017 04:15 AM    CO2 41 03/01/2017 04:15 AM    BUN 57 03/01/2017 04:15 AM    Creatinine 1.43 03/01/2017 04:15 AM    Calcium 8.5 03/01/2017 04:15 AM    Magnesium 2.1 03/01/2017 04:15 AM    Phosphorus 4.2 02/26/2017 06:35 AM      Lab Results   Component Value Date/Time    AST (SGOT) 12 03/01/2017 04:15 AM    Alk.  phosphatase 89 03/01/2017 04:15 AM    Protein, total 5.8 03/01/2017 04:15 AM    Albumin 2.6 03/01/2017 04:15 AM    Globulin 3.2 03/01/2017 04:15 AM     Lab Results   Component Value Date/Time    INR 1.6 03/01/2017 04:15 AM    Prothrombin time 18.2 03/01/2017 04:15 AM    aPTT 29.2 01/30/2017 08:00 PM      No results found for: IRON, FE, TIBC, IBCT, PSAT, FERR   No results found for: PH, PCO2, PO2  No components found for: [place]   Lab Results   Component Value Date/Time    CK 13 03/01/2017 04:15 AM    CK - MB <1.0 03/01/2017 04:15 AM              Total time: 35  Counseling / coordination time: 30  > 50% counseling / coordination?: hank Guerrero MS, FNP-BC, [facility] Palliative Medicine Nurse Practitioner

## 2017-03-01 NOTE — PROGRESS NOTES
Hospitalist Progress Note    Patient: Paulino Posey MRN: [id number]  CSN: [id number]    YOB: 1945  Age: 66 y.o. Sex: female    DOA: 2/25/2017 LOS:  LOS: 3 days          Chief Complaint: Ac resp failure      Assessment/Plan     Acute hypoxemic hypercarbic respiratory failure. On cpap, see if today she can swallow and pass test to eat something  CHF diastolic acute on admission. Diureses, seems more euvolemic  Pulmonary HTN moderate  Hypokalemia-IV repletion  TnI elevation chronic. No chest pain  Afib chronic on Warfarin-daily INR, 1.6  UTI-await final cx results  IMANI/Obesity Hypoventilation syndrome. Possible Asthma. Decreased FEV1. DM2. IDDM with renal disease,Basal insulin, SSI, BG are controlled  HTN. Anxiety. Klonopin low dose BID  Insomnia. Echo 2/1/17 - EF 55-60. RVCP 55mmhg. Pulm art pressure 52mmhg. appreciate cards consult    Continue to wean as tolerated from resp support  Swallow trial  Then if passes start changing meds to PO  Treat infection  Diuresis  BP is stable now    Patient Active Problem List   Diagnosis Code    Acute on chronic diastolic CHF (congestive heart failure) ([facility]) I50.33    ALEJANDRINA (acute kidney injury) ([facility] [address] N17.9    Acute exacerbation of CHF (congestive heart failure) ([facility]) I50.9    DM [facility]diabetes mellitus[facility] E11.9    Hypertension I10    Respiratory failure ([facility]) J96.90    Acute coronary syndrome ([facility]) I24.9    Obstructive sleep apnea, adult G47.33    Paroxysmal atrial fibrillation ([facility]) I48.0    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 ([facility]) O94.95, E11.65    Dyspnea due to congestive heart failure ([facility]) I50.9    Aspiration pneumonia ([facility]) J69.0    Hyperkalemia E87.5    Elevated troponin R79.89    COPD (chronic obstructive pulmonary disease) ([facility]) J44.9    CHF (congestive heart failure) ([facility]) I50.9    Insufficiency, respiratory, acute ([facility]) R06.89    Infiltrate noted on imaging study R93.8    COPD exacerbation ([facility]) J44.1    Acute on chronic respiratory failure with hypoxia (HCC) J96.21    Chest pain R07.9    Anxiety F41.9    UTI (urinary tract infection) N39.0    Obesity hypoventilation syndrome (HCC) E66.2    Pulmonary hypertension, moderate to severe (HCC) I27.2       Subjective:  Slept ok  Denies pain exceopt in back from bed  No chest pain  Hungry  Wants to eat        Review of systems:    Constitutional: denies fevers, chills, myalgias  Respiratory: denies SOB, cough  Cardiovascular: denies chest pain, palpitations  Gastrointestinal: denies nausea, vomiting, diarrhea      Vital signs/Intake and Output:  Visit Vitals    /58 (BP 1 Location: Left arm, BP Patient Position: At rest;Head of bed elevated (Comment degrees))    Pulse (!) 59    Temp 97.1 °F (36.2 °C)    Resp 17    Wt 115.2 kg (253 lb 15.5 oz)    SpO2 99%    Breastfeeding No    BMI 42.26 kg/m2     Current Shift:  03/01 0701 - 03/01 1900  In: -   Out: 650 [Urine:650]  Last three shifts:  02/27 1901 - 03/01 0700  In: 1376.9 [P.O.:440; I.V.:936.9]  Out: 3950 [Urine:3950]    Exam:    General: obese elderly WF, alert, NAD, OX3  Head/Neck: NCAT, supple, No masses, No lymphadenopathy  CVS:Regular rate and rhythm, no M/R/G, S1/S2 heard, no thrill  Lungs:Clear to auscultation bilaterally, no wheezes, rhonchi, or rales  Abdomen: Soft, Nontender, No distention, Normal Bowel sounds, No hepatomegaly  Extremities: No C/C/E, pulses palpable 2+  Skin:normal texture and turgor, no rashes, no lesions  Neuro:grossly normal , follows commands  Psych:appropriate                Labs: Results:       Chemistry Recent Labs      03/01/17   0415  02/28/17   0510  02/27/17   0612   GLU  104*  142*  265*   NA  147*  142  141   K  3.4*  5.0  4.9   CL  100  101  99*   CO2  41*  37*  37*   BUN  57*  60*  53*   CREA  1.43*  1.74*  1.73*   CA  8.5  8.2*  8.5   AGAP  6  4  5   BUCR  40*  34*  31*   AP  89   --    --    TP  5.8*   --    --    ALB  2.6*   --    --    GLOB  3.2   -- --    AGRAT  0.8   --    --       CBC w/Diff Recent Labs      03/01/17 0415 02/28/17   0510  02/27/17   0612   WBC  4.8  6.4  6.7   RBC  3.44*  3.67*  3.57*   HGB  11.0*  11.7*  11.8*   HCT  35.3  39.6  36.8   PLT  177  172  179      Cardiac Enzymes Recent Labs      03/01/17 0415 02/28/17   2230   CPK  13*  17*   CKND1  Cannot be calulated  5.9*      Coagulation Recent Labs      03/01/17 0415 02/28/17   0510   PTP  18.2*  18.5*   INR  1.6*  1.6*       Lipid Panel No results found for: CHOL, CHOLPOCT, CHOLX, CHLST, CHOLV, [id number], HDL, LDL, NLDLCT, DLDL, LDLC, DLDLP, [id number], VLDLC, VLDL, TGL, TGLX, TRIGL, [id number], TRIGP, TGLPOCT, [id number], CHHD, CHHDX   BNP No results for input(s): BNPP in the last 72 hours.    Liver Enzymes Recent Labs      03/01/17 0415   TP  5.8*   ALB  2.6*   AP  89   SGOT  12*      Thyroid Studies Lab Results   Component Value Date/Time    TSH 0.31 01/01/2016 10:20 PM        Procedures/imaging: see electronic medical records for all procedures/Xrays and details which were not copied into this note but were reviewed prior to creation of Leward Lennox, MD

## 2017-03-01 NOTE — CONSULTS
[facility] Consult Note      Patient: King Vito MRN: [id number]  SSN: [id number]    YOB: 1945  Age: 66 y.o. Sex: female    Date of Consultation: 02/28/2017  Referring Physician: Jazz Bennett MD  Reason for Consultation: CHF    Chief complain : AMS    HPI: 66year old female being managed for respiratory failure. Cardiology consult called for diastolic heart failure. Patient is on BiPAP she opens her eyes on verbal command but can not provide any information. Other information obtained from chart.      Past Medical History:   Diagnosis Date    A-fib [facility])     Arthritis     Back injury     Chronic obstructive [facility] disease ([facility] [address] [facility]     Diabetes ([facility]     Fibromyalgia     Heart failure ([facility] [address] [facility]     Hypertension     MRSA (methicillin resistant Staphylococcus aureus)     Obesity hypoventilation syndrome ([facility] [address] 2/28/2017    Pulmonary hypertension, moderate to severe ([facility] [address] 2/28/2017     Past Surgical History:   Procedure Laterality Date    CARDIAC SURG PROCEDURE UNLIST      HX CORONARY STENT PLACEMENT      HX KNEE REPLACEMENT      HX ORTHOPAEDIC      bilateral knee replacement     Current Facility-Administered Medications   Medication Dose Route Frequency    nystatin (MYCOSTATIN) 100,000 unit/gram powder   Topical BID    NOREPINephrine (LEVOPHED) 8,000 mcg in dextrose 5% 250 mL infusion  2-16 mcg/min IntraVENous TITRATE    ipratropium (ATROVENT) 0.02 % nebulizer solution 0.5 mg  0.5 mg Nebulization QID RT    [START ON 3/1/2017] digoxin (LANOXIN) injection 125 mcg  125 mcg IntraVENous DAILY    metoprolol tartrate (LOPRESSOR) tablet 12.5 mg  12.5 mg Oral Q6H    hydrocortisone Sod Succ (PF) (SOLU-CORTEF) injection 25 mg  25 mg IntraVENous Q6H    pantoprazole (PROTONIX) 40 mg in sodium chloride 0.9 % 10 mL injection  40 mg IntraVENous DAILY    [START ON 3/1/2017] meropenem (MERREM) 1 g in 0.9% sodium chloride (MBP/ADV) 50 mL MBP  1 g IntraVENous Q12H    morphine injection 1 mg  1 mg IntraVENous ONCE PRN    clonazePAM (KlonoPIN) tablet 0.25 mg  0.25 mg Oral BID    docusate sodium (COLACE) capsule 100 mg  100 mg Oral BID    insulin glargine (LANTUS) injection 25 Units  25 Units SubCUTAneous QHS    simvastatin (ZOCOR) tablet 10 mg  10 mg Oral QHS    pregabalin (LYRICA) capsule 75 mg  75 mg Oral BID    aspirin chewable tablet 81 mg  81 mg Oral DAILY    arformoterol (BROVANA) neb solution 15 mcg  15 mcg Nebulization BID RT    budesonide (PULMICORT) 500 mcg/2 ml nebulizer suspension  500 mcg Nebulization BID RT    albuterol-ipratropium (DUO-NEB) 2.5 MG-0.5 MG/3 ML  3 mL Nebulization Q4H PRN    ondansetron (ZOFRAN) injection 4 mg  4 mg IntraVENous Q6H PRN    furosemide (LASIX) injection 40 mg  40 mg IntraVENous Q12H    acetaminophen (TYLENOL) tablet 650 mg  650 mg Oral Q4H PRN    insulin lispro (HUMALOG) injection   SubCUTAneous AC&HS    glucose chewable tablet 16 g  4 Tab Oral PRN    glucagon (GLUCAGEN) injection 1 mg  1 mg IntraMUSCular PRN    dextrose (D50W) injection syrg 12.5-25 g  25-50 mL IntraVENous PRN    warfarin pharmacy to dose   Other PRN       Allergies and Intolerances: Allergies   Allergen Reactions    Latex Hives    Adhesive Tape-Silicones Unknown (comments)    Bees [Sting, Bee] Unknown (comments)    Garlic Nausea and Vomiting    Zoloft [Sertraline] Hives       Family History:   History reviewed. No pertinent family history. Social History:   She  reports that she has never smoked. She does not have any smokeless tobacco history on file. She  reports that she does not drink alcohol.       Review of Systems:     Can not obtain due to patient condition    Physical:   Patient Vitals for the past 6 hrs:   Pulse Resp BP SpO2   02/28/17 2230 86 15 103/43 95 %   02/28/17 2215 89 17 - 93 %   02/28/17 2207 - - - 96 %   02/28/17 2200 93 25 100/49 95 %   02/28/17 2145 78 25 161/58 97 %   02/28/17 2143 76 - 123/52 -   02/28/17 2130 84 23 123/52 98 %   02/28/17 2115 82 26 146/60 96 %   02/28/17 2100 86 25 124/55 96 %   02/28/17 2045 67 17 (!) 128/39 96 %   02/28/17 2030 95 (!) 35 141/50 97 %   02/28/17 2015 81 22 (!) 125/91 94 %   02/28/17 2000 80 21 (!) 127/36 95 %   02/28/17 1945 94 24 126/48 97 %   02/28/17 1930 95 24 132/45 93 %   02/28/17 1900 89 18 - 96 %   02/28/17 1830 85 18 (!) 131/39 96 %   02/28/17 1800 100 25 (!) 102/39 92 %         Exam:   General Appearance: Comfortable, on BiPAP, not using accessory muscles of respiration. HEENT: SIRIA. HEAD: Atraumatic  NECK: No JVD, no thyroidomeglay. CAROTIDS: No bruit  LUNGS: Clear bilaterally. HEART: S1+S2 audible, irregularly irregular, no murmur, no pericardial rub. ABD: Non-tender, BS Audible    EXT: No edema, and no cyanosis. VASCULAR EXAM: Pulses are intact. PSYCHIATRIC EXAM: Mood is appropriate. MUSCULOSKELETAL: Grossly no joint deformity.   NEUROLOGICAL: opens eye on verbal command   Review of Data:   LABS:   Lab Results   Component Value Date/Time    WBC 6.4 02/28/2017 05:10 AM    HGB 11.7 02/28/2017 05:10 AM    HCT 39.6 02/28/2017 05:10 AM    PLATELET 774 41/74/1027 05:10 AM     Lab Results   Component Value Date/Time    Sodium 142 02/28/2017 05:10 AM    Potassium 5.0 02/28/2017 05:10 AM    Chloride 101 02/28/2017 05:10 AM    CO2 37 02/28/2017 05:10 AM    Glucose 142 02/28/2017 05:10 AM    BUN 60 02/28/2017 05:10 AM    Creatinine 1.74 02/28/2017 05:10 AM     No results found for: CHOL, CHOLX, CHLST, CHOLV, HDL, LDL, DLDL, LDLC, DLDLP, TGL, TGLX, TRIGL, TRIGP  No results found for: GPT  Lab Results   Component Value Date/Time    Hemoglobin A1c 7.7 02/25/2017 04:21 PM         Cardiology Procedures:   EKG atrial fibrillation, low voltage EKG, anteroseptal infarct age undetermined    Echocardiogram: LVEF 55-60%      Impression / Plan:    Patient Active Problem List   Diagnosis Code    Acute on chronic diastolic CHF (congestive heart failure) ([facility]) I50.33    ALEJANDRINA (acute kidney injury) ([facility] [address] N17.9    Acute exacerbation of CHF (congestive heart failure) ([facility]) I50.9    DM (diabetes mellitus) ([facility] [address] E11.9    Hypertension I10    Respiratory failure ([facility]) J96.90    Acute coronary syndrome ([facility]) I24.9    Obstructive sleep apnea, adult G47.33    Paroxysmal atrial fibrillation ([facility]) I48.0    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 ([facility]) I31.89, E11.65    Dyspnea due to congestive heart failure ([facility]) I50.9    Aspiration pneumonia ([facility]) J69.0    Hyperkalemia E87.5    Elevated troponin R79.89    COPD (chronic obstructive pulmonary disease) ([facility]) J44.9    CHF (congestive heart failure) ([facility]) I50.9    Insufficiency, respiratory, acute ([facility]) R06.89    Infiltrate noted on imaging study R93.8    COPD exacerbation ([facility]) J44.1    Acute on chronic respiratory failure with hypoxia ([facility]) J96.21    Chest pain R07.9    Anxiety F41.9    UTI (urinary tract infection) N39.0    Obesity hypoventilation syndrome ([facility]) E66.2    Pulmonary hypertension, moderate to severe ([facility]) I27.2       Hypercapnic/hypoxic respiratory failure    Continue IV Lasix  Strict I/O  Continue management as per hospital medicine         Signed By: Violette Salinas MD     February 28, 2017

## 2017-03-01 NOTE — PROGRESS NOTES
PATIENT REMOVED FROM BIPAP AND PLACED ON 4L NC AS ORDERED BY DR. Balbir Lopez. PATIENT IS TO BE PLACED ON BIPAP QHS AND AS NEEDED. CURRENT SPO2 98%.

## 2017-03-01 NOTE — PROGRESS NOTES
Problem: Mobility Impaired (Adult and Pediatric)  Goal: *Acute Goals and Plan of Care (Insert Text)  Physical Therapy Goals  Initiated 2/27/2017 and to be accomplished within 7 day(s)  1. Patient will move from supine to sit and sit to supine , scoot up and down and roll side to side in bed with supervision/set-up. 2. Patient will transfer from bed to chair and chair to bed with minimal assistance/contact guard assist using the least restrictive device. 3. Patient will perform sit to stand with minimal assistance/contact guard assist.  4. Patient will ambulate with minimal assistance/contact guard assist for 50 feet with the least restrictive device. Outcome: Progressing Towards Goal  PHYSICAL THERAPY TREATMENT     Patient: Pirncess Roman (19 y.o. female)  Date: 3/1/2017  Diagnosis: CHF NYHA class IV, acute, diastolic (HCC)  CHF NYHA class IV, acute, diastolic (HCC) Acute on chronic respiratory failure with hypoxia [facility])       Precautions: Fall   Chart, physical therapy assessment, plan of care and goals were reviewed. ASSESSMENT:  Pt is able to ambulate to/from chair with cuing and close supervision. Pt requires no assist for standing from bed and chair, but line management is needed. Pt left in chair and safe educated on close supervision needs. Progression toward goals:  [ ]      Improving appropriately and progressing toward goals  [X]      Improving slowly and progressing toward goals  [ ]      Not making progress toward goals and plan of care will be adjusted       PLAN:  Patient continues to benefit from skilled intervention to address the above impairments. Continue treatment per established plan of care. Discharge Recommendations:  Andrés Nelson or To Be Determined  Further Equipment Recommendations for Discharge:  bedside commode and rolling walker       SUBJECTIVE:   Patient stated Harvis Harman you get me to the chair.       OBJECTIVE DATA SUMMARY:   Critical Behavior:  Neurologic State: Alert  Orientation Level: Oriented to person, Oriented to place, Disoriented to situation, Disoriented to time  Cognition: Follows commands  Safety/Judgement: Fall prevention  Functional Mobility Training:  Bed Mobility:  Rolling: Stand-by asssistance  Supine to Sit: Stand-by asssistance  Sit to Supine: Stand-by asssistance  Scooting: Stand-by asssistance  Transfers:  Sit to Stand: Contact guard assistance  Stand to Sit: Contact guard assistance  Balance:  Sitting: Intact  Standing: Intact; With support  Ambulation/Gait Training:  Distance (ft): 8 Feet (ft)  Assistive Device: Walker, rolling;Gait belt  Ambulation - Level of Assistance: Minimal assistance;Contact guard assistance;Assist x2  Gait Abnormalities: Decreased step clearance; Step to gait  Base of Support: Widened  Stance: Time  Speed/Kristin: Slow  Step Length: Right shortened;Left shortened  Swing Pattern: Right asymmetrical;Left asymmetrical  Interventions: Safety awareness training;Verbal cues  Pain:  Pain Scale 1: Numeric (0 - 10)  Pain Intensity 1: 0  Pain Location 1: Back  Pain Description 1: Aching  Pain Intervention(s) 1: Rest;Repositioned; Emotional support  Activity Tolerance:   Good  Please refer to the flowsheet for vital signs taken during this treatment.   After treatment:   [X] Patient left in no apparent distress sitting up in chair  [ ] Patient left in no apparent distress in bed  [X] Call bell left within reach  [X] Nursing notified  [X] Caregiver present  [ ] Bed alarm activated      Vesta Zoraer, PTA   Time Calculation: 38 mins

## 2017-03-01 NOTE — PROGRESS NOTES
Problem: Dysphagia (Adult)  Goal: *Acute Goals and Plan of Care (Insert Text)  Dysphagia Present: mild  Aspiration: at risk     Recommendations:  Diet: dental soft and thin liquids  Meds: as tolerated  Aspiration Precautions  Other: small sips/bites, slow rate    Goals: Patient will:  1. Tolerate PO trials with 0 s/s overt distress in 4/5 trials  2. Utilize compensatory swallow strategies/maneuvers (decrease bite/sip, size/rate, alt. liq/sol) with min cues in 4/5 trials  3. Perform oral-motor/laryngeal exercises to increase oropharyngeal swallow function with min cues  4. Complete an objective swallow study (i.e., MBSS) to assess swallow integrity, r/o aspiration, and determine of safest LRD, min A  Outcome: Progressing Towards Goal  SPEECH LANGUAGE PATHOLOGY BEDSIDE SWALLOW EVALUATION     Patient: Princess Roman (36 y.o. female)  Date: 3/1/2017  Primary Diagnosis: CHF NYHA class IV, acute, diastolic (HCC)  CHF NYHA class IV, acute, diastolic (HCC)        Precautions: aspiration  Fall      ASSESSMENT :  Based on the objective data described below, the patient presents with mild oropharyngeal dysphagia. Clinical evaluation of swallow completed with pt A&Ox2. Denies h/o dysphagia. Note baseline COPD, CHF and multiple recent hospitalizations. PO assessment of puree and regular solids as well as thin liquids tolerated without overt s/s penetration/aspiration noted. Mildly labored mastication of solid cracker in setting of reduced endurance; would benefit from soft solid texture. Brief pharyngeal swallow delay to palpation appreciated. Educated pt re: aspiration risk, safe swallow strategies and diet recommendations; understanding voiced. Posted recs at bedside and d/w RN. ST to f/u for diet tolerance/advancement; would consider MBS to rule out silent aspiration given complicated underlying medical dx. Patient will benefit from skilled intervention to address the above impairments.   Patients rehabilitation potential is considered to be Good  Factors which may influence rehabilitation potential include:   [ ]            None noted  [X]            Mental ability/status  [X]            Medical condition  [ ]            Home/family situation and support systems  [ ]            Safety awareness  [ ]            Pain tolerance/management  [ ]            Other:        PLAN : dental soft and thin liquids, safe swallow strategies  Recommendations and Planned Interventions:  ST to f/u for diet tolerance/advancement, consider MBS, education and swallow strategy training  Frequency/Duration: Patient will be followed by speech-language pathology 1-2 times per day/4-7 days per week to address goals. Discharge Recommendations: To Be Determined       SUBJECTIVE:   Patient stated I'd like some chicken salad.       OBJECTIVE:       Past Medical History:   Diagnosis Date    A[facility]fib ([facility] [address]      Arthritis      Back injury      Chronic obstructive pulmonary disease (HCC)      Diabetes ([facility] [address]      Fibromyalgia      Heart failure ([facility] [address]      Hypertension      MRSA (methicillin resistant Staphylococcus aureus)      Obesity hypoventilation syndrome ([facility] [address] 2/28/2017    Pulmonary hypertension, moderate to severe ([facility] [address] 2/28/2017     Past Surgical History:   Procedure Laterality Date    CARDIAC SURG PROCEDURE UNLIST        HX CORONARY STENT PLACEMENT        HX KNEE REPLACEMENT        HX ORTHOPAEDIC         bilateral knee replacement     Prior Level of Function/Home Situation: per chart/pt  Home Situation  Home Environment: Private residence  # Steps to Enter: 1  Rails to Enter: Yes  Hand Rails : Left  One/Two Story Residence: One story  Living Alone: No  Support Systems: Spouse/Significant Other/Partner  Patient Expects to be Discharged to[de-identified] Rehabilitation facility  Current DME Used/Available at Home: Walker, rolling, Cane, straight, Commode, bedside, Grab bars  Tub or Shower Type: Tub/Shower combination  Diet prior to admission: dental soft/thin  Current Diet:  Dental soft/thin   Cognitive and Communication Status:  Neurologic State: Alert  Orientation Level: Oriented to person, Oriented to place, Disoriented to situation, Disoriented to time  Cognition: Follows commands  Perception: Appears intact  Perseveration: No perseveration noted  Safety/Judgement: Fall prevention  Oral Assessment:  Oral Assessment  Labial: No impairment  Dentition: Natural;Poor  Oral Hygiene: fair  Lingual: Decreased rate  Velum: No impairment  Mandible: No impairment  P.O. Trials:  Patient Position: [facility]  Vocal quality prior to P.O.: No impairment  Consistency Presented: Thin liquid;Puree; Solid  How Presented: Self-fed/presented;Cup/sip;Straw;Spoon  How Much:  (x3oz thin, x2oz puree, x3 solid)  Bolus Acceptance: No impairment  Bolus Formation/Control: Impaired  Type of Impairment: Delayed;Mastication  Propulsion: No impairment  Oral Residue: None  Initiation of Swallow: Delayed (# of seconds)  Laryngeal Elevation: Functional  Aspiration Signs/Symptoms: None  Pharyngeal Phase Characteristics: Poor endurance  Effective Modifications:  Alternate liquids/solids;Small sips and bites  Cues for Modifications: Minimal        Oral Phase Severity: Mild  Pharyngeal Phase Severity : Mild     GCODESwallowing:   Swallow Current Status CI= 1-19%   Swallow Goal Status CI= 1-19%     The severity rating is based on the following outcomes:  GLORIA Noms Swallow Level 6              Clinical Judgement     PAIN:  Start of Eval: 0  End of Eval: 0      After treatment:   [ ]            Patient left in no apparent distress sitting up in chair  [X]            Patient left in no apparent distress in bed  [X]            Call bell left within reach  [X]            Nursing notified  [X]            Family present  [ ]            Caregiver present  [ ]            Bed alarm activated      COMMUNICATION/EDUCATION:   [X]            Aspiration precautions; swallow safety; compensatory techniques. [X]            Patient/family have participated as able in goal setting and plan of care. [X]            Patient/family agree to work toward stated goals and plan of care. [ ]            Patient understands intent and goals of therapy; neutral about participation. [ ]            Patient unable to participate in goal setting/plan of care; educ ongoing with interdisciplinary staff  [X]            Posted safety precautions in patient's room.      Thank you for this referral.  Nery Summers, SLP  Time Calculation: 24 mins

## 2017-03-01 NOTE — CONSULTS
[facility]  Pulmonary, Critical Care, and Sleep Medicine    Name: Giacomo Garza MRN: [id number]   : 1945 Hospital: [facility]   Date: 3/1/2017        Critical Care Follow up Patient Consult    Requesting MD:                                                  Reason for CC Consult:  IMPRESSION:   · Acute hypoxic respiratory failure, likely due to pulmonary congestion   · Likely severe HFPEF given severe LA dilation and CXR  · Moderate pulmonary  Hypertension, likely combined group 2 (HFPEF) and group 3 (OHS)  · OHS, decompensated resulting in hypercapnic respiratory failure  · Acute diastolic  Heart failure  · UTI  · Pafib. · Recurrent admissions for resp failure (10x this year)      RECOMMENDATIONS:   · Neuro: minimize sedatives/narcotics, as these will exacerbate hypercapnia  · CV: likely severe HFPEF given CXR with e/o hypervolemia and TTE with severe LA dilation. Please consult cardiology. Will slowly add low- dose BB in addition to Dig for rate control as pt HD stable and off pressors. · Resp: hypercapnic failure, likely 2/2 decompensated OHS. UTI may be contributing, as is CHF. Minimize sedation as above. Continue nebs. Much improved today, will trial off BIPAP during day, and continue BIPAP at night  · GI: NPO for now but advance if can tolerate being off bipap, PPI  · Renal: Cardio-renal syndrome. Improved with IV lasix. Continue diuresis today though will reduce lasix and start diuril due to hypernatremia. Replete K  · ID: GNR x2 in urine, both sensitive to ceftriaxone; DC madyson  · Heme: CBC stable, re-start warfarin. Lovenox for DVT prophy until therapeutic  · Endo: glucose control adequate on lantus. Prior TFTs do not suppor hypothyroidism as contributor to OHS    Overall improved  CVC and sosa will remain today for adequate access and strict I/Os respectively    Alfred Myles MD  PCCM  40 min     Subjective/History:      This patient has been seen and evaluated at the request of Dr. Miguelina Dahl for acute respiratory failure   Patient is a 66 y.o. female with mulitple medical problems listed below including moderate pulm htn, hermelinda/ohs and copd  was recently discharged from this facility to SNF. Pt was discharged on BIPAP qhs. Pt reports that she was not able to get  Her bipap therapy from SNF. Day prior to admission, she finally had bipap but was not functioning. Also noted increase in leg swellings   Pt reports her breathing status continue to declined henc brought to ED. Pt was placed on bipap overnight. This am transition to HFNC. Feeling much better. Pt was also diuresed 3.2L   NO chest pain, no palpittiaon. No fever or chills. 3/1: diuresed well, much improved today. Hungry and would like to eat. Past Medical History:   Diagnosis Date    A-fib [facility])     Arthritis     Back injury     Chronic obstructive pulmonary disease (HCC)     Diabetes [facility]     Fibromyalgia     Heart failure ([facility] [address] [facility]     Hypertension     MRSA (methicillin resistant Staphylococcus aureus)     Obesity hypoventilation syndrome ([facility] [address] 2/28/2017    Pulmonary hypertension, moderate to severe ([facility] [address] 2/28/2017      Past Surgical History:   Procedure Laterality Date    CARDIAC SURG PROCEDURE UNLIST      HX CORONARY STENT PLACEMENT      HX KNEE REPLACEMENT      HX ORTHOPAEDIC      bilateral knee replacement      Prior to Admission medications    Medication Sig Start Date End Date Taking? Authorizing Provider   arformoterol (BROVANA) 15 mcg/2 mL nebu neb solution 2 mL by Nebulization route two (2) times a day. 2/9/17  Yes Dane Hancock DO   budesonide (PULMICORT) 0.5 mg/2 mL nbsp 2 mL by Nebulization route two (2) times a day. 2/9/17  Yes Dane Hancock DO   clonazePAM (KLONOPIN) 0.5 mg tablet Take 0.5 Tabs by mouth two (2) times a day.  Max Daily Amount: 0.5 mg. 2/9/17  Yes Dane Hancock DO   dilTIAZem (CARDIZEM) 60 mg tablet Take 1 Tab by mouth Before breakfast, lunch, and dinner. 2/9/17  Yes Lori Brucees, DO   docusate sodium (COLACE) 100 mg capsule Take 1 Cap by mouth two (2) times a day for 90 days. 2/9/17 5/10/17 Yes Elmon Pries, DO   furosemide (LASIX) 40 mg tablet Take 1 Tab by mouth daily. 2/9/17  Yes Elmon Pries, DO   pantoprazole (PROTONIX) 40 mg tablet Take 1 Tab by mouth Daily (before breakfast). 2/9/17  Yes Elvikki Brucees, DO   simvastatin (ZOCOR) 10 mg tablet Take 1 Tab by mouth nightly. 2/9/17  Yes Lori Brucees, DO   insulin glargine (LANTUS) 100 unit/mL injection 25 Units by SubCUTAneous route nightly. 2/9/17  Yes Lori Fan, DO   albuterol-ipratropium (DUO-NEB) 2.5 mg-0.5 mg/3 ml nebulizer solution 3 mL by Nebulization route every four (4) hours as needed for Shortness of Breath. 1/12/16  Yes Aletta Dancer, MD   pregabalin (LYRICA) 75 mg capsule Take  by mouth. Yes Paul Sloan MD   warfarin (COUMADIN) 2.5 mg tablet Take 2.5 mg by mouth daily. Yes Paul Sloan MD   carvedilol (COREG) 25 mg tablet Take 25 mg by mouth two (2) times daily (with meals). Yes Paul Sloan MD   aspirin 81 mg tablet Take 81 mg by mouth. Yes Paul Sloan MD   FERROUS SULFATE by Does Not Apply route. Yes Paul Sloan MD   traZODone (DESYREL) 50 mg tablet Take 1 Tab by mouth nightly. 2/9/17   Lori Pries, DO   insulin lispro (HUMALOG) 100 unit/mL injection AC and HS  Indications: type 2 diabetes mellitus 2/9/17   Elmon Pries, DO   insulin lispro (HUMALOG) 100 unit/mL injection AC  Indications: type 2 diabetes mellitus 2/9/17   Elmon Pries, DO   oxyCODONE-acetaminophen (PERCOCET) 5-325 mg per tablet Take 2 Tabs by mouth every four (4) hours as needed. Max Daily Amount: 12 Tabs. 2/9/17   Elmon Pries, DO   codeine-butalbital-acetaminophen-caffeine (FIORICET WITH CODEINE) -77-30 mg per capsule Take  by mouth every four (4) hours as needed for Headache.     Paul Sloan MD CYANOCOBALAMIN, VITAMIN B-12, (VITAMIN B-12 PO) Take  by mouth. Phys Other, MD     Current Facility-Administered Medications   Medication Dose Route Frequency    hydrocortisone Sod Succ (PF) (SOLU-CORTEF) injection 25 mg  25 mg IntraVENous Q8H    potassium chloride 10 mEq in 100 ml IVPB  10 mEq IntraVENous Q1H    nystatin (MYCOSTATIN) 100,000 unit/gram powder   Topical BID    ipratropium (ATROVENT) 0.02 % nebulizer solution 0.5 mg  0.5 mg Nebulization QID RT    digoxin (LANOXIN) injection 125 mcg  125 mcg IntraVENous DAILY    metoprolol tartrate (LOPRESSOR) tablet 12.5 mg  12.5 mg Oral Q6H    pantoprazole (PROTONIX) 40 mg in sodium chloride 0.9 % 10 mL injection  40 mg IntraVENous DAILY    meropenem (MERREM) 1 g in 0.9% sodium chloride (MBP/ADV) 50 mL MBP  1 g IntraVENous Q12H    clonazePAM (KlonoPIN) tablet 0.25 mg  0.25 mg Oral BID    docusate sodium (COLACE) capsule 100 mg  100 mg Oral BID    insulin glargine (LANTUS) injection 25 Units  25 Units SubCUTAneous QHS    simvastatin (ZOCOR) tablet 10 mg  10 mg Oral QHS    pregabalin (LYRICA) capsule 75 mg  75 mg Oral BID    aspirin chewable tablet 81 mg  81 mg Oral DAILY    arformoterol (BROVANA) neb solution 15 mcg  15 mcg Nebulization BID RT    budesonide (PULMICORT) 500 mcg/2 ml nebulizer suspension  500 mcg Nebulization BID RT    furosemide (LASIX) injection 40 mg  40 mg IntraVENous Q12H    insulin lispro (HUMALOG) injection   SubCUTAneous AC&HS     Allergies   Allergen Reactions    Latex Hives    Adhesive Tape-Silicones Unknown (comments)    Bees [Sting, Bee] Unknown (comments)    Garlic Nausea and Vomiting    Zoloft [Sertraline] Hives      Social History   Substance Use Topics    Smoking status: Never Smoker    Smokeless tobacco: Not on file    Alcohol use No      History reviewed. No pertinent family history. Review of Systems:  A comprehensive review of systems was negative except for that written in the HPI.     Objective: Vital Signs:    Visit Vitals    /58 (BP 1 Location: Left arm, BP Patient Position: At rest;Head of bed elevated (Comment degrees))    Pulse (!) 59    Temp 97.1 °F (36.2 °C)    Resp 17    Wt 115.2 kg (253 lb 15.5 oz)    SpO2 99%    Breastfeeding No    BMI 42.26 kg/m2       O2 Device: BIPAP   O2 Flow Rate (L/min): 40 l/min   Temp (24hrs), Av.7 °F (36.5 °C), Min:97.1 °F (36.2 °C), Max:99.1 °F (37.3 °C)       Intake/Output:   Last shift:       07 -  190  In: -   Out: 650 [Urine:650]  Last 3 shifts: 1901 -  0700  In: 1376.9 [P.O.:440; I.V.:936.9]  Out: 3950 [Urine:3950]    Intake/Output Summary (Last 24 hours) at 17 0921  Last data filed at 17 8938   Gross per 24 hour   Intake           837.33 ml   Output             3800 ml   Net         -2962.67 ml     Hemodynamics:   . @MAP     . @CVP       Ventilator Settings:  Mode Rate Tidal Volume Pressure FiO2 PEEP            50 %       Peak airway pressure:      Minute ventilation: 7.2 l/min      ARDS network Guidelines: Lung protective strategy and Pl pressure goals____________    Physical Exam:    General:  Alert, cooperative, no distress, appears stated age. Head:  Normocephalic, without obvious abnormality, atraumatic. Eyes:  Conjunctivae/corneas clear. PERRL, EOMs intact. Nose: Nares normal. Septum midline. Mucosa normal. No drainage or sinus tenderness. Throat: Lips, mucosa, and tongue normal. Teeth and gums normal.   Neck: Supple, symmetrical, trachea midline, no adenopathy, thyroid: no enlargment/tenderness/nodules, no carotid bruit and no JVD. Back:   Symmetric, no curvature. ROM normal.   Lungs:   Clear to auscultation bilaterally. Chest wall:  No tenderness or deformity. Heart:  Regular rate and rhythm, S1, S2 normal, no murmur, click, rub or gallop. Abdomen:   Soft, non-tender. Bowel sounds normal. No masses,  No organomegaly. Extremities: Extremities normal, atraumatic, no cyanosis or edema. Pulses: 2+ and symmetric all extremities. Skin: Skin color, texture, turgor normal. No rashes or lesions   Lymph nodes: Cervical, supraclavicular, and axillary nodes normal.   Neurologic: Grossly nonfocal       Data:     Recent Results (from the past 24 hour(s))   POC G3    Collection Time: 02/28/17 10:21 AM   Result Value Ref Range    Device: BIPAP      FIO2 (POC) 50 %    pH (POC) 7.297 (L) 7.35 - 7.45      pCO2 (POC) 78.5 (H) 35.0 - 45.0 MMHG    pO2 (POC) 69 (L) 80 - 100 MMHG    HCO3 (POC) 38.4 (H) 22 - 26 MMOL/L    sO2 (POC) 90 (L) 92 - 97 %    Base excess (POC) 12 mmol/L    PEEP/CPAP (POC) 6 cmH2O    Allens test (POC) YES      Total resp. rate 19      Site LEFT RADIAL      Patient temp.  98.6      Specimen type (POC) ARTERIAL      Performed by Rachele Cueto     AVJALEESA, IAVAPS YES     GLUCOSE, POC    Collection Time: 02/28/17 11:14 AM   Result Value Ref Range    Glucose (POC) 129 (H) 70 - 110 mg/dL   GLUCOSE, POC    Collection Time: 02/28/17  4:02 PM   Result Value Ref Range    Glucose (POC) 136 (H) 70 - 110 mg/dL   CARDIAC PANEL,(CK, CKMB & TROPONIN)    Collection Time: 02/28/17  7:45 PM   Result Value Ref Range    CK 14 (L) 26 - 192 U/L    CK - MB <1.0 <3.6 ng/ml    CK-MB Index Cannot be calulated 0.0 - 4.0 %    Troponin-I, Qt. 0.53 (H) 0.00 - 0.06 NG/ML   GLUCOSE, POC    Collection Time: 02/28/17  9:07 PM   Result Value Ref Range    Glucose (POC) 130 (H) 70 - 110 mg/dL   CARDIAC PANEL,(CK, CKMB & TROPONIN)    Collection Time: 02/28/17 10:30 PM   Result Value Ref Range    CK 17 (L) 26 - 192 U/L    CK - MB 1.0 <3.6 ng/ml    CK-MB Index 5.9 (H) 0.0 - 4.0 %    Troponin-I, Qt. 0.50 (H) 0.00 - 0.06 NG/ML   CARDIAC PANEL,(CK, CKMB & TROPONIN)    Collection Time: 03/01/17  4:15 AM   Result Value Ref Range    CK 13 (L) 26 - 192 U/L    CK - MB <1.0 <3.6 ng/ml    CK-MB Index Cannot be calulated 0.0 - 4.0 %    Troponin-I, Qt. 0.44 (H) 0.00 - 0.06 NG/ML   PROTHROMBIN TIME + INR    Collection Time: 03/01/17  4:15 AM Result Value Ref Range    Prothrombin time 18.2 (H) 11.5 - 15.2 sec    INR 1.6 (H) 0.8 - 1.2     MAGNESIUM    Collection Time: 03/01/17  4:15 AM   Result Value Ref Range    Magnesium 2.1 1.8 - 2.4 mg/dL   METABOLIC PANEL, COMPREHENSIVE    Collection Time: 03/01/17  4:15 AM   Result Value Ref Range    Sodium 147 (H) 136 - 145 mmol/L    Potassium 3.4 (L) 3.5 - 5.5 mmol/L    Chloride 100 100 - 108 mmol/L    CO2 41 (HH) 21 - 32 mmol/L    Anion gap 6 3.0 - 18 mmol/L    Glucose 104 (H) 74 - 99 mg/dL    BUN 57 (H) 7.0 - 18 MG/DL    Creatinine 1.43 (H) 0.6 - 1.3 MG/DL    BUN/Creatinine ratio 40 (H) 12 - 20      GFR est AA 44 (L) >60 ml/min/1.73m2    GFR est non-AA 36 (L) >60 ml/min/1.73m2    Calcium 8.5 8.5 - 10.1 MG/DL    Bilirubin, total 0.7 0.2 - 1.0 MG/DL    ALT (SGPT) 13 13 - 56 U/L    AST (SGOT) 12 (L) 15 - 37 U/L    Alk. phosphatase 89 45 - 117 U/L    Protein, total 5.8 (L) 6.4 - 8.2 g/dL    Albumin 2.6 (L) 3.4 - 5.0 g/dL    Globulin 3.2 2.0 - 4.0 g/dL    A-G Ratio 0.8 0.8 - 1.7     CBC W/O DIFF    Collection Time: 03/01/17  4:15 AM   Result Value Ref Range    WBC 4.8 4.6 - 13.2 K/uL    RBC 3.44 (L) 4.20 - 5.30 M/uL    HGB 11.0 (L) 12.0 - 16.0 g/dL    HCT 35.3 35.0 - 45.0 %    .6 (H) 74.0 - 97.0 FL    MCH 32.0 24.0 - 34.0 PG    MCHC 31.2 31.0 - 37.0 g/dL    RDW 17.0 (H) 11.6 - 14.5 %    PLATELET 832 846 - 721 K/uL    MPV 10.6 9.2 - 11.8 FL   GLUCOSE, POC    Collection Time: 03/01/17  6:36 AM   Result Value Ref Range    Glucose (POC) 105 70 - 110 mg/dL   POC G3    Collection Time: 03/01/17  7:08 AM   Result Value Ref Range    Device: BIPAP      FIO2 (POC) 50 %    pH (POC) 7.443 7.35 - 7.45      pCO2 (POC) 65.5 (H) 35.0 - 45.0 MMHG    pO2 (POC) 65 (L) 80 - 100 MMHG    HCO3 (POC) 44.8 (H) 22 - 26 MMOL/L    sO2 (POC) 92 92 - 97 %    Base excess (POC) 21 mmol/L    PEEP/CPAP (POC) 12 cmH2O    Allens test (POC) YES      Total resp.  rate 22      Site LEFT RADIAL      Specimen type (POC) ARTERIAL      Performed by Sharmila GARSIA, FLAKITA YES               Telemetry:afib      Imaging:  I have personally reviewed the patients radiographs and have reviewed the reports:        2017  4:51 PM - Pardeep, Card Result In       Narrative      [facility] [place]  [address], [address]  [phone]    Transthoracic Echocardiogram    Patient: Topher Mares  MRN: [id number]  ACCT #: [de-identified]  : 1945  Age: 66 years  Gender: Female  Height: 65 in  Weight: 229.5 lb  BSA: 2.1 mï¾²  BP: 137 / 51 mmHg  Study date: 2017  Status: Routine  Location: Bedside  DMS ACC #: [id number]    Allergies: LATEX, ADHESIVE TAPE-SILICONES, STING, BEE, GARLIC, SERTRALINE    Referring_Ordering Physician: Walter Pires MD  Interpreting Physician: Luigi Jung MD  Technologist: Nandini Shi    SUMMARY:  Left ventricle: The ventricle was mildly dilated. Systolic function was  normal. Ejection fraction was estimated in the range of 55 % to 60 %. No  obvious wall motion abnormalities identified in the views obtained. can  not comment on diastolic function due to monophasic mitral doppler inflow  due to atrial fibrillation. Right ventricle: Systolic pressure was mildly increased. Estimated peak  pressure was 55 mmHg. Left atrium: The atrium is severely dilated. LA volume index was 78 ml/mï¾². Right atrium: The atrium is mildly dilated. Mitral valve: The posterior leaflet is thickened, calcified with reduced  mobility. Aortic valve: Aortic valve not well seen. Can not comment on strucutre of  aortic valve. Aortic valve is thickened and calcified. Peak velocity is  3.23 m/sec with mean gradient of 22.40 mm hg. Moderate aortic stenosis  with velocity criteria. Mild aortic regurgitation. Tricuspid valve: Pulmonary artery systolic pressure: 52 mmHg. COMPARISONS:  Comparison was made with the previous study of 2016. INDICATIONS: Atrial fibrillation, CHF.     HISTORY: Prior history: Risk factors: hypertension. PROCEDURE: This was a routine study. The study included complete 2D  imaging, M-mode, complete spectral Doppler, and color Doppler. The heart  rate was 89 bpm, at the start of the study. Systolic blood pressure was  137 mmHg, at the start of the study. Diastolic blood pressure was 51 mmHg,  at the start of the study. Image quality was fair. LEFT VENTRICLE: The ventricle was mildly dilated. Systolic function was  normal. Ejection fraction was estimated in the range of 55 % to 60 %. No  obvious wall motion abnormalities identified in the views obtained. can  not comment on diastolic function due to monophasic mitral doppler inflow  due to atrial fibrillation. Wall thickness was normal. DOPPLER:  Indeterminate diastolic function. RIGHT VENTRICLE: The size was normal. Systolic function was normal.  DOPPLER: Systolic pressure was mildly increased. Estimated peak pressure  was 55 mmHg. LEFT ATRIUM: The atrium is severely dilated. LA volume index was 78 ml/mï¾². RIGHT ATRIUM: The atrium is mildly dilated. MITRAL VALVE: There was mild annular dilatation. Normal valve structure. There was normal leaflet separation. DOPPLER: There was no evidence for  stenosis. The posterior leaflet is thickened, calcified with reduced  mobility. AORTIC VALVE: The valve was probably trileaflet. Leaflets exhibited mildly  increased thickness, mild calcification, and mildly reduced cuspal  separation. DOPPLER: Aortic valve not well seen. Can not comment on  strucutre of aortic valve. Aortic valve is thickened and calcified. Peak  velocity is 3.23 m/sec with mean gradient of 22.40 mm hg. Moderate aortic  stenosis with velocity criteria. Mild aortic regurgitation. There was mild  regurgitation. TRICUSPID VALVE: Normal valve structure. There was normal leaflet  separation. DOPPLER: There was no evidence for tricuspid stenosis. There  was no regurgitation.  Tricuspid regurgitation peak velocity: 3.4 m/sec. Right atrial pressure estimate: 8 mmHg. Pulmonary artery systolic  pressure: 52 mmHg. PULMONIC VALVE: Not well visualized, but normal Doppler findings. AORTA: The root exhibited normal size. SYSTEMIC VEINS: IVC: The inferior vena cava was not well visualized. PERICARDIUM: No significant pericardial effusion identified. MEASUREMENT TABLES    2D measurements  Right atrium   (Reference normals)  Area sys   23 cmï¾²   (8.3-19. 5)    Doppler measurements  Aortic valve   (Reference normals)  Valve area, cont   1 cmï¾²   (--)    SYSTEM MEASUREMENT TABLES    2D  Ao Diam: 3.3 cm  LVOT Diam: 2.1 cm  EF(Teich): 66.4 %  IVSd: 0.9 cm  LVIDd: 5.4 cm  LVIDs: 3.4 cm  LVPWd: 1 cm  LAESV Index (A-L): 78.2 ml/m2  LVEDV MOD A2C: 97.7 ml  LVEDV MOD A4C: 86.1 ml  LVESV MOD A2C: 42.1 ml  LVESV MOD A4C: 38.9 ml  RA Area: 22.6 cm2  RVIDd: 3.7 cm    CW  AV Vmean: 2 m/s  AV maxP.6 mmHg  AV meanP.7 mmHg  TR Vmax: 3.4 m/s  TR maxP.3 mmHg    PW  SANIA (VTI): 1 cm2  LVOT meanP.4 mmHg    Prepared and E-signed by    Zoë Singh MD  Signed 2017 16:51:06     esults    XR CHEST PORT (Accession [id number]) (Order [id number])         Allergies        Unspecified: Latex; Adhesive Tape-silicones; Bees [Sting, Bee];  Garlic;  Zoloft [Sertraline]       Result Information      Status Provider Status        Final result (Exam End: 2017  4:37 PM) Open        Study Result      Chest, single view     Indication: Shortness of breath, pulmonary edema.     Comparison: Several prior exams, most recently 2017.     Findings: Portable upright AP view of the chest was obtained. The lungs are  considerably underexpanded, similar to prior, with associated bronchovascular  crowding as well as prominence of the central pulmonary vasculature. No  pneumothorax or large pleural effusion. Cardiac enlargement as well as an  atherosclerotic and tortuous thoracic aorta are not substantially changed from  prior.  No acute osseous abnormality identified.     IMPRESSION  IMPRESSION:  1. Underexpanded lungs with associated bronchovascular crowding and central  pulmonary vascular prominence, similar to prior. 2. Cardiomegaly, also similar to prior.                   Total critical care time exclusive of procedures: 40 minutes  An Helm MD  3/1/2017, 12:18 PM

## 2017-03-01 NOTE — PROGRESS NOTES
3022  Report received from ZAHRA Arnold RN. Assumed patient care. 0930  Patient removed from BIPAP and placed on 4L NC by RT.    0945  Patient frequently asks \"Am I dying? \". Also asked if she would \"get a heart transplant now\". Patient requires a lot of emotional support and encouragement. 1100  Palliative care at bedside. 1244  Dr. Ari Marte at bedside. 1330  ST at bedside for swallow eval.    1500  PT/OT at bedside. 1934  Bedside and Verbal shift change report given to BENJI Muller (oncoming nurse) by Mason Maynard. Prabhakar Ramos, JAYA (offgoing nurse). Report included the following information SBAR, Kardex, Intake/Output, MAR, Recent Results and Cardiac Rhythm a-fib.

## 2017-03-02 LAB
ALBUMIN SERPL BCP-MCNC: 2.7 G/DL (ref 3.4–5)
ALBUMIN/GLOB SERPL: 0.9 {RATIO} (ref 0.8–1.7)
ALP SERPL-CCNC: 81 U/L (ref 45–117)
ALT SERPL-CCNC: 14 U/L (ref 13–56)
ANION GAP BLD CALC-SCNC: 1 MMOL/L (ref 3–18)
AST SERPL W P-5'-P-CCNC: 13 U/L (ref 15–37)
BILIRUB SERPL-MCNC: 0.6 MG/DL (ref 0.2–1)
BUN SERPL-MCNC: 44 MG/DL (ref 7–18)
BUN/CREAT SERPL: 40 (ref 12–20)
CALCIUM SERPL-MCNC: 8.6 MG/DL (ref 8.5–10.1)
CHLORIDE SERPL-SCNC: 102 MMOL/L (ref 100–108)
CK MB CFR SERPL CALC: ABNORMAL % (ref 0–4)
CK MB SERPL-MCNC: <1 NG/ML (ref 5–25)
CK SERPL-CCNC: 17 U/L (ref 26–192)
CO2 SERPL-SCNC: 44 MMOL/L (ref 21–32)
CREAT SERPL-MCNC: 1.1 MG/DL (ref 0.6–1.3)
ERYTHROCYTE [DISTWIDTH] IN BLOOD BY AUTOMATED COUNT: 16.9 % (ref 11.6–14.5)
GLOBULIN SER CALC-MCNC: 3 G/DL (ref 2–4)
GLUCOSE BLD STRIP.AUTO-MCNC: 122 MG/DL (ref 70–110)
GLUCOSE BLD STRIP.AUTO-MCNC: 269 MG/DL (ref 70–110)
GLUCOSE BLD STRIP.AUTO-MCNC: 303 MG/DL (ref 70–110)
GLUCOSE BLD STRIP.AUTO-MCNC: 381 MG/DL (ref 70–110)
GLUCOSE SERPL-MCNC: 117 MG/DL (ref 74–99)
HCT VFR BLD AUTO: 35 % (ref 35–45)
HGB BLD-MCNC: 10.9 G/DL (ref 12–16)
INR PPP: 2.2 (ref 0.8–1.2)
MAGNESIUM SERPL-MCNC: 2.2 MG/DL (ref 1.8–2.4)
MCH RBC QN AUTO: 31.7 PG (ref 24–34)
MCHC RBC AUTO-ENTMCNC: 31.1 G/DL (ref 31–37)
MCV RBC AUTO: 101.7 FL (ref 74–97)
PLATELET # BLD AUTO: 162 K/UL (ref 135–420)
PMV BLD AUTO: 10 FL (ref 9.2–11.8)
POTASSIUM SERPL-SCNC: 3.2 MMOL/L (ref 3.5–5.5)
PROT SERPL-MCNC: 5.7 G/DL (ref 6.4–8.2)
PROTHROMBIN TIME: 23.5 SEC (ref 11.5–15.2)
RBC # BLD AUTO: 3.44 M/UL (ref 4.2–5.3)
SODIUM SERPL-SCNC: 147 MMOL/L (ref 136–145)
TROPONIN I SERPL-MCNC: 0.44 NG/ML (ref 0–0.06)
WBC # BLD AUTO: 4.5 K/UL (ref 4.6–13.2)

## 2017-03-02 PROCEDURE — 74011000250 HC RX REV CODE- 250: Performed by: INTERNAL MEDICINE

## 2017-03-02 PROCEDURE — 94640 AIRWAY INHALATION TREATMENT: CPT

## 2017-03-02 PROCEDURE — 83735 ASSAY OF MAGNESIUM: CPT | Performed by: INTERNAL MEDICINE

## 2017-03-02 PROCEDURE — 85027 COMPLETE CBC AUTOMATED: CPT | Performed by: INTERNAL MEDICINE

## 2017-03-02 PROCEDURE — 82962 GLUCOSE BLOOD TEST: CPT

## 2017-03-02 PROCEDURE — 74011250637 HC RX REV CODE- 250/637: Performed by: HOSPITALIST

## 2017-03-02 PROCEDURE — 74011250636 HC RX REV CODE- 250/636: Performed by: HOSPITALIST

## 2017-03-02 PROCEDURE — 80053 COMPREHEN METABOLIC PANEL: CPT | Performed by: INTERNAL MEDICINE

## 2017-03-02 PROCEDURE — 82550 ASSAY OF CK (CPK): CPT | Performed by: INTERNAL MEDICINE

## 2017-03-02 PROCEDURE — 65610000006 HC RM INTENSIVE CARE

## 2017-03-02 PROCEDURE — 74011250636 HC RX REV CODE- 250/636: Performed by: INTERNAL MEDICINE

## 2017-03-02 PROCEDURE — 74011250637 HC RX REV CODE- 250/637: Performed by: INTERNAL MEDICINE

## 2017-03-02 PROCEDURE — 77010033678 HC OXYGEN DAILY

## 2017-03-02 PROCEDURE — 74011636637 HC RX REV CODE- 636/637: Performed by: INTERNAL MEDICINE

## 2017-03-02 PROCEDURE — 97530 THERAPEUTIC ACTIVITIES: CPT

## 2017-03-02 PROCEDURE — 85610 PROTHROMBIN TIME: CPT | Performed by: INTERNAL MEDICINE

## 2017-03-02 PROCEDURE — C9113 INJ PANTOPRAZOLE SODIUM, VIA: HCPCS | Performed by: INTERNAL MEDICINE

## 2017-03-02 PROCEDURE — 74011000258 HC RX REV CODE- 258: Performed by: INTERNAL MEDICINE

## 2017-03-02 PROCEDURE — 94660 CPAP INITIATION&MGMT: CPT

## 2017-03-02 PROCEDURE — 77030011256 HC DRSG MEPILEX <16IN NO BORD MOLN -A

## 2017-03-02 RX ORDER — CARVEDILOL 12.5 MG/1
12.5 TABLET ORAL ONCE
Status: COMPLETED | OUTPATIENT
Start: 2017-03-02 | End: 2017-03-02

## 2017-03-02 RX ORDER — POLYVINYL ALCOHOL 14 MG/ML
1 SOLUTION/ DROPS OPHTHALMIC AS NEEDED
Status: DISCONTINUED | OUTPATIENT
Start: 2017-03-02 | End: 2017-03-24 | Stop reason: HOSPADM

## 2017-03-02 RX ORDER — CARVEDILOL 12.5 MG/1
25 TABLET ORAL 2 TIMES DAILY WITH MEALS
Status: DISCONTINUED | OUTPATIENT
Start: 2017-03-02 | End: 2017-03-24 | Stop reason: HOSPADM

## 2017-03-02 RX ORDER — POTASSIUM CHLORIDE 20MEQ/15ML
40 LIQUID (ML) ORAL
Status: DISPENSED | OUTPATIENT
Start: 2017-03-02 | End: 2017-03-02

## 2017-03-02 RX ORDER — DIGOXIN 125 MCG
0.12 TABLET ORAL DAILY
Status: DISCONTINUED | OUTPATIENT
Start: 2017-03-02 | End: 2017-03-24 | Stop reason: HOSPADM

## 2017-03-02 RX ORDER — WARFARIN SODIUM 5 MG/1
2.5 TABLET ORAL ONCE
Status: COMPLETED | OUTPATIENT
Start: 2017-03-02 | End: 2017-03-02

## 2017-03-02 RX ORDER — PANTOPRAZOLE SODIUM 40 MG/1
40 TABLET, DELAYED RELEASE ORAL
Status: DISCONTINUED | OUTPATIENT
Start: 2017-03-03 | End: 2017-03-24 | Stop reason: HOSPADM

## 2017-03-02 RX ORDER — POTASSIUM CHLORIDE 20 MEQ/1
20 TABLET, EXTENDED RELEASE ORAL 2 TIMES DAILY
Status: DISCONTINUED | OUTPATIENT
Start: 2017-03-02 | End: 2017-03-08

## 2017-03-02 RX ORDER — FUROSEMIDE 10 MG/ML
40 INJECTION INTRAMUSCULAR; INTRAVENOUS EVERY 12 HOURS
Status: DISCONTINUED | OUTPATIENT
Start: 2017-03-02 | End: 2017-03-03

## 2017-03-02 RX ORDER — POTASSIUM CHLORIDE 20 MEQ/1
40 TABLET, EXTENDED RELEASE ORAL
Status: DISCONTINUED | OUTPATIENT
Start: 2017-03-02 | End: 2017-03-02

## 2017-03-02 RX ORDER — PREDNISONE 20 MG/1
40 TABLET ORAL
Status: DISCONTINUED | OUTPATIENT
Start: 2017-03-02 | End: 2017-03-02

## 2017-03-02 RX ORDER — DILTIAZEM HYDROCHLORIDE 30 MG/1
30 TABLET, FILM COATED ORAL
Status: DISCONTINUED | OUTPATIENT
Start: 2017-03-02 | End: 2017-03-21

## 2017-03-02 RX ORDER — POTASSIUM CHLORIDE 20 MEQ/1
40 TABLET, EXTENDED RELEASE ORAL
Status: COMPLETED | OUTPATIENT
Start: 2017-03-02 | End: 2017-03-02

## 2017-03-02 RX ADMIN — CLONAZEPAM 0.25 MG: 0.5 TABLET ORAL at 09:00

## 2017-03-02 RX ADMIN — FUROSEMIDE 40 MG: 10 INJECTION, SOLUTION INTRAMUSCULAR; INTRAVENOUS at 22:04

## 2017-03-02 RX ADMIN — ARFORMOTEROL TARTRATE 15 MCG: 15 SOLUTION RESPIRATORY (INHALATION) at 19:42

## 2017-03-02 RX ADMIN — FUROSEMIDE 40 MG: 10 INJECTION, SOLUTION INTRAMUSCULAR; INTRAVENOUS at 09:00

## 2017-03-02 RX ADMIN — NYSTATIN: 100000 POWDER TOPICAL at 22:04

## 2017-03-02 RX ADMIN — DIGOXIN 0.12 MG: 0.12 TABLET ORAL at 09:32

## 2017-03-02 RX ADMIN — INSULIN GLARGINE 25 UNITS: 100 INJECTION, SOLUTION SUBCUTANEOUS at 22:13

## 2017-03-02 RX ADMIN — SODIUM CHLORIDE 40 MG: 9 INJECTION INTRAMUSCULAR; INTRAVENOUS; SUBCUTANEOUS at 09:00

## 2017-03-02 RX ADMIN — WARFARIN SODIUM 2.5 MG: 5 TABLET ORAL at 17:11

## 2017-03-02 RX ADMIN — HYDROCORTISONE SODIUM SUCCINATE 25 MG: 100 INJECTION, POWDER, FOR SOLUTION INTRAMUSCULAR; INTRAVENOUS at 07:00

## 2017-03-02 RX ADMIN — CARVEDILOL 6.25 MG: 3.12 TABLET, FILM COATED ORAL at 08:59

## 2017-03-02 RX ADMIN — IPRATROPIUM BROMIDE 0.5 MG: 0.5 SOLUTION RESPIRATORY (INHALATION) at 19:43

## 2017-03-02 RX ADMIN — POTASSIUM CHLORIDE 20 MEQ: 20 TABLET, EXTENDED RELEASE ORAL at 21:57

## 2017-03-02 RX ADMIN — ASPIRIN 81 MG CHEWABLE TABLET 81 MG: 81 TABLET CHEWABLE at 08:59

## 2017-03-02 RX ADMIN — INSULIN LISPRO 9 UNITS: 100 INJECTION, SOLUTION INTRAVENOUS; SUBCUTANEOUS at 17:10

## 2017-03-02 RX ADMIN — CHLOROTHIAZIDE SODIUM 500 MG: 500 INJECTION, POWDER, LYOPHILIZED, FOR SOLUTION INTRAVENOUS at 11:09

## 2017-03-02 RX ADMIN — METHYLPREDNISOLONE SODIUM SUCCINATE 40 MG: 40 INJECTION, POWDER, FOR SOLUTION INTRAMUSCULAR; INTRAVENOUS at 09:00

## 2017-03-02 RX ADMIN — INSULIN LISPRO 12 UNITS: 100 INJECTION, SOLUTION INTRAVENOUS; SUBCUTANEOUS at 12:28

## 2017-03-02 RX ADMIN — SIMVASTATIN 10 MG: 20 TABLET, FILM COATED ORAL at 22:04

## 2017-03-02 RX ADMIN — POTASSIUM CHLORIDE 40 MEQ: 20 TABLET, EXTENDED RELEASE ORAL at 06:59

## 2017-03-02 RX ADMIN — CEFTRIAXONE 1 G: 1 INJECTION, POWDER, FOR SOLUTION INTRAMUSCULAR; INTRAVENOUS at 09:33

## 2017-03-02 RX ADMIN — DOCUSATE SODIUM 100 MG: 100 CAPSULE, LIQUID FILLED ORAL at 09:00

## 2017-03-02 RX ADMIN — DOCUSATE SODIUM 100 MG: 100 CAPSULE, LIQUID FILLED ORAL at 21:58

## 2017-03-02 RX ADMIN — IPRATROPIUM BROMIDE 0.5 MG: 0.5 SOLUTION RESPIRATORY (INHALATION) at 11:16

## 2017-03-02 RX ADMIN — DILTIAZEM HYDROCHLORIDE 30 MG: 30 TABLET, FILM COATED ORAL at 11:08

## 2017-03-02 RX ADMIN — BUDESONIDE 500 MCG: 0.5 INHALANT RESPIRATORY (INHALATION) at 19:42

## 2017-03-02 RX ADMIN — ARFORMOTEROL TARTRATE 15 MCG: 15 SOLUTION RESPIRATORY (INHALATION) at 07:42

## 2017-03-02 RX ADMIN — IPRATROPIUM BROMIDE 0.5 MG: 0.5 SOLUTION RESPIRATORY (INHALATION) at 07:44

## 2017-03-02 RX ADMIN — POTASSIUM CHLORIDE 40 MEQ: 20 SOLUTION ORAL at 12:00

## 2017-03-02 RX ADMIN — POTASSIUM CHLORIDE 20 MEQ: 20 TABLET, EXTENDED RELEASE ORAL at 09:33

## 2017-03-02 RX ADMIN — PREGABALIN 75 MG: 75 CAPSULE ORAL at 08:59

## 2017-03-02 RX ADMIN — IPRATROPIUM BROMIDE 0.5 MG: 0.5 SOLUTION RESPIRATORY (INHALATION) at 15:51

## 2017-03-02 RX ADMIN — NYSTATIN: 100000 POWDER TOPICAL at 08:59

## 2017-03-02 RX ADMIN — CLONAZEPAM 0.25 MG: 0.5 TABLET ORAL at 21:57

## 2017-03-02 RX ADMIN — PREGABALIN 75 MG: 75 CAPSULE ORAL at 21:59

## 2017-03-02 RX ADMIN — CARVEDILOL 25 MG: 12.5 TABLET, FILM COATED ORAL at 17:11

## 2017-03-02 RX ADMIN — CARVEDILOL 12.5 MG: 12.5 TABLET, FILM COATED ORAL at 11:09

## 2017-03-02 RX ADMIN — ACETAMINOPHEN 650 MG: 325 TABLET ORAL at 08:08

## 2017-03-02 RX ADMIN — POTASSIUM CHLORIDE 40 MEQ: 20 SOLUTION ORAL at 11:09

## 2017-03-02 RX ADMIN — BUDESONIDE 500 MCG: 0.5 INHALANT RESPIRATORY (INHALATION) at 07:44

## 2017-03-02 RX ADMIN — INSULIN LISPRO 15 UNITS: 100 INJECTION, SOLUTION INTRAVENOUS; SUBCUTANEOUS at 22:13

## 2017-03-02 RX ADMIN — DILTIAZEM HYDROCHLORIDE 30 MG: 30 TABLET, FILM COATED ORAL at 17:10

## 2017-03-02 RX ADMIN — ACETAMINOPHEN 650 MG: 325 TABLET ORAL at 13:01

## 2017-03-02 NOTE — PROGRESS NOTES
Cardiology Progress Note        Patient: Billy Martin        Sex: female          DOA: 2/25/2017  YOB: 1945      Age:  66 y.o.        LOS:  LOS: 3 days    Patient seen and examined. Chart reviewed. Assessment/Plan     Patient Active Problem List   Diagnosis Code    Acute on chronic diastolic CHF (congestive heart failure) ([facility]) I50.33    ALEJANDRINA (acute kidney injury) ([facility] [address] N17.9    Acute exacerbation of CHF (congestive heart failure) ([facility]) I50.9    [facility]di[facility] mellitus[facility] E11.9    Hypertension I10    Respiratory failure ([facility]) J96.90    Acute coronary syndrome ([facility]) I24.9    Obstructive sleep apnea, adult G47.33    Paroxysmal atrial fibrillation ([facility]) I48.0    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 ([facility]) S96.35, E11.65    Dyspnea due to congestive heart failure ([facility]) I50.9    Aspiration pneumonia ([facility]) J69.0    Hyperkalemia E87.5    Elevated troponin R79.89    COPD (chronic obstructive pulmonary disease) ([facility]) J44.9    CHF (congestive heart failure) ([facility]) I50.9    Insufficiency, respiratory, acute ([facility]) R06.89    Infiltrate noted on imaging study R93.8    COPD exacerbation ([facility]) J44.1    Acute on chronic respiratory failure with hypoxia ([facility]) J96.21    Chest pain R07.9    Anxiety F41.9    UTI (urinary tract infection) N39.0    Obesity hypoventilation syndrome ([facility]) E66.2    Pulmonary hypertension, moderate to severe ([facility]) I27.2    Permanent atrial fibrillation  Hypercapnic respiratory failure. Plan:  Start Full dose of Lovenox as INR is subtherapeutic   Continue IV diuretics  Strict I/O  Continue beta blocker  Continue management as per hospital medicine. Subjective:    cc: I feel much better today. My breathing is better      REVIEW OF SYSTEMS:     General: No fevers or chills.   Cardiovascular: No chest pain,No palpitations, No orthopnea, No PND, No leg swelling, No claudication  Pulmonary: Positive dyspnea. Gastrointestinal: No nausea, vomiting, bleeding  Neurology: No Dizziness    Objective:      Visit Vitals    /52    Pulse 79    Temp 98.1 °F (36.7 °C)    Resp 24    Wt 115.2 kg (253 lb 15.5 oz)    SpO2 93%    Breastfeeding No    BMI 42.26 kg/m2     Body mass index is 42.26 kg/(m^2). Physical Exam:  General Appearance: Comfortable, Morbidly obese, not using accessory muscles of respiration. HEENT: SIRIA. HEAD: Atraumatic  NECK: No JVD, no thyroidomeglay. CAROTIDS: No bruit  LUNGS: Clear bilaterally. HEART: S1+S2 audible, irregularly irregular, no murmur, no pericardial rub. ABD: Non-tender, BS Audible    EXT: No edema, and no cyanosis. VASCULAR EXAM: Pulses are intact. PSYCHIATRIC EXAM: Mood is appropriate. MUSCULOSKELETAL: Grossly no joint deformity. NEUROLOGICAL: AAO times 3, No motor and sensory deficit.   Medication:  Current Facility-Administered Medications   Medication Dose Route Frequency    hydrocortisone Sod Succ (PF) (SOLU-CORTEF) injection 25 mg  25 mg IntraVENous Q8H    carvedilol (COREG) tablet 6.25 mg  6.25 mg Oral BID WITH MEALS    furosemide (LASIX) injection 20 mg  20 mg IntraVENous Q12H    [START ON 3/2/2017] chlorothiazide (DIURIL) 500 mg in 0.9% sodium chloride 50 mL IVPB  500 mg IntraVENous DAILY    cefTRIAXone (ROCEPHIN) 1 g in 0.9% sodium chloride (MBP/ADV) 50 mL MBP  1 g IntraVENous Q24H    enoxaparin (LOVENOX) injection 120 mg  120 mg SubCUTAneous Q12H    nystatin (MYCOSTATIN) 100,000 unit/gram powder   Topical BID    ipratropium (ATROVENT) 0.02 % nebulizer solution 0.5 mg  0.5 mg Nebulization QID RT    digoxin (LANOXIN) injection 125 mcg  125 mcg IntraVENous DAILY    pantoprazole (PROTONIX) 40 mg in sodium chloride 0.9 % 10 mL injection  40 mg IntraVENous DAILY    clonazePAM (KlonoPIN) tablet 0.25 mg  0.25 mg Oral BID    docusate sodium (COLACE) capsule 100 mg  100 mg Oral BID    insulin glargine (LANTUS) injection 25 Units  25 Units SubCUTAneous QHS    simvastatin (ZOCOR) tablet 10 mg  10 mg Oral QHS    pregabalin (LYRICA) capsule 75 mg  75 mg Oral BID    aspirin chewable tablet 81 mg  81 mg Oral DAILY    arformoterol (BROVANA) neb solution 15 mcg  15 mcg Nebulization BID RT    budesonide (PULMICORT) 500 mcg/2 ml nebulizer suspension  500 mcg Nebulization BID RT    albuterol-ipratropium (DUO-NEB) 2.5 MG-0.5 MG/3 ML  3 mL Nebulization Q4H PRN    ondansetron (ZOFRAN) injection 4 mg  4 mg IntraVENous Q6H PRN    acetaminophen (TYLENOL) tablet 650 mg  650 mg Oral Q4H PRN    insulin lispro (HUMALOG) injection   SubCUTAneous AC&HS    glucose chewable tablet 16 g  4 Tab Oral PRN    glucagon (GLUCAGEN) injection 1 mg  1 mg IntraMUSCular PRN    dextrose (D50W) injection syrg 12.5-25 g  25-50 mL IntraVENous PRN    warfarin pharmacy to dose   Other PRN               Lab/Data Reviewed:       Recent Labs      03/01/17 0415 02/28/17   0510  02/27/17   0612   WBC  4.8  6.4  6.7   HGB  11.0*  11.7*  11.8*   HCT  35.3  39.6  36.8   PLT  177  172  179     Recent Labs      03/01/17 0415 02/28/17   0510  02/27/17   0612   NA  147*  142  141   K  3.4*  5.0  4.9   CL  100  101  99*   CO2  41*  37*  37*   GLU  104*  142*  265*   BUN  57*  60*  53*   CREA  1.43*  1.74*  1.73*   CA  8.5  8.2*  8.5       Signed By: Nidia Delgado MD     March 1, 2017

## 2017-03-02 NOTE — PROGRESS NOTES
6376  Report received from BENJI Kumar RN. Assumed patient care. 3952  Shift assessment complete per flowsheet. Patient sitting up in bed, VSS, NAD, reports feeling \"worse today\" and SOB, O2 sat 94%, RR 22, reports abdominal pain 6/10 PRN Tylenol administered per MAR. Patient's  at bedside. 1113  Patient up to bedside chair. 1755  Received call from Concepción Tam with Speech Therapy, patient is scheduled to have a modified barium swallow tomorrow morning. 1926  Bedside and Verbal shift change report given to BENJI Lopez (oncoming nurse) by Billy Richardson. Dalton Calix RN (offgoing nurse). Report included the following information SBAR, Kardex, Procedure Summary, Intake/Output, MAR, Recent Results and Cardiac Rhythm a-fib.

## 2017-03-02 NOTE — PROGRESS NOTES
Bedside and Verbal shift change report received from ZAHRA Robertson RN (offgoing nurse). Report included the following information SBAR, Kardex, Intake/Output, MAR and Recent Results Alarm parameters reviewed and opportunities for questions provided. 2015 Shift assessment completed, see EMR. 0030 Reassessment completed, see EMR    [id number] Reassessment completed, see EMR    8105 Patient's critical CO2 and potassium level was reported to Dr. Angele Libman. New order given for 40 mEq potassium chloride.

## 2017-03-02 NOTE — DIABETES MGMT
NUTRITION / GLYCEMIC CONTROL PLAN OF CARE        Diabetes Management:      - recommend: modify diet to add Consistent CHO, may need mealtime dose, recommend watch trends, has done well in past on Lantus 15- 20 units qhs and Humalog 5 units qac   - discussed in rounds, known h/o DM2, h/o of exacerbation of hyperglycemia with steroids, has needed a lot of encouragement to eat consistently in the past   - see Clinical RD notes for full nutrition assessment  - education: no  - goals: progressing   - BG rene be in target range of 140-180 mg/dl by 3/5/17   - PO intake will average at least 75% of meals offered by 3/5/17      - TDD: 31 units (25 Lantus, 6 Humalog)  - BG range:  mg/dl  - Hypo: none  - BG in target range (non-ICU: <140; ICU<180): [] Yes  [x] No  - Steroids: d/c'd today in rounds  - Intake: excellent   Patient Vitals for the past 100 hrs:   % Diet Eaten   02/27/17 0839 90 %        Current Insulin regimen:   - Lantus 25 units qhs  - Humalog Very Insulin Resistant Corrective Coverage    Home medication/insulin regimen:  - Lantus 25 units qhs  - Humalog SSI    Recent Glucose Results: Lab Results   Component Value Date/Time     (H) 03/02/2017 05:30 AM    GLUCPOC 303 (H) 03/02/2017 11:47 AM    GLUCPOC 305 (H) 03/02/2017 11:46 AM    GLUCPOC 122 (H) 03/02/2017 06:42 AM           Adequate glycemic control PTA:  [x] Yes  [] No * within recommended range for age + co-morbids    HbA1c: equivalent  to ave BGlucose of  mg/dl for 2-3 months prior to admission    Lab Results   Component Value Date/Time    Hemoglobin A1c 7.7 02/25/2017 04:21 PM       Diet:   Active Orders   Diet    DIET CARDIAC Soft Solids       M. Deloise Kocher, MPH, RD

## 2017-03-02 NOTE — CONSULTS
[facility]  Pulmonary, Critical Care, and Sleep Medicine    Name: Fredo Schaffer MRN: [id number]   : 1945 Hospital: [facility] [place]   Date: 3/2/2017        Critical Care Follow up Patient Consult    Requesting MD:                                                  Reason for CC Consult:  IMPRESSION:   · Acute hypoxic respiratory failure, likely due to pulmonary congestion   · Likely severe HFPEF given severe LA dilation and CXR  · Moderate pulmonary  Hypertension, likely combined group 2 (HFPEF) and group 3 (OHS)  · OHS, decompensated resulting in hypercapnic respiratory failure  · Acute diastolic  Heart failure  · UTI  · Pafib. · Recurrent admissions for resp failure (10x this year)      RECOMMENDATIONS:   · Neuro: minimize sedatives/narcotics, as these will exacerbate hypercapnia; OOB to chair; PT consult  · CV: likely severe HFPEF given CXR with e/o hypervolemia and TTE with severe LA dilation. Please consult cardiology. Will BP stable to high. Will re-start home dose of coreg, diltiazem at 1/2 dose, and continue dig  · Resp: hypercapnic failure, likely 2/2 decompensated OHS. UTI may be contributing, as is CHF. Minimize sedation as above. Continue nebs. Much improved today, BIPAP PRN, and continue BIPAP at night  · GI: ADA diet, PPI  · Renal: Cardio-renal syndrome. Improved with IV lasix. Add diuril today. Replete K  · ID: GNR x2 in urine, both sensitive to ceftriaxone; 7 day course  · Heme: CBC stable; on coumadin (therapeutic)  · Endo: glucose control adequate on lantus. Prior TFTs do not suppor hypothyroidism as contributor to OHS. No need for steroids, think her worsening status today is 2/2 AFib w/RVR    Overall improved vs time of ICU transfer, though Afib w/RVR today likely causing her worsened sxs vs. yesterday  CVC and sosa will remain today for adequate access and strict I/Os respectively    Anish Schuster MD  PCCM  35 min     Subjective/History:      This patient has been seen and evaluated at the request of Dr. Panda Oliveira for acute respiratory failure   Patient is a 66 y.o. female with mulitple medical problems listed below including moderate pulm htn, hermelinda/ohs and copd  was recently discharged from this facility to SNF. Pt was discharged on BIPAP qhs. Pt reports that she was not able to get  Her bipap therapy from SNF. Day prior to admission, she finally had bipap but was not functioning. Also noted increase in leg swellings   Pt reports her breathing status continue to declined henc brought to ED. Pt was placed on bipap overnight. This am transition to HFNC. Feeling much better. Pt was also diuresed 3.2L   NO chest pain, no palpittiaon. No fever or chills. 3/1: diuresed well, HR up today. Feeling a little more dyspnic today. Past Medical History:   Diagnosis Date    A-fib [facility])     Arthritis     Back injury     Chronic obstructive pulmonary disease (HCC)     Diabetes [facility]     Fibromyalgia     Heart failure ([facility]     Hypertension     MRSA (methicillin resistant Staphylococcus aureus)     Obesity hypoventilation syndrome ([facility] [facility] 2/28/2017    Pulmonary hypertension, moderate to severe ([facility] [address] [facility] 2/28/2017      Past Surgical History:   Procedure Laterality Date    CARDIAC SURG PROCEDURE UNLIST      HX CORONARY STENT PLACEMENT      HX KNEE REPLACEMENT      HX ORTHOPAEDIC      bilateral knee replacement      Prior to Admission medications    Medication Sig Start Date End Date Taking? Authorizing Provider   arformoterol (BROVANA) 15 mcg/2 mL nebu neb solution 2 mL by Nebulization route two (2) times a day. 2/9/17  Yes Hans Benson, DO   budesonide (PULMICORT) 0.5 mg/2 mL nbsp 2 mL by Nebulization route two (2) times a day. 2/9/17  Yes Hans Benson, DO   clonazePAM (KLONOPIN) 0.5 mg tablet Take 0.5 Tabs by mouth two (2) times a day.  Max Daily Amount: 0.5 mg. 2/9/17  Yes Hans Benson, DO   dilTIAZem (CARDIZEM) 60 mg tablet Take 1 Tab by mouth Before breakfast, lunch, and dinner. 2/9/17  Yes Bj Henson, DO   docusate sodium (COLACE) 100 mg capsule Take 1 Cap by mouth two (2) times a day for 90 days. 2/9/17 5/10/17 Yes Bj Henson DO   furosemide (LASIX) 40 mg tablet Take 1 Tab by mouth daily. 2/9/17  Yes Bj Henson, DO   pantoprazole (PROTONIX) 40 mg tablet Take 1 Tab by mouth Daily (before breakfast). 2/9/17  Yes Bj Henson, DO   simvastatin (ZOCOR) 10 mg tablet Take 1 Tab by mouth nightly. 2/9/17  Yes Bj Henson, DO   insulin glargine (LANTUS) 100 unit/mL injection 25 Units by SubCUTAneous route nightly. 2/9/17  Yes Bj Henson, DO   albuterol-ipratropium (DUO-NEB) 2.5 mg-0.5 mg/3 ml nebulizer solution 3 mL by Nebulization route every four (4) hours as needed for Shortness of Breath. 1/12/16  Yes Anne Limon MD   pregabalin (LYRICA) 75 mg capsule Take  by mouth. Yes Paul Sloan MD   warfarin (COUMADIN) 2.5 mg tablet Take 2.5 mg by mouth daily. Yes Paul Sloan MD   carvedilol (COREG) 25 mg tablet Take 25 mg by mouth two (2) times daily (with meals). Yes Paul Sloan MD   aspirin 81 mg tablet Take 81 mg by mouth. Yes Paul Sloan MD   FERROUS SULFATE by Does Not Apply route. Yes Paul Sloan MD   traZODone (DESYREL) 50 mg tablet Take 1 Tab by mouth nightly. 2/9/17   Bj Henson, DO   insulin lispro (HUMALOG) 100 unit/mL injection AC and HS  Indications: type 2 diabetes mellitus 2/9/17   Bj Henson, DO   insulin lispro (HUMALOG) 100 unit/mL injection AC  Indications: type 2 diabetes mellitus 2/9/17   Bj Henson, DO   oxyCODONE-acetaminophen (PERCOCET) 5-325 mg per tablet Take 2 Tabs by mouth every four (4) hours as needed. Max Daily Amount: 12 Tabs. 2/9/17   Bj Henson, DO   codeine-butalbital-acetaminophen-caffeine (FIORICET WITH CODEINE) -86-30 mg per capsule Take  by mouth every four (4) hours as needed for Headache. Paul Sloan MD   CYANOCOBALAMIN, VITAMIN B-12, (VITAMIN B-12 PO) Take  by mouth. Paul Sloan MD     Current Facility-Administered Medications   Medication Dose Route Frequency    digoxin (LANOXIN) tablet 0.125 mg  0.125 mg Oral DAILY    furosemide (LASIX) injection 40 mg  40 mg IntraVENous Q12H    methylPREDNISolone (PF) (SOLU-MEDROL) injection 40 mg  40 mg IntraVENous Q8H    potassium chloride (K-DUR, KLOR-CON) SR tablet 20 mEq  20 mEq Oral BID    carvedilol (COREG) tablet 6.25 mg  6.25 mg Oral BID WITH MEALS    cefTRIAXone (ROCEPHIN) 1 g in 0.9% sodium chloride (MBP/ADV) 50 mL MBP  1 g IntraVENous Q24H    nystatin (MYCOSTATIN) 100,000 unit/gram powder   Topical BID    ipratropium (ATROVENT) 0.02 % nebulizer solution 0.5 mg  0.5 mg Nebulization QID RT    pantoprazole (PROTONIX) 40 mg in sodium chloride 0.9 % 10 mL injection  40 mg IntraVENous DAILY    clonazePAM (KlonoPIN) tablet 0.25 mg  0.25 mg Oral BID    docusate sodium (COLACE) capsule 100 mg  100 mg Oral BID    insulin glargine (LANTUS) injection 25 Units  25 Units SubCUTAneous QHS    simvastatin (ZOCOR) tablet 10 mg  10 mg Oral QHS    pregabalin (LYRICA) capsule 75 mg  75 mg Oral BID    aspirin chewable tablet 81 mg  81 mg Oral DAILY    arformoterol (BROVANA) neb solution 15 mcg  15 mcg Nebulization BID RT    budesonide (PULMICORT) 500 mcg/2 ml nebulizer suspension  500 mcg Nebulization BID RT    insulin lispro (HUMALOG) injection   SubCUTAneous AC&HS     Allergies   Allergen Reactions    Latex Hives    Adhesive Tape-Silicones Unknown (comments)    Bees [Sting, Bee] Unknown (comments)    Garlic Nausea and Vomiting    Zoloft [Sertraline] Hives      Social History   Substance Use Topics    Smoking status: Never Smoker    Smokeless tobacco: Not on file    Alcohol use No      History reviewed. No pertinent family history.      Review of Systems:  A comprehensive review of systems was negative except for that written in the HPI.    Objective:   Vital Signs:    Visit Vitals    /61    Pulse 96    Temp 97.6 °F (36.4 °C)    Resp 23    Wt 115.2 kg (253 lb 15.5 oz)    SpO2 90%    Breastfeeding No    BMI 42.26 kg/m2       O2 Device: Nasal cannula   O2 Flow Rate (L/min): 5 l/min   Temp (24hrs), Av.8 °F (36.6 °C), Min:97.5 °F (36.4 °C), Max:98.1 °F (36.7 °C)       Intake/Output:   Last shift:         Last 3 shifts:  1901 -  0700  In: 937.3 [P.O.:440; I.V.:497.3]  Out: 3900 [Urine:3900]    Intake/Output Summary (Last 24 hours) at 17 1003  Last data filed at 17 0030   Gross per 24 hour   Intake              100 ml   Output             1550 ml   Net            -1450 ml     Hemodynamics:   . @MAP     . @CVP       Ventilator Settings:  Mode Rate Tidal Volume Pressure FiO2 PEEP            50 %       Peak airway pressure:      Minute ventilation: 7.7 l/min      ARDS network Guidelines: Lung protective strategy and Pl pressure goals____________    Physical Exam:    General:  Alert, cooperative, no distress, appears stated age. Head:  Normocephalic, without obvious abnormality, atraumatic. Eyes:  Conjunctivae/corneas clear. PERRL, EOMs intact. Nose: Nares normal. Septum midline. Mucosa normal. No drainage or sinus tenderness. Throat: Lips, mucosa, and tongue normal. Teeth and gums normal.   Neck: Supple, symmetrical, trachea midline, no adenopathy, thyroid: no enlargment/tenderness/nodules, no carotid bruit and no JVD. Back:   Symmetric, no curvature. ROM normal.   Lungs:   Clear to auscultation bilaterally. Chest wall:  No tenderness or deformity. Heart:  Regular rate and rhythm, S1, S2 normal, no murmur, click, rub or gallop. Abdomen:   Soft, non-tender. Bowel sounds normal. No masses,  No organomegaly. Extremities: Extremities normal, atraumatic, no cyanosis or edema. Pulses: 2+ and symmetric all extremities.    Skin: Skin color, texture, turgor normal. No rashes or lesions   Lymph nodes: Cervical, supraclavicular, and axillary nodes normal.   Neurologic: Grossly nonfocal       Data:     Recent Results (from the past 24 hour(s))   CARDIAC PANEL,(CK, CKMB & TROPONIN)    Collection Time: 03/01/17 10:50 AM   Result Value Ref Range    CK 12 (L) 26 - 192 U/L    CK - MB <1.0 <3.6 ng/ml    CK-MB Index Cannot be calulated 0.0 - 4.0 %    Troponin-I, Qt. 0.42 (H) 0.00 - 0.06 NG/ML   GLUCOSE, POC    Collection Time: 03/01/17 11:57 AM   Result Value Ref Range    Glucose (POC) 92 70 - 110 mg/dL   GLUCOSE, POC    Collection Time: 03/01/17  3:32 PM   Result Value Ref Range    Glucose (POC) 126 (H) 70 - 110 mg/dL   CARDIAC PANEL,(CK, CKMB & TROPONIN)    Collection Time: 03/01/17  7:50 PM   Result Value Ref Range    CK 17 (L) 26 - 192 U/L    CK - MB <1.0 <3.6 ng/ml    CK-MB Index Cannot be calulated 0.0 - 4.0 %    Troponin-I, Qt. 0.46 (H) 0.00 - 0.06 NG/ML   GLUCOSE, POC    Collection Time: 03/01/17  9:38 PM   Result Value Ref Range    Glucose (POC) 212 (H) 70 - 110 mg/dL   MAGNESIUM    Collection Time: 03/02/17  5:30 AM   Result Value Ref Range    Magnesium 2.2 1.8 - 2.4 mg/dL   CARDIAC PANEL,(CK, CKMB & TROPONIN)    Collection Time: 03/02/17  5:30 AM   Result Value Ref Range    CK 17 (L) 26 - 192 U/L    CK - MB <1.0 <3.6 ng/ml    CK-MB Index Cannot be calulated 0.0 - 4.0 %    Troponin-I, Qt. 0.44 (H) 0.00 - 0.06 NG/ML   PROTHROMBIN TIME + INR    Collection Time: 03/02/17  5:30 AM   Result Value Ref Range    Prothrombin time 23.5 (H) 11.5 - 15.2 sec    INR 2.2 (H) 0.8 - 1.2     METABOLIC PANEL, COMPREHENSIVE    Collection Time: 03/02/17  5:30 AM   Result Value Ref Range    Sodium 147 (H) 136 - 145 mmol/L    Potassium 3.2 (L) 3.5 - 5.5 mmol/L    Chloride 102 100 - 108 mmol/L    CO2 44 (HH) 21 - 32 mmol/L    Anion gap 1 (L) 3.0 - 18 mmol/L    Glucose 117 (H) 74 - 99 mg/dL    BUN 44 (H) 7.0 - 18 MG/DL    Creatinine 1.10 0.6 - 1.3 MG/DL    BUN/Creatinine ratio 40 (H) 12 - 20      GFR est AA 59 (L) >60 ml/min/1.73m2 GFR est non-AA 49 (L) >60 ml/min/1.73m2    Calcium 8.6 8.5 - 10.1 MG/DL    Bilirubin, total 0.6 0.2 - 1.0 MG/DL    ALT (SGPT) 14 13 - 56 U/L    AST (SGOT) 13 (L) 15 - 37 U/L    Alk. phosphatase 81 45 - 117 U/L    Protein, total 5.7 (L) 6.4 - 8.2 g/dL    Albumin 2.7 (L) 3.4 - 5.0 g/dL    Globulin 3.0 2.0 - 4.0 g/dL    A-G Ratio 0.9 0.8 - 1.7     CBC W/O DIFF    Collection Time: 17  5:30 AM   Result Value Ref Range    WBC 4.5 (L) 4.6 - 13.2 K/uL    RBC 3.44 (L) 4.20 - 5.30 M/uL    HGB 10.9 (L) 12.0 - 16.0 g/dL    HCT 35.0 35.0 - 45.0 %    .7 (H) 74.0 - 97.0 FL    MCH 31.7 24.0 - 34.0 PG    MCHC 31.1 31.0 - 37.0 g/dL    RDW 16.9 (H) 11.6 - 14.5 %    PLATELET 385 837 - 138 K/uL    MPV 10.0 9.2 - 11.8 FL   GLUCOSE, POC    Collection Time: 17  6:42 AM   Result Value Ref Range    Glucose (POC) 122 (H) 70 - 110 mg/dL             Telemetry:afib      Imaging:  I have personally reviewed the patients radiographs and have reviewed the reports:        2017  4:51 PM - Pardeep, Card Result In       Narrative      [facility] [place]  [address] [facility]  [phone]    Transthoracic Echocardiogram    Patient: Zach Vazquez  MRN: [id number]  ACCT #: [de-identified]  : 1945  Age: 66 years  Gender: Female  Height: 65 in  Weight: 229.5 lb  BSA: 2.1 mï¾²  BP: 137 / 51 mmHg  Study date: 2017  Status: Routine  Location: Bedside  [facility] ACC #: [id number]    Allergies: LATEX, ADHESIVE TAPE-SILICONES, STING, BEE, GARLIC, SERTRALINE    Referring_Ordering Physician: Paula Pope MD  Interpreting Physician: Susan Quinteros MD  Technologist: Evelin Escobedo    SUMMARY:  Left ventricle: The ventricle was mildly dilated. Systolic function was  normal. Ejection fraction was estimated in the range of 55 % to 60 %. No  obvious wall motion abnormalities identified in the views obtained.  can  not comment on diastolic function due to monophasic mitral doppler inflow  due to atrial fibrillation. Right ventricle: Systolic pressure was mildly increased. Estimated peak  pressure was 55 mmHg. Left atrium: The atrium is severely dilated. LA volume index was 78 ml/mï¾². Right atrium: The atrium is mildly dilated. Mitral valve: The posterior leaflet is thickened, calcified with reduced  mobility. Aortic valve: Aortic valve not well seen. Can not comment on strucutre of  aortic valve. Aortic valve is thickened and calcified. Peak velocity is  3.23 m/sec with mean gradient of 22.40 mm hg. Moderate aortic stenosis  with velocity criteria. Mild aortic regurgitation. Tricuspid valve: Pulmonary artery systolic pressure: 52 mmHg. COMPARISONS:  Comparison was made with the previous study of 02-Jan-2016. INDICATIONS: Atrial fibrillation, CHF. HISTORY: Prior history: Risk factors: hypertension. PROCEDURE: This was a routine study. The study included complete 2D  imaging, M-mode, complete spectral Doppler, and color Doppler. The heart  rate was 89 bpm, at the start of the study. Systolic blood pressure was  137 mmHg, at the start of the study. Diastolic blood pressure was 51 mmHg,  at the start of the study. Image quality was fair. LEFT VENTRICLE: The ventricle was mildly dilated. Systolic function was  normal. Ejection fraction was estimated in the range of 55 % to 60 %. No  obvious wall motion abnormalities identified in the views obtained. can  not comment on diastolic function due to monophasic mitral doppler inflow  due to atrial fibrillation. Wall thickness was normal. DOPPLER:  Indeterminate diastolic function. RIGHT VENTRICLE: The size was normal. Systolic function was normal.  DOPPLER: Systolic pressure was mildly increased. Estimated peak pressure  was 55 mmHg. LEFT ATRIUM: The atrium is severely dilated. LA volume index was 78 ml/mï¾². RIGHT ATRIUM: The atrium is mildly dilated. MITRAL VALVE: There was mild annular dilatation.  Normal valve structure. There was normal leaflet separation. DOPPLER: There was no evidence for  stenosis. The posterior leaflet is thickened, calcified with reduced  mobility. AORTIC VALVE: The valve was probably trileaflet. Leaflets exhibited mildly  increased thickness, mild calcification, and mildly reduced cuspal  separation. DOPPLER: Aortic valve not well seen. Can not comment on  strucutre of aortic valve. Aortic valve is thickened and calcified. Peak  velocity is 3.23 m/sec with mean gradient of 22.40 mm hg. Moderate aortic  stenosis with velocity criteria. Mild aortic regurgitation. There was mild  regurgitation. TRICUSPID VALVE: Normal valve structure. There was normal leaflet  separation. DOPPLER: There was no evidence for tricuspid stenosis. There  was no regurgitation. Tricuspid regurgitation peak velocity: 3.4 m/sec. Right atrial pressure estimate: 8 mmHg. Pulmonary artery systolic  pressure: 52 mmHg. PULMONIC VALVE: Not well visualized, but normal Doppler findings. AORTA: The root exhibited normal size. SYSTEMIC VEINS: IVC: The inferior vena cava was not well visualized. PERICARDIUM: No significant pericardial effusion identified. MEASUREMENT TABLES    2D measurements  Right atrium   (Reference normals)  Area sys   23 cmï¾²   (8.3-19. 5)    Doppler measurements  Aortic valve   (Reference normals)  Valve area, cont   1 cmï¾²   (--)    SYSTEM MEASUREMENT TABLES    2D  Ao Diam: 3.3 cm  LVOT Diam: 2.1 cm  EF(Teich): 66.4 %  IVSd: 0.9 cm  LVIDd: 5.4 cm  LVIDs: 3.4 cm  LVPWd: 1 cm  LAESV Index (A-L): 78.2 ml/m2  LVEDV MOD A2C: 97.7 ml  LVEDV MOD A4C: 86.1 ml  LVESV MOD A2C: 42.1 ml  LVESV MOD A4C: 38.9 ml  RA Area: 22.6 cm2  RVIDd: 3.7 cm    CW  AV Vmean: 2 m/s  AV maxP.6 mmHg  AV meanP.7 mmHg  TR Vmax: 3.4 m/s  TR maxP.3 mmHg    PW  SANIA (VTI): 1 cm2  LVOT meanP.4 mmHg    Prepared and E-signed by    Katrina Salas MD  Signed 2017 16:51:06     esults    XR CHEST PORT (Accession [id number]) (Order [id number])         Allergies        Unspecified: Latex; Adhesive Tape-silicones; Bees [Sting, Bee];  Garlic;  Zoloft [Sertraline]       Result Information      Status Provider Status        Final result (Exam End: 2/25/2017  4:37 PM) Open        Study Result      Chest, single view     Indication: Shortness of breath, pulmonary edema.     Comparison: Several prior exams, most recently 1/30/2017.     Findings: Portable upright AP view of the chest was obtained. The lungs are  considerably underexpanded, similar to prior, with associated bronchovascular  crowding as well as prominence of the central pulmonary vasculature. No  pneumothorax or large pleural effusion. Cardiac enlargement as well as an  atherosclerotic and tortuous thoracic aorta are not substantially changed from  prior. No acute osseous abnormality identified.     IMPRESSION  IMPRESSION:  1. Underexpanded lungs with associated bronchovascular crowding and central  pulmonary vascular prominence, similar to prior. 2. Cardiomegaly, also similar to prior.                   Total critical care time exclusive of procedures: 35 minutes  An Helm MD  3/2/2017, 12:18 PM

## 2017-03-02 NOTE — PROGRESS NOTES
Pharmacy Dosing Services: Warfarin     Consult for Warfarin Dosing by Pharmacy by Dr. Sole Moyer provided for this 66 y.o. female , for indication of Atrial Fibrillation. Dose to achieve an INR goal of 2-3     Order entered for Warfarin 2.5 (mg) ordered to be given today at 18:00. Significant drug interactions: aspirin 81mg  LABS    PT/INR Lab Results   Component Value Date/Time    INR 2.2 03/02/2017 05:30 AM      Platelets Lab Results   Component Value Date/Time    PLATELET 585 62/30/6399 05:30 AM      H/H     Lab Results   Component Value Date/Time    HGB 10.9 03/02/2017 05:30 AM        Warfarin Administrations (last 168 hours)       Date/Time Action Medication Dose      03/01/17 1908 Given   [INR 1.6]    warfarin (COUMADIN) tablet 4 mg 4 mg    02/28/17 2144 Given    warfarin (COUMADIN) tablet 4 mg 4 mg    02/27/17 2059 Given    warfarin (COUMADIN) tablet 2.5 mg 2.5 mg    02/26/17 1752 Given    warfarin (COUMADIN) tablet 2.5 mg 2.5 mg    02/25/17 2113 Given    warfarin (COUMADIN) tablet 2.5 mg 2.5 mg              Pharmacy to follow daily and will provide subsequent Warfarin dosing based on clinical status.   Genesis Kaba PHARMD   Contact information [phone]

## 2017-03-02 NOTE — PROGRESS NOTES
Problem: Mobility Impaired (Adult and Pediatric)  Goal: *Acute Goals and Plan of Care (Insert Text)  Physical Therapy Goals  Initiated 2/27/2017 and to be accomplished within 7 day(s)  1. Patient will move from supine to sit and sit to supine , scoot up and down and roll side to side in bed with supervision/set-up. 2. Patient will transfer from bed to chair and chair to bed with minimal assistance/contact guard assist using the least restrictive device. 3. Patient will perform sit to stand with minimal assistance/contact guard assist.  4. Patient will ambulate with minimal assistance/contact guard assist for 50 feet with the least restrictive device. Outcome: Progressing Towards Goal  PHYSICAL THERAPY TREATMENT     Patient: Jorge Luis Anglin (35 y.o. female)  Date: 3/2/2017  Diagnosis: CHF NYHA class IV, acute, diastolic (HCC)  CHF NYHA class IV, acute, diastolic (HCC) Acute on chronic respiratory failure with hypoxia (HCC)  Precautions: Fall (SPO2)   Chart, physical therapy assessment, plan of care and goals were reviewed. ASSESSMENT:  Pt found up in chair on PT arrival in NAD. Reports no pain. Pt eager to participate in therapy session. Pt able to complete transfers to stand x 3 with min assist of 2 for first 2 trials and mod assist on 3rd trial (most likely due to fatigue). Pt with good standing balance, slow netta with wide base of support. Educated in safety and hand placement during transfers and maintenance of upright posture during gt. O2 sat decreased as low as 86/87%, but returns to >90 within 1 min. Pt left up in chair at bedside in NAD and nurse notified. Recommend SNF upon discharge for f/u therapy.   Progression toward goals:  [ ]      Improving appropriately and progressing toward goals  [X]      Improving slowly and progressing toward goals  [ ]      Not making progress toward goals and plan of care will be adjusted       PLAN:  Patient continues to benefit from skilled intervention to address the above impairments. Continue treatment per established plan of care. Discharge Recommendations:  Andrés Nelson  Further Equipment Recommendations for Discharge:  rolling walker       SUBJECTIVE:   Patient stated Carolynn Amaral we work on standing up.       OBJECTIVE DATA SUMMARY:   Critical Behavior:  Neurologic State: Alert, Appropriate for age, Eyes open spontaneously  Orientation Level: Oriented X4  Cognition: Follows commands  Safety/Judgement: Fall prevention  Functional Mobility Training:  Bed Mobility:  Pt up in chair on PT arrival  Transfers:  Sit to Stand: Minimum assistance; Moderate assistance;Assist x2  Stand to Sit: Minimum assistance;Contact guard assistance;Assist x2  Balance:  Sitting: Intact  Standing: Intact; With support  Ambulation/Gait Training:  Distance (ft): 10 Feet (ft) (4 ft frwd/bwkd x 2 and 2 ft laterally)  Assistive Device: Gait belt;Walker, rolling  Ambulation - Level of Assistance: Minimal assistance;Assist x2  Gait Abnormalities: Decreased step clearance; Step to gait  Base of Support: Widened  Stance: Time  Speed/Kristin: Slow;Shuffled  Step Length: Right shortened;Left shortened  Swing Pattern: Right asymmetrical;Left asymmetrical  Interventions: Safety awareness training; Tactile cues; Verbal cues; Visual/Demos  Pain:  Pain Scale 1: Numeric (0 - 10)  Pain Intensity 1: 0  Pain Location 1: Abdomen  Pain Intervention(s) 1: Repositioned  Activity Tolerance:   Fair   Please refer to the flowsheet for vital signs taken during this treatment.   After treatment:   [X] Patient left in no apparent distress sitting up in chair  [ ] Patient left in no apparent distress in bed  [X] Call bell left within reach  [X] Nursing notified  [ ] Caregiver present  [ ] Bed alarm activated      Vaelncia Cardona PT   Time Calculation: 23 mins

## 2017-03-02 NOTE — PROGRESS NOTES
Cardiology Progress Note        Patient: Paulino Posey        Sex: female          DOA: 2/25/2017  YOB: 1945      Age:  66 y.o.        LOS:  LOS: 4 days   Assessment/Plan     Patient Active Problem List   Diagnosis Code    Acute on chronic diastolic CHF (congestive heart failure) ([facility]) I50.33    ALEJANDRINA (acute kidney injury) [facility] N17.9    Acute exacerbation of CHF (congestive heart failure) ([facility]) I50.9    DM (diabetes mellitus) ([facility] E11.9    Hypertension I10    Respiratory failure ([facility]) J96.90    Acute coronary syndrome ([facility]) I24.9    Obstructive sleep apnea, adult G47.33    Paroxysmal atrial fibrillation ([facility]) I48.0    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 ([facility]) Q77.54, E11.65    Dyspnea due to congestive heart failure ([facility]) I50.9    Aspiration pneumonia ([facility]) J69.0    Hyperkalemia E87.5    Elevated troponin R79.89    COPD (chronic obstructive pulmonary disease) ([facility]) J44.9    CHF (congestive heart failure) ([facility]) I50.9    Insufficiency, respiratory, acute ([facility]) R06.89    Infiltrate noted on imaging study R93.8    COPD exacerbation ([facility]) J44.1    Acute on chronic respiratory failure with hypoxia ([facility]) J96.21    Chest pain R07.9    Anxiety F41.9    UTI (urinary tract infection) N39.0    Obesity hypoventilation syndrome ([facility]) E66.2    Pulmonary hypertension, moderate to severe ([facility]) I27.2      Hypercapnic hypoxic  respiratory failure   Permanent atrial fibrillation   Plan:  Continue Lasix. Continue coumadin as per PT/INR Goal 2-3  Continue beta blocker and digoxin  Strict I/O  Daily CMP  Continue management as per hospital medicine and Pulmonary critical care                Subjective:    cc: My breathing is little better today      REVIEW OF SYSTEMS:     General: No fevers or chills.   Cardiovascular: No chest pain,No palpitations, No orthopnea, No PND, No leg swelling, No claudication  Pulmonary: Positive dyspnea  Gastrointestinal: No nausea, vomiting, bleeding  Neurology: No Dizziness    Objective:      Visit Vitals    /57    Pulse 97    Temp 98.3 °F (36.8 °C)    Resp (!) 31    Wt 115.2 kg (253 lb 15.5 oz)    SpO2 98%    Breastfeeding No    BMI 42.26 kg/m2     Body mass index is 42.26 kg/(m^2). Physical Exam:  General Appearance: Comfortable, Morbidly obese, not using accessory muscles of respiration. HEENT: SIRIA. HEAD: Atraumatic  NECK: No JVD, no thyroidomeglay. CAROTIDS: No bruit  LUNGS: Clear bilaterally. HEART: S1+S2 audible, irregularly irregular, no murmur, no pericardial rub. ABD: Non-tender, BS Audible    EXT: no cyanosis. VASCULAR EXAM: Pulses are intact. PSYCHIATRIC EXAM: Mood is appropriate. MUSCULOSKELETAL: Grossly no joint deformity. NEUROLOGICAL: AAO times 3, No motor and sensory deficit.   Medication:  Current Facility-Administered Medications   Medication Dose Route Frequency    digoxin (LANOXIN) tablet 0.125 mg  0.125 mg Oral DAILY    furosemide (LASIX) injection 40 mg  40 mg IntraVENous Q12H    potassium chloride (K-DUR, KLOR-CON) SR tablet 20 mEq  20 mEq Oral BID    carvedilol (COREG) tablet 25 mg  25 mg Oral BID WITH MEALS    dilTIAZem (CARDIZEM) IR tablet 30 mg  30 mg Oral TIDAC    ELECTROLYTE REPLACEMENT PROTOCOL  1 Each Other PRN    ELECTROLYTE REPLACEMENT PROTOCOL  1 Each Other PRN    [START ON 3/3/2017] pantoprazole (PROTONIX) tablet 40 mg  40 mg Oral ACB    warfarin (COUMADIN) tablet 2.5 mg  2.5 mg Oral ONCE    cefTRIAXone (ROCEPHIN) 1 g in 0.9% sodium chloride (MBP/ADV) 50 mL MBP  1 g IntraVENous Q24H    nystatin (MYCOSTATIN) 100,000 unit/gram powder   Topical BID    ipratropium (ATROVENT) 0.02 % nebulizer solution 0.5 mg  0.5 mg Nebulization QID RT    clonazePAM (KlonoPIN) tablet 0.25 mg  0.25 mg Oral BID    docusate sodium (COLACE) capsule 100 mg  100 mg Oral BID    insulin glargine (LANTUS) injection 25 Units  25 Units SubCUTAneous QHS    simvastatin (ZOCOR) tablet 10 mg  10 mg Oral QHS    pregabalin (LYRICA) capsule 75 mg  75 mg Oral BID    aspirin chewable tablet 81 mg  81 mg Oral DAILY    arformoterol (BROVANA) neb solution 15 mcg  15 mcg Nebulization BID RT    budesonide (PULMICORT) 500 mcg/2 ml nebulizer suspension  500 mcg Nebulization BID RT    albuterol-ipratropium (DUO-NEB) 2.5 MG-0.5 MG/3 ML  3 mL Nebulization Q4H PRN    ondansetron (ZOFRAN) injection 4 mg  4 mg IntraVENous Q6H PRN    acetaminophen (TYLENOL) tablet 650 mg  650 mg Oral Q4H PRN    insulin lispro (HUMALOG) injection   SubCUTAneous AC&HS    glucose chewable tablet 16 g  4 Tab Oral PRN    glucagon (GLUCAGEN) injection 1 mg  1 mg IntraMUSCular PRN    dextrose (D50W) injection syrg 12.5-25 g  25-50 mL IntraVENous PRN    warfarin pharmacy to dose   Other PRN               Lab/Data Reviewed:       Recent Labs      03/02/17   0530  03/01/17   0415  02/28/17   0510   WBC  4.5*  4.8  6.4   HGB  10.9*  11.0*  11.7*   HCT  35.0  35.3  39.6   PLT  162  177  172     Recent Labs      03/02/17   0530  03/01/17   0415  02/28/17   0510   NA  147*  147*  142   K  3.2*  3.4*  5.0   CL  102  100  101   CO2  44*  41*  37*   GLU  117*  104*  142*   BUN  44*  57*  60*   CREA  1.10  1.43*  1.74*   CA  8.6  8.5  8.2*       Signed By: Shaq Barbosa MD     March 2, 2017

## 2017-03-02 NOTE — PROGRESS NOTES
Palliative Medicine Progress Note  DR. BIRD'[facility]: [phone] ([phone])  [facility]: [phone]   [facility]: [phone]    Patient Name: Michaelle Lauren  YOB: 1945    Date of Initial Consult: 2/27/17   Reason for Consult: Care Decisions  Requesting Provider: Dr. Brooks Marino   Primary Care Physician: Tonya Ryder MD      SUMMARY:   Michaelle Lauren is a 66y.o. year old with a past history ofCHF, DM, COPD, HTN, CKD, Osteoarthritis, Afib/on coumadin, CAD with cardiac stenting, Pulmonary HTN, Sleep apnea but not using CPAP who was admitted on 2/25/2017 from [facility]  with a diagnosis of:    Acute on chronic respiratory failure/ pulmonary edema  Pulmonary HTN  Aortic stenosis  Hypotension in setting of diuresis  Acute on chronic CHF  UTI  Morbid obesity  DM2  Debility      Current medical issues leading to Palliative Medicine involvement include: Care Decisions in setting of disease progression and multiple hospitalizations. Presented to ED with increased SOB, weight gain, edema to feet which began while at [facility] for rehab after last hospitalization. At baseline she is on O2 at 3L NC. Initially on bipap now on HF oxygen. 2 hospitalization in past month at [facility] for respiratory failure however has had a total of 10 hospitalizations (most at [facility]) since January 2016. Patient and spouse have seen functional decline especially since September. Up until that time she was able to perform ADLs, fix meals. The longest she is been able to stay out of the hospital and at home has been only a couple of months. Was on ventilator back in January and has little recall of this. 2/28/17: Decompensated overnight, moved to ICU on BIPAP. Received diuretic, then hypotensive, now receiving fluid. Alert and anxious, but denies SOB.  just left. 3/1/17: Improving. Off Bipap and on O2NC at this time. 3/2/17: Developed RVR earlier. HR down now. Sitting up in chair.  Reporting some mild-mod SOB. PALLIATIVE DIAGNOSES:   1. Acute on chronic CHF  2. Acute on chronic respiratory failure  3. Dyspnea  4. Anxiety       PLAN:   1. Palliative Medicine team Sima Velasquez MD, [facility] NP) met with patient and spouse at bedside. She is alert, well oriented and able to participate in our discussion. They have some understanding of her current medical condition but were unable to clearly understand how her illness can be attributed to multifactorial causes including progressive lung, heart and renal disease. We presented an overview and how this contributes to her multiple hospitalizations in spite of them trying to [facility] and do what we are told to do\". We addressed how the reason for her numerous hospitalizations are due to problems that are no longer curative but only temporarily \"fixable\" before they reoccur. She expressed a strong desire to \"get well and go home\". She added that she would be okay with further hospitalizations. She also stated \"I am not ready to go\" (die). She stated she worries about death and dying. In fact, she thought this was what we were here to talk about today and was upset initially by our visit until we explained that our goal is to make sure all have a clear understanding of what she wants so we can best support her [address]  informed us that she lost her sister, her mother in law this past year. Lost a son many years ago in a skiing accident. There may be complicated grief especially over loss of son. There is likely denial of the extent of her illness which may be related to her expressed fear of dying. During past hospitalization, she expressed fear of being buried. She said she had told her  she wanted to be buried upright with her head above ground.  asked to see. She may benefit from additional counseling which exceeds what can be offered in an inpatient setting.    2/28/17: Tenuous respiratory status.  Prognosis for remaining out of hospital once stable enough for d/c remains poor. Will continue to provide support. 3/1/17: Experiencing psychosocial distress. Provided listening presence for patient to elaborate on her fear of dying. She expressed being \"unsure where you go\". Wanted to talk about her son Joseline Carlson who  25 years ago at age 27 in a Jet ski accident. She expressed how she was unable to protect him and how greatly she still misses him. Allowed to express feelings of frustration, anger, sadness. Spoke about multiple deaths of family members including sister and MIL this past year. She is likely experiencing a complicated grief pattern. She expressed a great fear of dying herself--fear of the unknown, worries about how her spouse and other son Fernando Ortega will react. This may contribute to a significant denial of her own health issues. Several times she stated that during her multiple hospitalizations \"they have not told me what is wrong or how to fix it\". Gently reminded her that we had just discussed this with her on Monday in detail and that with each hospitalization this would have been addressed with her. Reminded her that she has been following the recommendations for treatment of her condition by taking her medications, etc. Will continue to follow for support. Will plan to see if a counselor from the [facility] would visit with her --if she agrees. 3/2/17: She states she spoke with her son last night and told him she wanted to talk with him but indicated his reluctance stating \"oh mom you are going to be fine\". She is struggling with the \"why?--why is this happening to me? \" Adding  \"I have tried so hard\". Indicated she is praying for guidance \"but I am not hearing from Him yet\". Professional counseling may help her significantly. At this time we are attempting to see if a bereavement counselor may be available. Psychosocial/Functional status:   54+ years.   Functional decline since September .  2 sons--one  about 10 years ago in a skiing accident. Owned an appliance store. Uses cane and walker for past 5 years    2. Advance Care Planning: No AD--reluctant to complete in past. Encouraged to complete and offered assistance with this. Code Status: FULL CODE  Not addressed at this visit    Durable DNR status: NA    4. Symptoms: Dyspnea: Improved and \"feeling better\". 5. Disposition: TBD    6. Initial consult note routed to primary continuity provider. 7. Communicated plan of care with: Palliative IDT, patient, nurse       GOALS OF CARE:     [====Goals of Care====]  GOALS OF CARE:  Patient / health care proxy stated goals: Continue with current level of care. \"I want to get better and go home\".        TREATMENT PREFERENCES:   Code Status: Full Code    Advance Care Planning:  Advance Care Planning 2017   Patient's Healthcare Decision Maker is: Legal Next of Jenna 68   Primary Decision Maker Name -   Primary Decision Maker Phone Number -   Primary Decision Maker Relationship to Patient -   Confirm Advance Directive None   Patient Would Like to Complete Advance Directive -       Other:    The palliative care team has discussed with patient / health care proxy about goals of care / treatment preferences for patient.  [====Goals of Care====]      Advance Care Planning 2017   Patient's Healthcare Decision Maker is: Legal Next of Jenna 68   Primary Decision Maker Name -   Primary Decision [address] Phone Number -   Primary Decision Maker Relationship to Patient -   Confirm Advance Directive None   Patient Would Like to Complete Advance Directive -       Spouse: Franklin Karimi [phone]       HISTORY:     History obtained from: chart, patient, spouse    CHIEF COMPLAINT: Dyspnea, fluid retention    HPI/SUBJECTIVE:    The patient is:   [x] Verbal and participatory  [] Non-participatory due to:      SEE SUMMARY    Clinical Pain Assessment (nonverbal scale for nonverbal patients): Pain: 3 (usual chronic back pain)         FUNCTIONAL ASSESSMENT:     Palliative Performance Scale (PPS):  PPS: 50       PSYCHOSOCIAL/SPIRITUAL SCREENING:     Advance Care Planning:  Advance Care Planning 2/27/2017   Patient's Healthcare Decision Maker is: Legal Next of Kin   Primary Decision Maker Name -   Primary Decision Maker Phone Number -   Primary Decision Maker Relationship to Patient -   Confirm Advance Directive None   Patient Would Like to Complete Advance Directive -        Any spiritual / Faith concerns:  [] Yes /  [] No  None expressed but may have fear of dying. Plan to ask  to visit. Caregiver Burnout:  [] Yes /  [x] No /  [] No Caregiver Present      Anticipatory grief assessment:   [x] Normal  / [] Maladaptive       ESAS Anxiety: Anxiety: 4     ESAS Depression:   Not assessed this visit. REVIEW OF SYSTEMS:     Positive and pertinent negative findings in ROS are noted above in HPI. The following systems were [x] reviewed / [] unable to be reviewed as noted in HPI   Other findings are noted below. Systems: constitutional, ears/nose/mouth/throat, respiratory, gastrointestinal, genitourinary, musculoskeletal, integumentary, neurologic, psychiatric, endocrine. Positive findings noted below. Modified ESAS Completed by: provider   Fatigue: 6 Drowsiness: 0     Pain: 3 (usual chronic back pain)   Anxiety: 4 Nausea: 0     Dyspnea: 3     Constipation: No     Stool Occurrence(s): 1        PHYSICAL EXAM:     Wt Readings from Last 3 Encounters:   03/01/17 115.2 kg (253 lb 15.5 oz)   02/09/17 106 kg (233 lb 11 oz)   01/12/16 100.7 kg (222 lb)     Blood pressure 160/76, pulse (!) 103, temperature 98.4 °F (36.9 °C), resp. rate 23, weight 115.2 kg (253 lb 15.5 oz), SpO2 94 %, not currently breastfeeding.   Pain:  Pain Scale 1: Numeric (0 - 10)  Pain Intensity 1: 6     Pain Location 1: Abdomen  Pain Orientation 1: Lower  Pain Description 1: Aching  Pain Intervention(s) 1: Medication (see MAR)  Last bowel movement: 3/1/17        Constitutional: NAD. Alert, tearful at times, pleasant. Eyes: pupils equal, anicteric  ENMT: no nasal discharge, moist mucous membranes  Cardiovascular: Irregular rhythm  Respiratory: breathing mildly labored, symmetric  Gastrointestinal: Abdomen soft/ non tender. + bowel sounds  Musculoskeletal:   Skin: warm, dry  Neurologic: following commands, moving all extremities  Psychiatric: full affect, no hallucinations  Other: On O2 NC        HISTORY:     Principal Problem:    Acute on chronic respiratory failure with hypoxia ([address] (2/1/2017)    Active Problems:    Acute on chronic diastolic CHF (congestive heart failure) ([address] (9/3/2014)      DM (diabetes mellitus) ([address] (9/22/2015)      Hypertension (9/27/2015)      Obstructive sleep apnea, adult (1/4/2016)      Overview: CPAP/BIPAP intolerant      Paroxysmal atrial fibrillation ([address] (1/4/2016)      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 (HCC) (7/6/0937)      Elevated troponin (1/30/2017)      COPD (chronic obstructive pulmonary disease) ([address] (1/30/2017)      UTI (urinary tract infection) (2/25/2017)      Obesity hypoventilation syndrome ([address] (2/28/2017)      Pulmonary hypertension, moderate to severe (HCC) (2/28/2017)      Past Medical History:   Diagnosis Date    A-fib ([address]     Arthritis     Back injury     Chronic obstructive pulmonary disease ([address]     Diabetes ([address]     Fibromyalgia     Heart failure ([address]     Hypertension     MRSA (methicillin resistant Staphylococcus aureus)     Obesity hypoventilation syndrome ([address] 2/28/2017    Pulmonary hypertension, moderate to severe ([address] 2/28/2017      Past Surgical History:   Procedure Laterality Date    CARDIAC SURG PROCEDURE UNLIST      HX CORONARY STENT PLACEMENT      HX KNEE REPLACEMENT      HX ORTHOPAEDIC      bilateral knee replacement      History reviewed. No pertinent family history. History reviewed, no pertinent family history.   Social History   Substance Use Topics    Smoking status: Never Smoker    Smokeless tobacco: Not on file    Alcohol use No     Allergies   Allergen Reactions    Latex Hives    Adhesive Tape-Silicones Unknown (comments)    Bees [Sting, Bee] Unknown (comments)    Garlic Nausea and Vomiting    Zoloft [Sertraline] Hives      Current Facility-Administered Medications   Medication Dose Route Frequency    digoxin (LANOXIN) tablet 0.125 mg  0.125 mg Oral DAILY    furosemide (LASIX) injection 40 mg  40 mg IntraVENous Q12H    potassium chloride (K-DUR, KLOR-CON) SR tablet 20 mEq  20 mEq Oral BID    carvedilol (COREG) tablet 25 mg  25 mg Oral BID WITH MEALS    dilTIAZem (CARDIZEM) IR tablet 30 mg  30 mg Oral TIDAC    potassium chloride (KAON 10%) 20 mEq/15 mL oral liquid 40 mEq  40 mEq Oral Q1H    ELECTROLYTE REPLACEMENT PROTOCOL  1 Each Other PRN    ELECTROLYTE REPLACEMENT PROTOCOL  1 Each Other PRN    [START ON 3/3/2017] pantoprazole (PROTONIX) tablet 40 mg  40 mg Oral ACB    carvedilol (COREG) tablet 6.25 mg  6.25 mg Oral BID WITH MEALS    cefTRIAXone (ROCEPHIN) 1 g in 0.9% sodium chloride (MBP/ADV) 50 mL MBP  1 g IntraVENous Q24H    nystatin (MYCOSTATIN) 100,000 unit/gram powder   Topical BID    ipratropium (ATROVENT) 0.02 % nebulizer solution 0.5 mg  0.5 mg Nebulization QID RT    clonazePAM (KlonoPIN) tablet 0.25 mg  0.25 mg Oral BID    docusate sodium (COLACE) capsule 100 mg  100 mg Oral BID    insulin glargine (LANTUS) injection 25 Units  25 Units SubCUTAneous QHS    simvastatin (ZOCOR) tablet 10 mg  10 mg Oral QHS    pregabalin (LYRICA) capsule 75 mg  75 mg Oral BID    aspirin chewable tablet 81 mg  81 mg Oral DAILY    arformoterol (BROVANA) neb solution 15 mcg  15 mcg Nebulization BID RT    budesonide (PULMICORT) 500 mcg/2 ml nebulizer suspension  500 mcg Nebulization BID RT    albuterol-ipratropium (DUO-NEB) 2.5 MG-0.5 MG/3 ML  3 mL Nebulization Q4H PRN    ondansetron (ZOFRAN) injection 4 mg  4 mg IntraVENous Q6H PRN    acetaminophen (TYLENOL) tablet 650 mg  650 mg Oral Q4H PRN    insulin lispro (HUMALOG) injection   SubCUTAneous AC&HS    glucose chewable tablet 16 g  4 Tab Oral PRN    glucagon (GLUCAGEN) injection 1 mg  1 mg IntraMUSCular PRN    dextrose (D50W) injection syrg 12.5-25 g  25-50 mL IntraVENous PRN    warfarin pharmacy to dose   Other PRN        LAB AND IMAGING FINDINGS:     Lab Results   Component Value Date/Time    WBC 4.5 03/02/2017 05:30 AM    HGB 10.9 03/02/2017 05:30 AM    PLATELET 227 28/51/6852 05:30 AM     Lab Results   Component Value Date/Time    Sodium 147 03/02/2017 05:30 AM    Potassium 3.2 03/02/2017 05:30 AM    Chloride 102 03/02/2017 05:30 AM    CO2 44 03/02/2017 05:30 AM    BUN 44 03/02/2017 05:30 AM    Creatinine 1.10 03/02/2017 05:30 AM    Calcium 8.6 03/02/2017 05:30 AM    Magnesium 2.2 03/02/2017 05:30 AM    Phosphorus 4.2 02/26/2017 06:35 AM      Lab Results   Component Value Date/Time    AST (SGOT) 13 03/02/2017 05:30 AM    Alk.  phosphatase 81 03/02/2017 05:30 AM    Protein, total 5.7 03/02/2017 05:30 AM    Albumin 2.7 03/02/2017 05:30 AM    Globulin 3.0 03/02/2017 05:30 AM     Lab Results   Component Value Date/Time    INR 2.2 03/02/2017 05:30 AM    Prothrombin time 23.5 03/02/2017 05:30 AM    aPTT 29.2 01/30/2017 08:00 PM      No results found for: IRON, FE, TIBC, IBCT, PSAT, FERR   No results found for: PH, PCO2, PO2  No components found for: [place]   Lab Results   Component Value Date/Time    CK 17 03/02/2017 05:30 AM    CK - MB <1.0 03/02/2017 05:30 AM              Total time: 25  Counseling / coordination time: 20  > 50% counseling / coordination?: hank Daniels MS, FNP-BC, [facility] Palliative Medicine Nurse Practitioner

## 2017-03-02 NOTE — PROGRESS NOTES
Hospitalist Progress Note    Patient: Preston Evangelista MRN: [id number]  CSN: [id number]    YOB: 1945  Age: 66 y.o. Sex: female    DOA: 2/25/2017 LOS:  LOS: 4 days          Chief Complaint: Ac resp failure      Assessment/Plan     Acute hypoxemic hypercarbic respiratory failure. Weaned to NC dueing day now, bipap at night  CHF diastolic acute on admission. diuresisng still, change to BID lasix  Pulmonary HTN moderate  Hypokalemia-PO Kdur this am X 1, start PO BID Kdur regularly  TnI elevation chronic. No chest pain  Afib chronic on Warfarin-daily INR now therapeutic, stop lovenox  UTI-e coli=-sens to rocephin  IMANI/Obesity Hypoventilation syndrome. Possible Asthma. Decreased FEV1. No hx smoking, prior PFT ok per  report-wheezes like asthma, ?cardiac asthma, steroids IV empirically  DM2. IDDM with renal disease,Basal insulin, SSI, BG are controlled  HTN. Anxiety. Klonopin low dose BID  Insomnia. Echo 2/1/17 - EF 55-60. RVCP 55mmhg. Pulm art pressure 52mmhg. appreciate cards consult    K repletion  IV diuretics  Coreg, stopped lopressor  Stop lovenox as INR over 2  bipap at night  resp treatments    Discussed with  and pt at length  Total time: 35 min  Counseling / coordination time:   > 50% counseling / coordination         Patient Active Problem List   Diagnosis Code    Acute on chronic diastolic CHF (congestive heart failure) ([facility]) I50.33    ALEJANDRINA (acute kidney injury) ([facility] [address] N17.9    Acute exacerbation of CHF (congestive heart failure) ([facility]) I50.9    DM [facility]diabetes mellitus[facility] E11.9    Hypertension I10    Respiratory failure ([facility]) J96.90    Acute coronary syndrome ([facility]) I24.9    Obstructive sleep apnea, adult G47.33    Paroxysmal atrial fibrillation ([facility]) I48.0    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 ([facility]) I35.80, E11.65    Dyspnea due to congestive heart failure ([facility]) I50.9    Aspiration pneumonia ([facility]) J69.0    Hyperkalemia E87.5    Elevated troponin R79.89    COPD (chronic obstructive pulmonary disease) (HCC) J44.9    CHF (congestive heart failure) ([facility]) I50.9    Insufficiency, respiratory, acute (HCC) R06.89    Infiltrate noted on imaging study R93.8    COPD exacerbation ([facility]) J44.1    Acute on chronic respiratory failure with hypoxia (HCC) J96.21    Chest pain R07.9    Anxiety F41.9    UTI (urinary tract infection) N39.0    Obesity hypoventilation syndrome (HCC) E66.2    Pulmonary hypertension, moderate to severe (HCC) I27.2       Subjective: Why am I so weak!     Denies CP  Cough and feels SOB this am, wheezing      Review of systems:    Constitutional: denies fevers, chills, myalgias    Cardiovascular: denies chest pain, palpitations  Gastrointestinal: denies nausea, vomiting, diarrhea      Vital signs/Intake and Output:  Visit Vitals    /85    Pulse 77    Temp 97.6 °F (36.4 °C)    Resp 22    Wt 115.2 kg (253 lb 15.5 oz)    SpO2 96%    Breastfeeding No    BMI 42.26 kg/m2     Current Shift:     Last three shifts:  02/28 1901 - 03/02 0700  In: 937.3 [P.O.:440; I.V.:497.3]  Out: 3900 [Urine:3900]    Exam:    General: obese elderly WF weak, , alert, NAD, OX3  Head/Neck: NCAT, supple, No masses, No lymphadenopathy  CVS:no MRG, irreg rhythm, reg rate  Lungs:wheezing, coarse BS diffuse  Abdomen: Soft, Nontender, No distention, Normal Bowel sounds, No hepatomegaly  Extremities: 2 plus edema LE  Skin:normal texture and turgor, no rashes, no lesions  Neuro:grossly normal , follows commands  Psych:appropriate                Labs: Results:       Chemistry Recent Labs      03/02/17   0530  03/01/17   0415  02/28/17   0510   GLU  117*  104*  142*   NA  147*  147*  142   K  3.2*  3.4*  5.0   CL  102  100  101   CO2  44*  41*  37*   BUN  44*  57*  60*   CREA  1.10  1.43*  1.74*   CA  8.6  8.5  8.2*   AGAP  1*  6  4   BUCR  40*  40*  34*   AP  81  89   --    TP  5.7*  5.8*   --    ALB  2.7*  2.6*   --    GLOB  3.0  3.2   -- AGRAT  0.9  0.8   --       CBC w/Diff Recent Labs      03/02/17   0530  03/01/17   0415  02/28/17   0510   WBC  4.5*  4.8  6.4   RBC  3.44*  3.44*  3.67*   HGB  10.9*  11.0*  11.7*   HCT  35.0  35.3  39.6   PLT  162  177  172      Cardiac Enzymes Recent Labs      03/02/17   0530  03/01/17   1950   CPK  17*  17*   CKND1  Cannot be calulated  Cannot be calulated      Coagulation Recent Labs      03/02/17   0530  03/01/17   0415   PTP  23.5*  18.2*   INR  2.2*  1.6*       Lipid Panel No results found for: CHOL, CHOLPOCT, CHOLX, CHLST, CHOLV, [id number], HDL, LDL, NLDLCT, DLDL, LDLC, DLDLP, 862015, VLDLC, VLDL, TGL, TGLX, TRIGL, [id number], TRIGP, TGLPOCT, [id number], CHHD, CHHDX   BNP No results for input(s): BNPP in the last 72 hours.    Liver Enzymes Recent Labs      03/02/17   0530   TP  5.7*   ALB  2.7*   AP  81   SGOT  13*      Thyroid Studies Lab Results   Component Value Date/Time    TSH 0.31 01/01/2016 10:20 PM        Procedures/imaging: see electronic medical records for all procedures/Xrays and details which were not copied into this note but were reviewed prior to creation of Gena Caballero MD

## 2017-03-03 ENCOUNTER — APPOINTMENT (OUTPATIENT)
Dept: GENERAL RADIOLOGY | Age: 72
DRG: 286 | End: 2017-03-03
Attending: HOSPITALIST
Payer: MEDICARE

## 2017-03-03 LAB
ALBUMIN SERPL BCP-MCNC: 3 G/DL (ref 3.4–5)
ALBUMIN/GLOB SERPL: 0.9 {RATIO} (ref 0.8–1.7)
ALP SERPL-CCNC: 100 U/L (ref 45–117)
ALT SERPL-CCNC: 14 U/L (ref 13–56)
ANION GAP BLD CALC-SCNC: ABNORMAL MMOL/L (ref 3–18)
AST SERPL W P-5'-P-CCNC: 13 U/L (ref 15–37)
BILIRUB SERPL-MCNC: 0.6 MG/DL (ref 0.2–1)
BUN SERPL-MCNC: 39 MG/DL (ref 7–18)
BUN/CREAT SERPL: 30 (ref 12–20)
CALCIUM SERPL-MCNC: 8.5 MG/DL (ref 8.5–10.1)
CHLORIDE SERPL-SCNC: 102 MMOL/L (ref 100–108)
CO2 SERPL-SCNC: 48 MMOL/L (ref 21–32)
CREAT SERPL-MCNC: 1.29 MG/DL (ref 0.6–1.3)
ERYTHROCYTE [DISTWIDTH] IN BLOOD BY AUTOMATED COUNT: 16.5 % (ref 11.6–14.5)
GLOBULIN SER CALC-MCNC: 3.3 G/DL (ref 2–4)
GLUCOSE BLD STRIP.AUTO-MCNC: 106 MG/DL (ref 70–110)
GLUCOSE BLD STRIP.AUTO-MCNC: 109 MG/DL (ref 70–110)
GLUCOSE BLD STRIP.AUTO-MCNC: 159 MG/DL (ref 70–110)
GLUCOSE BLD STRIP.AUTO-MCNC: 229 MG/DL (ref 70–110)
GLUCOSE BLD STRIP.AUTO-MCNC: 305 MG/DL (ref 70–110)
GLUCOSE SERPL-MCNC: 121 MG/DL (ref 74–99)
HCT VFR BLD AUTO: 37.4 % (ref 35–45)
HGB BLD-MCNC: 11.5 G/DL (ref 12–16)
INR PPP: 2.6 (ref 0.8–1.2)
MAGNESIUM SERPL-MCNC: 2 MG/DL (ref 1.8–2.4)
MCH RBC QN AUTO: 31.5 PG (ref 24–34)
MCHC RBC AUTO-ENTMCNC: 30.7 G/DL (ref 31–37)
MCV RBC AUTO: 102.5 FL (ref 74–97)
PLATELET # BLD AUTO: 194 K/UL (ref 135–420)
PMV BLD AUTO: 10.6 FL (ref 9.2–11.8)
POTASSIUM SERPL-SCNC: 4.1 MMOL/L (ref 3.5–5.5)
PROT SERPL-MCNC: 6.3 G/DL (ref 6.4–8.2)
PROTHROMBIN TIME: 26.6 SEC (ref 11.5–15.2)
RBC # BLD AUTO: 3.65 M/UL (ref 4.2–5.3)
SODIUM SERPL-SCNC: 144 MMOL/L (ref 136–145)
WBC # BLD AUTO: 7.2 K/UL (ref 4.6–13.2)

## 2017-03-03 PROCEDURE — 85027 COMPLETE CBC AUTOMATED: CPT | Performed by: INTERNAL MEDICINE

## 2017-03-03 PROCEDURE — 94660 CPAP INITIATION&MGMT: CPT

## 2017-03-03 PROCEDURE — 65660000000 HC RM CCU STEPDOWN

## 2017-03-03 PROCEDURE — 74011250637 HC RX REV CODE- 250/637: Performed by: INTERNAL MEDICINE

## 2017-03-03 PROCEDURE — 82962 GLUCOSE BLOOD TEST: CPT

## 2017-03-03 PROCEDURE — 74011250636 HC RX REV CODE- 250/636: Performed by: INTERNAL MEDICINE

## 2017-03-03 PROCEDURE — 80053 COMPREHEN METABOLIC PANEL: CPT | Performed by: INTERNAL MEDICINE

## 2017-03-03 PROCEDURE — 94640 AIRWAY INHALATION TREATMENT: CPT

## 2017-03-03 PROCEDURE — 94760 N-INVAS EAR/PLS OXIMETRY 1: CPT

## 2017-03-03 PROCEDURE — 74230 X-RAY XM SWLNG FUNCJ C+: CPT

## 2017-03-03 PROCEDURE — 74011636637 HC RX REV CODE- 636/637: Performed by: INTERNAL MEDICINE

## 2017-03-03 PROCEDURE — 83735 ASSAY OF MAGNESIUM: CPT | Performed by: INTERNAL MEDICINE

## 2017-03-03 PROCEDURE — 77010033678 HC OXYGEN DAILY

## 2017-03-03 PROCEDURE — 85610 PROTHROMBIN TIME: CPT | Performed by: INTERNAL MEDICINE

## 2017-03-03 PROCEDURE — 74011000255 HC RX REV CODE- 255: Performed by: INTERNAL MEDICINE

## 2017-03-03 PROCEDURE — 36415 COLL VENOUS BLD VENIPUNCTURE: CPT | Performed by: INTERNAL MEDICINE

## 2017-03-03 PROCEDURE — 74011000250 HC RX REV CODE- 250: Performed by: INTERNAL MEDICINE

## 2017-03-03 PROCEDURE — 92611 MOTION FLUOROSCOPY/SWALLOW: CPT

## 2017-03-03 PROCEDURE — 74011250637 HC RX REV CODE- 250/637: Performed by: HOSPITALIST

## 2017-03-03 PROCEDURE — 74011000258 HC RX REV CODE- 258: Performed by: INTERNAL MEDICINE

## 2017-03-03 PROCEDURE — 77030011256 HC DRSG MEPILEX <16IN NO BORD MOLN -A

## 2017-03-03 RX ORDER — POTASSIUM CHLORIDE 20 MEQ/1
20 TABLET, EXTENDED RELEASE ORAL ONCE
Status: COMPLETED | OUTPATIENT
Start: 2017-03-03 | End: 2017-03-03

## 2017-03-03 RX ORDER — WARFARIN 1 MG/1
2 TABLET ORAL ONCE
Status: COMPLETED | OUTPATIENT
Start: 2017-03-03 | End: 2017-03-03

## 2017-03-03 RX ORDER — FUROSEMIDE 10 MG/ML
10 INJECTION INTRAMUSCULAR; INTRAVENOUS EVERY 12 HOURS
Status: DISCONTINUED | OUTPATIENT
Start: 2017-03-03 | End: 2017-03-06

## 2017-03-03 RX ORDER — INSULIN GLARGINE 100 [IU]/ML
20 INJECTION, SOLUTION SUBCUTANEOUS
Status: DISCONTINUED | OUTPATIENT
Start: 2017-03-03 | End: 2017-03-24 | Stop reason: HOSPADM

## 2017-03-03 RX ORDER — POTASSIUM CHLORIDE 20 MEQ/1
20 TABLET, EXTENDED RELEASE ORAL
Status: DISCONTINUED | OUTPATIENT
Start: 2017-03-03 | End: 2017-03-03

## 2017-03-03 RX ADMIN — BARIUM SULFATE 700 MG: 700 TABLET ORAL at 11:01

## 2017-03-03 RX ADMIN — BUDESONIDE 500 MCG: 0.5 INHALANT RESPIRATORY (INHALATION) at 07:48

## 2017-03-03 RX ADMIN — FUROSEMIDE 10 MG: 10 INJECTION, SOLUTION INTRAMUSCULAR; INTRAVENOUS at 08:26

## 2017-03-03 RX ADMIN — CEFTRIAXONE 1 G: 1 INJECTION, POWDER, FOR SOLUTION INTRAMUSCULAR; INTRAVENOUS at 11:03

## 2017-03-03 RX ADMIN — INSULIN LISPRO 5 UNITS: 100 INJECTION, SOLUTION INTRAVENOUS; SUBCUTANEOUS at 05:00

## 2017-03-03 RX ADMIN — DOCUSATE SODIUM 100 MG: 100 CAPSULE, LIQUID FILLED ORAL at 22:02

## 2017-03-03 RX ADMIN — ACETAMINOPHEN 650 MG: 325 TABLET ORAL at 11:28

## 2017-03-03 RX ADMIN — INSULIN LISPRO 7 UNITS: 100 INJECTION, SOLUTION INTRAVENOUS; SUBCUTANEOUS at 22:44

## 2017-03-03 RX ADMIN — CLONAZEPAM 0.25 MG: 0.5 TABLET ORAL at 08:23

## 2017-03-03 RX ADMIN — PREGABALIN 75 MG: 75 CAPSULE ORAL at 08:24

## 2017-03-03 RX ADMIN — PREGABALIN 75 MG: 75 CAPSULE ORAL at 22:03

## 2017-03-03 RX ADMIN — DILTIAZEM HYDROCHLORIDE 30 MG: 30 TABLET, FILM COATED ORAL at 06:58

## 2017-03-03 RX ADMIN — BARIUM SULFATE 70 G: 960 POWDER, FOR SUSPENSION ORAL at 11:01

## 2017-03-03 RX ADMIN — POTASSIUM CHLORIDE 20 MEQ: 20 TABLET, EXTENDED RELEASE ORAL at 08:25

## 2017-03-03 RX ADMIN — SIMVASTATIN 10 MG: 20 TABLET, FILM COATED ORAL at 22:03

## 2017-03-03 RX ADMIN — WATER 500 MG: 1 INJECTION INTRAMUSCULAR; INTRAVENOUS; SUBCUTANEOUS at 22:59

## 2017-03-03 RX ADMIN — IPRATROPIUM BROMIDE 0.5 MG: 0.5 SOLUTION RESPIRATORY (INHALATION) at 19:36

## 2017-03-03 RX ADMIN — ARFORMOTEROL TARTRATE 15 MCG: 15 SOLUTION RESPIRATORY (INHALATION) at 07:48

## 2017-03-03 RX ADMIN — IPRATROPIUM BROMIDE 0.5 MG: 0.5 SOLUTION RESPIRATORY (INHALATION) at 07:48

## 2017-03-03 RX ADMIN — PANTOPRAZOLE SODIUM 40 MG: 40 TABLET, DELAYED RELEASE ORAL at 06:58

## 2017-03-03 RX ADMIN — CARVEDILOL 25 MG: 12.5 TABLET, FILM COATED ORAL at 08:23

## 2017-03-03 RX ADMIN — FUROSEMIDE 10 MG: 10 INJECTION, SOLUTION INTRAMUSCULAR; INTRAVENOUS at 22:01

## 2017-03-03 RX ADMIN — ACETAMINOPHEN 650 MG: 325 TABLET ORAL at 06:58

## 2017-03-03 RX ADMIN — CLONAZEPAM 0.25 MG: 0.5 TABLET ORAL at 22:03

## 2017-03-03 RX ADMIN — POTASSIUM CHLORIDE 20 MEQ: 20 TABLET, EXTENDED RELEASE ORAL at 22:03

## 2017-03-03 RX ADMIN — CARVEDILOL 25 MG: 12.5 TABLET, FILM COATED ORAL at 18:10

## 2017-03-03 RX ADMIN — ACETAMINOPHEN 650 MG: 325 TABLET ORAL at 18:19

## 2017-03-03 RX ADMIN — IPRATROPIUM BROMIDE 0.5 MG: 0.5 SOLUTION RESPIRATORY (INHALATION) at 15:16

## 2017-03-03 RX ADMIN — WARFARIN SODIUM 2 MG: 1 TABLET ORAL at 18:10

## 2017-03-03 RX ADMIN — DIGOXIN 0.12 MG: 0.12 TABLET ORAL at 08:23

## 2017-03-03 RX ADMIN — WATER 500 MG: 1 INJECTION INTRAMUSCULAR; INTRAVENOUS; SUBCUTANEOUS at 09:05

## 2017-03-03 RX ADMIN — BUDESONIDE 500 MCG: 0.5 INHALANT RESPIRATORY (INHALATION) at 19:36

## 2017-03-03 RX ADMIN — NYSTATIN: 100000 POWDER TOPICAL at 22:50

## 2017-03-03 RX ADMIN — IPRATROPIUM BROMIDE 0.5 MG: 0.5 SOLUTION RESPIRATORY (INHALATION) at 11:44

## 2017-03-03 RX ADMIN — DILTIAZEM HYDROCHLORIDE 30 MG: 30 TABLET, FILM COATED ORAL at 18:09

## 2017-03-03 RX ADMIN — DOCUSATE SODIUM 100 MG: 100 CAPSULE, LIQUID FILLED ORAL at 08:23

## 2017-03-03 RX ADMIN — ASPIRIN 81 MG CHEWABLE TABLET 81 MG: 81 TABLET CHEWABLE at 08:25

## 2017-03-03 RX ADMIN — DILTIAZEM HYDROCHLORIDE 30 MG: 30 TABLET, FILM COATED ORAL at 11:28

## 2017-03-03 RX ADMIN — INSULIN GLARGINE 20 UNITS: 100 INJECTION, SOLUTION SUBCUTANEOUS at 22:40

## 2017-03-03 NOTE — PROGRESS NOTES
Pharmacy Dosing Services:  Warfarin    Consult for Warfarin Dosing by Pharmacy by Dr. Sara Maguire provided for this 66 y.o.  female , for indication of Atrial Fibrillation. Dose to achieve an INR goal of 2-3    Order entered for  Warfarin  2 (mg) ordered to be given today at 18:00. Significant drug interactions:  Aspirin 81 mg    LABS    PT/INR Lab Results   Component Value Date/Time    INR 2.6 03/03/2017 05:30 AM      Platelets Lab Results   Component Value Date/Time    PLATELET 714 81/67/7126 05:30 AM      H/H     Lab Results   Component Value Date/Time    HGB 11.5 03/03/2017 05:30 AM        Warfarin Administrations (last 168 hours)     Date/Time Action Medication Dose    03/02/17 1711 Given   [INR 2.2]    warfarin (COUMADIN) tablet 2.5 mg 2.5 mg    03/01/17 1908 Given   [INR 1.6]    warfarin (COUMADIN) tablet 4 mg 4 mg    02/28/17 2144 Given    warfarin (COUMADIN) tablet 4 mg 4 mg    02/27/17 2059 Given    warfarin (COUMADIN) tablet 2.5 mg 2.5 mg    02/26/17 1752 Given    warfarin (COUMADIN) tablet 2.5 mg 2.5 mg    02/25/17 2113 Given    warfarin (COUMADIN) tablet 2.5 mg 2.5 mg          Pharmacy to follow daily and will provide subsequent Warfarin dosing based on clinical status.   Bety Hernadez [facility]  Contact information    [phone]

## 2017-03-03 NOTE — PROGRESS NOTES
ICU RN Maria De Jesus ordered Kdur 20MEq q2h x 3 doses PO as opposed to the expected IV. When I called to question the order she stated the pt is fluid restricted and this order is per the Electrolyte Replacement Protocol. It appears per the protocol this should be IV. RN will clarify w/ the prescriber.      [address] [place] [address] Pharmacist  [phone]

## 2017-03-03 NOTE — PROGRESS NOTES
When discharged pt will be returning back to [facility],d/c not anticipated or finalized for weekend,cm will resume on Monday.

## 2017-03-03 NOTE — ROUTINE PROCESS
Shift summary:  Patient had uneventful shift. Alarm parameters reviewed and opportunities for questions provided. Bedside and Verbal shift change report given to Dejuan Mohr RN   (oncoming nurse) by Desi Grimm RN   (offgoing nurse). Report included the following information SBAR, Kardex, Intake/Output, MAR, Accordion and Recent Results.

## 2017-03-03 NOTE — PROGRESS NOTES
NUTRITION FOLLOW-UP    RECOMMENDATIONS/PLAN:   - Continue Cardiac/Soft Solid/ Consistent Carb 1600 kcal diet    NUTRITION ASSESSMENT:   Client Update: 66 yrs old Female with acute hypoxic respiratory failure, pulmonary HTN, acute HF, UTI, Afib, and OHS. Good PO intake since diet initiated 3/01. SLP recommended dental soft texture. FOOD/NUTRITION INTAKE   Diet Order:  Cardiac Consistent Carb 1600kcal Soft Solid   Supplements: none   Food Allergies: garlic/latex  Average PO Intake:       % Diet Eaten   03/03/17 0800 75 %     Pertinent Medications:  [x] Reviewed; Electrolyte Replacement Protocol: [x]K [x]Mg []PO4  Insulin:  [x]SSI  []Pre-meal   [x]Basal    []Drip  []None  Cultural/Mu-ism Food Preferences: None Identified    BIOCHEMICAL DATA & MEDICAL TESTS  Pertinent Labs:  [x] Reviewed; POC BG , CO2 48, BUN 39  ANTHROPOMETRICS  Height:  5'5\"      Weight: 109.1 kg (240 lb 8.4 oz)         BMI:  40 kg/m^2 morbidly obese (Greater than or = to 40% BMI)   Adm Weight: 254 lbs                Weight change: -14 lbs (?fluid)  Adjusted Body Weight: 71 kg     NUTRITION-FOCUSED PHYSICAL ASSESSMENT  Skin: intact, 3-4+ edema BLE      GI: last BM 3/03, poor dentition noted    NUTRITION PRESCRIPTION  Calories: 7858-2807 kcal/day based on 11-14 kcal/kg actual wt  Protein: 113-143 g/day based on 2-2.5 g/kg IBW  CHO: 158 g/day based on 50% of total energy  Fluid: 2925 ml/day based on 1500 + (15 ml/kg >20 kg)      NUTRITION DIAGNOSES:   1. At risk of inadequate oral intake related to respiratory failure as evidenced by NPO x1 day - resolved   2. Altered nutrition related lab values related to DM2 as evidenced by POC BG  mg/dl, HbA1c 7.7 - ongoing  3. Morbid obesity related to h/o excessive energy intake and inadequate physical activity as evidenced by BMI 42.3 kg/m^2 and 203% of IBW. - ongoing    NUTRITION INTERVENTIONS:   INTERVENTIONS:        GOALS:  1.  Continue current diet 1. >50% PO intake of meals, maintain dry wt, prevent skin breakdown, POC -180 mg/dl by next review 3-5 days     LEARNING NEEDS (Diet, Supplementation, Food/Nutrient-Drug Interaction):   [] None Identified     [] Education provided & documented        Identified and patient:   [] Declined      [x] Was not appropriate/indicated        NUTRITION MONITORING /EVALUATION:   Follow PO intake  Monitor wt  Monitor renal labs, electrolytes, fluid status    Previous Recommendations Implemented: yes        Previous Goals Met:  yes       [x] Participated in Interdisciplinary Rounds    [x] Interdisciplinary Care Plan Reviewed  [x] Discharge Nutrition Plan:  Cardiac consistent cho    NUTRITION RISK:           [x] At risk                        [] Not currently at risk        Will follow-up per policy.   Jonel DOMINGO Mccloud  PAGER:  [phone]

## 2017-03-03 NOTE — PROGRESS NOTES
Palliative Medicine Progress Note  DR. BIRD[facility]S [facility]: [phone] ([phone])  [facility]: [phone]   [facility]: [phone]    Patient Name: Valentin Armenta  YOB: 1945    Date of Initial Consult: 2/27/17   Reason for Consult: Care Decisions  Requesting Provider: Dr. Brenda Ruiz   Primary Care Physician: Yuko Lanza MD      SUMMARY:   Valentin Armenta is a 66y.o. year old with a past history ofCHF, DM, COPD, HTN, CKD, Osteoarthritis, Afib/on coumadin, CAD with cardiac stenting, Pulmonary HTN, Sleep apnea but not using CPAP who was admitted on 2/25/2017 from [facility]  with a diagnosis of:    Acute on chronic respiratory failure/ pulmonary edema  Pulmonary HTN  Aortic stenosis  Hypotension in setting of diuresis  Acute on chronic CHF  UTI  Morbid obesity  DM2  Debility      Current medical issues leading to Palliative Medicine involvement include: Care Decisions in setting of disease progression and multiple hospitalizations. Presented to ED with increased SOB, weight gain, edema to feet which began while at [facility] for rehab after last hospitalization. At baseline she is on O2 at 3L NC. Initially on bipap now on HF oxygen. 2 hospitalization in past month at [facility] for respiratory failure however has had a total of 10 hospitalizations (most at [facility]) since January 2016. Patient and spouse have seen functional decline especially since September. Up until that time she was able to perform ADLs, fix meals. The longest she is been able to stay out of the hospital and at home has been only a couple of months. Was on ventilator back in January and has little recall of this. 2/28/17: Decompensated overnight, moved to ICU on BIPAP. Received diuretic, then hypotensive, now receiving fluid. Alert and anxious, but denies SOB.  just left. 3/1/17: Improving. Off Bipap and on O2NC at this time. 3/2/17: Developed RVR earlier. HR down now. Sitting up in chair.  Reporting some mild-mod SOB.    3/3/17: Much better this am. Saturating well.  at bedside. PALLIATIVE DIAGNOSES:   1. Acute on chronic CHF  2. Acute on chronic respiratory failure  3. Dyspnea  4. Anxiety       PLAN:   1. Palliative Medicine team Debbie Duvall MD, [facility] NP) met with patient and spouse at bedside. She is alert, well oriented and able to participate in our discussion. They have some understanding of her current medical condition but were unable to clearly understand how her illness can be attributed to multifactorial causes including progressive lung, heart and renal disease. We presented an overview and how this contributes to her multiple hospitalizations in spite of them trying to [facility] and do what we are told to do\". We addressed how the reason for her numerous hospitalizations are due to problems that are no longer curative but only temporarily \"fixable\" before they reoccur. She expressed a strong desire to \"get well and go home\". She added that she would be okay with further hospitalizations. She also stated \"I am not ready to go\" (die). She stated she worries about death and dying. In fact, she thought this was what we were here to talk about today and was upset initially by our visit until we explained that our goal is to make sure all have a clear understanding of what she wants so we can best support her [address]  informed us that she lost her sister, her mother in law this past year. Lost a son many years ago in a skiing accident. There may be complicated grief especially over loss of son. There is likely denial of the extent of her illness which may be related to her expressed fear of dying. During past hospitalization, she expressed fear of being buried. She said she had told her  she wanted to be buried upright with her head above ground.  asked to see.  She may benefit from additional counseling which exceeds what can be offered in an inpatient setting.    17: Tenuous respiratory status. Prognosis for remaining out of hospital once stable enough for d/c remains poor. Will continue to provide support. 3/1/17: Experiencing psychosocial distress. Provided listening presence for patient to elaborate on her fear of dying. She expressed being \"unsure where you go\". Wanted to talk about her son Brandon Novoa who  25 years ago at age 27 in a Jet ski accident. She expressed how she was unable to protect him and how greatly she still misses him. Allowed to express feelings of frustration, anger, sadness. Spoke about multiple deaths of family members including sister and MIL this past year. She is likely experiencing a complicated grief pattern. She expressed a great fear of dying herself--fear of the unknown, worries about how her spouse and other son Jagdish Him will react. This may contribute to a significant denial of her own health issues. Several times she stated that during her multiple hospitalizations \"they have not told me what is wrong or how to fix it\". Gently reminded her that we had just discussed this with her on Monday in detail and that with each hospitalization this would have been addressed with her. Reminded her that she has been following the recommendations for treatment of her condition by taking her medications, etc. Will continue to follow for support. Will plan to see if a counselor from the [facility] would visit with her --if she agrees. 3/2/17: She states she spoke with her son last night and told him she wanted to talk with him but indicated his reluctance stating \"oh mom you are going to be fine\". She is struggling with the \"why?--why is this happening to me? \" Adding  \"I have tried so hard\". Indicated she is praying for guidance \"but I am not hearing from Him yet\". Professional counseling may help her significantly. At this time we are attempting to see if a bereavement counselor may be available.      3/3/17: improving w/ consistent use of BIPAP. Goals of care established. Psychosocial/Functional status:   54+ years. Functional decline since 2016.  2 sons--one  about 10 years ago in a skiing accident. Owned an appliance store. Uses cane and walker for past 5 years    2. Advance Care Planning: No AD--reluctant to complete in past. Encouraged to complete and offered assistance with this. Code Status: FULL CODE  Not addressed at this visit    Durable DNR status: NA    4. Symptoms: Dyspnea: Improved and \"feeling better\". 5. Disposition: TBD    6. Initial consult note routed to primary continuity provider. 7. Communicated plan of care with: Palliative IDT, patient, nurse       GOALS OF CARE:     [====Goals of Care====]  GOALS OF CARE:  Patient / health care proxy stated goals: Continue with current level of care. \"I want to get better and go home\".        TREATMENT PREFERENCES:   Code Status: Full Code    Advance Care Planning:  Advance Care Planning 2017   Patient's Healthcare Decision Maker is: Legal Next of J[address] 68   Primary Decision Maker Name -   Primary Decision Maker Phone Number -   Primary Decision Maker Relationship to Patient -   Confirm Advance Directive None   Patient Would Like to Complete Advance Directive -       Other:    The palliative care team has discussed with patient / health care proxy about goals of care / treatment preferences for patient.  [====Goals of Care====]      Advance Care Planning 2017   Patient's Healthcare Decision Maker is: Legal Next of Jenna 68   Primary Decision Maker Name -   Primary Decision [address] Phone Number -   Primary Decision Maker Relationship to Patient -   Confirm Advance Directive None   Patient Would Like to Complete Advance Directive -       Spouse: Pasty Roles [phone]       HISTORY:     History obtained from: chart, patient, spouse    CHIEF COMPLAINT: Dyspnea, fluid retention    HPI/SUBJECTIVE:    The patient is:   [x] Verbal and participatory  [] Non-participatory due to:      SEE SUMMARY    Clinical Pain Assessment (nonverbal scale for nonverbal patients): Pain: 3 (usual chronic back pain)         FUNCTIONAL ASSESSMENT:     Palliative Performance Scale (PPS):  PPS: 50       PSYCHOSOCIAL/SPIRITUAL SCREENING:     Advance Care Planning:  Advance Care Planning 2/27/2017   Patient's Healthcare Decision Maker is: Legal Next of Kin   Primary Decision Maker Name -   Primary Decision Maker Phone Number -   Primary Decision Maker Relationship to Patient -   Confirm Advance Directive None   Patient Would Like to Complete Advance Directive -        Any spiritual / Buddhist concerns:  [] Yes /  [] No  None expressed but may have fear of dying. Plan to ask  to visit. Caregiver Burnout:  [] Yes /  [x] No /  [] No Caregiver Present      Anticipatory grief assessment:   [x] Normal  / [] Maladaptive       ESAS Anxiety: Anxiety: 4     ESAS Depression:   Not assessed this visit. REVIEW OF SYSTEMS:     Positive and pertinent negative findings in ROS are noted above in HPI. The following systems were [x] reviewed / [] unable to be reviewed as noted in HPI   Other findings are noted below. Systems: constitutional, ears/nose/mouth/throat, respiratory, gastrointestinal, genitourinary, musculoskeletal, integumentary, neurologic, psychiatric, endocrine. Positive findings noted below. Modified ESAS Completed by: provider   Fatigue: 6 Drowsiness: 0     Pain: 3 (usual chronic back pain)   Anxiety: 4 Nausea: 0     Dyspnea: 3     Constipation: No     Stool Occurrence(s): 1        PHYSICAL EXAM:     Wt Readings from Last 3 Encounters:   03/03/17 109.1 kg (240 lb 8.4 oz)   02/09/17 106 kg (233 lb 11 oz)   01/12/16 100.7 kg (222 lb)     Blood pressure 133/73, pulse (!) 101, temperature 97.8 °F (36.6 °C), resp. rate 20, weight 109.1 kg (240 lb 8.4 oz), SpO2 (!) 89 %, not currently breastfeeding.   Pain:  Pain Scale 1: Numeric (0 - 10)  Pain Intensity 1: 0     Pain Location 1: Back, Hip  Pain Orientation 1: Left  Pain Description 1: Aching  Pain Intervention(s) 1: Repositioned  Last bowel movement: 3/1/17        Constitutional: NAD. Alert,  pleasant.   Eyes: pupils equal, anicteric  ENMT: no nasal discharge, moist mucous membranes  Cardiovascular: Irregular rhythm  Respiratory: breathing mildly labored, symmetric  Gastrointestinal: Abdomen soft/ non tender. + bowel sounds  Musculoskeletal:   Skin: warm, dry  Neurologic: following commands, moving all extremities  Psychiatric: full affect, no hallucinations  Other: On O2 NC        HISTORY:     Principal Problem:    Acute on chronic respiratory failure with hypoxia (HCC) (2/1/2017)    Active Problems:    Acute on chronic diastolic CHF (congestive heart failure) ([address] (9/3/2014)      DM (diabetes mellitus) ([address] (9/22/2015)      Hypertension (9/27/2015)      Obstructive sleep apnea, adult (1/4/2016)      Overview: CPAP/BIPAP intolerant      Paroxysmal atrial fibrillation ([address] (1/4/2016)      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 (HCC) (0/3/9478)      Elevated troponin (1/30/2017)      COPD (chronic obstructive pulmonary disease) ([facility]) (1/30/2017)      UTI (urinary tract infection) (2/25/2017)      Obesity hypoventilation syndrome ([address] (2/28/2017)      Pulmonary hypertension, moderate to severe (HCC) (2/28/2017)      Past Medical History:   Diagnosis Date    A-fib ([address]     Arthritis     Back injury     Chronic obstructive pulmonary disease ([address]     Diabetes ([address]     Fibromyalgia     Heart failure ([address]     Hypertension     MRSA (methicillin resistant Staphylococcus aureus)     Obesity hypoventilation syndrome ([address] 2/28/2017    Pulmonary hypertension, moderate to severe ([address] 2/28/2017      Past Surgical History:   Procedure Laterality Date    CARDIAC SURG PROCEDURE UNLIST      HX CORONARY STENT PLACEMENT      HX KNEE REPLACEMENT      HX ORTHOPAEDIC      bilateral knee replacement      History reviewed. No pertinent family history. History reviewed, no pertinent family history.   Social History   Substance Use Topics    Smoking status: Never Smoker    Smokeless tobacco: Not on file    Alcohol use No     Allergies   Allergen Reactions    Latex Hives    Adhesive Tape-Silicones Unknown (comments)    Bees [Sting, Bee] Unknown (comments)    Garlic Nausea and Vomiting    Zoloft [Sertraline] Hives      Current Facility-Administered Medications   Medication Dose Route Frequency    furosemide (LASIX) injection 10 mg  10 mg IntraVENous Q12H    acetaZOLAMIDE (DIAMOX) 500 mg in sterile water (preservative free) 5 mL injection  500 mg IntraVENous Q12H    digoxin (LANOXIN) tablet 0.125 mg  0.125 mg Oral DAILY    potassium chloride (K-DUR, KLOR-CON) SR tablet 20 mEq  20 mEq Oral BID    carvedilol (COREG) tablet 25 mg  25 mg Oral BID WITH MEALS    dilTIAZem (CARDIZEM) IR tablet 30 mg  30 mg Oral TIDAC    ELECTROLYTE REPLACEMENT PROTOCOL  1 Each Other PRN    ELECTROLYTE REPLACEMENT PROTOCOL  1 Each Other PRN    pantoprazole (PROTONIX) tablet 40 mg  40 mg Oral ACB    polyvinyl alcohol (LIQUIFILM TEARS) 1.4 % ophthalmic solution 1 Drop  1 Drop Both Eyes PRN    cefTRIAXone (ROCEPHIN) 1 g in 0.9% sodium chloride (MBP/ADV) 50 mL MBP  1 g IntraVENous Q24H    nystatin (MYCOSTATIN) 100,000 unit/gram powder   Topical BID    ipratropium (ATROVENT) 0.02 % nebulizer solution 0.5 mg  0.5 mg Nebulization QID RT    clonazePAM (KlonoPIN) tablet 0.25 mg  0.25 mg Oral BID    docusate sodium (COLACE) capsule 100 mg  100 mg Oral BID    insulin glargine (LANTUS) injection 25 Units  25 Units SubCUTAneous QHS    simvastatin (ZOCOR) tablet 10 mg  10 mg Oral QHS    pregabalin (LYRICA) capsule 75 mg  75 mg Oral BID    aspirin chewable tablet 81 mg  81 mg Oral DAILY    arformoterol (BROVANA) neb solution 15 mcg  15 mcg Nebulization BID RT    budesonide (PULMICORT) 500 mcg/2 ml nebulizer suspension  500 mcg Nebulization BID RT    albuterol-ipratropium (DUO-NEB) 2.5 MG-0.5 MG/3 ML  3 mL Nebulization Q4H PRN    ondansetron (ZOFRAN) injection 4 mg  4 mg IntraVENous Q6H PRN    acetaminophen (TYLENOL) tablet 650 mg  650 mg Oral Q4H PRN    insulin lispro (HUMALOG) injection   SubCUTAneous AC&HS    glucose chewable tablet 16 g  4 Tab Oral PRN    glucagon (GLUCAGEN) injection 1 mg  1 mg IntraMUSCular PRN    dextrose (D50W) injection syrg 12.5-25 g  25-50 mL IntraVENous PRN    warfarin pharmacy to dose   Other PRN        LAB AND IMAGING FINDINGS:     Lab Results   Component Value Date/Time    WBC 7.2 03/03/2017 05:30 AM    HGB 11.5 03/03/2017 05:30 AM    PLATELET 363 66/16/6023 05:30 AM     Lab Results   Component Value Date/Time    Sodium 144 03/03/2017 05:30 AM    Potassium 4.1 03/03/2017 05:30 AM    Chloride 102 03/03/2017 05:30 AM    CO2 48 03/03/2017 05:30 AM    BUN 39 03/03/2017 05:30 AM    Creatinine 1.29 03/03/2017 05:30 AM    Calcium 8.5 03/03/2017 05:30 AM    Magnesium 2.0 03/03/2017 05:30 AM    Phosphorus 4.2 02/26/2017 06:35 AM      Lab Results   Component Value Date/Time    AST (SGOT) 13 03/03/2017 05:30 AM    Alk.  phosphatase 100 03/03/2017 05:30 AM    Protein, total 6.3 03/03/2017 05:30 AM    Albumin 3.0 03/03/2017 05:30 AM    Globulin 3.3 03/03/2017 05:30 AM     Lab Results   Component Value Date/Time    INR 2.6 03/03/2017 05:30 AM    Prothrombin time 26.6 03/03/2017 05:30 AM    aPTT 29.2 01/30/2017 08:00 PM      No results found for: IRON, FE, TIBC, IBCT, PSAT, FERR   No results found for: PH, PCO2, PO2  No components found for: [place]   Lab Results   Component Value Date/Time    CK 17 03/02/2017 05:30 AM    CK - MB <1.0 03/02/2017 05:30 AM              Total time: 15 min  Counseling / coordination time: 13  > 50% counseling / coordination?: hank Calvillo MD  Palliative Medicine

## 2017-03-03 NOTE — PROGRESS NOTES
1035 Pt off of floor to X ray for scheduled barium swallow. 1230 TRANSFER - OUT REPORT:    Verbal report given to Mason Mcduffie (name) on Rebeca Barrera  being transferred to Andres Frost Rn(unit) for change in patient condition(routine progession of care)       Report consisted of patients Situation, Background, Assessment and   Recommendations(SBAR). Information from the following report(s) SBAR, Kardex, Intake/Output, MAR, Accordion, Recent Results, Med Rec Status, Cardiac Rhythm A fib and Alarm Parameters  was reviewed with the receiving nurse. Lines:   Triple Lumen 02/28/17 Right Internal jugular (Active)   Central Line Being Utilized Yes 3/3/2017  8:00 AM   Criteria for Appropriate Use Irritant/vesicant medication 3/3/2017  8:00 AM   Site Assessment Clean, dry, & intact 3/3/2017  8:00 AM   Infiltration Assessment 0 3/3/2017  8:00 AM   Affected Extremity/Extremities Color distal to insertion site pink (or appropriate for race); Pulses palpable;Range of motion performed 3/3/2017  8:00 AM   Date of Last Dressing Change 03/03/17 3/3/2017  8:00 AM   Dressing Status Clean, dry, & intact 3/3/2017  8:00 AM   Dressing Type Tape;Transparent 3/3/2017  8:00 AM   Action Taken Open ports on tubing capped 3/3/2017  8:00 AM   Proximal Hub Color/Line Status Blue; White;Capped;Flushed 3/3/2017  8:00 AM   Positive Blood Return (Medial Site) Yes 3/3/2017  8:00 AM   Medial Hub Color/Line Status Blue;Capped;Flushed 3/3/2017  8:00 AM   Positive Blood Return (Lateral Site) Yes 3/3/2017  8:00 AM   Distal Hub Color/Line Status Brown;Capped;Flushed 3/3/2017  8:00 AM   Positive Blood Return (Site #3) Yes 3/3/2017  8:00 AM   Alcohol Cap Used Yes 3/3/2017  8:00 AM       Peripheral IV 68/39/78 Right Cephalic (Active)   Site Assessment Clean, dry, & intact 3/3/2017 12:00 PM   Phlebitis Assessment 0 3/3/2017 12:00 PM   Infiltration Assessment 0 3/3/2017 12:00 PM   Dressing Status Clean, dry, & intact 3/3/2017 12:00 PM   Dressing Type Tape;Transparent 3/3/2017 12:00 PM   Hub Color/Line Status Blue;Capped;Flushed 3/3/2017 12:00 PM   Action Taken Open ports on tubing capped 2/28/2017  8:00 PM   Alcohol Cap Used Yes 3/3/2017 12:00 PM       Peripheral IV 02/26/17 Left Antecubital (Active)   Site Assessment Clean, dry, & intact 3/3/2017  8:00 AM   Phlebitis Assessment 0 3/3/2017  8:00 AM   Infiltration Assessment 0 3/3/2017  8:00 AM   Dressing Status Clean, dry, & intact 3/3/2017  8:00 AM   Dressing Type Tape;Transparent 3/3/2017  8:00 AM   Hub Color/Line Status Pink 3/3/2017  8:00 AM   Action Taken Open ports on tubing capped 2/28/2017  8:00 PM   Alcohol Cap Used Yes 3/3/2017  8:00 AM        Opportunity for questions and clarification was provided.           Patient transported with:   Monitor  O2 @ 4 liters  Patients medications from home  Patient-specific medications from Pharmacy  Registered Nurse

## 2017-03-03 NOTE — PROGRESS NOTES
Problem: Mobility Impaired (Adult and Pediatric)  Goal: *Acute Goals and Plan of Care (Insert Text)  Physical Therapy Goals  Initiated 2/27/2017 and to be accomplished within 7 day(s)  1. Patient will move from supine to sit and sit to supine , scoot up and down and roll side to side in bed with supervision/set-up. 2. Patient will transfer from bed to chair and chair to bed with minimal assistance/contact guard assist using the least restrictive device. 3. Patient will perform sit to stand with minimal assistance/contact guard assist.  4. Patient will ambulate with minimal assistance/contact guard assist for 50 feet with the least restrictive device. 3/3/17  Chart reviewed. PT treatment attempted. Pt sleeping on BiPAP. Pt gently awakened. Declined PT at this time due to feeling tired. Pt falling asleep immediately thereafter. Will f/u later as pt schedule allows. Thank you.   Cipriano Eason, PT

## 2017-03-03 NOTE — PROGRESS NOTES
Pt placed on BIPAP with no complications. HR 83, SPO2 98%, breath sounds diminished, will continue to monitor.

## 2017-03-03 NOTE — ROUTINE PROCESS
Bedside and Verbal shift change report given to VALERIE Conner RN (oncoming nurse). Report included the following information SBAR, Kardex, Intake/Output, MAR and Recent Results. Alarm parameters reviewed and opportunities for questions provided.

## 2017-03-03 NOTE — PROGRESS NOTES
Problem: Dysphagia (Adult)  Goal: *Acute Goals and Plan of Care (Insert Text)  Dysphagia Present: mild  Aspiration: no     Recommendations:  Diet: dental soft and thin liquids  Meds: in puree  Aspiration Precautions  Other: small sips/bites, slow rate    Goals: Patient will:  1. Tolerate PO trials with 0 s/s overt distress in 4/5 trials  2. Utilize compensatory swallow strategies/maneuvers (decrease bite/sip, size/rate, alt. liq/sol) with min cues in 4/5 trials  3. Complete an objective swallow study (i.e., MBSS) to assess swallow integrity, r/o aspiration, and determine of safest LRD, min A - met 3/3/17     Outcome: Progressing Towards Goal  SPEECH PATHOLOGY MODIFIED BARIUM SWALLOW STUDY     Patient: Fredo Schaffer (44 y.o. female)  Date: 3/3/2017  Primary Diagnosis: CHF NYHA class IV, acute, diastolic (HCC)  CHF NYHA class IV, acute, diastolic (HCC)        Precautions: aspiration  Fall (SPO2)      ASSESSMENT :  MBS completed with 0 airway compromise across consistencies including thin, puree, regular solid, and 13mm barium tablet. Prolonged mastication with premature spillage and piecemeal deglutition however 100% pharyngeal clearance of solids. Mild vallecular residue post thin liquids cleared spontaneously with subsequent swallows. 13mm barium tablet attempted in puree per pt preference however pt ultimately masticated pill; may benefit from meds crushed in puree. Consistent pharyngeal swallow delay to valleculae and mildly impaired pharyngeal strength, though pt remains safe for current PO diet. ST to f/u 1-2 visits for education and ongoing safe swallow strategy training including single small sips/bites at slow rate in setting of respiratory compromise. Pt presents with mild oropharyngeal dysphagia, as evidenced above. At this time, safest for soft solid, thin liquid diet. SLP utilized video of study for visual feedback, education, and recommendations for pt/caregiver; verbalized comprehension. Patient will benefit from skilled intervention to address the above impairments. Patients rehabilitation potential is considered to be Good  Factors which may influence rehabilitation potential include:   [ ]              None noted  [X]              Mental ability/status  [X]              Medical condition  [ ]              Home/family situation and support systems  [ ]              Safety awareness  [ ]              Pain tolerance/management  [ ]              Other:        PLAN : dental soft and thin liquids, safe swallow strategies  Recommendations and Planned Interventions:  ST to f/u 1-2 visits for education, swallow strategy training  Frequency/Duration: Patient will be followed by speech-language pathology 1-2 times per day/4-7 days per week to address goals. Discharge Recommendations: defer to PT/OT       SUBJECTIVE:   Patient stated I'm glad it went OK.       OBJECTIVE:       Past Medical History:   Diagnosis Date    A-fib ([facility] [address]      Arthritis      Back injury      Chronic obstructive pulmonary disease (HCC)      Diabetes ([facility] [address]      Fibromyalgia      Heart failure ([facility] [address]      Hypertension      MRSA (methicillin resistant Staphylococcus aureus)      Obesity hypoventilation syndrome ([facility] [address] 2/28/2017    Pulmonary hypertension, moderate to severe ([address] 2/28/2017     Past Surgical History:   Procedure Laterality Date    CARDIAC SURG PROCEDURE UNLIST        HX CORONARY STENT PLACEMENT        HX KNEE REPLACEMENT        HX ORTHOPAEDIC         bilateral knee replacement     Prior Level of Function/Home Situation: per chart/pt  Home Situation  Home Environment: Private residence  # Steps to Enter: 1  Rails to Enter: Yes  Hand Rails : Left  One/Two Story Residence: One story  Living Alone: No  Support Systems: Spouse/Significant Other/Partner  Patient Expects to be Discharged to[de-identified] Rehabilitation facility  Current DME Used/Available at Home: Walker, rolling, [place] beach, straight, [employer], bedside, [employer] bars  Tub or Shower Type: Tub/Shower combination  Diet prior to admission: dental soft/thin  Current Diet:  Dental soft/thin   Radiologist: HRRA  Film Views: Lateral;Fluoro  Patient Position: 90 in fluoro chair      Trial 1: Trial 2:   Consistency Presented: Thin liquid;Puree; Solid;Pill/Tablet     How Presented: Self-fed/presented;SLP-fed/presented;Spoon;Straw     Consistency Amount:  (x4 thin, x4 puree, x1 solid, x1 pill)     Bolus Acceptance: No impairment     Bolus Formation/Control: Impaired: Delayed;Mastication;Premature spillage;Piecemeal  :     Propulsion: Delayed (# of seconds)     Oral Residue: None     Initiation of Swallow: Triggered at vallecula     Timing: Pooling 1-5 sec;Vallecular     Penetration: None     Aspiration/Timing: No evidence of aspiration     Pharyngeal Clearance: Vallecular residue;10-50% (thin liquids)     Attempted Modifications: Small sips and bites; Double swallow     Effective Modifications: Double swallow;Small sips and bites     Cues for Modifications: Minimal               Trial 3: Trial 4:                            :    :                                                                          Decreased Tongue Base Retraction?: No  Laryngeal Elevation: WFL (within functional limits)  Aspiration/Penetration Score: 1 (No penetration or aspiration-Contrast does not enter the airway)     Pharyngeal-Esophageal Segment: No impairment  Pharyngeal Dysfunction: Decreased strength     Oral Phase Severity: Mild  Pharyngeal Phase Severity: Mild     GCODESwallowing:   Swallow Current Status CI= 1-19%   Swallow Goal Status CI= 1-19%     The severity rating is based on the following outcomes:  GLORIA Noms Swallow Level 6              8-point Penetration-Aspiration Scale: Score 0  Clinical Judgement     PAIN:  Start of Study: 8  End of Study: 8       COMMUNICATION/EDUCATION:   [X]  Aspiration risks/precautions; compensatory swallow techniques  [X]  Patient/family have participated as able in goal setting and plan of care. [X]  Patient/family agree to work toward stated goals and plan of care. [ ]  Patient understands intent and goals of therapy, but is neutral about his/her participation. [ ]  Patient is unable to participate in goal setting and plan of care.      Thank you for this referral.  Marilee Hi, SLP  Time Calculation: 14 mins

## 2017-03-03 NOTE — CONSULTS
[facility]  Pulmonary, Critical Care, and Sleep Medicine    Name: Salud Melendrez MRN: [id number]   : 1945 Hospital: [facility]   Date: 3/3/2017        Critical Care Follow up Patient Consult    Requesting MD:                                                  Reason for CC Consult:  IMPRESSION:   · Acute hypoxic respiratory failure, likely due to pulmonary congestion - improved  · Likely severe HFPEF given severe LA dilation and CXR - improved  · Moderate pulmonary  Hypertension, likely combined group 2 (HFPEF) and group 3 (OHS)  · OHS, decompensated resulting in hypercapnic respiratory failure - improved  · Acute diastolic  Heart failure  · UTI  · Afib. · Recurrent admissions for resp failure (10x this year)  · Home O2 @3L/min      RECOMMENDATIONS:   · Neuro: minimize sedatives/narcotics, as these will exacerbate hypercapnia; OOB to chair; PT consult  · CV: likely severe HFPEF given CXR with e/o hypervolemia and TTE with severe LA dilation. Rate control better on coreg, dig, and low-dose diltiazem; consider transitioning to long-acting dilt in 2-3 days  · Resp: hypercapnic failure- improved, likely 2/2 decompensated OHS. UTI may be contributing, as is CHF. Minimize sedation as above. Continue nebs. Much improved today, continue BIPAP at night  · GI: ADA diet, PPI  · Renal: Cardio-renal syndrome. Improved with IV lasix. Metabolic alkalosis with small bump in Cr today. Likely euvolemic now with contraction alkalosis. Will reduce dose of lasix, add diamox, and aim for ~net even I/O  · ID: GNR x2 in urine, both sensitive to ceftriaxone; 7 day course  · Heme: CBC stable; on coumadin (therapeutic)  · Endo: Fasting glucose ok with current lantus, though post prandials too high. Have increased intensity of her very insulin resistant SSI. Prior TFTs do not suppor hypothyroidism as contributor to OHS.      Overall improved vs time of ICU transfer, now euvolemic, and near her baseline respiratory status (3L NC during day, and BIPAP at night)- can transfer out of ICU  CVC and sosa will remain today for adequate access and strict I/Os respectively    Alida Valdes MD  PCCM  35 min     Subjective/History: This patient has been seen and evaluated at the request of Dr. Linzie Skiff for acute respiratory failure   Patient is a 66 y.o. female with mulitple medical problems listed below including moderate pulm htn, hermelinda/ohs and copd  was recently discharged from this facility to SNF. Pt was discharged on BIPAP qhs. Pt reports that she was not able to get  Her bipap therapy from SNF. Day prior to admission, she finally had bipap but was not functioning. Also noted increase in leg swellings   Pt reports her breathing status continue to declined henc brought to ED. Pt was placed on bipap overnight. This am transition to HFNC. Feeling much better. Pt was also diuresed 3.2L   NO chest pain, no palpittiaon. No fever or chills. 3/3: diuresed well, HR better controlled on higher dose of coreg, low-dose diltiazem, and dig. Feels better today. Was OOB to chair with PT yesterday. Past Medical History:   Diagnosis Date    A-fib [facility])     Arthritis     Back injury     Chronic obstructive pulmonary disease (HCC)     Diabetes ([address]     Fibromyalgia     Heart failure ([address]     Hypertension     MRSA (methicillin resistant Staphylococcus aureus)     Obesity hypoventilation syndrome ([facility] 2/28/2017    Pulmonary hypertension, moderate to severe ([address] 2/28/2017      Past Surgical History:   Procedure Laterality Date    CARDIAC SURG PROCEDURE UNLIST      HX CORONARY STENT PLACEMENT      HX KNEE REPLACEMENT      HX ORTHOPAEDIC      bilateral knee replacement      Prior to Admission medications    Medication Sig Start Date End Date Taking? Authorizing Provider   arformoterol (BROVANA) 15 mcg/2 mL nebu neb solution 2 mL by Nebulization route two (2) times a day.  2/9/17  Yes Min Ghosh DO   budesonide (PULMICORT) 0.5 mg/2 mL nbsp 2 mL by Nebulization route two (2) times a day. 2/9/17  Yes Franchesca Current, DO   clonazePAM (KLONOPIN) 0.5 mg tablet Take 0.5 Tabs by mouth two (2) times a day. Max Daily Amount: 0.5 mg. 2/9/17  Yes Franchesca Current, DO   dilTIAZem (CARDIZEM) 60 mg tablet Take 1 Tab by mouth Before breakfast, lunch, and dinner. 2/9/17  Yes Franchesca Current, DO   docusate sodium (COLACE) 100 mg capsule Take 1 Cap by mouth two (2) times a day for 90 days. 2/9/17 5/10/17 Yes Franchesca Current, DO   furosemide (LASIX) 40 mg tablet Take 1 Tab by mouth daily. 2/9/17  Yes [place] Current, DO   pantoprazole (PROTONIX) 40 mg tablet Take 1 Tab by mouth Daily (before breakfast). 2/9/17  Yes Franchesca Current, DO   simvastatin (ZOCOR) 10 mg tablet Take 1 Tab by mouth nightly. 2/9/17  Yes Franchesca Current, DO   insulin glargine (LANTUS) 100 unit/mL injection 25 Units by SubCUTAneous route nightly. 2/9/17  Yes Franchesca Current, DO   albuterol-ipratropium (DUO-NEB) 2.5 mg-0.5 mg/3 ml nebulizer solution 3 mL by Nebulization route every four (4) hours as needed for Shortness of Breath. 1/12/16  Yes James Anderson MD   pregabalin (LYRICA) 75 mg capsule Take  by mouth. Yes Paul Sloan MD   warfarin (COUMADIN) 2.5 mg tablet Take 2.5 mg by mouth daily. Yes Paul Sloan MD   carvedilol (COREG) 25 mg tablet Take 25 mg by mouth two (2) times daily (with meals). Yes Paul Sloan MD   aspirin 81 mg tablet Take 81 mg by mouth. Yes Paul Sloan MD   FERROUS SULFATE by Does Not Apply route. Yes Paul Sloan MD   traZODone (DESYREL) 50 mg tablet Take 1 Tab by mouth nightly.  2/9/17   Franchesca Current, DO   insulin lispro (HUMALOG) 100 unit/mL injection AC and HS  Indications: type 2 diabetes mellitus 2/9/17   Franchesca Current, DO   insulin lispro (HUMALOG) 100 unit/mL injection AC  Indications: type 2 diabetes mellitus 2/9/17   Franchesca Current, DO oxyCODONE-acetaminophen (PERCOCET) 5-325 mg per tablet Take 2 Tabs by mouth every four (4) hours as needed. Max Daily Amount: 12 Tabs. 2/9/17   Lavinia Bailey DO   codeine-butalbital-acetaminophen-caffeine (FIORICET WITH CODEINE) -19-30 mg per capsule Take  by mouth every four (4) hours as needed for Headache. Paul Sloan MD   CYANOCOBALAMIN, VITAMIN B-12, (VITAMIN B-12 PO) Take  by mouth.       Paul Sloan MD     Current Facility-Administered Medications   Medication Dose Route Frequency    potassium chloride (K-DUR, KLOR-CON) SR tablet 20 mEq  20 mEq Oral ONCE    digoxin (LANOXIN) tablet 0.125 mg  0.125 mg Oral DAILY    furosemide (LASIX) injection 40 mg  40 mg IntraVENous Q12H    potassium chloride (K-DUR, KLOR-CON) SR tablet 20 mEq  20 mEq Oral BID    carvedilol (COREG) tablet 25 mg  25 mg Oral BID WITH MEALS    dilTIAZem (CARDIZEM) IR tablet 30 mg  30 mg Oral TIDAC    pantoprazole (PROTONIX) tablet 40 mg  40 mg Oral ACB    cefTRIAXone (ROCEPHIN) 1 g in 0.9% sodium chloride (MBP/ADV) 50 mL MBP  1 g IntraVENous Q24H    nystatin (MYCOSTATIN) 100,000 unit/gram powder   Topical BID    ipratropium (ATROVENT) 0.02 % nebulizer solution 0.5 mg  0.5 mg Nebulization QID RT    clonazePAM (KlonoPIN) tablet 0.25 mg  0.25 mg Oral BID    docusate sodium (COLACE) capsule 100 mg  100 mg Oral BID    insulin glargine (LANTUS) injection 25 Units  25 Units SubCUTAneous QHS    simvastatin (ZOCOR) tablet 10 mg  10 mg Oral QHS    pregabalin (LYRICA) capsule 75 mg  75 mg Oral BID    aspirin chewable tablet 81 mg  81 mg Oral DAILY    arformoterol (BROVANA) neb solution 15 mcg  15 mcg Nebulization BID RT    budesonide (PULMICORT) 500 mcg/2 ml nebulizer suspension  500 mcg Nebulization BID RT    insulin lispro (HUMALOG) injection   SubCUTAneous AC&HS     Allergies   Allergen Reactions    Latex Hives    Adhesive Tape-Silicones Unknown (comments)    Bees [Sting, Bee] Unknown (comments)    Garlic Nausea and Vomiting    Zoloft [Sertraline] Hives      Social History   Substance Use Topics    Smoking status: Never Smoker    Smokeless tobacco: Not on file    Alcohol use No      History reviewed. No pertinent family history. Review of Systems:  A comprehensive review of systems was negative except for that written in the HPI. Objective:   Vital Signs:    Visit Vitals    /42    Pulse 72    Temp 97.8 °F (36.6 °C)    Resp 25    Wt 115.2 kg (253 lb 15.5 oz)    SpO2 100%    Breastfeeding No    BMI 42.26 kg/m2       O2 Device: Nasal cannula   O2 Flow Rate (L/min): 4 l/min   Temp (24hrs), Av.1 °F (36.7 °C), Min:97.8 °F (36.6 °C), Max:98.4 °F (36.9 °C)       Intake/Output:   Last shift:         Last 3 shifts:  1901 -  0700  In: 200 [I.V.:200]  Out: 4300 [Urine:4300]    Intake/Output Summary (Last 24 hours) at 17 0814  Last data filed at 17 0345   Gross per 24 hour   Intake              100 ml   Output             3500 ml   Net            -3400 ml     Hemodynamics:   . @MAP     . @CVP       Ventilator Settings:  Mode Rate Tidal Volume Pressure FiO2 PEEP            45 %       Peak airway pressure:      Minute ventilation: 14.1 l/min      ARDS network Guidelines: Lung protective strategy and Pl pressure goals____________    Physical Exam:    General:  Alert, cooperative, no distress, appears stated age. Head:  Normocephalic, without obvious abnormality, atraumatic. Eyes:  Conjunctivae/corneas clear. PERRL, EOMs intact. Nose: Nares normal. Septum midline. Mucosa normal. No drainage or sinus tenderness. Throat: Lips, mucosa, and tongue normal. Teeth and gums normal.   Neck: Supple, symmetrical, trachea midline, no adenopathy, thyroid: no enlargment/tenderness/nodules, no carotid bruit and no JVD. Back:   Symmetric, no curvature. ROM normal.   Lungs:   Clear to auscultation bilaterally. Chest wall:  No tenderness or deformity.    Heart:  Regular rate and rhythm, S1, S2 normal, no murmur, click, rub or gallop. Abdomen:   Soft, non-tender. Bowel sounds normal. No masses,  No organomegaly. Extremities: Extremities normal, atraumatic, no cyanosis or edema. Pulses: 2+ and symmetric all extremities. Skin: Skin color, texture, turgor normal. No rashes or lesions   Lymph nodes: Cervical, supraclavicular, and axillary nodes normal.   Neurologic: Grossly nonfocal       Data:     Recent Results (from the past 24 hour(s))   GLUCOSE, POC    Collection Time: 03/02/17 11:46 AM   Result Value Ref Range    Glucose (POC) 305 (H) 70 - 110 mg/dL   GLUCOSE, POC    Collection Time: 03/02/17 11:47 AM   Result Value Ref Range    Glucose (POC) 303 (H) 70 - 110 mg/dL   GLUCOSE, POC    Collection Time: 03/02/17  4:26 PM   Result Value Ref Range    Glucose (POC) 269 (H) 70 - 110 mg/dL   GLUCOSE, POC    Collection Time: 03/02/17 10:01 PM   Result Value Ref Range    Glucose (POC) 381 (H) 70 - 110 mg/dL   MAGNESIUM    Collection Time: 03/03/17  5:30 AM   Result Value Ref Range    Magnesium 2.0 1.8 - 2.4 mg/dL   METABOLIC PANEL, COMPREHENSIVE    Collection Time: 03/03/17  5:30 AM   Result Value Ref Range    Sodium 144 136 - 145 mmol/L    Potassium 4.1 3.5 - 5.5 mmol/L    Chloride 102 100 - 108 mmol/L    CO2 PENDING mmol/L    Anion gap PENDING mmol/L    Glucose 121 (H) 74 - 99 mg/dL    BUN 39 (H) 7.0 - 18 MG/DL    Creatinine 1.29 0.6 - 1.3 MG/DL    BUN/Creatinine ratio 30 (H) 12 - 20      GFR est AA 49 (L) >60 ml/min/1.73m2    GFR est non-AA 41 (L) >60 ml/min/1.73m2    Calcium 8.5 8.5 - 10.1 MG/DL    Bilirubin, total 0.6 0.2 - 1.0 MG/DL    ALT (SGPT) 14 13 - 56 U/L    AST (SGOT) 13 (L) 15 - 37 U/L    Alk.  phosphatase 100 45 - 117 U/L    Protein, total 6.3 (L) 6.4 - 8.2 g/dL    Albumin 3.0 (L) 3.4 - 5.0 g/dL    Globulin 3.3 2.0 - 4.0 g/dL    A-G Ratio 0.9 0.8 - 1.7     CBC W/O DIFF    Collection Time: 03/03/17  5:30 AM   Result Value Ref Range    WBC 7.2 4.6 - 13.2 K/uL    RBC 3.65 (L) 4.20 - 5.30 M/uL HGB 11.5 (L) 12.0 - 16.0 g/dL    HCT 37.4 35.0 - 45.0 %    .5 (H) 74.0 - 97.0 FL    MCH 31.5 24.0 - 34.0 PG    MCHC 30.7 (L) 31.0 - 37.0 g/dL    RDW 16.5 (H) 11.6 - 14.5 %    PLATELET 339 016 - 846 K/uL    MPV 10.6 9.2 - 11.8 FL   GLUCOSE, POC    Collection Time: 17  6:55 AM   Result Value Ref Range    Glucose (POC) 106 70 - 110 mg/dL             Telemetry:afib      Imaging:  I have personally reviewed the patients radiographs and have reviewed the reports:        2017  4:51 PM - Pardeep, Card Result In       Narrative      [facility] [place]  [address], [address] [facility]  [phone]    Transthoracic Echocardiogram    Patient: Loc Hou  MRN: [id number]  ACCT #: [de-identified]  : 1945  Age: 66 years  Gender: Female  Height: 65 in  Weight: 229.5 lb  BSA: 2.1 mï¾²  BP: 137 / 51 mmHg  Study date: 2017  Status: Routine  Location: Bedside  [facility] ACC #: [id number]    Allergies: LATEX, ADHESIVE TAPE-SILICONES, STING, BEE, GARLIC, SERTRALINE    Referring_Ordering Physician: Jinny See MD  Interpreting Physician: Nicole Garcia MD  Technologist: Yung Rao    SUMMARY:  Left ventricle: The ventricle was mildly dilated. Systolic function was  normal. Ejection fraction was estimated in the range of 55 % to 60 %. No  obvious wall motion abnormalities identified in the views obtained. can  not comment on diastolic function due to monophasic mitral doppler inflow  due to atrial fibrillation. Right ventricle: Systolic pressure was mildly increased. Estimated peak  pressure was 55 mmHg. Left atrium: The atrium is severely dilated. LA volume index was 78 ml/mï¾². Right atrium: The atrium is mildly dilated. Mitral valve: The posterior leaflet is thickened, calcified with reduced  mobility. Aortic valve: Aortic valve not well seen. Can not comment on strucutre of  aortic valve. Aortic valve is thickened and calcified.  Peak velocity is  3.23 m/sec with mean gradient of 22.40 mm hg. Moderate aortic stenosis  with velocity criteria. Mild aortic regurgitation. Tricuspid valve: Pulmonary artery systolic pressure: 52 mmHg. COMPARISONS:  Comparison was made with the previous study of 02-Jan-2016. INDICATIONS: Atrial fibrillation, CHF. HISTORY: Prior history: Risk factors: hypertension. PROCEDURE: This was a routine study. The study included complete 2D  imaging, M-mode, complete spectral Doppler, and color Doppler. The heart  rate was 89 bpm, at the start of the study. Systolic blood pressure was  137 mmHg, at the start of the study. Diastolic blood pressure was 51 mmHg,  at the start of the study. Image quality was fair. LEFT VENTRICLE: The ventricle was mildly dilated. Systolic function was  normal. Ejection fraction was estimated in the range of 55 % to 60 %. No  obvious wall motion abnormalities identified in the views obtained. can  not comment on diastolic function due to monophasic mitral doppler inflow  due to atrial fibrillation. Wall thickness was normal. DOPPLER:  Indeterminate diastolic function. RIGHT VENTRICLE: The size was normal. Systolic function was normal.  DOPPLER: Systolic pressure was mildly increased. Estimated peak pressure  was 55 mmHg. LEFT ATRIUM: The atrium is severely dilated. LA volume index was 78 ml/mï¾². RIGHT ATRIUM: The atrium is mildly dilated. MITRAL VALVE: There was mild annular dilatation. Normal valve structure. There was normal leaflet separation. DOPPLER: There was no evidence for  stenosis. The posterior leaflet is thickened, calcified with reduced  mobility. AORTIC VALVE: The valve was probably trileaflet. Leaflets exhibited mildly  increased thickness, mild calcification, and mildly reduced cuspal  separation. DOPPLER: Aortic valve not well seen. Can not comment on  strucutre of aortic valve. Aortic valve is thickened and calcified.  Peak  velocity is 3.23 m/sec with mean gradient of 22.40 mm hg. Moderate aortic  stenosis with velocity criteria. Mild aortic regurgitation. There was mild  regurgitation. TRICUSPID VALVE: Normal valve structure. There was normal leaflet  separation. DOPPLER: There was no evidence for tricuspid stenosis. There  was no regurgitation. Tricuspid regurgitation peak velocity: 3.4 m/sec. Right atrial pressure estimate: 8 mmHg. Pulmonary artery systolic  pressure: 52 mmHg. PULMONIC VALVE: Not well visualized, but normal Doppler findings. AORTA: The root exhibited normal size. SYSTEMIC VEINS: IVC: The inferior vena cava was not well visualized. PERICARDIUM: No significant pericardial effusion identified. MEASUREMENT TABLES    2D measurements  Right atrium   (Reference normals)  Area sys   23 cmï¾²   (8.3-19. 5)    Doppler measurements  Aortic valve   (Reference normals)  Valve area, cont   1 cmï¾²   (--)    SYSTEM MEASUREMENT TABLES    2D  Ao Diam: 3.3 cm  LVOT Diam: 2.1 cm  EF(Teich): 66.4 %  IVSd: 0.9 cm  LVIDd: 5.4 cm  LVIDs: 3.4 cm  LVPWd: 1 cm  LAESV Index (A-L): 78.2 ml/m2  LVEDV MOD A2C: 97.7 ml  LVEDV MOD A4C: 86.1 ml  LVESV MOD A2C: 42.1 ml  LVESV MOD A4C: 38.9 ml  RA Area: 22.6 cm2  RVIDd: 3.7 cm    CW  AV Vmean: 2 m/s  AV maxP.6 mmHg  AV meanP.7 mmHg  TR Vmax: 3.4 m/s  TR maxP.3 mmHg    PW  SANIA (VTI): 1 cm2  LVOT meanP.4 mmHg    Prepared and E-signed by    Lucia Hall MD  Signed 2017 16:51:06     esults    XR CHEST PORT (Accession [id number]) (Order [id number])         Allergies        Unspecified: Latex; Adhesive Tape-silicones; Bees [Sting, Bee];  Garlic;  Zoloft [Sertraline]       Result Information      Status Provider Status        Final result (Exam End: 2017  4:37 PM) Open        Study Result      Chest, single view     Indication: Shortness of breath, pulmonary edema.     Comparison: Several prior exams, most recently 2017.     Findings: Portable upright AP view of the chest was obtained.  The lungs are  considerably underexpanded, similar to prior, with associated bronchovascular  crowding as well as prominence of the central pulmonary vasculature. No  pneumothorax or large pleural effusion. Cardiac enlargement as well as an  atherosclerotic and tortuous thoracic aorta are not substantially changed from  prior. No acute osseous abnormality identified.     IMPRESSION  IMPRESSION:  1. Underexpanded lungs with associated bronchovascular crowding and central  pulmonary vascular prominence, similar to prior. 2. Cardiomegaly, also similar to prior.                   Total critical care time exclusive of procedures: 35 minutes  Dallas Leonardo MD  3/3/2017, 12:18 PM

## 2017-03-03 NOTE — ROUTINE PROCESS
TRANSFER - IN REPORT:    Verbal report received from Νοταρά Mary, RN(name) on Paulino oPsey  being received from ICU(unit) for routine progression of care      Report consisted of patients Situation, Background, Assessment and   Recommendations(SBAR). Information from the following report(s) SBAR, Kardex, Procedure Summary, Intake/Output, MAR, Accordion, Recent Results, Med Rec Status and Cardiac Rhythm a fib was reviewed with the receiving nurse. Opportunity for questions and clarification was provided. Assessment completed upon patients arrival to unit and care assumed.

## 2017-03-04 LAB
GLUCOSE BLD STRIP.AUTO-MCNC: 107 MG/DL (ref 70–110)
GLUCOSE BLD STRIP.AUTO-MCNC: 195 MG/DL (ref 70–110)
GLUCOSE BLD STRIP.AUTO-MCNC: 205 MG/DL (ref 70–110)
GLUCOSE BLD STRIP.AUTO-MCNC: 91 MG/DL (ref 70–110)
INR PPP: 2.5 (ref 0.8–1.2)
PROTHROMBIN TIME: 26.1 SEC (ref 11.5–15.2)

## 2017-03-04 PROCEDURE — 74011000250 HC RX REV CODE- 250: Performed by: INTERNAL MEDICINE

## 2017-03-04 PROCEDURE — 94760 N-INVAS EAR/PLS OXIMETRY 1: CPT

## 2017-03-04 PROCEDURE — 74011250636 HC RX REV CODE- 250/636: Performed by: INTERNAL MEDICINE

## 2017-03-04 PROCEDURE — 74011250637 HC RX REV CODE- 250/637: Performed by: INTERNAL MEDICINE

## 2017-03-04 PROCEDURE — 85610 PROTHROMBIN TIME: CPT | Performed by: INTERNAL MEDICINE

## 2017-03-04 PROCEDURE — 97530 THERAPEUTIC ACTIVITIES: CPT

## 2017-03-04 PROCEDURE — 74011250637 HC RX REV CODE- 250/637: Performed by: HOSPITALIST

## 2017-03-04 PROCEDURE — 94660 CPAP INITIATION&MGMT: CPT

## 2017-03-04 PROCEDURE — 74011636637 HC RX REV CODE- 636/637: Performed by: INTERNAL MEDICINE

## 2017-03-04 PROCEDURE — 94640 AIRWAY INHALATION TREATMENT: CPT

## 2017-03-04 PROCEDURE — 77010033678 HC OXYGEN DAILY

## 2017-03-04 PROCEDURE — 82962 GLUCOSE BLOOD TEST: CPT

## 2017-03-04 PROCEDURE — 74011000258 HC RX REV CODE- 258: Performed by: INTERNAL MEDICINE

## 2017-03-04 PROCEDURE — 77030020887 HC CRM SKN PROT DIMTH S&N -A

## 2017-03-04 PROCEDURE — 97116 GAIT TRAINING THERAPY: CPT

## 2017-03-04 PROCEDURE — 65660000000 HC RM CCU STEPDOWN

## 2017-03-04 RX ORDER — WARFARIN 1 MG/1
2 TABLET ORAL ONCE
Status: COMPLETED | OUTPATIENT
Start: 2017-03-04 | End: 2017-03-04

## 2017-03-04 RX ADMIN — INSULIN LISPRO 7 UNITS: 100 INJECTION, SOLUTION INTRAVENOUS; SUBCUTANEOUS at 16:30

## 2017-03-04 RX ADMIN — POTASSIUM CHLORIDE 20 MEQ: 20 TABLET, EXTENDED RELEASE ORAL at 08:46

## 2017-03-04 RX ADMIN — IPRATROPIUM BROMIDE 0.5 MG: 0.5 SOLUTION RESPIRATORY (INHALATION) at 11:40

## 2017-03-04 RX ADMIN — FUROSEMIDE 10 MG: 10 INJECTION, SOLUTION INTRAMUSCULAR; INTRAVENOUS at 08:45

## 2017-03-04 RX ADMIN — CLONAZEPAM 0.25 MG: 0.5 TABLET ORAL at 20:44

## 2017-03-04 RX ADMIN — DILTIAZEM HYDROCHLORIDE 30 MG: 30 TABLET, FILM COATED ORAL at 06:38

## 2017-03-04 RX ADMIN — FUROSEMIDE 10 MG: 10 INJECTION, SOLUTION INTRAMUSCULAR; INTRAVENOUS at 20:44

## 2017-03-04 RX ADMIN — ASPIRIN 81 MG CHEWABLE TABLET 81 MG: 81 TABLET CHEWABLE at 09:00

## 2017-03-04 RX ADMIN — INSULIN LISPRO 5 UNITS: 100 INJECTION, SOLUTION INTRAVENOUS; SUBCUTANEOUS at 12:40

## 2017-03-04 RX ADMIN — SIMVASTATIN 10 MG: 20 TABLET, FILM COATED ORAL at 20:43

## 2017-03-04 RX ADMIN — ARFORMOTEROL TARTRATE 15 MCG: 15 SOLUTION RESPIRATORY (INHALATION) at 08:18

## 2017-03-04 RX ADMIN — CEFTRIAXONE 1 G: 1 INJECTION, POWDER, FOR SOLUTION INTRAMUSCULAR; INTRAVENOUS at 12:40

## 2017-03-04 RX ADMIN — ARFORMOTEROL TARTRATE 15 MCG: 15 SOLUTION RESPIRATORY (INHALATION) at 20:09

## 2017-03-04 RX ADMIN — DOCUSATE SODIUM 100 MG: 100 CAPSULE, LIQUID FILLED ORAL at 20:44

## 2017-03-04 RX ADMIN — ACETAMINOPHEN 650 MG: 325 TABLET ORAL at 21:01

## 2017-03-04 RX ADMIN — BUDESONIDE 500 MCG: 0.5 INHALANT RESPIRATORY (INHALATION) at 08:18

## 2017-03-04 RX ADMIN — NYSTATIN: 100000 POWDER TOPICAL at 09:00

## 2017-03-04 RX ADMIN — DIGOXIN 0.12 MG: 0.12 TABLET ORAL at 08:46

## 2017-03-04 RX ADMIN — IPRATROPIUM BROMIDE 0.5 MG: 0.5 SOLUTION RESPIRATORY (INHALATION) at 08:18

## 2017-03-04 RX ADMIN — PREGABALIN 75 MG: 75 CAPSULE ORAL at 20:44

## 2017-03-04 RX ADMIN — DILTIAZEM HYDROCHLORIDE 30 MG: 30 TABLET, FILM COATED ORAL at 16:53

## 2017-03-04 RX ADMIN — ACETAMINOPHEN 650 MG: 325 TABLET ORAL at 08:45

## 2017-03-04 RX ADMIN — PANTOPRAZOLE SODIUM 40 MG: 40 TABLET, DELAYED RELEASE ORAL at 06:38

## 2017-03-04 RX ADMIN — ACETAMINOPHEN 650 MG: 325 TABLET ORAL at 17:02

## 2017-03-04 RX ADMIN — DILTIAZEM HYDROCHLORIDE 30 MG: 30 TABLET, FILM COATED ORAL at 11:30

## 2017-03-04 RX ADMIN — BUDESONIDE 500 MCG: 0.5 INHALANT RESPIRATORY (INHALATION) at 20:09

## 2017-03-04 RX ADMIN — IPRATROPIUM BROMIDE AND ALBUTEROL SULFATE 3 ML: .5; 3 SOLUTION RESPIRATORY (INHALATION) at 14:49

## 2017-03-04 RX ADMIN — PREGABALIN 75 MG: 75 CAPSULE ORAL at 08:46

## 2017-03-04 RX ADMIN — CARVEDILOL 25 MG: 12.5 TABLET, FILM COATED ORAL at 16:53

## 2017-03-04 RX ADMIN — DOCUSATE SODIUM 100 MG: 100 CAPSULE, LIQUID FILLED ORAL at 08:46

## 2017-03-04 RX ADMIN — WATER 500 MG: 1 INJECTION INTRAMUSCULAR; INTRAVENOUS; SUBCUTANEOUS at 16:53

## 2017-03-04 RX ADMIN — CARVEDILOL 25 MG: 12.5 TABLET, FILM COATED ORAL at 08:46

## 2017-03-04 RX ADMIN — CLONAZEPAM 0.25 MG: 0.5 TABLET ORAL at 08:45

## 2017-03-04 RX ADMIN — INSULIN GLARGINE 20 UNITS: 100 INJECTION, SOLUTION SUBCUTANEOUS at 22:05

## 2017-03-04 RX ADMIN — NYSTATIN: 100000 POWDER TOPICAL at 20:45

## 2017-03-04 RX ADMIN — WARFARIN SODIUM 2 MG: 1 TABLET ORAL at 18:04

## 2017-03-04 RX ADMIN — POTASSIUM CHLORIDE 20 MEQ: 20 TABLET, EXTENDED RELEASE ORAL at 20:43

## 2017-03-04 NOTE — PROGRESS NOTES
Pt very tired appearing, struggling somewhat with SOB, requested to be put back on BIPAP, hoda well.   Will monitor

## 2017-03-04 NOTE — PROGRESS NOTES
[facility]  Pulmonary, Critical Care, and Sleep Medicine    Name: Delta Horvath MRN: [id number]   : 1945 Hospital: [facility] [place]   Date: 3/4/2017  Room: [facility]/     Subjective:     Delta Horvath is a 66 y.o. female with IMANI/OHS, pulmonary HTN, and COPD who came in after being unable to get her BiPAP at the SNF and had increasing leg edema. She had some respiratory distress and was therefore brought to the ED. She was placed on BiPAP when she came into the ED for the night and did well and was able to be transitioned to [facility]NC. She has been receiving diuresis and has since been transferred to the floor as she no longer required ICU level of care.      3/4  \"tired\" this AM and on BiPAP  Using BiPAP at night   Negative fluid balance 3/3    Past Medical History:   Diagnosis Date    A-fib ([facility]     Arthritis     Back injury     Chronic obstructive pulmonary disease (HCC)     Diabetes [facility]     Fibromyalgia     Heart failure ([facility]     Hypertension     MRSA (methicillin resistant Staphylococcus aureus)     Obesity hypoventilation syndrome ([facility] 2017    Pulmonary hypertension, moderate to severe (HCC) 2017       Current Facility-Administered Medications   Medication Dose Route Frequency    warfarin (COUMADIN) tablet 2 mg  2 mg Oral ONCE    furosemide (LASIX) injection 10 mg  10 mg IntraVENous Q12H    acetaZOLAMIDE (DIAMOX) 500 mg in sterile water (preservative free) 5 mL injection  500 mg IntraVENous Q12H    insulin glargine (LANTUS) injection 20 Units  20 Units SubCUTAneous QHS    digoxin (LANOXIN) tablet 0.125 mg  0.125 mg Oral DAILY    potassium chloride (K-DUR, KLOR-CON) SR tablet 20 mEq  20 mEq Oral BID    carvedilol (COREG) tablet 25 mg  25 mg Oral BID WITH MEALS    dilTIAZem (CARDIZEM) IR tablet 30 mg  30 mg Oral TIDAC    ELECTROLYTE REPLACEMENT PROTOCOL  1 Each Other PRN    ELECTROLYTE REPLACEMENT PROTOCOL  1 Each Other PRN    pantoprazole (PROTONIX) tablet 40 mg  40 mg Oral ACB    polyvinyl alcohol (LIQUIFILM TEARS) 1.4 % ophthalmic solution 1 Drop  1 Drop Both Eyes PRN    cefTRIAXone (ROCEPHIN) 1 g in 0.9% sodium chloride (MBP/ADV) 50 mL MBP  1 g IntraVENous Q24H    nystatin (MYCOSTATIN) 100,000 unit/gram powder   Topical BID    ipratropium (ATROVENT) 0.02 % nebulizer solution 0.5 mg  0.5 mg Nebulization QID RT    clonazePAM (KlonoPIN) tablet 0.25 mg  0.25 mg Oral BID    docusate sodium (COLACE) capsule 100 mg  100 mg Oral BID    simvastatin (ZOCOR) tablet 10 mg  10 mg Oral QHS    pregabalin (LYRICA) capsule 75 mg  75 mg Oral BID    aspirin chewable tablet 81 mg  81 mg Oral DAILY    arformoterol (BROVANA) neb solution 15 mcg  15 mcg Nebulization BID RT    budesonide (PULMICORT) 500 mcg/2 ml nebulizer suspension  500 mcg Nebulization BID RT    albuterol-ipratropium (DUO-NEB) 2.5 MG-0.5 MG/3 ML  3 mL Nebulization Q4H PRN    ondansetron (ZOFRAN) injection 4 mg  4 mg IntraVENous Q6H PRN    acetaminophen (TYLENOL) tablet 650 mg  650 mg Oral Q4H PRN    insulin lispro (HUMALOG) injection   SubCUTAneous AC&HS    glucose chewable tablet 16 g  4 Tab Oral PRN    glucagon (GLUCAGEN) injection 1 mg  1 mg IntraMUSCular PRN    dextrose (D50W) injection syrg 12.5-25 g  25-50 mL IntraVENous PRN    warfarin pharmacy to dose   Other PRN       Allergies   Allergen Reactions    Latex Hives    Adhesive Tape-Silicones Unknown (comments)    Bees [Sting, Bee] Unknown (comments)    Garlic Nausea and Vomiting    Zoloft [Sertraline] Hives         Review of Systems:  HEENT: No epistaxis, no nasal drainage, no difficulty in swallowing  Respiratory: as above  Cardiovascular: no chest pain, no palpitations, no syncope  Gastrointestinal: no abd pain, no vomiting  Neurological: No focal weakness  Constitutional: No fever,    Objective:   Vital Signs:    Visit Vitals    /57 (BP 1 Location: Left arm, BP Patient Position: At rest)    Pulse 82    Temp 97.8 °F (36.6 °C)    Resp 23    Wt 110.9 kg (244 lb 6.4 oz)    SpO2 91%    Breastfeeding No    BMI 40.67 kg/m2       O2 Device: Nasal cannula   O2 Flow Rate (L/min): 5 l/min (inc to 6)   Temp (24hrs), Av.7 °F (36.5 °C), Min:96.4 °F (35.8 °C), Max:98.5 °F (36.9 °C)       Intake/Output:     Intake/Output Summary (Last 24 hours) at 17 1147  Last data filed at 17 0849   Gross per 24 hour   Intake             1000 ml   Output             2250 ml   Net            -1250 ml           Physical Exam:   General: Appears tired, on BiPAP  Neck: Trachea midline, no significant jvd  CVS: Regular rate; no audible murmur  RS: Mod AE bilaterally, decreased sounds in bases but clear to auscultation. Abd: soft, non-tender  Neuro: awake, alert, follows basic commands  Extrm: 2+ lower extremity edema. No cyanosis. Skin: warm, dry    Data review:   Labs:  Recent Results (from the past 12 hour(s))   GLUCOSE, POC    Collection Time: 17  6:16 AM   Result Value Ref Range    Glucose (POC) 107 70 - 110 mg/dL   PROTHROMBIN TIME + INR    Collection Time: 17 10:35 AM   Result Value Ref Range    Prothrombin time 26.1 (H) 11.5 - 15.2 sec    INR 2.5 (H) 0.8 - 1.2     GLUCOSE, POC    Collection Time: 17 11:35 AM   Result Value Ref Range    Glucose (POC) 195 (H) 70 - 110 mg/dL       ABG:  No results found for: PH, PHI, PCO2, PCO2I, PO2, PO2I, HCO3, HCO3I, FIO2, FIO2I      Imaging:  No new imaging 3/4    IMPRESSION:   · Acute hypoxemic hypercapnic respiratory failure, much improved   · Moderate pulmonary HTN  · OHS, decompensated resulting in hypercapnic respiratory failure, improved  · Acute diastolic HF, improved  · Home O2 @ 3LPM      RECOMMENDATIONS:   · Tired this AM, on BiPAP; reasonable to use BiPAP prn during the day and routinely at night. · Continue Brovana and Pulmicort; will make Atrovent nebs PRN. · Diamox added 3/3 for alkalosis. Continue today  · Lasix 10 mg q12h; goal to maintain net even fluid balance. May need to scale back lasix tomorrow. Monitor renal function. Diuretic management per cardiology  · May remove CVL and obtain PIV. · Pulmonary signing off.     Thank you for the consultation   Total time spent 50 mins with more than 50% done face to face interview / exam / counseling       Hina Best PA-C

## 2017-03-04 NOTE — ROUTINE PROCESS
Shift summary:  Patient's sosa was discontinued, patient napped with bipap and worked with physical therapy.

## 2017-03-04 NOTE — PROGRESS NOTES
1946: Assumed care of patient resting in bed, bed in lowest position, call bell with in reach. Patient demonstrated and verbalized on how to call for assistance. Alarm parameters reviewed, on, and audible. 4285: Shift summary: Patient had an uneventful shift, no signs/sympotoms of respiratory distress. Patient left resting in bed no acute distress, bed in lowest position,  call light with in reach.

## 2017-03-04 NOTE — PROGRESS NOTES
Hospitalist Progress Note    Patient: Claudetta Cruel MRN: [id number]  CSN: [id number]    YOB: 1945  Age: 66 y.o. Sex: female    DOA: 2/25/2017 LOS:  LOS: 6 days            Assessment/Plan     1. Acute hypoxemic respiratory failure due to decopmensated diastolic CHF, improved  2. Pulmonary HTN  3. Morbid obesity hypoventilation syndrome  4. UTI  5. Afib, rate better controlled  6.  Dm2 w hyperglycemia    Plan:  - contineu ceftriaxone  - bipap qhs and prn'  - wean oxygen  - continue inhaled beconrhodilators  - coreg, digoxin, warfarin to be dosed by pharmacy  - cardiology dosing diuretics  - DC sosa catheter  - consider place PIV and DC CVL-> defer to pulm    Patient Active Problem List   Diagnosis Code    Acute on chronic diastolic CHF (congestive heart failure) ([facility]) I50.33    ALEJANDRINA (acute kidney injury) ([facility] N17.9    Acute exacerbation of CHF (congestive heart failure) ([facility]) I50.9    DM (diabetes mellitus) ([facility] E11.9    Hypertension I10    Respiratory failure ([facility]) J96.90    Acute coronary syndrome ([facility]) I24.9    Obstructive sleep apnea, adult G47.33    Paroxysmal atrial fibrillation ([facility]) I48.0    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 ([facility]) G90.92, E11.65    Dyspnea due to congestive heart failure ([facility]) I50.9    Aspiration pneumonia ([facility]) J69.0    Hyperkalemia E87.5    Elevated troponin R79.89    COPD (chronic obstructive pulmonary disease) ([facility]) J44.9    CHF (congestive heart failure) ([facility]) I50.9    Insufficiency, respiratory, acute ([facility]) R06.89    Infiltrate noted on imaging study R93.8    COPD exacerbation ([facility]) J44.1    Acute on chronic respiratory failure with hypoxia ([facility]) J96.21    Chest pain R07.9    Anxiety F41.9    UTI (urinary tract infection) N39.0    Obesity hypoventilation syndrome ([facility]) E66.2    Pulmonary hypertension, moderate to severe ([facility]) I27.2               Subjective:    cc: \" Im wheezing\"  No acute events overnight  Continues to have dyspena  Denies cough, fever, chills, chest pain      REVIEW OF SYSTEMS:  General: No fevers or chills. Cardiovascular: No chest pain or pressure. No palpitations. Pulmonary: No shortness of breath. Gastrointestinal: No nausea, vomiting. Objective:        Vital signs/Intake and Output:  Visit Vitals    /57 (BP 1 Location: Left arm, BP Patient Position: At rest)    Pulse 82    Temp 97.8 °F (36.6 °C)    Resp 23    Wt 110.9 kg (244 lb 6.4 oz)    SpO2 (!) 89%    Breastfeeding No    BMI 40.67 kg/m2     Current Shift:     Last three shifts:  03/02 1901 - 03/04 0700  In: 760 [P.O.:720; I.V.:40]  Out: 5250 [Urine:5250]    Body mass index is 40.67 kg/(m^2).     Physical Exam:  GEN: Alert and oriented times three NAD  EYES: conjunctiva normal, lids with out lesions  HEENT: MMM, No thyromegaly, no lymphadenopathy  HEART: RRR +S1 +S2, no JVD, pulses 2+ distally  LUNGS: + expiratory bilatera wheezes, no rales or rhonchi  ABDOMEN: + BS, soft NT/ND no organomegaly,  No rebound  EXTREMITIES: No edema cyanosis, cap refill normal   SKIN: no rashes or skin breakdown, no nodules, normal turgor  Current Facility-Administered Medications   Medication Dose Route Frequency    furosemide (LASIX) injection 10 mg  10 mg IntraVENous Q12H    acetaZOLAMIDE (DIAMOX) 500 mg in sterile water (preservative free) 5 mL injection  500 mg IntraVENous Q12H    insulin glargine (LANTUS) injection 20 Units  20 Units SubCUTAneous QHS    digoxin (LANOXIN) tablet 0.125 mg  0.125 mg Oral DAILY    potassium chloride (K-DUR, KLOR-CON) SR tablet 20 mEq  20 mEq Oral BID    carvedilol (COREG) tablet 25 mg  25 mg Oral BID WITH MEALS    dilTIAZem (CARDIZEM) IR tablet 30 mg  30 mg Oral TIDAC    ELECTROLYTE REPLACEMENT PROTOCOL  1 Each Other PRN    ELECTROLYTE REPLACEMENT PROTOCOL  1 Each Other PRN    pantoprazole (PROTONIX) tablet 40 mg  40 mg Oral ACB    polyvinyl alcohol (LIQUIFILM TEARS) 1.4 % ophthalmic solution 1 Drop  1 Drop Both Eyes PRN    cefTRIAXone (ROCEPHIN) 1 g in 0.9% sodium chloride (MBP/ADV) 50 mL MBP  1 g IntraVENous Q24H    nystatin (MYCOSTATIN) 100,000 unit/gram powder   Topical BID    ipratropium (ATROVENT) 0.02 % nebulizer solution 0.5 mg  0.5 mg Nebulization QID RT    clonazePAM (KlonoPIN) tablet 0.25 mg  0.25 mg Oral BID    docusate sodium (COLACE) capsule 100 mg  100 mg Oral BID    simvastatin (ZOCOR) tablet 10 mg  10 mg Oral QHS    pregabalin (LYRICA) capsule 75 mg  75 mg Oral BID    aspirin chewable tablet 81 mg  81 mg Oral DAILY    arformoterol (BROVANA) neb solution 15 mcg  15 mcg Nebulization BID RT    budesonide (PULMICORT) 500 mcg/2 ml nebulizer suspension  500 mcg Nebulization BID RT    albuterol-ipratropium (DUO-NEB) 2.5 MG-0.5 MG/3 ML  3 mL Nebulization Q4H PRN    ondansetron (ZOFRAN) injection 4 mg  4 mg IntraVENous Q6H PRN    acetaminophen (TYLENOL) tablet 650 mg  650 mg Oral Q4H PRN    insulin lispro (HUMALOG) injection   SubCUTAneous AC&HS    glucose chewable tablet 16 g  4 Tab Oral PRN    glucagon (GLUCAGEN) injection 1 mg  1 mg IntraMUSCular PRN    dextrose (D50W) injection syrg 12.5-25 g  25-50 mL IntraVENous PRN    warfarin pharmacy to dose   Other PRN         All the patient's labs over the past 24 hours were reviewed both during my initial daily workflow process and at the time notated as \"note time\" in [address] [place] [address]. (It is not time stamped separately in this workflow.)  Select labs are listed below.         Labs: Results:       Chemistry Recent Labs      03/03/17   0530  03/02/17   0530   GLU  121*  117*   NA  144  147*   K  4.1  3.2*   CL  102  102   CO2  48*  44*   BUN  39*  44*   CREA  1.29  1.10   CA  8.5  8.6   AGAP  NEG 3  1*   BUCR  30*  40*   AP  100  81   TP  6.3*  5.7*   ALB  3.0*  2.7*   GLOB  3.3  3.0   AGRAT  0.9  0.9      CBC w/Diff Recent Labs      03/03/17   0530  03/02/17   0530   WBC  7.2  4.5*   RBC  3.65*  3.44*   HGB  11.5* 10.9*   HCT  37.4  35.0   PLT  194  162      Cardiac Enzymes Recent Labs      03/02/17   0530  03/01/17   1950   CPK  17*  17*   CKND1  Cannot be calulated  Cannot be calulated      Coagulation Recent Labs      03/03/17   0530  03/02/17   0530   PTP  26.6*  23.5*   INR  2.6*  2.2*               Liver Enzymes Recent Labs      03/03/17   0530   TP  6.3*   ALB  3.0*   AP  100   SGOT  13*      Thyroid Studies Lab Results   Component Value Date/Time    TSH 0.31 01/01/2016 10:20 PM        Procedures/imaging: see electronic medical records for all procedures/Xrays and details which were not copied into this note but were reviewed prior to creation of Plan    Total time spent: 40 minutes> 50% care coordination    Imaging personally reviewed:            Ladarius Montanez DO  Internal Medicine/Geriatrics

## 2017-03-04 NOTE — PROGRESS NOTES
Problem: Mobility Impaired (Adult and Pediatric)  Goal: *Acute Goals and Plan of Care (Insert Text)  Physical Therapy Goals  Initiated 2/27/2017 and to be accomplished within 7 day(s)  1. Patient will move from supine to sit and sit to supine , scoot up and down and roll side to side in bed with supervision/set-up. 2. Patient will transfer from bed to chair and chair to bed with minimal assistance/contact guard assist using the least restrictive device. 3. Patient will perform sit to stand with minimal assistance/contact guard assist.  4. Patient will ambulate with minimal assistance/contact guard assist for 50 feet with the least restrictive device. Outcome: Not Progressing Towards Goal  PT session held due to:  [X]  Requesting Bi-PAP and rest   [ ]  RN Communication/ suggestion  [ ]  Extreme Pain  [ ]  Dialysis treatment in progress. Will f/u later as schedule allows. Thank you.   Trevor Carvajal, PTA

## 2017-03-04 NOTE — PROGRESS NOTES
Cardiology Progress Note      3/4/2017     Admit Date: 2/25/2017    Admit Diagnosis: CHF NYHA class IV, acute, diastolic (HCC);CHF NYHA class IV*      Subjective:     Guido Rivas reports she is tired today, but denies CP.     Visit Vitals    /57 (BP 1 Location: Left arm, BP Patient Position: At rest)    Pulse 82    Temp 97.8 °F (36.6 °C)    Resp 23    Wt 244 lb 6.4 oz (110.9 kg)    SpO2 91%    Breastfeeding No    BMI 40.67 kg/m2        Objective:      Medications:  Current Facility-Administered Medications   Medication Dose Route Frequency    warfarin (COUMADIN) tablet 2 mg  2 mg Oral ONCE    furosemide (LASIX) injection 10 mg  10 mg IntraVENous Q12H    acetaZOLAMIDE (DIAMOX) 500 mg in sterile water (preservative free) 5 mL injection  500 mg IntraVENous Q12H    insulin glargine (LANTUS) injection 20 Units  20 Units SubCUTAneous QHS    digoxin (LANOXIN) tablet 0.125 mg  0.125 mg Oral DAILY    potassium chloride (K-DUR, KLOR-CON) SR tablet 20 mEq  20 mEq Oral BID    carvedilol (COREG) tablet 25 mg  25 mg Oral BID WITH MEALS    dilTIAZem (CARDIZEM) IR tablet 30 mg  30 mg Oral TIDAC    ELECTROLYTE REPLACEMENT PROTOCOL  1 Each Other PRN    ELECTROLYTE REPLACEMENT PROTOCOL  1 Each Other PRN    pantoprazole (PROTONIX) tablet 40 mg  40 mg Oral ACB    polyvinyl alcohol (LIQUIFILM TEARS) 1.4 % ophthalmic solution 1 Drop  1 Drop Both Eyes PRN    cefTRIAXone (ROCEPHIN) 1 g in 0.9% sodium chloride (MBP/ADV) 50 mL MBP  1 g IntraVENous Q24H    nystatin (MYCOSTATIN) 100,000 unit/gram powder   Topical BID    ipratropium (ATROVENT) 0.02 % nebulizer solution 0.5 mg  0.5 mg Nebulization QID RT    clonazePAM (KlonoPIN) tablet 0.25 mg  0.25 mg Oral BID    docusate sodium (COLACE) capsule 100 mg  100 mg Oral BID    simvastatin (ZOCOR) tablet 10 mg  10 mg Oral QHS    pregabalin (LYRICA) capsule 75 mg  75 mg Oral BID    aspirin chewable tablet 81 mg  81 mg Oral DAILY    arformoterol (BROVANA) neb solution 15 mcg  15 mcg Nebulization BID RT    budesonide (PULMICORT) 500 mcg/2 ml nebulizer suspension  500 mcg Nebulization BID RT    albuterol-ipratropium (DUO-NEB) 2.5 MG-0.5 MG/3 ML  3 mL Nebulization Q4H PRN    ondansetron (ZOFRAN) injection 4 mg  4 mg IntraVENous Q6H PRN    acetaminophen (TYLENOL) tablet 650 mg  650 mg Oral Q4H PRN    insulin lispro (HUMALOG) injection   SubCUTAneous AC&HS    glucose chewable tablet 16 g  4 Tab Oral PRN    glucagon (GLUCAGEN) injection 1 mg  1 mg IntraMUSCular PRN    dextrose (D50W) injection syrg 12.5-25 g  25-50 mL IntraVENous PRN    warfarin pharmacy to dose   Other PRN       Physical Exam:  Neck: no carotid bruit and no JVD  Heart: irregularly irregular rhythm  Lungs: clear to auscultation bilaterally  Abdomen: soft, non-tender.  Bowel sounds normal. No masses,  no organomegaly  Extremities: edema 1-2+    Data Review:   Labs:    Recent Results (from the past 24 hour(s))   GLUCOSE, POC    Collection Time: 03/03/17  4:11 PM   Result Value Ref Range    Glucose (POC) 109 70 - 110 mg/dL   GLUCOSE, POC    Collection Time: 03/03/17  9:18 PM   Result Value Ref Range    Glucose (POC) 229 (H) 70 - 110 mg/dL   GLUCOSE, POC    Collection Time: 03/04/17  6:16 AM   Result Value Ref Range    Glucose (POC) 107 70 - 110 mg/dL   PROTHROMBIN TIME + INR    Collection Time: 03/04/17 10:35 AM   Result Value Ref Range    Prothrombin time 26.1 (H) 11.5 - 15.2 sec    INR 2.5 (H) 0.8 - 1.2     GLUCOSE, POC    Collection Time: 03/04/17 11:35 AM   Result Value Ref Range    Glucose (POC) 195 (H) 70 - 110 mg/dL       Assessment:     Principal Problem:    Acute on chronic respiratory failure with hypoxia (HCC) (2/1/2017)    Active Problems:    Acute on chronic diastolic CHF (congestive heart failure) ([facility] (9/3/2014)      DM (diabetes mellitus) ([facility] (9/22/2015)      Hypertension (9/27/2015)      Obstructive sleep apnea, adult (1/4/2016)      Overview: CPAP/BIPAP intolerant      Paroxysmal [facility] fibrillation ([facility] (1/4/2016)      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 (HCC) (0/3/2551)      Elevated troponin (1/30/2017)      COPD (chronic obstructive pulmonary disease) ([facility] (1/30/2017)      UTI (urinary tract infection) (2/25/2017)      Obesity hypoventilation syndrome ([facility] (2/28/2017)      Pulmonary hypertension, moderate to severe ([facility] (2/28/2017)        Plan:     1. Hypercapnic hypoxic respiratory failure   2. Morbid obesity with sleep apnea  3. Permanent atrial fibrillation (rate controlled)  Plan:  Continue Lasix. Continue coumadin as per PT/INR Goal 2-3  Continue beta blocker and digoxin  Strict I/O  Adjust Lasix dose as per volume status. Continue management as per hospital medicine.

## 2017-03-04 NOTE — ROUTINE PROCESS
0745: Bedside and Verbal shift change report given to Bahman Tubbs RN (oncoming nurse) by Cathy Schofield RN   (offgoing nurse). Report included the following information SBAR, Kardex, Intake/Output, MAR, Recent Results and Cardiac Rhythm A-Fib.

## 2017-03-04 NOTE — PROGRESS NOTES
Cardiology Progress Note        Patient: Michelle Polanco        Sex: female          DOA: 2/25/2017  YOB: 1945      Age:  66 y.o.        LOS:  LOS: 5 days    Patient seen and examined. Chart reviewed. Assessment/Plan     Patient Active Problem List   Diagnosis Code    Acute on chronic diastolic CHF (congestive heart failure) ([facility]) I50.33    ALEJANDRINA (acute kidney injury) ([facility] [address] N17.9    Acute exacerbation of CHF (congestive heart failure) ([facility]) I50.9    DM [facility]diabetes mellitus[facility] E11.9    Hypertension I10    Respiratory failure ([facility]) J96.90    Acute coronary syndrome ([facility]) I24.9    Obstructive sleep apnea, adult G47.33    Paroxysmal atrial fibrillation ([facility]) I48.0    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 ([facility]) K98.23, E11.65    Dyspnea due to congestive heart failure ([facility]) I50.9    Aspiration pneumonia ([facility]) J69.0    Hyperkalemia E87.5    Elevated troponin R79.89    COPD (chronic obstructive pulmonary disease) ([facility]) J44.9    CHF (congestive heart failure) ([facility]) I50.9    Insufficiency, respiratory, acute ([facility]) R06.89    Infiltrate noted on imaging study R93.8    COPD exacerbation ([facility]) J44.1    Acute on chronic respiratory failure with hypoxia ([facility]) J96.21    Chest pain R07.9    Anxiety F41.9    UTI (urinary tract infection) N39.0    Obesity hypoventilation syndrome ([facility]) E66.2    Pulmonary hypertension, moderate to severe ([facility]) I27.2      Hypercapnic hypoxic respiratory failure   Permanent atrial fibrillation   Plan:  Continue Lasix. Continue coumadin as per PT/INR Goal 2-3  Continue beta blocker and digoxin  Strict I/O  Adjust Lasix dose as per volume status. Continue management as per hospital medicine. Subjective:    I feel fatigue and tired      REVIEW OF SYSTEMS:     General: No fevers or chills.   Cardiovascular: No chest pain,No palpitations, No orthopnea, No PND, No leg swelling, No claudication  Pulmonary: Positive dyspnea   Gastrointestinal: No nausea, vomiting, bleeding  Neurology: No Dizziness    Objective:      Visit Vitals    /54 (BP 1 Location: Right arm, BP Patient Position: At rest)    Pulse 84    Temp 97.5 °F (36.4 °C)    Resp 20    Wt 109.1 kg (240 lb 8.4 oz)    SpO2 95%    Breastfeeding No    BMI 40.02 kg/m2     Body mass index is 40.02 kg/(m^2). Physical Exam:  General Appearance: Comfortable,obese, not using accessory muscles of respiration. HEENT: [place]. HEAD: Atraumatic  NECK: No JVD, no thyroidomeglay. CAROTIDS: No bruit  LUNGS: Clear bilaterally. HEART: S1+S2 audible, irregularly irregular, no murmur, no pericardial rub. ABD: Non-tender, BS Audible    EXT: No edema, and no cyanosis. VASCULAR EXAM: Pulses are intact. PSYCHIATRIC EXAM: Mood is appropriate. MUSCULOSKELETAL: Grossly no joint deformity. NEUROLOGICAL: AAO times 3, No motor and sensory deficit.   Medication:  Current Facility-Administered Medications   Medication Dose Route Frequency    furosemide (LASIX) injection 10 mg  10 mg IntraVENous Q12H    acetaZOLAMIDE (DIAMOX) 500 mg in sterile water (preservative free) 5 mL injection  500 mg IntraVENous Q12H    insulin glargine (LANTUS) injection 20 Units  20 Units SubCUTAneous QHS    digoxin (LANOXIN) tablet 0.125 mg  0.125 mg Oral DAILY    potassium chloride (K-DUR, KLOR-CON) SR tablet 20 mEq  20 mEq Oral BID    carvedilol (COREG) tablet 25 mg  25 mg Oral BID WITH MEALS    dilTIAZem (CARDIZEM) IR tablet 30 mg  30 mg Oral TIDAC    ELECTROLYTE REPLACEMENT PROTOCOL  1 Each Other PRN    ELECTROLYTE REPLACEMENT PROTOCOL  1 Each Other PRN    pantoprazole (PROTONIX) tablet 40 mg  40 mg Oral ACB    polyvinyl alcohol (LIQUIFILM TEARS) 1.4 % ophthalmic solution 1 Drop  1 Drop Both Eyes PRN    cefTRIAXone (ROCEPHIN) 1 g in 0.9% sodium chloride (MBP/ADV) 50 mL MBP  1 g IntraVENous Q24H    nystatin (MYCOSTATIN) 100,000 unit/gram powder Topical BID    ipratropium (ATROVENT) 0.02 % nebulizer solution 0.5 mg  0.5 mg Nebulization QID RT    clonazePAM (KlonoPIN) tablet 0.25 mg  0.25 mg Oral BID    docusate sodium (COLACE) capsule 100 mg  100 mg Oral BID    simvastatin (ZOCOR) tablet 10 mg  10 mg Oral QHS    pregabalin (LYRICA) capsule 75 mg  75 mg Oral BID    aspirin chewable tablet 81 mg  81 mg Oral DAILY    arformoterol (BROVANA) neb solution 15 mcg  15 mcg Nebulization BID RT    budesonide (PULMICORT) 500 mcg/2 ml nebulizer suspension  500 mcg Nebulization BID RT    albuterol-ipratropium (DUO-NEB) 2.5 MG-0.5 MG/3 ML  3 mL Nebulization Q4H PRN    ondansetron (ZOFRAN) injection 4 mg  4 mg IntraVENous Q6H PRN    acetaminophen (TYLENOL) tablet 650 mg  650 mg Oral Q4H PRN    insulin lispro (HUMALOG) injection   SubCUTAneous AC&HS    glucose chewable tablet 16 g  4 Tab Oral PRN    glucagon (GLUCAGEN) injection 1 mg  1 mg IntraMUSCular PRN    dextrose (D50W) injection syrg 12.5-25 g  25-50 mL IntraVENous PRN    warfarin pharmacy to dose   Other PRN               Lab/Data Reviewed:       Recent Labs      03/03/17 0530  03/02/17   0530  03/01/17 0415   WBC  7.2  4.5*  4.8   HGB  11.5*  10.9*  11.0*   HCT  37.4  35.0  35.3   PLT  194  162  177     Recent Labs      03/03/17 0530  03/02/17   0530  03/01/17   0415   NA  144  147*  147*   K  4.1  3.2*  3.4*   CL  102  102  100   CO2  48*  44*  41*   GLU  121*  117*  104*   BUN  39*  44*  57*   CREA  1.29  1.10  1.43*   CA  8.5  8.6  8.5       Signed By: Thong Rios MD     March 3, 2017

## 2017-03-04 NOTE — PROGRESS NOTES
Pharmacy Dosing Services:  Warfarin  Consult for Warfarin Dosing by Pharmacy by Dr. Manjeet León provided for this 66 y.o.  female , for indication of Atrial Fibrillation. Dose to achieve an INR goal of 2-3    Order entered for  Warfarin  2 (mg) ordered to be given today at 18:00. Significant drug interactions:  Aspirin 81 mg    LABS    PT/INR Lab Results   Component Value Date/Time    INR 2.5 03/04/2017 10:35 AM      Platelets Lab Results   Component Value Date/Time    PLATELET 564 15/19/7497 05:30 AM      H/H     Lab Results   Component Value Date/Time    HGB 11.5 03/03/2017 05:30 AM        Warfarin Administrations (last 168 hours)     Date/Time Action Medication Dose    03/03/17 1810 Given    warfarin (COUMADIN) tablet 2 mg 2 mg    03/02/17 1711 Given   [INR 2.2]    warfarin (COUMADIN) tablet 2.5 mg 2.5 mg    03/01/17 1908 Given   [INR 1.6]    warfarin (COUMADIN) tablet 4 mg 4 mg    02/28/17 2144 Given    warfarin (COUMADIN) tablet 4 mg 4 mg    02/27/17 2059 Given    warfarin (COUMADIN) tablet 2.5 mg 2.5 mg    02/26/17 1752 Given    warfarin (COUMADIN) tablet 2.5 mg 2.5 mg    02/25/17 2113 Given    warfarin (COUMADIN) tablet 2.5 mg 2.5 mg          Pharmacy to follow daily and will provide subsequent Warfarin dosing based on clinical status.   Mike Fuentes, [facility]  Contact information     [phone]

## 2017-03-04 NOTE — ROUTINE PROCESS
Alarm parameters reviewed and opportunities for questions provided. Bedside and Verbal shift change report given to Gonzalo Morris RN (oncoming nurse) by Laura Mariscal RN   (offgoing nurse). Report included the following information SBAR, Kardex, Procedure Summary, Intake/Output, MAR, Accordion, Recent Results and Med Rec Status.

## 2017-03-05 LAB
ANION GAP BLD CALC-SCNC: 0 MMOL/L (ref 3–18)
ATRIAL RATE: 441 BPM
BUN SERPL-MCNC: 30 MG/DL (ref 7–18)
BUN/CREAT SERPL: 26 (ref 12–20)
CALCIUM SERPL-MCNC: 7.9 MG/DL (ref 8.5–10.1)
CALCULATED R AXIS, ECG10: 41 DEGREES
CALCULATED T AXIS, ECG11: 46 DEGREES
CHLORIDE SERPL-SCNC: 102 MMOL/L (ref 100–108)
CO2 SERPL-SCNC: 37 MMOL/L (ref 21–32)
CREAT SERPL-MCNC: 1.14 MG/DL (ref 0.6–1.3)
DIAGNOSIS, 93000: NORMAL
ERYTHROCYTE [DISTWIDTH] IN BLOOD BY AUTOMATED COUNT: 16.6 % (ref 11.6–14.5)
GLUCOSE BLD STRIP.AUTO-MCNC: 145 MG/DL (ref 70–110)
GLUCOSE BLD STRIP.AUTO-MCNC: 145 MG/DL (ref 70–110)
GLUCOSE BLD STRIP.AUTO-MCNC: 217 MG/DL (ref 70–110)
GLUCOSE BLD STRIP.AUTO-MCNC: 270 MG/DL (ref 70–110)
GLUCOSE SERPL-MCNC: 227 MG/DL (ref 74–99)
HCT VFR BLD AUTO: 38.2 % (ref 35–45)
HGB BLD-MCNC: 11.6 G/DL (ref 12–16)
INR PPP: 2.2 (ref 0.8–1.2)
MAGNESIUM SERPL-MCNC: 2 MG/DL (ref 1.8–2.4)
MCH RBC QN AUTO: 31.8 PG (ref 24–34)
MCHC RBC AUTO-ENTMCNC: 30.4 G/DL (ref 31–37)
MCV RBC AUTO: 104.7 FL (ref 74–97)
PLATELET # BLD AUTO: 191 K/UL (ref 135–420)
PMV BLD AUTO: 10.8 FL (ref 9.2–11.8)
POTASSIUM SERPL-SCNC: 4.6 MMOL/L (ref 3.5–5.5)
PROTHROMBIN TIME: 23.4 SEC (ref 11.5–15.2)
Q-T INTERVAL, ECG07: 410 MS
QRS DURATION, ECG06: 72 MS
QTC CALCULATION (BEZET), ECG08: 422 MS
RBC # BLD AUTO: 3.65 M/UL (ref 4.2–5.3)
SODIUM SERPL-SCNC: 139 MMOL/L (ref 136–145)
VENTRICULAR RATE, ECG03: 64 BPM
WBC # BLD AUTO: 7.8 K/UL (ref 4.6–13.2)

## 2017-03-05 PROCEDURE — 82962 GLUCOSE BLOOD TEST: CPT

## 2017-03-05 PROCEDURE — 77010033678 HC OXYGEN DAILY

## 2017-03-05 PROCEDURE — 85610 PROTHROMBIN TIME: CPT | Performed by: INTERNAL MEDICINE

## 2017-03-05 PROCEDURE — 80048 BASIC METABOLIC PNL TOTAL CA: CPT | Performed by: INTERNAL MEDICINE

## 2017-03-05 PROCEDURE — 74011250636 HC RX REV CODE- 250/636: Performed by: INTERNAL MEDICINE

## 2017-03-05 PROCEDURE — 74011250637 HC RX REV CODE- 250/637: Performed by: INTERNAL MEDICINE

## 2017-03-05 PROCEDURE — 94660 CPAP INITIATION&MGMT: CPT

## 2017-03-05 PROCEDURE — 74011636637 HC RX REV CODE- 636/637: Performed by: INTERNAL MEDICINE

## 2017-03-05 PROCEDURE — 97530 THERAPEUTIC ACTIVITIES: CPT

## 2017-03-05 PROCEDURE — 94760 N-INVAS EAR/PLS OXIMETRY 1: CPT

## 2017-03-05 PROCEDURE — 74011250637 HC RX REV CODE- 250/637: Performed by: HOSPITALIST

## 2017-03-05 PROCEDURE — 83735 ASSAY OF MAGNESIUM: CPT | Performed by: INTERNAL MEDICINE

## 2017-03-05 PROCEDURE — 85027 COMPLETE CBC AUTOMATED: CPT | Performed by: INTERNAL MEDICINE

## 2017-03-05 PROCEDURE — 74011250637 HC RX REV CODE- 250/637

## 2017-03-05 PROCEDURE — 74011000250 HC RX REV CODE- 250: Performed by: INTERNAL MEDICINE

## 2017-03-05 PROCEDURE — 74011000258 HC RX REV CODE- 258: Performed by: INTERNAL MEDICINE

## 2017-03-05 PROCEDURE — 97116 GAIT TRAINING THERAPY: CPT

## 2017-03-05 PROCEDURE — 94640 AIRWAY INHALATION TREATMENT: CPT

## 2017-03-05 PROCEDURE — 65660000000 HC RM CCU STEPDOWN

## 2017-03-05 RX ORDER — HYDROCODONE BITARTRATE AND ACETAMINOPHEN 5; 325 MG/1; MG/1
TABLET ORAL
Status: COMPLETED
Start: 2017-03-05 | End: 2017-03-05

## 2017-03-05 RX ORDER — HYDROCODONE BITARTRATE AND ACETAMINOPHEN 5; 325 MG/1; MG/1
1 TABLET ORAL
Status: DISCONTINUED | OUTPATIENT
Start: 2017-03-05 | End: 2017-03-20

## 2017-03-05 RX ORDER — WARFARIN 2.5 MG/1
2.5 TABLET ORAL ONCE
Status: COMPLETED | OUTPATIENT
Start: 2017-03-05 | End: 2017-03-05

## 2017-03-05 RX ADMIN — CARVEDILOL 25 MG: 12.5 TABLET, FILM COATED ORAL at 08:11

## 2017-03-05 RX ADMIN — DILTIAZEM HYDROCHLORIDE 30 MG: 30 TABLET, FILM COATED ORAL at 06:01

## 2017-03-05 RX ADMIN — SIMVASTATIN 10 MG: 20 TABLET, FILM COATED ORAL at 21:22

## 2017-03-05 RX ADMIN — NYSTATIN: 100000 POWDER TOPICAL at 09:00

## 2017-03-05 RX ADMIN — BUDESONIDE 500 MCG: 0.5 INHALANT RESPIRATORY (INHALATION) at 20:22

## 2017-03-05 RX ADMIN — DOCUSATE SODIUM 100 MG: 100 CAPSULE, LIQUID FILLED ORAL at 08:10

## 2017-03-05 RX ADMIN — DILTIAZEM HYDROCHLORIDE 30 MG: 30 TABLET, FILM COATED ORAL at 12:40

## 2017-03-05 RX ADMIN — BUDESONIDE 500 MCG: 0.5 INHALANT RESPIRATORY (INHALATION) at 08:46

## 2017-03-05 RX ADMIN — PANTOPRAZOLE SODIUM 40 MG: 40 TABLET, DELAYED RELEASE ORAL at 06:02

## 2017-03-05 RX ADMIN — PREGABALIN 75 MG: 75 CAPSULE ORAL at 21:23

## 2017-03-05 RX ADMIN — CEFTRIAXONE 1 G: 1 INJECTION, POWDER, FOR SOLUTION INTRAMUSCULAR; INTRAVENOUS at 12:40

## 2017-03-05 RX ADMIN — HYDROCODONE BITARTRATE AND ACETAMINOPHEN 1 TABLET: 5; 325 TABLET ORAL at 07:00

## 2017-03-05 RX ADMIN — ARFORMOTEROL TARTRATE 15 MCG: 15 SOLUTION RESPIRATORY (INHALATION) at 20:22

## 2017-03-05 RX ADMIN — POTASSIUM CHLORIDE 20 MEQ: 20 TABLET, EXTENDED RELEASE ORAL at 21:23

## 2017-03-05 RX ADMIN — FUROSEMIDE 10 MG: 10 INJECTION, SOLUTION INTRAMUSCULAR; INTRAVENOUS at 21:22

## 2017-03-05 RX ADMIN — WARFARIN SODIUM 2.5 MG: 2.5 TABLET ORAL at 18:00

## 2017-03-05 RX ADMIN — WATER 500 MG: 1 INJECTION INTRAMUSCULAR; INTRAVENOUS; SUBCUTANEOUS at 06:01

## 2017-03-05 RX ADMIN — ARFORMOTEROL TARTRATE 15 MCG: 15 SOLUTION RESPIRATORY (INHALATION) at 08:46

## 2017-03-05 RX ADMIN — INSULIN LISPRO 7 UNITS: 100 INJECTION, SOLUTION INTRAVENOUS; SUBCUTANEOUS at 21:25

## 2017-03-05 RX ADMIN — POTASSIUM CHLORIDE 20 MEQ: 20 TABLET, EXTENDED RELEASE ORAL at 08:10

## 2017-03-05 RX ADMIN — PREGABALIN 75 MG: 75 CAPSULE ORAL at 08:11

## 2017-03-05 RX ADMIN — CLONAZEPAM 0.25 MG: 0.5 TABLET ORAL at 08:11

## 2017-03-05 RX ADMIN — ASPIRIN 81 MG CHEWABLE TABLET 81 MG: 81 TABLET CHEWABLE at 09:00

## 2017-03-05 RX ADMIN — ACETAMINOPHEN 650 MG: 325 TABLET ORAL at 16:41

## 2017-03-05 RX ADMIN — CARVEDILOL 25 MG: 12.5 TABLET, FILM COATED ORAL at 17:00

## 2017-03-05 RX ADMIN — ACETAMINOPHEN 650 MG: 325 TABLET ORAL at 01:38

## 2017-03-05 RX ADMIN — DOCUSATE SODIUM 100 MG: 100 CAPSULE, LIQUID FILLED ORAL at 21:23

## 2017-03-05 RX ADMIN — INSULIN GLARGINE 20 UNITS: 100 INJECTION, SOLUTION SUBCUTANEOUS at 21:25

## 2017-03-05 RX ADMIN — WATER 500 MG: 1 INJECTION INTRAMUSCULAR; INTRAVENOUS; SUBCUTANEOUS at 18:00

## 2017-03-05 RX ADMIN — INSULIN LISPRO 7 UNITS: 100 INJECTION, SOLUTION INTRAVENOUS; SUBCUTANEOUS at 12:40

## 2017-03-05 RX ADMIN — HYDROCODONE BITARTRATE AND ACETAMINOPHEN 1 TABLET: 5; 325 TABLET ORAL at 15:11

## 2017-03-05 RX ADMIN — NYSTATIN: 100000 POWDER TOPICAL at 21:31

## 2017-03-05 RX ADMIN — DILTIAZEM HYDROCHLORIDE 30 MG: 30 TABLET, FILM COATED ORAL at 16:30

## 2017-03-05 RX ADMIN — HYDROCODONE BITARTRATE AND ACETAMINOPHEN 1 TABLET: 5; 325 TABLET ORAL at 21:22

## 2017-03-05 RX ADMIN — CLONAZEPAM 0.25 MG: 0.5 TABLET ORAL at 21:22

## 2017-03-05 RX ADMIN — FUROSEMIDE 10 MG: 10 INJECTION, SOLUTION INTRAMUSCULAR; INTRAVENOUS at 06:21

## 2017-03-05 RX ADMIN — ONDANSETRON HYDROCHLORIDE 4 MG: 2 INJECTION INTRAMUSCULAR; INTRAVENOUS at 06:07

## 2017-03-05 RX ADMIN — DIGOXIN 0.12 MG: 0.12 TABLET ORAL at 08:11

## 2017-03-05 NOTE — PROGRESS NOTES
Pharmacy Dosing Services:  Warfarin    Consult for Warfarin Dosing by Pharmacy by Dr. Venita Lopez provided for this 66 y.o.  female , for indication of Atrial Fibrillation. Dose to achieve an INR goal of 2-3    Order entered for  Warfarin  2.5 (mg) ordered to be given today at 18:00. Significant drug interactions:  Aspirin 81 mg    LABS    PT/INR Lab Results   Component Value Date/Time    INR 2.2 03/05/2017 08:43 AM      Platelets Lab Results   Component Value Date/Time    PLATELET 315 71/20/5489 08:43 AM      H/H     Lab Results   Component Value Date/Time    HGB 11.6 03/05/2017 08:43 AM        Warfarin Administrations (last 168 hours)     Date/Time Action Medication Dose    03/04/17 1804 Given    warfarin (COUMADIN) tablet 2 mg 2 mg    03/03/17 1810 Given    warfarin (COUMADIN) tablet 2 mg 2 mg    03/02/17 1711 Given   [INR 2.2]    warfarin (COUMADIN) tablet 2.5 mg 2.5 mg    03/01/17 1908 Given   [INR 1.6]    warfarin (COUMADIN) tablet 4 mg 4 mg    02/28/17 2144 Given    warfarin (COUMADIN) tablet 4 mg 4 mg    02/27/17 2059 Given    warfarin (COUMADIN) tablet 2.5 mg 2.5 mg    02/26/17 1752 Given    warfarin (COUMADIN) tablet 2.5 mg 2.5 mg          Pharmacy to follow daily and will provide subsequent Warfarin dosing based on clinical status.   Dez Mccann [facility]     Contact information   [phone]

## 2017-03-05 NOTE — PROGRESS NOTES
Problem: Mobility Impaired (Adult and Pediatric)  Goal: *Acute Goals and Plan of Care (Insert Text)  Physical Therapy Goals  Initiated 2/27/2017 and to be accomplished within 7 day(s)  1. Patient will move from supine to sit and sit to supine , scoot up and down and roll side to side in bed with supervision/set-up. 2. Patient will transfer from bed to chair and chair to bed with minimal assistance/contact guard assist using the least restrictive device. 3. Patient will perform sit to stand with minimal assistance/contact guard assist.  4. Patient will ambulate with minimal assistance/contact guard assist for 50 feet with the least restrictive device. Outcome: Progressing Towards Goal  PHYSICAL THERAPY TREATMENT     Patient: Claudetta Cruel (17 y.o. female)  Date: 3/4/2017  Diagnosis: CHF NYHA class IV, acute, diastolic (HCC)  CHF NYHA class IV, acute, diastolic (HCC) Acute on chronic respiratory failure with hypoxia (HCC)       Precautions: Fall (SPO2)   Chart, physical therapy assessment, plan of care and goals were reviewed. ASSESSMENT:  Pt progressing well with activity tolerance. Pt maintains 91-96% SPO2 on 6L NC. Pt take long seated rest between first and second trial of 30' of amb. Moderate fatigue. Progression toward goals:  [ ]      Improving appropriately and progressing toward goals  [X]      Improving slowly and progressing toward goals  [ ]      Not making progress toward goals and plan of care will be adjusted       PLAN:  Patient continues to benefit from skilled intervention to address the above impairments. Continue treatment per established plan of care. Discharge Recommendations:  Andrés Nelson  Further Equipment Recommendations for Discharge:  bedside commode and rolling walker       SUBJECTIVE:   Patient stated I'd like to try.       OBJECTIVE DATA SUMMARY:   Critical Behavior:  Neurologic State: Alert, Eyes open spontaneously  Orientation Level: Oriented X4  Cognition: Appropriate decision making, Appropriate for age attention/concentration, Appropriate safety awareness, Follows commands, Recognition of people/places  Safety/Judgement: Fall prevention  Functional Mobility Training:  Bed Mobility:  Rolling: Minimum assistance  Supine to Sit: Minimum assistance  Scooting: Minimum assistance  Transfers:  Sit to Stand: Minimum assistance  Stand to Sit: Minimum assistance  Balance:  Sitting: Intact  Standing: Impaired; With support  Standing - Static: Fair  Standing - Dynamic : Fair  Ambulation/Gait Training:  Distance (ft): 60 Feet (ft)  Assistive Device: Gait belt;Walker, rolling  Ambulation - Level of Assistance: Stand-by asssistance  Gait Abnormalities: Decreased step clearance; Step to gait  Base of Support: Widened  Stance: Time  Speed/Kristin: Slow;Shuffled  Step Length: Right shortened;Left shortened  Swing Pattern: Left asymmetrical;Right asymmetrical  Interventions: Safety awareness training; Tactile cues; Verbal cues; Visual/Demos  Pain:  Pain Scale 1: Numeric (0 - 10)  Pain Intensity 1: 0  Pain Location 1: Back  Pain Orientation 1: Posterior  Pain Description 1: Aching  Pain Intervention(s) 1: Medication (see MAR)  Activity Tolerance:   Good  Please refer to the flowsheet for vital signs taken during this treatment.   After treatment:   [X] Patient left in no apparent distress sitting EOB  [ ] Patient left in no apparent distress in bed  [X] Call bell left within reach  [X] Nursing notified  [ ] Caregiver present  [ ] Bed alarm activated      Rosario Logan PTA   Time Calculation: 45 mins

## 2017-03-05 NOTE — ROUTINE PROCESS
Bedside and Verbal shift change report given to Cesia Guajardo RN (oncoming nurse) by Fanny Gee RN (offgoing nurse). Report included the following information SBAR and Kardex. Patient had no acute events overnight. Currently resting in bed in NAD. Last medicated for pain at 0700. No additional needs reported at this time.

## 2017-03-05 NOTE — PROGRESS NOTES
Cardiology Progress Note      3/5/2017     Admit Date: 2/25/2017    Admit Diagnosis: CHF NYHA class IV, acute, diastolic (HCC);CHF NYHA class IV*      Subjective:     Fatimah Bautista is resting comfortably without any complaints.     Visit Vitals    /44 (BP 1 Location: Left arm, BP Patient Position: Sitting)    Pulse 85    Temp 97.7 °F (36.5 °C)    Resp 16    Wt 256 lb 12.8 oz (116.5 kg)    SpO2 95%    Breastfeeding No    BMI 42.73 kg/m2        Objective:      Medications:  Current Facility-Administered Medications   Medication Dose Route Frequency    HYDROcodone-acetaminophen (NORCO) 5-325 mg per tablet 1 Tab  1 Tab Oral Q6H PRN    warfarin (COUMADIN) tablet 2.5 mg  2.5 mg Oral ONCE    furosemide (LASIX) injection 10 mg  10 mg IntraVENous Q12H    acetaZOLAMIDE (DIAMOX) 500 mg in sterile water (preservative free) 5 mL injection  500 mg IntraVENous Q12H    insulin glargine (LANTUS) injection 20 Units  20 Units SubCUTAneous QHS    digoxin (LANOXIN) tablet 0.125 mg  0.125 mg Oral DAILY    potassium chloride (K-DUR, KLOR-CON) SR tablet 20 mEq  20 mEq Oral BID    carvedilol (COREG) tablet 25 mg  25 mg Oral BID WITH MEALS    dilTIAZem (CARDIZEM) IR tablet 30 mg  30 mg Oral TIDAC    ELECTROLYTE REPLACEMENT PROTOCOL  1 Each Other PRN    ELECTROLYTE REPLACEMENT PROTOCOL  1 Each Other PRN    pantoprazole (PROTONIX) tablet 40 mg  40 mg Oral ACB    polyvinyl alcohol (LIQUIFILM TEARS) 1.4 % ophthalmic solution 1 Drop  1 Drop Both Eyes PRN    cefTRIAXone (ROCEPHIN) 1 g in 0.9% sodium chloride (MBP/ADV) 50 mL MBP  1 g IntraVENous Q24H    nystatin (MYCOSTATIN) 100,000 unit/gram powder   Topical BID    clonazePAM (KlonoPIN) tablet 0.25 mg  0.25 mg Oral BID    docusate sodium (COLACE) capsule 100 mg  100 mg Oral BID    simvastatin (ZOCOR) tablet 10 mg  10 mg Oral QHS    pregabalin (LYRICA) capsule 75 mg  75 mg Oral BID    aspirin chewable tablet 81 mg  81 mg Oral DAILY    arformoterol (BROVANA) neb solution 15 mcg  15 mcg Nebulization BID RT    budesonide (PULMICORT) 500 mcg/2 ml nebulizer suspension  500 mcg Nebulization BID RT    albuterol-ipratropium (DUO-NEB) 2.5 MG-0.5 MG/3 ML  3 mL Nebulization Q4H PRN    ondansetron (ZOFRAN) injection 4 mg  4 mg IntraVENous Q6H PRN    acetaminophen (TYLENOL) tablet 650 mg  650 mg Oral Q4H PRN    insulin lispro (HUMALOG) injection   SubCUTAneous AC&HS    glucose chewable tablet 16 g  4 Tab Oral PRN    glucagon (GLUCAGEN) injection 1 mg  1 mg IntraMUSCular PRN    dextrose (D50W) injection syrg 12.5-25 g  25-50 mL IntraVENous PRN    warfarin pharmacy to dose   Other PRN       Physical Exam:  Neck: no carotid bruit and no JVD  Heart: irregularly irregular rhythm  Lungs: clear to auscultation bilaterally  Abdomen: soft, non-tender.  Bowel sounds normal. No masses,  no organomegaly  Extremities: extremities normal, atraumatic, no cyanosis or edema    Data Review:   Labs:    Recent Results (from the past 24 hour(s))   GLUCOSE, POC    Collection Time: 03/04/17  4:17 PM   Result Value Ref Range    Glucose (POC) 205 (H) 70 - 110 mg/dL   GLUCOSE, POC    Collection Time: 03/04/17  9:10 PM   Result Value Ref Range    Glucose (POC) 91 70 - 110 mg/dL   GLUCOSE, POC    Collection Time: 03/05/17  6:20 AM   Result Value Ref Range    Glucose (POC) 145 (H) 70 - 110 mg/dL   MAGNESIUM    Collection Time: 03/05/17  8:43 AM   Result Value Ref Range    Magnesium 2.0 1.8 - 2.4 mg/dL   CBC W/O DIFF    Collection Time: 03/05/17  8:43 AM   Result Value Ref Range    WBC 7.8 4.6 - 13.2 K/uL    RBC 3.65 (L) 4.20 - 5.30 M/uL    HGB 11.6 (L) 12.0 - 16.0 g/dL    HCT 38.2 35.0 - 45.0 %    .7 (H) 74.0 - 97.0 FL    MCH 31.8 24.0 - 34.0 PG    MCHC 30.4 (L) 31.0 - 37.0 g/dL    RDW 16.6 (H) 11.6 - 14.5 %    PLATELET 024 615 - 986 K/uL    MPV 10.8 9.2 - 94.7 FL   METABOLIC PANEL, BASIC    Collection Time: 03/05/17  8:43 AM   Result Value Ref Range    Sodium 139 136 - 145 mmol/L    Potassium 4.6 3.5 - 5.5 mmol/L    Chloride 102 100 - 108 mmol/L    CO2 37 (H) 21 - 32 mmol/L    Anion gap 0 (L) 3.0 - 18 mmol/L    Glucose 227 (H) 74 - 99 mg/dL    BUN 30 (H) 7.0 - 18 MG/DL    Creatinine 1.14 0.6 - 1.3 MG/DL    BUN/Creatinine ratio 26 (H) 12 - 20      GFR est AA 57 (L) >60 ml/min/1.73m2    GFR est non-AA 47 (L) >60 ml/min/1.73m2    Calcium 7.9 (L) 8.5 - 10.1 MG/DL   PROTHROMBIN TIME + INR    Collection Time: 03/05/17  8:43 AM   Result Value Ref Range    Prothrombin time 23.4 (H) 11.5 - 15.2 sec    INR 2.2 (H) 0.8 - 1.2     GLUCOSE, POC    Collection Time: 03/05/17 11:26 AM   Result Value Ref Range    Glucose (POC) 270 (H) 70 - 110 mg/dL       Assessment:     Principal Problem:    Acute on chronic respiratory failure with hypoxia (HCC) (2/1/2017)    Active Problems:    Acute on chronic diastolic CHF (congestive heart failure) ([facility] (9/3/2014)      [facility]di[facility] (9/22/2015)      Hypertension (9/27/2015)      Obstructive sleep apnea, adult (1/4/2016)      Overview: CPAP/BIPAP intolerant      Paroxysmal atrial fibrillation (HCC) (1/4/2016)      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 (HCC) (0/6/4975)      Elevated troponin (1/30/2017)      COPD (chronic obstructive pulmonary disease) ([facility] (1/30/2017)      UTI (urinary tract infection) (2/25/2017)      Obesity hypoventilation syndrome ([facility] [address] (2/28/2017)      Pulmonary hypertension, moderate to severe ([facility] (2/28/2017)        Plan:     Problems:  1. Hypercapnic hypoxic respiratory failure   2. Morbid obesity with sleep apnea  3. Permanent atrial fibrillation (rate controlled)    Plan:  Continue Lasix. Continue coumadin as per PT/INR Goal 2-3  Continue beta blocker and digoxin  Strict I/O  Adjust Lasix dose as per volume status. Continue management as per hospital medicine.

## 2017-03-05 NOTE — PROGRESS NOTES
Problem: Mobility Impaired (Adult and Pediatric)  Goal: *Acute Goals and Plan of Care (Insert Text)  Physical Therapy Goals  Initiated 2/27/2017 and to be accomplished within 7 day(s)  1. Patient will move from supine to sit and sit to supine , scoot up and down and roll side to side in bed with supervision/set-up. 2. Patient will transfer from bed to chair and chair to bed with minimal assistance/contact guard assist using the least restrictive device. 3. Patient will perform sit to stand with minimal assistance/contact guard assist.  4. Patient will ambulate with minimal assistance/contact guard assist for 50 feet with the least restrictive device. Outcome: Not Progressing Towards Goal  PT session held due to:  [ ]  Nausea/vomiting  [ ]  RN Communication/ suggestion  [ ]  Extreme Pain  [X]  Spa time in progress. Will f/u later as schedule allows. Thank you.   Dolores Lora, PTA

## 2017-03-05 NOTE — PROGRESS NOTES
Problem: Mobility Impaired (Adult and Pediatric)  Goal: *Acute Goals and Plan of Care (Insert Text)  Physical Therapy Goals  Initiated 2/27/2017 and to be accomplished within 7 day(s)  1. Patient will move from supine to sit and sit to supine , scoot up and down and roll side to side in bed with supervision/set-up. 2. Patient will transfer from bed to chair and chair to bed with minimal assistance/contact guard assist using the least restrictive device. 3. Patient will perform sit to stand with minimal assistance/contact guard assist.  4. Patient will ambulate with minimal assistance/contact guard assist for 50 feet with the least restrictive device. Outcome: Progressing Towards Goal  PHYSICAL THERAPY TREATMENT     Patient: Billy Martin (60 y.o. female)  Date: 3/5/2017  Diagnosis: CHF NYHA class IV, acute, diastolic (HCC)  CHF NYHA class IV, acute, diastolic (HCC) Acute on chronic respiratory failure with hypoxia (HCC)       Precautions: Fall (SPO2)   Chart, physical therapy assessment, plan of care and goals were reviewed. ASSESSMENT:  Pt able to increase ambulation distance for 2 trials. Pt maintains 91-97% SPO2 on 6L NC (increased from 5L, due to portable O2 settings). Pt requires 3 min to recover, with cuing for respiration. Pt requesting to stay in chair post ambulation trials. Progression toward goals:  [ ]      Improving appropriately and progressing toward goals  [X]      Improving slowly and progressing toward goals  [ ]      Not making progress toward goals and plan of care will be adjusted       PLAN:  Patient continues to benefit from skilled intervention to address the above impairments. Continue treatment per established plan of care. Discharge Recommendations:  Andrés Nelson  Further Equipment Recommendations for Discharge:  bedside commode and rolling walker       SUBJECTIVE:   Patient stated I'm not doing good, don't push it.       OBJECTIVE DATA SUMMARY:   Critical Behavior:  Neurologic State: Alert, Eyes open spontaneously  Orientation Level: Oriented X4  Cognition: Appropriate decision making, Follows commands  Safety/Judgement: Fall prevention  Functional Mobility Training:  Transfers:  Sit to Stand: Contact guard assistance;Minimum assistance  Stand to Sit: Contact guard assistance;Minimum assistance  Balance:  Sitting: Intact  Standing: Impaired; With support  Standing - Static: Fair  Ambulation/Gait Training:  Distance (ft): 80 Feet (ft) (40+40)  Assistive Device: Gait belt;Walker, rolling  Ambulation - Level of Assistance: Stand-by asssistance  Gait Abnormalities: Decreased step clearance; Step to gait  Base of Support: Widened  Stance: Time  Speed/Kristin: Slow;Shuffled  Step Length: Right shortened;Left shortened  Swing Pattern: Right asymmetrical;Left asymmetrical  Interventions: Safety awareness training; Tactile cues; Verbal cues; Visual/Demos  Pain:  Pain Scale 1: Numeric (0 - 10)  Pain Intensity 1: 9  Pain Location 1: Back  Pain Orientation 1: Lower  Pain Description 1: Aching  Pain Intervention(s) 1: Medication (see MAR)  Activity Tolerance:   Good  Please refer to the flowsheet for vital signs taken during this treatment.   After treatment:   [X] Patient left in no apparent distress sitting up in chair  [ ] Patient left in no apparent distress in bed  [X] Call bell left within reach  [X] Nursing notified  [ ] Caregiver present  [ ] Bed alarm activated      Grant Chua PTA   Time Calculation: 40 mins

## 2017-03-05 NOTE — PROGRESS NOTES
completed follow up visit with patient and offered Pastoral care, see flow sheets for interventions.  was with patient. He was upbeat and pleasant. He talked about being strong and life not always being easy. Patient did not speak much during visit. After a few minutes two other visitors came.  said they are [place]. They did not express a need for spiritual care support at this time. Chaplains will continue to follow and will provide pastoral care  as needed or requested.    Enciso Genevieve, [address]  Board Certified [address] [place] [address]  Office [phone]

## 2017-03-05 NOTE — PROGRESS NOTES
Hospitalist Progress Note    Patient: Guido Rivas MRN: [id number]  CSN: [id number]    YOB: 1945  Age: 66 y.o. Sex: female    DOA: 2/25/2017 LOS:  LOS: 7 days            Assessment/Plan     1. Acute hypoxemic respiratory failure due to decopmensated diastolic CHF, improved  2. Pulmonary HTN  3. Morbid obesity hypoventilation syndrome  4. UTI  5. Afib, rate better controlled  6.  Dm2 w hyperglycemia    Plan:  - give AM dose of lasix now  - continue bipap qhs/ prn, oxygen as required  - coreg, digoxin, cardizem, warfarin  - cont inhaled bronchodilators  -  pulmicort  - mobilize w PT/OT  - hydrocodone for pain cautiously      Patient Active Problem List   Diagnosis Code    Acute on chronic diastolic CHF (congestive heart failure) ([facility]) I50.33    ALEJANDRINA (acute kidney injury) ([facility] N17.9    Acute exacerbation of CHF (congestive heart failure) ([facility]) I50.9    DM [facility]di[facility] mellitus[facility] E11.9    Hypertension I10    Respiratory failure ([facility]) J96.90    Acute coronary syndrome ([facility]) I24.9    Obstructive sleep apnea, adult G47.33    Paroxysmal atrial fibrillation ([facility]) I48.0    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 ([facility]) M36.55, E11.65    Dyspnea due to congestive heart failure ([facility]) I50.9    Aspiration pneumonia ([facility]) J69.0    Hyperkalemia E87.5    Elevated troponin R79.89    COPD (chronic obstructive pulmonary disease) ([facility]) J44.9    CHF (congestive heart failure) ([facility]) I50.9    Insufficiency, respiratory, acute ([facility]) R06.89    Infiltrate noted on imaging study R93.8    COPD exacerbation ([facility]) J44.1    Acute on chronic respiratory failure with hypoxia ([facility]) J96.21    Chest pain R07.9    Anxiety F41.9    UTI (urinary tract infection) N39.0    Obesity hypoventilation syndrome ([facility]) E66.2    Pulmonary hypertension, moderate to severe ([facility]) I27.2               Subjective:    cc: shortness of breath  Pt complains of dyspnea this AM Associated w wheezing  Denies chest pain  Co back & leg pain, which has been chronic in nature, \" throbbing\"      REVIEW OF SYSTEMS:  General: No fevers or chills. Cardiovascular: No chest pain or pressure. No palpitations. Pulmonary: No shortness of breath. Gastrointestinal: No nausea, vomiting. Objective:        Vital signs/Intake and Output:  Visit Vitals    /78 (BP 1 Location: Left arm, BP Patient Position: At rest)    Pulse 81    Temp 97.7 °F (36.5 °C)    Resp 20    Wt 116.5 kg (256 lb 12.8 oz)    SpO2 97%    Breastfeeding No    BMI 42.73 kg/m2     Current Shift:  03/04 1901 - 03/05 0700  In: 180 [P.O.:180]  Out: 600 [Urine:600]  Last three shifts:  03/03 0701 - 03/04 1900  In: 1960 [P.O.:1920; I.V.:40]  Out: 5400 [Urine:5400]    Body mass index is 42.73 kg/(m^2).     Physical Exam:  GEN: Alert and oriented times three NAD  EYES: conjunctiva normal, lids with out lesions  HEENT: MMM, No thyromegaly, no lymphadenopathy  HEART: irreg irreg +S1 +S2, no JVD, pulses 2+ distally  LUNGS: CTA B/L no wheezes, rales or rhonchi  ABDOMEN: + BS, soft NT/ND no organomegaly,  No rebound  EXTREMITIES: No edema cyanosis, cap refill normal   SKIN: no rashes or skin breakdown, no nodules, normal turgor  Current Facility-Administered Medications   Medication Dose Route Frequency    HYDROcodone-acetaminophen (NORCO) 5-325 mg per tablet 1 Tab  1 Tab Oral Q6H PRN    furosemide (LASIX) injection 10 mg  10 mg IntraVENous Q12H    acetaZOLAMIDE (DIAMOX) 500 mg in sterile water (preservative free) 5 mL injection  500 mg IntraVENous Q12H    insulin glargine (LANTUS) injection 20 Units  20 Units SubCUTAneous QHS    digoxin (LANOXIN) tablet 0.125 mg  0.125 mg Oral DAILY    potassium chloride (K-DUR, KLOR-CON) SR tablet 20 mEq  20 mEq Oral BID    carvedilol (COREG) tablet 25 mg  25 mg Oral BID WITH MEALS    dilTIAZem (CARDIZEM) IR tablet 30 mg  30 mg Oral TIDAC    ELECTROLYTE REPLACEMENT PROTOCOL  1 Each Other PRN    ELECTROLYTE REPLACEMENT PROTOCOL  1 Each Other PRN    pantoprazole (PROTONIX) tablet 40 mg  40 mg Oral ACB    polyvinyl alcohol (LIQUIFILM TEARS) 1.4 % ophthalmic solution 1 Drop  1 Drop Both Eyes PRN    cefTRIAXone (ROCEPHIN) 1 g in 0.9% sodium chloride (MBP/ADV) 50 mL MBP  1 g IntraVENous Q24H    nystatin (MYCOSTATIN) 100,000 unit/gram powder   Topical BID    clonazePAM (KlonoPIN) tablet 0.25 mg  0.25 mg Oral BID    docusate sodium (COLACE) capsule 100 mg  100 mg Oral BID    simvastatin (ZOCOR) tablet 10 mg  10 mg Oral QHS    pregabalin (LYRICA) capsule 75 mg  75 mg Oral BID    aspirin chewable tablet 81 mg  81 mg Oral DAILY    arformoterol (BROVANA) neb solution 15 mcg  15 mcg Nebulization BID RT    budesonide (PULMICORT) 500 mcg/2 ml nebulizer suspension  500 mcg Nebulization BID RT    albuterol-ipratropium (DUO-NEB) 2.5 MG-0.5 MG/3 ML  3 mL Nebulization Q4H PRN    ondansetron (ZOFRAN) injection 4 mg  4 mg IntraVENous Q6H PRN    acetaminophen (TYLENOL) tablet 650 mg  650 mg Oral Q4H PRN    insulin lispro (HUMALOG) injection   SubCUTAneous AC&HS    glucose chewable tablet 16 g  4 Tab Oral PRN    glucagon (GLUCAGEN) injection 1 mg  1 mg IntraMUSCular PRN    dextrose (D50W) injection syrg 12.5-25 g  25-50 mL IntraVENous PRN    warfarin pharmacy to dose   Other PRN         All the patient's labs over the past 24 hours were reviewed both during my initial daily workflow process and at the time notated as \"note time\" in [address] [place] [address]. (It is not time stamped separately in this workflow.)  Select labs are listed below.         Labs: Results:       Chemistry Recent Labs      03/03/17   0530   GLU  121*   NA  144   K  4.1   CL  102   CO2  48*   BUN  39*   CREA  1.29   CA  8.5   AGAP  NEG 3   BUCR  30*   AP  100   TP  6.3*   ALB  3.0*   GLOB  3.3   AGRAT  0.9      CBC w/Diff Recent Labs      03/03/17   0530   WBC  7.2   RBC  3.65*   HGB  11.5*   HCT  37.4   PLT  194          Coagulation Recent Labs      03/04/17   1035  03/03/17   0530   PTP  26.1*  26.6*   INR  2.5*  2.6*               Liver Enzymes Recent Labs      03/03/17   0530   TP  6.3*   ALB  3.0*   AP  100   SGOT  13*      Thyroid Studies Lab Results   Component Value Date/Time    TSH 0.31 01/01/2016 10:20 PM        Procedures/imaging: see electronic medical records for all procedures/Xrays and details which were not copied into this note but were reviewed prior to creation of [address] [place] [address] Rd, DO  Internal Medicine/Geriatrics

## 2017-03-06 LAB
GLUCOSE BLD STRIP.AUTO-MCNC: 140 MG/DL (ref 70–110)
GLUCOSE BLD STRIP.AUTO-MCNC: 156 MG/DL (ref 70–110)
GLUCOSE BLD STRIP.AUTO-MCNC: 181 MG/DL (ref 70–110)
GLUCOSE BLD STRIP.AUTO-MCNC: 224 MG/DL (ref 70–110)
INR PPP: 1.6 (ref 0.8–1.2)
MAGNESIUM SERPL-MCNC: 2.2 MG/DL (ref 1.8–2.4)
PROTHROMBIN TIME: 18.2 SEC (ref 11.5–15.2)

## 2017-03-06 PROCEDURE — 83735 ASSAY OF MAGNESIUM: CPT | Performed by: INTERNAL MEDICINE

## 2017-03-06 PROCEDURE — 65660000000 HC RM CCU STEPDOWN

## 2017-03-06 PROCEDURE — 74011250637 HC RX REV CODE- 250/637: Performed by: INTERNAL MEDICINE

## 2017-03-06 PROCEDURE — 97164 PT RE-EVAL EST PLAN CARE: CPT

## 2017-03-06 PROCEDURE — 74011250637 HC RX REV CODE- 250/637: Performed by: HOSPITALIST

## 2017-03-06 PROCEDURE — 94760 N-INVAS EAR/PLS OXIMETRY 1: CPT

## 2017-03-06 PROCEDURE — 74011000250 HC RX REV CODE- 250: Performed by: INTERNAL MEDICINE

## 2017-03-06 PROCEDURE — 74011636637 HC RX REV CODE- 636/637: Performed by: INTERNAL MEDICINE

## 2017-03-06 PROCEDURE — 36592 COLLECT BLOOD FROM PICC: CPT

## 2017-03-06 PROCEDURE — 92526 ORAL FUNCTION THERAPY: CPT

## 2017-03-06 PROCEDURE — 85610 PROTHROMBIN TIME: CPT | Performed by: INTERNAL MEDICINE

## 2017-03-06 PROCEDURE — 74011250636 HC RX REV CODE- 250/636: Performed by: INTERNAL MEDICINE

## 2017-03-06 PROCEDURE — 77010033678 HC OXYGEN DAILY

## 2017-03-06 PROCEDURE — 97116 GAIT TRAINING THERAPY: CPT

## 2017-03-06 PROCEDURE — 82962 GLUCOSE BLOOD TEST: CPT

## 2017-03-06 PROCEDURE — 94640 AIRWAY INHALATION TREATMENT: CPT

## 2017-03-06 PROCEDURE — 97168 OT RE-EVAL EST PLAN CARE: CPT

## 2017-03-06 PROCEDURE — 74011000258 HC RX REV CODE- 258: Performed by: INTERNAL MEDICINE

## 2017-03-06 PROCEDURE — 74011636637 HC RX REV CODE- 636/637: Performed by: HOSPITALIST

## 2017-03-06 RX ORDER — INSULIN LISPRO 100 [IU]/ML
5 INJECTION, SOLUTION INTRAVENOUS; SUBCUTANEOUS
Status: DISCONTINUED | OUTPATIENT
Start: 2017-03-06 | End: 2017-03-24 | Stop reason: HOSPADM

## 2017-03-06 RX ORDER — PHENAZOPYRIDINE HYDROCHLORIDE 100 MG/1
100 TABLET, FILM COATED ORAL
Status: DISCONTINUED | OUTPATIENT
Start: 2017-03-06 | End: 2017-03-07

## 2017-03-06 RX ORDER — FLUCONAZOLE 100 MG/1
100 TABLET ORAL DAILY
Status: DISCONTINUED | OUTPATIENT
Start: 2017-03-06 | End: 2017-03-15

## 2017-03-06 RX ORDER — WARFARIN 4 MG/1
4 TABLET ORAL ONCE
Status: COMPLETED | OUTPATIENT
Start: 2017-03-06 | End: 2017-03-06

## 2017-03-06 RX ORDER — FUROSEMIDE 40 MG/1
40 TABLET ORAL
Status: DISCONTINUED | OUTPATIENT
Start: 2017-03-06 | End: 2017-03-09

## 2017-03-06 RX ADMIN — CEFTRIAXONE 1 G: 1 INJECTION, POWDER, FOR SOLUTION INTRAMUSCULAR; INTRAVENOUS at 10:29

## 2017-03-06 RX ADMIN — INSULIN LISPRO 5 UNITS: 100 INJECTION, SOLUTION INTRAVENOUS; SUBCUTANEOUS at 16:30

## 2017-03-06 RX ADMIN — PREGABALIN 75 MG: 75 CAPSULE ORAL at 21:23

## 2017-03-06 RX ADMIN — DILTIAZEM HYDROCHLORIDE 30 MG: 30 TABLET, FILM COATED ORAL at 16:13

## 2017-03-06 RX ADMIN — FUROSEMIDE 40 MG: 40 TABLET ORAL at 16:13

## 2017-03-06 RX ADMIN — DILTIAZEM HYDROCHLORIDE 30 MG: 30 TABLET, FILM COATED ORAL at 12:30

## 2017-03-06 RX ADMIN — ONDANSETRON HYDROCHLORIDE 4 MG: 2 INJECTION INTRAMUSCULAR; INTRAVENOUS at 06:05

## 2017-03-06 RX ADMIN — PANTOPRAZOLE SODIUM 40 MG: 40 TABLET, DELAYED RELEASE ORAL at 06:44

## 2017-03-06 RX ADMIN — BUDESONIDE 500 MCG: 0.5 INHALANT RESPIRATORY (INHALATION) at 20:14

## 2017-03-06 RX ADMIN — FUROSEMIDE 10 MG: 10 INJECTION, SOLUTION INTRAMUSCULAR; INTRAVENOUS at 10:31

## 2017-03-06 RX ADMIN — INSULIN LISPRO 5 UNITS: 100 INJECTION, SOLUTION INTRAVENOUS; SUBCUTANEOUS at 06:45

## 2017-03-06 RX ADMIN — ARFORMOTEROL TARTRATE 15 MCG: 15 SOLUTION RESPIRATORY (INHALATION) at 20:14

## 2017-03-06 RX ADMIN — SIMVASTATIN 10 MG: 20 TABLET, FILM COATED ORAL at 21:23

## 2017-03-06 RX ADMIN — NYSTATIN: 100000 POWDER TOPICAL at 10:42

## 2017-03-06 RX ADMIN — PHENAZOPYRIDINE 100 MG: 100 TABLET ORAL at 18:16

## 2017-03-06 RX ADMIN — NYSTATIN: 100000 POWDER TOPICAL at 21:22

## 2017-03-06 RX ADMIN — FLUCONAZOLE 100 MG: 100 TABLET ORAL at 12:30

## 2017-03-06 RX ADMIN — CLONAZEPAM 0.25 MG: 0.5 TABLET ORAL at 10:31

## 2017-03-06 RX ADMIN — ACETAMINOPHEN 650 MG: 325 TABLET ORAL at 02:55

## 2017-03-06 RX ADMIN — BUDESONIDE 500 MCG: 0.5 INHALANT RESPIRATORY (INHALATION) at 08:02

## 2017-03-06 RX ADMIN — PREGABALIN 75 MG: 75 CAPSULE ORAL at 10:31

## 2017-03-06 RX ADMIN — DOCUSATE SODIUM 100 MG: 100 CAPSULE, LIQUID FILLED ORAL at 21:22

## 2017-03-06 RX ADMIN — CARVEDILOL 25 MG: 12.5 TABLET, FILM COATED ORAL at 10:31

## 2017-03-06 RX ADMIN — CLONAZEPAM 0.25 MG: 0.5 TABLET ORAL at 21:22

## 2017-03-06 RX ADMIN — HYDROCODONE BITARTRATE AND ACETAMINOPHEN 1 TABLET: 5; 325 TABLET ORAL at 10:35

## 2017-03-06 RX ADMIN — POTASSIUM CHLORIDE 20 MEQ: 20 TABLET, EXTENDED RELEASE ORAL at 21:23

## 2017-03-06 RX ADMIN — ONDANSETRON HYDROCHLORIDE 4 MG: 2 INJECTION INTRAMUSCULAR; INTRAVENOUS at 16:20

## 2017-03-06 RX ADMIN — WARFARIN SODIUM 4 MG: 4 TABLET ORAL at 18:16

## 2017-03-06 RX ADMIN — DOCUSATE SODIUM 100 MG: 100 CAPSULE, LIQUID FILLED ORAL at 10:31

## 2017-03-06 RX ADMIN — WATER 500 MG: 1 INJECTION INTRAMUSCULAR; INTRAVENOUS; SUBCUTANEOUS at 06:23

## 2017-03-06 RX ADMIN — HYDROCODONE BITARTRATE AND ACETAMINOPHEN 1 TABLET: 5; 325 TABLET ORAL at 03:29

## 2017-03-06 RX ADMIN — INSULIN LISPRO 5 UNITS: 100 INJECTION, SOLUTION INTRAVENOUS; SUBCUTANEOUS at 13:00

## 2017-03-06 RX ADMIN — INSULIN LISPRO 7 UNITS: 100 INJECTION, SOLUTION INTRAVENOUS; SUBCUTANEOUS at 12:29

## 2017-03-06 RX ADMIN — DILTIAZEM HYDROCHLORIDE 30 MG: 30 TABLET, FILM COATED ORAL at 06:44

## 2017-03-06 RX ADMIN — ARFORMOTEROL TARTRATE 15 MCG: 15 SOLUTION RESPIRATORY (INHALATION) at 08:02

## 2017-03-06 RX ADMIN — ASPIRIN 81 MG CHEWABLE TABLET 81 MG: 81 TABLET CHEWABLE at 10:31

## 2017-03-06 RX ADMIN — PHENAZOPYRIDINE 100 MG: 100 TABLET ORAL at 12:30

## 2017-03-06 RX ADMIN — POTASSIUM CHLORIDE 20 MEQ: 20 TABLET, EXTENDED RELEASE ORAL at 10:31

## 2017-03-06 RX ADMIN — DIGOXIN 0.12 MG: 0.12 TABLET ORAL at 10:31

## 2017-03-06 RX ADMIN — CARVEDILOL 25 MG: 12.5 TABLET, FILM COATED ORAL at 18:16

## 2017-03-06 RX ADMIN — INSULIN GLARGINE 20 UNITS: 100 INJECTION, SOLUTION SUBCUTANEOUS at 21:22

## 2017-03-06 NOTE — PROGRESS NOTES
Problem: Dysphagia (Adult)  Goal: *Acute Goals and Plan of Care (Insert Text)  Dysphagia Present: mild  Aspiration: no     Recommendations:  Diet: dental soft and thin liquids  Meds: in puree  Aspiration Precautions  Other: small sips/bites, slow rate       Outcome: Resolved/Met Date Met:  03/06/17  SPEECH LANGUAGE PATHOLOGY DYSPHAGIA TREATMENT/DISCHARGE     Patient: Richard Barrera (72 y.o. female)  Date: 3/6/2017  Diagnosis: CHF NYHA class IV, acute, diastolic (HCC)  CHF NYHA class IV, acute, diastolic (HCC) Acute on chronic respiratory failure with hypoxia (HCC)       Precautions: aspiration Fall (SPO2)      ASSESSMENT:  Mild oropharyngeal dysphagia c/w baseline     Dysphagia f/u completed this AM with pt A&Ox4. Educated re: MBS results/recommendations with deficits primarily r/t delayed swallow response and benefit of safe swallow strategies including small sips/bites, slow rate, alternating solids/liquids with HOB >45 for PO intake. Pt demo strategies via structured snack of solid cracker and thin liquids +straw without overt s/s penetration/aspiration noted. Remains most appropriate for dental soft r/t limited/poor natural dentition. Pt reports no difficulty with current diet and is independent with safe swallow strategy use. No further skilled ST needs, signing off. Progression toward goals:  [X]         Improving appropriately and progressing toward goals  [ ]         Improving slowly and progressing toward goals  [ ]         Not making progress toward goals and plan of care will be adjusted       PLAN: dental soft and thin liquids, safe swallow strategies  Recommendations and Planned Interventions:  No further acute dysphagia needs, signing off  Discharge Recommendations:  Defer to PT/OT       SUBJECTIVE:   Patient stated I'm not having any trouble eating.       OBJECTIVE:   Cognitive and Communication Status:  Neurologic State: Alert  Orientation Level: Oriented X4  Cognition: Follows commands, Appropriate safety awareness  Dysphagia Treatment:  Oral Assessment:  Oral Assessment  Labial: No impairment  Dentition: Natural, Poor  Oral Hygiene: fair  Lingual: Decreased rate  Velum: No impairment  Mandible: No impairment  P.O. Trials:              Patient Position: HOB 45              Vocal quality prior to P.O.: No impairment              Consistency Presented: Thin liquid, Solid              How Presented: Self-fed/presented, Straw              How Much:  (x3 thin, x2 solid)              Bolus Acceptance: No impairment              Bolus Formation/Control: Impaired              Type of Impairment: Mastication              Propulsion: No impairment              Oral Residue: None              Initiation of Swallow: Delayed (# of seconds)              Laryngeal Elevation: Functional              Aspiration Signs/Symptoms: None              Pharyngeal Phase Characteristics: No impairment, issues, or problems               Effective Modifications:  Alternate liquids/solids, Small sips and bites              Cues for Modifications: None                                Oral Phase Severity: Mild              Pharyngeal Phase Severity : Mild PAIN:  Start of Tx: 0  End of Tx: 0      After treatment:   [ ]              Patient left in no apparent distress sitting up in chair  [X]              Patient left in no apparent distress in bed  [X]              Call bell left within reach  [ ]              Nursing notified  [ ]              Family present  [ ]              Caregiver present  [ ]              Bed alarm activated         COMMUNICATION/EDUCATION:   [X]        Aspiration precautions; swallow safety; compensatory techniques  [ ]        Patient unable to participate in education; education ongoing with staff  [ ]         Posted safety precautions in patient's room.   [ ]         Oral-motor/laryngeal strengthening exercises        LILIANA Ramos  Time Calculation: 8 mins

## 2017-03-06 NOTE — ROUTINE PROCESS
0715: Bedside and Verbal shift change report given to Anna Pierce RN (oncoming nurse) by Daniel Peterson RN   (offgoing nurse). Report included the following information SBAR, Kardex, Intake/Output, MAR, Recent Results and Cardiac Rhythm A- Fib.

## 2017-03-06 NOTE — PROGRESS NOTES
Pharmacy Dosing Services: Warfarin     Consult for Warfarin Dosing by Pharmacy by Dr. Kasandra Ambriz provided for this 66 y.o.  female , for indication of Atrial Fibrillation. Dose to achieve an INR goal of 2-3    Order entered for  Warfarin 4 mg to be given today at 18:00. Aspirin 81 mg po daily    Significant drug interactions: Fluconazole 100 mg po daily (started today 3/6/17)    PT/INR Lab Results   Component Value Date/Time    INR 1.6 03/06/2017 05:41 AM      Platelets Lab Results   Component Value Date/Time    PLATELET 642 37/03/7705 08:43 AM      H/H     Lab Results   Component Value Date/Time    HGB 11.6 03/05/2017 08:43 AM        Warfarin Administrations (last 168 hours)       Date/Time Action Medication Dose      03/05/17 1800 Given    warfarin (COUMADIN) tablet 2.5 mg 2.5 mg    03/04/17 1804 Given    warfarin (COUMADIN) tablet 2 mg 2 mg    03/03/17 1810 Given    warfarin (COUMADIN) tablet 2 mg 2 mg    03/02/17 1711 Given   [INR 2.2]    warfarin (COUMADIN) tablet 2.5 mg 2.5 mg    03/01/17 1908 Given   [INR 1.6]    warfarin (COUMADIN) tablet 4 mg 4 mg    02/28/17 2144 Given    warfarin (COUMADIN) tablet 4 mg 4 mg    02/27/17 2059 Given    warfarin (COUMADIN) tablet 2.5 mg 2.5 mg          Pharmacy to follow daily and will provide subsequent Warfarin dosing based on clinical status.   [facility])  Contact information [phone]

## 2017-03-06 NOTE — PROGRESS NOTES
Palliative Medicine Progress Note  [facility] [phone] ([phone])  MIGUELINA BRAY [facility]: [phone]   [facility]: [phone]    Patient Name: Jorge Luis Anglin  YOB: 1945    Date of Initial Consult: 2/27/17   Reason for Consult: Care Decisions  Requesting Provider: Dr. Hien Spaulding   Primary Care Physician: Casey Suazo MD      SUMMARY:   Jorge Luis Anglin is a 66y.o. year old with a past history ofCHF, DM, COPD, HTN, CKD, Osteoarthritis, Afib/on coumadin, CAD with cardiac stenting, Pulmonary HTN, Sleep apnea but not using CPAP who was admitted on 2/25/2017 from [facility]  with a diagnosis of:    Acute on chronic respiratory failure/ pulmonary edema  Pulmonary HTN  Aortic stenosis  Hypotension in setting of diuresis  Acute on chronic CHF  UTI  Morbid obesity  DM2  Debility      Current medical issues leading to Palliative Medicine involvement include: Care Decisions in setting of disease progression and multiple hospitalizations. Presented to ED with increased SOB, weight gain, edema to feet which began while at [facility] for rehab after last hospitalization. At baseline she is on O2 at 3L NC. Initially on bipap now on HF oxygen. 2 hospitalization in past month at [facility] for respiratory failure however has had a total of 10 hospitalizations (most at [facility]) since January 2016. Patient and spouse have seen functional decline especially since September. Up until that time she was able to perform ADLs, fix meals. The longest she is been able to stay out of the hospital and at home has been only a couple of months. Was on ventilator back in January and has little recall of this. 2/28/17: Decompensated overnight, moved to ICU on BIPAP. Received diuretic, then hypotensive, now receiving fluid. Alert and anxious, but denies SOB.  just left. 3/1/17: Improving. Off Bipap and on O2NC at this time. 3/2/17: Developed RVR earlier. HR down now. Sitting up in chair.  Reporting some mild-mod SOB.    3/3/17: Much better this am. Saturating well.  at bedside. 3/6/17: No changes over weekend. Legs painful and weeping. Wants to go to a \"good\" rehab and get better, then be seen at [facility]. PALLIATIVE DIAGNOSES:   1. Acute on chronic CHF  2. Acute on chronic respiratory failure  3. Dyspnea  4. Anxiety       PLAN:   1. Palliative Medicine team Harshad Beasley MD, [facility] NP) met with patient and spouse at bedside. She is alert, well oriented and able to participate in our discussion. They have some understanding of her current medical condition but were unable to clearly understand how her illness can be attributed to multifactorial causes including progressive lung, heart and renal disease. We presented an overview and how this contributes to her multiple hospitalizations in spite of them trying to [facility] and do what we are told to do\". We addressed how the reason for her numerous hospitalizations are due to problems that are no longer curative but only temporarily \"fixable\" before they reoccur. She expressed a strong desire to \"get well and go home\". She added that she would be okay with further hospitalizations. She also stated \"I am not ready to go\" (die). She stated she worries about death and dying. In fact, she thought this was what we were here to talk about today and was upset initially by our visit until we explained that our goal is to make sure all have a clear understanding of what she wants so we can best support her [address]  informed us that she lost her sister, her mother in law this past year. Lost a son many years ago in a skiing accident. There may be complicated grief especially over loss of son. There is likely denial of the extent of her illness which may be related to her expressed fear of dying. During past hospitalization, she expressed fear of being buried.  She said she had told her  she wanted to be buried upright with her head above ground.  asked to see. She may benefit from additional counseling which exceeds what can be offered in an inpatient setting.    17: Tenuous respiratory status. Prognosis for remaining out of hospital once stable enough for d/c remains poor. Will continue to provide support. 3/1/17: Experiencing psychosocial distress. Provided listening presence for patient to elaborate on her fear of dying. She expressed being \"unsure where you go\". Wanted to talk about her son Helene Pisano who  25 years ago at age 27 in a Jet ski accident. She expressed how she was unable to protect him and how greatly she still misses him. Allowed to express feelings of frustration, anger, sadness. Spoke about multiple deaths of family members including sister and MIL this past year. She is likely experiencing a complicated grief pattern. She expressed a great fear of dying herself--fear of the unknown, worries about how her spouse and other son Linda Ayala will react. This may contribute to a significant denial of her own health issues. Several times she stated that during her multiple hospitalizations \"they have not told me what is wrong or how to fix it\". Gently reminded her that we had just discussed this with her on Monday in detail and that with each hospitalization this would have been addressed with her. Reminded her that she has been following the recommendations for treatment of her condition by taking her medications, etc. Will continue to follow for support. Will plan to see if a counselor from the [facility] would visit with her --if she agrees. 3/2/17: She states she spoke with her son last night and told him she wanted to talk with him but indicated his reluctance stating \"oh mom you are going to be fine\". She is struggling with the \"why?--why is this happening to me? \" Adding  \"I have tried so hard\". Indicated she is praying for guidance \"but I am not hearing from Him yet\".    Professional counseling may help her significantly. At this time we are attempting to see if a bereavement counselor may be available. 3/3/17: improving w/ consistent use of BIPAP. Goals of care established. 3/6/17: For SNF with likely almost immediate readmission. Unless consistent use of BIPAP causes dramatic improvement clinically, patient has unrealistic understanding of prognosis. No new recommendations. Psychosocial/Functional status:   55+ years. Functional decline since 2016.  2 sons--one  about 10 years ago in a skiing accident. Owned an appliance store. Uses cane and walker for past 5 years    2. Advance Care Planning: No AD--reluctant to complete in past. Encouraged to complete and offered assistance with this. Code Status: FULL CODE  Not addressed at this visit    Durable DNR status: NA    4. Symptoms: Dyspnea: Improved and \"feeling better\". 5. Disposition: TBD    6. Initial consult note routed to primary continuity provider. 7. Communicated plan of care with: Palliative IDT, patient, nurse       GOALS OF CARE:     [====Goals of Care====]  GOALS OF CARE:  Patient / health care proxy stated goals: Continue with current level of care. \"I want to get better and go home\".        TREATMENT PREFERENCES:   Code Status: Full Code    Advance Care Planning:  Advance Care Planning 2017   Patient's Healthcare Decision Maker is: Legal Next of Kin   Primary Decision Maker Name -   Primary Decision Maker Phone Number -   Primary Decision Maker Relationship to Patient -   Confirm Advance Directive None   Patient Would Like to Complete Advance Directive -       Other:    The palliative care team has discussed with patient / health care proxy about goals of care / treatment preferences for patient.  [====Goals of Care====]      Advance Care Planning 2017   Patient's Healthcare Decision Maker is: Legal Next of Kin   Primary Decision Maker Name -   Primary Decision [address] Phone Number - Primary Decision Maker Relationship to Patient -   Confirm Advance Directive None   Patient Would Like to Complete Advance Directive -       Spouse: Sim Dykes [phone]       HISTORY:     History obtained from: chart, patient, spouse    CHIEF COMPLAINT: Dyspnea, fluid retention    HPI/SUBJECTIVE:    The patient is:   [x] Verbal and participatory  [] Non-participatory due to:      SEE SUMMARY    Clinical Pain Assessment (nonverbal scale for nonverbal patients): Pain: 3 (usual chronic back pain)         FUNCTIONAL ASSESSMENT:     Palliative Performance Scale (PPS):  PPS: 50       PSYCHOSOCIAL/SPIRITUAL SCREENING:     Advance Care Planning:  Advance Care Planning 2/27/2017   Patient's Healthcare Decision Maker is: Legal Next of Kin   Primary Decision Maker Name -   Primary Decision [address] Phone Number -   Primary Decision Maker Relationship to Patient -   Confirm Advance Directive None   Patient Would Like to Complete Advance Directive -        Any spiritual / Baptism concerns:  [] Yes /  [] No  None expressed but may have fear of dying. Plan to ask  to visit. Caregiver Burnout:  [] Yes /  [x] No /  [] No Caregiver Present      Anticipatory grief assessment:   [x] Normal  / [] Maladaptive       ESAS Anxiety: Anxiety: 4     ESAS Depression:   Not assessed this visit. REVIEW OF SYSTEMS:     Positive and pertinent negative findings in ROS are noted above in HPI. The following systems were [x] reviewed / [] unable to be reviewed as noted in HPI   Other findings are noted below. Systems: constitutional, ears/nose/mouth/throat, respiratory, gastrointestinal, genitourinary, musculoskeletal, integumentary, neurologic, psychiatric, endocrine. Positive findings noted below.   Modified ESAS Completed by: provider   Fatigue: 6 Drowsiness: 0     Pain: 3 (usual chronic back pain)   Anxiety: 4 Nausea: 0     Dyspnea: 3     Constipation: No     Stool Occurrence(s): 1        PHYSICAL EXAM:     Wt Readings from Last 3 Encounters:   03/05/17 116.5 kg (256 lb 12.8 oz)   02/09/17 106 kg (233 lb 11 oz)   01/12/16 100.7 kg (222 lb)     Blood pressure 137/66, pulse 67, temperature 99.3 °F (37.4 °C), resp. rate 16, weight 116.5 kg (256 lb 12.8 oz), SpO2 98 %, not currently breastfeeding. Pain:  Pain Scale 1: Visual  Pain Intensity 1: 0     Pain Location 1: Back  Pain Orientation 1: Lower  Pain Description 1: Aching  Pain Intervention(s) 1: Medication (see MAR)  Last bowel movement: 3/1/17        Constitutional: NAD. Alert,  pleasant.   Eyes: pupils equal, anicteric  ENMT: no nasal discharge, moist mucous membranes  Cardiovascular: Irregular rhythm  Respiratory: breathing mildly labored, symmetric  Gastrointestinal: Abdomen soft/ non tender. + bowel sounds  Musculoskeletal:   Skin: warm, dry  Neurologic: following commands, moving all extremities  Psychiatric: full affect, no hallucinations  Other: On O2 NC        HISTORY:     Principal Problem:    Acute on chronic respiratory failure with hypoxia (HCC) (2/1/2017)    Active Problems:    Acute on chronic diastolic CHF (congestive heart failure) ([address] (9/3/2014)      DM (diabetes mellitus) ([address] (9/22/2015)      Hypertension (9/27/2015)      Obstructive sleep apnea, adult (1/4/2016)      Overview: CPAP/BIPAP intolerant      Paroxysmal atrial fibrillation ([address] (1/4/2016)      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 (HCC) (4/6/9957)      Elevated troponin (1/30/2017)      COPD (chronic obstructive pulmonary disease) ([address] (1/30/2017)      UTI (urinary tract infection) (2/25/2017)      Obesity hypoventilation syndrome ([address] (2/28/2017)      Pulmonary hypertension, moderate to severe ([address] (2/28/2017)      Past Medical History:   Diagnosis Date    A-fib ([address]     Arthritis     Back injury     Chronic obstructive pulmonary disease ([address]     Diabetes ([address]     Fibromyalgia     Heart failure ([address]     Hypertension     MRSA (methicillin resistant Staphylococcus aureus)     Obesity hypoventilation syndrome ([facility] 2/28/2017    Pulmonary hypertension, moderate to severe ([facility] [address] [facility] 2/28/2017      Past Surgical History:   Procedure Laterality Date    CARDIAC SURG PROCEDURE UNLIST      HX CORONARY STENT PLACEMENT      HX KNEE REPLACEMENT      HX ORTHOPAEDIC      bilateral knee replacement      History reviewed. No pertinent family history. History reviewed, no pertinent family history.   Social History   Substance Use Topics    Smoking status: Never Smoker    Smokeless tobacco: Not on file    Alcohol use No     Allergies   Allergen Reactions    Latex Hives    Adhesive Tape-Silicones Unknown (comments)    Bees [Sting, Bee] Unknown (comments)    Garlic Nausea and Vomiting    Zoloft [Sertraline] Hives      Current Facility-Administered Medications   Medication Dose Route Frequency    HYDROcodone-acetaminophen (NORCO) 5-325 mg per tablet 1 Tab  1 Tab Oral Q6H PRN    furosemide (LASIX) injection 10 mg  10 mg IntraVENous Q12H    acetaZOLAMIDE (DIAMOX) 500 mg in sterile water (preservative free) 5 mL injection  500 mg IntraVENous Q12H    insulin glargine (LANTUS) injection 20 Units  20 Units SubCUTAneous QHS    digoxin (LANOXIN) tablet 0.125 mg  0.125 mg Oral DAILY    potassium chloride (K-DUR, KLOR-CON) SR tablet 20 mEq  20 mEq Oral BID    carvedilol (COREG) tablet 25 mg  25 mg Oral BID WITH MEALS    dilTIAZem (CARDIZEM) IR tablet 30 mg  30 mg Oral TIDAC    ELECTROLYTE REPLACEMENT PROTOCOL  1 Each Other PRN    ELECTROLYTE REPLACEMENT PROTOCOL  1 Each Other PRN    pantoprazole (PROTONIX) tablet 40 mg  40 mg Oral ACB    polyvinyl alcohol (LIQUIFILM TEARS) 1.4 % ophthalmic solution 1 Drop  1 Drop Both Eyes PRN    cefTRIAXone (ROCEPHIN) 1 g in 0.9% sodium chloride (MBP/ADV) 50 mL MBP  1 g IntraVENous Q24H    nystatin (MYCOSTATIN) 100,000 unit/gram powder   Topical BID    clonazePAM (KlonoPIN) tablet 0.25 mg  0.25 mg Oral BID    docusate sodium (COLACE) capsule 100 mg  100 mg Oral BID    simvastatin (ZOCOR) tablet 10 mg  10 mg Oral QHS    pregabalin (LYRICA) capsule 75 mg  75 mg Oral BID    aspirin chewable tablet 81 mg  81 mg Oral DAILY    arformoterol (BROVANA) neb solution 15 mcg  15 mcg Nebulization BID RT    budesonide (PULMICORT) 500 mcg/2 ml nebulizer suspension  500 mcg Nebulization BID RT    albuterol-ipratropium (DUO-NEB) 2.5 MG-0.5 MG/3 ML  3 mL Nebulization Q4H PRN    ondansetron (ZOFRAN) injection 4 mg  4 mg IntraVENous Q6H PRN    acetaminophen (TYLENOL) tablet 650 mg  650 mg Oral Q4H PRN    insulin lispro (HUMALOG) injection   SubCUTAneous AC&HS    glucose chewable tablet 16 g  4 Tab Oral PRN    glucagon (GLUCAGEN) injection 1 mg  1 mg IntraMUSCular PRN    dextrose (D50W) injection syrg 12.5-25 g  25-50 mL IntraVENous PRN    warfarin pharmacy to dose   Other PRN        LAB AND IMAGING FINDINGS:     Lab Results   Component Value Date/Time    WBC 7.8 03/05/2017 08:43 AM    HGB 11.6 03/05/2017 08:43 AM    PLATELET 578 43/63/1754 08:43 AM     Lab Results   Component Value Date/Time    Sodium 139 03/05/2017 08:43 AM    Potassium 4.6 03/05/2017 08:43 AM    Chloride 102 03/05/2017 08:43 AM    CO2 37 03/05/2017 08:43 AM    BUN 30 03/05/2017 08:43 AM    Creatinine 1.14 03/05/2017 08:43 AM    Calcium 7.9 03/05/2017 08:43 AM    Magnesium 2.2 03/06/2017 05:41 AM    Phosphorus 4.2 02/26/2017 06:35 AM      Lab Results   Component Value Date/Time    AST (SGOT) 13 03/03/2017 05:30 AM    Alk.  phosphatase 100 03/03/2017 05:30 AM    Protein, total 6.3 03/03/2017 05:30 AM    Albumin 3.0 03/03/2017 05:30 AM    Globulin 3.3 03/03/2017 05:30 AM     Lab Results   Component Value Date/Time    INR 1.6 03/06/2017 05:41 AM    Prothrombin time 18.2 03/06/2017 05:41 AM    aPTT 29.2 01/30/2017 08:00 PM      No results found for: IRON, FE, TIBC, IBCT, PSAT, FERR   No results found for: PH, PCO2, PO2  No components found for: [place]   Lab Results   Component Value Date/Time    CK 17 03/02/2017 05:30 AM    CK - MB <1.0 03/02/2017 05:30 AM              Total time: 15 min  Counseling / coordination time: 13  > 50% counseling / coordination?: hank Hancock MD  Palliative Medicine

## 2017-03-06 NOTE — ROUTINE PROCESS
Shift summary:  Patient had uneventful day. Alarm parameters reviewed and opportunities for questions provided. Bedside and Verbal shift change report given to Shandra LAKE (oncoming nurse) by Todd Lopez RN   (offgoing nurse). Report included the following information SBAR, Kardex, Intake/Output, MAR, Accordion, Recent Results, Med Rec Status and Cardiac Rhythm A fib.

## 2017-03-06 NOTE — DIABETES MGMT
NUTRITION / GLYCEMIC CONTROL PLAN OF CARE        Diabetes Management:    -pt with known h/o T2DM, BG out of range this admission   -pt has done well in the past on Humalog 5 units AC TID  - recommend: Humalog 5 units AC TID  - education: basic education given  - goals: BG will be in range of < 140 by 3/11; PO intake will be at least 75% of meals offered by 3/11    - TDD: 34 units (Lantus 20 units + Humalog 14 units corrective coverage)  - BG range:  mg/dl  - Hypo: none  - BG in target range (non-ICU: <140; ICU<180): [] Yes  [x] No  - Steroids: none  - Intake: good    Patient Vitals for the past 100 hrs:   % Diet Eaten   03/06/17 0827 100 %   03/05/17 2300 0 %   03/05/17 1751 100 %   03/05/17 1200 100 %   03/05/17 0815 100 %   03/04/17 2328 100 %   03/04/17 1841 50 %   03/04/17 1243 100 %   03/04/17 0849 75 %   03/03/17 1411 100 %   03/03/17 0800 75 %        Current Insulin regimen:   Lantus 20 units daily  Humalog corrective coverage very insulin resistant  Home medication/insulin regimen:  Lantus 25 units daily  Humalog sliding scale AC and at bedtime    Recent Glucose Results: Lab Results   Component Value Date/Time    GLUCPOC 181 (H) 03/06/2017 06:29 AM    GLUCPOC 217 (H) 03/05/2017 09:04 PM    GLUCPOC 145 (H) 03/05/2017 04:37 PM       Adequate glycemic control PTA:  [] Yes  [x] No    HbA1c: equivalent  to ave BGlucose of 171 mg/dl for 2-3 months prior to admission    Lab Results   Component Value Date/Time    Hemoglobin A1c 7.7 02/25/2017 04:21 PM       Diet:   Active Orders   Diet    DIET CARDIAC Soft Solids; Consistent Carb 1500-1600kcal       Anabel Michelle RN, MS  Glycemic Control Team

## 2017-03-06 NOTE — PROGRESS NOTES
2308: Assumed care of patient resting in bed, bed in lowest position, call bell with in reach. Patient demonstrated and verbalized on how to call for assistance. Alarm parameters reviewed, on, and audible. 3596: Patient complains of lower back pain 9/10, Tylenol 650mg administered by mouth   0356: Patient observed laying in bed resting. No further complaints of pain at this time, medication effective     0600: Patient complains of nausea Zofran 4mg administered IV push    0700: No longer complaining of nausea, medication effective     0839: Shift summary: Patient had an uneventful shift. Left resting in bed no acute distress, bed in the lowest position, call light with in reach.

## 2017-03-06 NOTE — PROGRESS NOTES
800 Assumed care of patient     1047 AM assessment completed, patient is alert and oriented x's 4, no c/o pain. A fib on the monitor with a HR in the 60-70's. Lung fields are clear throughout on 4L NC, 02 sat sustained % with no cough noted. Scheduled medications administered as ordered without any complications. Patient is currently sitting up in the chair, no s/s of any distress, VSS, call bell and personal belongings within reach, will continue to monitor. 1230 Scheduled medications administered as ordered without any complications. Patient is currently back in bed watching television, no s/s of any pain or distress, will continue to monitor     1400 NAD, will continue to monitor. 1618 Scheduled medications administered as ordered without any complications. Patient is currently sitting up in bed watching television, no s/s of any pain or distress, will continue to monitor. 1817 Scheduled medications administered as ordered without any complications. Patient is currently sitting up in bed after consumption of dinner, spouse at bedside, telephone conversation with Dr. Keegan Quinteros completed, no s/s of any pain or distress, will continue to monitor. 1945 Bedside and Verbal shift change report given to Dianelys Alcazar RN (oncoming nurse) by Rhianna Dias RN (offgoing nurse). Report included the following information SBAR.

## 2017-03-06 NOTE — PROGRESS NOTES
1925: Bedside and Verbal shift change report received from Levy Leone RN. Report included the following information SBAR, Intake/Output, MAR, Recent Results, Cardiac Rhythm AFIB and Alarm Parameters . 2000: Assessment complete, see flow sheets. 2300: pt given diabetic snack. 2308: Bedside and Verbal shift change report given to Catrachito Pérez  (oncoming nurse) by Anastasia Ryan RN   (offgoing nurse). Report included the following information SBAR, Intake/Output, MAR, Recent Results, Cardiac Rhythm A fib and Alarm Parameters . Peggyann Gang

## 2017-03-06 NOTE — PROGRESS NOTES
Problem: Mobility Impaired (Adult and Pediatric)  Goal: *Acute Goals and Plan of Care (Insert Text)  Physical Therapy Goals  Initiated 2/27/2017 and to be accomplished within 7 day(s)  1. Patient will move from supine to sit and sit to supine , scoot up and down and roll side to side in bed with supervision/set-up. 2. Patient will transfer from bed to chair and chair to bed with minimal assistance/contact guard assist using the least restrictive device. 3. Patient will perform sit to stand with minimal assistance/contact guard assist.  4. Patient will ambulate with minimal assistance/contact guard assist for 50 feet with the least restrictive device. Outcome: Progressing Towards Goal    physical Therapy RE-EVALUATION and TREATMENT    Patient: Maldonado Angeles (4 y.o. female)  Date: 3/6/2017  Diagnosis: CHF NYHA class IV, acute, diastolic (HCC)  CHF NYHA class IV, acute, diastolic (HCC) Acute on chronic respiratory failure with hypoxia (HCC)       Precautions: Fall (SPO2)    ASSESSMENT:  Patient continues to present with decreased functional mobility with regards to bed mobility, transfers, gait, balance, and tolerance to activity. Patient is making progress with physical therapy. This session the patient is on 4L O2. Patient ambulated a very similar distance as she did yesterday (40ftx2). O2 saturation on first attempt dropped to about 87%. On second attempt O2 saturation was 90-91%. Patient was encouraged to utilize pursed lip breathing technique throughout treatment session. Patient was returned to seated in a chair after gait training. Will continue PT and progress ambulatory mobility as tolerated. Recommend rehab at discharge. Patient's progression toward goals since last assessment: Fair     PLAN:  Goals have been updated based on progression since last assessment. Patient continues to benefit from skilled intervention to address the above impairments.   Continue to follow the patient daily to address goals. Planned Interventions:  [x]     Bed Mobility Training          []     Neuromuscular Re-Education  [x]     Transfer Training                []    Orthotic/Prosthetic Training  [x]     Gait Training                       []     Modalities  [x]     Therapeutic Exercises       []     Edema Management/Control  [x]     Therapeutic Activities         [x]     Patient and Family Training/Education  []     Other (comment):  Discharge Recommendations: Rehab  Further Equipment Recommendations for Discharge: N/A     SUBJECTIVE:   Patient stated We can give it a try.     OBJECTIVE DATA SUMMARY:   Critical Behavior:  Neurologic State: Alert  Orientation Level: Oriented X4  Cognition: Follows commands  Safety/Judgement: Fall prevention  Functional Mobility Training:  Bed Mobility:  Supine to Sit: Supervision; Additional time  Transfers:  Sit to Stand: Minimum assistance; Additional time  Stand to Sit: Minimum assistance; Additional time  Bed to Chair: Minimum assistance; Additional time  Balance:  Sitting: Intact  Standing: Impaired; With support  Standing - Static: Fair  Standing - Dynamic : Fair  Ambulation/Gait Training:  Distance (ft): 80 Feet (ft) (40x2)  Assistive Device: Gait belt;Walker, rolling  Ambulation - Level of Assistance: Contact guard assistance  Gait Abnormalities: Decreased step clearance;Shuffling gait  Base of Support: Widened  Stance: Time  Speed/Kristin: Slow  Step Length: Left shortened;Right shortened  Swing Pattern: Right asymmetrical;Left asymmetrical  Interventions: Verbal cues  Pain:  Pain Scale 1: Numeric (0 - 10)  Pain Intensity 1: 9  Pain Location 1: Leg  Pain Intervention(s) 1: Medication (see MAR)  Activity Tolerance:   Fair  Please refer to the flowsheet for vital signs taken during this treatment.   After treatment:   [x]  Patient left in no apparent distress sitting up in chair  []  Patient left in no apparent distress in bed  [x]  Call bell left within reach  [x]  Nursing notified  [] Caregiver present  []  Bed alarm activated    Genaro Fonseca   Time Calculation: 21 mins

## 2017-03-06 NOTE — PROGRESS NOTES
Hospitalist Progress Note    Patient: Elpidio Caicedo MRN: [id number]  CSN: [id number]    YOB: 1945  Age: 66 y.o. Sex: female    DOA: 2/25/2017 LOS:  LOS: 8 days          Chief Complaint: Ac resp failure      Assessment/Plan     1. Acute hypoxemic respiratory failure due to decopmensated diastolic CHF, improved  2. Pulmonary HTN  3. Morbid obesity hypoventilation syndrome  4. UTI-day 6/7 rocephin  5. Afib, rate better controlled  6.  IDDm2 w hyperglycemia-add premeal insulin as uncontrolled    Continue coumadin, check daily INR  Stop diamox and start 40 mg bid lasix for mainetnance-check BMP in am  Now on bipap at night-will need this at rehab  Baseline on 3 liters 02  Add pyridium for dysuria, and diflucan for poss yeast related sx  contionue PT as tolerated  D/c planning for rehab with bipap/02  She now wants another rehab facility  Seen independently and on IDR rounds with nurse and  as well    Patient Active Problem List   Diagnosis Code    Acute on chronic diastolic CHF (congestive heart failure) ([facility]) I50.33    ALEJANDRINA (acute kidney injury) ([facility] N17.9    Acute exacerbation of CHF (congestive heart failure) ([facility] I50.9    DM (diabetes mellitus) ([facility] E11.9    Hypertension I10    Respiratory failure ([facility] [facility] J96.90    Acute coronary syndrome ([facility] I24.9    Obstructive sleep apnea, adult G47.33    Paroxysmal atrial fibrillation ([facility]) I48.0    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 ([facility]) Y01.24, E11.65    Dyspnea due to congestive heart failure ([facility]) I50.9    Aspiration pneumonia ([facility]) J69.0    Hyperkalemia E87.5    Elevated troponin R79.89    COPD (chronic obstructive pulmonary disease) ([facility]) J44.9    CHF (congestive heart failure) ([facility]) I50.9    Insufficiency, respiratory, acute ([facility]) R06.89    Infiltrate noted on imaging study R93.8    COPD exacerbation ([facility]) J44.1    Acute on chronic respiratory failure with hypoxia ([facility]) J96.21    Chest pain R07.9    Anxiety F41.9    UTI (urinary tract infection) N39.0    Obesity hypoventilation syndrome (HCC) E66.2    Pulmonary hypertension, moderate to severe (HCC) I27.2       Subjective:    i dont feel well  C/po burning w/ urination  Denies vag itching      Review of systems:    Constitutional: denies fevers, chills, myalgias  Respiratory: denies SOB, cough  Cardiovascular: denies chest pain, palpitations  Gastrointestinal: denies nausea, vomiting, diarrhea      Vital signs/Intake and Output:  Visit Vitals    /50 (BP 1 Location: Left arm, BP Patient Position: At rest)    Pulse 76    Temp 97.4 °F (36.3 °C)    Resp 18    Wt 116.5 kg (256 lb 12.8 oz)    SpO2 100%    Breastfeeding No    BMI 42.73 kg/m2     Current Shift:  03/06 0701 - 03/06 1900  In: 240 [P.O.:200; I.V.:40]  Out: -   Last three shifts:  03/04 1901 - 03/06 0700  In: 1960 [P.O.:1860; I.V.:100]  Out: 2900 [Urine:2900]    Exam:    General: elderly debilitated WF, alert, NAD, OX3  Head/Neck: NCAT, supple, No masses, No lymphadenopathy  CVS:Regular rate and rhythm, no M/R/G, S1/S2 heard, no thrill  Lungs:Clear to auscultation bilaterally, no wheezes, rhonchi, or rales  Abdomen: Soft, Nontender, No distention, Normal Bowel sounds, No hepatomegaly  Extremities: 2 plus LE edema BL  Skin:normal texture and turgor, no rashes, no lesions  Neuro:grossly normal , follows commands  Psych:appropriate                Labs: Results:       Chemistry Recent Labs      03/05/17   0843   GLU  227*   NA  139   K  4.6   CL  102   CO2  37*   BUN  30*   CREA  1.14   CA  7.9*   AGAP  0*   BUCR  26*      CBC w/Diff Recent Labs      03/05/17   0843   WBC  7.8   RBC  3.65*   HGB  11.6*   HCT  38.2   PLT  191      Cardiac Enzymes No results for input(s): CPK, CKND1, ALESSANDRA in the last 72 hours.     No lab exists for component: CKRMB, TROIP   Coagulation Recent Labs      03/06/17   0541  03/05/17   0843   PTP  18.2*  23.4*   INR  1.6*  2.2*       Lipid Panel No results found for: CHOL, CHOLPOCT, CHOLX, CHLST, CHOLV, [id number], HDL, LDL, NLDLCT, DLDL, LDLC, DLDLP, [id number], VLDLC, VLDL, TGL, TGLX, TRIGL, [id number], TRIGP, TGLPOCT, [id number], CHHD, CHHDX   BNP No results for input(s): BNPP in the last 72 hours. Liver Enzymes No results for input(s): TP, ALB, TBIL, AP, SGOT, GPT in the last 72 hours.     No lab exists for component: DBIL   Thyroid Studies Lab Results   Component Value Date/Time    TSH 0.31 01/01/2016 10:20 PM        Procedures/imaging: see electronic medical records for all procedures/Xrays and details which were not copied into this note but were reviewed prior to creation of Rafael Valle MD

## 2017-03-06 NOTE — PROGRESS NOTES
Problem: Self Care Deficits Care Plan (Adult)  Goal: *Acute Goals and Plan of Care (Insert Text)  Occupational Therapy Goals  Initiated 3/6/2017 within 7 day(s). 1. Patient will perform grooming with supervision/set-up   2. Patient will perform upper body dressing with supervision/set-up. 3. Patient will perform lower body dressing with minimal assistance/contact guard assist.  4. Patient will perform toilet transfers with supervision/set-up. 5. Patient will perform all aspects of toileting with supervision/set-up. 6. Patient will participate in upper extremity therapeutic exercise/activities with supervision/set-up for 10 minutes. 7. Patient will utilize energy conservation techniques during functional activities with verbal cues. 8. Patient will perform functional activity while standing for 3-5 minutes with CGA  Outcome: Progressing Towards Goal  OCCUPATIONAL THERAPY REEVALUATION     Patient: Guido Rivas (13 y.o. female)  Date: 3/6/2017  Diagnosis: CHF NYHA class IV, acute, diastolic (HCC)  CHF NYHA class IV, acute, diastolic (HCC) Acute on chronic respiratory failure with hypoxia (HCC)       Precautions: Fall (SPO2)      ASSESSMENT :  Based on the objective data described below, the patient presents with continued decreased ability to perform ADLs due to decreased functional strength, decreased functional balance, and decreased overall activity tolerance. Pt performed supine to sit transfer with supervision. Pt completed sit to stand transfer and transferred to chair with min A. While seated in chair, pt required max A to doff/don socks. Pt completed second sit to stand transfer from chair with min A using RW. Pt left seated in chair set up for lunch with needs in reach. Patient will benefit from skilled intervention to address the above impairments.   Patients rehabilitation potential is considered to be Good  Factors which may influence rehabilitation potential include:   [ ] None noted  [ ]                Mental ability/status  [X]                Medical condition  [ ]                Home/family situation and support systems  [ ]                Safety awareness  [ ]                Pain tolerance/management  [ ]                Other:        PLAN :  Recommendations and Planned Interventions:  [X]                  Self Care Training                  [X]           Therapeutic Activities  [X]                  Functional Mobility Training    [ ]           Cognitive Retraining  [X]                  Therapeutic Exercises           [X]           Endurance Activities  [X]                  Balance Training                   [X]           Neuromuscular Re-Education  [ ]                  Visual/Perceptual Training     [X]      Home Safety Training  [X]                  Patient Education                 [X]           Family Training/Education  [ ]                  Other (comment):     Frequency/Duration: Patient will be followed by occupational therapy 3-5 times a week to address goals. Discharge Recommendations: Andrés Nelson  Further Equipment Recommendations for Discharge: TBD by next level of care       SUBJECTIVE:   Patient stated .      OBJECTIVE DATA SUMMARY:   Hospital course since last seen and reason for reevaluation: Pt has been on OT caseload x1 week, now requiring weekly re-evaluation  Cognitive/Behavioral Status:  Neurologic State: Alert  Orientation Level: Oriented X4  Cognition: Follows commands  Safety/Judgement: Fall prevention  Coordination:  Coordination: Generally decreased, functional  Fine Motor Skills-Upper: Right Intact; Left Intact       Balance:  Sitting: Intact  Standing: Impaired; With support  Standing - Static: Fair  Standing - Dynamic : Fair  Strength:  Strength: Generally decreased, functional     Tone & Sensation:  Tone: Normal  Sensation: Intact     Range of Motion:  AROM: Generally decreased, functional  PROM: Generally decreased, functional Functional Mobility and Transfers for ADLs:  Bed Mobility:  Supine to Sit: Supervision; Additional time     Transfers:  Sit to Stand: Minimum assistance; Additional time  Bed to Chair: Minimum assistance; Additional time              Bathroom Mobility: Contact guard assistance;Minimum assistance (simulated)  ADL Assessment:  Feeding: Setup     Lower Body Dressing: Maximum assistance     ADL Intervention:  Lower Body Dressing Assistance  Dressing Assistance: Maximum assistance  Socks: Maximum assistance  Leg Crossed Method Used: No  Position Performed: Seated in chair     Cognitive Retraining  Safety/Judgement: Fall prevention     Pain:  PRE: 0  POST: 0  Activity Tolerance:   fair  Please refer to the flowsheet for vital signs taken during this treatment. After treatment:   [X] Patient left in no apparent distress sitting up in chair  [ ] Patient left in no apparent distress in bed  [X] Call bell left within reach  [ ] Nursing notified  [ ] Caregiver present  [ ] Bed alarm activated      COMMUNICATION/EDUCATION:   [ ]    Home safety education was provided and the patient/caregiver indicated understanding. [X]    Patient/family have participated as able in goal setting and plan of care. [X]    Patient/family agree to work toward stated goals and plan of care. [ ]    Patient understands intent and goals of therapy, but is neutral about his/her participation. [ ]    Patient is unable to participate in goal setting and plan of care. This patients plan of care is appropriate for delegation to [facility] [place].      Thank you for this referral.  Agustina Brown, MS OTR/L  Time Calculation: 16 mins

## 2017-03-07 LAB
ANION GAP BLD CALC-SCNC: 6 MMOL/L (ref 3–18)
BUN SERPL-MCNC: 35 MG/DL (ref 7–18)
BUN/CREAT SERPL: 27 (ref 12–20)
CALCIUM SERPL-MCNC: 8 MG/DL (ref 8.5–10.1)
CHLORIDE SERPL-SCNC: 109 MMOL/L (ref 100–108)
CO2 SERPL-SCNC: 31 MMOL/L (ref 21–32)
CREAT SERPL-MCNC: 1.32 MG/DL (ref 0.6–1.3)
GLUCOSE BLD STRIP.AUTO-MCNC: 163 MG/DL (ref 70–110)
GLUCOSE BLD STRIP.AUTO-MCNC: 178 MG/DL (ref 70–110)
GLUCOSE BLD STRIP.AUTO-MCNC: 206 MG/DL (ref 70–110)
GLUCOSE BLD STRIP.AUTO-MCNC: 260 MG/DL (ref 70–110)
GLUCOSE SERPL-MCNC: 184 MG/DL (ref 74–99)
INR PPP: 1.3 (ref 0.8–1.2)
MAGNESIUM SERPL-MCNC: 2.1 MG/DL (ref 1.8–2.4)
POTASSIUM SERPL-SCNC: 4.9 MMOL/L (ref 3.5–5.5)
PROTHROMBIN TIME: 15.9 SEC (ref 11.5–15.2)
SODIUM SERPL-SCNC: 146 MMOL/L (ref 136–145)

## 2017-03-07 PROCEDURE — 82962 GLUCOSE BLOOD TEST: CPT

## 2017-03-07 PROCEDURE — 65660000000 HC RM CCU STEPDOWN

## 2017-03-07 PROCEDURE — 74011636637 HC RX REV CODE- 636/637: Performed by: HOSPITALIST

## 2017-03-07 PROCEDURE — 83735 ASSAY OF MAGNESIUM: CPT | Performed by: INTERNAL MEDICINE

## 2017-03-07 PROCEDURE — 94640 AIRWAY INHALATION TREATMENT: CPT

## 2017-03-07 PROCEDURE — 74011636637 HC RX REV CODE- 636/637: Performed by: INTERNAL MEDICINE

## 2017-03-07 PROCEDURE — 74011250637 HC RX REV CODE- 250/637: Performed by: INTERNAL MEDICINE

## 2017-03-07 PROCEDURE — 85610 PROTHROMBIN TIME: CPT | Performed by: INTERNAL MEDICINE

## 2017-03-07 PROCEDURE — 74011250636 HC RX REV CODE- 250/636: Performed by: INTERNAL MEDICINE

## 2017-03-07 PROCEDURE — 74011250636 HC RX REV CODE- 250/636: Performed by: HOSPITALIST

## 2017-03-07 PROCEDURE — 94660 CPAP INITIATION&MGMT: CPT

## 2017-03-07 PROCEDURE — 80048 BASIC METABOLIC PNL TOTAL CA: CPT | Performed by: INTERNAL MEDICINE

## 2017-03-07 PROCEDURE — 77010033678 HC OXYGEN DAILY

## 2017-03-07 PROCEDURE — 74011000250 HC RX REV CODE- 250: Performed by: INTERNAL MEDICINE

## 2017-03-07 PROCEDURE — 74011250637 HC RX REV CODE- 250/637: Performed by: HOSPITALIST

## 2017-03-07 PROCEDURE — 74011000258 HC RX REV CODE- 258: Performed by: INTERNAL MEDICINE

## 2017-03-07 PROCEDURE — 97116 GAIT TRAINING THERAPY: CPT

## 2017-03-07 RX ORDER — ENOXAPARIN SODIUM 150 MG/ML
110 INJECTION SUBCUTANEOUS EVERY 12 HOURS
Status: DISCONTINUED | OUTPATIENT
Start: 2017-03-07 | End: 2017-03-13 | Stop reason: ALTCHOICE

## 2017-03-07 RX ORDER — LIDOCAINE 50 MG/G
1 PATCH TOPICAL EVERY 24 HOURS
Status: DISCONTINUED | OUTPATIENT
Start: 2017-03-07 | End: 2017-03-24 | Stop reason: HOSPADM

## 2017-03-07 RX ORDER — SPIRONOLACTONE 25 MG/1
25 TABLET ORAL DAILY
Status: DISCONTINUED | OUTPATIENT
Start: 2017-03-08 | End: 2017-03-09

## 2017-03-07 RX ORDER — WARFARIN 4 MG/1
4 TABLET ORAL ONCE
Status: COMPLETED | OUTPATIENT
Start: 2017-03-07 | End: 2017-03-07

## 2017-03-07 RX ADMIN — INSULIN GLARGINE 20 UNITS: 100 INJECTION, SOLUTION SUBCUTANEOUS at 22:07

## 2017-03-07 RX ADMIN — SIMVASTATIN 10 MG: 20 TABLET, FILM COATED ORAL at 22:07

## 2017-03-07 RX ADMIN — PANTOPRAZOLE SODIUM 40 MG: 40 TABLET, DELAYED RELEASE ORAL at 07:04

## 2017-03-07 RX ADMIN — PREGABALIN 75 MG: 75 CAPSULE ORAL at 22:07

## 2017-03-07 RX ADMIN — CEFTRIAXONE 1 G: 1 INJECTION, POWDER, FOR SOLUTION INTRAMUSCULAR; INTRAVENOUS at 12:22

## 2017-03-07 RX ADMIN — HYDROCODONE BITARTRATE AND ACETAMINOPHEN 1 TABLET: 5; 325 TABLET ORAL at 16:10

## 2017-03-07 RX ADMIN — INSULIN LISPRO 5 UNITS: 100 INJECTION, SOLUTION INTRAVENOUS; SUBCUTANEOUS at 18:09

## 2017-03-07 RX ADMIN — DIGOXIN 0.12 MG: 0.12 TABLET ORAL at 08:35

## 2017-03-07 RX ADMIN — NYSTATIN: 100000 POWDER TOPICAL at 22:07

## 2017-03-07 RX ADMIN — PHENAZOPYRIDINE 100 MG: 100 TABLET ORAL at 12:30

## 2017-03-07 RX ADMIN — CARVEDILOL 25 MG: 12.5 TABLET, FILM COATED ORAL at 16:10

## 2017-03-07 RX ADMIN — INSULIN LISPRO 5 UNITS: 100 INJECTION, SOLUTION INTRAVENOUS; SUBCUTANEOUS at 07:30

## 2017-03-07 RX ADMIN — FLUCONAZOLE 100 MG: 100 TABLET ORAL at 12:30

## 2017-03-07 RX ADMIN — FUROSEMIDE 40 MG: 40 TABLET ORAL at 07:04

## 2017-03-07 RX ADMIN — ACETAMINOPHEN 650 MG: 325 TABLET ORAL at 07:04

## 2017-03-07 RX ADMIN — DOCUSATE SODIUM 100 MG: 100 CAPSULE, LIQUID FILLED ORAL at 22:07

## 2017-03-07 RX ADMIN — DILTIAZEM HYDROCHLORIDE 30 MG: 30 TABLET, FILM COATED ORAL at 12:30

## 2017-03-07 RX ADMIN — INSULIN LISPRO 11 UNITS: 100 INJECTION, SOLUTION INTRAVENOUS; SUBCUTANEOUS at 12:29

## 2017-03-07 RX ADMIN — POTASSIUM CHLORIDE 20 MEQ: 20 TABLET, EXTENDED RELEASE ORAL at 08:35

## 2017-03-07 RX ADMIN — DOCUSATE SODIUM 100 MG: 100 CAPSULE, LIQUID FILLED ORAL at 08:35

## 2017-03-07 RX ADMIN — INSULIN LISPRO 7 UNITS: 100 INJECTION, SOLUTION INTRAVENOUS; SUBCUTANEOUS at 18:09

## 2017-03-07 RX ADMIN — HYDROCODONE BITARTRATE AND ACETAMINOPHEN 1 TABLET: 5; 325 TABLET ORAL at 10:16

## 2017-03-07 RX ADMIN — PREGABALIN 75 MG: 75 CAPSULE ORAL at 08:35

## 2017-03-07 RX ADMIN — FUROSEMIDE 40 MG: 40 TABLET ORAL at 16:11

## 2017-03-07 RX ADMIN — INSULIN LISPRO 5 UNITS: 100 INJECTION, SOLUTION INTRAVENOUS; SUBCUTANEOUS at 12:30

## 2017-03-07 RX ADMIN — DILTIAZEM HYDROCHLORIDE 30 MG: 30 TABLET, FILM COATED ORAL at 16:10

## 2017-03-07 RX ADMIN — ARFORMOTEROL TARTRATE 15 MCG: 15 SOLUTION RESPIRATORY (INHALATION) at 20:46

## 2017-03-07 RX ADMIN — WARFARIN SODIUM 4 MG: 4 TABLET ORAL at 18:09

## 2017-03-07 RX ADMIN — ONDANSETRON HYDROCHLORIDE 4 MG: 2 INJECTION INTRAMUSCULAR; INTRAVENOUS at 07:11

## 2017-03-07 RX ADMIN — HYDROCODONE BITARTRATE AND ACETAMINOPHEN 1 TABLET: 5; 325 TABLET ORAL at 02:43

## 2017-03-07 RX ADMIN — DILTIAZEM HYDROCHLORIDE 30 MG: 30 TABLET, FILM COATED ORAL at 07:04

## 2017-03-07 RX ADMIN — BUDESONIDE 500 MCG: 0.5 INHALANT RESPIRATORY (INHALATION) at 07:12

## 2017-03-07 RX ADMIN — CARVEDILOL 25 MG: 12.5 TABLET, FILM COATED ORAL at 08:35

## 2017-03-07 RX ADMIN — POTASSIUM CHLORIDE 20 MEQ: 20 TABLET, EXTENDED RELEASE ORAL at 22:06

## 2017-03-07 RX ADMIN — ASPIRIN 81 MG CHEWABLE TABLET 81 MG: 81 TABLET CHEWABLE at 08:35

## 2017-03-07 RX ADMIN — BUDESONIDE 500 MCG: 0.5 INHALANT RESPIRATORY (INHALATION) at 20:46

## 2017-03-07 RX ADMIN — CLONAZEPAM 0.25 MG: 0.5 TABLET ORAL at 22:06

## 2017-03-07 RX ADMIN — INSULIN LISPRO 5 UNITS: 100 INJECTION, SOLUTION INTRAVENOUS; SUBCUTANEOUS at 07:04

## 2017-03-07 RX ADMIN — PHENAZOPYRIDINE 100 MG: 100 TABLET ORAL at 08:34

## 2017-03-07 RX ADMIN — NYSTATIN: 100000 POWDER TOPICAL at 10:16

## 2017-03-07 RX ADMIN — ENOXAPARIN SODIUM 110 MG: 120 INJECTION SUBCUTANEOUS at 16:10

## 2017-03-07 RX ADMIN — INSULIN LISPRO 5 UNITS: 100 INJECTION, SOLUTION INTRAVENOUS; SUBCUTANEOUS at 22:06

## 2017-03-07 RX ADMIN — ARFORMOTEROL TARTRATE 15 MCG: 15 SOLUTION RESPIRATORY (INHALATION) at 07:12

## 2017-03-07 RX ADMIN — CLONAZEPAM 0.25 MG: 0.5 TABLET ORAL at 08:35

## 2017-03-07 NOTE — PROGRESS NOTES
Hospitalist Progress Note    Patient: Michaelle Lauren MRN: [id number]  CSN: [id number]    YOB: 1945  Age: 66 y.o. Sex: female    DOA: 2/25/2017 LOS:  LOS: 9 days          Chief Complaint: Ac respiratory failure    Assessment/Plan     1. Acute hypoxemic respiratory failure due to decomensated diastolic CHF, improved  2. Pulmonary HTN  3. Morbid obesity hypoventilation syndrome  4. UTI-day 7/7 rocephin  5. Afib, rate better controlled-INR is down, goal 203 on coumadin  6.  IDDm2 w hyperglycemia-added premeal insulin    BID lasix with Kdur  On beta blocker, coumadin  resp treatments  Daily 02  bipap at night  Labs reviewed    I discussed with her on my rounds as well as IDR that using an IS as well as participating in PT is important  She then advised she wants to go to [facility], and said it is a rehab  I asked her to clarify as [facility] is a med school, then I got a call from the  saying her  wants to transfer her to [facility]-    I will review with her and her   She is likely not a candidate for acute LTAC or hospital transfer unless they find accepting physician  Dr. Ari Marte and I spoke and agree that d/c planning for her is appropriate and acute treatments here are finishing  Will bridge with lovenox in interim as INR is low    Update: I spoke with Mr Alex Love is very concerned the facility she is transferred to can manage her BIPAP and CHF needs-they prefer to go to a facility other than one she was in prior  I assured him we can look at other options, will continue her tx in hospital, but must prepare for a d/c plan as staying unnecessarily in hospital beyond acute needs increases her risk for hospital acquired infections which are deadly  He rescinded his request for another hospital transfer at this time    Patient Active Problem List   Diagnosis Code    Acute on chronic diastolic CHF (congestive heart failure) ([facility] [address] I50.33    ALEJANDRINA (acute kidney injury) ([facility] [address] N17.9  Acute exacerbation of CHF (congestive heart failure) ([facility]) I50.9    DM (diabetes mellitus) ([facility] [address] E11.9    Hypertension I10    Respiratory failure ([facility]) J96.90    Acute coronary syndrome ([facility]) I24.9    Obstructive sleep apnea, adult G47.33    Paroxysmal atrial fibrillation ([facility]) I48.0    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 ([facility]) K26.45, E11.65    Dyspnea due to congestive heart failure ([facility]) I50.9    Aspiration pneumonia ([facility]) J69.0    Hyperkalemia E87.5    Elevated troponin R79.89    COPD (chronic obstructive pulmonary disease) ([facility]) J44.9    CHF (congestive heart failure) ([facility]) I50.9    Insufficiency, respiratory, acute ([facility]) R06.89    Infiltrate noted on imaging study R93.8    COPD exacerbation ([facility]) J44.1    Acute on chronic respiratory failure with hypoxia ([facility]) J96.21    Chest pain R07.9    Anxiety F41.9    UTI (urinary tract infection) N39.0    Obesity hypoventilation syndrome ([facility]) E66.2    Pulmonary hypertension, moderate to severe ([facility]) I27.2       Subjective:    Feeling \"bad\"  Too weak she states to try PT  Did eat   Is up in chair  Denies CP    Review of systems:    Constitutional: denies fevers, chills, myalgias  No cough  Cardiovascular: denies chest pain, palpitations  Gastrointestinal: denies nausea, vomiting, diarrhea      Vital signs/Intake and Output:  Visit Vitals    /52 (BP 1 Location: Left arm, BP Patient Position: Sitting)    Pulse 64    Temp 98 °F (36.7 °C)    Resp 20    Wt 111.1 kg (245 lb)    SpO2 96%    Breastfeeding No    BMI 40.77 kg/m2     Current Shift:     Last three shifts:  03/05 1901 - 03/07 0700  In: 580 [P.O.:440; I.V.:140]  Out: 2200 [Urine:2200]    Exam:    General:weak obese WF alert, NAD, OX3  Head/Neck: NCAT, supple, No masses, No lymphadenopathy  CVS:Regular rate and rhythm, no M/R/G, S1/S2 heard, no thrill  Lungs:clear mostly, few rales left base  Abdomen: Soft, Nontender, No distention, Normal Bowel sounds, No hepatomegaly  Extremities: 2 plus edema  Skin:normal texture and turgor, no rashes, no lesions  Neuro:grossly normal , follows commands  Psych:appropriate                Labs: Results:       Chemistry Recent Labs      03/07/17   0555  03/05/17   0843   GLU  184*  227*   NA  146*  139   K  4.9  4.6   CL  109*  102   CO2  31  37*   BUN  35*  30*   CREA  1.32*  1.14   CA  8.0*  7.9*   AGAP  6  0*   BUCR  27*  26*      CBC w/Diff Recent Labs      03/05/17   0843   WBC  7.8   RBC  3.65*   HGB  11.6*   HCT  38.2   PLT  191      Cardiac Enzymes No results for input(s): CPK, CKND1, ALESSANDRA in the last 72 hours. No lab exists for component: CKRMB, TROIP   Coagulation Recent Labs      03/07/17   0555  03/06/17   0541   PTP  15.9*  18.2*   INR  1.3*  1.6*       Lipid Panel No results found for: CHOL, CHOLPOCT, CHOLX, CHLST, CHOLV, [id number], HDL, LDL, NLDLCT, DLDL, LDLC, DLDLP, [id number], VLDLC, VLDL, TGL, TGLX, TRIGL, [id number], TRIGP, TGLPOCT, [id number], CHHD, CHHDX   BNP No results for input(s): BNPP in the last 72 hours. Liver Enzymes No results for input(s): TP, ALB, TBIL, AP, SGOT, GPT in the last 72 hours.     No lab exists for component: DBIL   Thyroid Studies Lab Results   Component Value Date/Time    TSH 0.31 01/01/2016 10:20 PM        Procedures/imaging: see electronic medical records for all procedures/Xrays and details which were not copied into this note but were reviewed prior to creation of Wander Villasenor MD

## 2017-03-07 NOTE — PROGRESS NOTES
Cardiology Progress Note        Patient: Fredo Schaffer        Sex: female          DOA: 2/25/2017  YOB: 1945      Age:  66 y.o.        LOS:  LOS: 8 days      Patient seen and examined. Chart reviewed. Assessment/Plan     Patient Active Problem List   Diagnosis Code    Acute on chronic diastolic CHF (congestive heart failure) ([facility]) I50.33    ALEJANDRINA (acute kidney injury) ([facility] N17.9    Acute exacerbation of CHF (congestive heart failure) ([facility]) I50.9    DM (diabetes mellitus) ([facility] E11.9    Hypertension I10    Respiratory failure ([facility]) J96.90    Acute coronary syndrome ([facility]) I24.9    Obstructive sleep apnea, adult G47.33    Paroxysmal atrial fibrillation ([facility]) I48.0    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 ([facility]) B77.94, E11.65    Dyspnea due to congestive heart failure ([facility]) I50.9    Aspiration pneumonia ([facility]) J69.0    Hyperkalemia E87.5    Elevated troponin R79.89    COPD (chronic obstructive pulmonary disease) ([facility]) J44.9    CHF (congestive heart failure) ([facility]) I50.9    Insufficiency, respiratory, acute ([facility]) R06.89    Infiltrate noted on imaging study R93.8    COPD exacerbation ([facility]) J44.1    Acute on chronic respiratory failure with hypoxia ([facility]) J96.21    Chest pain R07.9    Anxiety F41.9    UTI (urinary tract infection) N39.0    Obesity hypoventilation syndrome ([facility]) E66.2    Pulmonary hypertension, moderate to severe ([facility]) I27.2      Hypercapnic hypoxic respiratory failure   Moderate aortic stenosis  Permanent atrial fibrillation     Plan:    She was on Diamox, discontinued today and Lasix changed to 40 mg PO BID. Continue Lasix. Continue coumadin as per PT/INR Goal 2-3  Continue beta blocker, calcium channel blocker and digoxin  Strict I/O  CMP and Mg level tomorrow  Continue management as per hospital medicine. Subjective:    cc:   My breathing is better but i still feel weak      REVIEW OF SYSTEMS: General: No fevers or chills. Cardiovascular: No chest pain,No palpitations, No orthopnea, No PND, No leg swelling, No claudication  Pulmonary: Positive dyspnea. Gastrointestinal: No nausea, vomiting, bleeding  Neurology: No Dizziness    Objective:      Visit Vitals    /47 (BP 1 Location: Left arm, BP Patient Position: At rest)    Pulse 93    Temp 97.6 °F (36.4 °C)    Resp 18    Wt 116.5 kg (256 lb 12.8 oz)    SpO2 98%    Breastfeeding No    BMI 42.73 kg/m2     Body mass index is 42.73 kg/(m^2). Physical Exam:  General Appearance: Comfortable, not using accessory muscles of respiration. HEENT: SIRIA. HEAD: Atraumatic  NECK: No JVD, no thyroidomeglay. CAROTIDS: No bruit  LUNGS: Clear bilaterally. HEART: S1+S2 audible, irregularly irregular, 3/6 systolic murmur in aortic area, no pericardial rub. ABD: Non-tender, BS Audible    EXT: ++ leg edema, and no cyanosis. VASCULAR EXAM: Pulses are intact. PSYCHIATRIC EXAM: Mood is appropriate. MUSCULOSKELETAL: Grossly no joint deformity. NEUROLOGICAL: AAO times 3, No motor and sensory deficit.   Medication:  Current Facility-Administered Medications   Medication Dose Route Frequency    fluconazole (DIFLUCAN) tablet 100 mg  100 mg Oral DAILY    phenazopyridine (PYRIDIUM) tablet 100 mg  100 mg Oral TIDPC    insulin lispro (HUMALOG) injection 5 Units  5 Units SubCUTAneous TIDAC    furosemide (LASIX) tablet 40 mg  40 mg Oral ACB&D    HYDROcodone-acetaminophen (NORCO) 5-325 mg per tablet 1 Tab  1 Tab Oral Q6H PRN    insulin glargine (LANTUS) injection 20 Units  20 Units SubCUTAneous QHS    digoxin (LANOXIN) tablet 0.125 mg  0.125 mg Oral DAILY    potassium chloride (K-DUR, KLOR-CON) SR tablet 20 mEq  20 mEq Oral BID    carvedilol (COREG) tablet 25 mg  25 mg Oral BID WITH MEALS    dilTIAZem (CARDIZEM) IR tablet 30 mg  30 mg Oral TIDAC    ELECTROLYTE REPLACEMENT PROTOCOL  1 Each Other PRN    ELECTROLYTE REPLACEMENT PROTOCOL  1 Each Other PRN    pantoprazole (PROTONIX) tablet 40 mg  40 mg Oral ACB    polyvinyl alcohol (LIQUIFILM TEARS) 1.4 % ophthalmic solution 1 Drop  1 Drop Both Eyes PRN    cefTRIAXone (ROCEPHIN) 1 g in 0.9% sodium chloride (MBP/ADV) 50 mL MBP  1 g IntraVENous Q24H    nystatin (MYCOSTATIN) 100,000 unit/gram powder   Topical BID    clonazePAM (KlonoPIN) tablet 0.25 mg  0.25 mg Oral BID    docusate sodium (COLACE) capsule 100 mg  100 mg Oral BID    simvastatin (ZOCOR) tablet 10 mg  10 mg Oral QHS    pregabalin (LYRICA) capsule 75 mg  75 mg Oral BID    aspirin chewable tablet 81 mg  81 mg Oral DAILY    arformoterol (BROVANA) neb solution 15 mcg  15 mcg Nebulization BID RT    budesonide (PULMICORT) 500 mcg/2 ml nebulizer suspension  500 mcg Nebulization BID RT    albuterol-ipratropium (DUO-NEB) 2.5 MG-0.5 MG/3 ML  3 mL Nebulization Q4H PRN    ondansetron (ZOFRAN) injection 4 mg  4 mg IntraVENous Q6H PRN    acetaminophen (TYLENOL) tablet 650 mg  650 mg Oral Q4H PRN    insulin lispro (HUMALOG) injection   SubCUTAneous AC&HS    glucose chewable tablet 16 g  4 Tab Oral PRN    glucagon (GLUCAGEN) injection 1 mg  1 mg IntraMUSCular PRN    dextrose (D50W) injection syrg 12.5-25 g  25-50 mL IntraVENous PRN    warfarin pharmacy to dose   Other PRN               Lab/Data Reviewed:       Recent Labs      03/05/17   0843   WBC  7.8   HGB  11.6*   HCT  38.2   PLT  191     Recent Labs      03/05/17   0843   NA  139   K  4.6   CL  102   CO2  37*   GLU  227*   BUN  30*   CREA  1.14   CA  7.9*       Signed By: Thong Rios MD     March 6, 2017

## 2017-03-07 NOTE — ROUTINE PROCESS
Bedside and Verbal shift change report given to GAMALIEL Ocasio RN (oncoming nurse) by BOLIVAR Kerr RN (offgoing nurse). Report included the following information SBAR, Kardex, Intake/Output, MAR and Recent Results.

## 2017-03-07 NOTE — PROGRESS NOTES
Pharmacy Dosing Services: Warfarin     Consult for Warfarin Dosing by Pharmacy by Dr. Veneta Galeazzi provided for this 66 y.o.  female , for indication of Atrial Fibrillation. Dose to achieve an INR goal of 2-3    Home dose of Warfarin is listed as 2.5 mg daily , dose was increased yesterday to 4 mg due to sub-therapeutic INR , will order 4 mg again tonight. Order entered for  Warfarin 4 mg to be given today at 18:00. Aspirin 81 mg po daily   Lovenox 110 mg sc q12h (bridge)     Significant drug interactions: Fluconazole  PT/INR Lab Results   Component Value Date/Time    INR 1.3 03/07/2017 05:55 AM      Platelets Lab Results   Component Value Date/Time    PLATELET 062 58/23/9913 08:43 AM      H/H     Lab Results   Component Value Date/Time    HGB 11.6 03/05/2017 08:43 AM        Warfarin Administrations (last 168 hours)       Date/Time Action Medication Dose      03/06/17 1816 Given    warfarin (COUMADIN) tablet 4 mg 4 mg    03/05/17 1800 Given    warfarin (COUMADIN) tablet 2.5 mg 2.5 mg    03/04/17 1804 Given    warfarin (COUMADIN) tablet 2 mg 2 mg    03/03/17 1810 Given    warfarin (COUMADIN) tablet 2 mg 2 mg    03/02/17 1711 Given   [INR 2.2]    warfarin (COUMADIN) tablet 2.5 mg 2.5 mg    03/01/17 1908 Given   [INR 1.6]    warfarin (COUMADIN) tablet 4 mg 4 mg    02/28/17 2144 Given    warfarin (COUMADIN) tablet 4 mg 4 mg          Pharmacy to follow daily and will provide subsequent Warfarin dosing based on clinical status.   [facility])  Contact information [phone]

## 2017-03-07 NOTE — PROGRESS NOTES
Problem: Mobility Impaired (Adult and Pediatric)  Goal: *Acute Goals and Plan of Care (Insert Text)  Physical Therapy Goals  Initiated 2/27/2017 and to be accomplished within 7 day(s)  1. Patient will move from supine to sit and sit to supine , scoot up and down and roll side to side in bed with supervision/set-up. 2. Patient will transfer from bed to chair and chair to bed with minimal assistance/contact guard assist using the least restrictive device. 3. Patient will perform sit to stand with minimal assistance/contact guard assist.  4. Patient will ambulate with minimal assistance/contact guard assist for 50 feet with the least restrictive device. Re-evaluation on 3/6/2017 and to be accomplished within 7 day(s)  1. Patient will move from supine to sit and sit to supine, scoot up and down and roll side to side in bed with supervision/set-up. 2. Patient will transfer from bed to chair and chair to bed with contact guard assist/supervision using the least restrictive device. 3. Patient will perform sit to stand with contact guard assist/supervision. 4. Patient will ambulate with contact guard assist/supervision for 100 feet with the least restrictive device. Outcome: Progressing Towards Goal  PHYSICAL THERAPY TREATMENT     Patient: Issa Ruiz (71 y.o. female)  Date: 3/7/2017  Diagnosis: CHF NYHA class IV, acute, diastolic (HCC)  CHF NYHA class IV, acute, diastolic (HCC) Acute on chronic respiratory failure with hypoxia (HCC)       Precautions: Fall (SPO2)   Chart, physical therapy assessment, plan of care and goals were reviewed. ASSESSMENT:  Patient is agreeable to PT but requires more assistance for transfers compared to previous visit. Patient required maximal assistance of two to stand up to a rolling walker this session. Patient then ambulated for about 35 feet using a rolling walker and returned back to her chair. Patient was not agreeable to a second trial of ambulation.  O2 saturations while on 3L O2 dropped to 83%. Instructed patient in pursed lip breathing. O2 saturations returned to around 90%. Will continue PT and progress ambulatory mobility as tolerated. Recommend rehab at discharge. Progression toward goals:  [ ]      Improving appropriately and progressing toward goals  [X]      Improving slowly and progressing toward goals  [ ]      Not making progress toward goals and plan of care will be adjusted       PLAN:  Patient continues to benefit from skilled intervention to address the above impairments. Continue treatment per established plan of care. Discharge Recommendations:  Rehab  Further Equipment Recommendations for Discharge:  N/A       SUBJECTIVE:   Patient stated What did you have for breakfast?      OBJECTIVE DATA SUMMARY:   Critical Behavior:  Neurologic State: Alert  Orientation Level: Oriented X4  Cognition: Appropriate decision making, Appropriate for age attention/concentration, Appropriate safety awareness, Follows commands  Safety/Judgement: Fall prevention  Functional Mobility Training:  Bed Mobility:  Transfers:  Sit to Stand: Maximum assistance  Stand to Sit: Minimum assistance  Balance:  Sitting: Intact  Standing: Impaired; With support  Standing - Static: Fair  Standing - Dynamic : Fair  Ambulation/Gait Training:  Distance (ft): 35 Feet (ft)  Assistive Device: Gait belt;Walker, rolling  Ambulation - Level of Assistance: Minimal assistance;Contact guard assistance  Gait Abnormalities: Decreased step clearance  Base of Support: Widened  Stance: Time  Speed/Kristin: Slow  Step Length: Right shortened;Left shortened  Swing Pattern: Right asymmetrical;Left asymmetrical     Pain:  0/10  Activity Tolerance:   Poor  Please refer to the flowsheet for vital signs taken during this treatment.   After treatment:   [X] Patient left in no apparent distress sitting up in chair  [ ] Patient left in no apparent distress in bed  [X] Call bell left within reach  [X] Nursing notified  [ ] Caregiver present  [ ] Bed alarm activated      Bj Krishna   Time Calculation: 19 mins

## 2017-03-07 NOTE — PROGRESS NOTES
Progress Note    Goals of care defined. No further recommendations. Will sign off. Please call if we can be of further assistance.         Lizzie Espana MD  Palliative Medicine

## 2017-03-08 LAB
ANION GAP BLD CALC-SCNC: 3 MMOL/L (ref 3–18)
BUN SERPL-MCNC: 35 MG/DL (ref 7–18)
BUN/CREAT SERPL: 28 (ref 12–20)
CALCIUM SERPL-MCNC: 8.3 MG/DL (ref 8.5–10.1)
CHLORIDE SERPL-SCNC: 104 MMOL/L (ref 100–108)
CO2 SERPL-SCNC: 33 MMOL/L (ref 21–32)
CREAT SERPL-MCNC: 1.26 MG/DL (ref 0.6–1.3)
GLUCOSE BLD STRIP.AUTO-MCNC: 123 MG/DL (ref 70–110)
GLUCOSE BLD STRIP.AUTO-MCNC: 124 MG/DL (ref 70–110)
GLUCOSE BLD STRIP.AUTO-MCNC: 225 MG/DL (ref 70–110)
GLUCOSE BLD STRIP.AUTO-MCNC: 259 MG/DL (ref 70–110)
GLUCOSE SERPL-MCNC: 205 MG/DL (ref 74–99)
INR PPP: 1.4 (ref 0.8–1.2)
MAGNESIUM SERPL-MCNC: 2 MG/DL (ref 1.8–2.4)
POTASSIUM SERPL-SCNC: 5.7 MMOL/L (ref 3.5–5.5)
PROTHROMBIN TIME: 16.2 SEC (ref 11.5–15.2)
SODIUM SERPL-SCNC: 140 MMOL/L (ref 136–145)

## 2017-03-08 PROCEDURE — 77030011256 HC DRSG MEPILEX <16IN NO BORD MOLN -A

## 2017-03-08 PROCEDURE — 94660 CPAP INITIATION&MGMT: CPT

## 2017-03-08 PROCEDURE — 74011250636 HC RX REV CODE- 250/636: Performed by: HOSPITALIST

## 2017-03-08 PROCEDURE — 74011636637 HC RX REV CODE- 636/637: Performed by: INTERNAL MEDICINE

## 2017-03-08 PROCEDURE — 82962 GLUCOSE BLOOD TEST: CPT

## 2017-03-08 PROCEDURE — 74011250637 HC RX REV CODE- 250/637: Performed by: INTERNAL MEDICINE

## 2017-03-08 PROCEDURE — 97530 THERAPEUTIC ACTIVITIES: CPT

## 2017-03-08 PROCEDURE — 94640 AIRWAY INHALATION TREATMENT: CPT

## 2017-03-08 PROCEDURE — 94760 N-INVAS EAR/PLS OXIMETRY 1: CPT

## 2017-03-08 PROCEDURE — 77010033678 HC OXYGEN DAILY

## 2017-03-08 PROCEDURE — 85025 COMPLETE CBC W/AUTO DIFF WBC: CPT | Performed by: HOSPITALIST

## 2017-03-08 PROCEDURE — 80048 BASIC METABOLIC PNL TOTAL CA: CPT | Performed by: HOSPITALIST

## 2017-03-08 PROCEDURE — 74011000250 HC RX REV CODE- 250: Performed by: INTERNAL MEDICINE

## 2017-03-08 PROCEDURE — 74011250637 HC RX REV CODE- 250/637: Performed by: HOSPITALIST

## 2017-03-08 PROCEDURE — 65660000000 HC RM CCU STEPDOWN

## 2017-03-08 PROCEDURE — 85610 PROTHROMBIN TIME: CPT | Performed by: HOSPITALIST

## 2017-03-08 PROCEDURE — 83735 ASSAY OF MAGNESIUM: CPT | Performed by: HOSPITALIST

## 2017-03-08 PROCEDURE — 74011636637 HC RX REV CODE- 636/637: Performed by: HOSPITALIST

## 2017-03-08 RX ORDER — WARFARIN 1 MG/1
2 TABLET ORAL
Status: COMPLETED | OUTPATIENT
Start: 2017-03-09 | End: 2017-03-08

## 2017-03-08 RX ORDER — POTASSIUM CHLORIDE 20 MEQ/1
20 TABLET, EXTENDED RELEASE ORAL DAILY
Status: DISCONTINUED | OUTPATIENT
Start: 2017-03-09 | End: 2017-03-19

## 2017-03-08 RX ADMIN — INSULIN LISPRO 5 UNITS: 100 INJECTION, SOLUTION INTRAVENOUS; SUBCUTANEOUS at 11:50

## 2017-03-08 RX ADMIN — DIGOXIN 0.12 MG: 0.12 TABLET ORAL at 09:23

## 2017-03-08 RX ADMIN — POTASSIUM CHLORIDE 20 MEQ: 20 TABLET, EXTENDED RELEASE ORAL at 09:21

## 2017-03-08 RX ADMIN — DILTIAZEM HYDROCHLORIDE 30 MG: 30 TABLET, FILM COATED ORAL at 16:39

## 2017-03-08 RX ADMIN — CLONAZEPAM 0.25 MG: 0.5 TABLET ORAL at 23:19

## 2017-03-08 RX ADMIN — PREGABALIN 75 MG: 75 CAPSULE ORAL at 23:19

## 2017-03-08 RX ADMIN — SPIRONOLACTONE 25 MG: 25 TABLET, FILM COATED ORAL at 11:44

## 2017-03-08 RX ADMIN — ARFORMOTEROL TARTRATE 15 MCG: 15 SOLUTION RESPIRATORY (INHALATION) at 20:40

## 2017-03-08 RX ADMIN — ARFORMOTEROL TARTRATE 15 MCG: 15 SOLUTION RESPIRATORY (INHALATION) at 07:57

## 2017-03-08 RX ADMIN — DOCUSATE SODIUM 100 MG: 100 CAPSULE, LIQUID FILLED ORAL at 23:21

## 2017-03-08 RX ADMIN — ENOXAPARIN SODIUM 110 MG: 120 INJECTION SUBCUTANEOUS at 14:00

## 2017-03-08 RX ADMIN — INSULIN GLARGINE 20 UNITS: 100 INJECTION, SOLUTION SUBCUTANEOUS at 23:19

## 2017-03-08 RX ADMIN — ENOXAPARIN SODIUM 110 MG: 120 INJECTION SUBCUTANEOUS at 04:55

## 2017-03-08 RX ADMIN — FUROSEMIDE 40 MG: 40 TABLET ORAL at 16:39

## 2017-03-08 RX ADMIN — CARVEDILOL 25 MG: 12.5 TABLET, FILM COATED ORAL at 09:20

## 2017-03-08 RX ADMIN — CLONAZEPAM 0.25 MG: 0.5 TABLET ORAL at 09:21

## 2017-03-08 RX ADMIN — ASPIRIN 81 MG CHEWABLE TABLET 81 MG: 81 TABLET CHEWABLE at 09:21

## 2017-03-08 RX ADMIN — WARFARIN SODIUM 2 MG: 1 TABLET ORAL at 23:33

## 2017-03-08 RX ADMIN — BUDESONIDE 500 MCG: 0.5 INHALANT RESPIRATORY (INHALATION) at 07:56

## 2017-03-08 RX ADMIN — NYSTATIN: 100000 POWDER TOPICAL at 09:24

## 2017-03-08 RX ADMIN — INSULIN LISPRO 11 UNITS: 100 INJECTION, SOLUTION INTRAVENOUS; SUBCUTANEOUS at 23:20

## 2017-03-08 RX ADMIN — SIMVASTATIN 10 MG: 20 TABLET, FILM COATED ORAL at 23:19

## 2017-03-08 RX ADMIN — BUDESONIDE 500 MCG: 0.5 INHALANT RESPIRATORY (INHALATION) at 20:40

## 2017-03-08 RX ADMIN — FLUCONAZOLE 100 MG: 100 TABLET ORAL at 11:44

## 2017-03-08 RX ADMIN — NYSTATIN: 100000 POWDER TOPICAL at 23:22

## 2017-03-08 RX ADMIN — HYDROCODONE BITARTRATE AND ACETAMINOPHEN 1 TABLET: 5; 325 TABLET ORAL at 11:44

## 2017-03-08 RX ADMIN — CARVEDILOL 25 MG: 12.5 TABLET, FILM COATED ORAL at 16:39

## 2017-03-08 RX ADMIN — PANTOPRAZOLE SODIUM 40 MG: 40 TABLET, DELAYED RELEASE ORAL at 07:38

## 2017-03-08 RX ADMIN — DILTIAZEM HYDROCHLORIDE 30 MG: 30 TABLET, FILM COATED ORAL at 07:38

## 2017-03-08 RX ADMIN — PREGABALIN 75 MG: 75 CAPSULE ORAL at 09:20

## 2017-03-08 RX ADMIN — DILTIAZEM HYDROCHLORIDE 30 MG: 30 TABLET, FILM COATED ORAL at 11:44

## 2017-03-08 RX ADMIN — FUROSEMIDE 40 MG: 40 TABLET ORAL at 07:38

## 2017-03-08 RX ADMIN — INSULIN LISPRO 7 UNITS: 100 INJECTION, SOLUTION INTRAVENOUS; SUBCUTANEOUS at 11:30

## 2017-03-08 NOTE — PROGRESS NOTES
NUTRITION FOLLOW-UP    RECOMMENDATIONS/PLAN:   - Continue Cardiac/Soft Solid/ Consistent Carb 1600 kcal diet  - Consider stool softener due to hard BM's    NUTRITION ASSESSMENT:   Client Update: 66 yrs old Female with acute hypoxic respiratory failure, pulmonary HTN, acute HF, UTI, Afib, and OHS. FOOD/NUTRITION INTAKE   Diet Order:  Cardiac Consistent Carb 1600kcal Soft Solid   Supplements: none   Food Allergies: garlic/latex  Average PO Intake:       % Diet Eaten   03/08/17 1214 50 %   03/06/17 0827 100 %   03/05/17 1751 100 %   03/05/17 1200 100 %   03/05/17 0815 100 %   03/04/17 2328 100 %   03/04/17 1841 50 %   03/04/17 1243 100 %   Pertinent Medications:  [x] Reviewed; colace, norco, warfarin, aldactone, lasix, zofran, ppi  Insulin:  [x]SSI  [x]Pre-meal   [x]Basal    []Drip  []None  Cultural/Hindu Food Preferences: None Identified     ANTHROPOMETRICS  Height: 5'5\"      Weight: 112.2 kg (247 lb 5.7 oz)         BMI: 41.240 kg/m^2 morbidly obese (Greater than or = to 40% BMI)  Adm Weight: 254 lbs                Weight change: -14 lbs (?fluid)  Adjusted Body Weight: 71 kg     NUTRITION-FOCUSED PHYSICAL ASSESSMENT  Skin: intact aside from tears to BLE, 3+ edema BLE      GI: last BM 3/08 - hard    BIOCHEMICAL DATA & MEDICAL TESTS  Pertinent Labs:  [x] Reviewed; POC -260, Na 146, BUN 35, Ca 8    NUTRITION PRESCRIPTION  Calories: 6059-8932 kcal/day based on 11-14 kcal/kg actual wt  Protein: 113-143 g/day based on 2-2.5 g/kg IBW  CHO: 158 g/day based on 50% of total energy  Fluid: 2925 ml/day based on 1500 + (15 ml/kg >20 kg)      NUTRITION DIAGNOSES:   1. At risk of inadequate oral intake related to respiratory failure as evidenced by NPO x1 day - resolved   2. Altered nutrition related lab values related to DM2 as evidenced by POC BG  mg/dl, HbA1c 7.7 - ongoing  3.  Morbid obesity related to h/o excessive energy intake and inadequate physical activity as evidenced by BMI 42.3 kg/m^2 and 203% of IBW. - ongoing    NUTRITION INTERVENTIONS:   INTERVENTIONS:        GOALS:  1. Continue current diet 1. >50% PO intake of meals, maintain dry wt, prevent skin breakdown, POC -180 mg/dl by next review 3-5 days     LEARNING NEEDS (Diet, Supplementation, Food/Nutrient-Drug Interaction):   [] None Identified     [] Education provided & documented        Identified and patient:   [] Declined      [x] Was not appropriate/indicated        NUTRITION MONITORING /EVALUATION:   Follow PO intake  Monitor wt  Monitor renal labs, electrolytes, fluid status    Previous Recommendations Implemented: yes        Previous Goals Met:  yes       [x] Participated in Interdisciplinary Rounds    [x] Interdisciplinary Care Plan Reviewed  [x] Discharge Nutrition Plan:  Cardiac consistent cho    NUTRITION RISK:           [x] At risk                        [] Not currently at risk        Will follow-up per policy.   Aleja Calhoun RD  PAGER:  [phone]

## 2017-03-08 NOTE — PROGRESS NOTES
Cardiology Progress Note        Patient: Riki Figueroa        Sex: female          DOA: 2/25/2017  YOB: 1945      Age:  66 y.o.        LOS:  LOS: 9 days   Assessment/Plan     Patient Active Problem List   Diagnosis Code    Acute on chronic diastolic CHF (congestive heart failure) ([facility]) I50.33    [facility] N17.9    Acute exacerbation of CHF (congestive heart failure) ([facility]) I50.9    DM (diabetes mellitus) ([facility] E11.9    Hypertension I10    Respiratory failure ([facility]) J96.90    Acute coronary syndrome ([facility]) I24.9    Obstructive sleep apnea, adult G47.33    Paroxysmal atrial fibrillation ([facility]) I48.0    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 ([facility]) Q54.95, E11.65    Dyspnea due to congestive heart failure ([facility]) I50.9    Aspiration pneumonia ([facility]) J69.0    Hyperkalemia E87.5    Elevated troponin R79.89    COPD (chronic obstructive pulmonary disease) ([facility]) J44.9    CHF (congestive heart failure) ([facility]) I50.9    Insufficiency, respiratory, acute ([facility]) R06.89    Infiltrate noted on imaging study R93.8    COPD exacerbation ([facility]) J44.1    Acute on chronic respiratory failure with hypoxia ([facility]) J96.21    Chest pain R07.9    Anxiety F41.9    UTI (urinary tract infection) N39.0    Obesity hypoventilation syndrome ([facility]) E66.2    Pulmonary hypertension, moderate to severe ([facility]) I27.2      Hypercapnic hypoxic respiratory failure   Moderate aortic stenosis  Permanent atrial fibrillation - Rate is under good control     Plan:     Continue Lasix. Start Lovenox 1 mg kg s/c q 12 hr as INR is subtherapeutic  Continue coumadin as per PT/INR Goal 2-3  Continue beta blocker, calcium channel blocker and digoxin  Strict I/O  CMP and Mg level tomorrow  Continue management as per hospital medicine. Plan discussed with patient and her  as bed side.    He wanted to transfer her to [facility] as they were not happy with previous facility. Plan discussed with    Subjective:    cc:  Denies any chest pain. C/o feeling fatigue      REVIEW OF SYSTEMS:     General: No fevers or chills. Cardiovascular: No chest pain,No palpitations, No orthopnea, No PND, No leg swelling, No claudication  Pulmonary: Positive dyspnea. Gastrointestinal: No nausea, vomiting, bleeding  Neurology: No Dizziness    Objective:      Visit Vitals    /41 (BP 1 Location: Left arm, BP Patient Position: At rest)    Pulse 86    Temp 98 °F (36.7 °C)    Resp 17    Ht 5' 4.96\" (1.65 m)    Wt 111.1 kg (245 lb)    SpO2 96%    Breastfeeding No    BMI 40.82 kg/m2     Body mass index is 40.82 kg/(m^2). Physical Exam:  General Appearance: Comfortable, not using accessory muscles of respiration. HEENT: SIRIA. HEAD: Atraumatic  NECK: No JVD, no thyroidomeglay. CAROTIDS: No bruit  LUNGS: Clear bilaterally. HEART: S1+S2 audible, irregularly irregular, 3/6 systolic murmur in aortic area, no pericardial rub. ABD: Non-tender, BS Audible    EXT: ++ leg edema, no cyanosis. PSYCHIATRIC EXAM: Mood is appropriate. MUSCULOSKELETAL: Grossly no joint deformity. NEUROLOGICAL: AAO times 3, No motor and sensory deficit.   Medication:  Current Facility-Administered Medications   Medication Dose Route Frequency    lidocaine (LIDODERM) 5 % patch 1 Patch  1 Patch TransDERmal Q24H    enoxaparin (LOVENOX) injection 110 mg  110 mg SubCUTAneous Q12H    [START ON 3/8/2017] spironolactone (ALDACTONE) tablet 25 mg  25 mg Oral DAILY    fluconazole (DIFLUCAN) tablet 100 mg  100 mg Oral DAILY    insulin lispro (HUMALOG) injection 5 Units  5 Units SubCUTAneous TIDAC    furosemide (LASIX) tablet 40 mg  40 mg Oral ACB&D    HYDROcodone-acetaminophen (NORCO) 5-325 mg per tablet 1 Tab  1 Tab Oral Q6H PRN    insulin glargine (LANTUS) injection 20 Units  20 Units SubCUTAneous QHS    digoxin (LANOXIN) tablet 0.125 mg  0.125 mg Oral DAILY    potassium chloride (K-DUR, KLOR-CON) SR tablet 20 mEq  20 mEq Oral BID    carvedilol (COREG) tablet 25 mg  25 mg Oral BID WITH MEALS    dilTIAZem (CARDIZEM) IR tablet 30 mg  30 mg Oral TIDAC    ELECTROLYTE REPLACEMENT PROTOCOL  1 Each Other PRN    ELECTROLYTE REPLACEMENT PROTOCOL  1 Each Other PRN    pantoprazole (PROTONIX) tablet 40 mg  40 mg Oral ACB    polyvinyl alcohol (LIQUIFILM TEARS) 1.4 % ophthalmic solution 1 Drop  1 Drop Both Eyes PRN    nystatin (MYCOSTATIN) 100,000 unit/gram powder   Topical BID    clonazePAM (KlonoPIN) tablet 0.25 mg  0.25 mg Oral BID    docusate sodium (COLACE) capsule 100 mg  100 mg Oral BID    simvastatin (ZOCOR) tablet 10 mg  10 mg Oral QHS    pregabalin (LYRICA) capsule 75 mg  75 mg Oral BID    aspirin chewable tablet 81 mg  81 mg Oral DAILY    arformoterol (BROVANA) neb solution 15 mcg  15 mcg Nebulization BID RT    budesonide (PULMICORT) 500 mcg/2 ml nebulizer suspension  500 mcg Nebulization BID RT    albuterol-ipratropium (DUO-NEB) 2.5 MG-0.5 MG/3 ML  3 mL Nebulization Q4H PRN    ondansetron (ZOFRAN) injection 4 mg  4 mg IntraVENous Q6H PRN    acetaminophen (TYLENOL) tablet 650 mg  650 mg Oral Q4H PRN    insulin lispro (HUMALOG) injection   SubCUTAneous AC&HS    glucose chewable tablet 16 g  4 Tab Oral PRN    glucagon (GLUCAGEN) injection 1 mg  1 mg IntraMUSCular PRN    dextrose (D50W) injection syrg 12.5-25 g  25-50 mL IntraVENous PRN    warfarin pharmacy to dose   Other PRN               Lab/Data Reviewed:       Recent Labs      03/05/17   0843   WBC  7.8   HGB  11.6*   HCT  38.2   PLT  191     Recent Labs      03/07/17   0555  03/05/17   0843   NA  146*  139   K  4.9  4.6   CL  109*  102   CO2  31  37*   GLU  184*  227*   BUN  35*  30*   CREA  1.32*  1.14   CA  8.0*  7.9*       Signed By: Moshe Hamman, MD     March 7, 2017

## 2017-03-08 NOTE — PROGRESS NOTES
Cardiology Progress Note        Patient: Valentin Armenta        Sex: female          DOA: 2/25/2017  YOB: 1945      Age:  66 y.o.        LOS:  LOS: 10 days    Patient seen and examined, chart reviewed. Assessment/Plan     Patient Active Problem List   Diagnosis Code    Acute on chronic diastolic CHF (congestive heart failure) ([facility]) I50.33    ALEJANDRINA (acute kidney injury) ([facility] N17.9    Acute exacerbation of CHF (congestive heart failure) ([facility]) I50.9    DM (diabetes mellitus) ([facility] E11.9    Hypertension I10    Respiratory failure ([facility]) J96.90    Acute coronary syndrome ([facility]) I24.9    Obstructive sleep apnea, adult G47.33    Paroxysmal atrial fibrillation ([facility]) I48.0    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 ([facility]) R39.80, E11.65    Dyspnea due to congestive heart failure ([facility]) I50.9    Aspiration pneumonia ([facility]) J69.0    Hyperkalemia E87.5    Elevated troponin R79.89    COPD (chronic obstructive pulmonary disease) ([facility]) J44.9    CHF (congestive heart failure) ([facility]) I50.9    Insufficiency, respiratory, acute ([facility]) R06.89    Infiltrate noted on imaging study R93.8    COPD exacerbation ([facility]) J44.1    Acute on chronic respiratory failure with hypoxia ([facility]) J96.21    Chest pain R07.9    Anxiety F41.9    UTI (urinary tract infection) N39.0    Obesity hypoventilation syndrome ([facility]) E66.2    Pulmonary hypertension, moderate to severe ([facility]) I27.2       Permanent atrial fibrillation with controlled ventricular rate  Moderate aortic stenosis  Hypercapnic hypoxic respiratory failure    Plan:  Continue beta blocker, calcium channel blocker and digoxin. Continue Lovenox and Coumadin until PT/INR is therapeutic, INR goal 2-3. Continue management as per hospital medicine  Cardiology signed off.                 Subjective:    cc:  Denies any chest pain or shortness of breath, denies any orthopnea      REVIEW OF SYSTEMS:     General: No fevers or chills. Cardiovascular: Positive leg swelling,No chest pain,No palpitations, No orthopnea, No PND, No claudication  Pulmonary: Positive dyspnea. Gastrointestinal: No nausea, vomiting, bleeding  Neurology: No Dizziness    Objective:      Visit Vitals    /68 (BP 1 Location: Left arm, BP Patient Position: Sitting)    Pulse 73    Temp 98 °F (36.7 °C)    Resp 20    Ht 5' 4.96\" (1.65 m)    Wt 112.2 kg (247 lb 5.7 oz)    SpO2 95%    Breastfeeding No    BMI 41.21 kg/m2     Body mass index is 41.21 kg/(m^2). Physical Exam:  General Appearance: Comfortable, not using accessory muscles of respiration. HEENT: SIRIA. HEAD: Atraumatic  NECK: No JVD, no thyroidomeglay. CAROTIDS:no bruit  LUNGS: Clear bilaterally. HEART: S1+S2 audible, irregularly irregular, 3/6 systolic murmur in aortic area, no pericardial rub. ABD: Non-tender, BS Audible    EXT:2+ leg edema, and no cyanosis. VASCULAR EXAM: Pulses are intact. PSYCHIATRIC EXAM: Mood is appropriate. MUSCULOSKELETAL: Grossly no joint deformity. NEUROLOGICAL: AAO times 3, No motor and sensory deficit.   Medication:  Current Facility-Administered Medications   Medication Dose Route Frequency    [START ON 3/9/2017] potassium chloride (K-DUR, KLOR-CON) SR tablet 20 mEq  20 mEq Oral DAILY    lidocaine (LIDODERM) 5 % patch 1 Patch  1 Patch TransDERmal Q24H    enoxaparin (LOVENOX) injection 110 mg  110 mg SubCUTAneous Q12H    spironolactone (ALDACTONE) tablet 25 mg  25 mg Oral DAILY    fluconazole (DIFLUCAN) tablet 100 mg  100 mg Oral DAILY    insulin lispro (HUMALOG) injection 5 Units  5 Units SubCUTAneous TIDAC    furosemide (LASIX) tablet 40 mg  40 mg Oral ACB&D    HYDROcodone-acetaminophen (NORCO) 5-325 mg per tablet 1 Tab  1 Tab Oral Q6H PRN    insulin glargine (LANTUS) injection 20 Units  20 Units SubCUTAneous QHS    digoxin (LANOXIN) tablet 0.125 mg  0.125 mg Oral DAILY    carvedilol (COREG) tablet 25 mg  25 mg Oral BID WITH MEALS    dilTIAZem (CARDIZEM) IR tablet 30 mg  30 mg Oral TIDAC    ELECTROLYTE REPLACEMENT PROTOCOL  1 Each Other PRN    ELECTROLYTE REPLACEMENT PROTOCOL  1 Each Other PRN    pantoprazole (PROTONIX) tablet 40 mg  40 mg Oral ACB    polyvinyl alcohol (LIQUIFILM TEARS) 1.4 % ophthalmic solution 1 Drop  1 Drop Both Eyes PRN    nystatin (MYCOSTATIN) 100,000 unit/gram powder   Topical BID    clonazePAM (KlonoPIN) tablet 0.25 mg  0.25 mg Oral BID    docusate sodium (COLACE) capsule 100 mg  100 mg Oral BID    simvastatin (ZOCOR) tablet 10 mg  10 mg Oral QHS    pregabalin (LYRICA) capsule 75 mg  75 mg Oral BID    aspirin chewable tablet 81 mg  81 mg Oral DAILY    arformoterol (BROVANA) neb solution 15 mcg  15 mcg Nebulization BID RT    budesonide (PULMICORT) 500 mcg/2 ml nebulizer suspension  500 mcg Nebulization BID RT    albuterol-ipratropium (DUO-NEB) 2.5 MG-0.5 MG/3 ML  3 mL Nebulization Q4H PRN    ondansetron (ZOFRAN) injection 4 mg  4 mg IntraVENous Q6H PRN    acetaminophen (TYLENOL) tablet 650 mg  650 mg Oral Q4H PRN    insulin lispro (HUMALOG) injection   SubCUTAneous AC&HS    glucose chewable tablet 16 g  4 Tab Oral PRN    glucagon (GLUCAGEN) injection 1 mg  1 mg IntraMUSCular PRN    dextrose (D50W) injection syrg 12.5-25 g  25-50 mL IntraVENous PRN    warfarin pharmacy to dose   Other PRN               Lab/Data Reviewed:       No results for input(s): WBC, HGB, HCT, PLT, HGBEXT, HCTEXT, PLTEXT in the last 72 hours.   Recent Labs      03/07/17   0555   NA  146*   K  4.9   CL  109*   CO2  31   GLU  184*   BUN  35*   CREA  1.32*   CA  8.0*       Signed By: Damien Soliman MD     March 8, 2017

## 2017-03-08 NOTE — ROUTINE PROCESS
Bedside and Verbal shift change report given to BOLIVAR Rodriguez RN (oncoming nurse) by Thuy Peters RN (offgoing nurse). Report included the following information SBAR, Kardex, Intake/Output, MAR and Recent Results.

## 2017-03-08 NOTE — ROUTINE PROCESS
Pt care received. Pt A/O x4 . Pt resting in bed. Denies pain. Pt stable. Call light within reach, bed in lowest position and locked.

## 2017-03-08 NOTE — PROGRESS NOTES
Hospitalist Progress Note    Patient: Karen Banegas MRN: [id number]  CSN: [id number]    YOB: 1945  Age: 66 y.o. Sex: female    DOA: 2/25/2017 LOS:  LOS: 10 days          Chief Complaint: Ac resp failure        Assessment/Plan   1. Acute hypoxemic respiratory failure due to decomensated diastolic CHF, improved  2. Pulmonary HTN  3. Morbid obesity hypoventilation syndrome  4. UTI-completed treatment  5. Afib, rate better controlled-INR is down, goal 203 on coumadin  6.  IDDm2 w hyperglycemia-added premeal insulin     Aldactone started with lasix  Leg swelling looks better this am, of course she has had legs in the bed all night  Wound care for leg lesions to change and dress  Klonopin for anxiety  Very weak and will require acute rehab/SNF upon d/c,  to give options for choices  Continue lovenox bridge with coumadin as INR low  Finished UTI treatment  BS are stable on current regimen  Repeat BMP as CR was climbing, and check lytes as well    Patient Active Problem List   Diagnosis Code    Acute on chronic diastolic CHF (congestive heart failure) ([facility]) I50.33    ALEJANDRINA (acute kidney injury) ([address] N17.9    Acute exacerbation of CHF (congestive heart failure) ([address] I50.9    DM (diabetes mellitus) ([address] E11.9    Hypertension I10    Respiratory failure ([address] J96.90    Acute coronary syndrome ([address] I24.9    Obstructive sleep apnea, adult G47.33    Paroxysmal atrial fibrillation ([address] I48.0    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 ([address] C17.61, E11.65    Dyspnea due to congestive heart failure ([facility]) I50.9    Aspiration pneumonia ([facility]) J69.0    Hyperkalemia E87.5    Elevated troponin R79.89    COPD (chronic obstructive pulmonary disease) ([facility]) J44.9    CHF (congestive heart failure) ([facility]) I50.9    Insufficiency, respiratory, acute ([facility]) R06.89    Infiltrate noted on imaging study R93.8    COPD exacerbation ([facility]) J44.1    Acute on chronic respiratory failure with hypoxia ([facility]) J96.21    Chest pain R07.9    Anxiety F41.9    UTI (urinary tract infection) N39.0    Obesity hypoventilation syndrome ([facility]) E66.2    Pulmonary hypertension, moderate to severe ([facility]) I27.2       Subjective:    Feeling nervous and shaky  Denies CP, inc SOB  Spilled drink on self this am as she says hands are shaky  Denies new issue clinically    Review of systems:    Constitutional: denies fevers, chills, myalgias  Respiratory: denies SOB, cough  Cardiovascular: denies chest pain, palpitations  Gastrointestinal: denies nausea, vomiting, diarrhea      Vital signs/Intake and Output:  Visit Vitals    /57 (BP 1 Location: Left arm, BP Patient Position: Sitting)    Pulse 73    Temp 99.1 °F (37.3 °C)    Resp 20    Ht 5' 4.96\" (1.65 m)    Wt 112.2 kg (247 lb 5.7 oz)    SpO2 95%    Breastfeeding No    BMI 41.21 kg/m2     Current Shift:     Last three shifts:  03/06 1901 - 03/08 0700  In: -   Out: 1100 [Urine:1100]    Exam:    General: weakened WF, alert, NAD, OX3  Head/Neck: NCAT, supple, No masses, No lymphadenopathy  CVS:Regular rate and rhythm, no M/R/G, S1/S2 heard, no thrill  Lungs:Clear to auscultation bilaterally, no wheezes, rhonchi, or rales  Abdomen: Soft, Nontender, No distention, Normal Bowel sounds, No hepatomegaly  Extremities: 1-2 plus edema with ulcerations superficially on both LE and right upper thigh  Neuro:grossly normal , follows commands  Psych:appropriate                Labs: Results:       Chemistry Recent Labs      03/07/17   0555   GLU  184*   NA  146*   K  4.9   CL  109*   CO2  31   BUN  35*   CREA  1.32*   CA  8.0*   AGAP  6   BUCR  27*      CBC w/Diff No results for input(s): WBC, RBC, HGB, HCT, PLT, GRANS, LYMPH, EOS, HGBEXT, HCTEXT, PLTEXT in the last 72 hours. Cardiac Enzymes No results for input(s): CPK, CKND1, ALESSANDRA in the last 72 hours.     No lab exists for component: [address]   Coagulation Recent Labs      03/07/17   0555  03/06/17   0541 PTP  15.9*  18.2*   INR  1.3*  1.6*       Lipid Panel No results found for: CHOL, CHOLPOCT, CHOLX, CHLST, CHOLV, [id number], HDL, LDL, NLDLCT, DLDL, LDLC, DLDLP, 101293, VLDLC, VLDL, TGL, TGLX, TRIGL, [id number], TRIGP, TGLPOCT, [id number], CHHD, CHHDX   BNP No results for input(s): BNPP in the last 72 hours. Liver Enzymes No results for input(s): TP, ALB, TBIL, AP, SGOT, GPT in the last 72 hours.     No lab exists for component: DBIL   Thyroid Studies Lab Results   Component Value Date/Time    TSH 0.31 01/01/2016 10:20 PM        Procedures/imaging: see electronic medical records for all procedures/Xrays and details which were not copied into this note but were reviewed prior to creation of Sohail Le MD

## 2017-03-08 NOTE — DIABETES MGMT
NUTRITION / GLYCEMIC CONTROL PLAN OF CARE        Diabetes Management:      - recommend: pt experiencing postprandial BG increase consistently requiring corrective coverage, morning BG within range      *increase Humalog to 10-15 units TID AC   *change corrective coverage to normal insulin resistance  - goals: progressing, BG in target range non-ICU: <140 by 3/14  - TDD: 63 units (Lantus 20 units + Humalog AC 15 units + Humalog 28 units very insulin resistant corrective coverage)  - BG range: 123 mg/dl-260 mg/dl  - Hypo: none  - BG in target range (non-ICU: <140; ICU<180): [] Yes  [x] No  - Steroids: none  - Intake: good   Patient Vitals for the past 100 hrs:   % Diet Eaten   03/08/17 1214 50 %   03/06/17 0827 100 %   03/05/17 2300 0 %   03/05/17 1751 100 %   03/05/17 1200 100 %   03/05/17 0815 100 %   03/04/17 2328 100 %   03/04/17 1841 50 %   03/04/17 1243 100 %        Current Insulin regimen:   Lantus 20 units daily  Humalog 5 units TID AC  Humalog very insulin resistant sliding scale    Home medication/insulin regimen:  Lantus 25 units daily  Humalog AC + HS sliding scale    Recent Glucose Results: Lab Results   Component Value Date/Time    GLUCPOC 225 (H) 03/08/2017 11:59 AM    GLUCPOC 123 (H) 03/08/2017 06:18 AM    GLUCPOC 178 (H) 03/07/2017 09:29 PM         Adequate glycemic control PTA:  [] Yes  [x] No    HbA1c: equivalent to ave BGlucose of 174 mg/dl for 2-3 months prior to admission    Lab Results   Component Value Date/Time    Hemoglobin A1c 7.7 02/25/2017 04:21 PM       Diet:   Active Orders   Diet    DIET CARDIAC Soft Solids; Consistent Carb 1500-1600kcal       Aimee Resendiz RN, MS  Glycemic Control Team

## 2017-03-08 NOTE — PROGRESS NOTES
Problem: Mobility Impaired (Adult and Pediatric)  Goal: *Acute Goals and Plan of Care (Insert Text)  Physical Therapy Goals  Initiated 2/27/2017 and to be accomplished within 7 day(s)  1. Patient will move from supine to sit and sit to supine , scoot up and down and roll side to side in bed with supervision/set-up. 2. Patient will transfer from bed to chair and chair to bed with minimal assistance/contact guard assist using the least restrictive device. 3. Patient will perform sit to stand with minimal assistance/contact guard assist.  4. Patient will ambulate with minimal assistance/contact guard assist for 50 feet with the least restrictive device. Re-evaluation on 3/6/2017 and to be accomplished within 7 day(s)  1. Patient will move from supine to sit and sit to supine, scoot up and down and roll side to side in bed with supervision/set-up. 2. Patient will transfer from bed to chair and chair to bed with contact guard assist/supervision using the least restrictive device. 3. Patient will perform sit to stand with contact guard assist/supervision. 4. Patient will ambulate with contact guard assist/supervision for 100 feet with the least restrictive device. Attempted PT treatment session. Patient declined PT at this time, requesting PT returns later after lunch.  Will follow up in the afternoon per patient request.     Alejandro Calix PT

## 2017-03-08 NOTE — ROUTINE PROCESS
Bedside and Verbal shift change report given to Andres Thomas RN (oncoming nurse) by Jhon Sarmiento RN   (offgoing nurse). Report included the following information SBAR, Kardex, Intake/Output and MAR.

## 2017-03-08 NOTE — ROUTINE PROCESS
Bedside and Verbal shift change report given to ZAHRA Zuniga RN (oncoming nurse) by BOLIVAR Kerr RN (offgoing nurse). Report included the following information SBAR, Kardex, Intake/Output, MAR and Recent Results.

## 2017-03-08 NOTE — PROGRESS NOTES
Problem: Mobility Impaired (Adult and Pediatric)  Goal: *Acute Goals and Plan of Care (Insert Text)  Physical Therapy Goals  Initiated 2/27/2017 and to be accomplished within 7 day(s)  1. Patient will move from supine to sit and sit to supine , scoot up and down and roll side to side in bed with supervision/set-up. 2. Patient will transfer from bed to chair and chair to bed with minimal assistance/contact guard assist using the least restrictive device. 3. Patient will perform sit to stand with minimal assistance/contact guard assist.  4. Patient will ambulate with minimal assistance/contact guard assist for 50 feet with the least restrictive device. Re-evaluation on 3/6/2017 and to be accomplished within 7 day(s)  1. Patient will move from supine to sit and sit to supine, scoot up and down and roll side to side in bed with supervision/set-up. 2. Patient will transfer from bed to chair and chair to bed with contact guard assist/supervision using the least restrictive device. 3. Patient will perform sit to stand with contact guard assist/supervision. 4. Patient will ambulate with contact guard assist/supervision for 100 feet with the least restrictive device. Outcome: Not Progressing Towards Goal  PHYSICAL THERAPY TREATMENT     Patient: Kadi Carter (79 y.o. female)  Date: 3/8/2017  Diagnosis: CHF NYHA class IV, acute, diastolic (HCC)  CHF NYHA class IV, acute, diastolic (HCC) Acute on chronic respiratory failure with hypoxia (HCC)       Precautions: Fall (SPO2)   Chart, physical therapy assessment, plan of care and goals were reviewed. ASSESSMENT:  The patient is doing much worse today than she has in previous sessions. Patient required maximal assistance to stand up this session. Patient was unable to ambulate, as her knees buckled so suddenly that she essentially fell back into bed. Noted that patient's bed pads were soiled with urine.  Patient attempted a second stand with maximal assistance for the bed pads to be changed. Again the patient fell back in bed. Patient was on 3L O2 upon entering the room. Pulse oximeter read 81% on right hand, but 91% on left hand. Unsure of accuracy of pulse oximeter in this assessment. Patient requested to remain seated at close of session. Will follow up tomorrow and progress as appropriate. Recommend rehab at discharge. Progression toward goals:  [ ]      Improving appropriately and progressing toward goals  [ ]      Improving slowly and progressing toward goals  [X]      Not making progress toward goals and plan of care will be adjusted       PLAN:  Patient continues to benefit from skilled intervention to address the above impairments. Continue treatment per established plan of care. Discharge Recommendations:  Rehab  Further Equipment Recommendations for Discharge:  N/A       SUBJECTIVE:   Patient stated I don't feel good.       OBJECTIVE DATA SUMMARY:   Critical Behavior:  Neurologic State: Alert  Orientation Level: Oriented X4  Cognition: Appropriate decision making, Follows commands  Safety/Judgement: Fall prevention  Functional Mobility Training:  Transfers:  Sit to Stand: Maximum assistance  Stand to Sit: Maximum assistance  Balance:  Sitting: Intact  Standing: Impaired; With support  Standing - Static: Poor     Pain:  Pain Scale 1: Numeric (0 - 10)  Pain Intensity 1: 0  Pain Location 1: Back  Pain Orientation 1: Lower  Pain Description 1: Aching  Pain Intervention(s) 1: Medication (see MAR)  Activity Tolerance:   Poor  Please refer to the flowsheet for vital signs taken during this treatment.   After treatment:   [ ] Patient left in no apparent distress sitting up in chair  [X] Patient left in no apparent distress in bed  [X] Call bell left within reach  [X] Nursing notified  [ ] Caregiver present  [ ] Bed alarm activated      Jaspal Cullen   Time Calculation: 24 mins

## 2017-03-09 ENCOUNTER — APPOINTMENT (OUTPATIENT)
Dept: GENERAL RADIOLOGY | Age: 72
DRG: 286 | End: 2017-03-09
Attending: HOSPITALIST
Payer: MEDICARE

## 2017-03-09 LAB
AMMONIA PLAS-SCNC: 35 UMOL/L (ref 11–32)
ANION GAP BLD CALC-SCNC: 3 MMOL/L (ref 3–18)
APPEARANCE UR: CLEAR
ARTERIAL PATENCY WRIST A: YES
BACTERIA URNS QL MICRO: ABNORMAL /HPF
BASE EXCESS BLD CALC-SCNC: 3 MMOL/L
BASOPHILS # BLD AUTO: 0 K/UL (ref 0–0.1)
BASOPHILS # BLD: 0 % (ref 0–3)
BDY SITE: ABNORMAL
BILIRUB UR QL: NEGATIVE
BUN SERPL-MCNC: 32 MG/DL (ref 7–18)
BUN/CREAT SERPL: 30 (ref 12–20)
CALCIUM SERPL-MCNC: 8.5 MG/DL (ref 8.5–10.1)
CHLORIDE SERPL-SCNC: 106 MMOL/L (ref 100–108)
CO2 SERPL-SCNC: 33 MMOL/L (ref 21–32)
COLOR UR: YELLOW
CREAT SERPL-MCNC: 1.08 MG/DL (ref 0.6–1.3)
DIFFERENTIAL METHOD BLD: ABNORMAL
EOSINOPHIL # BLD: 0 K/UL (ref 0–0.4)
EOSINOPHIL NFR BLD: 0 % (ref 0–5)
EPITH CASTS URNS QL MICRO: ABNORMAL /LPF (ref 0–5)
ERYTHROCYTE [DISTWIDTH] IN BLOOD BY AUTOMATED COUNT: 17.3 % (ref 11.6–14.5)
ERYTHROCYTE [DISTWIDTH] IN BLOOD BY AUTOMATED COUNT: 17.3 % (ref 11.6–14.5)
GAS FLOW.O2 O2 DELIVERY SYS: ABNORMAL L/MIN
GAS FLOW.O2 SETTING OXYMISER: 3 L/M
GLUCOSE BLD STRIP.AUTO-MCNC: 100 MG/DL (ref 70–110)
GLUCOSE BLD STRIP.AUTO-MCNC: 117 MG/DL (ref 70–110)
GLUCOSE BLD STRIP.AUTO-MCNC: 139 MG/DL (ref 70–110)
GLUCOSE BLD STRIP.AUTO-MCNC: 93 MG/DL (ref 70–110)
GLUCOSE SERPL-MCNC: 80 MG/DL (ref 74–99)
GLUCOSE UR STRIP.AUTO-MCNC: NEGATIVE MG/DL
HCO3 BLD-SCNC: 30.5 MMOL/L (ref 22–26)
HCT VFR BLD AUTO: 34.3 % (ref 35–45)
HCT VFR BLD AUTO: 36.1 % (ref 35–45)
HGB BLD-MCNC: 10.4 G/DL (ref 12–16)
HGB BLD-MCNC: 10.9 G/DL (ref 12–16)
HGB UR QL STRIP: ABNORMAL
INR PPP: 1.4 (ref 0.8–1.2)
KETONES UR QL STRIP.AUTO: NEGATIVE MG/DL
LEUKOCYTE ESTERASE UR QL STRIP.AUTO: ABNORMAL
LYMPHOCYTES # BLD AUTO: 12 % (ref 20–51)
LYMPHOCYTES # BLD: 1.5 K/UL (ref 0.8–3.5)
MAGNESIUM SERPL-MCNC: 2.1 MG/DL (ref 1.8–2.4)
MCH RBC QN AUTO: 31.9 PG (ref 24–34)
MCH RBC QN AUTO: 32 PG (ref 24–34)
MCHC RBC AUTO-ENTMCNC: 30.2 G/DL (ref 31–37)
MCHC RBC AUTO-ENTMCNC: 30.3 G/DL (ref 31–37)
MCV RBC AUTO: 105.5 FL (ref 74–97)
MCV RBC AUTO: 105.6 FL (ref 74–97)
METAMYELOCYTES NFR BLD MANUAL: 3 %
MONOCYTES # BLD: 0.6 K/UL (ref 0–1)
MONOCYTES NFR BLD AUTO: 5 % (ref 2–9)
MYELOCYTES NFR BLD MANUAL: 3 %
NEUTS BAND NFR BLD MANUAL: 5 % (ref 0–5)
NEUTS SEG # BLD: 8.8 K/UL (ref 1.8–8)
NEUTS SEG NFR BLD AUTO: 72 % (ref 42–75)
NITRITE UR QL STRIP.AUTO: NEGATIVE
O2/TOTAL GAS SETTING VFR VENT: 32 %
PCO2 BLD: 67.2 MMHG (ref 35–45)
PH BLD: 7.26 [PH] (ref 7.35–7.45)
PH UR STRIP: 5 [PH] (ref 5–8)
PLATELET # BLD AUTO: 223 K/UL (ref 135–420)
PLATELET # BLD AUTO: 248 K/UL (ref 135–420)
PLATELET COMMENTS,PCOM: ABNORMAL
PMV BLD AUTO: 10.1 FL (ref 9.2–11.8)
PMV BLD AUTO: 10.8 FL (ref 9.2–11.8)
PO2 BLD: 50 MMHG (ref 80–100)
POTASSIUM SERPL-SCNC: 5.1 MMOL/L (ref 3.5–5.5)
PROT UR STRIP-MCNC: NEGATIVE MG/DL
PROTHROMBIN TIME: 17 SEC (ref 11.5–15.2)
RBC # BLD AUTO: 3.25 M/UL (ref 4.2–5.3)
RBC # BLD AUTO: 3.42 M/UL (ref 4.2–5.3)
RBC #/AREA URNS HPF: ABNORMAL /HPF (ref 0–5)
RBC MORPH BLD: ABNORMAL
SAO2 % BLD: 78 % (ref 92–97)
SERVICE CMNT-IMP: ABNORMAL
SODIUM SERPL-SCNC: 142 MMOL/L (ref 136–145)
SP GR UR REFRACTOMETRY: 1.01 (ref 1–1.03)
SPECIMEN TYPE: ABNORMAL
TOTAL RESP. RATE, ITRR: 28
UROBILINOGEN UR QL STRIP.AUTO: 0.2 EU/DL (ref 0.2–1)
WBC # BLD AUTO: 10.8 K/UL (ref 4.6–13.2)
WBC # BLD AUTO: 12.2 K/UL (ref 4.6–13.2)
WBC MORPH BLD: ABNORMAL
WBC URNS QL MICRO: ABNORMAL /HPF (ref 0–5)

## 2017-03-09 PROCEDURE — 65610000006 HC RM INTENSIVE CARE

## 2017-03-09 PROCEDURE — 97602 WOUND(S) CARE NON-SELECTIVE: CPT

## 2017-03-09 PROCEDURE — 74011250636 HC RX REV CODE- 250/636: Performed by: INTERNAL MEDICINE

## 2017-03-09 PROCEDURE — 36600 WITHDRAWAL OF ARTERIAL BLOOD: CPT

## 2017-03-09 PROCEDURE — 87077 CULTURE AEROBIC IDENTIFY: CPT | Performed by: HOSPITALIST

## 2017-03-09 PROCEDURE — 74011000250 HC RX REV CODE- 250: Performed by: INTERNAL MEDICINE

## 2017-03-09 PROCEDURE — 94640 AIRWAY INHALATION TREATMENT: CPT

## 2017-03-09 PROCEDURE — 74011250637 HC RX REV CODE- 250/637: Performed by: INTERNAL MEDICINE

## 2017-03-09 PROCEDURE — 87186 SC STD MICRODIL/AGAR DIL: CPT | Performed by: HOSPITALIST

## 2017-03-09 PROCEDURE — 36415 COLL VENOUS BLD VENIPUNCTURE: CPT | Performed by: HOSPITALIST

## 2017-03-09 PROCEDURE — 85610 PROTHROMBIN TIME: CPT | Performed by: INTERNAL MEDICINE

## 2017-03-09 PROCEDURE — 80048 BASIC METABOLIC PNL TOTAL CA: CPT | Performed by: HOSPITALIST

## 2017-03-09 PROCEDURE — 82140 ASSAY OF AMMONIA: CPT | Performed by: HOSPITALIST

## 2017-03-09 PROCEDURE — 87040 BLOOD CULTURE FOR BACTERIA: CPT | Performed by: HOSPITALIST

## 2017-03-09 PROCEDURE — 71010 XR CHEST PORT: CPT

## 2017-03-09 PROCEDURE — 85027 COMPLETE CBC AUTOMATED: CPT | Performed by: HOSPITALIST

## 2017-03-09 PROCEDURE — 74011250637 HC RX REV CODE- 250/637: Performed by: HOSPITALIST

## 2017-03-09 PROCEDURE — 82962 GLUCOSE BLOOD TEST: CPT

## 2017-03-09 PROCEDURE — 83735 ASSAY OF MAGNESIUM: CPT | Performed by: HOSPITALIST

## 2017-03-09 PROCEDURE — 74011636637 HC RX REV CODE- 636/637: Performed by: HOSPITALIST

## 2017-03-09 PROCEDURE — 94660 CPAP INITIATION&MGMT: CPT

## 2017-03-09 PROCEDURE — 81001 URINALYSIS AUTO W/SCOPE: CPT | Performed by: HOSPITALIST

## 2017-03-09 PROCEDURE — 77010033678 HC OXYGEN DAILY

## 2017-03-09 PROCEDURE — 74011250636 HC RX REV CODE- 250/636: Performed by: HOSPITALIST

## 2017-03-09 PROCEDURE — 82803 BLOOD GASES ANY COMBINATION: CPT

## 2017-03-09 PROCEDURE — 36592 COLLECT BLOOD FROM PICC: CPT

## 2017-03-09 PROCEDURE — 87086 URINE CULTURE/COLONY COUNT: CPT | Performed by: HOSPITALIST

## 2017-03-09 RX ORDER — CLONAZEPAM 0.5 MG/1
0.5 TABLET ORAL 2 TIMES DAILY
Status: DISCONTINUED | OUTPATIENT
Start: 2017-03-09 | End: 2017-03-09

## 2017-03-09 RX ORDER — WARFARIN 4 MG/1
4 TABLET ORAL ONCE
Status: COMPLETED | OUTPATIENT
Start: 2017-03-09 | End: 2017-03-09

## 2017-03-09 RX ORDER — FUROSEMIDE 10 MG/ML
40 INJECTION INTRAMUSCULAR; INTRAVENOUS EVERY 12 HOURS
Status: DISCONTINUED | OUTPATIENT
Start: 2017-03-09 | End: 2017-03-11

## 2017-03-09 RX ORDER — CLONAZEPAM 0.5 MG/1
0.25 TABLET ORAL 2 TIMES DAILY
Status: DISCONTINUED | OUTPATIENT
Start: 2017-03-09 | End: 2017-03-09

## 2017-03-09 RX ADMIN — CARVEDILOL 25 MG: 12.5 TABLET, FILM COATED ORAL at 08:01

## 2017-03-09 RX ADMIN — FUROSEMIDE 40 MG: 40 TABLET ORAL at 08:01

## 2017-03-09 RX ADMIN — INSULIN LISPRO 5 UNITS: 100 INJECTION, SOLUTION INTRAVENOUS; SUBCUTANEOUS at 08:02

## 2017-03-09 RX ADMIN — NYSTATIN: 100000 POWDER TOPICAL at 08:02

## 2017-03-09 RX ADMIN — INSULIN LISPRO 5 UNITS: 100 INJECTION, SOLUTION INTRAVENOUS; SUBCUTANEOUS at 12:14

## 2017-03-09 RX ADMIN — SPIRONOLACTONE 25 MG: 25 TABLET, FILM COATED ORAL at 08:01

## 2017-03-09 RX ADMIN — SIMVASTATIN 10 MG: 20 TABLET, FILM COATED ORAL at 21:38

## 2017-03-09 RX ADMIN — ASPIRIN 81 MG CHEWABLE TABLET 81 MG: 81 TABLET CHEWABLE at 08:01

## 2017-03-09 RX ADMIN — DOCUSATE SODIUM 100 MG: 100 CAPSULE, LIQUID FILLED ORAL at 08:01

## 2017-03-09 RX ADMIN — DILTIAZEM HYDROCHLORIDE 30 MG: 30 TABLET, FILM COATED ORAL at 08:01

## 2017-03-09 RX ADMIN — HYDROCODONE BITARTRATE AND ACETAMINOPHEN 1 TABLET: 5; 325 TABLET ORAL at 16:09

## 2017-03-09 RX ADMIN — POTASSIUM CHLORIDE 20 MEQ: 20 TABLET, EXTENDED RELEASE ORAL at 12:13

## 2017-03-09 RX ADMIN — ENOXAPARIN SODIUM 110 MG: 120 INJECTION SUBCUTANEOUS at 14:00

## 2017-03-09 RX ADMIN — IPRATROPIUM BROMIDE AND ALBUTEROL SULFATE 3 ML: .5; 3 SOLUTION RESPIRATORY (INHALATION) at 15:26

## 2017-03-09 RX ADMIN — NYSTATIN: 100000 POWDER TOPICAL at 21:47

## 2017-03-09 RX ADMIN — DIGOXIN 0.12 MG: 0.12 TABLET ORAL at 08:01

## 2017-03-09 RX ADMIN — DILTIAZEM HYDROCHLORIDE 30 MG: 30 TABLET, FILM COATED ORAL at 12:13

## 2017-03-09 RX ADMIN — BUDESONIDE 500 MCG: 0.5 INHALANT RESPIRATORY (INHALATION) at 07:38

## 2017-03-09 RX ADMIN — BUDESONIDE 500 MCG: 0.5 INHALANT RESPIRATORY (INHALATION) at 20:34

## 2017-03-09 RX ADMIN — DILTIAZEM HYDROCHLORIDE 30 MG: 30 TABLET, FILM COATED ORAL at 17:29

## 2017-03-09 RX ADMIN — CLONAZEPAM 0.25 MG: 0.5 TABLET ORAL at 08:01

## 2017-03-09 RX ADMIN — FLUCONAZOLE 100 MG: 100 TABLET ORAL at 12:14

## 2017-03-09 RX ADMIN — PANTOPRAZOLE SODIUM 40 MG: 40 TABLET, DELAYED RELEASE ORAL at 08:01

## 2017-03-09 RX ADMIN — ARFORMOTEROL TARTRATE 15 MCG: 15 SOLUTION RESPIRATORY (INHALATION) at 07:39

## 2017-03-09 RX ADMIN — FUROSEMIDE 40 MG: 10 INJECTION, SOLUTION INTRAMUSCULAR; INTRAVENOUS at 21:38

## 2017-03-09 RX ADMIN — ARFORMOTEROL TARTRATE 15 MCG: 15 SOLUTION RESPIRATORY (INHALATION) at 20:34

## 2017-03-09 RX ADMIN — ENOXAPARIN SODIUM 110 MG: 120 INJECTION SUBCUTANEOUS at 03:36

## 2017-03-09 RX ADMIN — DOCUSATE SODIUM 100 MG: 100 CAPSULE, LIQUID FILLED ORAL at 21:38

## 2017-03-09 RX ADMIN — WARFARIN SODIUM 4 MG: 4 TABLET ORAL at 17:29

## 2017-03-09 RX ADMIN — FUROSEMIDE 40 MG: 10 INJECTION, SOLUTION INTRAMUSCULAR; INTRAVENOUS at 14:29

## 2017-03-09 RX ADMIN — PREGABALIN 75 MG: 75 CAPSULE ORAL at 08:01

## 2017-03-09 NOTE — ROUTINE PROCESS
TRANSFER - OUT REPORT:    Verbal report given to Hortencia Calvert RN(name) on Ghassan Bud  being transferred to Kingston Esparza RN  (unit) for routine progression of care       Report consisted of patients Situation, Background, Assessment and   Recommendations(SBAR). Information from the following report(s) SBAR, Kardex, Intake/Output and MAR was reviewed with the receiving nurse. Lines:   Triple Lumen 02/28/17 Right Internal jugular (Active)   Central Line Being Utilized Yes 3/9/2017  8:03 AM   Criteria for Appropriate Use Limited/no vessel suitable for conventional peripheral access 3/9/2017  8:03 AM   Site Assessment Clean, dry, & intact 3/9/2017  8:03 AM   Infiltration Assessment 0 3/9/2017  8:03 AM   Affected Extremity/Extremities Color distal to insertion site pink (or appropriate for race) 3/7/2017  8:00 PM   Date of Last Dressing Change 03/09/17 3/9/2017  8:03 AM   Dressing Status Clean, dry, & intact 3/9/2017  8:03 AM   Dressing Type Disk with Chlorhexadine gluconate (CHG); Tape;Transparent 3/9/2017  8:03 AM   Action Taken Open ports on tubing capped 3/7/2017  8:00 PM   Proximal Hub Color/Line Status White;Capped;Flushed 3/9/2017  8:03 AM   Positive Blood Return (Medial Site) Yes 3/9/2017  8:03 AM   Medial Hub Color/Line Status Blue;Capped;Flushed 3/9/2017  8:03 AM   Positive Blood Return (Lateral Site) Yes 3/9/2017  8:03 AM   Distal Hub Color/Line Status Brown;Capped;Flushed 3/9/2017  8:03 AM   Positive Blood Return (Site #3) Yes 3/9/2017  8:03 AM   Alcohol Cap Used Yes 3/7/2017  6:48 PM        Opportunity for questions and clarification was provided. Patient transported with:   O2 @ 4 liters  Registered Nurse       and son at bedside and aware of transfer.

## 2017-03-09 NOTE — PROGRESS NOTES
Pharmacy Dosing Services: Warfarin    Consult for Warfarin Dosing by Pharmacy by Dr. Gallegos Marian provided for this 66 y.o.  female , for indication of Atrial Fibrillation. Dose to achieve an INR goal of 2-3    Order entered for  Warfarin 2 mg ordered to be given late this evening (3/8/17). (schedule defaults to 3/9/17 at 00:00)  Was awaiting labs, INR not available today; therefore a conservative dose was ordered based on patient's dosing history and potential significant drug interaction with fluconazole. Patient also has order for: Enoxaparin 110 mg SQ q12h    Significant drug interactions: fluconazole  LABS    PT/INR Lab Results   Component Value Date/Time    INR 1.3 03/07/2017 05:55 AM      Platelets Lab Results   Component Value Date/Time    PLATELET 675 19/33/4646 08:43 AM      H/H     Lab Results   Component Value Date/Time    HGB 11.6 03/05/2017 08:43 AM        Warfarin Administrations (last 168 hours)       Date/Time Action Medication Dose      03/07/17 1809 Given    warfarin (COUMADIN) tablet 4 mg 4 mg    03/06/17 1816 Given    warfarin (COUMADIN) tablet 4 mg 4 mg    03/05/17 1800 Given    warfarin (COUMADIN) tablet 2.5 mg 2.5 mg    03/04/17 1804 Given    warfarin (COUMADIN) tablet 2 mg 2 mg    03/03/17 1810 Given    warfarin (COUMADIN) tablet 2 mg 2 mg    03/02/17 1711 Given   [INR 2.2]    warfarin (COUMADIN) tablet 2.5 mg 2.5 mg          Pharmacy to follow daily and will provide subsequent Warfarin dosing based on clinical status.   YA Galvez  Contact information  [phone]

## 2017-03-09 NOTE — PROGRESS NOTES
Cm attempted to call patient's  again @ [phone] left voice message regarding discharge plan cm will go visit with patient to verify number and see if there's an alternative number.

## 2017-03-09 NOTE — WOUND CARE
Pt seen by wound care. Pt declines to turn at this time stating, there is nothing wrong with her bottom. Pt has open areas to rt and lt medial legs. Saima dressings applied to areas. Wound care will continue to follow pt per protocol.

## 2017-03-09 NOTE — PROGRESS NOTES
PATIENT WAS REMOVED FROM BIPAP @ 1000 BY RN.  SHE WAS APPARENTLY PLACED ON 5L NC AT THAT TIME. DUE TO PATIENT'S DECREASED LOC, LITER FLOW WAS REDUCED TO 3L NC BY DR. Annell Dancer. STAT ABG'S WERE DRAWN @ 1319. PH 7.264/PCO2 67.2/PO2 50 ON 3L NC.  PATIENT IS NOW ON BIPAP @ 16/5 WITH FIO2 40%. CURRENT SPO2 96%. SHE REMAINS ON CONTINUOUS PULSE OXIMETRY.

## 2017-03-09 NOTE — PROGRESS NOTES
Received pt into ICU on BIPAP machine, no distress noted, settings per other RT from Dr Mike Mckeon, alarms and other settings per doc flowsheet. BS bilateral and diminished. O2 sat 96% and RR 19. Will monitor.

## 2017-03-09 NOTE — PROGRESS NOTES
Pharmacy Dosing Services: Warfarin    Consult for Warfarin Dosing by Pharmacy by Dr. Bob Marmolejo provided for this 66 y.o.  female , for indication of Atrial Fibrillation. Dose to achieve an INR goal of 2-3    Order entered for  Warfarin  4 mg to be given today at 18:00. Significant drug interactions: ASA 81 mg, Fluconizole, Lovenox    LABS    PT/INR Lab Results   Component Value Date/Time    INR 1.4 03/09/2017 05:15 AM      Platelets Lab Results   Component Value Date/Time    PLATELET 316 43/85/0812 05:15 AM      H/H     Lab Results   Component Value Date/Time    HGB 10.4 03/09/2017 05:15 AM        Warfarin Administrations (last 168 hours)       Date/Time Action Medication Dose      03/08/17 2333 Given    warfarin (COUMADIN) tablet 2 mg 2 mg    03/07/17 1809 Given    warfarin (COUMADIN) tablet 4 mg 4 mg    03/06/17 1816 Given    warfarin (COUMADIN) tablet 4 mg 4 mg    03/05/17 1800 Given    warfarin (COUMADIN) tablet 2.5 mg 2.5 mg    03/04/17 1804 Given    warfarin (COUMADIN) tablet 2 mg 2 mg    03/03/17 1810 Given    warfarin (COUMADIN) tablet 2 mg 2 mg    03/02/17 1711 Given   [INR 2.2]    warfarin (COUMADIN) tablet 2.5 mg 2.5 mg          Pharmacy to follow daily and will provide subsequent Warfarin dosing based on clinical status. Daniela Antoine, Pharm. D.   Clinical Pharmacist  [phone]

## 2017-03-09 NOTE — PROGRESS NOTES
Problem: Mobility Impaired (Adult and Pediatric)  Goal: *Acute Goals and Plan of Care (Insert Text)  Physical Therapy Goals  Initiated 2/27/2017 and to be accomplished within 7 day(s)  1. Patient will move from supine to sit and sit to supine , scoot up and down and roll side to side in bed with supervision/set-up. 2. Patient will transfer from bed to chair and chair to bed with minimal assistance/contact guard assist using the least restrictive device. 3. Patient will perform sit to stand with minimal assistance/contact guard assist.  4. Patient will ambulate with minimal assistance/contact guard assist for 50 feet with the least restrictive device. Re-evaluation on 3/6/2017 and to be accomplished within 7 day(s)  1. Patient will move from supine to sit and sit to supine, scoot up and down and roll side to side in bed with supervision/set-up. 2. Patient will transfer from bed to chair and chair to bed with contact guard assist/supervision using the least restrictive device. 3. Patient will perform sit to stand with contact guard assist/supervision. 4. Patient will ambulate with contact guard assist/supervision for 100 feet with the least restrictive device. Holding PT today due to RN recommendation. Katherine Ramirez RN reports patient is not doing well and may get transferred down to ICU. Recommends holding PT today. Will follow up tomorrow as medically appropriate.      Fernanda Pelayo PT

## 2017-03-09 NOTE — PROGRESS NOTES
Cm called and spoke with patients number thru contact number [phone], was upset expressed that he feels no one is finding out what's wrong with his wife and she is not close to being discharge \"she can't even stand\"  was explained that his wife has not been discharged I was calling to start and initiate a safe discharge plan so when she's stable enough she will be able to proceed and transition to the next level of care, refused to discuss discharge planning at this time, for continuity of care cm will be checking local rehab facilities for bed availability so when  is ready to discuss discharge plan writer will be able to present availability to .

## 2017-03-09 NOTE — PROGRESS NOTES
PER DR. BOOKER, BIPAP CHANGED TO 20/4 WITH FIO2 40% AND BACKUP RATE WAS INCREASED TO 12. PATIENT TO BE TRANSFERRED TO ICU WHEN BED AVAILABLE.

## 2017-03-09 NOTE — PROGRESS NOTES
[facility]  Pulmonary, Critical Care, and Sleep Medicine    Name: Tanya Siddiqui MRN: [id number]   : 1945 Hospital: [facility] [place]   Date: 3/9/2017  Room: [facility]     Subjective:   3/9  Asked to see the patient again by Dr. Jake Garza. Pt with increase in somnolence. Pt requiring bipap during the day. ABG with pco2 of 67   More somnolent but arouseable. I have changed the bipap setting to 20 over 4 with fio2 of 40% and back up rate of 14. I have stopped the klonopin. Tanya Siddiqui is a 66 y.o. female with IMANI/OHS, pulmonary HTN, and COPD who came in after being unable to get her BiPAP at the SNF and had increasing leg edema. She had some respiratory distress and was therefore brought to the ED. She was placed on BiPAP when she came into the ED for the night and did well and was able to be transitioned to [facility]NC. She has been receiving diuresis and has since been transferred to the floor as she no longer required ICU level of care.        Past Medical History:   Diagnosis Date    A-fib [facility])     Arthritis     Back injury     Chronic obstructive pulmonary disease (HCC)     Diabetes [facility]     Fibromyalgia     Heart failure ([facility] [address]     Hypertension     MRSA (methicillin resistant Staphylococcus aureus)     Obesity hypoventilation syndrome ([facility] [address] 2017    Pulmonary hypertension, moderate to severe (HCC) 2017       Current Facility-Administered Medications   Medication Dose Route Frequency    potassium chloride (K-DUR, KLOR-CON) SR tablet 20 mEq  20 mEq Oral DAILY    lidocaine (LIDODERM) 5 % patch 1 Patch  1 Patch TransDERmal Q24H    enoxaparin (LOVENOX) injection 110 mg  110 mg SubCUTAneous Q12H    fluconazole (DIFLUCAN) tablet 100 mg  100 mg Oral DAILY    insulin lispro (HUMALOG) injection 5 Units  5 Units SubCUTAneous TIDAC    furosemide (LASIX) tablet 40 mg  40 mg Oral ACB&D    HYDROcodone-acetaminophen (NORCO) 5-325 mg per tablet 1 Tab  1 Tab Oral Q6H PRN  insulin glargine (LANTUS) injection 20 Units  20 Units SubCUTAneous QHS    digoxin (LANOXIN) tablet 0.125 mg  0.125 mg Oral DAILY    carvedilol (COREG) tablet 25 mg  25 mg Oral BID WITH MEALS    dilTIAZem (CARDIZEM) IR tablet 30 mg  30 mg Oral TIDAC    ELECTROLYTE REPLACEMENT PROTOCOL  1 Each Other PRN    ELECTROLYTE REPLACEMENT PROTOCOL  1 Each Other PRN    pantoprazole (PROTONIX) tablet 40 mg  40 mg Oral ACB    polyvinyl alcohol (LIQUIFILM TEARS) 1.4 % ophthalmic solution 1 Drop  1 Drop Both Eyes PRN    nystatin (MYCOSTATIN) 100,000 unit/gram powder   Topical BID    docusate sodium (COLACE) capsule 100 mg  100 mg Oral BID    simvastatin (ZOCOR) tablet 10 mg  10 mg Oral QHS    pregabalin (LYRICA) capsule 75 mg  75 mg Oral BID    aspirin chewable tablet 81 mg  81 mg Oral DAILY    arformoterol (BROVANA) neb solution 15 mcg  15 mcg Nebulization BID RT    budesonide (PULMICORT) 500 mcg/2 ml nebulizer suspension  500 mcg Nebulization BID RT    albuterol-ipratropium (DUO-NEB) 2.5 MG-0.5 MG/3 ML  3 mL Nebulization Q4H PRN    ondansetron (ZOFRAN) injection 4 mg  4 mg IntraVENous Q6H PRN    acetaminophen (TYLENOL) tablet 650 mg  650 mg Oral Q4H PRN    insulin lispro (HUMALOG) injection   SubCUTAneous AC&HS    glucose chewable tablet 16 g  4 Tab Oral PRN    glucagon (GLUCAGEN) injection 1 mg  1 mg IntraMUSCular PRN    dextrose (D50W) injection syrg 12.5-25 g  25-50 mL IntraVENous PRN    warfarin pharmacy to dose   Other PRN       Allergies   Allergen Reactions    Latex Hives    Adhesive Tape-Silicones Unknown (comments)    Bees [Sting, Bee] Unknown (comments)    Garlic Nausea and Vomiting    Zoloft [Sertraline] Hives         Review of Systems:  HEENT: No epistaxis, no nasal drainage, no difficulty in swallowing  Respiratory: as above  Cardiovascular: no chest pain, no palpitations, no syncope  Gastrointestinal: no abd pain, no vomiting  Neurological: No focal weakness  Constitutional: No fever,    Objective:   Vital Signs:    Visit Vitals    /75 (BP 1 Location: Left arm, BP Patient Position: At rest)    Pulse 69    Temp 97.5 °F (36.4 °C)    Resp 19    Ht 5' 4.96\" (1.65 m)    Wt 112.2 kg (247 lb 5.7 oz)    SpO2 96%    Breastfeeding No    BMI 41.21 kg/m2       O2 Device: BIPAP   O2 Flow Rate (L/min): 4 l/min   Temp (24hrs), Av °F (36.7 °C), Min:97.3 °F (36.3 °C), Max:99.1 °F (37.3 °C)       Intake/Output:   No intake or output data in the 24 hours ending 17 1348        Physical Exam:   General: Appears tired, on BiPAP  Neck: Trachea midline, no significant jvd  CVS: Regular rate; no audible murmur  RS: Mod AE bilaterally, decreased sounds in bases but clear to auscultation. Abd: soft, non-tender  Neuro: awake, alert, follows basic commands  Extrm: 2+ lower extremity edema. No cyanosis.    Skin: warm, dry    Data review:   Labs:  Recent Results (from the past 12 hour(s))   PROTHROMBIN TIME + INR    Collection Time: 17  5:15 AM   Result Value Ref Range    Prothrombin time 17.0 (H) 11.5 - 15.2 sec    INR 1.4 (H) 0.8 - 1.2     METABOLIC PANEL, BASIC    Collection Time: 17  5:15 AM   Result Value Ref Range    Sodium 142 136 - 145 mmol/L    Potassium 5.1 3.5 - 5.5 mmol/L    Chloride 106 100 - 108 mmol/L    CO2 33 (H) 21 - 32 mmol/L    Anion gap 3 3.0 - 18 mmol/L    Glucose 80 74 - 99 mg/dL    BUN 32 (H) 7.0 - 18 MG/DL    Creatinine 1.08 0.6 - 1.3 MG/DL    BUN/Creatinine ratio 30 (H) 12 - 20      GFR est AA >60 >60 ml/min/1.73m2    GFR est non-AA 50 (L) >60 ml/min/1.73m2    Calcium 8.5 8.5 - 10.1 MG/DL   CBC W/O DIFF    Collection Time: 03/09/17  5:15 AM   Result Value Ref Range    WBC 10.8 4.6 - 13.2 K/uL    RBC 3.25 (L) 4.20 - 5.30 M/uL    HGB 10.4 (L) 12.0 - 16.0 g/dL    HCT 34.3 (L) 35.0 - 45.0 %    .5 (H) 74.0 - 97.0 FL    MCH 32.0 24.0 - 34.0 PG    MCHC 30.3 (L) 31.0 - 37.0 g/dL    RDW 17.3 (H) 11.6 - 14.5 %    PLATELET 813 670 - 413 K/uL    MPV 10.8 9.2 - 11.8 FL   MAGNESIUM    Collection Time: 03/09/17  5:15 AM   Result Value Ref Range    Magnesium 2.1 1.8 - 2.4 mg/dL   GLUCOSE, POC    Collection Time: 03/09/17  6:12 AM   Result Value Ref Range    Glucose (POC) 93 70 - 110 mg/dL   GLUCOSE, POC    Collection Time: 03/09/17 11:21 AM   Result Value Ref Range    Glucose (POC) 139 (H) 70 - 110 mg/dL   POC G3    Collection Time: 03/09/17  1:19 PM   Result Value Ref Range    Device: NASAL CANNULA      Flow rate (POC) 3.0 L/M    FIO2 (POC) 32 %    pH (POC) 7.264 (L) 7.35 - 7.45      pCO2 (POC) 67.2 (H) 35.0 - 45.0 MMHG    pO2 (POC) 50 (L) 80 - 100 MMHG    HCO3 (POC) 30.5 (H) 22 - 26 MMOL/L    sO2 (POC) 78 (L) 92 - 97 %    Base excess (POC) 3 mmol/L    Allens test (POC) YES      Total resp. rate 28      Site LEFT RADIAL      Specimen type (POC) ARTERIAL      Performed by Karyna Fuentes        ABG:    Lab Results   Component Value Date/Time    PHI 7.264 (L) 03/09/2017 01:19 PM    PCO2I 67.2 (H) 03/09/2017 01:19 PM    PO2I 50 (L) 03/09/2017 01:19 PM    HCO3I 30.5 (H) 03/09/2017 01:19 PM    FIO2I 32 03/09/2017 01:19 PM         Imaging:  No new imaging 3/4    IMPRESSION:   · Acute hypercapneic respiratory failure. I think this time patient's bicarb dropped significantly comapre to prior ABG likely cause of her symptoms As I suspect her baseline Pco2 is high 60s. · Acute metabolic acidosis without adequate resp compensation. Possibly due to recent diamox use. Lack of respiratory response may be due to effects of klonopin possibly  · Moderate pulmonary HTN  · OHS,    · Obesity  ·        RECOMMENDATIONS:   · Continue bipap at 20 over 4  · Increase the lasix. Avoid diamox.    · Dc klonopin  · Continue Brovana and Pulmicort; duoneb prn  · Will resume lasix  · Continue bronchodilator including brovana and pulmicort   ·  Prn duobeb  ·  transfer patient to ICU

## 2017-03-09 NOTE — ROUTINE PROCESS
Bedside, Verbal and Written shift change report given to AZIZA Best (oncoming nurse) by Owen Watts. JAYA Hopkins (offgoing nurse). Report included the following information SBAR, Kardex, Intake/Output and Recent Results. Assumed care of pt laying in bed, a/o x 3, following commands, family at bedside, pt having difficultly breathing, I offered to put pt on BIPAP, pr refused at this time. BIPAP on standby at bedside. 2130  RT at bedside, put BIPAP on pt, continuous pulse-ox place on pt right hand, pt Sat's 94-96% on 50% Fio2.  2300  Scheduled med's given, pt tolerated well. 0200  Pt incontinent, hygiene provide. Pt tolerated well.  0600  Labs drawn, central line dressing changed at this time. Pt tolerated well.    0715  Bedside, Verbal and Written shift change report given to GAMALIEL Marin RN (oncoming nurse) by AZIZA Best (offgoing nurse). Report included the following information SBAR, Kardex, Intake/Output and Recent Results.

## 2017-03-09 NOTE — FAMILY MEETING
At the request of the family- further palliative care services are declined. /Dr. Raciel Mejias.  Beto

## 2017-03-09 NOTE — PROGRESS NOTES
Bedside and Verbal shift change report given to Andrés Frazier (oncoming nurse) by Hellen Toney RN   (offgoing nurse). Report included the following information SBAR, Kardex, Intake/Output and Recent Results.

## 2017-03-09 NOTE — PROGRESS NOTES
Hospitalist Progress Note    Patient: Renu Garcia MRN: [id number]  CSN: [id number]    YOB: 1945  Age: 66 y.o. Sex: female    DOA: 2/25/2017 LOS:  LOS: 11 days          Chief Complaint: Ac resp failure    Assessment/Plan     1. Acute hypoxemic respiratory failure due to decomensated diastolic CHF, improved, but apparently dropped her sats so will get CXR to reevaluate  She is on lasix , digoxin, bipap at night consistently  2. Pulmonary HTN  3. Morbid obesity hypoventilation syndrome/IMANI requiring bipap  4. UTI-completed treatment-on diflucan for vaginitis  5. Afib, rate better controlled-INR is down, goal 2-3 on coumadin-lovenox bridge  6.  IDDM 2 w hyperglycemia-added premeal insulin, BS improved    tremors- may be C02 related, unclear-hold klonopin BID-she gets confused easliy, limit sedatives, benzos-CHECK ABG NOW-get repeat pulm consult also  VERY weak and deconditioned-she would benefit from acute rehab to help her try to gain strength but we are in a difficult situation as her  does not want her to go back from whrere she was and has stated that no one cares for her, the hospital does not care about her, and she is not better-I tried to tell him we certainly care and are trying to help her as e have adilene since her admission  I advised that we cannot \"fix\" her CHF and chronic resp failure, she will need some form of rehab to try to regain any strength, and she will need to continue on her med regimen and fluid restriction to hopefully reduce chances for regaining fluid, but consdiering her recurrent events with this, she is at high risk again    Due to declining sats this am, a cxr was ordered as well as ABG, Dr. Carmela Serrano and I discussed her case, I will repeat her CXR, hold aldactone as K was a little higher-get ABG, she may be retaining C02-and may need to go back on BIPAP now    ABG showed inc C02 to 67, bipap resumed, resp therapy in with patient (1;20PM)    Nursing has been in to address all his concerns that she did not receive help for over an hour earlier this am    Add:discussed with Dr. Sienna Silva at 1:45 pm-transfer back to ICU, precautionary, workup for recurrent infection-get urine and blood cultures     in to admin (called around 4 pm) to ask about transfer to another facility-if she is unstable I would not recommend transfer but certainly we can look at his requests and work towards this if she is stable to do so -however at this juncture we should continue to treat her acute illness here and with the team members already involved    Patient Active Problem List   Diagnosis Code    Acute on chronic diastolic CHF (congestive heart failure) ([address] I50.33    ALEJANDRINA (acute kidney injury) ([address] N17.9    Acute exacerbation of CHF (congestive heart failure) ([address] I50.9    DM (diabetes mellitus) ([address] E11.9    Hypertension I10    Respiratory failure ([address] J96.90    Acute coronary syndrome ([address] I24.9    Obstructive sleep apnea, adult G47.33    Paroxysmal atrial fibrillation ([address] I48.0    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 ([address] R17.20, E11.65    Dyspnea due to congestive heart failure (HCC) I50.9    Aspiration pneumonia ([address] J69.0    Hyperkalemia E87.5    Elevated troponin R79.89    COPD (chronic obstructive pulmonary disease) (HCC) J44.9    CHF (congestive heart failure) ([address] I50.9    Insufficiency, respiratory, acute ([address] R06.89    Infiltrate noted on imaging study R93.8    COPD exacerbation (HCC) J44.1    Acute on chronic respiratory failure with hypoxia ([address] J96.21    Chest pain R07.9    Anxiety F41.9    UTI (urinary tract infection) N39.0    Obesity hypoventilation syndrome ([address] E66.2    Pulmonary hypertension, moderate to severe (HCC) I27.2       Subjective:    She has been more confused today  sats drop to 85% on 3 liters  She c/o \"jerks\"  I have had 3 visits with her today and she seems to progressivley gotten more confused-was eating BF this am, and upon rounds could talk to the team, but now talking but easily falls asleep    Review of systems:    Constitutional: denies fevers, chills, myalgias  Cardiovascular: denies chest pain, palpitations  Gastrointestinal: denies nausea, vomiting, diarrhea      Vital signs/Intake and Output:  Visit Vitals    /75 (BP 1 Location: Left arm, BP Patient Position: At rest)    Pulse 69    Temp 97.5 °F (36.4 °C)    Resp 19    Ht 5' 4.96\" (1.65 m)    Wt 112.2 kg (247 lb 5.7 oz)    SpO2 98%    Breastfeeding No    BMI 41.21 kg/m2     Current Shift:     Last three shifts:  03/07 1901 - 03/09 0700  In: 480 [P.O.:480]  Out: 1500 [Urine:1500]    Exam:    General: weak elderly WF, NAD, sleepy now, oriented to person, place  CVS:Regular rate and rhythm, no M/R/G, S1/S2 heard, no thrill  Lungs:Coarse BS, dec BS bases  Abdomen: Soft, Nontender, No distention, Normal Bowel sounds, No hepatomegaly  Extremities: 2 plus, no weeping, lesions dressed  Skin:normal texture and turgor, no rashes, no lesions  Neuro:grossly normal , follows commands-no fasciculations or jerking seen  Psych:appropriate                Labs: Results:       Chemistry Recent Labs      03/09/17   0515  03/08/17   2320  03/07/17   0555   GLU  80  205*  184*   NA  142  140  146*   K  5.1  5.7*  4.9   CL  106  104  109*   CO2  33*  33*  31   BUN  32*  35*  35*   CREA  1.08  1.26  1.32*   CA  8.5  8.3*  8.0*   AGAP  3  3  6   BUCR  30*  28*  27*      CBC w/Diff Recent Labs      03/09/17   0515  03/08/17   2320   WBC  10.8  12.2   RBC  3.25*  3.42*   HGB  10.4*  10.9*   HCT  34.3*  36.1   PLT  248  223   GRANS   --   72   LYMPH   --   12*   EOS   --   0      Cardiac Enzymes No results for input(s): CPK, CKND1, ALESSANDRA in the last 72 hours.     No lab exists for component: CKRMB, TROIP   Coagulation Recent Labs      03/09/17   0515  03/08/17   2320   PTP  17.0*  16.2*   INR  1.4*  1.4*       Lipid Panel No results found for: CHOL, Annalee Burt, CHLST, CHOLV, [id number], HDL, LDL, NLDLCT, DLDL, LDLC, DLDLP, 313542, VLDLC, VLDL, TGL, TGLX, TRIGL, [id number], TRIGP, TGLPOCT, [id number], CHHD, CHHDX   BNP No results for input(s): BNPP in the last 72 hours. Liver Enzymes No results for input(s): TP, ALB, TBIL, AP, SGOT, GPT in the last 72 hours.     No lab exists for component: DBIL   Thyroid Studies Lab Results   Component Value Date/Time    TSH 0.31 01/01/2016 10:20 PM        Procedures/imaging: see electronic medical records for all procedures/Xrays and details which were not copied into this note but were reviewed prior to creation of Rafael Valle MD

## 2017-03-09 NOTE — PROGRESS NOTES
PATIENT REMAINS ASLEEP ON BIPAP THIS AM.  SPO2 98% ON FIO2 45%. DECREASED TO 40%. INCREASED IPAP FROM 14 TO 16 SECONDARY TO LOW TV'S OF APPROXIMATELY 275.

## 2017-03-10 LAB
ANION GAP BLD CALC-SCNC: 6 MMOL/L (ref 3–18)
BUN SERPL-MCNC: 31 MG/DL (ref 7–18)
BUN/CREAT SERPL: 26 (ref 12–20)
CALCIUM SERPL-MCNC: 8.4 MG/DL (ref 8.5–10.1)
CHLORIDE SERPL-SCNC: 104 MMOL/L (ref 100–108)
CO2 SERPL-SCNC: 32 MMOL/L (ref 21–32)
CREAT SERPL-MCNC: 1.2 MG/DL (ref 0.6–1.3)
ERYTHROCYTE [DISTWIDTH] IN BLOOD BY AUTOMATED COUNT: 17.2 % (ref 11.6–14.5)
GLUCOSE BLD STRIP.AUTO-MCNC: 163 MG/DL (ref 70–110)
GLUCOSE BLD STRIP.AUTO-MCNC: 226 MG/DL (ref 70–110)
GLUCOSE BLD STRIP.AUTO-MCNC: 259 MG/DL (ref 70–110)
GLUCOSE BLD STRIP.AUTO-MCNC: 95 MG/DL (ref 70–110)
GLUCOSE SERPL-MCNC: 81 MG/DL (ref 74–99)
HCT VFR BLD AUTO: 32.5 % (ref 35–45)
HGB BLD-MCNC: 10.2 G/DL (ref 12–16)
INR PPP: 1.6 (ref 0.8–1.2)
MAGNESIUM SERPL-MCNC: 2 MG/DL (ref 1.8–2.4)
MCH RBC QN AUTO: 32.2 PG (ref 24–34)
MCHC RBC AUTO-ENTMCNC: 31.4 G/DL (ref 31–37)
MCV RBC AUTO: 102.5 FL (ref 74–97)
PLATELET # BLD AUTO: 224 K/UL (ref 135–420)
PMV BLD AUTO: 10.5 FL (ref 9.2–11.8)
POTASSIUM SERPL-SCNC: 4.7 MMOL/L (ref 3.5–5.5)
PROTHROMBIN TIME: 18.4 SEC (ref 11.5–15.2)
RBC # BLD AUTO: 3.17 M/UL (ref 4.2–5.3)
SODIUM SERPL-SCNC: 142 MMOL/L (ref 136–145)
WBC # BLD AUTO: 9.5 K/UL (ref 4.6–13.2)

## 2017-03-10 PROCEDURE — 74011250636 HC RX REV CODE- 250/636: Performed by: HOSPITALIST

## 2017-03-10 PROCEDURE — 94660 CPAP INITIATION&MGMT: CPT

## 2017-03-10 PROCEDURE — 77010033678 HC OXYGEN DAILY

## 2017-03-10 PROCEDURE — 74011250637 HC RX REV CODE- 250/637: Performed by: INTERNAL MEDICINE

## 2017-03-10 PROCEDURE — 85610 PROTHROMBIN TIME: CPT | Performed by: INTERNAL MEDICINE

## 2017-03-10 PROCEDURE — 74011250636 HC RX REV CODE- 250/636: Performed by: INTERNAL MEDICINE

## 2017-03-10 PROCEDURE — 97116 GAIT TRAINING THERAPY: CPT

## 2017-03-10 PROCEDURE — 83735 ASSAY OF MAGNESIUM: CPT | Performed by: INTERNAL MEDICINE

## 2017-03-10 PROCEDURE — 97530 THERAPEUTIC ACTIVITIES: CPT

## 2017-03-10 PROCEDURE — 82962 GLUCOSE BLOOD TEST: CPT

## 2017-03-10 PROCEDURE — 74011250637 HC RX REV CODE- 250/637: Performed by: HOSPITALIST

## 2017-03-10 PROCEDURE — 65610000006 HC RM INTENSIVE CARE

## 2017-03-10 PROCEDURE — 74011000250 HC RX REV CODE- 250: Performed by: INTERNAL MEDICINE

## 2017-03-10 PROCEDURE — 80048 BASIC METABOLIC PNL TOTAL CA: CPT | Performed by: INTERNAL MEDICINE

## 2017-03-10 PROCEDURE — 77030011256 HC DRSG MEPILEX <16IN NO BORD MOLN -A

## 2017-03-10 PROCEDURE — 85027 COMPLETE CBC AUTOMATED: CPT | Performed by: HOSPITALIST

## 2017-03-10 PROCEDURE — 94640 AIRWAY INHALATION TREATMENT: CPT

## 2017-03-10 PROCEDURE — 77010033711 HC HIGH FLOW OXYGEN

## 2017-03-10 RX ORDER — WARFARIN 4 MG/1
4 TABLET ORAL ONCE
Status: COMPLETED | OUTPATIENT
Start: 2017-03-10 | End: 2017-03-10

## 2017-03-10 RX ADMIN — NYSTATIN: 100000 POWDER TOPICAL at 10:20

## 2017-03-10 RX ADMIN — WARFARIN SODIUM 4 MG: 4 TABLET ORAL at 17:03

## 2017-03-10 RX ADMIN — DILTIAZEM HYDROCHLORIDE 30 MG: 30 TABLET, FILM COATED ORAL at 06:33

## 2017-03-10 RX ADMIN — ARFORMOTEROL TARTRATE 15 MCG: 15 SOLUTION RESPIRATORY (INHALATION) at 20:08

## 2017-03-10 RX ADMIN — INSULIN LISPRO 5 UNITS: 100 INJECTION, SOLUTION INTRAVENOUS; SUBCUTANEOUS at 16:58

## 2017-03-10 RX ADMIN — CARVEDILOL 25 MG: 12.5 TABLET, FILM COATED ORAL at 10:18

## 2017-03-10 RX ADMIN — BUDESONIDE 500 MCG: 0.5 INHALANT RESPIRATORY (INHALATION) at 07:49

## 2017-03-10 RX ADMIN — ASPIRIN 81 MG CHEWABLE TABLET 81 MG: 81 TABLET CHEWABLE at 10:18

## 2017-03-10 RX ADMIN — DILTIAZEM HYDROCHLORIDE 30 MG: 30 TABLET, FILM COATED ORAL at 12:12

## 2017-03-10 RX ADMIN — HYDROCODONE BITARTRATE AND ACETAMINOPHEN 1 TABLET: 5; 325 TABLET ORAL at 23:24

## 2017-03-10 RX ADMIN — DOCUSATE SODIUM 100 MG: 100 CAPSULE, LIQUID FILLED ORAL at 10:18

## 2017-03-10 RX ADMIN — HYDROCODONE BITARTRATE AND ACETAMINOPHEN 1 TABLET: 5; 325 TABLET ORAL at 00:51

## 2017-03-10 RX ADMIN — BUDESONIDE 500 MCG: 0.5 INHALANT RESPIRATORY (INHALATION) at 20:08

## 2017-03-10 RX ADMIN — PANTOPRAZOLE SODIUM 40 MG: 40 TABLET, DELAYED RELEASE ORAL at 06:33

## 2017-03-10 RX ADMIN — POTASSIUM CHLORIDE 20 MEQ: 20 TABLET, EXTENDED RELEASE ORAL at 10:18

## 2017-03-10 RX ADMIN — ENOXAPARIN SODIUM 110 MG: 120 INJECTION SUBCUTANEOUS at 00:52

## 2017-03-10 RX ADMIN — DIGOXIN 0.12 MG: 0.12 TABLET ORAL at 10:18

## 2017-03-10 RX ADMIN — HYDROCODONE BITARTRATE AND ACETAMINOPHEN 1 TABLET: 5; 325 TABLET ORAL at 16:50

## 2017-03-10 RX ADMIN — INSULIN LISPRO 5 UNITS: 100 INJECTION, SOLUTION INTRAVENOUS; SUBCUTANEOUS at 12:13

## 2017-03-10 RX ADMIN — ENOXAPARIN SODIUM 110 MG: 120 INJECTION SUBCUTANEOUS at 17:04

## 2017-03-10 RX ADMIN — DILTIAZEM HYDROCHLORIDE 30 MG: 30 TABLET, FILM COATED ORAL at 16:50

## 2017-03-10 RX ADMIN — NYSTATIN: 100000 POWDER TOPICAL at 21:11

## 2017-03-10 RX ADMIN — FUROSEMIDE 40 MG: 10 INJECTION, SOLUTION INTRAMUSCULAR; INTRAVENOUS at 10:18

## 2017-03-10 RX ADMIN — CARVEDILOL 25 MG: 12.5 TABLET, FILM COATED ORAL at 16:50

## 2017-03-10 RX ADMIN — FUROSEMIDE 40 MG: 10 INJECTION, SOLUTION INTRAMUSCULAR; INTRAVENOUS at 21:10

## 2017-03-10 RX ADMIN — INSULIN LISPRO 7 UNITS: 100 INJECTION, SOLUTION INTRAVENOUS; SUBCUTANEOUS at 12:13

## 2017-03-10 RX ADMIN — HYDROCODONE BITARTRATE AND ACETAMINOPHEN 1 TABLET: 5; 325 TABLET ORAL at 10:27

## 2017-03-10 RX ADMIN — FLUCONAZOLE 100 MG: 100 TABLET ORAL at 12:12

## 2017-03-10 RX ADMIN — DOCUSATE SODIUM 100 MG: 100 CAPSULE, LIQUID FILLED ORAL at 21:11

## 2017-03-10 RX ADMIN — SIMVASTATIN 10 MG: 20 TABLET, FILM COATED ORAL at 21:11

## 2017-03-10 RX ADMIN — ARFORMOTEROL TARTRATE 15 MCG: 15 SOLUTION RESPIRATORY (INHALATION) at 07:49

## 2017-03-10 RX ADMIN — INSULIN LISPRO 5 UNITS: 100 INJECTION, SOLUTION INTRAVENOUS; SUBCUTANEOUS at 21:12

## 2017-03-10 RX ADMIN — INSULIN LISPRO 11 UNITS: 100 INJECTION, SOLUTION INTRAVENOUS; SUBCUTANEOUS at 16:58

## 2017-03-10 RX ADMIN — INSULIN GLARGINE 20 UNITS: 100 INJECTION, SOLUTION SUBCUTANEOUS at 21:11

## 2017-03-10 NOTE — WOUND CARE
Pt seen by wound care. No change in skin assessment. Mepilex borders remain intact to lower extremities. Wound care will continue to follow pt daily while in ICU.

## 2017-03-10 NOTE — PROGRESS NOTES
[facility]  Pulmonary, Critical Care, and Sleep Medicine    Name: Kiran Saunders MRN: [id number]   : 1945 Hospital: [facility] [place]   Date: 3/10/2017  Room: 110/01     Subjective:   3/10  Pt is much more alert this am. Following command. Tolerating the bipap well. Good response to lasix. Kiran Saunders is a 66 y.o. female with IMANI/OHS, pulmonary HTN, and COPD who came in after being unable to get her BiPAP at the SNF and had increasing leg edema. She had some respiratory distress and was therefore brought to the ED. She was placed on BiPAP when she came into the ED for the night and did well and was able to be transitioned to HFNC. She has been receiving diuresis and has since been transferred to the floor as she no longer required ICU level of care.        Past Medical History:   Diagnosis Date    A-fib [facility])     Arthritis     Back injury     [facility]     Diabetes ([facility]     Fibromyalgia     Heart failure ([facility]     Hypertension     MRSA (methicillin resistant Staphylococcus aureus)     Obesity hypoventilation syndrome ([facility] 2017    Pulmonary hypertension, moderate to severe (HCC) 2017       Current Facility-Administered Medications   Medication Dose Route Frequency    furosemide (LASIX) injection 40 mg  40 mg IntraVENous Q12H    potassium chloride (K-DUR, KLOR-CON) SR tablet 20 mEq  20 mEq Oral DAILY    lidocaine (LIDODERM) 5 % patch 1 Patch  1 Patch TransDERmal Q24H    enoxaparin (LOVENOX) injection 110 mg  110 mg SubCUTAneous Q12H    fluconazole (DIFLUCAN) tablet 100 mg  100 mg Oral DAILY    insulin lispro (HUMALOG) injection 5 Units  5 Units SubCUTAneous TIDAC    HYDROcodone-acetaminophen (NORCO) 5-325 mg per tablet 1 Tab  1 Tab Oral Q6H PRN    insulin glargine (LANTUS) injection 20 Units  20 Units SubCUTAneous QHS    digoxin (LANOXIN) tablet 0.125 mg  0.125 mg Oral DAILY    carvedilol (COREG) tablet 25 mg  25 mg Oral BID WITH MEALS    dilTIAZem (CARDIZEM) IR tablet 30 mg  30 mg Oral TIDAC    ELECTROLYTE REPLACEMENT PROTOCOL  1 Each Other PRN    ELECTROLYTE REPLACEMENT PROTOCOL  1 Each Other PRN    pantoprazole (PROTONIX) tablet 40 mg  40 mg Oral ACB    polyvinyl alcohol (LIQUIFILM TEARS) 1.4 % ophthalmic solution 1 Drop  1 Drop Both Eyes PRN    nystatin (MYCOSTATIN) 100,000 unit/gram powder   Topical BID    docusate sodium (COLACE) capsule 100 mg  100 mg Oral BID    simvastatin (ZOCOR) tablet 10 mg  10 mg Oral QHS    aspirin chewable tablet 81 mg  81 mg Oral DAILY    arformoterol (BROVANA) neb solution 15 mcg  15 mcg Nebulization BID RT    budesonide (PULMICORT) 500 mcg/2 ml nebulizer suspension  500 mcg Nebulization BID RT    albuterol-ipratropium (DUO-NEB) 2.5 MG-0.5 MG/3 ML  3 mL Nebulization Q4H PRN    ondansetron (ZOFRAN) injection 4 mg  4 mg IntraVENous Q6H PRN    acetaminophen (TYLENOL) tablet 650 mg  650 mg Oral Q4H PRN    insulin lispro (HUMALOG) injection   SubCUTAneous AC&HS    glucose chewable tablet 16 g  4 Tab Oral PRN    glucagon (GLUCAGEN) injection 1 mg  1 mg IntraMUSCular PRN    dextrose (D50W) injection syrg 12.5-25 g  25-50 mL IntraVENous PRN    warfarin pharmacy to dose   Other PRN       Allergies   Allergen Reactions    Latex Hives    Adhesive Tape-Silicones Unknown (comments)    Bees [Sting, Bee] Unknown (comments)    Garlic Nausea and Vomiting    Zoloft [Sertraline] Hives            Objective:   Vital Signs:    Visit Vitals    /54    Pulse 64    Temp 97.6 °F (36.4 °C)    Resp 21    Ht 5' 4.96\" (1.65 m)    Wt 109.3 kg (240 lb 15.4 oz)    SpO2 96%    Breastfeeding No    BMI 40.15 kg/m2       O2 Device: BIPAP   O2 Flow Rate (L/min): 4 l/min   Temp (24hrs), Av °F (36.7 °C), Min:97.6 °F (36.4 °C), Max:98.9 °F (37.2 °C)       Intake/Output:     Intake/Output Summary (Last 24 hours) at 03/10/17 0953  Last data filed at 03/10/17 0318   Gross per 24 hour Intake                0 ml   Output             1525 ml   Net            -1525 ml           Physical Exam:   General: tolerating bipap. Alert and appropriate. Neck: Trachea midline, no significant jvd  CVS: Regular rate; no audible murmur  RS: Mod AE bilaterally, decreased sounds in bases but clear to auscultation. Abd: soft, non-tender  Neuro: awake, alert, follows basic commands  Extrm: 2+ lower extremity edema. No cyanosis.    Skin: warm, dry    Data review:   Labs:  Recent Results (from the past 12 hour(s))   GLUCOSE, POC    Collection Time: 03/09/17  9:42 PM   Result Value Ref Range    Glucose (POC) 100 70 - 110 mg/dL   CBC W/O DIFF    Collection Time: 03/10/17 12:01 AM   Result Value Ref Range    WBC 9.5 4.6 - 13.2 K/uL    RBC 3.17 (L) 4.20 - 5.30 M/uL    HGB 10.2 (L) 12.0 - 16.0 g/dL    HCT 32.5 (L) 35.0 - 45.0 %    .5 (H) 74.0 - 97.0 FL    MCH 32.2 24.0 - 34.0 PG    MCHC 31.4 31.0 - 37.0 g/dL    RDW 17.2 (H) 11.6 - 14.5 %    PLATELET 746 874 - 280 K/uL    MPV 10.5 9.2 - 11.8 FL   MAGNESIUM    Collection Time: 03/10/17  3:35 AM   Result Value Ref Range    Magnesium 2.0 1.8 - 2.4 mg/dL   PROTHROMBIN TIME + INR    Collection Time: 03/10/17  3:35 AM   Result Value Ref Range    Prothrombin time 18.4 (H) 11.5 - 15.2 sec    INR 1.6 (H) 0.8 - 1.2     METABOLIC PANEL, BASIC    Collection Time: 03/10/17  3:35 AM   Result Value Ref Range    Sodium 142 136 - 145 mmol/L    Potassium 4.7 3.5 - 5.5 mmol/L    Chloride 104 100 - 108 mmol/L    CO2 32 21 - 32 mmol/L    Anion gap 6 3.0 - 18 mmol/L    Glucose 81 74 - 99 mg/dL    BUN 31 (H) 7.0 - 18 MG/DL    Creatinine 1.20 0.6 - 1.3 MG/DL    BUN/Creatinine ratio 26 (H) 12 - 20      GFR est AA 54 (L) >60 ml/min/1.73m2    GFR est non-AA 44 (L) >60 ml/min/1.73m2    Calcium 8.4 (L) 8.5 - 10.1 MG/DL   GLUCOSE, POC    Collection Time: 03/10/17  5:24 AM   Result Value Ref Range    Glucose (POC) 95 70 - 110 mg/dL       ABG:    Lab Results   Component Value Date/Time    PHI 7.264 (L) 03/09/2017 01:19 PM    PCO2I 67.2 (H) 03/09/2017 01:19 PM    PO2I 50 (L) 03/09/2017 01:19 PM    HCO3I 30.5 (H) 03/09/2017 01:19 PM    FIO2I 32 03/09/2017 01:19 PM         Imaging:  No new imaging 3/4    IMPRESSION:   · Acute hypercapneic respiratory failure. I think this time patient's bicarb dropped significantly compare to prior ABG likely cause of her symptoms As I suspect her baseline Pco2 is high 60s. · Acute metabolic acidosis without adequate resp compensation. Possibly due to recent diamox use. · Moderate pulmonary HTN  · OHS,    · Obesity  ·        RECOMMENDATIONS:   · continue Brovana and Pulmicort; duoneb prn  · Continue IV lasix for now avoid diamox on this patient. Patient's baseline HCO3 should be closer to 40s  Patients baseline PCO2 should be closer to high 60s or low 70s.   · Transition to HFNC today and bipap at night  ·

## 2017-03-10 NOTE — PROGRESS NOTES
0730 Received report. Pt resting in ICU bed # 10 on Bipap at 20/4 40% backup rate 12. Appears comfortable. Denies pain discomfort or SOB at this time. 0800 Complete RN assessment done a this time as per doc flow sheet.  at bedside, pt stable denies complaints remains on bipap. TLC noted and patent all thre ports flushed and capped. Several mepilex  Noted to sisi le additional one placed to left outer calf with open draining area and another placed to rt ankle due to reddness from shearing. LCTA but diminished pure wick in use. Call bell in reach will continue to monitor. NADN.     0900 unable to giva am medication pt remains on bipap. 200 Dr. Polly Van present and gave order for HF, RT aware. 1027 Pt c/o back pain and requesting pain medication. All AM PO medications provided along with pain medication and pt ate container of applesauce with am medications. Breakfast tray ordered. 36 Dr. Frandy Andrade to bedside. Pt denies complaints of pain or discomfort at this time. 1135 Pt placed on bedpan. Purewick changed and canister replaced. Call bell in reach. 1145 Pt voided linens changed. 1200 reassessed no changes vss friend at bedside pt visiting and watching tv. NADN remains on HF lcta but diminished. 1400 repositioned vss nadn     1530 Dr. Frandy Andrade aware of gram pos. Cocci in clusters in aerobic bottle. No new orders other BC negative both preliminary he will continue to monitor. 1600 Reassessed no changes vss nadn. 80 Pt assisted by Natalya JUDDA and Emil Pool PT to use bedside commode for Bm and then assisted back to bed.     1650 pt c/o pain in legs and lower back chronic pain rates 9/10, repositioned and PO pain medication provided. mepilex applied to pressure points and open blisters on sisi LE.     1730 no changes resting comfortably with eyes closed flacc 0 nadn     1810  at bedside. 1930 no changes resting with eyes closed vss flacc 0.  Report to Teofilo RN   Bedside and Verbal shift change report given to Teofilo RN  (oncoming nurse) by me. (offgoing nurse). Report included the following information SBAR, Kardex, Intake/Output, MAR, Recent Results, Med Rec Status, Cardiac Rhythm afib and Alarm Parameters .

## 2017-03-10 NOTE — PROGRESS NOTES
1708- Patient arrived on unit on bipap and cardiac monitor with RN and RT. Handoff report received from ZAHRA Shannon RN. Patient stable on bipap. Initial assessment completed. Will continue to monitor. 1930-Bedside and Verbal shift change report given to SUNNY Ferraro RN (oncoming nurse) by ATIYA Roman (offgoing nurse). Report included the following information SBAR, Kardex, Intake/Output, MAR, Recent Results and Cardiac Rhythm A Fib.

## 2017-03-10 NOTE — PROGRESS NOTES
Problem: Mobility Impaired (Adult and Pediatric)  Goal: *Acute Goals and Plan of Care (Insert Text)  Physical Therapy Goals  Initiated 2/27/2017 and to be accomplished within 7 day(s)  1. Patient will move from supine to sit and sit to supine , scoot up and down and roll side to side in bed with supervision/set-up. 2. Patient will transfer from bed to chair and chair to bed with minimal assistance/contact guard assist using the least restrictive device. 3. Patient will perform sit to stand with minimal assistance/contact guard assist.  4. Patient will ambulate with minimal assistance/contact guard assist for 50 feet with the least restrictive device. Re-evaluation on 3/6/2017 and to be accomplished within 7 day(s)  1. Patient will move from supine to sit and sit to supine, scoot up and down and roll side to side in bed with supervision/set-up. 2. Patient will transfer from bed to chair and chair to bed with contact guard assist/supervision using the least restrictive device. 3. Patient will perform sit to stand with contact guard assist/supervision. 4. Patient will ambulate with contact guard assist/supervision for 100 feet with the least restrictive device. Outcome: Progressing Towards Goal  PHYSICAL THERAPY TREATMENT     Patient: Marco Arcos (40 y.o. female)  Date: 3/10/2017  Diagnosis: CHF NYHA class IV, acute, diastolic (HCC)  CHF NYHA class IV, acute, diastolic (HCC) Acute on chronic respiratory failure with hypoxia (HCC)       Precautions: Fall (SPO2)   Chart, physical therapy assessment, plan of care and goals were reviewed. ASSESSMENT:  Pt now requesting assist to/ from [facility]. Pt has raid decrease of SPO2 with beginning transfer (72%), but recovers to 89% while sitting. Pt slowly increases to 97%. No difficulty seen, other that transitions to/from supine. Will resume exercise and ambulation as tolerated.      Progression toward goals:  [ ]      Improving appropriately and progressing toward goals  [X]      Improving slowly and progressing toward goals  [ ]      Not making progress toward goals and plan of care will be adjusted       PLAN:  Patient continues to benefit from skilled intervention to address the above impairments. Continue treatment per established plan of care. Discharge Recommendations:  Andrés Nelson  Further Equipment Recommendations for Discharge:  bedside commode and rolling walker       SUBJECTIVE:   Patient stated Please help me. They almost dropped me yesterday.       OBJECTIVE DATA SUMMARY:   Critical Behavior:  Neurologic State: Alert, Appropriate for age  Orientation Level: Oriented X4  Cognition: Appropriate decision making, Appropriate for age attention/concentration, Appropriate safety awareness, Follows commands  Safety/Judgement: Fall prevention  Functional Mobility Training:  Bed Mobility:  Rolling: Minimum assistance  Supine to Sit: Minimum assistance  Sit to Supine: Moderate assistance  Scooting: Supervision  Transfers:  Sit to Stand: Contact guard assistance  Stand to Sit: Contact guard assistance  Balance:  Sitting: Intact  Standing: Impaired  Standing - Static: Fair  Standing - Dynamic : Poor  Ambulation/Gait Training:  Distance (ft): 6 Feet (ft) (T/F [facility])  Assistive Device: Gait belt;Walker, rolling  Ambulation - Level of Assistance: Contact guard assistance  Gait Abnormalities: Decreased step clearance  Base of Support: Widened  Speed/Kristin: Slow  Step Length: Left shortened;Right shortened  Pain:  Pain Scale 1: Numeric (0 - 10)  Pain Intensity 1: 9  Pain Location 1: Back;Leg  Pain Orientation 1: Lower  Pain Description 1: Aching  Pain Intervention(s) 1: Medication (see MAR)  Activity Tolerance:   Fair-  Please refer to the flowsheet for vital signs taken during this treatment.   After treatment:   [ ] Patient left in no apparent distress sitting up in chair  [X] Patient left in no apparent distress in bed  [X] Call bell left within reach  [X] Nursing notified  [ ] Caregiver present  [ ] Bed alarm activated      Maggie Coe PTA   Time Calculation: 25 mins

## 2017-03-10 NOTE — INTERDISCIPLINARY ROUNDS
CRITICAL CARE INTERDISCIPLINARY ROUNDS    Patient Information:    Name:   King Vito    Age:   66 y.o. Admission Date:   2/25/2017    Critical Care Day: 2 (from floor 3/9)    Surgery Date:      Attending Provider:   Jazz Bennett MD    Surgeon:        Consultant(s):   Gualberto Degroot    Critical Care Physician:  Vanna Ramos    Code Status: Full Code    Problem List:     Patient Active Problem List   Diagnosis Code    Acute on chronic diastolic CHF (congestive heart failure) ([facility]) I50.33    ALEJANDRINA (acute kidney injury) ([facility] [address] N17.9    Acute exacerbation of CHF (congestive heart failure) ([address] I50.9    DM (diabetes mellitus) ([address] E11.9    Hypertension I10    Respiratory failure ([address] J96.90    Acute coronary syndrome ([facility] [address] I24.9    Obstructive sleep apnea, adult G47.33    Paroxysmal atrial fibrillation ([facility] [address] I48.0    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 ([facility]) X71.31, E11.65    Dyspnea due to congestive heart failure ([facility]) I50.9    Aspiration pneumonia ([facility]) J69.0    Hyperkalemia E87.5    Elevated troponin R79.89    COPD (chronic obstructive pulmonary disease) ([facility]) J44.9    CHF (congestive heart failure) ([facility]) I50.9    Insufficiency, respiratory, acute ([facility]) R06.89    Infiltrate noted on imaging study R93.8    COPD exacerbation ([facility]) J44.1    Acute on chronic respiratory failure with hypoxia ([facility]) J96.21    Chest pain R07.9    Anxiety F41.9    UTI (urinary tract infection) N39.0    Obesity hypoventilation syndrome ([facility]) E66.2    Pulmonary hypertension, moderate to severe ([facility]) I27.2       Principal Problem:  Acute on chronic respiratory failure with hypoxia ([address]    During rounds the following quality care indicators and evidence based practices were addressed :  DVT Prophylaxis and PUD Prophylaxis Davila Day Purewick ; Central Line Day: 11 Isolation:na;  Antibiotic Stewardship: reviewed; Probiotics Necessary: na    Ventilator Bundle:      Sepsis Order Set: Glycemic Control:   Recent Labs      03/10/17   0335  03/09/17   0515  03/08/17   2320   GLU  81  80  205*   ;  Recent Labs      03/09/17   1319   PHI  7.264*   PCO2I  67.2*   PO2I  50*    Adjustments     Acute MI/PCI:   Not applicable    Door to Balloon: Admission Time: 1509      Heart Failure:    Not applicable     SCIP Measures for other Surgeries:   Not applicable     Pneumonia:    Not applicable    Interdisciplinary team rounds were held with the following team membersCare Management, Diabetes Treatment Specialist, Lactation Consultant, Nutrition, Pharmacy, Physician and Speech Therapy. Plan of care discussed. Goals of Care/ Recommendations: Continue current plan. See clinical pathway and/or care plan for interventions and desired outcomes.     Critical Care Discharge Status:  Red

## 2017-03-10 NOTE — PROGRESS NOTES
Bedside and Verbal shift change report given to Louis Stevenson RN (oncoming nurse) by Mago Duran RN   (offgoing nurse). Report included the following information SBAR, ED Summary, Procedure Summary, Intake/Output, MAR, Recent Results and Cardiac Rhythm Afib.

## 2017-03-10 NOTE — PROGRESS NOTES
0940  Pt placed on HFNC, titrate to keep sats >90%. Pt hoda well. Started at 60% and 40L, now down to 45% and 50L.   Will monitor

## 2017-03-10 NOTE — PROGRESS NOTES
Problem: Mobility Impaired (Adult and Pediatric)  Goal: *Acute Goals and Plan of Care (Insert Text)  Physical Therapy Goals  Initiated 2/27/2017 and to be accomplished within 7 day(s)  1. Patient will move from supine to sit and sit to supine , scoot up and down and roll side to side in bed with supervision/set-up. 2. Patient will transfer from bed to chair and chair to bed with minimal assistance/contact guard assist using the least restrictive device. 3. Patient will perform sit to stand with minimal assistance/contact guard assist.  4. Patient will ambulate with minimal assistance/contact guard assist for 50 feet with the least restrictive device. Re-evaluation on 3/6/2017 and to be accomplished within 7 day(s)  1. Patient will move from supine to sit and sit to supine, scoot up and down and roll side to side in bed with supervision/set-up. 2. Patient will transfer from bed to chair and chair to bed with contact guard assist/supervision using the least restrictive device. 3. Patient will perform sit to stand with contact guard assist/supervision. 4. Patient will ambulate with contact guard assist/supervision for 100 feet with the least restrictive device. Outcome: Not Progressing Towards Goal  PT session held due to:  [X]  Pt reports feels to tired to participate   [X]  RN Communication/ suggestion  [ ]  Extreme Pain  [ ]  Dialysis treatment in progress. Will f/u tomorrow. Thank you.   Maggie Coe, PTA

## 2017-03-10 NOTE — PROGRESS NOTES
1930 hrs report received ,initial assessment completed. Monitoring in A fib 55-64. BP stable. Afebrile. Tolerating Bipap therapy FiO2 40%. Lung sounds clear with diminished bases on auscultation. Alert and orientated x4. Talking in full sentences with mask. Amonia level and urine culture sent to lab.    2320 hrs Bedside and Verbal shift change report given to ANKUR Red(oncoming nurse) by Vanna North (offgoing nurse). Report included the following information SBAR, Kardex, MAR and Recent Results.

## 2017-03-10 NOTE — PROGRESS NOTES
Pharmacy Dosing Services: Warfarin    Consult for Warfarin Dosing by Pharmacy by Dr. Tanesha Celeste provided for this 66 y.o.  female , for indication of Atrial Fibrillation. Dose to achieve an INR goal of 2-3    Order entered for  Warfarin  4 mg to be given today at 18:00. Patient also has order for Enoxaparin 110 mg SQ q12h    Significant drug interactions: fluconazole / aspirin 81 mg  LABS    PT/INR Lab Results   Component Value Date/Time    INR 1.6 03/10/2017 03:35 AM      Platelets Lab Results   Component Value Date/Time    PLATELET 316 98/77/4910 12:01 AM      H/H     Lab Results   Component Value Date/Time    HGB 10.2 03/10/2017 12:01 AM        Warfarin Administrations (last 168 hours)       Date/Time Action Medication Dose      03/09/17 1729 Given    warfarin (COUMADIN) tablet 4 mg 4 mg    03/08/17 2333 Given    warfarin (COUMADIN) tablet 2 mg 2 mg    03/07/17 1809 Given    warfarin (COUMADIN) tablet 4 mg 4 mg    03/06/17 1816 Given    warfarin (COUMADIN) tablet 4 mg 4 mg    03/05/17 1800 Given    warfarin (COUMADIN) tablet 2.5 mg 2.5 mg    03/04/17 1804 Given    warfarin (COUMADIN) tablet 2 mg 2 mg    03/03/17 1810 Given    warfarin (COUMADIN) tablet 2 mg 2 mg          Pharmacy to follow daily and will provide subsequent Warfarin dosing based on clinical status.   Towana Goldberg, [facility]  Contact information [phone]

## 2017-03-10 NOTE — PROGRESS NOTES
Hospitalist Progress Note    Patient: Kadi Carter MRN: [id number]  CSN: [id number]    YOB: 1945  Age: 66 y.o. Sex: female    DOA: 2/25/2017 LOS:  LOS: 12 days          Chief Complaint:  Diastolic CHF exacerbation          Assessment/Plan       Patient Active Problem List   Diagnosis Code    Acute on chronic diastolic CHF (congestive heart failure) ([facility]) I50.33    ALEJANDRINA (acute kidney injury) ([facility] N17.9    Acute exacerbation of CHF (congestive heart failure) ([facility]) I50.9    DM (diabetes mellitus) ([facility] E11.9    Hypertension I10    Respiratory failure ([facility]) J96.90    Acute coronary syndrome ([facility]) I24.9    Obstructive sleep apnea, adult G47.33    Paroxysmal atrial fibrillation ([facility]) I48.0    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 ([facility]) W32.22, E11.65    Dyspnea due to congestive heart failure ([facility]) I50.9    Aspiration pneumonia ([facility]) J69.0    Hyperkalemia E87.5    Elevated troponin R79.89    COPD (chronic obstructive pulmonary disease) ([facility]) J44.9    CHF (congestive heart failure) ([facility]) I50.9    Insufficiency, respiratory, acute ([facility]) R06.89    Infiltrate noted on imaging study R93.8    COPD exacerbation ([facility]) J44.1    Acute on chronic respiratory failure with hypoxia ([facility]) J96.21    Chest pain R07.9    Anxiety F41.9    UTI (urinary tract infection) N39.0    Obesity hypoventilation syndrome ([facility]) E66.2    Pulmonary hypertension, moderate to severe ([facility]) I27.2     1. Acute hypoxemic respiratory failure due to decomensated diastolic CHF, improved. CXR yesterday showed no significant interval change but shows pulmonary vascular congestion. She is on lasix , digoxin, bipap at night consistently (currently on high flow O2). -1.5L out over 24 hrs. Still has some edema on exam. Continue diuresis, breathing treatment and antibiotics  2. Pulmonary HTN  3. Morbid obesity hypoventilation syndrome/IMANI requiring bipap  4.  UTI-completed treatment-on diflucan for vaginitis  5. Afib, rate better controlled-INR is down (1.6), goal 2-3 on coumadin-continue lovenox bridge  6.  IDDM 2 w hyperglycemia-good glycemic control; continue premeal insulin      Subjective:    \"I'm breathing much better today\"    Review of systems:    Constitutional: denies fevers, chills, myalgias  Respiratory: denies SOB, cough  Cardiovascular: denies chest pain, palpitations  Gastrointestinal: denies nausea, vomiting, diarrhea      Vital signs/Intake and Output:  Visit Vitals    /54    Pulse 64    Temp 97.6 °F (36.4 °C)    Resp 21    Ht 5' 4.96\" (1.65 m)    Wt 109.3 kg (240 lb 15.4 oz)    SpO2 96%    Breastfeeding No    BMI 40.15 kg/m2     Current Shift:     Last three shifts:  03/08 1901 - 03/10 0700  In: -   Out: 1525 [Urine:1525]    Exam:    General: Well developed, alert, NAD, OX3  Head/Neck: NCAT, supple, No masses, No lymphadenopathy  CVS:Regular rate and rhythm, no M/R/G, S1/S2 heard, no thrill  Lungs:Clear to auscultation bilaterally, no wheezes, rhonchi, or rales  Abdomen: Soft, Nontender, No distention, Normal Bowel sounds, No hepatomegaly  Extremities: 1+ bilat CRISS, No C/C, pulses palpable 2+  Skin:normal texture and turgor, no rashes, no lesions  Neuro:grossly normal , follows commands  Psych:appropriate                Labs: Results:       Chemistry Recent Labs      03/10/17   0335  03/09/17   0515  03/08/17   2320   GLU  81  80  205*   NA  142  142  140   K  4.7  5.1  5.7*   CL  104  106  104   CO2  32  33*  33*   BUN  31*  32*  35*   CREA  1.20  1.08  1.26   CA  8.4*  8.5  8.3*   AGAP  6  3  3   BUCR  26*  30*  28*      CBC w/Diff Recent Labs      03/10/17   0001  03/09/17   0515  03/08/17   2320   WBC  9.5  10.8  12.2   RBC  3.17*  3.25*  3.42*   HGB  10.2*  10.4*  10.9*   HCT  32.5*  34.3*  36.1   PLT  224  248  223   GRANS   --    --   72   LYMPH   --    --   12*   EOS   --    --   0      Cardiac Enzymes No results for input(s): CPK, CKND1, ALESSANDRA in the last 72 hours.    No lab exists for component: CKRMB, TROIP   Coagulation Recent Labs      03/10/17   0335  03/09/17   0515   PTP  18.4*  17.0*   INR  1.6*  1.4*       Lipid Panel No results found for: CHOL, CHOLPOCT, CHOLX, CHLST, CHOLV, [id number], HDL, LDL, NLDLCT, DLDL, LDLC, DLDLP, [id number], VLDLC, VLDL, TGL, TGLX, TRIGL, [id number], TRIGP, TGLPOCT, [id number], CHHD, CHHDX   BNP No results for input(s): BNPP in the last 72 hours. Liver Enzymes No results for input(s): TP, ALB, TBIL, AP, SGOT, GPT in the last 72 hours.     No lab exists for component: DBIL   Thyroid Studies Lab Results   Component Value Date/Time    TSH 0.31 01/01/2016 10:20 PM        Procedures/imaging: see electronic medical records for all procedures/Xrays and details which were not copied into this note but were reviewed prior to creation of Xu Holman MD

## 2017-03-11 LAB
ANION GAP BLD CALC-SCNC: 3 MMOL/L (ref 3–18)
BUN SERPL-MCNC: 28 MG/DL (ref 7–18)
BUN/CREAT SERPL: 24 (ref 12–20)
CALCIUM SERPL-MCNC: 8.3 MG/DL (ref 8.5–10.1)
CHLORIDE SERPL-SCNC: 104 MMOL/L (ref 100–108)
CO2 SERPL-SCNC: 34 MMOL/L (ref 21–32)
CREAT SERPL-MCNC: 1.18 MG/DL (ref 0.6–1.3)
GLUCOSE BLD STRIP.AUTO-MCNC: 110 MG/DL (ref 70–110)
GLUCOSE BLD STRIP.AUTO-MCNC: 133 MG/DL (ref 70–110)
GLUCOSE BLD STRIP.AUTO-MCNC: 148 MG/DL (ref 70–110)
GLUCOSE BLD STRIP.AUTO-MCNC: 189 MG/DL (ref 70–110)
GLUCOSE SERPL-MCNC: 105 MG/DL (ref 74–99)
INR PPP: 1.8 (ref 0.8–1.2)
MAGNESIUM SERPL-MCNC: 2.1 MG/DL (ref 1.8–2.4)
POTASSIUM SERPL-SCNC: 4.4 MMOL/L (ref 3.5–5.5)
PROTHROMBIN TIME: 19.9 SEC (ref 11.5–15.2)
SODIUM SERPL-SCNC: 141 MMOL/L (ref 136–145)

## 2017-03-11 PROCEDURE — 77010033711 HC HIGH FLOW OXYGEN

## 2017-03-11 PROCEDURE — 74011250637 HC RX REV CODE- 250/637: Performed by: INTERNAL MEDICINE

## 2017-03-11 PROCEDURE — 85610 PROTHROMBIN TIME: CPT | Performed by: INTERNAL MEDICINE

## 2017-03-11 PROCEDURE — 83735 ASSAY OF MAGNESIUM: CPT | Performed by: INTERNAL MEDICINE

## 2017-03-11 PROCEDURE — 74011250636 HC RX REV CODE- 250/636: Performed by: INTERNAL MEDICINE

## 2017-03-11 PROCEDURE — 74011250637 HC RX REV CODE- 250/637: Performed by: HOSPITALIST

## 2017-03-11 PROCEDURE — 74011000250 HC RX REV CODE- 250: Performed by: INTERNAL MEDICINE

## 2017-03-11 PROCEDURE — 94640 AIRWAY INHALATION TREATMENT: CPT

## 2017-03-11 PROCEDURE — 82962 GLUCOSE BLOOD TEST: CPT

## 2017-03-11 PROCEDURE — 94660 CPAP INITIATION&MGMT: CPT

## 2017-03-11 PROCEDURE — 77030011256 HC DRSG MEPILEX <16IN NO BORD MOLN -A

## 2017-03-11 PROCEDURE — 80048 BASIC METABOLIC PNL TOTAL CA: CPT | Performed by: INTERNAL MEDICINE

## 2017-03-11 PROCEDURE — 65610000006 HC RM INTENSIVE CARE

## 2017-03-11 PROCEDURE — 97116 GAIT TRAINING THERAPY: CPT

## 2017-03-11 PROCEDURE — 74011250636 HC RX REV CODE- 250/636: Performed by: HOSPITALIST

## 2017-03-11 RX ORDER — FUROSEMIDE 10 MG/ML
40 INJECTION INTRAMUSCULAR; INTRAVENOUS DAILY
Status: DISCONTINUED | OUTPATIENT
Start: 2017-03-12 | End: 2017-03-13

## 2017-03-11 RX ORDER — LIDOCAINE 50 MG/G
1 PATCH TOPICAL EVERY 24 HOURS
Status: DISCONTINUED | OUTPATIENT
Start: 2017-03-11 | End: 2017-03-11 | Stop reason: SDUPTHER

## 2017-03-11 RX ORDER — MORPHINE SULFATE 2 MG/ML
2 INJECTION, SOLUTION INTRAMUSCULAR; INTRAVENOUS ONCE
Status: COMPLETED | OUTPATIENT
Start: 2017-03-11 | End: 2017-03-11

## 2017-03-11 RX ORDER — CALCIUM CARBONATE 200(500)MG
200 TABLET,CHEWABLE ORAL
Status: DISCONTINUED | OUTPATIENT
Start: 2017-03-11 | End: 2017-03-24 | Stop reason: HOSPADM

## 2017-03-11 RX ORDER — LORAZEPAM 2 MG/ML
1 INJECTION INTRAMUSCULAR ONCE
Status: COMPLETED | OUTPATIENT
Start: 2017-03-11 | End: 2017-03-11

## 2017-03-11 RX ORDER — QUETIAPINE FUMARATE 25 MG/1
50 TABLET, FILM COATED ORAL
Status: DISCONTINUED | OUTPATIENT
Start: 2017-03-11 | End: 2017-03-12

## 2017-03-11 RX ORDER — WARFARIN 4 MG/1
4 TABLET ORAL ONCE
Status: COMPLETED | OUTPATIENT
Start: 2017-03-11 | End: 2017-03-11

## 2017-03-11 RX ADMIN — DILTIAZEM HYDROCHLORIDE 30 MG: 30 TABLET, FILM COATED ORAL at 12:57

## 2017-03-11 RX ADMIN — HYDROCODONE BITARTRATE AND ACETAMINOPHEN 1 TABLET: 5; 325 TABLET ORAL at 12:57

## 2017-03-11 RX ADMIN — SIMVASTATIN 10 MG: 20 TABLET, FILM COATED ORAL at 22:11

## 2017-03-11 RX ADMIN — PANTOPRAZOLE SODIUM 40 MG: 40 TABLET, DELAYED RELEASE ORAL at 06:31

## 2017-03-11 RX ADMIN — DILTIAZEM HYDROCHLORIDE 30 MG: 30 TABLET, FILM COATED ORAL at 06:31

## 2017-03-11 RX ADMIN — ARFORMOTEROL TARTRATE 15 MCG: 15 SOLUTION RESPIRATORY (INHALATION) at 20:07

## 2017-03-11 RX ADMIN — WARFARIN SODIUM 4 MG: 4 TABLET ORAL at 18:13

## 2017-03-11 RX ADMIN — ENOXAPARIN SODIUM 110 MG: 120 INJECTION SUBCUTANEOUS at 17:20

## 2017-03-11 RX ADMIN — LORAZEPAM 1 MG: 2 INJECTION, SOLUTION INTRAMUSCULAR; INTRAVENOUS at 19:42

## 2017-03-11 RX ADMIN — POTASSIUM CHLORIDE 20 MEQ: 20 TABLET, EXTENDED RELEASE ORAL at 09:39

## 2017-03-11 RX ADMIN — DOCUSATE SODIUM 100 MG: 100 CAPSULE, LIQUID FILLED ORAL at 09:39

## 2017-03-11 RX ADMIN — NYSTATIN: 100000 POWDER TOPICAL at 22:12

## 2017-03-11 RX ADMIN — INSULIN GLARGINE 20 UNITS: 100 INJECTION, SOLUTION SUBCUTANEOUS at 22:11

## 2017-03-11 RX ADMIN — ARFORMOTEROL TARTRATE 15 MCG: 15 SOLUTION RESPIRATORY (INHALATION) at 08:11

## 2017-03-11 RX ADMIN — ONDANSETRON HYDROCHLORIDE 4 MG: 2 INJECTION INTRAMUSCULAR; INTRAVENOUS at 13:43

## 2017-03-11 RX ADMIN — INSULIN LISPRO 5 UNITS: 100 INJECTION, SOLUTION INTRAVENOUS; SUBCUTANEOUS at 17:23

## 2017-03-11 RX ADMIN — BUDESONIDE 500 MCG: 0.5 INHALANT RESPIRATORY (INHALATION) at 08:11

## 2017-03-11 RX ADMIN — QUETIAPINE FUMARATE 50 MG: 25 TABLET, FILM COATED ORAL at 22:11

## 2017-03-11 RX ADMIN — INSULIN LISPRO 5 UNITS: 100 INJECTION, SOLUTION INTRAVENOUS; SUBCUTANEOUS at 12:59

## 2017-03-11 RX ADMIN — ANTACID TABLETS 200 MG: 500 TABLET, CHEWABLE ORAL at 18:13

## 2017-03-11 RX ADMIN — ENOXAPARIN SODIUM 110 MG: 120 INJECTION SUBCUTANEOUS at 00:52

## 2017-03-11 RX ADMIN — CARVEDILOL 25 MG: 12.5 TABLET, FILM COATED ORAL at 09:39

## 2017-03-11 RX ADMIN — ANTACID TABLETS 200 MG: 500 TABLET, CHEWABLE ORAL at 22:11

## 2017-03-11 RX ADMIN — ONDANSETRON HYDROCHLORIDE 4 MG: 2 INJECTION INTRAMUSCULAR; INTRAVENOUS at 00:52

## 2017-03-11 RX ADMIN — CARVEDILOL 25 MG: 12.5 TABLET, FILM COATED ORAL at 17:23

## 2017-03-11 RX ADMIN — ASPIRIN 81 MG CHEWABLE TABLET 81 MG: 81 TABLET CHEWABLE at 09:39

## 2017-03-11 RX ADMIN — DILTIAZEM HYDROCHLORIDE 30 MG: 30 TABLET, FILM COATED ORAL at 18:14

## 2017-03-11 RX ADMIN — DIGOXIN 0.12 MG: 0.12 TABLET ORAL at 09:38

## 2017-03-11 RX ADMIN — INSULIN LISPRO 5 UNITS: 100 INJECTION, SOLUTION INTRAVENOUS; SUBCUTANEOUS at 13:00

## 2017-03-11 RX ADMIN — BUDESONIDE 500 MCG: 0.5 INHALANT RESPIRATORY (INHALATION) at 20:07

## 2017-03-11 RX ADMIN — FUROSEMIDE 40 MG: 10 INJECTION, SOLUTION INTRAMUSCULAR; INTRAVENOUS at 09:40

## 2017-03-11 RX ADMIN — NYSTATIN: 100000 POWDER TOPICAL at 09:53

## 2017-03-11 RX ADMIN — FLUCONAZOLE 100 MG: 100 TABLET ORAL at 12:56

## 2017-03-11 RX ADMIN — DOCUSATE SODIUM 100 MG: 100 CAPSULE, LIQUID FILLED ORAL at 22:12

## 2017-03-11 RX ADMIN — HYDROCODONE BITARTRATE AND ACETAMINOPHEN 1 TABLET: 5; 325 TABLET ORAL at 06:30

## 2017-03-11 RX ADMIN — Medication 2 MG: at 19:42

## 2017-03-11 NOTE — PROGRESS NOTES
1.  at bedside, assessment completed. Denied pain at present time. Want Mepilex to be replaced on left leg. Will do with med passing. Agreed.  2200. Resting in bed, wanted some med to help sleep, but pt drowsy and going to sleep on her own, will continue to monitor. Mepilex replaced on left leg, no excoriation noted. Med given , well tolerated. Bipap applied by RT. Pt HR dropped to 34 while asleep, Dr. Serene Hodgkin notified, stated to give Atropine 0.5 mg IVP whenever it re-occurs and pt symptomatic.  2330. Pt TV not working, upset bc of it, facility tech called, asked to unplug TV for a while and plug it back to reset it. explanation given to pt , still upset stated \" I can't stay here if there is no TV\". 0100. TV back on, awake, watching it. C/o nausea, medicated w/ Zofran 4mg IVP. Will continue to monitor. 0200. Called , wanted to be pull up in bed, pulled up, in bed, blanket repositioned, well tolerated, will continue to monitor. 0400. Pt awake, resting in bed, no sign of distress noted. 0600. Call multiple times, needing different things from staff, c/o pain, medicated for pain, going back anfd forth about using the bedpan. Assisted by nurse and Tech.  8229. Sitting in bed, Tech at bedside. 0730. Bedside and Verbal shift change report given to Macy Corona RN (oncoming nurse) by Ross Littlejohn RN   (offgoing nurse). Report included the following information SBAR, ED Summary, Procedure Summary, MAR, Accordion and Recent Results.

## 2017-03-11 NOTE — PROGRESS NOTES
Hospitalist Progress Note    Patient: Maldonado Angeles MRN: [id number]  CSN: [id number]    YOB: 1945  Age: 66 y.o. Sex: female    DOA: 2/25/2017 LOS:  LOS: 13 days          Chief Complaint:  Diastolic CHF exacerbation          Assessment/Plan       Patient Active Problem List   Diagnosis Code    Acute on chronic diastolic CHF (congestive heart failure) ([facility]) I50.33    ALEJANDRINA (acute kidney injury) ([facility] N17.9    Acute exacerbation of CHF (congestive heart failure) ([facility]) I50.9    [facility]di[facility] mellitus[facility] E11.9    Hypertension I10    Respiratory failure ([facility]) J96.90    Acute coronary syndrome ([facility]) I24.9    Obstructive sleep apnea, adult G47.33    Paroxysmal atrial fibrillation ([facility]) I48.0    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 ([facility]) C13.58, E11.65    Dyspnea due to congestive heart failure ([facility]) I50.9    Aspiration pneumonia ([facility]) J69.0    Hyperkalemia E87.5    Elevated troponin R79.89    COPD (chronic obstructive pulmonary disease) ([facility]) J44.9    CHF (congestive heart failure) ([facility]) I50.9    Insufficiency, respiratory, acute ([facility]) R06.89    Infiltrate noted on imaging study R93.8    COPD exacerbation ([facility]) J44.1    Acute on chronic respiratory failure with hypoxia ([facility]) J96.21    Chest pain R07.9    Anxiety F41.9    UTI (urinary tract infection) N39.0    Obesity hypoventilation syndrome ([facility]) E66.2    Pulmonary hypertension, moderate to severe ([facility]) I27.2     1. Acute hypoxemic respiratory failure due to decomensated diastolic CHF, improved. She is on lasix , digoxin, bipap at night consistently (currently on high flow O2). -1.8L out over 24 hrs. Still has some edema on exam. Continue diuresis, breathing treatment and antibiotics. Wean HF O2 as intolerated to keep sats > 93%  2. Pulmonary HTN  3. Morbid obesity hypoventilation syndrome/IMANI requiring bipap  4. UTI-completed treatment-on diflucan for vaginitis  5.  Afib, rate better controlled-INR is down (1.8), goal 2-3 on coumadin-continue lovenox bridge  6. IDDM 2 w hyperglycemia-good glycemic control; continue premeal insulin  7.  Lidoderm patch       Subjective:    \"I had a rough night because of left hip pain\"    Review of systems:    Constitutional: denies fevers, chills, myalgias  Respiratory: denies SOB, cough  Cardiovascular: denies chest pain, palpitations  Gastrointestinal: denies nausea, vomiting, diarrhea      Vital signs/Intake and Output:  Visit Vitals    /76    Pulse 75    Temp 97.5 °F (36.4 °C)    Resp 17    Ht 5' 4.96\" (1.65 m)    Wt 109.3 kg (240 lb 15.4 oz)    SpO2 99%    Breastfeeding No    BMI 40.15 kg/m2     Current Shift:  03/11 0701 - 03/11 1900  In: 480 [P.O.:480]  Out: -   Last three shifts:  03/09 1901 - 03/11 0700  In: 360 [P.O.:360]  Out: 3675 [Urine:3675]    Exam:    General: Well developed, alert, NAD, OX3  Head/Neck: NCAT, supple, No masses, No lymphadenopathy  CVS:Regular rate and rhythm, no M/R/G, S1/S2 heard, no thrill  Lungs:Clear to auscultation bilaterally, no wheezes, rhonchi, or rales  Abdomen: Soft, Nontender, No distention, Normal Bowel sounds, No hepatomegaly  Extremities: 1+ bilat CRISS, No C/C, pulses palpable 2+  Skin:normal texture and turgor, no rashes, no lesions  Neuro:grossly normal , follows commands  Psych:appropriate                Labs: Results:       Chemistry Recent Labs      03/11/17   0325  03/10/17   0335  03/09/17   0515   GLU  105*  81  80   NA  141  142  142   K  4.4  4.7  5.1   CL  104  104  106   CO2  34*  32  33*   BUN  28*  31*  32*   CREA  1.18  1.20  1.08   CA  8.3*  8.4*  8.5   AGAP  3  6  3   BUCR  24*  26*  30*      CBC w/Diff Recent Labs      03/10/17   0001  03/09/17   0515  03/08/17   2320   WBC  9.5  10.8  12.2   RBC  3.17*  3.25*  3.42*   HGB  10.2*  10.4*  10.9*   HCT  32.5*  34.3*  36.1   PLT  224  248  223   GRANS   --    --   72   LYMPH   --    --   12*   EOS   --    --   0      Cardiac Enzymes No results for input(s): CPK, CKND1, ALESSANDRA in the last 72 hours. No lab exists for component: CKRMB, TROIP   Coagulation Recent Labs      03/11/17   0325  03/10/17   0335   PTP  19.9*  18.4*   INR  1.8*  1.6*       Lipid Panel No results found for: CHOL, CHOLPOCT, CHOLX, CHLST, CHOLV, [id number], HDL, LDL, NLDLCT, DLDL, LDLC, DLDLP, [id number], VLDLC, VLDL, TGL, TGLX, TRIGL, [id number], TRIGP, TGLPOCT, [id number], CHHD, CHHDX   BNP No results for input(s): BNPP in the last 72 hours. Liver Enzymes No results for input(s): TP, ALB, TBIL, AP, SGOT, GPT in the last 72 hours.     No lab exists for component: DBIL   Thyroid Studies Lab Results   Component Value Date/Time    TSH 0.31 01/01/2016 10:20 PM        Procedures/imaging: see electronic medical records for all procedures/Xrays and details which were not copied into this note but were reviewed prior to creation of Monica Quiros MD

## 2017-03-11 NOTE — PROGRESS NOTES
Problem: Mobility Impaired (Adult and Pediatric)  Goal: *Acute Goals and Plan of Care (Insert Text)  Physical Therapy Goals  Initiated 2/27/2017 and to be accomplished within 7 day(s)  1. Patient will move from supine to sit and sit to supine , scoot up and down and roll side to side in bed with supervision/set-up. 2. Patient will transfer from bed to chair and chair to bed with minimal assistance/contact guard assist using the least restrictive device. 3. Patient will perform sit to stand with minimal assistance/contact guard assist.  4. Patient will ambulate with minimal assistance/contact guard assist for 50 feet with the least restrictive device. Re-evaluation on 3/6/2017 and to be accomplished within 7 day(s)  1. Patient will move from supine to sit and sit to supine, scoot up and down and roll side to side in bed with supervision/set-up. 2. Patient will transfer from bed to chair and chair to bed with contact guard assist/supervision using the least restrictive device. 3. Patient will perform sit to stand with contact guard assist/supervision. 4. Patient will ambulate with contact guard assist/supervision for 100 feet with the least restrictive device. Outcome: Not Progressing Towards Goal  PHYSICAL THERAPY TREATMENT     Patient: Lydia Issa (88 y.o. female)  Date: 3/11/2017  Diagnosis: CHF NYHA class IV, acute, diastolic ([facility]CC)  CHF NYHA class IV, acute, diastolic ([facility]) Acute on chronic respiratory failure with hypoxia ([facility])       Precautions: Fall (SPO2)   Chart, physical therapy assessment, plan of care and goals were reviewed. ASSESSMENT:  Pt required more assistance to transfer to standing from chair today. Pt angry and not welling to perform any other activity. Fair transition to supine . RN aware of pain and notes that she will soon medicate.    Progression toward goals:  [ ]      Improving appropriately and progressing toward goals  [ ]      Improving slowly and progressing toward goals  [X]      Not making progress toward goals        PLAN:  Patient continues to benefit from skilled intervention to address the above impairments. Continue treatment per established plan of care. Discharge Recommendations:    Further Equipment Recommendations for Discharge:  bedside commode and rolling walker       SUBJECTIVE:   Patient stated I'm not doing anything, except get in the bed. I've been asking her for pain medicine for 2 hours!       OBJECTIVE DATA SUMMARY:   Critical Behavior:  Neurologic State: Alert  Orientation Level: Oriented X4  Cognition: Appropriate decision making  Safety/Judgement: Fall prevention  Functional Mobility Training:  Bed Mobility:  Rolling: Minimum assistance  Supine to Sit: Minimum assistance  Sit to Supine: Moderate assistance  Scooting: Minimum assistance  Transfers:  Sit to Stand: Moderate assistance  Stand to Sit: Minimum assistance  Balance:  Sitting: Intact  Standing: Impaired  Standing - Static: Fair  Standing - Dynamic : Poor  Ambulation/Gait Training:  Distance (ft): 3 Feet (ft)  Assistive Device: Gait belt;Walker, rolling  Ambulation - Level of Assistance: Minimal assistance  Gait Abnormalities: Decreased step clearance  Base of Support: Widened  Speed/Kristin: Slow  Step Length: Left shortened;Right shortened  Pain:  Pain Scale 1: Numeric (0 - 10)  Pain Intensity 1: 5  Pain Location 1: Back  Pain Orientation 1: Lower  Pain Description 1: Aching  Pain Intervention(s) 1: Medication (see MAR)  Activity Tolerance:   Good  Please refer to the flowsheet for vital signs taken during this treatment.   After treatment:   [ ] Patient left in no apparent distress sitting up in chair  [X] Patient left in no apparent distress in bed  [X] Call bell left within reach  [X] Nursing notified  [X] Caregiver present  [ ] Bed alarm activated      Tom Moore PTA   Time Calculation: 20 mins

## 2017-03-11 NOTE — PROGRESS NOTES
Pharmacy Dosing Services: Warfarin    Consult for Warfarin Dosing by Pharmacy by Dr. Gonzalo Marti provided for this 66 y.o.  female , for indication of Atrial Fibrillation. Dose to achieve an INR goal of 2-3    Order entered for  Warfarin 4 mg to be given today at 18:00. Patient also has order for Enoxaparin 110 mg SQ q12h    Significant drug interactions: fluconazole / aspirin 81 mg  LABS    PT/INR Lab Results   Component Value Date/Time    INR 1.8 03/11/2017 03:25 AM      Platelets Lab Results   Component Value Date/Time    PLATELET 357 10/08/0839 12:01 AM      H/H     Lab Results   Component Value Date/Time    HGB 10.2 03/10/2017 12:01 AM        Warfarin Administrations (last 168 hours)       Date/Time Action Medication Dose      03/10/17 1703 Given    warfarin (COUMADIN) tablet 4 mg 4 mg    03/09/17 1729 Given    warfarin (COUMADIN) tablet 4 mg 4 mg    03/08/17 2333 Given    warfarin (COUMADIN) tablet 2 mg 2 mg    03/07/17 1809 Given    warfarin (COUMADIN) tablet 4 mg 4 mg    03/06/17 1816 Given    warfarin (COUMADIN) tablet 4 mg 4 mg    03/05/17 1800 Given    warfarin (COUMADIN) tablet 2.5 mg 2.5 mg    03/04/17 1804 Given    warfarin (COUMADIN) tablet 2 mg 2 mg          Pharmacy to follow daily and will provide subsequent Warfarin dosing based on clinical status.   YA Coleman  Contact information [phone]

## 2017-03-12 LAB
ANION GAP BLD CALC-SCNC: 3 MMOL/L (ref 3–18)
BACTERIA SPEC CULT: ABNORMAL
BACTERIA SPEC CULT: ABNORMAL
BACTERIA SPEC CULT: NORMAL
BUN SERPL-MCNC: 28 MG/DL (ref 7–18)
BUN/CREAT SERPL: 22 (ref 12–20)
CALCIUM SERPL-MCNC: 8.6 MG/DL (ref 8.5–10.1)
CHLORIDE SERPL-SCNC: 106 MMOL/L (ref 100–108)
CO2 SERPL-SCNC: 36 MMOL/L (ref 21–32)
CREAT SERPL-MCNC: 1.25 MG/DL (ref 0.6–1.3)
GLUCOSE BLD STRIP.AUTO-MCNC: 103 MG/DL (ref 70–110)
GLUCOSE BLD STRIP.AUTO-MCNC: 105 MG/DL (ref 70–110)
GLUCOSE BLD STRIP.AUTO-MCNC: 137 MG/DL (ref 70–110)
GLUCOSE BLD STRIP.AUTO-MCNC: 153 MG/DL (ref 70–110)
GLUCOSE SERPL-MCNC: 149 MG/DL (ref 74–99)
GRAM STN SPEC: ABNORMAL
GRAM STN SPEC: ABNORMAL
HCT VFR BLD AUTO: 32.4 % (ref 35–45)
HGB BLD-MCNC: 10.3 G/DL (ref 12–16)
INR PPP: 2.4 (ref 0.8–1.2)
MAGNESIUM SERPL-MCNC: 2.1 MG/DL (ref 1.8–2.4)
PLATELET # BLD AUTO: 212 K/UL (ref 135–420)
POTASSIUM SERPL-SCNC: 4.3 MMOL/L (ref 3.5–5.5)
PROTHROMBIN TIME: 24.7 SEC (ref 11.5–15.2)
SERVICE CMNT-IMP: ABNORMAL
SERVICE CMNT-IMP: NORMAL
SODIUM SERPL-SCNC: 145 MMOL/L (ref 136–145)

## 2017-03-12 PROCEDURE — 74011250636 HC RX REV CODE- 250/636: Performed by: HOSPITALIST

## 2017-03-12 PROCEDURE — 74011250637 HC RX REV CODE- 250/637: Performed by: INTERNAL MEDICINE

## 2017-03-12 PROCEDURE — 80048 BASIC METABOLIC PNL TOTAL CA: CPT | Performed by: INTERNAL MEDICINE

## 2017-03-12 PROCEDURE — 82962 GLUCOSE BLOOD TEST: CPT

## 2017-03-12 PROCEDURE — 65610000006 HC RM INTENSIVE CARE

## 2017-03-12 PROCEDURE — 74011250636 HC RX REV CODE- 250/636: Performed by: INTERNAL MEDICINE

## 2017-03-12 PROCEDURE — 83735 ASSAY OF MAGNESIUM: CPT | Performed by: INTERNAL MEDICINE

## 2017-03-12 PROCEDURE — 94660 CPAP INITIATION&MGMT: CPT

## 2017-03-12 PROCEDURE — 85049 AUTOMATED PLATELET COUNT: CPT | Performed by: INTERNAL MEDICINE

## 2017-03-12 PROCEDURE — 36415 COLL VENOUS BLD VENIPUNCTURE: CPT | Performed by: INTERNAL MEDICINE

## 2017-03-12 PROCEDURE — 74011000250 HC RX REV CODE- 250: Performed by: INTERNAL MEDICINE

## 2017-03-12 PROCEDURE — 87040 BLOOD CULTURE FOR BACTERIA: CPT | Performed by: INTERNAL MEDICINE

## 2017-03-12 PROCEDURE — 74011250637 HC RX REV CODE- 250/637: Performed by: HOSPITALIST

## 2017-03-12 PROCEDURE — 85610 PROTHROMBIN TIME: CPT | Performed by: INTERNAL MEDICINE

## 2017-03-12 PROCEDURE — 77010033711 HC HIGH FLOW OXYGEN

## 2017-03-12 PROCEDURE — 94640 AIRWAY INHALATION TREATMENT: CPT

## 2017-03-12 PROCEDURE — 85018 HEMOGLOBIN: CPT | Performed by: INTERNAL MEDICINE

## 2017-03-12 RX ORDER — QUETIAPINE FUMARATE 25 MG/1
25 TABLET, FILM COATED ORAL
Status: DISCONTINUED | OUTPATIENT
Start: 2017-03-12 | End: 2017-03-24 | Stop reason: HOSPADM

## 2017-03-12 RX ORDER — WARFARIN 1 MG/1
1 TABLET ORAL ONCE
Status: COMPLETED | OUTPATIENT
Start: 2017-03-12 | End: 2017-03-12

## 2017-03-12 RX ORDER — MORPHINE SULFATE 2 MG/ML
2 INJECTION, SOLUTION INTRAMUSCULAR; INTRAVENOUS ONCE
Status: COMPLETED | OUTPATIENT
Start: 2017-03-12 | End: 2017-03-12

## 2017-03-12 RX ORDER — LORAZEPAM 2 MG/ML
1 INJECTION INTRAMUSCULAR ONCE
Status: COMPLETED | OUTPATIENT
Start: 2017-03-12 | End: 2017-03-12

## 2017-03-12 RX ADMIN — HYDROCODONE BITARTRATE AND ACETAMINOPHEN 1 TABLET: 5; 325 TABLET ORAL at 04:28

## 2017-03-12 RX ADMIN — LORAZEPAM 1 MG: 2 INJECTION, SOLUTION INTRAMUSCULAR; INTRAVENOUS at 00:21

## 2017-03-12 RX ADMIN — ENOXAPARIN SODIUM 110 MG: 120 INJECTION SUBCUTANEOUS at 04:27

## 2017-03-12 RX ADMIN — ACETAMINOPHEN 650 MG: 325 TABLET ORAL at 15:05

## 2017-03-12 RX ADMIN — IPRATROPIUM BROMIDE AND ALBUTEROL SULFATE 3 ML: .5; 3 SOLUTION RESPIRATORY (INHALATION) at 20:16

## 2017-03-12 RX ADMIN — INSULIN LISPRO 5 UNITS: 100 INJECTION, SOLUTION INTRAVENOUS; SUBCUTANEOUS at 21:39

## 2017-03-12 RX ADMIN — DIGOXIN 0.12 MG: 0.12 TABLET ORAL at 08:20

## 2017-03-12 RX ADMIN — BUDESONIDE 500 MCG: 0.5 INHALANT RESPIRATORY (INHALATION) at 07:48

## 2017-03-12 RX ADMIN — PANTOPRAZOLE SODIUM 40 MG: 40 TABLET, DELAYED RELEASE ORAL at 08:18

## 2017-03-12 RX ADMIN — DOCUSATE SODIUM 100 MG: 100 CAPSULE, LIQUID FILLED ORAL at 08:18

## 2017-03-12 RX ADMIN — FUROSEMIDE 40 MG: 10 INJECTION, SOLUTION INTRAMUSCULAR; INTRAVENOUS at 08:23

## 2017-03-12 RX ADMIN — NYSTATIN: 100000 POWDER TOPICAL at 08:27

## 2017-03-12 RX ADMIN — DILTIAZEM HYDROCHLORIDE 30 MG: 30 TABLET, FILM COATED ORAL at 08:21

## 2017-03-12 RX ADMIN — NYSTATIN: 100000 POWDER TOPICAL at 21:34

## 2017-03-12 RX ADMIN — VANCOMYCIN HYDROCHLORIDE 2000 MG: 10 INJECTION, POWDER, LYOPHILIZED, FOR SOLUTION INTRAVENOUS at 12:12

## 2017-03-12 RX ADMIN — ASPIRIN 81 MG CHEWABLE TABLET 81 MG: 81 TABLET CHEWABLE at 08:18

## 2017-03-12 RX ADMIN — BUDESONIDE 500 MCG: 0.5 INHALANT RESPIRATORY (INHALATION) at 20:16

## 2017-03-12 RX ADMIN — FLUCONAZOLE 100 MG: 100 TABLET ORAL at 11:59

## 2017-03-12 RX ADMIN — WARFARIN SODIUM 1 MG: 1 TABLET ORAL at 17:30

## 2017-03-12 RX ADMIN — ANTACID TABLETS 200 MG: 500 TABLET, CHEWABLE ORAL at 11:59

## 2017-03-12 RX ADMIN — ENOXAPARIN SODIUM 110 MG: 120 INJECTION SUBCUTANEOUS at 15:03

## 2017-03-12 RX ADMIN — POTASSIUM CHLORIDE 20 MEQ: 20 TABLET, EXTENDED RELEASE ORAL at 08:18

## 2017-03-12 RX ADMIN — ARFORMOTEROL TARTRATE 15 MCG: 15 SOLUTION RESPIRATORY (INHALATION) at 07:48

## 2017-03-12 RX ADMIN — CARVEDILOL 25 MG: 12.5 TABLET, FILM COATED ORAL at 08:20

## 2017-03-12 RX ADMIN — ARFORMOTEROL TARTRATE 15 MCG: 15 SOLUTION RESPIRATORY (INHALATION) at 20:16

## 2017-03-12 RX ADMIN — INSULIN GLARGINE 20 UNITS: 100 INJECTION, SOLUTION SUBCUTANEOUS at 21:35

## 2017-03-12 RX ADMIN — ANTACID TABLETS 200 MG: 500 TABLET, CHEWABLE ORAL at 17:30

## 2017-03-12 RX ADMIN — ANTACID TABLETS 200 MG: 500 TABLET, CHEWABLE ORAL at 08:18

## 2017-03-12 RX ADMIN — Medication 2 MG: at 00:22

## 2017-03-12 NOTE — PROGRESS NOTES
1925- Report and care received, assessment completed per flow sheet. Alert, reports anxiety and pain. Dr. Aminta Hernandez updated, orders received for a one time dose of ativan and morphine. 2100-NAD, resting quietly with eyes closed. 0010- Reassessment completed and without change. Anxious, c/o bilateral leg pain. See MAR. Ativan and morphine administered per orders. 0200- Daylight savings. Clock ahead 1 hour. 6197- Reassessment completed and without change.

## 2017-03-12 NOTE — PROGRESS NOTES
Pharmacy Dosing Services: Warfarin    Consult for Warfarin Dosing by Pharmacy by Dr. Herbert Ramírez provided for this 66 y.o.  female , for indication of Atrial Fibrillation. Dose to achieve an INR goal of 2-3    INR = 2.4  (increased from 1.8 on 2/11/17)   Order entered for  Warfarin 1 mg to be given today at 18:00. Pt has order for Enoxaparin 110 mg SQ q12h    Recommendation for MD:  If INR remains therapeutic on 3/13/17, consider discontinuing Enoxaparin     Significant drug interactions: fluconazole  LABS    PT/INR Lab Results   Component Value Date/Time    INR 2.4 03/12/2017 06:00 AM      Platelets Lab Results   Component Value Date/Time    PLATELET 414 64/06/3381 04:28 AM      H/H     Lab Results   Component Value Date/Time    HGB 10.3 03/12/2017 04:28 AM        Warfarin Administrations (last 168 hours)       Date/Time Action Medication Dose      03/11/17 1813 Given    warfarin (COUMADIN) tablet 4 mg 4 mg    03/10/17 1703 Given    warfarin (COUMADIN) tablet 4 mg 4 mg    03/09/17 1729 Given    warfarin (COUMADIN) tablet 4 mg 4 mg    03/08/17 2333 Given    warfarin (COUMADIN) tablet 2 mg 2 mg    03/07/17 1809 Given    warfarin (COUMADIN) tablet 4 mg 4 mg    03/06/17 1816 Given    warfarin (COUMADIN) tablet 4 mg 4 mg    03/05/17 1800 Given    warfarin (COUMADIN) tablet 2.5 mg 2.5 mg          Pharmacy to follow daily and will provide subsequent Warfarin dosing based on clinical status.   YA Eid  Contact information [phone]

## 2017-03-12 NOTE — PROGRESS NOTES
Problem: Mobility Impaired (Adult and Pediatric)  Goal: *Acute Goals and Plan of Care (Insert Text)  Physical Therapy Goals  Initiated 2/27/2017 and to be accomplished within 7 day(s)  1. Patient will move from supine to sit and sit to supine , scoot up and down and roll side to side in bed with supervision/set-up. 2. Patient will transfer from bed to chair and chair to bed with minimal assistance/contact guard assist using the least restrictive device. 3. Patient will perform sit to stand with minimal assistance/contact guard assist.  4. Patient will ambulate with minimal assistance/contact guard assist for 50 feet with the least restrictive device. Re-evaluation on 3/6/2017 and to be accomplished within 7 day(s)  1. Patient will move from supine to sit and sit to supine, scoot up and down and roll side to side in bed with supervision/set-up. 2. Patient will transfer from bed to chair and chair to bed with contact guard assist/supervision using the least restrictive device. 3. Patient will perform sit to stand with contact guard assist/supervision. 4. Patient will ambulate with contact guard assist/supervision for 100 feet with the least restrictive device. Outcome: Not Progressing Towards Goal  PT session held due to:  [X]  Pt sleeping with Bi-PAP  [ ]  RN Communication/ suggestion  [ ]  Extreme Pain  [ ]  Dialysis treatment in progress. RT to remove in near future. Will f/u later as schedule allows. Thank you.   Maggie Coe, PTA

## 2017-03-12 NOTE — PROGRESS NOTES
0730- Bedside and Verbal shift change report given to ATIYA Machuca (oncoming nurse) by Margarita Slade RN (offgoing nurse). Patient currently in A Fib. Patient bradycardic overnight. Patient currently on bipap. Initial shift assessment complete. [atient sleeping between care. Patient placed on hi-flow by RT. Will continue to monitor. 0800- Notified Dr Renaldo Vasquez of critical lab: MRSA in blood cultures from 3/9. Orders received. 1100- Dr Renaldo Vasquez at bedside. Verbal orders received to hold carvedilol, diltiazem, and digoxin for HR <60.     1200- Reassessment complete. Will continue to monitor. Patient back on bipap at this time. 1600- Reassessment complete. Patient sleeping between care. Patient currently on hi-flow. Family at bedside. 1910- Bedside and Verbal shift change report given to AZIZA Kurtz RN (oncoming nurse) by ATIYA Machuca (offgoing nurse). Report included the following information SBAR, Kardex, Intake/Output, MAR, Recent Results and Cardiac Rhythm A Fib.

## 2017-03-12 NOTE — ROUTINE PROCESS
@0800: plan of day reviewed, pt sated want to get up with P/T.  @0900: up in chair with assistance. advised to sit up with both leg up.  @1230: insulin as ordered and lunch offered with sit in chair. @1400: family at bedside advised to ask questions. @1600: no change noted. @1700: requesting candi villar, explained to ask MD get order. Dr. Isiah Mendiola called order received, pharmacy don't carry candi-aurea t Tums ok ordered. @1900: Bedside and Verbal shift change report given to [address] (oncoming nurse) by Sonu Garcia (offgoing nurse). Report included the following information Kardex, Intake/Output, MAR, Recent Results and Cardiac Rhythm JOEY Christie

## 2017-03-12 NOTE — PROGRESS NOTES
[facility]  Pulmonary, Critical Care, and Sleep Medicine    Name: Delta Horvath MRN: [id number]   : 1945 Hospital: [facility] [place]   Date: 3/12/2017  Room: 110/01     Subjective:   3/12  Continue to feel better. Blood culture positive for MRSA repeat culture pending  Tolerating the bipap overnight. On HFNC currently. Delta Horvath is a 66 y.o. female with IMANI/OHS, pulmonary HTN, and COPD who came in after being unable to get her BiPAP at the SNF and had increasing leg edema. She had some respiratory distress and was therefore brought to the ED. She was placed on BiPAP when she came into the ED for the night and did well and was able to be transitioned to HFNC. She has been receiving diuresis and has since been transferred to the floor as she no longer required ICU level of care.        Past Medical History:   Diagnosis Date    A-fib [facility])     Arthritis     Back injury     [facility]     Diabetes ([facility]     Fibromyalgia     Heart failure ([facility]     Hypertension     MRSA (methicillin resistant Staphylococcus aureus)     Obesity hypoventilation syndrome ([facility] 2017    Pulmonary hypertension, moderate to severe (HCC) 2017       Current Facility-Administered Medications   Medication Dose Route Frequency    QUEtiapine (SEROquel) tablet 50 mg  50 mg Oral QHS    furosemide (LASIX) injection 40 mg  40 mg IntraVENous DAILY    calcium carbonate (TUMS) chewable tablet 200 mg [elemental]  200 mg Oral QID WITH MEALS    potassium chloride (K-DUR, KLOR-CON) SR tablet 20 mEq  20 mEq Oral DAILY    lidocaine (LIDODERM) 5 % patch 1 Patch  1 Patch TransDERmal Q24H    enoxaparin (LOVENOX) injection 110 mg  110 mg SubCUTAneous Q12H    fluconazole (DIFLUCAN) tablet 100 mg  100 mg Oral DAILY    insulin lispro (HUMALOG) injection 5 Units  5 Units SubCUTAneous TIDAC    HYDROcodone-acetaminophen (NORCO) 5-325 mg per tablet 1 Tab  1 Tab Oral Q6H PRN    insulin glargine (LANTUS) injection 20 Units  20 Units SubCUTAneous QHS    digoxin (LANOXIN) tablet 0.125 mg  0.125 mg Oral DAILY    carvedilol (COREG) tablet 25 mg  25 mg Oral BID WITH MEALS    dilTIAZem (CARDIZEM) IR tablet 30 mg  30 mg Oral TIDAC    ELECTROLYTE REPLACEMENT PROTOCOL  1 Each Other PRN    ELECTROLYTE REPLACEMENT PROTOCOL  1 Each Other PRN    pantoprazole (PROTONIX) tablet 40 mg  40 mg Oral ACB    polyvinyl alcohol (LIQUIFILM TEARS) 1.4 % ophthalmic solution 1 Drop  1 Drop Both Eyes PRN    nystatin (MYCOSTATIN) 100,000 unit/gram powder   Topical BID    docusate sodium (COLACE) capsule 100 mg  100 mg Oral BID    simvastatin (ZOCOR) tablet 10 mg  10 mg Oral QHS    aspirin chewable tablet 81 mg  81 mg Oral DAILY    arformoterol (BROVANA) neb solution 15 mcg  15 mcg Nebulization BID RT    budesonide (PULMICORT) 500 mcg/2 ml nebulizer suspension  500 mcg Nebulization BID RT    albuterol-ipratropium (DUO-NEB) 2.5 MG-0.5 MG/3 ML  3 mL Nebulization Q4H PRN    ondansetron (ZOFRAN) injection 4 mg  4 mg IntraVENous Q6H PRN    acetaminophen (TYLENOL) tablet 650 mg  650 mg Oral Q4H PRN    insulin lispro (HUMALOG) injection   SubCUTAneous AC&HS    glucose chewable tablet 16 g  4 Tab Oral PRN    glucagon (GLUCAGEN) injection 1 mg  1 mg IntraMUSCular PRN    dextrose (D50W) injection syrg 12.5-25 g  25-50 mL IntraVENous PRN    warfarin pharmacy to dose   Other PRN       Allergies   Allergen Reactions    Latex Hives    Adhesive Tape-Silicones Unknown (comments)    Bees [Sting, Bee] Unknown (comments)    Garlic Nausea and Vomiting    Zoloft [Sertraline] Hives            Objective:   Vital Signs:    Visit Vitals    /46    Pulse 63    Temp 98.1 °F (36.7 °C)    Resp 22    Ht 5' 4.96\" (1.65 m)    Wt 106.7 kg (235 lb 3.7 oz)    SpO2 98%    Breastfeeding No    BMI 39.19 kg/m2       O2 Device: Hi flow nasal cannula   O2 Flow Rate (L/min): 40 l/min   Temp (24hrs), Av.7 °F (36.5 °C), Min:97.5 °F (36.4 °C), Max:98.1 °F (36.7 °C)       Intake/Output:     Intake/Output Summary (Last 24 hours) at 03/12/17 1107  Last data filed at 03/12/17 0500   Gross per 24 hour   Intake              360 ml   Output              800 ml   Net             -440 ml           Physical Exam:   General: resting on hfnc  Neck: Trachea midline, no significant jvd  CVS: Regular rate; no audible murmur  RS: Mod AE bilaterally, decreased sounds in bases but clear to auscultation. Abd: soft, non-tender  Neuro: awake, alert, follows basic commands  Extrm: 2+ lower extremity edema. No cyanosis.    Skin: warm, dry    Data review:   Labs:  Recent Results (from the past 12 hour(s))   MAGNESIUM    Collection Time: 03/12/17  4:28 AM   Result Value Ref Range    Magnesium 2.1 1.8 - 2.4 mg/dL   METABOLIC PANEL, BASIC    Collection Time: 03/12/17  4:28 AM   Result Value Ref Range    Sodium 145 136 - 145 mmol/L    Potassium 4.3 3.5 - 5.5 mmol/L    Chloride 106 100 - 108 mmol/L    CO2 36 (H) 21 - 32 mmol/L    Anion gap 3 3.0 - 18 mmol/L    Glucose 149 (H) 74 - 99 mg/dL    BUN 28 (H) 7.0 - 18 MG/DL    Creatinine 1.25 0.6 - 1.3 MG/DL    BUN/Creatinine ratio 22 (H) 12 - 20      GFR est AA 51 (L) >60 ml/min/1.73m2    GFR est non-AA 42 (L) >60 ml/min/1.73m2    Calcium 8.6 8.5 - 10.1 MG/DL   HGB & HCT    Collection Time: 03/12/17  4:28 AM   Result Value Ref Range    HGB 10.3 (L) 12.0 - 16.0 g/dL    HCT 32.4 (L) 35.0 - 45.0 %   PLATELET    Collection Time: 03/12/17  4:28 AM   Result Value Ref Range    PLATELET 009 495 - 568 K/uL   PROTHROMBIN TIME + INR    Collection Time: 03/12/17  6:00 AM   Result Value Ref Range    Prothrombin time 24.7 (H) 11.5 - 15.2 sec    INR 2.4 (H) 0.8 - 1.2     GLUCOSE, POC    Collection Time: 03/12/17  6:47 AM   Result Value Ref Range    Glucose (POC) 137 (H) 70 - 110 mg/dL       ABG:    No results found for: PH, PHI, PCO2, PCO2I, PO2, PO2I, HCO3, HCO3I, FIO2, FIO2I     3/12/2017 10:18 AM - Pardeep, Lab In [facility]       Component Results      Component Value Flag Ref Range Units Status     Special Requests: NO SPECIAL REQUESTS     Final     GRAM STAIN AEROBIC BOTTLE   GRAM POSITIVE COCCI   IN CLUSTERS        Final     GRAM STAIN      Final     SMEAR CALLED TO AND CORRECTLY REPEATED BY:   Alfonso Tate RN ICU ON 3/10/2017 1513 TO 5078        Culture result:  (A)    Final     AEROBIC BOTTLE   **METHICILLIN RESISTANT STAPHYLOCOCCUS AUREUS**        Culture result:      Final     CALLED TO AND CORRECTLY REPEATED BY:   Rosy Davis RN ICU ON 3/12/2017 0731 TO 2602          Culture & Susceptibility      METHICILLIN - RESISTANT STAPHYLOCOCCUS AUREUS      Antibiotic Sensitivity PAUL Unit Status     Ampicillin/sulbactam ($) Resistant  ug/mL Final     Method: PAUL     Cefazolin ($) Resistant  ug/mL Final     Method: PAUL     Clindamycin ($) Susceptible <=0.25 ug/mL Final     Method: PAUL     Erythromycin ($$$$) Resistant >=8 ug/mL Final     Method: PAUL     Gentamicin ($) Susceptible <=0.5 ug/mL Final     Method: PAUL     Levofloxacin ($) Susceptible 1 ug/mL Final     Method: PAUL     Oxacillin Resistant >=4 ug/mL Final     Method: PAUL     Penicillin G ($$) Resistant >=0.5 ug/mL Final     Method: PAUL     Rifampin ($$$$) Susceptible <=0.5 ug/mL Final     Method: PAUL     Tetracycline Susceptible <=1 ug/mL Final     Method: PAUL     Tigecycline ($$$$) Susceptible <=0.12 ug/mL Final     Method: PAUL     Trimeth-Sulfamethoxa Susceptible <=10 ug/mL Final     Method: PAUL     Vancomycin ($) Susceptible <=0.5 ug/mL Final     Method: PAUL                   Lab and Collection      CULTURE, BLOOD (Order #[id number]) on 3/9/2017 - Lab and Collection Information       Result History      CULTURE, BLOOD (Order #[id number]) on 3/12/2017 - Order Result History Report       Specimen Information      Blood          Collected: 3/9/2017 1846               Final Report Date/time         Reported date Reported time     Mar 12, 2017 10:18 EDT     Provider Information      Ordering User Ordering Provider Authorizing Provider     MD Zora Mosley MD Armandina Murphy, MD     Attending Provider(s) Admitting Provider PCP     Mango Mitchell MD; MD Marina Steele MD Gwendel Hence., MD       Lab Information      Lab      Yuki Donohue.  Miguel Pepper,   [address] [place] [address]                      Lab Inquiry View [facility] Lab Information   How to build your own SmartLinks      CULTURE, BLOOD (Order #[id number]) on 3/9/17       CULTURE, BLOOD [[id number]] (Order [id number])   Microbiology    Date and Time: 3/9/2017  4:10 PM   Department: THE [place] [id number] INTENSIVE CARE   Released By/Authorizing: Zora Caba MD (auto-released)         Order Providers      Authorizing     Zora Caba       Order Information      Order Date/Time Release Date/Time Start Date/Time End Date/Time     03/09/17 04:10 PM 03/09/17 04:10 PM 03/09/17 04:15 PM 03/09/17 04:15 PM         CSN:     [id number]       Order Details      Frequency Duration Priority Order Class     ONE TIME 1  occurrence Routine Lab Collect       Reprint OrderRequisition - Outpatient Only      CULTURE, BLOOD (Order #[id number]) on 3/9/17       Original Order      Ordered On Ordered By        3/9/2017 4:10 PM Zora Caba MD               Collection Information      Accession #: [id number]    SpecimenType: Blood    Blood        Collected: 3/9/2017  6:46 PM    Resulting Agency: [facility]          Acknowledgement Info      For At Acknowledged By Acknowledged On     Placing Order 03/09/17 [address] Lin, [address] [place] [address] 03/09/17 1712       Additional Information      Associated Reports     View Encounter     Priority and Order Details     Collection Information       Medication Detail         Disp Refills Start End        CULTURE, BLOOD   3/9/2017 3/9/2017      Sig: One time.      Class: Lab Collect            NPI Information            Electronically signed by Marck Rodriguez Provider: Fred Leyva [id number]  Order start date/time: 3/9/2017 4:15 PM  Electronically signed by Co-signing Provider (if required):                  Imaging:  No new imaging 3/4    IMPRESSION:   · Acute hypercapneic respiratory failure. I think this time patient's bicarb dropped significantly compare to prior ABG likely cause of her symptoms As I suspect her baseline Pco2 is high 60s. · Acute metabolic acidosis without adequate resp compensation. Possibly due to recent diamox use. resolving  · Moderate pulmonary HTN  · OHS,    · Obesity  ·  MRSA bacteremia      RECOMMENDATIONS:   · Will repeat the culture  · Start the vancomycin  · continue Brovana and Pulmicort; duoneb prn  · Continue IV lasix for now avoid diamox on this patient. Patient's baseline HCO3 should be closer to 40s  Patients baseline PCO2 should be closer to high 60s or low 70s.   · Transition to HFNC today and bipap at night  · Lidocaine patch to left hip

## 2017-03-12 NOTE — PROGRESS NOTES
Hospitalist Progress Note    Patient: Jorge Luis Anglin MRN: [id number]  CSN: [id number]    YOB: 1945  Age: 66 y.o. Sex: female    DOA: 2/25/2017 LOS:  LOS: 14 days          Chief Complaint:  Diastolic CHF exacerbation          Assessment/Plan       Patient Active Problem List   Diagnosis Code    Acute on chronic diastolic CHF (congestive heart failure) ([facility]) I50.33    ALEJANDRINA (acute kidney injury) ([facility] N17.9    Acute exacerbation of CHF (congestive heart failure) ([facility]) I50.9    DM [facility] mellitus[facility] E11.9    Hypertension I10    Respiratory failure ([facility]) J96.90    Acute coronary syndrome ([facility]) I24.9    Obstructive sleep apnea, adult G47.33    Paroxysmal atrial fibrillation ([facility]) I48.0    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 ([facility]) L16.09, E11.65    Dyspnea due to congestive heart failure ([facility]) I50.9    Aspiration pneumonia ([facility]) J69.0    Hyperkalemia E87.5    Elevated troponin R79.89    COPD (chronic obstructive pulmonary disease) ([facility]) J44.9    CHF (congestive heart failure) ([facility]) I50.9    Insufficiency, respiratory, acute ([facility]) R06.89    Infiltrate noted on imaging study R93.8    COPD exacerbation ([facility]) J44.1    Acute on chronic respiratory failure with hypoxia ([facility]) J96.21    Chest pain R07.9    Anxiety F41.9    UTI (urinary tract infection) N39.0    Obesity hypoventilation syndrome ([facility]) E66.2    Pulmonary hypertension, moderate to severe ([facility]) I27.2     1. Acute hypoxemic respiratory failure due to decomensated diastolic CHF, improved. She is on lasix, digoxin, bipap at night consistently (currently on high flow O2). +0.4L out over 24 hrs. Still has some edema on exam. Continue diuresis, breathing treatment and antibiotics. Wean HF O2 as intolerated to keep sats > 93%  2. Pulmonary HTN  3. Morbid obesity hypoventilation syndrome/IMANI requiring bipap  4. UTI-completed treatment-on diflucan for vaginitis  5.  Afib, rate better controlled-INR is down (2.4), goal 2-3 on coumadin-continue lovenox bridge  6. IDDM 2 w hyperglycemia-good glycemic control; continue premeal insulin  7. Lidoderm patch   8. MRSA Bacteremia-Start Vancomycin      Subjective:    \"Why am I shaking\"    Review of systems:    Constitutional: denies fevers, + chills, myalgias  Respiratory: denies SOB, cough  Cardiovascular: denies chest pain, palpitations  Gastrointestinal: denies nausea, vomiting, diarrhea      Vital signs/Intake and Output:  Visit Vitals    /46    Pulse 63    Temp 98.1 °F (36.7 °C)    Resp 22    Ht 5' 4.96\" (1.65 m)    Wt 106.7 kg (235 lb 3.7 oz)    SpO2 98%    Breastfeeding No    BMI 39.19 kg/m2     Current Shift:     Last three shifts:  03/10 1901 - 03/12 0700  In: 840 [P.O.:840]  Out: 1550 [Urine:1550]    Exam:    General: Well developed, alert, NAD, OX3  Head/Neck: NCAT, supple, No masses, No lymphadenopathy  CVS:Regular rate and rhythm, no M/R/G, S1/S2 heard, no thrill  Lungs:Clear to auscultation bilaterally, no wheezes, rhonchi, or rales  Abdomen: Soft, Nontender, No distention, Normal Bowel sounds, No hepatomegaly  Extremities: 1+ bilat CRISS, No C/C, pulses palpable 2+  Skin:normal texture and turgor, no rashes, no lesions  Neuro:grossly normal , follows commands  Psych:appropriate                Labs: Results:       Chemistry Recent Labs      03/12/17 0428 03/11/17   0325  03/10/17   0335   GLU  149*  105*  81   NA  145  141  142   K  4.3  4.4  4.7   CL  106  104  104   CO2  36*  34*  32   BUN  28*  28*  31*   CREA  1.25  1.18  1.20   CA  8.6  8.3*  8.4*   AGAP  3  3  6   BUCR  22*  24*  26*      CBC w/Diff Recent Labs      03/12/17 0428  03/10/17   0001   WBC   --   9.5   RBC   --   3.17*   HGB  10.3*  10.2*   HCT  32.4*  32.5*   PLT  212  224      Cardiac Enzymes No results for input(s): CPK, CKND1, ALESSANDRA in the last 72 hours.     No lab exists for component: CKRMB, TROIP   Coagulation Recent Labs      03/12/17   0600 03/11/17   0325   PTP  24.7*  19.9*   INR  2.4*  1.8*       Lipid Panel No results found for: CHOL, CHOLPOCT, CHOLX, CHLST, CHOLV, [id number], HDL, LDL, NLDLCT, DLDL, LDLC, DLDLP, 277923, VLDLC, VLDL, TGL, TGLX, TRIGL, [id number], TRIGP, TGLPOCT, [id number], CHHD, CHHDX   BNP No results for input(s): BNPP in the last 72 hours. Liver Enzymes No results for input(s): TP, ALB, TBIL, AP, SGOT, GPT in the last 72 hours.     No lab exists for component: DBIL   Thyroid Studies Lab Results   Component Value Date/Time    TSH 0.31 01/01/2016 10:20 PM        Procedures/imaging: see electronic medical records for all procedures/Xrays and details which were not copied into this note but were reviewed prior to creation of Katharine Severe, MD

## 2017-03-12 NOTE — PROGRESS NOTES
1930- Report and care received, assessment completed per flow sheet. Alert with frequent periods of drowsiness, sitting up in bed with  at bedside. Family states they were feeding her when she began coughing vigorously. SpO2 WNL, respirations unlabored, tachypneic at times, lungs with expiratory wheezing, RT called for nebulizer. Advised that we would maintain an NPO status for now secondary to possible aspiration. Will update MD.    2005- Mandan Chouteau updated regarding possible aspiration, NPO and swallow evaluation orders received. 2040- RT in for nebulizer, reported at completion SpO2 down to 88% requiring BiPAP application. Currently SpO2 up to 98% and resting quietly. 2200-NAD, incontinence care completed, respirations regular and unlabored, remains on BiPAP.     2300- Reassessment completed and without change. 0050- Alert, requesting medication to assist with sleep. Was informed by day RN that she slept most of the day and was very drowsy. Discussed the fact that she slept last night and entire day and that this is the first time she has been fully alert in that time. States \"So am I supposed to be awake all night? \", requests MD be called for medication.  updated, orders received for a one time dose of benadryl. 0300- Resting quietly with eyes closed, FLACC=0, NAD, respirations unlabored, remains on BiPAP. Reassessment deferred to promote sleep. 0280- Awake, alert, reassessment completed per flow sheet. Norco administered for c/o pain, see MAR.    0720- C/o nausea, FSBS checked = 76.  paged to discuss NPO status and glucose of 76, zofran administered per orders. Report given to Melania. Anupama RN to follow up with MD regarding low glucose.

## 2017-03-12 NOTE — PROGRESS NOTES
Problem: Mobility Impaired (Adult and Pediatric)  Goal: *Acute Goals and Plan of Care (Insert Text)  Physical Therapy Goals  Initiated 2/27/2017 and to be accomplished within 7 day(s)  1. Patient will move from supine to sit and sit to supine , scoot up and down and roll side to side in bed with supervision/set-up. 2. Patient will transfer from bed to chair and chair to bed with minimal assistance/contact guard assist using the least restrictive device. 3. Patient will perform sit to stand with minimal assistance/contact guard assist.  4. Patient will ambulate with minimal assistance/contact guard assist for 50 feet with the least restrictive device. Re-evaluation on 3/6/2017 and to be accomplished within 7 day(s)  1. Patient will move from supine to sit and sit to supine, scoot up and down and roll side to side in bed with supervision/set-up. 2. Patient will transfer from bed to chair and chair to bed with contact guard assist/supervision using the least restrictive device. 3. Patient will perform sit to stand with contact guard assist/supervision. 4. Patient will ambulate with contact guard assist/supervision for 100 feet with the least restrictive device. Outcome: Not Progressing Towards Goal  Pt refused PT due to:  [ ]  Nausea/vomiting  [ ]  Eating  [ ]  Pain  [X]  Pt lethargic (wakes, but only long enough to refuse)  [ ]  Off Unit  Will f/u tomorrow. Thank you.   Kimber Houser, PTA

## 2017-03-12 NOTE — PROGRESS NOTES
Pharmacy Dosing Services: Vancomycin    Consult for Vancomycin Dosing by Pharmacy by Dr. Hina Ann provided for this 66y.o. year old female , for indication of MRSA Bacteremia. Day of Therapy 1    Ht Readings from Last 1 Encounters:   03/07/17 165 cm (64.96\")        Wt Readings from Last 1 Encounters:   03/12/17 106.7 kg (235 lb 3.7 oz)      Significant Cultures (3/9/17)Blood culture: MRSA   Serum Creatinine Lab Results   Component Value Date/Time    Creatinine 1.25 03/12/2017 04:28 AM      Creatinine Clearance Estimated Creatinine Clearance: 49.3 mL/min (based on Cr of 1.25). BUN Lab Results   Component Value Date/Time    BUN 28 03/12/2017 04:28 AM      WBC Lab Results   Component Value Date/Time    WBC 9.5 03/10/2017 12:01 AM      H/H Lab Results   Component Value Date/Time    HGB 10.3 03/12/2017 04:28 AM      Platelets Lab Results   Component Value Date/Time    PLATELET 451 72/58/9981 04:28 AM      Temp 98.3 °F (36.8 °C)     Start Vancomycin therapy, with loading dose of 2000 mg IV once, scheduled for 3/12/17 at 12:00. Follow with maintenance dose of Vancomycin 1750 mg IV every 24 hours, starting 3/13/17 at 12:00. Dose calculated to approximate a therapeutic trough of 15 - 20 mcg/mL. Pharmacy to follow daily and will make changes to dose and/or frequency based on clinical status.   Pharmacist Nathalia Roberts, [address] [facility]

## 2017-03-13 LAB
ANION GAP BLD CALC-SCNC: 5 MMOL/L (ref 3–18)
BUN SERPL-MCNC: 23 MG/DL (ref 7–18)
BUN/CREAT SERPL: 20 (ref 12–20)
CALCIUM SERPL-MCNC: 8.8 MG/DL (ref 8.5–10.1)
CHLORIDE SERPL-SCNC: 108 MMOL/L (ref 100–108)
CO2 SERPL-SCNC: 33 MMOL/L (ref 21–32)
CREAT SERPL-MCNC: 1.15 MG/DL (ref 0.6–1.3)
GLUCOSE BLD STRIP.AUTO-MCNC: 103 MG/DL (ref 70–110)
GLUCOSE BLD STRIP.AUTO-MCNC: 231 MG/DL (ref 70–110)
GLUCOSE BLD STRIP.AUTO-MCNC: 76 MG/DL (ref 70–110)
GLUCOSE BLD STRIP.AUTO-MCNC: 79 MG/DL (ref 70–110)
GLUCOSE SERPL-MCNC: 71 MG/DL (ref 74–99)
INR PPP: 2.7 (ref 0.8–1.2)
MAGNESIUM SERPL-MCNC: 2.2 MG/DL (ref 1.8–2.4)
POTASSIUM SERPL-SCNC: 4.7 MMOL/L (ref 3.5–5.5)
PROTHROMBIN TIME: 27.1 SEC (ref 11.5–15.2)
SODIUM SERPL-SCNC: 146 MMOL/L (ref 136–145)

## 2017-03-13 PROCEDURE — 97535 SELF CARE MNGMENT TRAINING: CPT

## 2017-03-13 PROCEDURE — 74011000258 HC RX REV CODE- 258: Performed by: INTERNAL MEDICINE

## 2017-03-13 PROCEDURE — 77010033711 HC HIGH FLOW OXYGEN

## 2017-03-13 PROCEDURE — 74011250636 HC RX REV CODE- 250/636: Performed by: INTERNAL MEDICINE

## 2017-03-13 PROCEDURE — 83735 ASSAY OF MAGNESIUM: CPT | Performed by: INTERNAL MEDICINE

## 2017-03-13 PROCEDURE — 74011250637 HC RX REV CODE- 250/637: Performed by: INTERNAL MEDICINE

## 2017-03-13 PROCEDURE — 97530 THERAPEUTIC ACTIVITIES: CPT

## 2017-03-13 PROCEDURE — 97116 GAIT TRAINING THERAPY: CPT

## 2017-03-13 PROCEDURE — 74011250637 HC RX REV CODE- 250/637: Performed by: HOSPITALIST

## 2017-03-13 PROCEDURE — 65610000006 HC RM INTENSIVE CARE

## 2017-03-13 PROCEDURE — 80048 BASIC METABOLIC PNL TOTAL CA: CPT | Performed by: INTERNAL MEDICINE

## 2017-03-13 PROCEDURE — 82962 GLUCOSE BLOOD TEST: CPT

## 2017-03-13 PROCEDURE — 74011250636 HC RX REV CODE- 250/636: Performed by: HOSPITALIST

## 2017-03-13 PROCEDURE — 94660 CPAP INITIATION&MGMT: CPT

## 2017-03-13 PROCEDURE — 94640 AIRWAY INHALATION TREATMENT: CPT

## 2017-03-13 PROCEDURE — 74011000250 HC RX REV CODE- 250: Performed by: INTERNAL MEDICINE

## 2017-03-13 PROCEDURE — 92610 EVALUATE SWALLOWING FUNCTION: CPT

## 2017-03-13 PROCEDURE — 85610 PROTHROMBIN TIME: CPT | Performed by: INTERNAL MEDICINE

## 2017-03-13 RX ORDER — DEXTROSE MONOHYDRATE AND SODIUM CHLORIDE 5; .45 G/100ML; G/100ML
75 INJECTION, SOLUTION INTRAVENOUS CONTINUOUS
Status: DISCONTINUED | OUTPATIENT
Start: 2017-03-13 | End: 2017-03-13

## 2017-03-13 RX ORDER — DIPHENHYDRAMINE HCL 25 MG
CAPSULE ORAL
Status: DISPENSED
Start: 2017-03-13 | End: 2017-03-13

## 2017-03-13 RX ORDER — FUROSEMIDE 10 MG/ML
20 INJECTION INTRAMUSCULAR; INTRAVENOUS DAILY
Status: DISCONTINUED | OUTPATIENT
Start: 2017-03-14 | End: 2017-03-16

## 2017-03-13 RX ORDER — DIPHENHYDRAMINE HCL 25 MG
25 CAPSULE ORAL ONCE
Status: COMPLETED | OUTPATIENT
Start: 2017-03-13 | End: 2017-03-13

## 2017-03-13 RX ADMIN — ANTACID TABLETS 200 MG: 500 TABLET, CHEWABLE ORAL at 17:29

## 2017-03-13 RX ADMIN — FLUCONAZOLE 100 MG: 100 TABLET ORAL at 11:46

## 2017-03-13 RX ADMIN — BUDESONIDE 500 MCG: 0.5 INHALANT RESPIRATORY (INHALATION) at 07:49

## 2017-03-13 RX ADMIN — ARFORMOTEROL TARTRATE 15 MCG: 15 SOLUTION RESPIRATORY (INHALATION) at 20:36

## 2017-03-13 RX ADMIN — POTASSIUM CHLORIDE 20 MEQ: 20 TABLET, EXTENDED RELEASE ORAL at 08:57

## 2017-03-13 RX ADMIN — ANTACID TABLETS 200 MG: 500 TABLET, CHEWABLE ORAL at 11:46

## 2017-03-13 RX ADMIN — ONDANSETRON HYDROCHLORIDE 4 MG: 2 INJECTION INTRAMUSCULAR; INTRAVENOUS at 15:13

## 2017-03-13 RX ADMIN — HYDROCODONE BITARTRATE AND ACETAMINOPHEN 1 TABLET: 5; 325 TABLET ORAL at 22:06

## 2017-03-13 RX ADMIN — INSULIN GLARGINE 20 UNITS: 100 INJECTION, SOLUTION SUBCUTANEOUS at 22:06

## 2017-03-13 RX ADMIN — DOCUSATE SODIUM 100 MG: 100 CAPSULE, LIQUID FILLED ORAL at 20:29

## 2017-03-13 RX ADMIN — NYSTATIN: 100000 POWDER TOPICAL at 09:01

## 2017-03-13 RX ADMIN — HYDROCODONE BITARTRATE AND ACETAMINOPHEN 1 TABLET: 5; 325 TABLET ORAL at 04:14

## 2017-03-13 RX ADMIN — ONDANSETRON HYDROCHLORIDE 4 MG: 2 INJECTION INTRAMUSCULAR; INTRAVENOUS at 07:20

## 2017-03-13 RX ADMIN — BUDESONIDE 500 MCG: 0.5 INHALANT RESPIRATORY (INHALATION) at 20:36

## 2017-03-13 RX ADMIN — NYSTATIN: 100000 POWDER TOPICAL at 20:29

## 2017-03-13 RX ADMIN — ENOXAPARIN SODIUM 110 MG: 120 INJECTION SUBCUTANEOUS at 01:23

## 2017-03-13 RX ADMIN — FUROSEMIDE 40 MG: 10 INJECTION, SOLUTION INTRAMUSCULAR; INTRAVENOUS at 09:00

## 2017-03-13 RX ADMIN — ANTACID TABLETS 200 MG: 500 TABLET, CHEWABLE ORAL at 22:06

## 2017-03-13 RX ADMIN — VANCOMYCIN HYDROCHLORIDE 1750 MG: 10 INJECTION, POWDER, LYOPHILIZED, FOR SOLUTION INTRAVENOUS at 12:26

## 2017-03-13 RX ADMIN — QUETIAPINE FUMARATE 25 MG: 25 TABLET, FILM COATED ORAL at 22:06

## 2017-03-13 RX ADMIN — HYDROCODONE BITARTRATE AND ACETAMINOPHEN 1 TABLET: 5; 325 TABLET ORAL at 10:33

## 2017-03-13 RX ADMIN — CARVEDILOL 25 MG: 12.5 TABLET, FILM COATED ORAL at 08:57

## 2017-03-13 RX ADMIN — DEXTROSE MONOHYDRATE AND SODIUM CHLORIDE 75 ML/HR: 5; .45 INJECTION, SOLUTION INTRAVENOUS at 07:37

## 2017-03-13 RX ADMIN — ANTACID TABLETS 200 MG: 500 TABLET, CHEWABLE ORAL at 08:57

## 2017-03-13 RX ADMIN — INSULIN LISPRO 7 UNITS: 100 INJECTION, SOLUTION INTRAVENOUS; SUBCUTANEOUS at 11:46

## 2017-03-13 RX ADMIN — DILTIAZEM HYDROCHLORIDE 30 MG: 30 TABLET, FILM COATED ORAL at 11:46

## 2017-03-13 RX ADMIN — DIPHENHYDRAMINE HYDROCHLORIDE 25 MG: 25 CAPSULE ORAL at 01:23

## 2017-03-13 RX ADMIN — DILTIAZEM HYDROCHLORIDE 30 MG: 30 TABLET, FILM COATED ORAL at 08:57

## 2017-03-13 RX ADMIN — PANTOPRAZOLE SODIUM 40 MG: 40 TABLET, DELAYED RELEASE ORAL at 08:57

## 2017-03-13 RX ADMIN — DIGOXIN 0.12 MG: 0.12 TABLET ORAL at 08:57

## 2017-03-13 RX ADMIN — ASPIRIN 81 MG CHEWABLE TABLET 81 MG: 81 TABLET CHEWABLE at 08:57

## 2017-03-13 RX ADMIN — SIMVASTATIN 10 MG: 20 TABLET, FILM COATED ORAL at 22:06

## 2017-03-13 RX ADMIN — ARFORMOTEROL TARTRATE 15 MCG: 15 SOLUTION RESPIRATORY (INHALATION) at 07:49

## 2017-03-13 RX ADMIN — DOCUSATE SODIUM 100 MG: 100 CAPSULE, LIQUID FILLED ORAL at 08:57

## 2017-03-13 NOTE — PROGRESS NOTES
0730-Bedside and Verbal shift change report given to ATIYA Tanner (oncoming nurse) by Tracy Yao RN (offgoing nurse). Report included the following information SBAR, Kardex, Intake/Output, MAR, Recent Results and Cardiac Rhythm A Fib.     0800- Initial assessment completed. Will continue to monitor. 1200- Reassessment complete. Will continue to monitor. 1520- Patient up to side of bed with PT.     1600- Reassessment complete. Patient back in bed. Will continue to monitor. 1830- Bed placed in chair position. Patient used bedpan. Patient gown and purewick changed. Resting comfortably. Will continue to monitor. 1930-Bedside and Verbal shift change report given to SERGIO Herron RN (oncoming nurse) by Bhavani Oneal. Pauline Tanner (offgoing nurse). Report included the following information SBAR, Kardex, Intake/Output, MAR and Cardiac Rhythm A Fib.

## 2017-03-13 NOTE — PROGRESS NOTES
Problem: Mobility Impaired (Adult and Pediatric)  Goal: *Acute Goals and Plan of Care (Insert Text)  Physical Therapy Goals  Initiated 2/27/2017 and to be accomplished within 7 day(s)  1. Patient will move from supine to sit and sit to supine , scoot up and down and roll side to side in bed with supervision/set-up. 2. Patient will transfer from bed to chair and chair to bed with minimal assistance/contact guard assist using the least restrictive device. 3. Patient will perform sit to stand with minimal assistance/contact guard assist.  4. Patient will ambulate with minimal assistance/contact guard assist for 50 feet with the least restrictive device. Re-evaluation on 3/6/2017 and to be accomplished within 7 day(s)  1. Patient will move from supine to sit and sit to supine, scoot up and down and roll side to side in bed with supervision/set-up. 2. Patient will transfer from bed to chair and chair to bed with contact guard assist/supervision using the least restrictive device. 3. Patient will perform sit to stand with contact guard assist/supervision. 4. Patient will ambulate with contact guard assist/supervision for 100 feet with the least restrictive device. Outcome: Progressing Towards Goal  PHYSICAL THERAPY TREATMENT     Patient: Elpidio Caicedo (34 y.o. female)  Date: 3/13/2017  Diagnosis: CHF NYHA class IV, acute, diastolic (HCC)  CHF NYHA class IV, acute, diastolic (HCC) Acute on chronic respiratory failure with hypoxia (HCC)       Precautions: Contact, Fall   Chart, physical therapy assessment, plan of care and goals were reviewed. ASSESSMENT:  Pt is motivated to use BSC, but require a mild bed elevation to transfer to standing. Pt's SPO2 remains high with High flow NC during activity. Pt progressing slowly with tolerance and resp sequencing. Pt requesting to sit in chair, but convince to sit EOB for meal, to allow for ease on return to supine (with NSG).    Progression toward goals:  [ ] Improving appropriately and progressing toward goals  [X]      Improving slowly and progressing toward goals  [ ]      Not making progress toward goals and plan of care will be adjusted       PLAN:  Patient continues to benefit from skilled intervention to address the above impairments. Continue treatment per established plan of care. Discharge Recommendations:  Rehab  Further Equipment Recommendations for Discharge:  bedside commode and rolling walker       SUBJECTIVE:   Patient stated Will I walk again?       OBJECTIVE DATA SUMMARY:   Critical Behavior:  Neurologic State: Alert, Appropriate for age  Orientation Level: Oriented X4  Cognition: Follows commands, Appropriate for age attention/concentration, Appropriate safety awareness  Safety/Judgement: Awareness of environment, Fall prevention  Functional Mobility Training:  Bed Mobility:  Supine to Sit: Minimum assistance  Scooting: Minimum assistance  Transfers:  Sit to Stand: Minimum assistance  Stand to Sit: Minimum assistance  Balance:  Sitting: Intact  Standing: Impaired; With support  Standing - Static: Good  Standing - Dynamic : Fair  Ambulation/Gait Training:  Distance (ft): 16 Feet (ft)  Assistive Device: Gait belt;Walker, rolling  Ambulation - Level of Assistance: Contact guard assistance;Minimal assistance  Gait Abnormalities: Decreased step clearance  Base of Support: Widened  Speed/Kristin: Slow  Step Length: Right shortened;Left shortened  Swing Pattern: Right asymmetrical;Left asymmetrical  Therapeutic Exercises:         EXERCISE   Sets   Reps   Active Active Assist   Passive Self ROM   Comments   Ankle Pumps 2 20  [X] [ ] [ ] [ ]     Quad Sets/Glut Sets 1 10 [X] [ ] [ ] [ ]     Hamstring Sets 1 10 [X] [ ] [ ] [ ]     Short Arc Quads 1 10 [X] [ ] [ ] [ ]     Pinky Rowan     [ ] [ ] [ ] [ ]     Wilda Dumont     [ ] [ ] [ ] [ ]     Hip Abd/Add 1 5 [X] [ ] [ ] [ ]     Sonido Olmedoi 1 10 [X] [ ] [ ] [ ]     Seated Marching 1 10 [X] [ ] [ ] [ ] Standing Marching     [ ] [ ] [ ] [ ]           [ ] [ ] [ ] [ ]           Pain:  Pain Scale 1: FLACC  Pain Intensity 1: 8  Pain Location 1: Back  Pain Intervention(s) 1: Medication (see MAR)  Activity Tolerance:   Fair  Please refer to the flowsheet for vital signs taken during this treatment.   After treatment:   [ ] Patient left in no apparent distress sitting up in chair  [X] Patient left in no apparent distress in bed  [X] Call bell left within reach  [X] Nursing notified  [ ] Caregiver present  [ ] Bed alarm activated      Levy Cueto PTA   Time Calculation: 35 mins

## 2017-03-13 NOTE — PROGRESS NOTES
[facility]  ICU Progress Note      Name: Michelle Polanco   : 1945   MRN: [id number]   Date: 3/13/2017 12:13 PM     [x]I have reviewed the flowsheet and previous days notes. Events overnight reviewed and discussed with nursing staff. Vital signs and records reviewed. Michelle Polanco is a 66 y.o. female with IMANI/OHS, pulmonary HTN, and COPD . Patient was admitted for respiratory failure requiring Bipap. Patient has improved with HF NC and diuresis. S/p Tx for UTI. Started on vancomycin yesterday for + bcx for MRSA. On Bipap overnight. Tolerating weaning of HF  hemodynamically stable   Afebrile  A&O x3  Had BM this morning     Patient c/o SOB and cough with exertion. Some yellow phlegm yesterday. Also states that she is feeling anxious. Denies any headache, visual changes, hemoptysis, And pain, diarrhea, or melena. Medication Review:  · Pressors -  · Sedation -  · Antibiotics - vancomycin and Po diflucan   · Pain -  · GI/ DVT - Protonix, coumadin   · Others (other gtts)    Safety Bundles: VElectrolyte Replacement Protocol    Vital Signs:    Visit Vitals    /48    Pulse 66    Temp 96.2 °F (35.7 °C)    Resp 19    Ht 5' 4.96\" (1.65 m)    Wt 106.9 kg (235 lb 10.8 oz)    SpO2 96%    Breastfeeding No    BMI 39.26 kg/m2       O2 Device: Hi flow nasal cannula   O2 Flow Rate (L/min): 40 l/min   Temp (24hrs), Av.6 °F (36.4 °C), Min:96.2 °F (35.7 °C), Max:98.7 °F (37.1 °C)       Intake/Output:   Last shift:         Last 3 shifts:  1901 -  0700  In: 0   Out:  [Urine:]    Intake/Output Summary (Last 24 hours) at 17 1213  Last data filed at 17 0414   Gross per 24 hour   Intake                0 ml   Output             1400 ml   Net            -1400 ml       Physical Exam:    General: awake and sitting on the side of her bed.  On HF, Acyanotic, no distress    HEENT:  Anicteric sclerae; pink palpebral conjunctivae; mucosa moist  Resp:  Symmetrical chest expansion, no accessory muscle use. BBS CTA.  Diminished bibasilar   CV:  S1, S2 present; irregular rate and rhythm   GI:  Abdomen soft, non-tender; (+) active bowel sounds  Extremities:  +2 pulses on all extremities; trace pedal edema   Skin:  Warm; no rashes/ lesions noted  Neurologic:  Non-focal        DATA:     Current Facility-Administered Medications   Medication Dose Route Frequency    diphenhydrAMINE (BENADRYL) 25 mg capsule        dextrose 5 % - 0.45% NaCl infusion  75 mL/hr IntraVENous CONTINUOUS    QUEtiapine (SEROquel) tablet 25 mg  25 mg Oral QHS    Vancomycin - Pharmacy to Dose  1 Each Other Rx Dosing/Monitoring    vancomycin (VANCOCIN) 1,750 mg in 0.9% sodium chloride 500 mL IVPB  1,750 mg IntraVENous Q24H    furosemide (LASIX) injection 40 mg  40 mg IntraVENous DAILY    calcium carbonate (TUMS) chewable tablet 200 mg [elemental]  200 mg Oral QID WITH MEALS    potassium chloride (K-DUR, KLOR-CON) SR tablet 20 mEq  20 mEq Oral DAILY    lidocaine (LIDODERM) 5 % patch 1 Patch  1 Patch TransDERmal Q24H    enoxaparin (LOVENOX) injection 110 mg  110 mg SubCUTAneous Q12H    fluconazole (DIFLUCAN) tablet 100 mg  100 mg Oral DAILY    insulin lispro (HUMALOG) injection 5 Units  5 Units SubCUTAneous TIDAC    HYDROcodone-acetaminophen (NORCO) 5-325 mg per tablet 1 Tab  1 Tab Oral Q6H PRN    insulin glargine (LANTUS) injection 20 Units  20 Units SubCUTAneous QHS    digoxin (LANOXIN) tablet 0.125 mg  0.125 mg Oral DAILY    carvedilol (COREG) tablet 25 mg  25 mg Oral BID WITH MEALS    dilTIAZem (CARDIZEM) IR tablet 30 mg  30 mg Oral TIDAC    ELECTROLYTE REPLACEMENT PROTOCOL  1 Each Other PRN    ELECTROLYTE REPLACEMENT PROTOCOL  1 Each Other PRN    pantoprazole (PROTONIX) tablet 40 mg  40 mg Oral ACB    polyvinyl alcohol (LIQUIFILM TEARS) 1.4 % ophthalmic solution 1 Drop  1 Drop Both Eyes PRN    nystatin (MYCOSTATIN) 100,000 unit/gram powder   Topical BID    docusate sodium (COLACE) capsule 100 mg  100 mg Oral BID    simvastatin (ZOCOR) tablet 10 mg  10 mg Oral QHS    aspirin chewable tablet 81 mg  81 mg Oral DAILY    arformoterol (BROVANA) neb solution 15 mcg  15 mcg Nebulization BID RT    budesonide (PULMICORT) 500 mcg/2 ml nebulizer suspension  500 mcg Nebulization BID RT    albuterol-ipratropium (DUO-NEB) 2.5 MG-0.5 MG/3 ML  3 mL Nebulization Q4H PRN    ondansetron (ZOFRAN) injection 4 mg  4 mg IntraVENous Q6H PRN    acetaminophen (TYLENOL) tablet 650 mg  650 mg Oral Q4H PRN    insulin lispro (HUMALOG) injection   SubCUTAneous AC&HS    glucose chewable tablet 16 g  4 Tab Oral PRN    glucagon (GLUCAGEN) injection 1 mg  1 mg IntraMUSCular PRN    dextrose (D50W) injection syrg 12.5-25 g  25-50 mL IntraVENous PRN    warfarin pharmacy to dose   Other PRN         Labs: Results:       Chemistry Recent Labs      03/13/17   0408  03/12/17   0428  03/11/17   0325   GLU  71*  149*  105*   NA  146*  145  141   K  4.7  4.3  4.4   CL  108  106  104   CO2  33*  36*  34*   BUN  23*  28*  28*   CREA  1.15  1.25  1.18   CA  8.8  8.6  8.3*   AGAP  5  3  3   BUCR  20  22*  24*      CBC w/Diff Recent Labs      03/12/17   0428   HGB  10.3*   HCT  32.4*   PLT  212      Coagulation Recent Labs      03/13/17   0408  03/12/17   0600   PTP  27.1*  24.7*   INR  2.7*  2.4*       Liver Enzymes No results for input(s): TP, ALB, TBIL, AP, SGOT, GPT in the last 72 hours.     No lab exists for component: DBIL   ABG No results found for: PH, PHI, PCO2, PCO2I, PO2, PO2I, HCO3, HCO3I, FIO2, FIO2I   Microbiology Recent Labs      03/12/17   1107  03/12/17   1000   CULT  NO GROWTH AFTER 19 HOURS  NO GROWTH AFTER 18 HOURS          Telemetry: []Sinus []A-flutter []Paced    [x]A-fib []Multiple PVCs                        IMPRESSION:   · Acute hypoxemic respiratory failure r/t severe HFPEF and moderate pulmonary hypertension   · Moderate pulmonary HTN  · OHS, obesity hypoventilation syndrome   · Obesity  · MRSA bacteremia  · DM type 2   · afib  · UTI s/p abx        PLAN:   · Resp -   HF NC, wean for SPO2 > 90%. BIPAP HS. HOB > 30 degrees. Strict aspiration precautions. Continue brovana and Pulmicort. Patient's baseline HCO3 should be closer to 40s Patients baseline PCO2 should be closer to high 60s or low 70s  · ID -  S/p ceftriaxone for UTI. Started on vancomycin for + MRSA in bcx. Possible contaminant? Repeat Bcx pending   · CVS - Cardiology following. Afib rate controlled. Continue asa, coreg, digoxin, and cardizem. Patient is on IV lasix . Consider decreasing dose since patient Na is trending up  · Heme/onc -stable     · Metabolic - no acute electrolyte derangements noted   · Renal - strict I&O, trend renal function   · Endocrine - TSH is low with normal free t4. Most likely sick euthyroid. BG was low then elevated. This is most likely d/t patient being npo overnight then started on IVF with D5. Will d/c IVF since patient has passed swallow evaluation   · Neuro/ Pain/ Sedation - on Seroquel HS.  Judicious use of anxiolytics and narcotics in this patient since she is a chronic pco2 retainer   · GI -  Slp approved dental soft and thin liquids  · Prophylaxis - DVT- on coumadin, GI- protonix   · Discussed in interdisciplinary rounds          The patient is: [x] acutely ill Risk of deterioration: [] moderate    [] critically ill  [] high     [x]See my orders for details    My assessment/plan was discussed with:  [x]nursing []PT/OT    []respiratory therapy [x]Dr. Venita Estevez    []family []         Liz Barroso NP

## 2017-03-13 NOTE — PROGRESS NOTES
Hospitalist Progress Note    Patient: Ac Gee MRN: [id number]  CSN: [id number]    YOB: 1945  Age: 66 y.o. Sex: female    DOA: 2/25/2017 LOS:  LOS: 15 days                Assessment/Plan     Patient Active Problem List   Diagnosis Code    Acute on chronic diastolic CHF (congestive heart failure) ([facility]) I50.33    ALEJANDRINA (acute kidney injury) ([facility] N17.9    Acute exacerbation of CHF (congestive heart failure) ([facility]) I50.9    DM (diabetes mellitus) ([facility] E11.9    Hypertension I10    Respiratory failure ([facility]) J96.90    Acute coronary syndrome ([facility]) I24.9    Obstructive sleep apnea, adult G47.33    Paroxysmal atrial fibrillation ([facility]) I48.0    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 ([facility]) K73.13, E11.65    Dyspnea due to congestive heart failure ([facility]) I50.9    Aspiration pneumonia ([facility]) J69.0    Hyperkalemia E87.5    Elevated troponin R79.89    COPD (chronic obstructive pulmonary disease) ([facility]) J44.9    CHF (congestive heart failure) ([facility]) I50.9    Insufficiency, respiratory, acute ([facility]) R06.89    Infiltrate noted on imaging study R93.8    COPD exacerbation ([facility]) J44.1    Acute on chronic respiratory failure with hypoxia ([facility]) J96.21    Chest pain R07.9    Anxiety F41.9    UTI (urinary tract infection) N39.0    Obesity hypoventilation syndrome ([facility]) E66.2    Pulmonary hypertension, moderate to severe ([facility] I27.2            67 yo female admitted for acute respiratory failure    CRITICAL CARE PLAN    Resp - acute hypoxic hypercapnic respiratory failure, multifactorial, secondary to diastolic CHF, pulmonary HTN, IMANI/OHS  On high flow oxygen, continue with BiPAP at night. ID - E-Coli UTI, MRSA bacteremia. Repeat blood and urine cultures negative. Antibiotics - on ceftriaxone, vancomycin. On diflucan for vaginitis. CVS - Monitor HD.    A-fib - continue beta blocker and cardizem,   Chronic diastolic CHF - continue diuresis, significant improvement in edema    Heme/onc - Follow H&H, plts. Renal - Trend BUN, Cr, follow I/O. Check and replace Mg, K, phos. Endocrine -  Follow FSG, continue with insulin    Neuro/ Pain/ Sedation - pain control, lidoderm patch    GI - diabetic diet    Prophylaxis - DVT: coumadin, GI: protonix    Long discussion with  in the presence of patient re medicines, hospital course. We reviewed labs and discussed assessment of condition and went over plans of care. Questions answered. Total time 50 min's, including more than 50% on discussion with family and coordinating care. Review of systems  General: No fevers or chills. Cardiovascular: No chest pain or pressure. No palpitations. Pulmonary: on high flow oxygen   Gastrointestinal: No nausea, vomiting. Physical Exam:  General: Awake, cooperative, no acute distress    HEENT: NC, Atraumatic. PERRLA, anicteric sclerae. Lungs: CTA Bilaterally. No Wheezing/Rhonchi/Rales. Heart: Regular  rhythm,  No murmur, No Rubs, No Gallops  Abdomen: Soft, Non distended, Non tender.  +Bowel sounds,   Extremities: Trace edema  Psych:   Not anxious or agitated. Neurologic:  No acute neurological deficit.                Vital signs/Intake and Output:  Visit Vitals    BP (!) 146/37    Pulse (!) 58    Temp 96.2 °F (35.7 °C)    Resp 28    Ht 5' 4.96\" (1.65 m)    Wt 106.9 kg (235 lb 10.8 oz)    SpO2 98%    Breastfeeding No    BMI 39.26 kg/m2     Current Shift:     Last three shifts:  03/11 1901 - 03/13 0700  In: 0   Out: 2000 [Urine:2000]            Labs: Results:       Chemistry Recent Labs      03/13/17   0408  03/12/17   0428  03/11/17   0325   GLU  71*  149*  105*   NA  146*  145  141   K  4.7  4.3  4.4   CL  108  106  104   CO2  33*  36*  34*   BUN  23*  28*  28*   CREA  1.15  1.25  1.18   CA  8.8  8.6  8.3*   AGAP  5  3  3   BUCR  20  22*  24*      CBC w/Diff Recent Labs      03/12/17   0428   HGB  10.3*   HCT  32.4*   PLT  212      Cardiac Enzymes No results for input(s): CPK, CKND1, ALESSANDRA in the last 72 hours. No lab exists for component: CKRMB, TROIP   Coagulation Recent Labs      03/13/17   0408  03/12/17   0600   PTP  27.1*  24.7*   INR  2.7*  2.4*       Lipid Panel No results found for: CHOL, CHOLPOCT, CHOLX, CHLST, CHOLV, [id number], HDL, LDL, NLDLCT, DLDL, LDLC, DLDLP, [id number], VLDLC, VLDL, TGL, TGLX, TRIGL, [id number], TRIGP, TGLPOCT, [id number], CHHD, CHHDX   BNP No results for input(s): BNPP in the last 72 hours. Liver Enzymes No results for input(s): TP, ALB, TBIL, AP, SGOT, GPT in the last 72 hours.     No lab exists for component: DBIL   Thyroid Studies Lab Results   Component Value Date/Time    TSH 0.31 01/01/2016 10:20 PM        Procedures/imaging: see electronic medical records for all procedures/Xrays and details which were not copied into this note but were reviewed prior to creation of Plan

## 2017-03-13 NOTE — PROGRESS NOTES
Problem: Dysphagia (Adult)  Goal: *Acute Goals and Plan of Care (Insert Text)  Dysphagia Present: mild  Aspiration: no     Recommendations:  Diet: dental soft and thin liquids  Meds: in puree  Aspiration Precautions  Other: small sips/bites, slow rate    Patient will:  1. Tolerate PO trials with 0 s/s overt distress in 4/5 trials  2. Utilize compensatory swallow strategies/maneuvers (decrease bite/sip, size/rate, alt. liq/sol) with min cues in 4/5 trials       Outcome: Progressing Towards Goal  SPEECH LANGUAGE PATHOLOGY BEDSIDE SWALLOW EVALUATION     Patient: Patience Renee (42 y.o. female)  Date: 3/13/2017  Primary Diagnosis: CHF NYHA class IV, acute, diastolic (HCC)  CHF NYHA class IV, acute, diastolic (HCC)        Precautions: aspiration  Fall (SPO2)      ASSESSMENT :  Based on the objective data described below, the patient presents with mild oropharyngeal dysphagia. Clinical re-evaluation of swallow completed per MD order; pt A&Ox4, on HFNC upon arrival. Completed MBS this admission with delayed pharyngeal swallow however +airway protection with recommendation for dental soft and thin liquids with safe swallow strategies. This date pt tolerated thin liquids +straw self-fed single sips with delayed swallow and x2 swallows/bolus. Self-fed puree and regular solid without event; continues to demo mildly labored mastication of solids 2/2 poor dentition though 100% oral clearance, states preference to prior dental soft texture. No overt s/s penetration/aspiration across consistencies with stable vital signs throughout. Educated pt re: prior MBS results/recommendations, aspiration risk, diet consistencies and strategies for safety with PO intake; understanding voiced. Recommend resume dental soft and thin liquid diet with ST to f/u 1-2 visits for diet tolerance and ongoing education. Patient will benefit from skilled intervention to address the above impairments.   Patients rehabilitation potential is considered to be Good  Factors which may influence rehabilitation potential include:   [ ]            None noted  [X]            Mental ability/status  [X]            Medical condition  [ ]            Home/family situation and support systems  [ ]            Safety awareness  [ ]            Pain tolerance/management  [ ]            Other:        PLAN : dental soft and thin liquids, safe swallow strategies  Recommendations and Planned Interventions:  ST to f/u 1-2 visits for diet tolerance, education  Frequency/Duration: Patient will be followed by speech-language pathology 1-2 times per day/4-7 days per week to address goals. Discharge Recommendations: defer to PT/OT       SUBJECTIVE:   Patient stated I'm so glad you're here, I'm ready to eat.       OBJECTIVE:       Past Medical History:   Diagnosis Date    A[facility]fib [facility]      Arthritis      Back injury      Chronic obstructive pulmonary disease (HCC)      Diabetes ([facility]      Fibromyalgia      Heart failure ([facility]      Hypertension      MRSA (methicillin resistant Staphylococcus aureus)      Obesity hypoventilation syndrome ([facility] 2/28/2017    Pulmonary hypertension, moderate to severe ([facility] 2/28/2017     Past Surgical History:   Procedure Laterality Date    CARDIAC SURG PROCEDURE UNLIST        HX CORONARY STENT PLACEMENT        HX KNEE REPLACEMENT        HX ORTHOPAEDIC         bilateral knee replacement     Prior Level of Function/Home Situation: per chart/pt  Home Situation  Home Environment: Private residence  # Steps to Enter: 1  Rails to Enter: Yes  Hand Rails : Left  One/Two Story Residence: One story  Living Alone: No  Support Systems: Spouse/Significant Other/Partner  Patient Expects to be Discharged to[de-identified] Rehabilitation facility  Current DME Used/Available at Home: Walker, rolling, Cane, straight, Commode, bedside, Grab bars  Tub or Shower Type: Tub/Shower combination  Diet prior to admission: soft/thin  Current Diet:  NPO   Cognitive and Communication Status:  Neurologic State: Alert  Orientation Level: Oriented X4  Cognition: Follows commands, Appropriate for age attention/concentration, Appropriate safety awareness  Perception: Appears intact  Perseveration: No perseveration noted  Safety/Judgement: Fall prevention  Oral Assessment:  Oral Assessment  Labial: No impairment  Dentition: Limited;Poor  Oral Hygiene: good  Lingual: Decreased rate  Velum: No impairment  Mandible: No impairment  P.O. Trials:  Patient Position: HOB 45  Vocal quality prior to P.O.: Hoarse  Consistency Presented: Thin liquid;Puree; Solid  How Presented: Self-fed/presented;Straw;Spoon  How Much:  (x6oz thin, x2oz puree, x4 solid)  Bolus Acceptance: No impairment  Bolus Formation/Control: Impaired  Type of Impairment: Mastication  Propulsion: No impairment  Oral Residue: None  Initiation of Swallow: Delayed (# of seconds)  Laryngeal Elevation: Functional  Aspiration Signs/Symptoms: None  Pharyngeal Phase Characteristics: Double swallow; Poor endurance  Effective Modifications: Alternate liquids/solids;Small sips and bites  Cues for Modifications: None        Oral Phase Severity: Mild  Pharyngeal Phase Severity : Mild     GCODESwallowing:   Swallow Current Status CI= 1-19%   Swallow Goal Status CI= 1-19%     The severity rating is based on the following outcomes:  GLORIA Noms Swallow Level 6              Clinical Judgement     PAIN:  Start of Eval: 9  End of Eval: 9      After treatment:   [ ]            Patient left in no apparent distress sitting up in chair  [X]            Patient left in no apparent distress in bed  [X]            Call bell left within reach  [X]            Nursing notified  [ ]            Family present  [ ]            Caregiver present  [ ]            Bed alarm activated      COMMUNICATION/EDUCATION:   [X]            Aspiration precautions; swallow safety; compensatory techniques.   [X]            Patient/family have participated as able in goal setting and plan of care. [X]            Patient/family agree to work toward stated goals and plan of care. [ ]            Patient understands intent and goals of therapy; neutral about participation. [ ]            Patient unable to participate in goal setting/plan of care; educ ongoing with interdisciplinary staff  [ ]             Posted safety precautions in patient's room.      Thank you for this referral.  Alicia Powers, SLP  Time Calculation: 17 mins

## 2017-03-13 NOTE — PROGRESS NOTES
Problem: Self Care Deficits Care Plan (Adult)  Goal: *Acute Goals and Plan of Care (Insert Text)  Occupational Therapy Goals  Initiated 3/6/2017 within 7 day(s). 1. Patient will perform grooming with supervision/set-up   2. Patient will perform upper body dressing with supervision/set-up. 3. Patient will perform lower body dressing with minimal assistance/contact guard assist.  4. Patient will perform toilet transfers with supervision/set-up. 5. Patient will perform all aspects of toileting with supervision/set-up. 6. Patient will participate in upper extremity therapeutic exercise/activities with supervision/set-up for 10 minutes. 7. Patient will utilize energy conservation techniques during functional activities with verbal cues. 8. Patient will perform functional activity while standing for 3-5 minutes with CGA   OCCUPATIONAL THERAPY TREATMENT     Patient: Maldonado Angeles (89 y.o. female)  Date: 3/13/2017  Diagnosis: CHF NYHA class IV, acute, diastolic (HCC)  CHF NYHA class IV, acute, diastolic (HCC) Acute on chronic respiratory failure with hypoxia (HCC)       Precautions: Contact, Fall  Chart, occupational therapy assessment, plan of care, and goals were reviewed. ASSESSMENT:  Pt completed supine to sit EOB this session with min assist. Pt on high flow for oxygen. O2 above 90 throughout session and HR in 80s throughout session. Pt completed transfer from bed to c with min assist using RW. Pt completed toileting hygiene with total assist this session. Total assist for LB dressing to don socks. Min assist for transfer back to bed and min assist for sit to supine. Pt demonstrating improvement in transfers and mobility this session but continue to address ADLs.    Progression toward goals:  [ ]          Improving appropriately and progressing toward goals  [X]          Improving slowly and progressing toward goals  [ ]          Not making progress toward goals and plan of care will be adjusted PLAN:  Patient continues to benefit from skilled intervention to address the above impairments. Continue treatment per established plan of care. Discharge Recommendations:  Rehab  Further Equipment Recommendations for Discharge:  N/A       SUBJECTIVE:   Patient stated I need to use the bathroom.       OBJECTIVE DATA SUMMARY:   Cognitive/Behavioral Status:  Neurologic State: Alert, Appropriate for age  Orientation Level: Oriented X4  Cognition: Follows commands, Appropriate for age attention/concentration, Appropriate safety awareness  Safety/Judgement: Awareness of environment, Fall prevention  Functional Mobility and Transfers for ADLs:              Bed Mobility:  Supine to Sit: Minimum assistance  Scooting: Minimum assistance              Transfers:  Sit to Stand: Minimum assistance              Toilet Transfer : Minimum assistance              Balance:  Sitting: Intact  Standing: Impaired; With support  Standing - Static: Good  Standing - Dynamic : Fair  ADL Intervention:     Toileting: Total assist  LB dressing: total assist     Cognitive Retraining  Safety/Judgement: Awareness of environment; Fall prevention        Pain:  Pain Scale 1: FLACC  Pain Intensity 1: 8  Pain Location 1: Back  Pain Intervention(s) 1: Medication (see MAR)     Activity Tolerance:    fair  Please refer to the flowsheet for vital signs taken during this treatment.   After treatment:   [ ]  Patient left in no apparent distress sitting up in chair  [X]  Patient left in no apparent distress in bed  [X]  Call bell left within reach  [X]  Nursing notified  [ ]  Caregiver present  [ ]  Bed alarm activated     Diego Market OTR/L  Time Calculation: 31 mins

## 2017-03-13 NOTE — PROGRESS NOTES
Pharmacy Dosing Services: Warfarin    Consult for Warfarin Dosing by Pharmacy by Dr. Venita Lopez provided for this 66 y.o.  female , for indication of Atrial Fibrillation. Dose to achieve an INR goal of 2-3    Order entered to -HOLD- Warfarin dose this evening, as INR is near upper range with substantial escalation over the previous two days. Significant drug interactions: Fluconizole, ASA    Lovenox has been D/C'd, as patient is therapeutic on Warfarin at this time. LABS    PT/INR Lab Results   Component Value Date/Time    INR 2.7 03/13/2017 04:08 AM      Platelets Lab Results   Component Value Date/Time    PLATELET 441 95/87/2670 04:28 AM      H/H     Lab Results   Component Value Date/Time    HGB 10.3 03/12/2017 04:28 AM        Warfarin Administrations (last 168 hours)       Date/Time Action Medication Dose      03/12/17 1730 Given    warfarin (COUMADIN) tablet 1 mg 1 mg    03/11/17 1813 Given    warfarin (COUMADIN) tablet 4 mg 4 mg    03/10/17 1703 Given    warfarin (COUMADIN) tablet 4 mg 4 mg    03/09/17 1729 Given    warfarin (COUMADIN) tablet 4 mg 4 mg    03/08/17 2333 Given    warfarin (COUMADIN) tablet 2 mg 2 mg    03/07/17 1809 Given    warfarin (COUMADIN) tablet 4 mg 4 mg    03/06/17 1816 Given    warfarin (COUMADIN) tablet 4 mg 4 mg          Pharmacy to follow daily and will provide subsequent Warfarin dosing based on clinical status. Elvira Carranza Pharm. D.   Clinical Pharmacist  [phone]

## 2017-03-13 NOTE — PROGRESS NOTES
Chart reviewed. Pt continues in ICU, on HFNC currently. Pt has bed available at [facility] pending decrease in oxygen needs, they are aware that she needs bipap at SNF. Pt spouse contacted for discharge planning on Friday, did not want to discuss currently. Pt not ready for discharge at this time. CM will continue to follow for transition planning. Care Management Interventions  PCP Verified by CM:  Yes  Transition of Care Consult (CM Consult): Discharge Planning, SNF  Discharge Durable Medical Equipment: No  Physical Therapy Consult: Yes  Occupational Therapy Consult: Yes  Speech Therapy Consult: Yes  Current Support Network: Lives with Spouse  Confirm Follow Up Transport: Family  Plan discussed with Pt/Family/Caregiver: Yes  Freedom of Choice Offered: Yes  Discharge Location  Discharge Placement: Skilled nursing facility

## 2017-03-13 NOTE — PROGRESS NOTES
NUTRITION FOLLOW-UP    RECOMMENDATIONS/PLAN:   - Continue Cardiac Consistent Carb 1600 kcal Soft Solid diet, encourage adequate PO intake of meals    NUTRITION ASSESSMENT:   Client Update: 66 yrs old Female with acute respiratory failure, CHF, pulmonary HTN, OHS, UTI, Afib, and bacteremia. Noted with coughing with PO intake yesterday per RN note, SLP evaluated pt and recommended soft solid NDD3 diet. FOOD/NUTRITION INTAKE   Diet Order:  Cardiac Consistent Carb 1600 kcal Soft Solid  Supplements: none   Food Allergies: latex/garlic  Average PO Intake:      Patient Vitals for the past 100 hrs:   % Diet Eaten   03/12/17 1141 0 %   03/11/17 1813 25 %   03/11/17 1417 90 %   03/11/17 0850 90 %   03/10/17 1800 75 %   03/10/17 1321 75 %   03/10/17 0800 0 %   Pertinent Medications:  [x] Reviewed; Insulin:  [x]SSI  [x]Pre-meal   [x]Basal    []Drip  []None  Cultural/Sikhism Food Preferences: None Identified ANTHROPOMETRICS  Height: 5' 4.96\" (165 cm)       Weight: 106.9 kg (235 lb 10.8 oz)         BMI: 39.3 kg/m^2 obese (30%-39.9% BMI)   Adjusted Body Weight: 71 kg     NUTRITION-FOCUSED PHYSICAL ASSESSMENT  Skin: intact      GI: last BM 3/11- hard    BIOCHEMICAL DATA & MEDICAL TESTS  Pertinent Labs:  [x] Reviewed; POC BG , Na 146, CO2 33, BUN 23, NH3 35      NUTRITION PRESCRIPTION  Calories: 0800-8212 kcal/day based on 11-14 kcal/kg actual wt  Protein: 113-143 g/day based on 2-2.5 g/kg IBW  CHO: 158 g/day based on 50% of total energy  Fluid: 2925 ml/day based on 1500 + (15 ml/kg >20 kg)      NUTRITION DIAGNOSES:   1. Altered nutrition related lab values related to DM2 as evidenced by POC BG  mg/dl, HbA1c 7.7 - ongoing  2. Morbid obesity related to h/o excessive energy intake and inadequate physical activity as evidenced by BMI 42.3 kg/m^2 and 203% of IBW. - ongoing     NUTRITION INTERVENTIONS:   INTERVENTIONS:        GOALS:  1.  Continue soft diet per SLP 1. >50% PO intake of meals, POC -180 mg/dl in ICU, maintain skin integrity, no s/s of aspiration by next review 1-3 days     LEARNING NEEDS (Diet, Supplementation, Food/Nutrient-Drug Interaction):   [x] None Identified   [] Education provided/documented      Identified and patient: [] Declined   [] Was not appropriate/indicated        NUTRITION MONITORING /EVALUATION:   Follow PO intake  Monitor wt  Monitor renal labs, electrolytes, fluid status     Previous Recommendations Implemented: yes        Previous Goals Met:  yes -progressing      [] Participated in Interdisciplinary Rounds    [x] Interdisciplinary Care Plan Reviewed  DISCHARGE NUTRITION RECOMMENDATIONS ADDRESSED:     [x] Yes- recommended Consistent Carb Low NA diet (see nutrition d/c instructions)     [] To be determined closer to discharge    NUTRITION RISK:           [x] At risk                        [] Not currently at risk        Will follow-up per policy.   Kortney Naik, RD  PAGER:  [phone]

## 2017-03-14 LAB
ANION GAP BLD CALC-SCNC: 4 MMOL/L (ref 3–18)
ARTERIAL PATENCY WRIST A: YES
BASE EXCESS BLD CALC-SCNC: 6 MMOL/L
BDY SITE: ABNORMAL
BUN SERPL-MCNC: 18 MG/DL (ref 7–18)
BUN/CREAT SERPL: 17 (ref 12–20)
CALCIUM SERPL-MCNC: 8.7 MG/DL (ref 8.5–10.1)
CHLORIDE SERPL-SCNC: 107 MMOL/L (ref 100–108)
CO2 SERPL-SCNC: 33 MMOL/L (ref 21–32)
CREAT SERPL-MCNC: 1.07 MG/DL (ref 0.6–1.3)
GAS FLOW.O2 O2 DELIVERY SYS: ABNORMAL L/MIN
GAS FLOW.O2 SETTING OXYMISER: 30 L/M
GLUCOSE BLD STRIP.AUTO-MCNC: 115 MG/DL (ref 70–110)
GLUCOSE BLD STRIP.AUTO-MCNC: 140 MG/DL (ref 70–110)
GLUCOSE BLD STRIP.AUTO-MCNC: 153 MG/DL (ref 70–110)
GLUCOSE BLD STRIP.AUTO-MCNC: 159 MG/DL (ref 70–110)
GLUCOSE SERPL-MCNC: 110 MG/DL (ref 74–99)
HCO3 BLD-SCNC: 30.6 MMOL/L (ref 22–26)
INR PPP: 2.3 (ref 0.8–1.2)
MAGNESIUM SERPL-MCNC: 2.1 MG/DL (ref 1.8–2.4)
O2/TOTAL GAS SETTING VFR VENT: 40 %
PCO2 BLD: 49.2 MMHG (ref 35–45)
PH BLD: 7.4 [PH] (ref 7.35–7.45)
PO2 BLD: 80 MMHG (ref 80–100)
POTASSIUM SERPL-SCNC: 4.6 MMOL/L (ref 3.5–5.5)
PROTHROMBIN TIME: 24.2 SEC (ref 11.5–15.2)
SAO2 % BLD: 96 % (ref 92–97)
SERVICE CMNT-IMP: ABNORMAL
SODIUM SERPL-SCNC: 144 MMOL/L (ref 136–145)
SPECIMEN TYPE: ABNORMAL
TOTAL RESP. RATE, ITRR: 23

## 2017-03-14 PROCEDURE — 83735 ASSAY OF MAGNESIUM: CPT | Performed by: INTERNAL MEDICINE

## 2017-03-14 PROCEDURE — 74011250637 HC RX REV CODE- 250/637: Performed by: INTERNAL MEDICINE

## 2017-03-14 PROCEDURE — 92526 ORAL FUNCTION THERAPY: CPT

## 2017-03-14 PROCEDURE — 97164 PT RE-EVAL EST PLAN CARE: CPT

## 2017-03-14 PROCEDURE — 74011250637 HC RX REV CODE- 250/637: Performed by: HOSPITALIST

## 2017-03-14 PROCEDURE — 77010033711 HC HIGH FLOW OXYGEN

## 2017-03-14 PROCEDURE — 97116 GAIT TRAINING THERAPY: CPT

## 2017-03-14 PROCEDURE — 82962 GLUCOSE BLOOD TEST: CPT

## 2017-03-14 PROCEDURE — 85610 PROTHROMBIN TIME: CPT | Performed by: INTERNAL MEDICINE

## 2017-03-14 PROCEDURE — 74011250636 HC RX REV CODE- 250/636: Performed by: INTERNAL MEDICINE

## 2017-03-14 PROCEDURE — 94640 AIRWAY INHALATION TREATMENT: CPT

## 2017-03-14 PROCEDURE — 74011250636 HC RX REV CODE- 250/636: Performed by: HOSPITALIST

## 2017-03-14 PROCEDURE — 82803 BLOOD GASES ANY COMBINATION: CPT

## 2017-03-14 PROCEDURE — 65610000006 HC RM INTENSIVE CARE

## 2017-03-14 PROCEDURE — 97535 SELF CARE MNGMENT TRAINING: CPT

## 2017-03-14 PROCEDURE — 80048 BASIC METABOLIC PNL TOTAL CA: CPT | Performed by: INTERNAL MEDICINE

## 2017-03-14 PROCEDURE — 74011000250 HC RX REV CODE- 250: Performed by: INTERNAL MEDICINE

## 2017-03-14 PROCEDURE — 97168 OT RE-EVAL EST PLAN CARE: CPT

## 2017-03-14 PROCEDURE — 74011250636 HC RX REV CODE- 250/636: Performed by: NURSE PRACTITIONER

## 2017-03-14 PROCEDURE — 77030011256 HC DRSG MEPILEX <16IN NO BORD MOLN -A

## 2017-03-14 PROCEDURE — 36591 DRAW BLOOD OFF VENOUS DEVICE: CPT

## 2017-03-14 PROCEDURE — 36600 WITHDRAWAL OF ARTERIAL BLOOD: CPT

## 2017-03-14 RX ORDER — WARFARIN 2 MG/1
2 TABLET ORAL ONCE
Status: COMPLETED | OUTPATIENT
Start: 2017-03-14 | End: 2017-03-14

## 2017-03-14 RX ADMIN — CARVEDILOL 25 MG: 12.5 TABLET, FILM COATED ORAL at 10:10

## 2017-03-14 RX ADMIN — INSULIN LISPRO 5 UNITS: 100 INJECTION, SOLUTION INTRAVENOUS; SUBCUTANEOUS at 21:30

## 2017-03-14 RX ADMIN — HYDROCODONE BITARTRATE AND ACETAMINOPHEN 1 TABLET: 5; 325 TABLET ORAL at 11:15

## 2017-03-14 RX ADMIN — ONDANSETRON HYDROCHLORIDE 4 MG: 2 INJECTION INTRAMUSCULAR; INTRAVENOUS at 05:14

## 2017-03-14 RX ADMIN — INSULIN GLARGINE 20 UNITS: 100 INJECTION, SOLUTION SUBCUTANEOUS at 21:29

## 2017-03-14 RX ADMIN — WARFARIN SODIUM 2 MG: 2 TABLET ORAL at 18:43

## 2017-03-14 RX ADMIN — ASPIRIN 81 MG CHEWABLE TABLET 81 MG: 81 TABLET CHEWABLE at 10:11

## 2017-03-14 RX ADMIN — ANTACID TABLETS 200 MG: 500 TABLET, CHEWABLE ORAL at 10:11

## 2017-03-14 RX ADMIN — DOCUSATE SODIUM 100 MG: 100 CAPSULE, LIQUID FILLED ORAL at 21:29

## 2017-03-14 RX ADMIN — ONDANSETRON HYDROCHLORIDE 4 MG: 2 INJECTION INTRAMUSCULAR; INTRAVENOUS at 11:15

## 2017-03-14 RX ADMIN — DILTIAZEM HYDROCHLORIDE 30 MG: 30 TABLET, FILM COATED ORAL at 11:53

## 2017-03-14 RX ADMIN — ARFORMOTEROL TARTRATE 15 MCG: 15 SOLUTION RESPIRATORY (INHALATION) at 19:52

## 2017-03-14 RX ADMIN — INSULIN LISPRO 5 UNITS: 100 INJECTION, SOLUTION INTRAVENOUS; SUBCUTANEOUS at 11:30

## 2017-03-14 RX ADMIN — DIGOXIN 0.12 MG: 0.12 TABLET ORAL at 10:11

## 2017-03-14 RX ADMIN — DOCUSATE SODIUM 100 MG: 100 CAPSULE, LIQUID FILLED ORAL at 10:11

## 2017-03-14 RX ADMIN — ANTACID TABLETS 200 MG: 500 TABLET, CHEWABLE ORAL at 17:03

## 2017-03-14 RX ADMIN — INSULIN LISPRO 5 UNITS: 100 INJECTION, SOLUTION INTRAVENOUS; SUBCUTANEOUS at 11:47

## 2017-03-14 RX ADMIN — VANCOMYCIN HYDROCHLORIDE 1750 MG: 10 INJECTION, POWDER, LYOPHILIZED, FOR SOLUTION INTRAVENOUS at 12:24

## 2017-03-14 RX ADMIN — ANTACID TABLETS 200 MG: 500 TABLET, CHEWABLE ORAL at 13:03

## 2017-03-14 RX ADMIN — DILTIAZEM HYDROCHLORIDE 30 MG: 30 TABLET, FILM COATED ORAL at 06:58

## 2017-03-14 RX ADMIN — ANTACID TABLETS 200 MG: 500 TABLET, CHEWABLE ORAL at 21:29

## 2017-03-14 RX ADMIN — INSULIN LISPRO 5 UNITS: 100 INJECTION, SOLUTION INTRAVENOUS; SUBCUTANEOUS at 07:08

## 2017-03-14 RX ADMIN — INSULIN LISPRO 5 UNITS: 100 INJECTION, SOLUTION INTRAVENOUS; SUBCUTANEOUS at 17:03

## 2017-03-14 RX ADMIN — POTASSIUM CHLORIDE 20 MEQ: 20 TABLET, EXTENDED RELEASE ORAL at 10:11

## 2017-03-14 RX ADMIN — BUDESONIDE 500 MCG: 0.5 INHALANT RESPIRATORY (INHALATION) at 19:52

## 2017-03-14 RX ADMIN — QUETIAPINE FUMARATE 25 MG: 25 TABLET, FILM COATED ORAL at 21:33

## 2017-03-14 RX ADMIN — NYSTATIN: 100000 POWDER TOPICAL at 21:34

## 2017-03-14 RX ADMIN — FLUCONAZOLE 100 MG: 100 TABLET ORAL at 11:52

## 2017-03-14 RX ADMIN — BUDESONIDE 500 MCG: 0.5 INHALANT RESPIRATORY (INHALATION) at 07:25

## 2017-03-14 RX ADMIN — SIMVASTATIN 10 MG: 20 TABLET, FILM COATED ORAL at 21:33

## 2017-03-14 RX ADMIN — HYDROCODONE BITARTRATE AND ACETAMINOPHEN 1 TABLET: 5; 325 TABLET ORAL at 05:14

## 2017-03-14 RX ADMIN — NYSTATIN: 100000 POWDER TOPICAL at 10:12

## 2017-03-14 RX ADMIN — FUROSEMIDE 20 MG: 10 INJECTION, SOLUTION INTRAMUSCULAR; INTRAVENOUS at 10:12

## 2017-03-14 RX ADMIN — ARFORMOTEROL TARTRATE 15 MCG: 15 SOLUTION RESPIRATORY (INHALATION) at 07:25

## 2017-03-14 RX ADMIN — PANTOPRAZOLE SODIUM 40 MG: 40 TABLET, DELAYED RELEASE ORAL at 06:58

## 2017-03-14 RX ADMIN — HYDROCODONE BITARTRATE AND ACETAMINOPHEN 1 TABLET: 5; 325 TABLET ORAL at 23:17

## 2017-03-14 NOTE — PROGRESS NOTES
120 [facility] care of patient. Pt sitting in bed with family at bedside. Pt  requesting to speak with physician. Dr. Angela Ramos paged and asked to come to patients bedside. 1745 Pt stood up and pivot turned to bedside commode. Pt then requested to sit up in chair. Pt left in chair with call light in reach. 1905 Bedside shift change report given to Jass Jiménez RN (oncoming nurse) by Pastor Hess RN   (offgoing nurse). Report included the following information SBAR, Kardex and MAR.

## 2017-03-14 NOTE — PROGRESS NOTES
Pharmacy Dosing Services: Warfarin    Consult for Warfarin Dosing by Pharmacy by Dr. Thakur Saliva provided for this 66 y.o.  female , for indication of Atrial Fibrillation. Dose to achieve an INR goal of 2-3    Order entered for  Warfarin  2 mg to be given today at 18:00. Significant drug interactions: Fluconazole, ASA    LABS    PT/INR Lab Results   Component Value Date/Time    INR 2.3 03/14/2017 06:45 AM      Platelets Lab Results   Component Value Date/Time    PLATELET 184 99/44/6620 04:28 AM      H/H     Lab Results   Component Value Date/Time    HGB 10.3 03/12/2017 04:28 AM        Warfarin Administrations (last 168 hours)       Date/Time Action Medication Dose      03/12/17 1730 Given    warfarin (COUMADIN) tablet 1 mg 1 mg    03/11/17 1813 Given    warfarin (COUMADIN) tablet 4 mg 4 mg    03/10/17 1703 Given    warfarin (COUMADIN) tablet 4 mg 4 mg    03/09/17 1729 Given    warfarin (COUMADIN) tablet 4 mg 4 mg    03/08/17 2333 Given    warfarin (COUMADIN) tablet 2 mg 2 mg    03/07/17 1809 Given    warfarin (COUMADIN) tablet 4 mg 4 mg          Pharmacy to follow daily and will provide subsequent Warfarin dosing based on clinical status. Danie Carl Pharm. D.   Clinical Pharmacist  [phone]

## 2017-03-14 NOTE — PROGRESS NOTES
Hospitalist Progress Note    Patient: Car Brooke MRN: [id number]  [facility]: [id number]    YOB: 1945  Age: 66 y.o. Sex: female    DOA: 2/25/2017 LOS:  LOS: 16 days                Assessment/Plan     Patient Active Problem List   Diagnosis Code    Acute on chronic diastolic CHF (congestive heart failure) ([facility]) I50.33    ALEJANDRINA (acute kidney injury) [facility] N17.9    Acute exacerbation of CHF (congestive heart failure) ([facility]) I50.9    DM (diabetes mellitus) ([facility] E11.9    Hypertension I10    Respiratory failure ([facility]) J96.90    Acute coronary syndrome ([facility]) I24.9    Obstructive sleep apnea, adult G47.33    Paroxysmal atrial fibrillation ([facility]) I48.0    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 ([facility]) M57.66, E11.65    Dyspnea due to congestive heart failure ([facility]) I50.9    Aspiration pneumonia ([facility]) J69.0    Hyperkalemia E87.5    Elevated troponin R79.89    COPD (chronic obstructive pulmonary disease) ([facility]) J44.9    CHF (congestive heart failure) ([facility]) I50.9    Insufficiency, respiratory, acute ([facility]) R06.89    Infiltrate noted on imaging study R93.8    COPD exacerbation ([facility]) J44.1    Acute on chronic respiratory failure with hypoxia ([facility]) J96.21    Chest pain R07.9    Anxiety F41.9    UTI (urinary tract infection) N39.0    Obesity hypoventilation syndrome ([facility]) E66.2    Pulmonary hypertension, moderate to severe ([facility] I27.2            67 yo female admitted for acute respiratory failure     CRITICAL CARE PLAN     Resp - acute hypoxic hypercapnic respiratory failure, multifactorial, secondary to diastolic CHF, pulmonary HTN, IMANI/OHS  On high flow oxygen, continue with BiPAP at night.     ID - E-Coli UTI, MRSA bacteremia. Repeat blood and urine cultures negative. Antibiotics - on ceftriaxone, vancomycin. On diflucan for vaginitis.     CVS - Monitor HD.    A-fib - continue beta blocker and cardizem,   Chronic diastolic CHF - continue diuresis, significant improvement in edema     Heme/onc - Follow H&H, plts.     Renal - Trend BUN, Cr, follow I/O. Check and replace Mg, K, phos.     Endocrine - Follow FSG, continue with insulin     Neuro/ Pain/ Sedation - pain control, lidoderm patch     GI - diabetic diet     Prophylaxis - DVT: coumadin, GI: protonix     When stable  wants transfer to [facility].     Review of systems  General: No fevers or chills. Cardiovascular: No chest pain or pressure. No palpitations. Pulmonary: on high flow oxygen   Gastrointestinal: No nausea, vomiting.         Physical Exam:  General: Awake, cooperative, no acute distress    HEENT: NC, Atraumatic. PERRLA, anicteric sclerae. Lungs: CTA Bilaterally. No Wheezing/Rhonchi/Rales. Heart: Regular rhythm,  No murmur, No Rubs, No Gallops  Abdomen: Soft, Non distended, Non tender.  +Bowel sounds,   Extremities: Trace edema, bilateral LE ulcers bandaged  Psych:   Not anxious or agitated. Neurologic:  No acute neurological deficit. Vital signs/Intake and Output:  Visit Vitals    /75 (BP 1 Location: Right arm, BP Patient Position: At rest)    Pulse 68    Temp 96.3 °F (35.7 °C)    Resp 20    Ht 5' 4.96\" (1.65 m)    Wt 105.7 kg (233 lb 0.4 oz)    SpO2 95%    Breastfeeding No    BMI 38.82 kg/m2     Current Shift:     Last three shifts:  03/12 1901 - 03/14 0700  In: 1110 [P.O.:610; I.V.:500]  Out: 2400 [Urine:2400]            Labs: Results:       Chemistry Recent Labs      03/14/17   0515  03/13/17   0408  03/12/17   0428   GLU  110*  71*  149*   NA  144  146*  145   K  4.6  4.7  4.3   CL  107  108  106   CO2  33*  33*  36*   BUN  18  23*  28*   CREA  1.07  1.15  1.25   CA  8.7  8.8  8.6   AGAP  4  5  3   BUCR  17  20  22*      CBC w/Diff Recent Labs      03/12/17   0428   HGB  10.3*   HCT  32.4*   PLT  212      Cardiac Enzymes No results for input(s): CPK, CKND1, ALESSANDRA in the last 72 hours.     No lab exists for component: Genesis Bustos   Coagulation Recent Labs      03/14/17   0645  03/13/17 0408   PTP  24.2*  27.1*   INR  2.3*  2.7*       Lipid Panel No results found for: CHOL, CHOLPOCT, CHOLX, CHLST, CHOLV, [id number], HDL, LDL, NLDLCT, DLDL, LDLC, DLDLP, [id number], VLDLC, VLDL, TGL, TGLX, TRIGL, [id number], TRIGP, TGLPOCT, [id number], CHHD, CHHDX   BNP No results for input(s): BNPP in the last 72 hours. Liver Enzymes No results for input(s): TP, ALB, TBIL, AP, SGOT, GPT in the last 72 hours.     No lab exists for component: DBIL   Thyroid Studies Lab Results   Component Value Date/Time    TSH 0.31 01/01/2016 10:20 PM        Procedures/imaging: see electronic medical records for all procedures/Xrays and details which were not copied into this note but were reviewed prior to creation of Plan

## 2017-03-14 NOTE — WOUND CARE
Patient seen during ICU rounds. There are two healing blisters to the left lower leg. Areas were cleaned and bacitracin and mepilex borders were applied. There are two sites on the right lower extremity that are closed. Protective borders were placed per the patient request. The patient declined a complete assessment due to SOB. Wound care will continue to follow this patient during this admission.

## 2017-03-14 NOTE — PROGRESS NOTES
9748  Received report. Patient A/OX3. No acute distress noted. Open reddened areas noted to BLE. Dressing to each site clean,dry and intact. Slightly excoriated,reddened areas  to bilateral breast and abdominal folds. Denies pain. Call light within reach. [id number]  Assisted  patient onto bed pan. Small,dark brown,soft stool noted. Linen,gown an purwick changed. Placed in high moss position per patient request. Call light within reach. Farzaneh Jara 3. Placed on bed pan. 1215  Assisted off bed pan. Patient not able to void at this time. 1336  Patient resting in bed. No acute distress noted. Call light within reach. 1500  Changed dressing to right IJ. Visitors at bedside. Bedside and Verbal shift change report given to ,RN (oncoming nurse) by Joey Mohs (offgoing nurse). Report included the following information SBAR and Kardex.

## 2017-03-14 NOTE — PROGRESS NOTES
Problem: Mobility Impaired (Adult and Pediatric)  Goal: *Acute Goals and Plan of Care (Insert Text)  Physical Therapy Goals  Initiated 2/27/2017 and to be accomplished within 7 day(s)  1. Patient will move from supine to sit and sit to supine , scoot up and down and roll side to side in bed with supervision/set-up. 2. Patient will transfer from bed to chair and chair to bed with minimal assistance/contact guard assist using the least restrictive device. 3. Patient will perform sit to stand with minimal assistance/contact guard assist.  4. Patient will ambulate with minimal assistance/contact guard assist for 50 feet with the least restrictive device. Re-evaluation on 3/6/2017 and to be accomplished within 7 day(s)  1. Patient will move from supine to sit and sit to supine, scoot up and down and roll side to side in bed with supervision/set-up. 2. Patient will transfer from bed to chair and chair to bed with contact guard assist/supervision using the least restrictive device. 3. Patient will perform sit to stand with contact guard assist/supervision. 4. Patient will ambulate with contact guard assist/supervision for 100 feet with the least restrictive device. Re-evaluation on 3/14/2017. Goals to be accomplished within 7 days. 1. Patient will move from supine to sit and sit to supine, scoot up and down and roll side to side in bed with supervision/set-up. 2. Patient will transfer from bed to chair and chair to bed with contact guard assist/supervision using the least restrictive device. 3. Patient will perform sit to stand with contact guard assist/supervision. 4. Patient will ambulate with contact guard assist/supervision for 50 feet with the least restrictive device. 5. Patient will be independent with HEP to maximize strength and muscular endurance.   Outcome: Progressing Towards Goal  PHYSICAL THERAPY RE-EVALUATION AND TREATMENT     Patient: Patience Renee (2 y.o. female)  Date: 3/14/2017  Diagnosis: CHF NYHA class IV, acute, diastolic (HCC)  CHF NYHA class IV, acute, diastolic (HCC) Acute on chronic respiratory failure with hypoxia (HCC)       Precautions: Fall      ASSESSMENT:  The patient continues to present with decreased functional mobility with regards to bed mobility, transfers, gait, balance, and tolerance to activity. The patient has had a medical set back and transfer to the ICU recently but appears to be improving. Patient was seen with OT this date. Patient was very eager to transfer to a bedside commode in order to have a bowel movement. Patient sat up to edge of bed with CGA/minimal assistance. Patient then transferred to the bedside commode with minimal assistance of two. Patient had a bowel movement and OT assisted with greyson-care. Patient then participated in multiple bouts of ambulation for about 12 feet in total. Gait is slow and fairly unsteady in appearance, with the patient's O2 saturations dropping to about 85% while on high flow O2. Patient was returned back to supine in bed after gait training. Spoke with patient's nurse and made recommendations for 2 person assist to bedside commode. Will continue PT and recommend rehab at discharge. Patient's progression toward goals since last assessment: Fair       PLAN:  Goals have been updated based on progression since last assessment. Patient continues to benefit from skilled intervention to address the above impairments. Continue to follow the patient daily to address goals.   Planned Interventions:  [X]     Bed Mobility Training          [ ]     Neuromuscular Re-Education  [X]     Transfer Training                [ ]    Orthotic/Prosthetic Training  [X]     Gait Training                       [ ]     Modalities  [X]     Therapeutic Exercises       [ ]     Edema Management/Control  [X]     Therapeutic Activities         [X]     Patient and Family Training/Education  [ ]     Other (comment):  Discharge Recommendations: Rehab  Further Equipment Recommendations for Discharge: N/A       SUBJECTIVE:   Patient stated Tell the nurses that they can help me to the commode. Do they understand?       OBJECTIVE DATA SUMMARY:   Critical Behavior:  Neurologic State: Alert  Orientation Level: Oriented X4  Cognition: Follows commands, Appropriate for age attention/concentration  Safety/Judgement: Fall prevention  Functional Mobility Training:  Bed Mobility:  Supine to Sit: Contact guard assistance  Sit to Supine: Minimum assistance  Scooting: Minimum assistance  Transfers:  Sit to Stand: Contact guard assistance;Minimum assistance  Stand to Sit: Contact guard assistance;Minimum assistance  Balance:  Sitting: Intact  Standing: Impaired; With support  Standing - Static: Fair  Standing - Dynamic : Fair  Ambulation/Gait Training:  Distance (ft): 12 Feet (ft) (3 trials of 4 feet)  Assistive Device: Gait belt;Walker, rolling  Ambulation - Level of Assistance: Minimal assistance;Assist x2  Gait Abnormalities: Decreased step clearance; Antalgic; Shuffling gait  Base of Support: Widened  Stance: Time;Weight shift  Speed/Kristin: Slow;Shuffled  Step Length: Right shortened;Left shortened  Swing Pattern: Right asymmetrical;Left asymmetrical  Interventions: Verbal cues     Pain:  Pain Scale 1: Numeric (0 - 10)  Pain Intensity 1: 0  Pain Location 1: Back  Pain Orientation 1: Posterior  Pain Description 1: Aching  Pain Intervention(s) 1: Medication (see MAR)  Activity Tolerance:   Poor  Please refer to the flowsheet for vital signs taken during this treatment.   After treatment:   [ ]  Patient left in no apparent distress sitting up in chair  [X]  Patient left in no apparent distress in bed  [X]  Call bell left within reach  [X]  Nursing notified  [ ]  Caregiver present  [ ]  Bed alarm activated     Amy Best   Time Calculation: 35 mins

## 2017-03-14 NOTE — PROGRESS NOTES
Problem: Dysphagia (Adult)  Goal: *Acute Goals and Plan of Care (Insert Text)  Dysphagia Present: mild  Aspiration: no     Recommendations:  Diet: dental soft and thin liquids  Meds: in puree  Aspiration Precautions  Other: small sips/bites, slow rate               Outcome: Resolved/Met Date Met:  03/14/17  SPEECH LANGUAGE PATHOLOGY DYSPHAGIA TREATMENT/DISCHARGE     Patient: Tanya Siddiqui (10 y.o. female)  Date: 3/14/2017  Diagnosis: CHF NYHA class IV, acute, diastolic (HCC)  CHF NYHA class IV, acute, diastolic (HCC) Acute on chronic respiratory failure with hypoxia (HCC)       Precautions:  Contact, Fall      ASSESSMENT:  Mild oropharyngeal dysphagia     Dysphagia f/u completed this PM with pt A&Ox4, family and friend at bedside. RN reports pt demo no difficulty with meals or med pass in applesauce. Structured snack of regular solid crackers and thin liquids +straw tolerated without overt s/s penetration/aspiration. Mildly labored mastication of solid cracker though 100% oral clearance. Pt demo independent use of small bites/sips, slow rate, alternating solids/liquids and upright positioning with +feedback provided for adherence to swallow safety precautions. Educated pt and family at bedside re: prior MBS results/recommendations, dysphagia in setting of respiratory compromise and limited dentition, aspiration risk and importance of aforementioned strategies to maximize safety with PO intake; understanding voiced. Pt currently tolerating least restrictive diet and has met ST goals; will sign off.      Progression toward goals:  [X]         Improving appropriately and progressing toward goals  [ ]         Improving slowly and progressing toward goals  [ ]         Not making progress toward goals and plan of care will be adjusted       PLAN: dental soft and thin liquids, safe swallow strategies as above  Recommendations and Planned Interventions:  No further acute ST needs, signing off  Discharge Recommendations: Defer to PT/OT       SUBJECTIVE:   Patient stated I ate a hamburger for lunch, it was just fine. OBJECTIVE:   Cognitive and Communication Status:  Neurologic State: Alert  Orientation Level: Oriented X4  Cognition: Follows commands, Appropriate for age attention/concentration  Perception: Appears intact  Perseveration: No perseveration noted  Safety/Judgement: Awareness of environment, Fall prevention  Dysphagia Treatment:  Oral Assessment:  Oral Assessment  Labial: No impairment  Dentition: Limited, Poor  Oral Hygiene: good  Lingual: Decreased rate  Velum: No impairment  Mandible: No impairment  P.O. Trials:              Patient Position: [facility] 60              Vocal quality prior to P.O.: No impairment              Consistency Presented: Thin liquid, Solid              How Presented: Self-fed/presented, Straw              How Much:  (x4 solid, x4 thin)              Bolus Acceptance: No impairment              Bolus Formation/Control: Impaired              Type of Impairment: Mastication              Propulsion: No impairment              Oral Residue: None              Initiation of Swallow: Delayed (# of seconds)              Laryngeal Elevation: Functional              Aspiration Signs/Symptoms: None              Pharyngeal Phase Characteristics: Poor endurance              Effective Modifications:  Alternate liquids/solids, Small sips and bites              Cues for Modifications: None                                Oral Phase Severity: Mild              Pharyngeal Phase Severity : Mild PAIN:  Start of Tx: 0  End of Tx: 0      After treatment:   [ ]              Patient left in no apparent distress sitting up in chair  [X]              Patient left in no apparent distress in bed  [X]              Call bell left within reach  [X]              Nursing notified  [X]              Family present  [ ]              Caregiver present  [ ]              Bed alarm activated         COMMUNICATION/EDUCATION:   [X]        Aspiration precautions; swallow safety; compensatory techniques  [ ]        Patient unable to participate in education; education ongoing with staff  [ ]         Posted safety precautions in patient's room.   [ ]         Oral-motor/laryngeal strengthening exercises        LILIANA Renteria  Time Calculation: 15 mins

## 2017-03-14 NOTE — PROGRESS NOTES
conducted a Follow up consultation and Spiritual Assessment for Preston Evangelista, who is a 66 y.o.,female. Patient was sitting up in the bed. Room is filled with personal items. Patient attends a [employer]. Good support system. The  provided the following Interventions:  Continued the relationship of care and support. Listened empathically. Offered prayer and assurance of continued prayer on patients behalf. Chart reviewed. The following outcomes were achieved:  Patient expressed gratitude for Spiritual Care visit. Assessment:  There are no further spiritual or Adventism issues which require Spiritual Care Services intervention at this time. Plan:  Chaplains will continue to follow and will provide pastoral care as needed or requested.  recommends bedside caregivers page the  on duty if patient shows signs of acute spiritual or emotional distress. [address], [place].    Board Certified   [phone] - Office

## 2017-03-14 NOTE — PROGRESS NOTES
[facility]  ICU Progress Note      Name: Giacomo Garza   : 1945   MRN: [id number]   Date: 3/14/2017 12:13 PM     [x]I have reviewed the flowsheet and previous days notes. Events overnight reviewed and discussed with nursing staff. Vital signs and records reviewed. Giacomo Garza is a 66 y.o. female with IMANI/OHS, pulmonary HTN, and COPD . Patient was admitted for respiratory failure requiring Bipap. Patient has improved with HF NC and diuresis. S/p Tx for UTI. Started on vancomycin yesterday for + bcx for MRSA. On Bipap overnight. Tolerating weaning of HF  hemodynamically stable   Afebrile  A&O x3  Had BM this morning     Patient c/o SOB and cough with exertion. Some yellow phlegm yesterday. Also states that she is feeling anxious. Denies any headache, visual changes, hemoptysis, And pain, diarrhea, or melena. Medication Review:  · Pressors -  · Sedation -  · Antibiotics - vancomycin and Po diflucan   · Pain -  · GI/ DVT - Protonix, coumadin   · Others (other gtts)    Safety Bundles: VElectrolyte Replacement Protocol    Vital Signs:    Visit Vitals    /75    Pulse 73    Temp 97.4 °F (36.3 °C)    Resp 20    Ht 5' 4.96\" (1.65 m)    Wt 105.7 kg (233 lb 0.4 oz)    SpO2 95%    Breastfeeding No    BMI 38.82 kg/m2       O2 Device: Hi flow nasal cannula   O2 Flow Rate (L/min): 40 l/min   Temp (24hrs), Av.5 °F (36.4 °C), Min:96.3 °F (35.7 °C), Max:98 °F (36.7 °C)       Intake/Output:   Last shift:       07 -  1900  In: 360 [P.O.:360]  Out: 2   Last 3 shifts: 1901 -  0700  In: 1110 [P.O.:610; I.V.:500]  Out: 2400 [Urine:2400]    Intake/Output Summary (Last 24 hours) at 17 1434  Last data filed at 17 1224   Gross per 24 hour   Intake             1110 ml   Output             1252 ml   Net             -142 ml       Physical Exam:    General: awake and sitting on the side of her bed.  On HF, Acyanotic, no distress    HEENT:  Anicteric sclerae; pink palpebral conjunctivae; mucosa moist  Resp:  Symmetrical chest expansion, no accessory muscle use. BBS CTA.  Diminished bibasilar   CV:  S1, S2 present; irregular rate and rhythm   GI:  Abdomen soft, non-tender; (+) active bowel sounds  Extremities:  +2 pulses on all extremities; trace pedal edema   Skin:  Warm; no rashes/ lesions noted  Neurologic:  Non-focal        DATA:     Current Facility-Administered Medications   Medication Dose Route Frequency    furosemide (LASIX) injection 20 mg  20 mg IntraVENous DAILY    QUEtiapine (SEROquel) tablet 25 mg  25 mg Oral QHS    Vancomycin - Pharmacy to Dose  1 Each Other Rx Dosing/Monitoring    vancomycin (VANCOCIN) 1,750 mg in 0.9% sodium chloride 500 mL IVPB  1,750 mg IntraVENous Q24H    calcium carbonate (TUMS) chewable tablet 200 mg [elemental]  200 mg Oral QID WITH MEALS    potassium chloride (K-DUR, KLOR-CON) SR tablet 20 mEq  20 mEq Oral DAILY    lidocaine (LIDODERM) 5 % patch 1 Patch  1 Patch TransDERmal Q24H    fluconazole (DIFLUCAN) tablet 100 mg  100 mg Oral DAILY    insulin lispro (HUMALOG) injection 5 Units  5 Units SubCUTAneous TIDAC    HYDROcodone-acetaminophen (NORCO) 5-325 mg per tablet 1 Tab  1 Tab Oral Q6H PRN    insulin glargine (LANTUS) injection 20 Units  20 Units SubCUTAneous QHS    digoxin (LANOXIN) tablet 0.125 mg  0.125 mg Oral DAILY    carvedilol (COREG) tablet 25 mg  25 mg Oral BID WITH MEALS    dilTIAZem (CARDIZEM) IR tablet 30 mg  30 mg Oral TIDAC    ELECTROLYTE REPLACEMENT PROTOCOL  1 Each Other PRN    ELECTROLYTE REPLACEMENT PROTOCOL  1 Each Other PRN    pantoprazole (PROTONIX) tablet 40 mg  40 mg Oral ACB    polyvinyl alcohol (LIQUIFILM TEARS) 1.4 % ophthalmic solution 1 Drop  1 Drop Both Eyes PRN    nystatin (MYCOSTATIN) 100,000 unit/gram powder   Topical BID    docusate sodium (COLACE) capsule 100 mg  100 mg Oral BID    simvastatin (ZOCOR) tablet 10 mg  10 mg Oral QHS    aspirin chewable tablet 81 mg  81 mg Oral DAILY    arformoterol (BROVANA) neb solution 15 mcg  15 mcg Nebulization BID RT    budesonide (PULMICORT) 500 mcg/2 ml nebulizer suspension  500 mcg Nebulization BID RT    albuterol-ipratropium (DUO-NEB) 2.5 MG-0.5 MG/3 ML  3 mL Nebulization Q4H PRN    ondansetron (ZOFRAN) injection 4 mg  4 mg IntraVENous Q6H PRN    acetaminophen (TYLENOL) tablet 650 mg  650 mg Oral Q4H PRN    insulin lispro (HUMALOG) injection   SubCUTAneous AC&HS    glucose chewable tablet 16 g  4 Tab Oral PRN    glucagon (GLUCAGEN) injection 1 mg  1 mg IntraMUSCular PRN    dextrose (D50W) injection syrg 12.5-25 g  25-50 mL IntraVENous PRN    warfarin pharmacy to dose   Other PRN         Labs: Results:       Chemistry Recent Labs      03/14/17   0515  03/13/17   0408  03/12/17   0428   GLU  110*  71*  149*   NA  144  146*  145   K  4.6  4.7  4.3   CL  107  108  106   CO2  33*  33*  36*   BUN  18  23*  28*   CREA  1.07  1.15  1.25   CA  8.7  8.8  8.6   AGAP  4  5  3   BUCR  17  20  22*      CBC w/Diff Recent Labs      03/12/17   0428   HGB  10.3*   HCT  32.4*   PLT  212      Coagulation Recent Labs      03/14/17   0645  03/13/17   0408   PTP  24.2*  27.1*   INR  2.3*  2.7*       Liver Enzymes No results for input(s): TP, ALB, TBIL, AP, SGOT, GPT in the last 72 hours.     No lab exists for component: DBIL   ABG No results found for: PH, PHI, PCO2, PCO2I, PO2, PO2I, HCO3, HCO3I, FIO2, FIO2I   Microbiology Recent Labs      03/12/17   1107  03/12/17   1000   CULT  NO GROWTH 2 DAYS  NO GROWTH 2 DAYS          Telemetry: []Sinus []A-flutter []Paced    [x]A-fib []Multiple PVCs                        IMPRESSION:   · Acute hypoxemic respiratory failure r/t severe HFPEF and moderate pul htn will discus with cardiology and pulm about her she has been sob for one year  · R/o IPF   · LA severely dilated r/o constriction to discus with cardiology Moderate pulmonary HTN  · OHS, obesity hypoventilation syndrome   · Obesity  · MRSA bacteremia  · DM type 2   · afib  · UTI s/p abx        PLAN:   · Resp -   HF NC, wean for SPO2 > 90%. BIPAP HS. HOB > 30 degrees. Strict aspiration precautions. Continue brovana and Pulmicort. Patient's baseline HCO3 should be closer to 40s Patients baseline PCO2 should be closer to high 60s or low 70s  · ID -  S/p ceftriaxone for UTI. Started on vancomycin for + MRSA in bcx. Possible contaminant? Repeat Bcx pending   · CVS - Cardiology following. Afib rate controlled. Continue asa, coreg, digoxin, and cardizem. Patient is on IV lasix . Consider decreasing dose since patient Na is trending up  · Heme/onc -stable     · Metabolic - no acute electrolyte derangements noted   · Renal - strict I&O, trend renal function   · Endocrine - TSH is low with normal free t4. Most likely sick euthyroid. BG was low then elevated. This is most likely d/t patient being npo overnight then started on IVF with D5. Will d/c IVF since patient has passed swallow evaluation   · Neuro/ Pain/ Sedation - on Seroquel HS.  Judicious use of anxiolytics and narcotics in this patient since she is a chronic pco2 retainer   · GI -  Slp approved dental soft and thin liquids  · Prophylaxis - DVT- on coumadin, GI- protonix   ·           The patient is: [x] acutely ill Risk of deterioration: [] moderate    [] critically ill  [] high     [x]See my orders for details    My assessment/plan was discussed with:  [x]nursing []PT/OT    []respiratory therapy [x]Dr. Patricio Mcmillan    []family []         Mary Grant MD

## 2017-03-14 NOTE — PROGRESS NOTES
2215  Pt seen with off-going nurse. Pt is sitting in bed with Hi flow NC, watching TV. Awaiting Respiratory to place her on nightly Bipap. Pt gets dyspneic on exertion, reporting abdominal discomfort from feeling the need to have a BM. VSS. CM denotes Afib. Call bell in reach. [id number]  Has been calling frequently for service items. Turns with minimal assistance but is unable to scoot self up in bed, Assisted with turning. 0216  On bedpan for BM, only remains on it for 2 minutes, states it is too uncomfortable to have a BM. Wants to get up to [facility] with PT in AM.    0455  Norco given for c/o left hip and leg pain. Pt has continued to be slightly uncomfortable, is fidgety and unable to get comfortable. 0645  On [place] NC, remains awake and moving self in bed a lot but not up. States she does not feel tired . Blood drawn from IJ without diff.

## 2017-03-14 NOTE — PROGRESS NOTES
Problem: Self Care Deficits Care Plan (Adult)  Goal: *Acute Goals and Plan of Care (Insert Text)  OTG Initiated 3/14/17    1. Patient will perform grooming with supervision/set-up   2. Patient will perform upper body dressing with supervision/set-up. 3. Patient will perform lower body dressing with minimal assistance/contact guard assist.  4. Patient will perform toilet transfers with supervision/set-up. 5. Patient will perform all aspects of toileting with supervision/set-up. 6. Patient will perform functional activity while standing for 3-5 minutes with [facility]     Occupational Therapy Goals  Initiated 3/6/2017 within 7 day(s). 1. Patient will perform grooming with supervision/set-up   2. Patient will perform upper body dressing with supervision/set-up. 3. Patient will perform lower body dressing with minimal assistance/contact guard assist.  4. Patient will perform toilet transfers with supervision/set-up. 5. Patient will perform all aspects of toileting with supervision/set-up. 6. Patient will participate in upper extremity therapeutic exercise/activities with supervision/set-up for 10 minutes. 7. Patient will utilize energy conservation techniques during functional activities with verbal cues. 8. Patient will perform functional activity while standing for 3-5 minutes with [address] [place] [address]     Patient: Elizabeth Veronica (54 y.o. female)  Date: 3/14/2017  Diagnosis: CHF NYHA class IV, acute, diastolic (HCC)  CHF NYHA class IV, acute, diastolic (HCC) Acute on chronic respiratory failure with hypoxia [facility])       Precautions: Fall      ASSESSMENT :  Based on the objective data described below, the patient presents with respiratory failure.  Pt continues to be on high flow for oxygen and O2 stats high 80s with mobility and HR into 80s with activity and 60-70s at rest. Pt completed sit to stand from bedside with min assist. Transfer to [facility] with min assist. Pt reported unable to complete toilet hygiene at this time. Total assist for toileting hygiene and LB dressing. Min assist for UB dressing. Pt left in bed with call bell in reach. Instructed nursing this date, that pt could get OOB with nursing to use [facility] as pt is min assist for transfers. Pt could benefit from OT to increase I with ADLs, transfers, mobility, activity tolerance and strength for functional activity. Patient will benefit from skilled intervention to address the above impairments. Patients rehabilitation potential is considered to be Fair  Factors which may influence rehabilitation potential include:   [ ]                None noted  [ ]                Mental ability/status  [X]                Medical condition  [ ]                Home/family situation and support systems  [ ]                Safety awareness  [ ]                Pain tolerance/management  [ ]                Other:        PLAN :  Recommendations and Planned Interventions:  [X]                  Self Care Training                  [X]           Therapeutic Activities  [X]                  Functional Mobility Training    [ ]           Cognitive Retraining  [X]                  Therapeutic Exercises           [X]           Endurance Activities  [X]                  Balance Training                   [ ]           Neuromuscular Re-Education  [ ]                  Visual/Perceptual Training     [X]      Home Safety Training  [X]                  Patient Education                 [X]           Family Training/Education  [ ]                  Other (comment):     Frequency/Duration: Patient will be followed by occupational therapy 1-2 times per day/4-7 days per week to address goals. Discharge Recommendations: Rehab  Further Equipment Recommendations for Discharge: N/A       SUBJECTIVE:   Patient stated I've had to use the bathroom all day.       OBJECTIVE DATA SUMMARY:   Hospital course since last seen and reason for reevaluation: pt last re-evaluated on 3/7/14  Cognitive/Behavioral Status:  Neurologic State: Alert  Orientation Level: Oriented X4  Cognition: Follows commands, Appropriate for age attention/concentration  Safety/Judgement: Fall prevention  Skin: intact  Edema: none noted  Vision/Perceptual:  N/A  Coordination:  Coordination: Within functional limits  Fine Motor Skills-Upper: Left Intact; Right Intact    Gross Motor Skills-Upper: Left Intact; Right Intact  Balance:  Sitting: Intact  Standing: Impaired; With support  Standing - Static: Fair  Standing - Dynamic : Fair  Strength:  Strength: Generally decreased, functional  Tone & Sensation:  Tone: Normal  Range of Motion:  AROM: Within functional limits  Functional Mobility and Transfers for ADLs:  Bed Mobility:  Supine to Sit: Contact guard assistance  Sit to Supine: Minimum assistance  Scooting: Minimum assistance  Transfers:  Sit to Stand: Contact guard assistance;Minimum assistance              Toilet Transfer : Minimum assistance              ADL Assessment:  Upper Body Dressing: Minimum assistance  Lower Body Dressing: Total assistance  Toileting: Total assistance  ADL Intervention:  Cognitive Retraining  Safety/Judgement: Fall prevention     Pain:  Pain Scale 1: Numeric (0 - 10)  Pain Intensity 1: 0  Pain Location 1: Back  Pain Orientation 1: Posterior  Pain Description 1: Aching  Pain Intervention(s) 1: Medication (see MAR)  Activity Tolerance:   poor  Please refer to the flowsheet for vital signs taken during this treatment. After treatment:   [ ] Patient left in no apparent distress sitting up in chair  [X] Patient left in no apparent distress in bed  [X] Call bell left within reach  [X] Nursing notified  [ ] Caregiver present  [ ] Bed alarm activated      COMMUNICATION/EDUCATION:   [X]    Home safety education was provided and the patient/caregiver indicated understanding. [X]    Patient/family have participated as able in goal setting and plan of care.   [X]    Patient/family agree to work toward stated goals and plan of care. [ ]    Patient understands intent and goals of therapy, but is neutral about his/her participation. [ ]    Patient is unable to participate in goal setting and plan of care. This patients plan of care is appropriate for delegation to [facility]EL[facility].      Thank you for this referral.  Claudia Silva, OTR/L  Time Calculation: 31 mins

## 2017-03-14 NOTE — PROGRESS NOTES
Occupational Therapy ReEVALUATION    Patient: Fatimah Bautista (89 y.o. female)  Date: 3/14/2017  Diagnosis: CHF NYHA class IV, acute, diastolic (HCC)  CHF NYHA class IV, acute, diastolic (HCC) Acute on chronic respiratory failure with hypoxia [facility])       Precautions: Fall    ASSESSMENT :  Based on the objective data described below, the patient presents with respiratory failure. Pt continues to be on high flow for oxygen and O2 stats high 80s with mobility and HR into 80s with activity and 60-70s at rest. Pt completed sit to stand from bedside with min assist. Transfer to [facility] with min assist. Pt reported unable to complete toilet hygiene at this time. Total assist for toileting hygiene and LB dressing. Min assist for UB dressing. Pt left in bed with call bell in reach. Instructed nursing this date, that pt could get OOB with nursing to use [facility] as pt is min assist for transfers. Pt could benefit from OT to increase I with ADLs, transfers, mobility, activity tolerance and strength for functional activity. Patient will benefit from skilled intervention to address the above impairments.   Patients rehabilitation potential is considered to be Fair  Factors which may influence rehabilitation potential include:   []                None noted  []                Mental ability/status  [x]                Medical condition  []                Home/family situation and support systems  []                Safety awareness  []                Pain tolerance/management  []                Other:      PLAN :  Recommendations and Planned Interventions:  [x]                  Self Care Training                  [x]           Therapeutic Activities  [x]                  Functional Mobility Training    []           Cognitive Retraining  [x]                  Therapeutic Exercises           [x]           Endurance Activities  [x]                  Balance Training                   []           Neuromuscular Re-Education  [] Visual/Perceptual Training     [x]      Home Safety Training  [x]                  Patient Education                 [x]           Family Training/Education  []                  Other (comment):    Frequency/Duration: Patient will be followed by occupational therapy 1-2 times per day/4-7 days per week to address goals. Discharge Recommendations: Rehab  Further Equipment Recommendations for Discharge: N/A     SUBJECTIVE:   Patient stated I've had to use the bathroom all day.     OBJECTIVE DATA SUMMARY:   Hospital course since last seen and reason for reevaluation: pt last re-evaluated on 3/7/14  Cognitive/Behavioral Status:  Neurologic State: Alert  Orientation Level: Oriented X4  Cognition: Follows commands, Appropriate for age attention/concentration  Safety/Judgement: Fall prevention  Skin: intact  Edema: none noted  Vision/Perceptual:  N/A  Coordination:  Coordination: Within functional limits  Fine Motor Skills-Upper: Left Intact; Right Intact    Gross Motor Skills-Upper: Left Intact; Right Intact  Balance:  Sitting: Intact  Standing: Impaired; With support  Standing - Static: Fair  Standing - Dynamic : Fair  Strength:  Strength: Generally decreased, functional  Tone & Sensation:  Tone: Normal  Range of Motion:  AROM: Within functional limits  Functional Mobility and Transfers for ADLs:  Bed Mobility:  Supine to Sit: Contact guard assistance  Sit to Supine: Minimum assistance  Scooting: Minimum assistance  Transfers:  Sit to Stand: Contact guard assistance;Minimum assistance    Toilet Transfer : Minimum assistance   ADL Assessment:  Upper Body Dressing: Minimum assistance  Lower Body Dressing: Total assistance  Toileting:  Total assistance  ADL Intervention:  Cognitive Retraining  Safety/Judgement: Fall prevention    Pain:  Pain Scale 1: Numeric (0 - 10)  Pain Intensity 1: 0  Pain Location 1: Back  Pain Orientation 1: Posterior  Pain Description 1: Aching  Pain Intervention(s) 1: Medication (see MAR)  Activity Tolerance:   poor  Please refer to the flowsheet for vital signs taken during this treatment. After treatment:   [] Patient left in no apparent distress sitting up in chair  [x] Patient left in no apparent distress in bed  [x] Call bell left within reach  [x] Nursing notified  [] Caregiver present  [] Bed alarm activated    COMMUNICATION/EDUCATION:   [x]    Home safety education was provided and the patient/caregiver indicated understanding. [x]    Patient/family have participated as able in goal setting and plan of care. [x]    Patient/family agree to work toward stated goals and plan of care. []    Patient understands intent and goals of therapy, but is neutral about his/her participation. []    Patient is unable to participate in goal setting and plan of care. This patients plan of care is appropriate for delegation to [facility].     Thank you for this referral.  Ines Coley OTR/RICHARD  Time Calculation: 31 mins

## 2017-03-15 ENCOUNTER — APPOINTMENT (OUTPATIENT)
Dept: GENERAL RADIOLOGY | Age: 72
DRG: 286 | End: 2017-03-15
Attending: INTERNAL MEDICINE
Payer: MEDICARE

## 2017-03-15 LAB
ANION GAP BLD CALC-SCNC: 4 MMOL/L (ref 3–18)
BACTERIA SPEC CULT: NORMAL
BUN SERPL-MCNC: 16 MG/DL (ref 7–18)
BUN/CREAT SERPL: 15 (ref 12–20)
CALCIUM SERPL-MCNC: 8.7 MG/DL (ref 8.5–10.1)
CHLORIDE SERPL-SCNC: 104 MMOL/L (ref 100–108)
CO2 SERPL-SCNC: 32 MMOL/L (ref 21–32)
CREAT SERPL-MCNC: 1.1 MG/DL (ref 0.6–1.3)
GLUCOSE BLD STRIP.AUTO-MCNC: 121 MG/DL (ref 70–110)
GLUCOSE BLD STRIP.AUTO-MCNC: 130 MG/DL (ref 70–110)
GLUCOSE BLD STRIP.AUTO-MCNC: 177 MG/DL (ref 70–110)
GLUCOSE BLD STRIP.AUTO-MCNC: 180 MG/DL (ref 70–110)
GLUCOSE SERPL-MCNC: 200 MG/DL (ref 74–99)
INR PPP: 2 (ref 0.8–1.2)
MAGNESIUM SERPL-MCNC: 2.1 MG/DL (ref 1.8–2.4)
POTASSIUM SERPL-SCNC: 5.1 MMOL/L (ref 3.5–5.5)
PROTHROMBIN TIME: 21.7 SEC (ref 11.5–15.2)
SERVICE CMNT-IMP: NORMAL
SODIUM SERPL-SCNC: 140 MMOL/L (ref 136–145)

## 2017-03-15 PROCEDURE — 85610 PROTHROMBIN TIME: CPT | Performed by: INTERNAL MEDICINE

## 2017-03-15 PROCEDURE — 83735 ASSAY OF MAGNESIUM: CPT | Performed by: INTERNAL MEDICINE

## 2017-03-15 PROCEDURE — 74011250636 HC RX REV CODE- 250/636: Performed by: INTERNAL MEDICINE

## 2017-03-15 PROCEDURE — 82962 GLUCOSE BLOOD TEST: CPT

## 2017-03-15 PROCEDURE — C8924 2D TTE W OR W/O FOL W/CON,FU: HCPCS

## 2017-03-15 PROCEDURE — 74011250636 HC RX REV CODE- 250/636: Performed by: HOSPITALIST

## 2017-03-15 PROCEDURE — 74011000250 HC RX REV CODE- 250: Performed by: INTERNAL MEDICINE

## 2017-03-15 PROCEDURE — 74011250636 HC RX REV CODE- 250/636: Performed by: NURSE PRACTITIONER

## 2017-03-15 PROCEDURE — 65610000006 HC RM INTENSIVE CARE

## 2017-03-15 PROCEDURE — 74011250637 HC RX REV CODE- 250/637: Performed by: INTERNAL MEDICINE

## 2017-03-15 PROCEDURE — 77010033711 HC HIGH FLOW OXYGEN

## 2017-03-15 PROCEDURE — 74011250637 HC RX REV CODE- 250/637: Performed by: HOSPITALIST

## 2017-03-15 PROCEDURE — 80048 BASIC METABOLIC PNL TOTAL CA: CPT | Performed by: INTERNAL MEDICINE

## 2017-03-15 PROCEDURE — 97530 THERAPEUTIC ACTIVITIES: CPT

## 2017-03-15 PROCEDURE — 71010 XR CHEST PORT: CPT

## 2017-03-15 PROCEDURE — 94660 CPAP INITIATION&MGMT: CPT

## 2017-03-15 PROCEDURE — 97116 GAIT TRAINING THERAPY: CPT

## 2017-03-15 PROCEDURE — 94640 AIRWAY INHALATION TREATMENT: CPT

## 2017-03-15 RX ORDER — TRAZODONE HYDROCHLORIDE 50 MG/1
50 TABLET ORAL
Status: DISCONTINUED | OUTPATIENT
Start: 2017-03-15 | End: 2017-03-24 | Stop reason: HOSPADM

## 2017-03-15 RX ORDER — WARFARIN 2 MG/1
2 TABLET ORAL ONCE
Status: COMPLETED | OUTPATIENT
Start: 2017-03-15 | End: 2017-03-15

## 2017-03-15 RX ADMIN — ARFORMOTEROL TARTRATE 15 MCG: 15 SOLUTION RESPIRATORY (INHALATION) at 20:56

## 2017-03-15 RX ADMIN — DILTIAZEM HYDROCHLORIDE 30 MG: 30 TABLET, FILM COATED ORAL at 11:37

## 2017-03-15 RX ADMIN — DOCUSATE SODIUM 100 MG: 100 CAPSULE, LIQUID FILLED ORAL at 09:31

## 2017-03-15 RX ADMIN — FLUCONAZOLE 100 MG: 100 TABLET ORAL at 11:37

## 2017-03-15 RX ADMIN — NYSTATIN: 100000 POWDER TOPICAL at 20:23

## 2017-03-15 RX ADMIN — INSULIN LISPRO 5 UNITS: 100 INJECTION, SOLUTION INTRAVENOUS; SUBCUTANEOUS at 11:36

## 2017-03-15 RX ADMIN — ANTACID TABLETS 200 MG: 500 TABLET, CHEWABLE ORAL at 17:22

## 2017-03-15 RX ADMIN — ARFORMOTEROL TARTRATE 15 MCG: 15 SOLUTION RESPIRATORY (INHALATION) at 07:30

## 2017-03-15 RX ADMIN — INSULIN GLARGINE 20 UNITS: 100 INJECTION, SOLUTION SUBCUTANEOUS at 21:31

## 2017-03-15 RX ADMIN — ONDANSETRON HYDROCHLORIDE 4 MG: 2 INJECTION INTRAMUSCULAR; INTRAVENOUS at 10:35

## 2017-03-15 RX ADMIN — POTASSIUM CHLORIDE 20 MEQ: 20 TABLET, EXTENDED RELEASE ORAL at 09:31

## 2017-03-15 RX ADMIN — DILTIAZEM HYDROCHLORIDE 30 MG: 30 TABLET, FILM COATED ORAL at 17:22

## 2017-03-15 RX ADMIN — FUROSEMIDE 20 MG: 10 INJECTION, SOLUTION INTRAMUSCULAR; INTRAVENOUS at 09:53

## 2017-03-15 RX ADMIN — ANTACID TABLETS 200 MG: 500 TABLET, CHEWABLE ORAL at 09:31

## 2017-03-15 RX ADMIN — INSULIN LISPRO 5 UNITS: 100 INJECTION, SOLUTION INTRAVENOUS; SUBCUTANEOUS at 08:10

## 2017-03-15 RX ADMIN — QUETIAPINE FUMARATE 25 MG: 25 TABLET, FILM COATED ORAL at 21:33

## 2017-03-15 RX ADMIN — NYSTATIN: 100000 POWDER TOPICAL at 09:54

## 2017-03-15 RX ADMIN — BUDESONIDE 500 MCG: 0.5 INHALANT RESPIRATORY (INHALATION) at 07:30

## 2017-03-15 RX ADMIN — DILTIAZEM HYDROCHLORIDE 30 MG: 30 TABLET, FILM COATED ORAL at 07:02

## 2017-03-15 RX ADMIN — CARVEDILOL 25 MG: 12.5 TABLET, FILM COATED ORAL at 17:22

## 2017-03-15 RX ADMIN — DIGOXIN 0.12 MG: 0.12 TABLET ORAL at 09:31

## 2017-03-15 RX ADMIN — INSULIN LISPRO 5 UNITS: 100 INJECTION, SOLUTION INTRAVENOUS; SUBCUTANEOUS at 21:32

## 2017-03-15 RX ADMIN — PANTOPRAZOLE SODIUM 40 MG: 40 TABLET, DELAYED RELEASE ORAL at 07:01

## 2017-03-15 RX ADMIN — ANTACID TABLETS 200 MG: 500 TABLET, CHEWABLE ORAL at 21:32

## 2017-03-15 RX ADMIN — DOCUSATE SODIUM 100 MG: 100 CAPSULE, LIQUID FILLED ORAL at 20:23

## 2017-03-15 RX ADMIN — ASPIRIN 81 MG CHEWABLE TABLET 81 MG: 81 TABLET CHEWABLE at 09:31

## 2017-03-15 RX ADMIN — VANCOMYCIN HYDROCHLORIDE 1750 MG: 10 INJECTION, POWDER, LYOPHILIZED, FOR SOLUTION INTRAVENOUS at 16:37

## 2017-03-15 RX ADMIN — WARFARIN SODIUM 2 MG: 2 TABLET ORAL at 17:22

## 2017-03-15 RX ADMIN — HYDROCODONE BITARTRATE AND ACETAMINOPHEN 1 TABLET: 5; 325 TABLET ORAL at 22:53

## 2017-03-15 RX ADMIN — BUDESONIDE 500 MCG: 0.5 INHALANT RESPIRATORY (INHALATION) at 20:56

## 2017-03-15 RX ADMIN — SIMVASTATIN 10 MG: 20 TABLET, FILM COATED ORAL at 21:31

## 2017-03-15 RX ADMIN — HYDROCODONE BITARTRATE AND ACETAMINOPHEN 1 TABLET: 5; 325 TABLET ORAL at 06:53

## 2017-03-15 RX ADMIN — TRAZODONE HYDROCHLORIDE 50 MG: 50 TABLET ORAL at 21:31

## 2017-03-15 RX ADMIN — CARVEDILOL 25 MG: 12.5 TABLET, FILM COATED ORAL at 09:31

## 2017-03-15 NOTE — ROUTINE PROCESS
Bedside shift change report given to Maryjane Clay RN (oncoming nurse) by Bg Mckinley RN (offgoing nurse). Report included the following information SBAR, Kardex and MAR.

## 2017-03-15 NOTE — PROGRESS NOTES
Pharmacy Dosing Services:  Warfarin    Consult for Warfarin Dosing by Pharmacy by Dr. Genet Farah provided for this 66 y.o.  female , for indication of Atrial Fibrillation. Dose to achieve an INR goal of 2-3    Order entered for  Warfarin  2 (mg) ordered to be given today at 18:00. Significant drug interactions:  Fluconazole, Aspirin    LABS    PT/INR Lab Results   Component Value Date/Time    INR 2.0 03/15/2017 09:50 AM      Platelets Lab Results   Component Value Date/Time    PLATELET 534 03/28/0802 04:28 AM      H/H     Lab Results   Component Value Date/Time    HGB 10.3 03/12/2017 04:28 AM        Warfarin Administrations (last 168 hours)     Date/Time Action Medication Dose    03/14/17 1843 Given    warfarin (COUMADIN) tablet 2 mg 2 mg    03/12/17 1730 Given    warfarin (COUMADIN) tablet 1 mg 1 mg    03/11/17 1813 Given    warfarin (COUMADIN) tablet 4 mg 4 mg    03/10/17 1703 Given    warfarin (COUMADIN) tablet 4 mg 4 mg    03/09/17 1729 Given    warfarin (COUMADIN) tablet 4 mg 4 mg    03/08/17 2333 Given    warfarin (COUMADIN) tablet 2 mg 2 mg          Pharmacy to follow daily and will provide subsequent Warfarin dosing based on clinical status.   Ruperto De Leon, [facility]      Contact information     [phone]

## 2017-03-15 NOTE — PROGRESS NOTES
1254 Received report assumed care at this time. Pt awake alert and oriented on N/c tolerating well. VSS call bell in reach. Denies pain discomfort or SOB. 1500 No changes  at bedside. VSS NADN     1600 Report to [employer] RN   Bedside and Verbal shift change report given to [employer]  (oncoming nurse) by Rosario Gr RN   (offgoing nurse).  Report included the following information SBAR, Kardex, Intake/Output, MAR, Recent Results, Med Rec Status and Cardiac Rhythm ST error NOT ST pt is AFIB

## 2017-03-15 NOTE — PROGRESS NOTES
Assumed care of pt. Pt is alert no sign of distress. Call light within reach. Bed in lowest position. Pt working with PT.  1712: Dr. Trena Rinne rounding with pt.

## 2017-03-15 NOTE — PROGRESS NOTES
1920: Assumed patient care, she was sitting in a chair visiting with family. Patient had no signs of distress noted. She was alert and orientated to person, place, time and situation. Respiratory status was stable on hi-flow oxygen. Vital signs were stable. MEWS score was a zero. Patient denied any pain, discomfort, nausea, vomiting, dizziness or anxiety. White board was updated and explained. Bed was locked and in lowest position. Call bell, water and personal belongings were within reach. Patient had no questions, comments or concerns after bedside shift report. 0700: Patient had an uneventful night. Respiratory status, vital signs and MEWS score remain stable. Patient had no questions, comments or concerns after bedside shift report.

## 2017-03-15 NOTE — DIABETES MGMT
NUTRITION / GLYCEMIC CONTROL PLAN OF CARE        Diabetes Management:      - discussed in rounds, steroids possibly to start today  - recommend: continue current regimen, monitor trends, if steroids initiated, will more than likely need increase in insulin doses, pt may also benefit from adjustment to mealtime dose, continues to require corrective coverage, will make recommendation based on overnight trends tomorrow  - goals: progressing   - BG rene be in target range of 140-180 mg/dl by 3/17/17   - PO intake will average at least 75% of meals offered by 3/17/17      - TDD: 45 units (Lantus 20 units + Humalog AC 15 units + Humalog 10 units custom scale corrective coverage)  - BG range: 110 mg/dl-200 mg/dl  - Hypo: none  - BG in target range (non-ICU: <140; ICU<180): [] Yes  [x] No  - Steroids: none  - Intake: good     Patient Vitals for the past 100 hrs:   % Diet Eaten   03/14/17 1920 50 %   03/14/17 1224 50 %   03/14/17 1000 50 %   03/13/17 0857 25 %   03/12/17 1141 0 %   03/11/17 1813 25 %   03/11/17 1417 90 %        Current Insulin regimen:   - Lantus 20 units daily  - Humalog 5 units TID AC  - Humalog Custom very insulin resistant sliding scale    Home medication/insulin regimen:  - Lantus 25 units daily  - Humalog AC + HS sliding scale    Recent Glucose Results:   Lab Results   Component Value Date/Time     (H) 03/15/2017 09:50 AM    GLUCPOC 177 (H) 03/15/2017 11:21 AM    GLUCPOC 121 (H) 03/15/2017 06:49 AM    GLUCPOC 159 (H) 03/14/2017 09:08 PM             Adequate glycemic control PTA:  [] Yes  [x] No    HbA1c: equivalent to ave BGlucose of 174 mg/dl for 2-3 months prior to admission    Lab Results   Component Value Date/Time    Hemoglobin A1c 7.7 02/25/2017 04:21 PM       Diet:   Active Orders   Diet    DIET CARDIAC Soft Solids; Consistent Carb 1500-1600kcal       BELINDA Solis, MPH, RD  Glycemic Control Team

## 2017-03-15 NOTE — PROGRESS NOTES
[address] [place] [address] [facility]  ICU Progress Note      Name: Kiran Saunders   : 1945   MRN: [id number]   Date: 3/15/2017 12:13 PM     [x]I have reviewed the flowsheet and previous days notes. Events overnight reviewed and discussed with nursing staff. Vital signs and records reviewed. Kiran Saunders is a 66 y.o. female with IMANI/OHS, pulmonary HTN, and COPD . Patient was admitted for respiratory failure requiring Bipap. Patient has improved with HF NC and diuresis. S/p Tx for UTI. Started on vancomycin yesterday for + bcx for MRSA. On Bipap overnight. Tolerating weaning of HF  hemodynamically stable   Afebrile  A&O x3  Had BM this morning     Patient c/o SOB and cough with exertion. Some yellow phlegm yesterday. Also states that she is feeling anxious. Denies any headache, visual changes, hemoptysis, And pain, diarrhea, or melena. Medication Review:  · Pressors -  · Sedation -  · Antibiotics - vancomycin and Po diflucan   · Pain -  · GI/ DVT - Protonix, coumadin   · Others (other gtts)    Safety Bundles: VElectrolyte Replacement Protocol    Vital Signs:    Visit Vitals    /42    Pulse (!) 57    Temp 97.5 °F (36.4 °C)    Resp 21    Ht 5' 4.96\" (1.65 m)    Wt 105.7 kg (233 lb 0.4 oz)    SpO2 100%    Breastfeeding No    BMI 38.83 kg/m2       O2 Device: Hi flow nasal cannula   O2 Flow Rate (L/min): 30 l/min   Temp (24hrs), Av.7 °F (36.5 °C), Min:97.4 °F (36.3 °C), Max:98.3 °F (36.8 °C)       Intake/Output:   Last shift:         Last 3 shifts:  1901 - 03/15 0700  In: 850 [P.O.:850]  Out: 652 [Urine:650]    Intake/Output Summary (Last 24 hours) at 03/15/17 1121  Last data filed at 17 1920   Gross per 24 hour   Intake              480 ml   Output                0 ml   Net              480 ml       Physical Exam:    General: awake and sitting on the side of her bed.  On HF, Acyanotic, no distress    HEENT:  Anicteric sclerae; pink palpebral conjunctivae; mucosa moist  Resp: Symmetrical chest expansion, no accessory muscle use. BBS CTA.  Diminished bibasilar   CV:  S1, S2 present; irregular rate and rhythm   GI:  Abdomen soft, non-tender; (+) active bowel sounds  Extremities:  +2 pulses on all extremities; trace pedal edema   Skin:  Warm; no rashes/ lesions noted  Neurologic:  Non-focal        DATA:     Current Facility-Administered Medications   Medication Dose Route Frequency    furosemide (LASIX) injection 20 mg  20 mg IntraVENous DAILY    QUEtiapine (SEROquel) tablet 25 mg  25 mg Oral QHS    Vancomycin - Pharmacy to Dose  1 Each Other Rx Dosing/Monitoring    vancomycin (VANCOCIN) 1,750 mg in 0.9% sodium chloride 500 mL IVPB  1,750 mg IntraVENous Q24H    calcium carbonate (TUMS) chewable tablet 200 mg [elemental]  200 mg Oral QID WITH MEALS    potassium chloride (K-DUR, KLOR-CON) SR tablet 20 mEq  20 mEq Oral DAILY    lidocaine (LIDODERM) 5 % patch 1 Patch  1 Patch TransDERmal Q24H    fluconazole (DIFLUCAN) tablet 100 mg  100 mg Oral DAILY    insulin lispro (HUMALOG) injection 5 Units  5 Units SubCUTAneous TIDAC    HYDROcodone-acetaminophen (NORCO) 5-325 mg per tablet 1 Tab  1 Tab Oral Q6H PRN    insulin glargine (LANTUS) injection 20 Units  20 Units SubCUTAneous QHS    digoxin (LANOXIN) tablet 0.125 mg  0.125 mg Oral DAILY    carvedilol (COREG) tablet 25 mg  25 mg Oral BID WITH MEALS    dilTIAZem (CARDIZEM) IR tablet 30 mg  30 mg Oral TIDAC    ELECTROLYTE REPLACEMENT PROTOCOL  1 Each Other PRN    ELECTROLYTE REPLACEMENT PROTOCOL  1 Each Other PRN    pantoprazole (PROTONIX) tablet 40 mg  40 mg Oral ACB    polyvinyl alcohol (LIQUIFILM TEARS) 1.4 % ophthalmic solution 1 Drop  1 Drop Both Eyes PRN    nystatin (MYCOSTATIN) 100,000 unit/gram powder   Topical BID    docusate sodium (COLACE) capsule 100 mg  100 mg Oral BID    simvastatin (ZOCOR) tablet 10 mg  10 mg Oral QHS    aspirin chewable tablet 81 mg  81 mg Oral DAILY    arformoterol (BROVANA) neb solution 15 mcg 15 mcg Nebulization BID RT    budesonide (PULMICORT) 500 mcg/2 ml nebulizer suspension  500 mcg Nebulization BID RT    albuterol-ipratropium (DUO-NEB) 2.5 MG-0.5 MG/3 ML  3 mL Nebulization Q4H PRN    ondansetron (ZOFRAN) injection 4 mg  4 mg IntraVENous Q6H PRN    acetaminophen (TYLENOL) tablet 650 mg  650 mg Oral Q4H PRN    insulin lispro (HUMALOG) injection   SubCUTAneous AC&HS    glucose chewable tablet 16 g  4 Tab Oral PRN    glucagon (GLUCAGEN) injection 1 mg  1 mg IntraMUSCular PRN    dextrose (D50W) injection syrg 12.5-25 g  25-50 mL IntraVENous PRN    warfarin pharmacy to dose   Other PRN         Labs: Results:       Chemistry Recent Labs      03/14/17   0515  03/13/17   0408   GLU  110*  71*   NA  144  146*   K  4.6  4.7   CL  107  108   CO2  33*  33*   BUN  18  23*   CREA  1.07  1.15   CA  8.7  8.8   AGAP  4  5   BUCR  17  20      CBC w/Diff No results for input(s): WBC, RBC, HGB, HCT, PLT, GRANS, LYMPH, EOS, HGBEXT, HCTEXT, PLTEXT, HGBEXT, HCTEXT, PLTEXT in the last 72 hours. Coagulation Recent Labs      03/15/17   0950  03/14/17   0645   PTP  21.7*  24.2*   INR  2.0*  2.3*       Liver Enzymes No results for input(s): TP, ALB, TBIL, AP, SGOT, GPT in the last 72 hours. No lab exists for component: DBIL   ABG Lab Results   Component Value Date/Time    PHI 7.402 03/14/2017 02:44 PM    PCO2I 49.2 (H) 03/14/2017 02:44 PM    PO2I 80 03/14/2017 02:44 PM    HCO3I 30.6 (H) 03/14/2017 02:44 PM    FIO2I 40 03/14/2017 02:44 PM      Microbiology No results for input(s): CULT in the last 72 hours.        Telemetry: []Sinus []A-flutter []Paced    [x]A-fib []Multiple PVCs                        IMPRESSION:   · Acute hypoxemic respiratory failure r/t severe HFPEF and moderate pul htn will discus with cardiology and pulm about her she has been sob for one year  · R/o IPF   CXR bilateral infiltrate chronic    · At one stage steriod  ut only two days  Will start prednisolone just 60 mg a dayfor one week   · LA severely dilated r/o constriction to discus with cardiology Moderate pulmonary HTN  · OHS, obesity hypoventilation syndrome   · Obesity  · MRSA bacteremia  · DM type 2   · afib  · UTI s/p abx        PLAN:   · Resp -   HF NC, wean for SPO2 > 90%. BIPAP HS. HOB > 30 degrees. Strict aspiration precautions. Continue brovana and Pulmicort. Patient's baseline HCO3 should be closer to 40s Patients baseline PCO2 should be closer to high 60s or low 70s  · ID -  S/p ceftriaxone for UTI. Started on vancomycin for + MRSA in bcx on the 9th . Possible contaminant? Repeat Bcx pending will do echo for ? MRSA  · CVS - Cardiology following. Afib rate controlled. Continue asa, coreg, digoxin, and cardizem. Patient is on IV lasix . Consider decreasing dose since patient Na is trending up  · Heme/onc -stable     · Metabolic - no acute electrolyte derangements noted   · Renal - strict I&O, trend renal function   · Endocrine - TSH is low with normal free t4. Most likely sick euthyroid. BG was low then elevated. This is most likely d/t patient being npo overnight then started on IVF with D5. Will d/c IVF since patient has passed swallow evaluation   · Neuro/ Pain/ Sedation - on Seroquel HS.  Judicious use of anxiolytics and narcotics in this patient since she is a chronic pco2 retainer   · GI -  Slp approved dental soft and thin liquids  · Prophylaxis - DVT- on coumadin, GI- protonix   ·           The patient is: [x] acutely ill Risk of deterioration: [] moderate    [] critically ill  [] high     [x]See my orders for details    My assessment/plan was discussed with:  [x]nursing []PT/OT    []respiratory therapy [x]Dr. Kandace Segovia    []family []         Rodney Marie MD

## 2017-03-15 NOTE — PROGRESS NOTES
Pt placed back on HFNC at 40%, 30 lpm per nurses, pt c/o feeling nauseated, anxious. HR 58, SPO2 fluctuates between 91 and 94%, pt is in no resp distress. She claims she feels comfortable and is going to sleep, will continue to monitor.

## 2017-03-15 NOTE — PROGRESS NOTES
Hospitalist Progress Note    Patient: Claudetta Cruel MRN: [id number]  CSN: [id number]    YOB: 1945  Age: 66 y.o. Sex: female    DOA: 2/25/2017 LOS:  LOS: 17 days                Assessment/Plan     Patient Active Problem List   Diagnosis Code    Acute on chronic diastolic CHF (congestive heart failure) ([facility]) I50.33    ALEJANDRINA (acute kidney injury) ([facility] N17.9    Acute exacerbation of CHF (congestive heart failure) ([facility]) I50.9    DM (diabetes mellitus) ([facility] E11.9    Hypertension I10    Respiratory failure ([facility]) J96.90    Acute coronary syndrome ([facility]) I24.9    Obstructive sleep apnea, adult G47.33    Paroxysmal atrial fibrillation ([facility]) I48.0    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 ([facility]) J18.67, E11.65    Dyspnea due to congestive heart failure ([facility]) I50.9    Aspiration pneumonia ([facility]) J69.0    Hyperkalemia E87.5    Elevated troponin R79.89    COPD (chronic obstructive pulmonary disease) ([facility]) J44.9    CHF (congestive heart failure) ([facility]) I50.9    Insufficiency, respiratory, acute ([facility]) R06.89    Infiltrate noted on imaging study R93.8    COPD exacerbation ([facility]) J44.1    Acute on chronic respiratory failure with hypoxia ([facility]) J96.21    Chest pain R07.9    Anxiety F41.9    UTI (urinary tract infection) N39.0    Obesity hypoventilation syndrome ([facility]) E66.2    Pulmonary hypertension, moderate to severe ([facility] I27.2            67 yo female admitted for acute respiratory failure      CRITICAL CARE PLAN      Resp - acute hypoxic hypercapnic respiratory failure, multifactorial, secondary to diastolic CHF, pulmonary HTN, IMANI/OHS  On high flow oxygen, continue with BiPAP at night.      ID - E-Coli UTI, MRSA bacteremia. Repeat blood and urine cultures negative. Antibiotics - on ceftriaxone, vancomycin. Completed 10 days of diflucan for vaginitis.      CVS - Monitor HD.    A-fib - continue beta blocker and cardizem,   Chronic diastolic CHF - continue diuresis, significant improvement in edema      Heme/onc - Follow H&H, plts.      Renal - Trend BUN, Cr, follow I/O. Check and replace Mg, K, phos.      Endocrine - Follow FSG, continue with insulin      Neuro/ Pain/ Sedation - pain control, lidoderm patch, on seroquel, complains of anxiety, need to be judicious about narcotics and anxiolytics. She reports that trazodone helps at home.       GI - diabetic diet, seen by SLP.     Prophylaxis - DVT: coumadin, GI: protonix      When stable  wants transfer to [facility].     Review of systems  General: No fevers or chills. Cardiovascular: No chest pain or pressure. No palpitations. Pulmonary: on high flow oxygen   Gastrointestinal: No nausea, vomiting.           Physical Exam:  General: Awake, cooperative, no acute distress, on high flow oxygen.    HEENT: NC, Atraumatic. PERRLA, anicteric sclerae. Lungs: shallow respirations, CTA Bilaterally. No Wheezing/Rhonchi/Rales. Heart: Regular rhythm,  No murmur, No Rubs, No Gallops  Abdomen: Soft, Non distended, Non tender.  +Bowel sounds,   Extremities: Trace edema, bilateral LE ulcers bandaged  Psych:   Not anxious or agitated. Neurologic:  No acute neurological deficit.        Vital signs/Intake and Output:  Visit Vitals    /42    Pulse (!) 57    Temp 97.5 °F (36.4 °C)    Resp 21    Ht 5' 4.96\" (1.65 m)    Wt 105.7 kg (233 lb 0.4 oz)    SpO2 100%    Breastfeeding No    BMI 38.83 kg/m2     Current Shift:  03/15 0701 - 03/15 1900  In: -   Out: 750 [Urine:750]  Last three shifts:  03/13 1901 - 03/15 0700  In: 850 [P.O.:850]  Out: 652 [Urine:650]            Labs: Results:       Chemistry Recent Labs      03/15/17   0950  03/14/17   0515  03/13/17   0408   GLU  200*  110*  71*   NA  140  144  146*   K  5.1  4.6  4.7   CL  104  107  108   CO2  32  33*  33*   BUN  16  18  23*   CREA  1.10  1.07  1.15   CA  8.7  8.7  8.8   AGAP  4  4  5   BUCR  15  17  20      CBC w/Diff No results for input(s): WBC, RBC, HGB, HCT, PLT, GRANS, LYMPH, EOS, HGBEXT, HCTEXT, PLTEXT in the last 72 hours. Cardiac Enzymes No results for input(s): CPK, CKND1, ALESSANDRA in the last 72 hours. No lab exists for component: CKRMB, TROIP   Coagulation Recent Labs      03/15/17   0950  03/14/17   0645   PTP  21.7*  24.2*   INR  2.0*  2.3*       Lipid Panel No results found for: CHOL, CHOLPOCT, CHOLX, CHLST, CHOLV, [id number], HDL, LDL, NLDLCT, DLDL, LDLC, DLDLP, [id number], VLDLC, VLDL, TGL, TGLX, TRIGL, [id number], TRIGP, TGLPOCT, [id number], CHHD, CHHDX   BNP No results for input(s): BNPP in the last 72 hours. Liver Enzymes No results for input(s): TP, ALB, TBIL, AP, SGOT, GPT in the last 72 hours.     No lab exists for component: DBIL   Thyroid Studies Lab Results   Component Value Date/Time    TSH 0.31 01/01/2016 10:20 PM        Procedures/imaging: see electronic medical records for all procedures/Xrays and details which were not copied into this note but were reviewed prior to creation of Plan

## 2017-03-15 NOTE — PROGRESS NOTES
46- Received bedside report. Pt on Hi-flow nasal oxygen at 40%. Lungs sounds diminshed throughout, no crackles or wheezing noted at this time. Pt currently in chair position on the bed at this time. 0900- Complete assessment done per flowsheet. New Pur-wic applied to patient. Linen and underpad changed due to urinary incontinence. Pt taken out of the chair position per request. Dr. Radha Snow at the bedside. Incentive spirometer 500ml achieved. Pt needs much encouragement, and does increase to 600-650ml. Mepilex placed on right lateral malleolus due to stage 1 redness noted. 1230- Pt placed on 4L N/C by RT. Oxygen saturation at % at this time. 1245- Report given to Lexii Nguyễn RN.

## 2017-03-15 NOTE — PROGRESS NOTES
Problem: Mobility Impaired (Adult and Pediatric)  Goal: *Acute Goals and Plan of Care (Insert Text)  Physical Therapy Goals  Initiated 2/27/2017 and to be accomplished within 7 day(s)  1. Patient will move from supine to sit and sit to supine , scoot up and down and roll side to side in bed with supervision/set-up. 2. Patient will transfer from bed to chair and chair to bed with minimal assistance/contact guard assist using the least restrictive device. 3. Patient will perform sit to stand with minimal assistance/contact guard assist.  4. Patient will ambulate with minimal assistance/contact guard assist for 50 feet with the least restrictive device. Re-evaluation on 3/6/2017 and to be accomplished within 7 day(s)  1. Patient will move from supine to sit and sit to supine, scoot up and down and roll side to side in bed with supervision/set-up. 2. Patient will transfer from bed to chair and chair to bed with contact guard assist/supervision using the least restrictive device. 3. Patient will perform sit to stand with contact guard assist/supervision. 4. Patient will ambulate with contact guard assist/supervision for 100 feet with the least restrictive device. Re-evaluation on 3/14/2017. Goals to be accomplished within 7 days. 1. Patient will move from supine to sit and sit to supine, scoot up and down and roll side to side in bed with supervision/set-up. 2. Patient will transfer from bed to chair and chair to bed with contact guard assist/supervision using the least restrictive device. 3. Patient will perform sit to stand with contact guard assist/supervision. 4. Patient will ambulate with contact guard assist/supervision for 50 feet with the least restrictive device. 5. Patient will be independent with HEP to maximize strength and muscular endurance.    Outcome: Progressing Towards Goal  PHYSICAL THERAPY TREATMENT     Patient: Jorge Luis Anglin (89 y.o. female)  Date: 3/15/2017  Diagnosis: CHF NYHA class IV, acute, diastolic (HCC)  CHF NYHA class IV, acute, diastolic (HCC) Acute on chronic respiratory failure with hypoxia (HCC)       Precautions: Fall   Chart, physical therapy assessment, plan of care and goals were reviewed. ASSESSMENT:  Pt with 4L NC at start and throughout session. Pt is able to maintain % SPO2 with today's activity. Two ambulation trials completed, with seated rests between. Pt's able to complete standing transfers without assistance, but much encouragement is needed to increase output. Pt is safe for transfers to/from [facility] with NSG x 2. Progression toward goals:  [ ]      Improving appropriately and progressing toward goals  [X]      Improving slowly and progressing toward goals  [ ]      Not making progress toward goals and plan of care will be adjusted       PLAN:  Patient continues to benefit from skilled intervention to address the above impairments. Continue treatment per established plan of care. Discharge Recommendations: To be determined  Further Equipment Recommendations for Discharge:  bedside commode and rolling walker       SUBJECTIVE:   Patient stated I'm going to need to use that toilet.       OBJECTIVE DATA SUMMARY:   Critical Behavior:  Neurologic State: Alert, Appropriate for age  Orientation Level: Oriented X4  Cognition: Appropriate decision making, Appropriate for age attention/concentration, Appropriate safety awareness, Follows commands  Safety/Judgement: Fall prevention  Functional Mobility Training:  Bed Mobility:  Rolling: Supervision  Supine to Sit: Supervision  Sit to Supine:  Moderate assistance  Transfers:  Sit to Stand: Contact guard assistance  Stand to Sit: Contact guard assistance  Balance:  Sitting: Intact  Ambulation/Gait Training:  Distance (ft): 75 Feet (ft) (20+50)  Assistive Device: Gait belt;Walker, rolling  Ambulation - Level of Assistance: Contact guard assistance  Gait Abnormalities: Decreased step clearance; Antalgic; Shuffling gait  Base of Support: Widened  Speed/Kristin: Delayed; Shuffled  Step Length: Right shortened;Left shortened  Pain:  Pain Scale 1: Numeric (0 - 10)  Pain Intensity 1: 4  Pain Location 1: Back;Leg  Pain Orientation 1: Lower  Pain Description 1: Aching  Activity Tolerance:   Fair+  Please refer to the flowsheet for vital signs taken during this treatment.   After treatment:   [ ] Patient left in no apparent distress sitting up in chair  [X] Patient left in no apparent distress in bed  [X] Call bell left within reach  [X] Nursing notified  [X] Caregiver present  [ ] Bed alarm activated      Nae Gravel, PTA   Time Calculation: 42 mins

## 2017-03-16 ENCOUNTER — APPOINTMENT (OUTPATIENT)
Dept: CT IMAGING | Age: 72
DRG: 286 | End: 2017-03-16
Attending: INTERNAL MEDICINE
Payer: MEDICARE

## 2017-03-16 LAB
DATE LAST DOSE: NORMAL
GLUCOSE BLD STRIP.AUTO-MCNC: 133 MG/DL (ref 70–110)
GLUCOSE BLD STRIP.AUTO-MCNC: 241 MG/DL (ref 70–110)
GLUCOSE BLD STRIP.AUTO-MCNC: 257 MG/DL (ref 70–110)
INR PPP: 2 (ref 0.8–1.2)
MAGNESIUM SERPL-MCNC: 2.1 MG/DL (ref 1.8–2.4)
POTASSIUM SERPL-SCNC: 5 MMOL/L (ref 3.5–5.5)
PROTHROMBIN TIME: 21.5 SEC (ref 11.5–15.2)
REPORTED DOSE,DOSE: NORMAL UNITS
REPORTED DOSE/TIME,TMG: 1200
VANCOMYCIN TROUGH SERPL-MCNC: 18.7 UG/ML (ref 10–20)

## 2017-03-16 PROCEDURE — 74011250637 HC RX REV CODE- 250/637: Performed by: INTERNAL MEDICINE

## 2017-03-16 PROCEDURE — 82962 GLUCOSE BLOOD TEST: CPT

## 2017-03-16 PROCEDURE — 80202 ASSAY OF VANCOMYCIN: CPT | Performed by: INTERNAL MEDICINE

## 2017-03-16 PROCEDURE — 74011000250 HC RX REV CODE- 250: Performed by: INTERNAL MEDICINE

## 2017-03-16 PROCEDURE — 74011250636 HC RX REV CODE- 250/636: Performed by: INTERNAL MEDICINE

## 2017-03-16 PROCEDURE — 65660000000 HC RM CCU STEPDOWN

## 2017-03-16 PROCEDURE — 97530 THERAPEUTIC ACTIVITIES: CPT

## 2017-03-16 PROCEDURE — 74011250636 HC RX REV CODE- 250/636: Performed by: HOSPITALIST

## 2017-03-16 PROCEDURE — 97116 GAIT TRAINING THERAPY: CPT

## 2017-03-16 PROCEDURE — 74011250636 HC RX REV CODE- 250/636: Performed by: NURSE PRACTITIONER

## 2017-03-16 PROCEDURE — 77010033678 HC OXYGEN DAILY

## 2017-03-16 PROCEDURE — 97535 SELF CARE MNGMENT TRAINING: CPT

## 2017-03-16 PROCEDURE — 74011250636 HC RX REV CODE- 250/636: Performed by: FAMILY MEDICINE

## 2017-03-16 PROCEDURE — 94640 AIRWAY INHALATION TREATMENT: CPT

## 2017-03-16 PROCEDURE — 74011250637 HC RX REV CODE- 250/637: Performed by: HOSPITALIST

## 2017-03-16 PROCEDURE — 36415 COLL VENOUS BLD VENIPUNCTURE: CPT | Performed by: INTERNAL MEDICINE

## 2017-03-16 PROCEDURE — 84132 ASSAY OF SERUM POTASSIUM: CPT | Performed by: INTERNAL MEDICINE

## 2017-03-16 PROCEDURE — 74011000250 HC RX REV CODE- 250: Performed by: FAMILY MEDICINE

## 2017-03-16 PROCEDURE — 71250 CT THORAX DX C-: CPT

## 2017-03-16 PROCEDURE — 83735 ASSAY OF MAGNESIUM: CPT | Performed by: INTERNAL MEDICINE

## 2017-03-16 PROCEDURE — 85610 PROTHROMBIN TIME: CPT | Performed by: INTERNAL MEDICINE

## 2017-03-16 PROCEDURE — 94660 CPAP INITIATION&MGMT: CPT

## 2017-03-16 PROCEDURE — 77030035694 HC MSK BIPAP FLL FAC PERFMAX PHIL -B

## 2017-03-16 RX ORDER — FUROSEMIDE 10 MG/ML
20 INJECTION INTRAMUSCULAR; INTRAVENOUS 2 TIMES DAILY
Status: DISCONTINUED | OUTPATIENT
Start: 2017-03-16 | End: 2017-03-21

## 2017-03-16 RX ORDER — WARFARIN 2.5 MG/1
2.5 TABLET ORAL ONCE
Status: COMPLETED | OUTPATIENT
Start: 2017-03-16 | End: 2017-03-16

## 2017-03-16 RX ADMIN — ARFORMOTEROL TARTRATE 15 MCG: 15 SOLUTION RESPIRATORY (INHALATION) at 20:56

## 2017-03-16 RX ADMIN — CARVEDILOL 25 MG: 12.5 TABLET, FILM COATED ORAL at 18:40

## 2017-03-16 RX ADMIN — DIGOXIN 0.12 MG: 0.12 TABLET ORAL at 08:33

## 2017-03-16 RX ADMIN — ONDANSETRON HYDROCHLORIDE 4 MG: 2 INJECTION INTRAMUSCULAR; INTRAVENOUS at 13:30

## 2017-03-16 RX ADMIN — ASPIRIN 81 MG CHEWABLE TABLET 81 MG: 81 TABLET CHEWABLE at 08:33

## 2017-03-16 RX ADMIN — BUDESONIDE 500 MCG: 0.5 INHALANT RESPIRATORY (INHALATION) at 20:56

## 2017-03-16 RX ADMIN — INSULIN GLARGINE 20 UNITS: 100 INJECTION, SOLUTION SUBCUTANEOUS at 21:56

## 2017-03-16 RX ADMIN — PANTOPRAZOLE SODIUM 40 MG: 40 TABLET, DELAYED RELEASE ORAL at 08:32

## 2017-03-16 RX ADMIN — DILTIAZEM HYDROCHLORIDE 30 MG: 30 TABLET, FILM COATED ORAL at 12:00

## 2017-03-16 RX ADMIN — QUETIAPINE FUMARATE 25 MG: 25 TABLET, FILM COATED ORAL at 21:56

## 2017-03-16 RX ADMIN — DOCUSATE SODIUM 100 MG: 100 CAPSULE, LIQUID FILLED ORAL at 08:33

## 2017-03-16 RX ADMIN — ANTACID TABLETS 200 MG: 500 TABLET, CHEWABLE ORAL at 12:00

## 2017-03-16 RX ADMIN — ANTACID TABLETS 200 MG: 500 TABLET, CHEWABLE ORAL at 18:40

## 2017-03-16 RX ADMIN — INSULIN LISPRO 7 UNITS: 100 INJECTION, SOLUTION INTRAVENOUS; SUBCUTANEOUS at 18:42

## 2017-03-16 RX ADMIN — INSULIN LISPRO 5 UNITS: 100 INJECTION, SOLUTION INTRAVENOUS; SUBCUTANEOUS at 08:31

## 2017-03-16 RX ADMIN — INSULIN LISPRO 5 UNITS: 100 INJECTION, SOLUTION INTRAVENOUS; SUBCUTANEOUS at 18:40

## 2017-03-16 RX ADMIN — VANCOMYCIN HYDROCHLORIDE 1750 MG: 10 INJECTION, POWDER, LYOPHILIZED, FOR SOLUTION INTRAVENOUS at 20:43

## 2017-03-16 RX ADMIN — NYSTATIN: 100000 POWDER TOPICAL at 22:49

## 2017-03-16 RX ADMIN — TRAZODONE HYDROCHLORIDE 50 MG: 50 TABLET ORAL at 21:56

## 2017-03-16 RX ADMIN — ANTACID TABLETS 200 MG: 500 TABLET, CHEWABLE ORAL at 21:56

## 2017-03-16 RX ADMIN — CARVEDILOL 25 MG: 12.5 TABLET, FILM COATED ORAL at 08:33

## 2017-03-16 RX ADMIN — METHYLPREDNISOLONE SODIUM SUCCINATE 60 MG: 40 INJECTION, POWDER, FOR SOLUTION INTRAMUSCULAR; INTRAVENOUS at 08:35

## 2017-03-16 RX ADMIN — INSULIN LISPRO 5 UNITS: 100 INJECTION, SOLUTION INTRAVENOUS; SUBCUTANEOUS at 12:00

## 2017-03-16 RX ADMIN — BUDESONIDE 500 MCG: 0.5 INHALANT RESPIRATORY (INHALATION) at 07:16

## 2017-03-16 RX ADMIN — SODIUM CHLORIDE 12.5 MG: 9 INJECTION INTRAMUSCULAR; INTRAVENOUS; SUBCUTANEOUS at 06:25

## 2017-03-16 RX ADMIN — ANTACID TABLETS 200 MG: 500 TABLET, CHEWABLE ORAL at 08:33

## 2017-03-16 RX ADMIN — FUROSEMIDE 20 MG: 10 INJECTION, SOLUTION INTRAMUSCULAR; INTRAVENOUS at 08:37

## 2017-03-16 RX ADMIN — INSULIN LISPRO 11 UNITS: 100 INJECTION, SOLUTION INTRAVENOUS; SUBCUTANEOUS at 21:56

## 2017-03-16 RX ADMIN — DILTIAZEM HYDROCHLORIDE 30 MG: 30 TABLET, FILM COATED ORAL at 18:40

## 2017-03-16 RX ADMIN — ARFORMOTEROL TARTRATE 15 MCG: 15 SOLUTION RESPIRATORY (INHALATION) at 07:16

## 2017-03-16 RX ADMIN — DOCUSATE SODIUM 100 MG: 100 CAPSULE, LIQUID FILLED ORAL at 20:43

## 2017-03-16 RX ADMIN — ONDANSETRON HYDROCHLORIDE 4 MG: 2 INJECTION INTRAMUSCULAR; INTRAVENOUS at 04:42

## 2017-03-16 RX ADMIN — FUROSEMIDE 20 MG: 10 INJECTION, SOLUTION INTRAMUSCULAR; INTRAVENOUS at 20:43

## 2017-03-16 RX ADMIN — DILTIAZEM HYDROCHLORIDE 30 MG: 30 TABLET, FILM COATED ORAL at 08:33

## 2017-03-16 RX ADMIN — SIMVASTATIN 10 MG: 20 TABLET, FILM COATED ORAL at 21:56

## 2017-03-16 RX ADMIN — WARFARIN SODIUM 2.5 MG: 2.5 TABLET ORAL at 18:40

## 2017-03-16 NOTE — PROGRESS NOTES
Pharmacy Dosing Services:  Warfarin  Consult for Warfarin Dosing by Pharmacy by Dr. Manjeet León provided for this 66 y.o.  female , for indication of Atrial Fibrillation. Dose to achieve an INR goal of 2-3    Order entered for  Warfarin  2.5 (mg) ordered to be given today at 18:00. Significant drug interactions:  Aspirin    LABS    PT/INR Lab Results   Component Value Date/Time    INR 2.0 03/16/2017 05:45 AM      Platelets Lab Results   Component Value Date/Time    PLATELET 955 30/32/2810 04:28 AM      H/H     Lab Results   Component Value Date/Time    HGB 10.3 03/12/2017 04:28 AM        Warfarin Administrations (last 168 hours)     Date/Time Action Medication Dose    03/15/17 1722 Given    warfarin (COUMADIN) tablet 2 mg 2 mg    03/14/17 1843 Given    warfarin (COUMADIN) tablet 2 mg 2 mg    03/12/17 1730 Given    warfarin (COUMADIN) tablet 1 mg 1 mg    03/11/17 1813 Given    warfarin (COUMADIN) tablet 4 mg 4 mg    03/10/17 1703 Given    warfarin (COUMADIN) tablet 4 mg 4 mg    03/09/17 1729 Given    warfarin (COUMADIN) tablet 4 mg 4 mg          Pharmacy to follow daily and will provide subsequent Warfarin dosing based on clinical status.   Mike Fuentes, [facility]     Contact information    [phone]

## 2017-03-16 NOTE — PROGRESS NOTES
Pharmacy - Vancomycin Dosing ( MRSA Bacteremia )  Patient currently on Vancomycin 1750 mg IV q24hrs. Last dose given at 16:37 3/15/17  Trough Level 18.7 at 13:50 today ==> Therapeutic   Recommend No Change. Ordered Daily BMP x3  Pharmacy will continue to follow and adjust.  Shaun Silva   [phone]

## 2017-03-16 NOTE — ROUTINE PROCESS
Bedside and Verbal shift change report given to Mynor Shine RN (oncoming nurse) by Heri Camacho RN   (offgoing nurse). Report included the following information SBAR, Kardex, Intake/Output and MAR.

## 2017-03-16 NOTE — PROGRESS NOTES
Problem: Self Care Deficits Care Plan (Adult)  Goal: *Acute Goals and Plan of Care (Insert Text)  OTG Initiated 3/14/17    1. Patient will perform grooming with supervision/set-up   2. Patient will perform upper body dressing with supervision/set-up. 3. Patient will perform lower body dressing with minimal assistance/contact guard assist.  4. Patient will perform toilet transfers with supervision/set-up. 5. Patient will perform all aspects of toileting with supervision/set-up. 6. Patient will perform functional activity while standing for 3-5 minutes with CGA     Occupational Therapy Goals  Initiated 3/6/2017 within 7 day(s). 1. Patient will perform grooming with supervision/set-up   2. Patient will perform upper body dressing with supervision/set-up. 3. Patient will perform lower body dressing with minimal assistance/contact guard assist.  4. Patient will perform toilet transfers with supervision/set-up. 5. Patient will perform all aspects of toileting with supervision/set-up. 6. Patient will participate in upper extremity therapeutic exercise/activities with supervision/set-up for 10 minutes. 7. Patient will utilize energy conservation techniques during functional activities with verbal cues. 8. Patient will perform functional activity while standing for 3-5 minutes with CGA   Outcome: Progressing Towards Goal  OCCUPATIONAL THERAPY TREATMENT     Patient: Karyn Nagy (88 y.o. female)  Date: 3/16/2017  Diagnosis: CHF NYHA class IV, acute, diastolic (HCC)  CHF NYHA class IV, acute, diastolic (HCC) Acute on chronic respiratory failure with hypoxia [facility])       Precautions: Fall  Chart, occupational therapy assessment, plan of care, and goals were reviewed. ASSESSMENT:  Pt seen with PTA to maximize pt safety. Pt seated in chair upon entering, agreeable to therapy. Pt completed sit to stand transfer from chair with min A.  Pt completed functional/bathroom mobility with CGA-min A and cueing for safety using RW. Pt required mod-max A to transfer off toilet. Pt required max A for clothing management and bowel hygiene. Pt returned to EOB with CGA-min A using RW. While seated EOB, pt doffed robe with mod A. Pt required max A to adjust socks. Pt left seated EOB with needs in reach. SpO2 remained >93% during treatment session on 6L of O2.  EDUCATION: safety using RW during bathroom mobility/ADLs  Progression toward goals:  [ ]          Improving appropriately and progressing toward goals  [X]          Improving slowly and progressing toward goals  [ ]          Not making progress toward goals and plan of care will be adjusted       PLAN:  Patient continues to benefit from skilled intervention to address the above impairments. Continue treatment per established plan of care. Discharge Recommendations:  Fernie Monique  Further Equipment Recommendations for Discharge:  TBD by next level of care       SUBJECTIVE:   Patient stated .      OBJECTIVE DATA SUMMARY:      G CODES:  n/a     Cognitive/Behavioral Status:  Neurologic State: Alert  Orientation Level: Oriented X4  Cognition: Follows commands  Safety/Judgement: Fall prevention  Functional Mobility and Transfers for ADLs:                 Transfers:  Sit to Stand: Minimum assistance              Toilet Transfer : Moderate assistance;Maximum assistance              Bathroom Mobility: Contact guard assistance;Minimum assistance                 Balance:  Sitting: Intact  Standing: Intact; With support  ADL Intervention:  Feeding  Feeding Assistance: Supervision/set-up  Drink to Mouth: Supervision/set-up     Grooming  Grooming Assistance: Contact guard assistance     Lower Body Dressing Assistance  Dressing Assistance: Maximum assistance  Socks: Maximum assistance  Leg Crossed Method Used: No  Position Performed: Seated edge of bed     Toileting  Toileting Assistance: Maximum assistance  Bowel Hygiene: Maximum assistance  Clothing Management: Maximum assistance Cognitive Retraining  Safety/Judgement: Fall prevention        Pain:  Pre Treatment:  Post Treatment:  Pain Scale 1: Numeric (0 - 10)  Pain Intensity 1: 0     Activity Tolerance:    Fair, pt requires frequent rest breaks  Please refer to the flowsheet for vital signs taken during this treatment.   After treatment:   [ ]  Patient left in no apparent distress sitting up in chair  [X]  Patient left in no apparent distress EOB  [X]  Call bell left within reach  [ ]  Nursing notified  [ ]  Caregiver present  [ ]  Bed alarm activated     Nataliya Lawrence MS OTR/l  Time Calculation: 38 mins

## 2017-03-16 NOTE — PROGRESS NOTES
0730 Assumed care of patient. Pt sitting up in bed. No distress noted. 0800 Assessment deferred at this time due to patient eating breakfast.     0855 Attempted to call report to [facility]. Nurse unavailable at this time. 0900 Assessment completed and charted. Pt needs CT scan of chest. Orders for off-tele received by Dr. Rakesh Ricks, plan is for patient to go to CT on way to room 354.     0940 TRANSFER - OUT REPORT:    Verbal report given to Cyndy Calvin RN(name) on Kadi Carter  being transferred to Room 354(unit) for routine progression of care       Report consisted of patients Situation, Background, Assessment and   Recommendations(SBAR). Information from the following report(s) SBAR, Kardex and MAR was reviewed with the receiving nurse. Lines:   Triple Lumen 02/28/17 Right Internal jugular (Active)   Central Line Being Utilized Yes 3/16/2017  4:30 AM   Criteria for Appropriate Use Irritant/vesicant medication 3/16/2017  4:30 AM   Site Assessment Clean, dry, & intact 3/16/2017  4:30 AM   Infiltration Assessment 0 3/16/2017  4:30 AM   Affected Extremity/Extremities Color distal to insertion site pink (or appropriate for race); Pulses palpable 3/16/2017  4:30 AM   Date of Last Dressing Change 03/14/17 3/16/2017  4:30 AM   Dressing Status Clean, dry, & intact 3/16/2017  4:30 AM   Dressing Type Disk with Chlorhexadine gluconate (CHG); Tape;Transparent 3/16/2017  4:30 AM   Action Taken Open ports on tubing capped 3/16/2017  4:30 AM   Proximal Hub Color/Line Status Capped 3/16/2017  4:30 AM   Positive Blood Return (Medial Site) Yes 3/16/2017  4:30 AM   Medial Hub Color/Line Status Capped 3/16/2017  4:30 AM   Positive Blood Return (Lateral Site) Yes 3/16/2017  4:30 AM   Distal Hub Color/Line Status Capped;Flushed 3/16/2017  4:30 AM   Positive Blood Return (Site #3) Yes 3/16/2017  4:30 AM   Alcohol Cap Used Yes 3/16/2017 12:15 AM        Opportunity for questions and clarification was provided.       Patient transported with:   O2 @ 6 liters

## 2017-03-16 NOTE — PROGRESS NOTES
[facility]  Pulmonary, Critical Care, and Sleep Medicine    Name: Billy Martin MRN: [id number]   : 1945 Hospital: [facility]   Date: 3/16/2017        IMPRESSION:   · Acute hypoxemic respiratory failure r/t severe HFPEF and moderate pul htn will discus with cardiology and pulm about her she has been sob for one year  · R/o IPF CXR bilateral infiltrate chronic   · At one stage steriod ut only two days Will start prednisolone just 60 mg a dayfor one week   · LA severely dilated r/o constriction to discus with cardiology Moderate pulmonary HTN  · OHS, obesity hypoventilation syndrome   · Obesity  · MRSA bacteremia  · DM type 2   · afib  · UTI s/p abx     Patient Active Problem List   Diagnosis Code    Acute on chronic diastolic CHF (congestive heart failure) ([facility]) I50.33    ALEJANDRINA (acute kidney injury) ([facility] [address] N17.9    Acute exacerbation of CHF (congestive heart failure) ([facility] [address] I50.9    DM (diabetes mellitus) ([facility] [facility] E11.9    Hypertension I10    Respiratory failure ([facility] [address] [facility] J96.90    Acute coronary syndrome ([facility] [address] [facility] I24.9    Obstructive sleep apnea, adult G47.33    Paroxysmal atrial fibrillation ([facility]) I48.0    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 ([facility]) C09.38, E11.65    Dyspnea due to congestive heart failure ([facility]) I50.9    Aspiration pneumonia ([facility]) J69.0    Hyperkalemia E87.5    Elevated troponin R79.89    COPD (chronic obstructive pulmonary disease) ([facility]) J44.9    CHF (congestive heart failure) ([facility]) I50.9    Insufficiency, respiratory, acute ([facility]) R06.89    Infiltrate noted on imaging study R93.8    COPD exacerbation ([facility]) J44.1    Acute on chronic respiratory failure with hypoxia ([facility]) J96.21    Chest pain R07.9    Anxiety F41.9    UTI (urinary tract infection) N39.0    Obesity hypoventilation syndrome ([facility]) E66.2    Pulmonary hypertension, moderate to severe ([facility]) I27.2      PLAN:   · Continue O2 titrated to SaO2 > 90%  · Continue nocturnal BiPAP- long term  · Continue bronchodilators  · Antibiotics for UTI  · Anticoagulation  · Aspiration precautions  · Will follow     Subjective/Interval History:     On Bipap overnight. Tolerating weaning of Nasal canula  hemodynamically stable   Afebrile  A&O x3 asking what is wrong with her lungs and what she should do to get better. ROS:Pertinent items are noted in HPI. Objective:   Vital Signs:    Visit Vitals    /62 (BP 1 Location: Right arm, BP Patient Position: Sitting)    Pulse 75    Temp 98.1 °F (36.7 °C)    Resp 22    Ht 5' 4.96\" (1.65 m)    Wt 105.7 kg (233 lb 0.4 oz)    SpO2 98%    Breastfeeding No    BMI 38.83 kg/m2       O2 Device: Nasal cannula   O2 Flow Rate (L/min): 6 l/min   Temp (24hrs), Av.2 °F (36.8 °C), Min:97.9 °F (36.6 °C), Max:99 °F (37.2 °C)       Intake/Output:   Last shift:       0701 -  1900  In: 590 [P.O.:590]  Out: -   Last 3 shifts:  1901 -  0700  In: 1030 [P.O.:480; I.V.:550]  Out: 2150 [Urine:2150]    Intake/Output Summary (Last 24 hours) at 17 1524  Last data filed at 17 1317   Gross per 24 hour   Intake             1380 ml   Output             1400 ml   Net              -20 ml        Physical Exam:    General: in no apparent distress, non-toxic and oriented times 3   HEENT: Normal   Neck: No abnormally enlarged lymph nodes. Chest: normal   Lungs: decreased air exchange bibasilar and distant lung sounds   Heart: Regular rate and rhythm   Abdomen: abdomen is soft without significant tenderness, masses, organomegaly or guarding   Extremity: bilateral edema   Neuro: alert   Skin: Skin color, texture, turgor normal. No rashes or lesions        DATA:  Labs:  No results for input(s): WBC, HGB, HCT, PLT, HGBEXT, HCTEXT, PLTEXT in the last 72 hours.   Recent Labs      17   0545  03/15/17   0950  17   0645  17   0515   NA   --   140   --   144   K  5.0  5.1   --   4.6   CL   --   104   --   107   CO2   -- 32   --   33*   GLU   --   200*   --   110*   BUN   --   16   --   18   CREA   --   1.10   --   1.07   CA   --   8.7   --   8.7   MG  2.1  2.1   --   2.1   INR  2.0*  2.0*  2.3*   --      No results for input(s): PH, PCO2, PO2, HCO3, FIO2 in the last 72 hours. Imaging:  3/15/17  Significantly enlarged cardiac silhouette similar to prior. Enlargement of central pulmonary vasculature. Central venous catheter tip is  near the cavoatrial junction. No visible pleural abnormality. Pulmonary vascular  congestion possibly with mild edema, similar appearance to recent prior exams. No acute osseous abnormality.       Stefan Gonzales MD

## 2017-03-16 NOTE — ROUTINE PROCESS
Bedside and Verbal shift change report given to ZAHRA Barnhart RN (oncoming nurse). Report included the following information SBAR, Kardex, Intake/Output, MAR and Recent Results. Alarm parameters reviewed and opportunities for questions provided.

## 2017-03-16 NOTE — PROGRESS NOTES
1841: Medicated per MAR. Patient sitting up on the side of the bed, friend in room with patient. No questions or concerns at this time. 1830: Called down to pharmacy, patients vancomycin is not on the floor available at this time. Supplies in room. Will let oncoming nurse know. 1910: Patient ambulated to bedside commode. Tolerated well. Voided. Returned to bed.

## 2017-03-16 NOTE — PROGRESS NOTES
NUTRITION FOLLOW-UP    RECOMMENDATIONS/PLAN:   - Begin Glucerna shakes BID due to variable PO intake  - Continue Cardiac Consistent Carb 1600 kcal Soft Solid diet    NUTRITION ASSESSMENT:   Client Update: 66 yrs old Female with acute respiratory failure, CHF, pulmonary HTN, OHS, UTI, Afib, and bacteremia. Continues with variable appetite/PO intake- may benefit from ONS to assist in meeting kcal/protein needs when appetite is poor. FOOD/NUTRITION INTAKE   Diet Order:  Cardiac Consistent Carb 1600 kcal Soft Solid NDD3   Supplements: none   Food Allergies: latex/garlic  Average PO Intake:      Patient Vitals for the past 100 hrs:   % Diet Eaten   03/16/17 1317 100 %   03/16/17 0831 90 %   03/15/17 1853 50 %   03/15/17 1300 0 %   03/14/17 1920 50 %   03/14/17 1224 50 %   03/14/17 1000 50 %   03/13/17 0857 25 %   03/12/17 1141 0 %   Pertinent Medications:  [x] Reviewed; TUMS, digoxin, colace, norco, solu-medrol, zofran, k-dur, zocor, seroquel  Insulin:  [x]SSI  [x]Pre-meal   [x]Basal    []Drip  []None  Cultural/Episcopal Food Preferences: None Identified ANTHROPOMETRICS  Height: 5' 4.96\" (165 cm)       Weight: 105.7 kg (233 lb 0.4 oz)         BMI: 38.8 kg/m^2 obese (30%-39.9% BMI)   Adm Weight: 254 lbs                Weight change: -21 lbs (fluid loss)  Adjusted Body Weight: 71 kg     NUTRITION-FOCUSED PHYSICAL ASSESSMENT  Skin: intact aside from skin tears to BLE, trace edema BLE      GI: last BM 3/16    NUTRITION PRESCRIPTION  Calories: 1178-5227 kcal/day based on 11-14 kcal/kg actual wt  Protein: 113-143 g/day based on 2-2.5 g/kg IBW  CHO: 158 g/day based on 50% of total energy  Fluid: 2925 ml/day based on 1500 + (15 ml/kg >20 kg)       BIOCHEMICAL DATA & MEDICAL TESTS  Pertinent Labs:  [x] Reviewed; POC -180       NUTRITION DIAGNOSES:   1. Altered nutrition related lab values related to DM2 as evidenced by POC BG  mg/dl, HbA1c 7.7 - ongoing  2.  Morbid obesity related to h/o excessive energy intake and inadequate physical activity as evidenced by BMI 42.3 kg/m^2 and 203% of IBW. - ongoing   3- At risk of inadequate oral intake related to chronic respiratory failure as evidenced by PO intake varies 0-100% of meals since adm - new     NUTRITION INTERVENTIONS:   INTERVENTIONS:        GOALS:  1. Glucerna Shakes BID, current diet 1. >50% PO intake of meals/ONS, POC BG  mg/dl, prevent skin breakdown by next review 3-5 days     LEARNING NEEDS (Diet, Supplementation, Food/Nutrient-Drug Interaction):   [x] None Identified   [] Education provided/documented      Identified and patient: [] Declined   [] Was not appropriate/indicated        NUTRITION MONITORING /EVALUATION:   Follow PO intake  Monitor renal labs, electrolytes, fluid status  Monitor for additional supplement needs     Previous Recommendations Implemented: yes        Previous Goals Met:  yes      [] Participated in Interdisciplinary Rounds    [x] Interdisciplinary Care Plan Reviewed  DISCHARGE NUTRITION RECOMMENDATIONS ADDRESSED:     [x] Yes- recommended Consistent Carb Low NA diet (see nutrition d/c instructions)     NUTRITION RISK:           [x] At risk                        [] Not currently at risk        Will follow-up per policy.   Rosanna Haney RD  PAGER:  [phone]

## 2017-03-16 NOTE — PROGRESS NOTES
Hospitalist Progress Note    Patient: Kadi Carter MRN: [id number]  CSN: [id number]    YOB: 1945  Age: 66 y.o. Sex: female    DOA: 2/25/2017 LOS:  LOS: 18 days                Assessment/Plan     Patient Active Problem List   Diagnosis Code    Acute on chronic diastolic CHF (congestive heart failure) ([facility]) I50.33    ALEJANDRINA (acute kidney injury) ([facility] N17.9    Acute exacerbation of CHF (congestive heart failure) ([facility]) I50.9    DM (diabetes mellitus) ([facility] E11.9    Hypertension I10    Respiratory failure ([facility]) J96.90    Acute coronary syndrome ([facility]) I24.9    Obstructive sleep apnea, adult G47.33    Paroxysmal atrial fibrillation ([facility]) I48.0    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 ([facility]) S98.46, E11.65    Dyspnea due to congestive heart failure ([facility]) I50.9    Aspiration pneumonia ([facility]) J69.0    Hyperkalemia E87.5    Elevated troponin R79.89    COPD (chronic obstructive pulmonary disease) ([facility]) J44.9    CHF (congestive heart failure) ([facility]) I50.9    Insufficiency, respiratory, acute ([facility]) R06.89    Infiltrate noted on imaging study R93.8    COPD exacerbation ([facility]) J44.1    Acute on chronic respiratory failure with hypoxia ([facility]) J96.21    Chest pain R07.9    Anxiety F41.9    UTI (urinary tract infection) N39.0    Obesity hypoventilation syndrome ([facility]) E66.2    Pulmonary hypertension, moderate to severe ([facility] I27.2            65 yo female admitted for acute respiratory failure            Resp - acute hypoxic hypercapnic respiratory failure, multifactorial, secondary to diastolic CHF, pulmonary HTN, IMANI/OHS  Possible interstitial lung disease, started on solumedrol. Off high flow oxygen, on NC, continue with BiPAP at night.      ID - E-Coli UTI, MRSA bacteremia. Repeat blood and urine cultures negative. Antibiotics - on ceftriaxone, vancomycin. Completed 10 days of diflucan for vaginitis.      [facility] - Monitor HD.    A-fib - continue beta blocker and cardizem,   Chronic diastolic CHF - continue diuresis, significant improvement in edema      Heme/onc - Follow H&H, plts.      Renal - Trend BUN, Cr, follow I/O. Check and replace Mg, K, phos.      Endocrine - Follow FSG, continue with insulin      Neuro/ Pain/ Sedation - pain control, lidoderm patch, on seroquel, complains of anxiety, need to be judicious about narcotics and anxiolytics. She reports that trazodone helps at home.       GI - diabetic diet, seen by SLP.     Prophylaxis - DVT: coumadin, GI: protonix      Discussed with Dr. Saundra Blakely and Dr. Juan Bass. Plan to get CT chest, likely has interstitial lung disease, also discussed right heart cath.       Review of systems  General: No fevers or chills. Cardiovascular: No chest pain or pressure. No palpitations. Pulmonary: on high flow oxygen   Gastrointestinal: No nausea, vomiting.           Physical Exam:  General: Awake, cooperative, no acute distress, on high flow oxygen.    HEENT: NC, Atraumatic. PERRLA, anicteric sclerae. Lungs: shallow respirations, CTA Bilaterally. No Wheezing/Rhonchi/Rales. Heart: Regular rhythm,  No murmur, No Rubs, No Gallops  Abdomen: Soft, Non distended, Non tender.  +Bowel sounds,   Extremities: Trace edema, bilateral LE ulcers bandaged  Psych:   Not anxious or agitated.   Neurologic:  No acute neurological deficit.          Vital signs/Intake and Output:  Visit Vitals    /62 (BP 1 Location: Right arm, BP Patient Position: Sitting)    Pulse 75    Temp 98.1 °F (36.7 °C)    Resp 22    Ht 5' 4.96\" (1.65 m)    Wt 105.7 kg (233 lb 0.4 oz)    SpO2 98%    Breastfeeding No    BMI 38.83 kg/m2     Current Shift:  03/16 0701 - 03/16 1900  In: 590 [P.O.:590]  Out: -   Last three shifts:  03/14 1901 - 03/16 0700  In: 1030 [P.O.:480; I.V.:550]  Out: 2150 [Urine:2150]            Labs: Results:       Chemistry Recent Labs      03/16/17   0545  03/15/17   0950  03/14/17   0515   GLU   --   200*  110*   NA   --   140  144   K  5.0 5.1  4.6   CL   --   104  107   CO2   --   32  33*   BUN   --   16  18   CREA   --   1.10  1.07   CA   --   8.7  8.7   AGAP   --   4  4   BUCR   --   15  17      CBC w/Diff No results for input(s): WBC, RBC, HGB, HCT, PLT, GRANS, LYMPH, EOS, HGBEXT, HCTEXT, PLTEXT in the last 72 hours. Cardiac Enzymes No results for input(s): CPK, CKND1, ALESSANDRA in the last 72 hours. No lab exists for component: CKRMB, TROIP   Coagulation Recent Labs      03/16/17   0545  03/15/17   0950   PTP  21.5*  21.7*   INR  2.0*  2.0*       Lipid Panel No results found for: CHOL, CHOLPOCT, CHOLX, CHLST, CHOLV, [id number], HDL, LDL, NLDLCT, DLDL, LDLC, DLDLP, [id number], VLDLC, VLDL, TGL, TGLX, TRIGL, [id number], TRIGP, TGLPOCT, [id number], CHHD, CHHDX   BNP No results for input(s): BNPP in the last 72 hours. Liver Enzymes No results for input(s): TP, ALB, TBIL, AP, SGOT, GPT in the last 72 hours.     No lab exists for component: DBIL   Thyroid Studies Lab Results   Component Value Date/Time    TSH 0.31 01/01/2016 10:20 PM        Procedures/imaging: see electronic medical records for all procedures/Xrays and details which were not copied into this note but were reviewed prior to creation of Plan

## 2017-03-16 NOTE — ROUTINE PROCESS
TRANSFER - IN REPORT:    Verbal report received from Beata Perera RN(name) on Cydne Bud  being received from Kingston Esparza RN  (unit) for routine progression of care      Report consisted of patients Situation, Background, Assessment and   Recommendations(SBAR). Information from the following report(s) SBAR, Procedure Summary, Intake/Output, MAR and Recent Results was reviewed with the receiving nurse. Opportunity for questions and clarification was provided. Assessment completed upon patients arrival to unit and care assumed.

## 2017-03-16 NOTE — PROGRESS NOTES
Bedside and Verbal shift change report received from GAMALIEL Jorgensen RN (offgoing nurse). Report included the following information SBAR, Kardex, Intake/Output, MAR and Recent Results. Alarm parameters reviewed and opportunities for questions provided. 2015 Shift assessment completed, see EMR     0015 Reassessment completed, see EMR    0430 Reassessment completed, see EMR. Patient complained  of nausea, Bipap was remove and patient was placed on 3L NC. Will give Zofran. 0489 Patient resting with eyes close. Zofran seem to be effective.     1354 Called Dr. Torri Mccarthy concerning patient second episode of nausea and informed him that zofran was given previously. New order given for phenergan 12mg once.

## 2017-03-17 LAB
ANION GAP BLD CALC-SCNC: 7 MMOL/L (ref 3–18)
BUN SERPL-MCNC: 21 MG/DL (ref 7–18)
BUN/CREAT SERPL: 21 (ref 12–20)
CALCIUM SERPL-MCNC: 8.8 MG/DL (ref 8.5–10.1)
CHLORIDE SERPL-SCNC: 105 MMOL/L (ref 100–108)
CO2 SERPL-SCNC: 33 MMOL/L (ref 21–32)
CREAT SERPL-MCNC: 1.01 MG/DL (ref 0.6–1.3)
GLUCOSE BLD STRIP.AUTO-MCNC: 141 MG/DL (ref 70–110)
GLUCOSE BLD STRIP.AUTO-MCNC: 177 MG/DL (ref 70–110)
GLUCOSE BLD STRIP.AUTO-MCNC: 191 MG/DL (ref 70–110)
GLUCOSE BLD STRIP.AUTO-MCNC: 249 MG/DL (ref 70–110)
GLUCOSE SERPL-MCNC: 133 MG/DL (ref 74–99)
INR PPP: 1.9 (ref 0.8–1.2)
MAGNESIUM SERPL-MCNC: 2 MG/DL (ref 1.8–2.4)
POTASSIUM SERPL-SCNC: 5.3 MMOL/L (ref 3.5–5.5)
PROTHROMBIN TIME: 21.3 SEC (ref 11.5–15.2)
SODIUM SERPL-SCNC: 145 MMOL/L (ref 136–145)

## 2017-03-17 PROCEDURE — 74011250636 HC RX REV CODE- 250/636: Performed by: INTERNAL MEDICINE

## 2017-03-17 PROCEDURE — 74011250637 HC RX REV CODE- 250/637: Performed by: INTERNAL MEDICINE

## 2017-03-17 PROCEDURE — 74011250636 HC RX REV CODE- 250/636: Performed by: HOSPITALIST

## 2017-03-17 PROCEDURE — 85610 PROTHROMBIN TIME: CPT | Performed by: INTERNAL MEDICINE

## 2017-03-17 PROCEDURE — 97116 GAIT TRAINING THERAPY: CPT

## 2017-03-17 PROCEDURE — 94660 CPAP INITIATION&MGMT: CPT

## 2017-03-17 PROCEDURE — 97535 SELF CARE MNGMENT TRAINING: CPT

## 2017-03-17 PROCEDURE — 83735 ASSAY OF MAGNESIUM: CPT | Performed by: INTERNAL MEDICINE

## 2017-03-17 PROCEDURE — 80048 BASIC METABOLIC PNL TOTAL CA: CPT | Performed by: INTERNAL MEDICINE

## 2017-03-17 PROCEDURE — 82962 GLUCOSE BLOOD TEST: CPT

## 2017-03-17 PROCEDURE — 74011000250 HC RX REV CODE- 250: Performed by: INTERNAL MEDICINE

## 2017-03-17 PROCEDURE — 77010033678 HC OXYGEN DAILY

## 2017-03-17 PROCEDURE — 74011250637 HC RX REV CODE- 250/637: Performed by: HOSPITALIST

## 2017-03-17 PROCEDURE — 65660000000 HC RM CCU STEPDOWN

## 2017-03-17 PROCEDURE — 94640 AIRWAY INHALATION TREATMENT: CPT

## 2017-03-17 RX ORDER — WARFARIN 2.5 MG/1
2.5 TABLET ORAL ONCE
Status: COMPLETED | OUTPATIENT
Start: 2017-03-17 | End: 2017-03-17

## 2017-03-17 RX ADMIN — INSULIN LISPRO 5 UNITS: 100 INJECTION, SOLUTION INTRAVENOUS; SUBCUTANEOUS at 17:22

## 2017-03-17 RX ADMIN — PANTOPRAZOLE SODIUM 40 MG: 40 TABLET, DELAYED RELEASE ORAL at 06:42

## 2017-03-17 RX ADMIN — FUROSEMIDE 20 MG: 10 INJECTION, SOLUTION INTRAMUSCULAR; INTRAVENOUS at 22:03

## 2017-03-17 RX ADMIN — DOCUSATE SODIUM 100 MG: 100 CAPSULE, LIQUID FILLED ORAL at 08:35

## 2017-03-17 RX ADMIN — NYSTATIN: 100000 POWDER TOPICAL at 22:03

## 2017-03-17 RX ADMIN — ARFORMOTEROL TARTRATE 15 MCG: 15 SOLUTION RESPIRATORY (INHALATION) at 21:36

## 2017-03-17 RX ADMIN — TRAZODONE HYDROCHLORIDE 50 MG: 50 TABLET ORAL at 22:02

## 2017-03-17 RX ADMIN — QUETIAPINE FUMARATE 25 MG: 25 TABLET, FILM COATED ORAL at 22:03

## 2017-03-17 RX ADMIN — POTASSIUM CHLORIDE 20 MEQ: 20 TABLET, EXTENDED RELEASE ORAL at 08:35

## 2017-03-17 RX ADMIN — HYDROCODONE BITARTRATE AND ACETAMINOPHEN 1 TABLET: 5; 325 TABLET ORAL at 13:50

## 2017-03-17 RX ADMIN — CARVEDILOL 25 MG: 12.5 TABLET, FILM COATED ORAL at 08:02

## 2017-03-17 RX ADMIN — FUROSEMIDE 20 MG: 10 INJECTION, SOLUTION INTRAMUSCULAR; INTRAVENOUS at 08:35

## 2017-03-17 RX ADMIN — ONDANSETRON HYDROCHLORIDE 4 MG: 2 INJECTION INTRAMUSCULAR; INTRAVENOUS at 13:50

## 2017-03-17 RX ADMIN — DOCUSATE SODIUM 100 MG: 100 CAPSULE, LIQUID FILLED ORAL at 22:02

## 2017-03-17 RX ADMIN — ONDANSETRON HYDROCHLORIDE 4 MG: 2 INJECTION INTRAMUSCULAR; INTRAVENOUS at 04:59

## 2017-03-17 RX ADMIN — ASPIRIN 81 MG CHEWABLE TABLET 81 MG: 81 TABLET CHEWABLE at 08:35

## 2017-03-17 RX ADMIN — INSULIN LISPRO 5 UNITS: 100 INJECTION, SOLUTION INTRAVENOUS; SUBCUTANEOUS at 22:02

## 2017-03-17 RX ADMIN — BUDESONIDE 500 MCG: 0.5 INHALANT RESPIRATORY (INHALATION) at 10:05

## 2017-03-17 RX ADMIN — DILTIAZEM HYDROCHLORIDE 30 MG: 30 TABLET, FILM COATED ORAL at 12:16

## 2017-03-17 RX ADMIN — METHYLPREDNISOLONE SODIUM SUCCINATE 60 MG: 40 INJECTION, POWDER, FOR SOLUTION INTRAMUSCULAR; INTRAVENOUS at 08:35

## 2017-03-17 RX ADMIN — BUDESONIDE 500 MCG: 0.5 INHALANT RESPIRATORY (INHALATION) at 21:36

## 2017-03-17 RX ADMIN — ANTACID TABLETS 200 MG: 500 TABLET, CHEWABLE ORAL at 17:23

## 2017-03-17 RX ADMIN — ARFORMOTEROL TARTRATE 15 MCG: 15 SOLUTION RESPIRATORY (INHALATION) at 10:05

## 2017-03-17 RX ADMIN — CARVEDILOL 25 MG: 12.5 TABLET, FILM COATED ORAL at 17:23

## 2017-03-17 RX ADMIN — SIMVASTATIN 10 MG: 20 TABLET, FILM COATED ORAL at 22:02

## 2017-03-17 RX ADMIN — NYSTATIN: 100000 POWDER TOPICAL at 08:41

## 2017-03-17 RX ADMIN — INSULIN LISPRO 5 UNITS: 100 INJECTION, SOLUTION INTRAVENOUS; SUBCUTANEOUS at 08:02

## 2017-03-17 RX ADMIN — ANTACID TABLETS 200 MG: 500 TABLET, CHEWABLE ORAL at 08:02

## 2017-03-17 RX ADMIN — INSULIN LISPRO 7 UNITS: 100 INJECTION, SOLUTION INTRAVENOUS; SUBCUTANEOUS at 12:18

## 2017-03-17 RX ADMIN — DILTIAZEM HYDROCHLORIDE 30 MG: 30 TABLET, FILM COATED ORAL at 06:42

## 2017-03-17 RX ADMIN — DIGOXIN 0.12 MG: 0.12 TABLET ORAL at 08:35

## 2017-03-17 RX ADMIN — HYDROCODONE BITARTRATE AND ACETAMINOPHEN 1 TABLET: 5; 325 TABLET ORAL at 06:09

## 2017-03-17 RX ADMIN — INSULIN LISPRO 5 UNITS: 100 INJECTION, SOLUTION INTRAVENOUS; SUBCUTANEOUS at 12:17

## 2017-03-17 RX ADMIN — WARFARIN SODIUM 2.5 MG: 2.5 TABLET ORAL at 18:38

## 2017-03-17 RX ADMIN — DILTIAZEM HYDROCHLORIDE 30 MG: 30 TABLET, FILM COATED ORAL at 17:23

## 2017-03-17 RX ADMIN — ANTACID TABLETS 200 MG: 500 TABLET, CHEWABLE ORAL at 12:16

## 2017-03-17 RX ADMIN — VANCOMYCIN HYDROCHLORIDE 1750 MG: 10 INJECTION, POWDER, LYOPHILIZED, FOR SOLUTION INTRAVENOUS at 23:03

## 2017-03-17 RX ADMIN — INSULIN GLARGINE 20 UNITS: 100 INJECTION, SOLUTION SUBCUTANEOUS at 22:02

## 2017-03-17 RX ADMIN — ANTACID TABLETS 200 MG: 500 TABLET, CHEWABLE ORAL at 22:03

## 2017-03-17 RX ADMIN — ONDANSETRON HYDROCHLORIDE 4 MG: 2 INJECTION INTRAMUSCULAR; INTRAVENOUS at 08:02

## 2017-03-17 NOTE — PROGRESS NOTES
Problem: Self Care Deficits Care Plan (Adult)  Goal: *Acute Goals and Plan of Care (Insert Text)  OTG Initiated 3/14/17    1. Patient will perform grooming with supervision/set-up   2. Patient will perform upper body dressing with supervision/set-up. 3. Patient will perform lower body dressing with minimal assistance/contact guard assist.  4. Patient will perform toilet transfers with supervision/set-up. 5. Patient will perform all aspects of toileting with supervision/set-up. 6. Patient will perform functional activity while standing for 3-5 minutes with CGA     Occupational Therapy Goals  Initiated 3/6/2017 within 7 day(s). 1. Patient will perform grooming with supervision/set-up   2. Patient will perform upper body dressing with supervision/set-up. 3. Patient will perform lower body dressing with minimal assistance/contact guard assist.  4. Patient will perform toilet transfers with supervision/set-up. 5. Patient will perform all aspects of toileting with supervision/set-up. 6. Patient will participate in upper extremity therapeutic exercise/activities with supervision/set-up for 10 minutes. 7. Patient will utilize energy conservation techniques during functional activities with verbal cues. 8. Patient will perform functional activity while standing for 3-5 minutes with CGA   Outcome: Progressing Towards Goal  OCCUPATIONAL THERAPY TREATMENT     Patient: Lydia Issa (83 y.o. female)  Date: 3/17/2017  Diagnosis: CHF NYHA class IV, acute, diastolic (HCC)  CHF NYHA class IV, acute, diastolic (HCC) Acute on chronic respiratory failure with hypoxia [facility])       Precautions: Fall  Chart, occupational therapy assessment, plan of care, and goals were reviewed. ASSESSMENT:  Pt seated in chair upon entering, agreeable to therapy. Pt required max A to adjust socks. Pt donned robe with mod A. Pt required min-mod A to perform sit to stand transfer from chair.  Pt completed functional mobility with use of RW with CGA. Pt on 4L of supplemental O2. SpO2 dropped to 86% after ambulation. SpO2 increased to 91% after a few minutes rest. Pt left seated in chair with needs in reach. EDUCATION: exercises  Progression toward goals:  [ ]          Improving appropriately and progressing toward goals  [X]          Improving slowly and progressing toward goals  [ ]          Not making progress toward goals and plan of care will be adjusted       PLAN:  Patient continues to benefit from skilled intervention to address the above impairments. Continue treatment per established plan of care. Discharge Recommendations:  Andrés Nelson  Further Equipment Recommendations for Discharge:  TBD by next level of care       SUBJECTIVE:   Patient stated .      OBJECTIVE DATA SUMMARY:      G CODES:       Cognitive/Behavioral Status:  Neurologic State: Alert  Orientation Level: Oriented X4  Cognition: Follows commands  Safety/Judgement: Fall prevention  Functional Mobility and Transfers for ADLs:                          Transfers:  Sit to Stand: Minimum assistance; Moderate assistance              Bathroom Mobility: Contact guard assistance (simulated)                 Balance:  Sitting: Intact  Standing: Impaired; With support  Standing - Static: Fair  Standing - Dynamic : Fair  ADL Intervention:  Grooming  Grooming Assistance: Supervision/set up  Washing Face: Supervision/set-up     Upper Body Dressing Assistance  Dressing Assistance: Moderate assistance  Front Opened Shirt: Moderate assistance     Lower Body Dressing Assistance  Dressing Assistance: Maximum assistance  Socks: Maximum assistance           Cognitive Retraining  Safety/Judgement: Fall prevention     Pain:  Pre Treatment:0  Post Treatment:0     Activity Tolerance:    fair  Please refer to the flowsheet for vital signs taken during this treatment.   After treatment:   [X]  Patient left in no apparent distress sitting up in chair  [ ]  Patient left in no apparent distress in bed  [X]  Call bell left within reach  [X]  Nursing notified  [ ]  Caregiver present  [ ]  Bed alarm activated     Елена Berg MS OTR/L  Time Calculation: 19 mins

## 2017-03-17 NOTE — PROGRESS NOTES
Cardiology Progress Note        Patient: Michelle Polanco        Sex: female          DOA: 2/25/2017  YOB: 1945      Age:  66 y.o.        LOS:  LOS: 18 days    Patient seen and examined. Chart reviewed. Assessment/Plan     Patient Active Problem List   Diagnosis Code    Acute on chronic diastolic CHF (congestive heart failure) ([facility]) I50.33    ALEJANDRINA (acute kidney injury) ([facility] N17.9    Acute exacerbation of CHF (congestive heart failure) ([facility]) I50.9    DM (diabetes mellitus) ([facility] E11.9    Hypertension I10    Respiratory failure ([facility]) J96.90    Acute coronary syndrome ([facility]) I24.9    Obstructive sleep apnea, adult G47.33    Paroxysmal atrial fibrillation ([facility]) I48.0    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 ([facility]) I25.78, E11.65    Dyspnea due to congestive heart failure ([facility]) I50.9    Aspiration pneumonia ([facility]) J69.0    Hyperkalemia E87.5    Elevated troponin R79.89    COPD (chronic obstructive pulmonary disease) ([facility]) J44.9    CHF (congestive heart failure) ([facility]) I50.9    Insufficiency, respiratory, acute ([facility]) R06.89    Infiltrate noted on imaging study R93.8    COPD exacerbation ([facility]) J44.1    Acute on chronic respiratory failure with hypoxia ([facility]) J96.21    Chest pain R07.9    Anxiety F41.9    UTI (urinary tract infection) N39.0    Obesity hypoventilation syndrome ([facility]) E66.2    Pulmonary hypertension, moderate to severe ([facility]) I27.2      Acute hypoxic respiratory failure  Permanent atrial fibrillation on coumadin   Moderate Aortic stenosis     Plan:    CT chest revealed ground glass appearance. Started on Steroid  Discussed with pulmonary/critical care. 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  At this time due to significant hypoxia and CT finding suggestive of  interstitial lung disease (started on steroid) will wait for treatment response. Will consider LHC and RHC if clinically indicated. Continue management as per hospital medicine and pulmonary critical care. Subjective:    cc:  I am tired. REVIEW OF SYSTEMS:     General: No fevers or chills. Cardiovascular: Positive leg swelling, shortness of breath, No chest pain,No palpitations, No orthopnea, No PND, No claudication  Pulmonary: Positive dyspnea  Gastrointestinal: No nausea, vomiting, bleeding  Neurology: No Dizziness    Objective:      Visit Vitals    /66 (BP 1 Location: Right arm, BP Patient Position: Sitting)    Pulse 76    Temp 98.6 °F (37 °C)    Resp 22    Ht 5' 4.96\" (1.65 m)    Wt 105.7 kg (233 lb 0.4 oz)    SpO2 97%    Breastfeeding No    BMI 38.83 kg/m2     Body mass index is 38.83 kg/(m^2). Physical Exam:  General Appearance: Comfortable, Obese, In mild respiratory distress. HEENT: SIRIA. HEAD: Atraumatic  NECK: No JVD, no thyroidomeglay. CAROTIDS: No bruit  LUNGS:Right base reduced air entry, absent creps   HEART: S1+S2 audible, irregularly irregular, 3/6 systolic murmur in aortic area, no pericardial rub. ABD: Non-tender, BS Audible    EXT: + leg edema, and no cyanosis. VASCULAR EXAM: Pulses are intact. PSYCHIATRIC EXAM: Mood is appropriate. MUSCULOSKELETAL: Grossly no joint deformity. NEUROLOGICAL: AAO times 3, No motor and sensory deficit.   Medication:  Current Facility-Administered Medications   Medication Dose Route Frequency    methylPREDNISolone (PF) (SOLU-MEDROL) injection 60 mg  60 mg IntraVENous Q24H    furosemide (LASIX) injection 20 mg  20 mg IntraVENous BID    traZODone (DESYREL) tablet 50 mg  50 mg Oral QHS    QUEtiapine (SEROquel) tablet 25 mg  25 mg Oral QHS    Vancomycin - Pharmacy to Dose  1 Each Other Rx Dosing/Monitoring    vancomycin (VANCOCIN) 1,750 mg in 0.9% sodium chloride 500 mL IVPB  1,750 mg IntraVENous Q24H    calcium carbonate (TUMS) chewable tablet 200 mg [elemental]  200 mg Oral QID WITH MEALS    potassium chloride (K-DUR, KLOR-CON) SR tablet 20 mEq  20 mEq Oral DAILY    lidocaine (LIDODERM) 5 % patch 1 Patch  1 Patch TransDERmal Q24H    insulin lispro (HUMALOG) injection 5 Units  5 Units SubCUTAneous TIDAC    HYDROcodone-acetaminophen (NORCO) 5-325 mg per tablet 1 Tab  1 Tab Oral Q6H PRN    insulin glargine (LANTUS) injection 20 Units  20 Units SubCUTAneous QHS    digoxin (LANOXIN) tablet 0.125 mg  0.125 mg Oral DAILY    carvedilol (COREG) tablet 25 mg  25 mg Oral BID WITH MEALS    dilTIAZem (CARDIZEM) IR tablet 30 mg  30 mg Oral TIDAC    ELECTROLYTE REPLACEMENT PROTOCOL  1 Each Other PRN    ELECTROLYTE REPLACEMENT PROTOCOL  1 Each Other PRN    pantoprazole (PROTONIX) tablet 40 mg  40 mg Oral ACB    polyvinyl alcohol (LIQUIFILM TEARS) 1.4 % ophthalmic solution 1 Drop  1 Drop Both Eyes PRN    nystatin (MYCOSTATIN) 100,000 unit/gram powder   Topical BID    docusate sodium (COLACE) capsule 100 mg  100 mg Oral BID    simvastatin (ZOCOR) tablet 10 mg  10 mg Oral QHS    aspirin chewable tablet 81 mg  81 mg Oral DAILY    arformoterol (BROVANA) neb solution 15 mcg  15 mcg Nebulization BID RT    budesonide (PULMICORT) 500 mcg/2 ml nebulizer suspension  500 mcg Nebulization BID RT    albuterol-ipratropium (DUO-NEB) 2.5 MG-0.5 MG/3 ML  3 mL Nebulization Q4H PRN    ondansetron (ZOFRAN) injection 4 mg  4 mg IntraVENous Q6H PRN    acetaminophen (TYLENOL) tablet 650 mg  650 mg Oral Q4H PRN    insulin lispro (HUMALOG) injection   SubCUTAneous AC&HS    glucose chewable tablet 16 g  4 Tab Oral PRN    glucagon (GLUCAGEN) injection 1 mg  1 mg IntraMUSCular PRN    dextrose (D50W) injection syrg 12.5-25 g  25-50 mL IntraVENous PRN    warfarin pharmacy to dose   Other PRN               Lab/Data Reviewed:       No results for input(s): WBC, HGB, HCT, PLT, HGBEXT, HCTEXT, PLTEXT in the last 72 hours.   Recent Labs      03/16/17   0673 03/15/17   0950  03/14/17   0515   NA   --   140  144   K  5.0  5.1  4.6   CL   --   104  107   CO2   --   32  33*   GLU   --   200*  110*   BUN   --   16  18   CREA   --   1.10  1.07   CA   --   8.7  8.7       Signed By: Anna Davila MD     March 16, 2017

## 2017-03-17 NOTE — PROGRESS NOTES
0800 Assumed pt care. Pt alert and oriented. In chair for breakfast. Maintained on oxygen 4 liters , SPO2 in the high 90's. No acute distress. C/o nausea, Zofran adm as per [facility].     1100 Interdisciplinary team rounds were held 3/17/2017 with the following team members:Care Management, Nursing, Physical Therapy and Physician Dr Willy Walsh and the patient. Plan of care discussed. See clinical pathway and/or care plan for interventions and desired outcomes. V.O for removal of central line and  insertion of a peripheral line. 1350 Pt medicated with Narco 1 tab and Zofran for abm discomfort 9/10 and nausea. .    1430 Pt in bed resting, no noted discomfort    1535 Cental line to RIJ pulled ,2x2 with tape placed to site and peripheral line 22 gauge placed to RT hand. Tolerated well. 1730 Pt sitting in chair eating dinner with  @ bedside. Doing well. 1830 Pt spent a fair day.  Had no acute distress

## 2017-03-17 NOTE — PROGRESS NOTES
Hospitalist Progress Note    Patient: Ghassan Washington MRN: [id number]  CSN: [id number]    YOB: 1945  Age: 66 y.o. Sex: female    DOA: 2/25/2017 LOS:  LOS: 19 days                Assessment/Plan     Patient Active Problem List   Diagnosis Code    Acute on chronic diastolic CHF (congestive heart failure) ([facility]) I50.33    ALEJANDRINA (acute kidney injury) ([facility] N17.9    Acute exacerbation of CHF (congestive heart failure) ([facility]) I50.9    DM (diabetes mellitus) ([facility] E11.9    Hypertension I10    Respiratory failure ([facility]) J96.90    Acute coronary syndrome ([facility]) I24.9    Obstructive sleep apnea, adult G47.33    Paroxysmal atrial fibrillation ([facility]) I48.0    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 ([facility]) T61.25, E11.65    Dyspnea due to congestive heart failure ([facility]) I50.9    Aspiration pneumonia ([facility]) J69.0    Hyperkalemia E87.5    Elevated troponin R79.89    COPD (chronic obstructive pulmonary disease) ([facility]) J44.9    CHF (congestive heart failure) ([facility]) I50.9    Insufficiency, respiratory, acute ([facility]) R06.89    Infiltrate noted on imaging study R93.8    COPD exacerbation ([facility]) J44.1    Acute on chronic respiratory failure with hypoxia ([facility]) J96.21    Chest pain R07.9    Anxiety F41.9    UTI (urinary tract infection) N39.0    Obesity hypoventilation syndrome ([facility]) E66.2    Pulmonary hypertension, moderate to severe ([facility] I27.2            67 yo female admitted for acute respiratory failure             Resp - acute hypoxic hypercapnic respiratory failure, multifactorial, secondary to diastolic CHF, pulmonary HTN, IMANI/OHS  Possible interstitial lung disease, started on solumedrol. Off high flow oxygen, on NC, continue with BiPAP at night.      ID - E-Coli UTI, MRSA bacteremia. Repeat blood and urine cultures negative. Antibiotics - on ceftriaxone, vancomycin. Completed 10 days of diflucan for vaginitis.      [facility] - Monitor HD.    A-fib - continue beta blocker and cardizem,   Chronic diastolic CHF - continue diuresis, significant improvement in edema      Heme/onc - Follow H&H, plts.      Renal - Trend BUN, Cr, follow I/O. Check and replace Mg, K, phos.      Endocrine - Follow FSG, continue with insulin      Neuro/ Pain/ Sedation - pain control, lidoderm patch, on seroquel, complains of anxiety, need to be judicious about narcotics and anxiolytics. She reports that trazodone helps at home.       GI - diabetic diet, seen by SLP.     Prophylaxis - DVT: coumadin, GI: protonix      Discussed with Dr. Lana Sosa, plan for cath on Monday. Discussed plan with  on phone.      Review of systems  General: No fevers or chills. Cardiovascular: No chest pain or pressure. No palpitations. Pulmonary: on high flow oxygen   Gastrointestinal: No nausea, vomiting.           Physical Exam:  General: Awake, cooperative, no acute distress, on high flow oxygen.    HEENT: NC, Atraumatic. PERRLA, anicteric sclerae. Lungs: shallow respirations, CTA Bilaterally. No Wheezing/Rhonchi/Rales. Heart: Regular rhythm,  No murmur, No Rubs, No Gallops  Abdomen: Soft, Non distended, Non tender.  +Bowel sounds,   Extremities: Trace edema, bilateral LE ulcers bandaged  Psych:   Not anxious or agitated. Neurologic:  No acute neurological deficit.        Vital signs/Intake and Output:  Visit Vitals    /50 (BP 1 Location: Left arm, BP Patient Position: At rest)    Pulse 100    Temp 97.9 °F (36.6 °C)    Resp 22    Ht 5' 4.96\" (1.65 m)    Wt 106.5 kg (234 lb 12.6 oz)    SpO2 93%    Breastfeeding No    BMI 39.12 kg/m2     Current Shift:  03/17 0701 - 03/17 1900  In: 740 [P.O.:740]  Out: 400 [Urine:400]  Last three shifts:  03/15 1901 - 03/17 0700  In: 1990 [P.O.:1440; I.V.:550]  Out: 950 [Urine:950]            Labs: Results:       Chemistry Recent Labs      03/17/17   0331  03/16/17   0545  03/15/17   0950   GLU  133*   --   200*   NA  145   --   140   K  5.3  5.0  5.1   CL  105   --   104   CO2 33*   --   32   BUN  21*   --   16   CREA  1.01   --   1.10   CA  8.8   --   8.7   AGAP  7   --   4   BUCR  21*   --   15      CBC w/Diff No results for input(s): WBC, RBC, HGB, HCT, PLT, GRANS, LYMPH, EOS, HGBEXT, HCTEXT, PLTEXT in the last 72 hours. Cardiac Enzymes No results for input(s): CPK, CKND1, ALESSANDRA in the last 72 hours. No lab exists for component: CKRMB, TROIP   Coagulation Recent Labs      03/17/17   0331  03/16/17   0545   PTP  21.3*  21.5*   INR  1.9*  2.0*       Lipid Panel No results found for: CHOL, CHOLPOCT, CHOLX, CHLST, CHOLV, [id number], HDL, LDL, NLDLCT, DLDL, LDLC, DLDLP, 068640, VLDLC, VLDL, TGL, TGLX, TRIGL, [id number], TRIGP, TGLPOCT, [id number], CHHD, CHHDX   BNP No results for input(s): BNPP in the last 72 hours. Liver Enzymes No results for input(s): TP, ALB, TBIL, AP, SGOT, GPT in the last 72 hours.     No lab exists for component: DBIL   Thyroid Studies Lab Results   Component Value Date/Time    TSH 0.31 01/01/2016 10:20 PM        Procedures/imaging: see electronic medical records for all procedures/Xrays and details which were not copied into this note but were reviewed prior to creation of Plan

## 2017-03-17 NOTE — PROGRESS NOTES
Problem: Mobility Impaired (Adult and Pediatric)  Goal: *Acute Goals and Plan of Care (Insert Text)  Physical Therapy Goals  Initiated 2/27/2017 and to be accomplished within 7 day(s)  1. Patient will move from supine to sit and sit to supine , scoot up and down and roll side to side in bed with supervision/set-up. 2. Patient will transfer from bed to chair and chair to bed with minimal assistance/contact guard assist using the least restrictive device. 3. Patient will perform sit to stand with minimal assistance/contact guard assist.  4. Patient will ambulate with minimal assistance/contact guard assist for 50 feet with the least restrictive device. Re-evaluation on 3/6/2017 and to be accomplished within 7 day(s)  1. Patient will move from supine to sit and sit to supine, scoot up and down and roll side to side in bed with supervision/set-up. 2. Patient will transfer from bed to chair and chair to bed with contact guard assist/supervision using the least restrictive device. 3. Patient will perform sit to stand with contact guard assist/supervision. 4. Patient will ambulate with contact guard assist/supervision for 100 feet with the least restrictive device. Re-evaluation on 3/14/2017. Goals to be accomplished within 7 days. 1. Patient will move from supine to sit and sit to supine, scoot up and down and roll side to side in bed with supervision/set-up. 2. Patient will transfer from bed to chair and chair to bed with contact guard assist/supervision using the least restrictive device. 3. Patient will perform sit to stand with contact guard assist/supervision. 4. Patient will ambulate with contact guard assist/supervision for 50 feet with the least restrictive device. 5. Patient will be independent with HEP to maximize strength and muscular endurance.    Outcome: Progressing Towards Goal  PHYSICAL THERAPY TREATMENT     Patient: Elpidio Caicedo (1 y.o. female)  Date: 3/17/2017  Diagnosis: CHF NYHA class IV, acute, diastolic (HCC)  CHF NYHA class IV, acute, diastolic (HCC) Acute on chronic respiratory failure with hypoxia (HCC)       Precautions: Fall   Chart, physical therapy assessment, plan of care and goals were reviewed. ASSESSMENT:  The patient is progressing slowly with therapy. This session the patient ambulated about 75 feet using a rolling walker with CGA/minimal assistance. Patient's gait becomes increasingly unsteady as she fatigues. Unable to obtain O2 saturations until the patient was seated following gait training, which was 86% on 4L O2. Patient's pulse oximeter required replacement, nursing notified. The patient was returned back to her chair, O2 saturations returning to 91% with rest. Respiratory recommends increasing O2 flow rate during gait training in future sessions in order to maintain saturations. Will continue PT and progress gait and activity tolerance as appropriate. Progression toward goals:  [ ]      Improving appropriately and progressing toward goals  [X]      Improving slowly and progressing toward goals  [ ]      Not making progress toward goals and plan of care will be adjusted       PLAN:  Patient continues to benefit from skilled intervention to address the above impairments. Continue treatment per established plan of care. Discharge Recommendations:  Rehab  Further Equipment Recommendations for Discharge:  N/A       SUBJECTIVE:   Patient stated I sat up for so long yesterday that I couldn't do my walk.       OBJECTIVE DATA SUMMARY:   Critical Behavior:  Neurologic State: Alert  Orientation Level: Oriented X4  Cognition: Follows commands  Safety/Judgement: Fall prevention  Functional Mobility Training:  Bed Mobility:  Transfers:  Sit to Stand: Minimum assistance; Moderate assistance  Balance:  Sitting: Intact  Standing: Impaired; With support  Standing - Static: Fair  Standing - Dynamic : Fair  Ambulation/Gait Training:  Distance (ft): 75 Feet (ft)  Assistive Device: Gait belt;Walker, rolling  Ambulation - Level of Assistance: Contact guard assistance;Minimal assistance  Gait Abnormalities: Decreased step clearance  Base of Support: Widened  Stance: Time  Speed/Kristin: Slow  Step Length: Right shortened;Left shortened  Swing Pattern: Right asymmetrical;Left asymmetrical     Pain:  Pain Scale 1: Numeric (0 - 10)  Pain Intensity 1: 9  Pain Location 1: Back  Pain Orientation 1: Lower  Pain Description 1: Intermittent  Pain Intervention(s) 1: Medication (see MAR)  Activity Tolerance:   Poor  Please refer to the flowsheet for vital signs taken during this treatment.   After treatment:   [X] Patient left in no apparent distress sitting up in chair  [ ] Patient left in no apparent distress in bed  [X] Call bell left within reach  [X] Nursing notified  [ ] Caregiver present  [ ] Bed alarm activated      Brandon Cutting   Time Calculation: 18 mins

## 2017-03-17 NOTE — PROGRESS NOTES
1945 Pt received from offgoing nurse without any signs or symptoms of distress. Pt vitals are stable and within normal limits. Pt bed in low position with wheels locked and call bell within reach. 2043 Assessment completed and documented in flow sheet. Pt denies any further needs at this time. Pt in NAD with bed in low position, wheels locked and call bell within reach. Scheduled medications administered as ordered. 2156 Scheduled medications administered as ordered. Assisted to bedside commode. HS snack and fresh water provided at this time. 2315 Pt called to have blankets adjusted and bed moved. 2325 Reassessment completed with no changes noted. Bed locked, in lowest position, with call light within reach. 0330 in room to obtain blood samples for morning labs. Pt denies any further needs at this time. 5086 Reassessment completed with no changes noted. Bed locked, in lowest position, with call light within reach. 0459 pt c/o nausea. Zofran administered per PRN order. 4420 Scheduled medications administered as ordered. 0715 Bedside and Verbal shift change report given to Forrest Swift RN (oncoming nurse) by Sharyle Bevel RN (offgoing nurse). Report given with SBAR, Intake/Output, MAR and Recent Results.

## 2017-03-17 NOTE — ROUTINE PROCESS
Bedside and Verbal shift change report given to JAYA Thomson (oncoming nurse) by Darleen Chan RN (offgoing nurse). Report included the following information SBAR, Kardex, Procedure Summary, Intake/Output, MAR and Med Rec Status.

## 2017-03-17 NOTE — PROGRESS NOTES
Pharmacy Dosing Services: Warfarin     Consult for Warfarin Dosing by Pharmacy by Dr. Bob Marmolejo provided for this 66 y.o.  female , for indication of Atrial Fibrillation. Dose to achieve an INR goal of 2-3    Order entered for Warfarin  2.5 mg to be given today at 18:00. Significant drug interactions: Aspirin  PT/INR Lab Results   Component Value Date/Time    INR 1.9 03/17/2017 03:31 AM      Platelets Lab Results   Component Value Date/Time    PLATELET 146 52/91/9008 04:28 AM      H/H     Lab Results   Component Value Date/Time    HGB 10.3 03/12/2017 04:28 AM        Warfarin Administrations (last 168 hours)       Date/Time Action Medication Dose      03/16/17 1840 Given    warfarin (COUMADIN) tablet 2.5 mg 2.5 mg    03/15/17 1722 Given    warfarin (COUMADIN) tablet 2 mg 2 mg    03/14/17 1843 Given    warfarin (COUMADIN) tablet 2 mg 2 mg    03/12/17 1730 Given    warfarin (COUMADIN) tablet 1 mg 1 mg    03/11/17 1813 Given    warfarin (COUMADIN) tablet 4 mg 4 mg    03/10/17 1703 Given    warfarin (COUMADIN) tablet 4 mg 4 mg          Pharmacy to follow daily and will provide subsequent Warfarin dosing based on clinical status.   HAMMAD Freire [facility])  Contact information [phone]

## 2017-03-18 LAB
ANION GAP BLD CALC-SCNC: 2 MMOL/L (ref 3–18)
BACTERIA SPEC CULT: NORMAL
BACTERIA SPEC CULT: NORMAL
BUN SERPL-MCNC: 30 MG/DL (ref 7–18)
BUN/CREAT SERPL: 27 (ref 12–20)
CALCIUM SERPL-MCNC: 9 MG/DL (ref 8.5–10.1)
CHLORIDE SERPL-SCNC: 104 MMOL/L (ref 100–108)
CO2 SERPL-SCNC: 32 MMOL/L (ref 21–32)
CREAT SERPL-MCNC: 1.1 MG/DL (ref 0.6–1.3)
GLUCOSE BLD STRIP.AUTO-MCNC: 141 MG/DL (ref 70–110)
GLUCOSE BLD STRIP.AUTO-MCNC: 157 MG/DL (ref 70–110)
GLUCOSE BLD STRIP.AUTO-MCNC: 173 MG/DL (ref 70–110)
GLUCOSE BLD STRIP.AUTO-MCNC: 235 MG/DL (ref 70–110)
GLUCOSE SERPL-MCNC: 147 MG/DL (ref 74–99)
INR PPP: 2.1 (ref 0.8–1.2)
MAGNESIUM SERPL-MCNC: 2 MG/DL (ref 1.8–2.4)
POTASSIUM SERPL-SCNC: 5 MMOL/L (ref 3.5–5.5)
PROTHROMBIN TIME: 22.6 SEC (ref 11.5–15.2)
SERVICE CMNT-IMP: NORMAL
SERVICE CMNT-IMP: NORMAL
SODIUM SERPL-SCNC: 138 MMOL/L (ref 136–145)

## 2017-03-18 PROCEDURE — 94660 CPAP INITIATION&MGMT: CPT

## 2017-03-18 PROCEDURE — 74011250636 HC RX REV CODE- 250/636: Performed by: INTERNAL MEDICINE

## 2017-03-18 PROCEDURE — 94640 AIRWAY INHALATION TREATMENT: CPT

## 2017-03-18 PROCEDURE — 74011250636 HC RX REV CODE- 250/636: Performed by: HOSPITALIST

## 2017-03-18 PROCEDURE — 82962 GLUCOSE BLOOD TEST: CPT

## 2017-03-18 PROCEDURE — 94760 N-INVAS EAR/PLS OXIMETRY 1: CPT

## 2017-03-18 PROCEDURE — 97530 THERAPEUTIC ACTIVITIES: CPT

## 2017-03-18 PROCEDURE — 74011250637 HC RX REV CODE- 250/637: Performed by: INTERNAL MEDICINE

## 2017-03-18 PROCEDURE — 80048 BASIC METABOLIC PNL TOTAL CA: CPT | Performed by: INTERNAL MEDICINE

## 2017-03-18 PROCEDURE — 97116 GAIT TRAINING THERAPY: CPT

## 2017-03-18 PROCEDURE — 65660000000 HC RM CCU STEPDOWN

## 2017-03-18 PROCEDURE — 85610 PROTHROMBIN TIME: CPT | Performed by: INTERNAL MEDICINE

## 2017-03-18 PROCEDURE — 74011250637 HC RX REV CODE- 250/637: Performed by: HOSPITALIST

## 2017-03-18 PROCEDURE — 36415 COLL VENOUS BLD VENIPUNCTURE: CPT | Performed by: INTERNAL MEDICINE

## 2017-03-18 PROCEDURE — 74011000250 HC RX REV CODE- 250: Performed by: INTERNAL MEDICINE

## 2017-03-18 PROCEDURE — 77010033678 HC OXYGEN DAILY

## 2017-03-18 PROCEDURE — 83735 ASSAY OF MAGNESIUM: CPT | Performed by: INTERNAL MEDICINE

## 2017-03-18 RX ORDER — WARFARIN 2.5 MG/1
2.5 TABLET ORAL ONCE
Status: COMPLETED | OUTPATIENT
Start: 2017-03-18 | End: 2017-03-18

## 2017-03-18 RX ADMIN — PANTOPRAZOLE SODIUM 40 MG: 40 TABLET, DELAYED RELEASE ORAL at 07:06

## 2017-03-18 RX ADMIN — POTASSIUM CHLORIDE 20 MEQ: 20 TABLET, EXTENDED RELEASE ORAL at 10:08

## 2017-03-18 RX ADMIN — ARFORMOTEROL TARTRATE 15 MCG: 15 SOLUTION RESPIRATORY (INHALATION) at 19:21

## 2017-03-18 RX ADMIN — HYDROCODONE BITARTRATE AND ACETAMINOPHEN 1 TABLET: 5; 325 TABLET ORAL at 10:22

## 2017-03-18 RX ADMIN — INSULIN LISPRO 5 UNITS: 100 INJECTION, SOLUTION INTRAVENOUS; SUBCUTANEOUS at 17:37

## 2017-03-18 RX ADMIN — NYSTATIN: 100000 POWDER TOPICAL at 23:40

## 2017-03-18 RX ADMIN — INSULIN LISPRO 5 UNITS: 100 INJECTION, SOLUTION INTRAVENOUS; SUBCUTANEOUS at 22:03

## 2017-03-18 RX ADMIN — ANTACID TABLETS 200 MG: 500 TABLET, CHEWABLE ORAL at 17:40

## 2017-03-18 RX ADMIN — INSULIN LISPRO 5 UNITS: 100 INJECTION, SOLUTION INTRAVENOUS; SUBCUTANEOUS at 12:48

## 2017-03-18 RX ADMIN — ASPIRIN 81 MG CHEWABLE TABLET 81 MG: 81 TABLET CHEWABLE at 10:08

## 2017-03-18 RX ADMIN — ANTACID TABLETS 200 MG: 500 TABLET, CHEWABLE ORAL at 10:07

## 2017-03-18 RX ADMIN — QUETIAPINE FUMARATE 25 MG: 25 TABLET, FILM COATED ORAL at 22:12

## 2017-03-18 RX ADMIN — TRAZODONE HYDROCHLORIDE 50 MG: 50 TABLET ORAL at 22:12

## 2017-03-18 RX ADMIN — DILTIAZEM HYDROCHLORIDE 30 MG: 30 TABLET, FILM COATED ORAL at 10:08

## 2017-03-18 RX ADMIN — DOCUSATE SODIUM 100 MG: 100 CAPSULE, LIQUID FILLED ORAL at 10:08

## 2017-03-18 RX ADMIN — BUDESONIDE 500 MCG: 0.5 INHALANT RESPIRATORY (INHALATION) at 19:21

## 2017-03-18 RX ADMIN — HYDROCODONE BITARTRATE AND ACETAMINOPHEN 1 TABLET: 5; 325 TABLET ORAL at 22:11

## 2017-03-18 RX ADMIN — VANCOMYCIN HYDROCHLORIDE 1750 MG: 10 INJECTION, POWDER, LYOPHILIZED, FOR SOLUTION INTRAVENOUS at 23:40

## 2017-03-18 RX ADMIN — CARVEDILOL 25 MG: 12.5 TABLET, FILM COATED ORAL at 17:39

## 2017-03-18 RX ADMIN — DOCUSATE SODIUM 100 MG: 100 CAPSULE, LIQUID FILLED ORAL at 22:12

## 2017-03-18 RX ADMIN — CARVEDILOL 25 MG: 12.5 TABLET, FILM COATED ORAL at 10:08

## 2017-03-18 RX ADMIN — FUROSEMIDE 20 MG: 10 INJECTION, SOLUTION INTRAMUSCULAR; INTRAVENOUS at 22:00

## 2017-03-18 RX ADMIN — INSULIN LISPRO 5 UNITS: 100 INJECTION, SOLUTION INTRAVENOUS; SUBCUTANEOUS at 07:30

## 2017-03-18 RX ADMIN — DILTIAZEM HYDROCHLORIDE 30 MG: 30 TABLET, FILM COATED ORAL at 17:39

## 2017-03-18 RX ADMIN — DIGOXIN 0.12 MG: 0.12 TABLET ORAL at 10:08

## 2017-03-18 RX ADMIN — ANTACID TABLETS 200 MG: 500 TABLET, CHEWABLE ORAL at 22:12

## 2017-03-18 RX ADMIN — METHYLPREDNISOLONE SODIUM SUCCINATE 60 MG: 125 INJECTION, POWDER, FOR SOLUTION INTRAMUSCULAR; INTRAVENOUS at 10:08

## 2017-03-18 RX ADMIN — INSULIN LISPRO 7 UNITS: 100 INJECTION, SOLUTION INTRAVENOUS; SUBCUTANEOUS at 12:48

## 2017-03-18 RX ADMIN — ONDANSETRON HYDROCHLORIDE 4 MG: 2 INJECTION INTRAMUSCULAR; INTRAVENOUS at 05:27

## 2017-03-18 RX ADMIN — ARFORMOTEROL TARTRATE 15 MCG: 15 SOLUTION RESPIRATORY (INHALATION) at 08:27

## 2017-03-18 RX ADMIN — BUDESONIDE 500 MCG: 0.5 INHALANT RESPIRATORY (INHALATION) at 08:27

## 2017-03-18 RX ADMIN — SIMVASTATIN 10 MG: 20 TABLET, FILM COATED ORAL at 22:12

## 2017-03-18 RX ADMIN — HYDROCODONE BITARTRATE AND ACETAMINOPHEN 1 TABLET: 5; 325 TABLET ORAL at 00:00

## 2017-03-18 RX ADMIN — INSULIN GLARGINE 20 UNITS: 100 INJECTION, SOLUTION SUBCUTANEOUS at 22:03

## 2017-03-18 RX ADMIN — DILTIAZEM HYDROCHLORIDE 30 MG: 30 TABLET, FILM COATED ORAL at 12:47

## 2017-03-18 RX ADMIN — WARFARIN SODIUM 2.5 MG: 2.5 TABLET ORAL at 17:39

## 2017-03-18 RX ADMIN — FUROSEMIDE 20 MG: 10 INJECTION, SOLUTION INTRAMUSCULAR; INTRAVENOUS at 10:08

## 2017-03-18 RX ADMIN — ANTACID TABLETS 200 MG: 500 TABLET, CHEWABLE ORAL at 12:47

## 2017-03-18 RX ADMIN — NYSTATIN: 100000 POWDER TOPICAL at 10:10

## 2017-03-18 RX ADMIN — INSULIN LISPRO 5 UNITS: 100 INJECTION, SOLUTION INTRAVENOUS; SUBCUTANEOUS at 07:06

## 2017-03-18 NOTE — PROGRESS NOTES
0800 Assumed pt care. Pt in bed alert and oriented. No complains voiced    0900 In chair eating breakfast. Has up to [facility] and voiding    1022 Pt medicated for c/o back. abm pain 9/10, Norco one tab adm po as per MAR.    1120 pt sleeping in bed, pain resolved. 1500 Pt a fair and uneventful morning. No acute distress.

## 2017-03-18 NOTE — PROGRESS NOTES
1545 Assessment completed. Patient alert and oriented x4. Dyspnea upon exertion. Shift Summary- Patient had an uneventful shift. Patient may have to be NPO for possible cath on Monday.

## 2017-03-18 NOTE — PROGRESS NOTES
Problem: Mobility Impaired (Adult and Pediatric)  Goal: *Acute Goals and Plan of Care (Insert Text)  Physical Therapy Goals  Initiated 2/27/2017 and to be accomplished within 7 day(s)  1. Patient will move from supine to sit and sit to supine , scoot up and down and roll side to side in bed with supervision/set-up. 2. Patient will transfer from bed to chair and chair to bed with minimal assistance/contact guard assist using the least restrictive device. 3. Patient will perform sit to stand with minimal assistance/contact guard assist.  4. Patient will ambulate with minimal assistance/contact guard assist for 50 feet with the least restrictive device. Re-evaluation on 3/6/2017 and to be accomplished within 7 day(s)  1. Patient will move from supine to sit and sit to supine, scoot up and down and roll side to side in bed with supervision/set-up. 2. Patient will transfer from bed to chair and chair to bed with contact guard assist/supervision using the least restrictive device. 3. Patient will perform sit to stand with contact guard assist/supervision. 4. Patient will ambulate with contact guard assist/supervision for 100 feet with the least restrictive device. Re-evaluation on 3/14/2017. Goals to be accomplished within 7 days. 1. Patient will move from supine to sit and sit to supine, scoot up and down and roll side to side in bed with supervision/set-up. 2. Patient will transfer from bed to chair and chair to bed with contact guard assist/supervision using the least restrictive device. 3. Patient will perform sit to stand with contact guard assist/supervision. 4. Patient will ambulate with contact guard assist/supervision for 50 feet with the least restrictive device. 5. Patient will be independent with HEP to maximize strength and muscular endurance.    Outcome: Progressing Towards Goal  PHYSICAL THERAPY TREATMENT     Patient: Ghassan Washington (42 y.o. female)  Date: 3/18/2017  Diagnosis: CHF NYHA class IV, acute, diastolic (HCC)  CHF NYHA class IV, acute, diastolic (HCC) Acute on chronic respiratory failure with hypoxia (HCC)       Precautions: Fall   Chart, physical therapy assessment, plan of care and goals were reviewed. ASSESSMENT:  Pt willing to perform ambulation, but refuses a second trial. Pt is able to coomplete same distance as previous one trial of session with this writer, without break. Pt complains of feeling over heated and requests to remain sitting EOB. SPO2 range of 89-94% on 4L NC  Progression toward goals:  [ ]      Improving appropriately and progressing toward goals  [X]      Improving slowly and progressing toward goals  [ ]      Not making progress toward goals and plan of care will be adjusted       PLAN:  Patient continues to benefit from skilled intervention to address the above impairments. Continue treatment per established plan of care. Discharge Recommendations:  Home Health  Further Equipment Recommendations for Discharge:  bedside commode and rolling walker       SUBJECTIVE:   Patient stated My lunch hasn't come and my stomach is still bothering me.       OBJECTIVE DATA SUMMARY:   Critical Behavior:  Neurologic State: Alert  Orientation Level: Oriented X4  Cognition: Appropriate decision making  Safety/Judgement: Fall prevention  Functional Mobility Training:  Transfers:  Sit to Stand: Contact guard assistance  Stand to Sit: Contact guard assistance  Balance:  Sitting: Intact  Standing: With support; Impaired  Standing - Static: Good  Standing - Dynamic : Fair  Ambulation/Gait Training:  Distance (ft): 75 Feet (ft)  Assistive Device: Gait belt;Walker, rolling  Ambulation - Level of Assistance: Contact guard assistance;Minimal assistance  Gait Abnormalities: Decreased step clearance  Base of Support: Widened     Speed/Kristin: Slow  Step Length: Right shortened;Left shortened  Swing Pattern: Right asymmetrical;Left asymmetrical  Pain:  Pain Scale 1: Numeric (0 - 10)  Pain Intensity 1: 9  Pain Location 1: Abdomen;Back  Pain Orientation 1: Lower   Pain Description 1: Aching  Pain Intervention(s) 1: Medication (see MAR)  Activity Tolerance:   Fair  Please refer to the flowsheet for vital signs taken during this treatment.   After treatment:   [X] Patient left in no apparent distress sitting EOB  [ ] Patient left in no apparent distress in bed  [X] Call bell left within reach  [X] Nursing notified  [ ] Caregiver present    [ ] Bed alarm activated      Kaitlin Cisneros PTA   Time Calculation: 25 mins

## 2017-03-18 NOTE — PROGRESS NOTES
Hospitalist Progress Note    Patient: Jeffrey Rodriguez MRN: [id number]  CSN: [id number]    YOB: 1945  Age: 66 y.o. Sex: female    DOA: 2/25/2017 LOS:  LOS: 20 days            Assessment/Plan     1. Acute hypoxic & hypercarbic respiratory failure, multifactorial etiology due to decompensated diastolic CHF, pulmonary HTN, interstitial lung disease IMANI/OHS; off bipap/ high flow an on NC. Clinically improving  2. Afib, rate controlled w therapeutic INR on warfarin  2. MRSA bacteremia from 3/9, suspect due to CVL, repeat cultures negative from 3/11, echo with out veg noted, tolerating vancomycin  3. Ecoli UTI  4. Anxiety- but did not tolerate anxiolytics due to respiratory suppression in past  5. Morbid obesity    Plan:  - continue steroids, inhaled bronchodilators and supplemental oxygen  - continue diuretics  - consult infectious disease, can likely stop ceftriaxone at this point  - add SSRI  - for cardiac catheterization on Monday  - add CBC for tomorrow  - mobilize w PT/OT.   - full code, dvt ppx- anticoagulated w warfarin     Patient Active Problem List   Diagnosis Code    Acute on chronic diastolic CHF (congestive heart failure) ([facility]) I50.33    ALEJANDRINA (acute kidney injury) ([facility] N17.9    Acute exacerbation of CHF (congestive heart failure) ([facility]) I50.9    DM (diabetes mellitus) [facility] E11.9    Hypertension I10    Respiratory failure ([facility]) J96.90    Acute coronary syndrome ([facility]) I24.9    Obstructive sleep apnea, adult G47.33    Paroxysmal atrial fibrillation ([facility]) I48.0    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 ([facility]) N68.17, E11.65    Dyspnea due to congestive heart failure ([facility]) I50.9    Aspiration pneumonia ([facility]) J69.0    Hyperkalemia E87.5    Elevated troponin R79.89    COPD (chronic obstructive pulmonary disease) ([facility]) J44.9    CHF (congestive heart failure) ([facility]) I50.9    Insufficiency, respiratory, acute ([facility]) R06.89    Infiltrate noted on imaging study R93.8    COPD exacerbation ([facility] J44.1    Acute on chronic respiratory failure with hypoxia (HCC) J96.21    Chest pain R07.9    Anxiety F41.9    UTI (urinary tract infection) N39.0    Obesity hypoventilation syndrome (HCC) E66.2    Pulmonary hypertension, moderate to severe (HCC) I27.2               Subjective:    cc: \" Im nervous\"  Pt reports her dyspnea is improved compared to past few days, denies cough, sputum production or wheezing today. Tolerating medication regimen, oxygen  No bleeding noted  She is toelrating diet, mobilizing w PT  She does reoprt intermittent anxiety, worse at night      REVIEW OF SYSTEMS:  General: No fevers or chills. Cardiovascular: No chest pain or pressure. No palpitations. Pulmonary: + shortness of breath. Gastrointestinal: No nausea, vomiting. Objective:        Vital signs/Intake and Output:  Visit Vitals    /83    Pulse 77    Temp 98.3 °F (36.8 °C)    Resp 18    Ht 5' 4.96\" (1.65 m)    Wt 107.6 kg (237 lb 4.8 oz)    SpO2 98%    Breastfeeding No    BMI 39.54 kg/m2     Current Shift:  03/18 0701 - 03/18 1900  In: 720 [P.O.:720]  Out: -   Last three shifts:  03/16 1901 - 03/18 0700  In: 3250 [P.O.:2250; I.V.:1000]  Out: 1500 [Urine:1500]    Body mass index is 39.54 kg/(m^2).     Physical Exam:  GEN: Alert and oriented times three NAD, obese  EYES: conjunctiva normal, lids with out lesions  HEENT: MMM, No thyromegaly, no lymphadenopathy  HEART:  +S1 +S2, no JVD, pulses 2+ distally  LUNGS: CTA B/L no wheezes, rales or rhonchi  ABDOMEN: + BS, soft NT/ND no organomegaly,  No rebound  EXTREMITIES: + edema - cyanosis, cap refill normal   SKIN: no rashes or skin breakdown, no nodules, normal turgor  Current Facility-Administered Medications   Medication Dose Route Frequency    methylPREDNISolone (PF) (SOLU-MEDROL) injection 60 mg  60 mg IntraVENous Q24H    furosemide (LASIX) injection 20 mg  20 mg IntraVENous BID    traZODone (DESYREL) tablet 50 mg  50 mg Oral QHS    QUEtiapine (SEROquel) tablet 25 mg  25 mg Oral QHS    Vancomycin - Pharmacy to Dose  1 Each Other Rx Dosing/Monitoring    vancomycin (VANCOCIN) 1,750 mg in 0.9% sodium chloride 500 mL IVPB  1,750 mg IntraVENous Q24H    calcium carbonate (TUMS) chewable tablet 200 mg [elemental]  200 mg Oral QID WITH MEALS    potassium chloride (K-DUR, KLOR-CON) SR tablet 20 mEq  20 mEq Oral DAILY    lidocaine (LIDODERM) 5 % patch 1 Patch  1 Patch TransDERmal Q24H    insulin lispro (HUMALOG) injection 5 Units  5 Units SubCUTAneous TIDAC    HYDROcodone-acetaminophen (NORCO) 5-325 mg per tablet 1 Tab  1 Tab Oral Q6H PRN    insulin glargine (LANTUS) injection 20 Units  20 Units SubCUTAneous QHS    digoxin (LANOXIN) tablet 0.125 mg  0.125 mg Oral DAILY    carvedilol (COREG) tablet 25 mg  25 mg Oral BID WITH MEALS    dilTIAZem (CARDIZEM) IR tablet 30 mg  30 mg Oral TIDAC    ELECTROLYTE REPLACEMENT PROTOCOL  1 Each Other PRN    ELECTROLYTE REPLACEMENT PROTOCOL  1 Each Other PRN    pantoprazole (PROTONIX) tablet 40 mg  40 mg Oral ACB    polyvinyl alcohol (LIQUIFILM TEARS) 1.4 % ophthalmic solution 1 Drop  1 Drop Both Eyes PRN    nystatin (MYCOSTATIN) 100,000 unit/gram powder   Topical BID    docusate sodium (COLACE) capsule 100 mg  100 mg Oral BID    simvastatin (ZOCOR) tablet 10 mg  10 mg Oral QHS    aspirin chewable tablet 81 mg  81 mg Oral DAILY    arformoterol (BROVANA) neb solution 15 mcg  15 mcg Nebulization BID RT    budesonide (PULMICORT) 500 mcg/2 ml nebulizer suspension  500 mcg Nebulization BID RT    albuterol-ipratropium (DUO-NEB) 2.5 MG-0.5 MG/3 ML  3 mL Nebulization Q4H PRN    ondansetron (ZOFRAN) injection 4 mg  4 mg IntraVENous Q6H PRN    acetaminophen (TYLENOL) tablet 650 mg  650 mg Oral Q4H PRN    insulin lispro (HUMALOG) injection   SubCUTAneous AC&HS    glucose chewable tablet 16 g  4 Tab Oral PRN    glucagon (GLUCAGEN) injection 1 mg  1 mg IntraMUSCular PRN    dextrose (D50W) injection syrg 12.5-25 g  25-50 mL IntraVENous PRN    warfarin pharmacy to dose   Other PRN         All the patient's labs over the past 24 hours were reviewed both during my initial daily workflow process and at the time notated as \"note time\" in [address] [facility]. (It is not time stamped separately in this workflow.)  Select labs are listed below.         Labs: Results:       Chemistry Recent Labs      03/18/17   0655  03/17/17   0331  03/16/17   0545  03/15/17   0950   GLU  147*  133*   --   200*   NA  138  145   --   140   K  5.0  5.3  5.0  5.1   CL  104  105   --   104   CO2  32  33*   --   32   BUN  30*  21*   --   16   CREA  1.10  1.01   --   1.10   CA  9.0  8.8   --   8.7   AGAP  2*  7   --   4   BUCR  27*  21*   --   15              Coagulation Recent Labs      03/18/17 0655  03/17/17   0331   PTP  22.6*  21.3*   INR  2.1*  1.9*                   Thyroid Studies Lab Results   Component Value Date/Time    TSH 0.31 01/01/2016 10:20 PM        Procedures/imaging: see electronic medical records for all procedures/Xrays and details which were not copied into this note but were reviewed prior to creation of Plan    Total time spent: 45 minutes > 50% in care coordination        Jean Daugherty, DO  Internal Medicine/Geriatrics

## 2017-03-18 NOTE — PROGRESS NOTES
Problem: Mobility Impaired (Adult and Pediatric)  Goal: *Acute Goals and Plan of Care (Insert Text)  Physical Therapy Goals  Initiated 2/27/2017 and to be accomplished within 7 day(s)  1. Patient will move from supine to sit and sit to supine , scoot up and down and roll side to side in bed with supervision/set-up. 2. Patient will transfer from bed to chair and chair to bed with minimal assistance/contact guard assist using the least restrictive device. 3. Patient will perform sit to stand with minimal assistance/contact guard assist.  4. Patient will ambulate with minimal assistance/contact guard assist for 50 feet with the least restrictive device. Re-evaluation on 3/6/2017 and to be accomplished within 7 day(s)  1. Patient will move from supine to sit and sit to supine, scoot up and down and roll side to side in bed with supervision/set-up. 2. Patient will transfer from bed to chair and chair to bed with contact guard assist/supervision using the least restrictive device. 3. Patient will perform sit to stand with contact guard assist/supervision. 4. Patient will ambulate with contact guard assist/supervision for 100 feet with the least restrictive device. Re-evaluation on 3/14/2017. Goals to be accomplished within 7 days. 1. Patient will move from supine to sit and sit to supine, scoot up and down and roll side to side in bed with supervision/set-up. 2. Patient will transfer from bed to chair and chair to bed with contact guard assist/supervision using the least restrictive device. 3. Patient will perform sit to stand with contact guard assist/supervision. 4. Patient will ambulate with contact guard assist/supervision for 50 feet with the least restrictive device. 5. Patient will be independent with HEP to maximize strength and muscular endurance.    Outcome: Not Progressing Towards Goal  Pt refused PT due to:  [X]  Nausea/upset stomach  [ ]  Eating  [ ]  Pain  [ ]  Pt lethargic  [ ]  Off Unit  RN present  Will f/u later as schedule allows. Thank you.   Keyla Henao, PTA

## 2017-03-18 NOTE — PROGRESS NOTES
1920 Pt received from offgoing nurse without any signs or symptoms of distress. Pt vitals are stable and within normal limits. Pt bed in low position with wheels locked and call bell within reach. 1934 Assessment completed and documented in flow sheet. Pt denies any further needs at this time. Pt in NAD with bed in low position, wheels locked and call bell within reach. Assisted to [facility].  2203 Scheduled medications administered as ordered. 0630 Shift summary: Patient spent uneventful shift. No issues noted. See flow sheet and MAR.  0715 Bedside and Verbal shift change report given to Chintan Gould RN (oncoming nurse) by David Lance RN (offgoing nurse). Report given with SBAR, Intake/Output, MAR and Recent Results.

## 2017-03-18 NOTE — ROUTINE PROCESS
Bedside and Verbal shift change report given to EDITH Sunshine (oncoming nurse) by Toñito Whipple RN (offgoing nurse). Report included the following information SBAR, Kardex, Intake/Output and MAR.

## 2017-03-18 NOTE — PROGRESS NOTES
Cardiology Progress Note        Patient: Claudetta Cruel        Sex: female          DOA: 2/25/2017  YOB: 1945      Age:  66 y.o.        LOS:  LOS: 20 days   Assessment/Plan     Patient Active Problem List   Diagnosis Code    Acute on chronic diastolic CHF (congestive heart failure) ([facility]) I50.33    ALEJANDRINA (acute kidney injury) ([facility] N17.9    Acute exacerbation of CHF (congestive heart failure) ([facility]) I50.9    DM (diabetes mellitus) ([facility] E11.9    Hypertension I10    Respiratory failure ([facility]) J96.90    Acute coronary syndrome ([facility]) I24.9    Obstructive sleep apnea, adult G47.33    Paroxysmal atrial fibrillation ([facility]) I48.0    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 ([facility]) K93.85, E11.65    Dyspnea due to congestive heart failure ([facility]) I50.9    Aspiration pneumonia ([facility]) J69.0    Hyperkalemia E87.5    Elevated troponin R79.89    COPD (chronic obstructive pulmonary disease) ([facility]) J44.9    CHF (congestive heart failure) ([facility]) I50.9    Insufficiency, respiratory, acute ([facility]) R06.89    Infiltrate noted on imaging study R93.8    COPD exacerbation ([facility]) J44.1    Acute on chronic respiratory failure with hypoxia ([facility]) J96.21    Chest pain R07.9    Anxiety F41.9    UTI (urinary tract infection) N39.0    Obesity hypoventilation syndrome ([facility]) E66.2    Pulmonary hypertension, moderate to severe ([facility]) I27.2      Acute hypoxic respiratory failure  Permanent atrial fibrillation on coumadin   Moderate Aortic stenosis      Plan:     CT chest revealed ground glass appearance. Started on Steroid  Continue management as per hospital medicine and pulmonary critical care. Will consider RHC if clinically indicated. Subjective:    cc:   I am tired. Denies any shortness of breath     REVIEW OF SYSTEMS:     General: No fevers or chills.   Cardiovascular: No chest pain,No palpitations, No orthopnea, No PND, No leg swelling, No claudication  Pulmonary: No shortness of breath. Gastrointestinal: No nausea, vomiting, bleeding  Neurology: No Dizziness    Objective:      Visit Vitals    /65 (BP 1 Location: Left arm, BP Patient Position: At rest)    Pulse 82    Temp 98.5 °F (36.9 °C)    Resp 18    Ht 5' 4.96\" (1.65 m)    Wt 107.6 kg (237 lb 4.8 oz)    SpO2 99%    Breastfeeding No    BMI 39.54 kg/m2     Body mass index is 39.54 kg/(m^2). Physical Exam:  General Appearance: Comfortable, not using accessory muscles of respiration. HEENT: SIRIA. HEAD: Atraumatic  NECK: No JVD, no thyroidomeglay. CAROTIDS: No bruit  LUNGS: Clear bilaterally. HEART: S1+S2 audible, irregularly irregular, 2/6 systolic murmur in aortic area, no pericardial rub. ABD: Non-tender, BS Audible    EXT: trace leg edema, and no cyanosis. VASCULAR EXAM: Pulses are intact. PSYCHIATRIC EXAM: Mood is appropriate. MUSCULOSKELETAL: Grossly no joint deformity.   NEUROLOGICAL: AAO times 3, No motor and sensory deficit  Medication:  Current Facility-Administered Medications   Medication Dose Route Frequency    methylPREDNISolone (PF) (SOLU-MEDROL) injection 60 mg  60 mg IntraVENous Q24H    furosemide (LASIX) injection 20 mg  20 mg IntraVENous BID    traZODone (DESYREL) tablet 50 mg  50 mg Oral QHS    QUEtiapine (SEROquel) tablet 25 mg  25 mg Oral QHS    Vancomycin - Pharmacy to Dose  1 Each Other Rx Dosing/Monitoring    vancomycin (VANCOCIN) 1,750 mg in 0.9% sodium chloride 500 mL IVPB  1,750 mg IntraVENous Q24H    calcium carbonate (TUMS) chewable tablet 200 mg [elemental]  200 mg Oral QID WITH MEALS    potassium chloride (K-DUR, KLOR-CON) SR tablet 20 mEq  20 mEq Oral DAILY    lidocaine (LIDODERM) 5 % patch 1 Patch  1 Patch TransDERmal Q24H    insulin lispro (HUMALOG) injection 5 Units  5 Units SubCUTAneous TIDAC    HYDROcodone-acetaminophen (NORCO) 5-325 mg per tablet 1 Tab  1 Tab Oral Q6H PRN    insulin glargine (LANTUS) injection 20 Units  20 Units SubCUTAneous QHS  digoxin (LANOXIN) tablet 0.125 mg  0.125 mg Oral DAILY    carvedilol (COREG) tablet 25 mg  25 mg Oral BID WITH MEALS    dilTIAZem (CARDIZEM) IR tablet 30 mg  30 mg Oral TIDAC    ELECTROLYTE REPLACEMENT PROTOCOL  1 Each Other PRN    ELECTROLYTE REPLACEMENT PROTOCOL  1 Each Other PRN    pantoprazole (PROTONIX) tablet 40 mg  40 mg Oral ACB    polyvinyl alcohol (LIQUIFILM TEARS) 1.4 % ophthalmic solution 1 Drop  1 Drop Both Eyes PRN    nystatin (MYCOSTATIN) 100,000 unit/gram powder   Topical BID    docusate sodium (COLACE) capsule 100 mg  100 mg Oral BID    simvastatin (ZOCOR) tablet 10 mg  10 mg Oral QHS    aspirin chewable tablet 81 mg  81 mg Oral DAILY    arformoterol (BROVANA) neb solution 15 mcg  15 mcg Nebulization BID RT    budesonide (PULMICORT) 500 mcg/2 ml nebulizer suspension  500 mcg Nebulization BID RT    albuterol-ipratropium (DUO-NEB) 2.5 MG-0.5 MG/3 ML  3 mL Nebulization Q4H PRN    ondansetron (ZOFRAN) injection 4 mg  4 mg IntraVENous Q6H PRN    acetaminophen (TYLENOL) tablet 650 mg  650 mg Oral Q4H PRN    insulin lispro (HUMALOG) injection   SubCUTAneous AC&HS    glucose chewable tablet 16 g  4 Tab Oral PRN    glucagon (GLUCAGEN) injection 1 mg  1 mg IntraMUSCular PRN    dextrose (D50W) injection syrg 12.5-25 g  25-50 mL IntraVENous PRN    warfarin pharmacy to dose   Other PRN               Lab/Data Reviewed:       No results for input(s): WBC, HGB, HCT, PLT, HGBEXT, HCTEXT, PLTEXT in the last 72 hours.   Recent Labs      03/18/17   0655  03/17/17   0331  03/16/17   0545   NA  138  145   --    K  5.0  5.3  5.0   CL  104  105   --    CO2  32  33*   --    GLU  147*  133*   --    BUN  30*  21*   --    CREA  1.10  1.01   --    CA  9.0  8.8   --        Signed By: Brandon Nunez MD     March 18, 2017

## 2017-03-18 NOTE — ROUTINE PROCESS
Bedside and Verbal shift change report given to Otoniel Reyna RN (oncoming nurse) by Maricruz Armendariz RN  (offgoing nurse). Report included the following information SBAR, Kardex and Cardiac Rhythm Afib.

## 2017-03-18 NOTE — ROUTINE PROCESS
Bedside and Verbal shift change report given to BENJI Hugo RN (oncoming nurse) by Candice Helton RN  (offgoing nurse). Report included the following information SBAR, Kardex and Cardiac Rhythm Afib.

## 2017-03-18 NOTE — PROGRESS NOTES
Pharmacy Dosing Services: Warfarin      Consult for Warfarin Dosing by Pharmacy by Dr. Veneta Galeazzi provided for this 66 y.o. female , for indication of Atrial Fibrillation. Dose to achieve an INR goal of 2-3     Order entered for Warfarin 2.5 mg to be given today at 18:00. Significant drug interactions: Aspirin  LABS    PT/INR Lab Results   Component Value Date/Time    INR 2.1 03/18/2017 06:55 AM      Platelets Lab Results   Component Value Date/Time    PLATELET 975 29/88/4651 04:28 AM      H/H     Lab Results   Component Value Date/Time    HGB 10.3 03/12/2017 04:28 AM        Warfarin Administrations (last 168 hours)       Date/Time Action Medication Dose      03/17/17 1838 Given    warfarin (COUMADIN) tablet 2.5 mg 2.5 mg    03/16/17 1840 Given    warfarin (COUMADIN) tablet 2.5 mg 2.5 mg    03/15/17 1722 Given    warfarin (COUMADIN) tablet 2 mg 2 mg    03/14/17 1843 Given    warfarin (COUMADIN) tablet 2 mg 2 mg    03/12/17 1730 Given    warfarin (COUMADIN) tablet 1 mg 1 mg    03/11/17 1813 Given    warfarin (COUMADIN) tablet 4 mg 4 mg              Pharmacy to follow daily and will provide subsequent Warfarin dosing based on clinical status.   Valentina Harris [facility])  Contact information

## 2017-03-19 LAB
ALBUMIN SERPL BCP-MCNC: 3.6 G/DL (ref 3.4–5)
ALBUMIN/GLOB SERPL: 1 {RATIO} (ref 0.8–1.7)
ALP SERPL-CCNC: 95 U/L (ref 45–117)
ALT SERPL-CCNC: 23 U/L (ref 13–56)
ANION GAP BLD CALC-SCNC: 5 MMOL/L (ref 3–18)
AST SERPL W P-5'-P-CCNC: 13 U/L (ref 15–37)
BASOPHILS # BLD AUTO: 0 K/UL (ref 0–0.1)
BASOPHILS # BLD: 0 % (ref 0–3)
BILIRUB DIRECT SERPL-MCNC: 0.2 MG/DL (ref 0–0.2)
BILIRUB SERPL-MCNC: 0.8 MG/DL (ref 0.2–1)
BUN SERPL-MCNC: 30 MG/DL (ref 7–18)
BUN/CREAT SERPL: 24 (ref 12–20)
CALCIUM SERPL-MCNC: 9 MG/DL (ref 8.5–10.1)
CHLORIDE SERPL-SCNC: 104 MMOL/L (ref 100–108)
CO2 SERPL-SCNC: 32 MMOL/L (ref 21–32)
CREAT SERPL-MCNC: 1.24 MG/DL (ref 0.6–1.3)
DIFFERENTIAL METHOD BLD: ABNORMAL
EOSINOPHIL # BLD: 0 K/UL (ref 0–0.4)
EOSINOPHIL NFR BLD: 0 % (ref 0–5)
ERYTHROCYTE [DISTWIDTH] IN BLOOD BY AUTOMATED COUNT: 17.6 % (ref 11.6–14.5)
GLOBULIN SER CALC-MCNC: 3.6 G/DL (ref 2–4)
GLUCOSE BLD STRIP.AUTO-MCNC: 143 MG/DL (ref 70–110)
GLUCOSE BLD STRIP.AUTO-MCNC: 187 MG/DL (ref 70–110)
GLUCOSE BLD STRIP.AUTO-MCNC: 202 MG/DL (ref 70–110)
GLUCOSE BLD STRIP.AUTO-MCNC: 345 MG/DL (ref 70–110)
GLUCOSE SERPL-MCNC: 233 MG/DL (ref 74–99)
HCT VFR BLD AUTO: 36.3 % (ref 35–45)
HGB BLD-MCNC: 11.3 G/DL (ref 12–16)
INR PPP: 1.9 (ref 0.8–1.2)
LYMPHOCYTES # BLD AUTO: 6 % (ref 20–51)
LYMPHOCYTES # BLD: 0.6 K/UL (ref 0.8–3.5)
MAGNESIUM SERPL-MCNC: 2 MG/DL (ref 1.8–2.4)
MCH RBC QN AUTO: 31.4 PG (ref 24–34)
MCHC RBC AUTO-ENTMCNC: 31.1 G/DL (ref 31–37)
MCV RBC AUTO: 100.8 FL (ref 74–97)
MONOCYTES # BLD: 0.5 K/UL (ref 0–1)
MONOCYTES NFR BLD AUTO: 5 % (ref 2–9)
NEUTS BAND NFR BLD MANUAL: 1 % (ref 0–5)
NEUTS SEG # BLD: 9.4 K/UL (ref 1.8–8)
NEUTS SEG NFR BLD AUTO: 88 % (ref 42–75)
PLATELET # BLD AUTO: 229 K/UL (ref 135–420)
PLATELET COMMENTS,PCOM: ABNORMAL
PMV BLD AUTO: 11.3 FL (ref 9.2–11.8)
POTASSIUM SERPL-SCNC: 5.3 MMOL/L (ref 3.5–5.5)
PROT SERPL-MCNC: 7.2 G/DL (ref 6.4–8.2)
PROTHROMBIN TIME: 20.7 SEC (ref 11.5–15.2)
RBC # BLD AUTO: 3.6 M/UL (ref 4.2–5.3)
RBC MORPH BLD: ABNORMAL
SODIUM SERPL-SCNC: 141 MMOL/L (ref 136–145)
WBC # BLD AUTO: 10.7 K/UL (ref 4.6–13.2)

## 2017-03-19 PROCEDURE — 74011250637 HC RX REV CODE- 250/637: Performed by: INTERNAL MEDICINE

## 2017-03-19 PROCEDURE — 74011250636 HC RX REV CODE- 250/636: Performed by: INTERNAL MEDICINE

## 2017-03-19 PROCEDURE — 85610 PROTHROMBIN TIME: CPT | Performed by: INTERNAL MEDICINE

## 2017-03-19 PROCEDURE — 94660 CPAP INITIATION&MGMT: CPT

## 2017-03-19 PROCEDURE — 94640 AIRWAY INHALATION TREATMENT: CPT

## 2017-03-19 PROCEDURE — 94760 N-INVAS EAR/PLS OXIMETRY 1: CPT

## 2017-03-19 PROCEDURE — 74011250637 HC RX REV CODE- 250/637: Performed by: HOSPITALIST

## 2017-03-19 PROCEDURE — 65660000000 HC RM CCU STEPDOWN

## 2017-03-19 PROCEDURE — 74011000250 HC RX REV CODE- 250: Performed by: INTERNAL MEDICINE

## 2017-03-19 PROCEDURE — 80076 HEPATIC FUNCTION PANEL: CPT | Performed by: INTERNAL MEDICINE

## 2017-03-19 PROCEDURE — 83735 ASSAY OF MAGNESIUM: CPT | Performed by: INTERNAL MEDICINE

## 2017-03-19 PROCEDURE — 74011250636 HC RX REV CODE- 250/636: Performed by: HOSPITALIST

## 2017-03-19 PROCEDURE — 36415 COLL VENOUS BLD VENIPUNCTURE: CPT | Performed by: INTERNAL MEDICINE

## 2017-03-19 PROCEDURE — 77010033678 HC OXYGEN DAILY

## 2017-03-19 PROCEDURE — 80048 BASIC METABOLIC PNL TOTAL CA: CPT | Performed by: INTERNAL MEDICINE

## 2017-03-19 PROCEDURE — 85025 COMPLETE CBC W/AUTO DIFF WBC: CPT | Performed by: INTERNAL MEDICINE

## 2017-03-19 PROCEDURE — 82962 GLUCOSE BLOOD TEST: CPT

## 2017-03-19 RX ORDER — WARFARIN 3 MG/1
3 TABLET ORAL ONCE
Status: COMPLETED | OUTPATIENT
Start: 2017-03-19 | End: 2017-03-19

## 2017-03-19 RX ADMIN — INSULIN LISPRO 5 UNITS: 100 INJECTION, SOLUTION INTRAVENOUS; SUBCUTANEOUS at 17:59

## 2017-03-19 RX ADMIN — PANTOPRAZOLE SODIUM 40 MG: 40 TABLET, DELAYED RELEASE ORAL at 07:10

## 2017-03-19 RX ADMIN — INSULIN LISPRO 2 UNITS: 100 INJECTION, SOLUTION INTRAVENOUS; SUBCUTANEOUS at 11:54

## 2017-03-19 RX ADMIN — ANTACID TABLETS 200 MG: 500 TABLET, CHEWABLE ORAL at 11:53

## 2017-03-19 RX ADMIN — ANTACID TABLETS 200 MG: 500 TABLET, CHEWABLE ORAL at 09:01

## 2017-03-19 RX ADMIN — CARVEDILOL 25 MG: 12.5 TABLET, FILM COATED ORAL at 17:57

## 2017-03-19 RX ADMIN — INSULIN LISPRO 5 UNITS: 100 INJECTION, SOLUTION INTRAVENOUS; SUBCUTANEOUS at 11:54

## 2017-03-19 RX ADMIN — SIMVASTATIN 10 MG: 20 TABLET, FILM COATED ORAL at 21:25

## 2017-03-19 RX ADMIN — INSULIN LISPRO 7 UNITS: 100 INJECTION, SOLUTION INTRAVENOUS; SUBCUTANEOUS at 17:59

## 2017-03-19 RX ADMIN — TRAZODONE HYDROCHLORIDE 50 MG: 50 TABLET ORAL at 21:25

## 2017-03-19 RX ADMIN — NYSTATIN: 100000 POWDER TOPICAL at 21:26

## 2017-03-19 RX ADMIN — METHYLPREDNISOLONE SODIUM SUCCINATE 60 MG: 125 INJECTION, POWDER, FOR SOLUTION INTRAMUSCULAR; INTRAVENOUS at 09:02

## 2017-03-19 RX ADMIN — FUROSEMIDE 20 MG: 10 INJECTION, SOLUTION INTRAMUSCULAR; INTRAVENOUS at 19:10

## 2017-03-19 RX ADMIN — ONDANSETRON HYDROCHLORIDE 4 MG: 2 INJECTION INTRAMUSCULAR; INTRAVENOUS at 05:21

## 2017-03-19 RX ADMIN — DIGOXIN 0.12 MG: 0.12 TABLET ORAL at 09:00

## 2017-03-19 RX ADMIN — BUDESONIDE 500 MCG: 0.5 INHALANT RESPIRATORY (INHALATION) at 19:36

## 2017-03-19 RX ADMIN — INSULIN LISPRO 5 UNITS: 100 INJECTION, SOLUTION INTRAVENOUS; SUBCUTANEOUS at 09:02

## 2017-03-19 RX ADMIN — DOCUSATE SODIUM 100 MG: 100 CAPSULE, LIQUID FILLED ORAL at 21:25

## 2017-03-19 RX ADMIN — INSULIN LISPRO 14 UNITS: 100 INJECTION, SOLUTION INTRAVENOUS; SUBCUTANEOUS at 21:24

## 2017-03-19 RX ADMIN — QUETIAPINE FUMARATE 25 MG: 25 TABLET, FILM COATED ORAL at 21:25

## 2017-03-19 RX ADMIN — ARFORMOTEROL TARTRATE 15 MCG: 15 SOLUTION RESPIRATORY (INHALATION) at 19:36

## 2017-03-19 RX ADMIN — FUROSEMIDE 20 MG: 10 INJECTION, SOLUTION INTRAMUSCULAR; INTRAVENOUS at 09:02

## 2017-03-19 RX ADMIN — HYDROCODONE BITARTRATE AND ACETAMINOPHEN 1 TABLET: 5; 325 TABLET ORAL at 21:24

## 2017-03-19 RX ADMIN — ARFORMOTEROL TARTRATE 15 MCG: 15 SOLUTION RESPIRATORY (INHALATION) at 07:12

## 2017-03-19 RX ADMIN — ANTACID TABLETS 200 MG: 500 TABLET, CHEWABLE ORAL at 21:25

## 2017-03-19 RX ADMIN — INSULIN GLARGINE 20 UNITS: 100 INJECTION, SOLUTION SUBCUTANEOUS at 21:25

## 2017-03-19 RX ADMIN — ONDANSETRON HYDROCHLORIDE 4 MG: 2 INJECTION INTRAMUSCULAR; INTRAVENOUS at 12:58

## 2017-03-19 RX ADMIN — VANCOMYCIN HYDROCHLORIDE 1750 MG: 10 INJECTION, POWDER, LYOPHILIZED, FOR SOLUTION INTRAVENOUS at 23:43

## 2017-03-19 RX ADMIN — ANTACID TABLETS 200 MG: 500 TABLET, CHEWABLE ORAL at 17:57

## 2017-03-19 RX ADMIN — NYSTATIN: 100000 POWDER TOPICAL at 09:03

## 2017-03-19 RX ADMIN — CARVEDILOL 25 MG: 12.5 TABLET, FILM COATED ORAL at 09:01

## 2017-03-19 RX ADMIN — DILTIAZEM HYDROCHLORIDE 30 MG: 30 TABLET, FILM COATED ORAL at 17:57

## 2017-03-19 RX ADMIN — ASPIRIN 81 MG CHEWABLE TABLET 81 MG: 81 TABLET CHEWABLE at 09:09

## 2017-03-19 RX ADMIN — WARFARIN SODIUM 3 MG: 3 TABLET ORAL at 17:57

## 2017-03-19 RX ADMIN — BUDESONIDE 500 MCG: 0.5 INHALANT RESPIRATORY (INHALATION) at 07:12

## 2017-03-19 NOTE — ROUTINE PROCESS
Cardiac Cath Lab:  Pre Procedure Chart Check    Patients chart was accessed and reviewed for possible and/or scheduled procedure. Creatinine Clearance:  CREATININE: 1.24 MG/DL (03/19/17 1032)  Estimated creatinine clearance: 49.8 mL/min    Total Contrast  Load:  3 x estimated clearance amount=  149.4     ml    75% of Contrast Load:  0.75 x Total Contrast Load=  112.0      ml    Recent Labs      03/19/17   1032   WBC  10.7   RBC  3.60*   HCT  36.3   HGB  11.3*   PLT  229   INR  1.9*   PTP  20.7*   NA  141   K  5.3   BUN  30*   CREA  1.24   GFRAA  52*   GFRNA  43*   CA  9.0       BMI: Body mass index is 39.32 kg/(m^2).     ALLERGIES:   Allergies   Allergen Reactions    Latex Hives    Adhesive Tape-Silicones Unknown (comments)    Bees [Sting, Bee] Unknown (comments)    Garlic Nausea and Vomiting    Zoloft [Sertraline] Hives       Lines:        Peripheral IV 03/18/17 Left Antecubital (Active)   Site Assessment Clean, dry, & intact 3/19/2017  8:15 AM   Phlebitis Assessment 0 3/19/2017  8:15 AM   Infiltration Assessment 0 3/19/2017  8:15 AM   Dressing Status Clean, dry, & intact 3/19/2017  8:15 AM   Dressing Type Tape;Transparent 3/19/2017  8:15 AM   Hub Color/Line Status Pink;Capped 3/19/2017  8:15 AM   Action Taken Open ports on tubing capped 3/18/2017 12:52 PM   Alcohol Cap Used Yes 3/19/2017  8:15 AM          History:    Past Medical History:   Diagnosis Date    A-fib ([address]     Arthritis     Back injury     Chronic obstructive pulmonary disease ([address]     Diabetes ([address]     Fibromyalgia     Heart failure ([address]     Hypertension     MRSA (methicillin resistant Staphylococcus aureus)     Obesity hypoventilation syndrome ([address] 2/28/2017    Pulmonary hypertension, moderate to severe ([address] 2/28/2017     Past Surgical History:   Procedure Laterality Date    CARDIAC SURG PROCEDURE UNLIST      HX CORONARY STENT PLACEMENT      HX KNEE REPLACEMENT      HX ORTHOPAEDIC      bilateral knee replacement Patient Active Problem List   Diagnosis Code    Acute on chronic diastolic CHF (congestive heart failure) ([facility]) I50.33    ALEJANDRINA (acute kidney injury) ([facility] N17.9    Acute exacerbation of CHF (congestive heart failure) ([facility]) I50.9    [facility]di[facility] mellitus[facility] E11.9    Hypertension I10    Respiratory failure ([facility]) J96.90    Acute coronary syndrome ([facility]) I24.9    Obstructive sleep apnea, adult G47.33    Paroxysmal atrial fibrillation ([facility]) I48.0    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 ([facility]) Q82.35, E11.65    Dyspnea due to congestive heart failure ([facility]) I50.9    Aspiration pneumonia ([facility]) J69.0    Hyperkalemia E87.5    Elevated troponin R79.89    COPD (chronic obstructive pulmonary disease) ([facility]) J44.9    CHF (congestive heart failure) ([facility]) I50.9    Insufficiency, respiratory, acute ([facility]) R06.89    Infiltrate noted on imaging study R93.8    COPD exacerbation ([facility]) J44.1    Acute on chronic respiratory failure with hypoxia ([facility]) J96.21    Chest pain R07.9    Anxiety F41.9    UTI (urinary tract infection) N39.0    Obesity hypoventilation syndrome ([facility]) E66.2    Pulmonary hypertension, moderate to severe ([facility]) I27.2

## 2017-03-19 NOTE — PROGRESS NOTES
Pharmacy Dosing Services: Warfarin      Consult for Warfarin Dosing by Pharmacy by Dr. Roya Florian provided for this 66 y.o. female , for indication of Atrial Fibrillation. Dose to achieve an INR goal of 2-3     Order entered for Warfarin 3 mg to be given today at 18:00. Significant drug interactions: Aspirin  LABS    PT/INR Lab Results   Component Value Date/Time    INR 1.9 03/19/2017 10:32 AM      Platelets Lab Results   Component Value Date/Time    PLATELET 459 72/38/9467 10:32 AM      H/H     Lab Results   Component Value Date/Time    HGB 11.3 03/19/2017 10:32 AM        Warfarin Administrations (last 168 hours)       Date/Time Action Medication Dose      03/18/17 1739 Given    warfarin (COUMADIN) tablet 2.5 mg 2.5 mg    03/17/17 1838 Given    warfarin (COUMADIN) tablet 2.5 mg 2.5 mg    03/16/17 1840 Given    warfarin (COUMADIN) tablet 2.5 mg 2.5 mg    03/15/17 1722 Given    warfarin (COUMADIN) tablet 2 mg 2 mg    03/14/17 1843 Given    warfarin (COUMADIN) tablet 2 mg 2 mg    03/12/17 1730 Given    warfarin (COUMADIN) tablet 1 mg 1 mg              Pharmacy to follow daily and will provide subsequent Warfarin dosing based on clinical status.   Allegra Walsh, [facility])  Contact information

## 2017-03-19 NOTE — PROGRESS NOTES
0745 Assumed care of patient, pt sitting up in bed awaiting breakfast. Nose bleeding. Pt states nose bleeds for past few days. Nausea subsiding at this time. Assessment completed at this time. Call bell within reach, safety maintained, will cont to monitor and assess. 0845 Pt assisted out of bed to bedside chair. Encourage to use incentive spirometer. Verbalizes understanding. 1000 Assisted out of bed to bedside commode   1030 Pt refusing physical therapy, asking physical therapist to come back later. Pt upset states I just don't feel up to it now. 1100 Pt unable to get out of chair to bedside commode with assistance of 2 and walker. Physical therapist on floor at this time, to assist with transfer. 1200 Pt requesting to go back to bed, states \"I'm tired and I just can not do anything more today. \"  1300 zofran given pt complaining of nausea. 1400 Dr Tisha Singh at bedside to discuss R heart cath tentatively scheduled for 03/20/2018 Pt to be NPO after midnight. All questions and concerns discussed with Dr Tisha Singh.  1500 Bedside and Verbal shift change report given to 1656 Cong Rincon (oncoming nurse) by Moe Yates (offgoing nurse). Report included the following information SBAR, Kardex, Procedure Summary, Intake/Output, MAR, Accordion, Recent Results, Med Rec Status, Cardiac Rhythm A fib and Alarm Parameters .  Pt on continuous pulse ox, Npo after midnight for possible cath on 03/20/2017

## 2017-03-19 NOTE — ROUTINE PROCESS
Bedside and Verbal shift change report given to SAHRAοτestefania Hernandez RN (oncoming nurse) by Magalis León RN (offgoing nurse). Report given with SBAR, Procedure Summary, Intake/Output, MAR and Recent Results.

## 2017-03-19 NOTE — ROUTINE PROCESS
Bedside shift change report given to [id number] Arlington Drew RN (oncoming nurse) by Karen Beach RN (offgoing nurse). Report included the following information SBAR, Kardex and MAR.

## 2017-03-19 NOTE — PROGRESS NOTES
1930:  Assumed care of patient. Patient resting in bed. Call bell and phone left in reach. [id number]:  PRN zofran given for c/o nausea. Shift summary:  Patient with uneventful shift. Patient left in no acute distress with call light and phone within reach.

## 2017-03-19 NOTE — CONSULTS
Infectious Disease Consult  Full Consult [id number]    Reason for consultation: MRSA bacteremia    Assessment:  1. MRSA bacteremia in a 67y/o WF admitted on 2/25 with respiratory failure  -patient with 1/2 blood cultures from 3/9 positive for MRSA - unclear why drawn  -suspected source is CRBSI from R IJ which has been removed  -repeat blood cultures from the 12th are NG  -TTE without evidence of vegetation  -patient has been maintained on vancomycin    2. E. Coli bacteriuria  -patient with E. Coli on urine culture from admission  -concomitant UA without pyuria but with + nitrites  -completed 7 day course of ceftriaxone on 3/7/17  -currently asymptomatic    3. Acute respiratory failure secondary to CHF, pulmonary HTN, IMANI    4. Multiple medical problems  Include a-fib, morbid obesity    Recommendations  1. Continue vancomycin  2. Anticipate a 28 day course of therapy from 1st negative blood culture (currently day 7/28)  3.  Will need PICC placement  4. reculture with any fevers

## 2017-03-19 NOTE — PROGRESS NOTES
Problem: Mobility Impaired (Adult and Pediatric)  Goal: *Acute Goals and Plan of Care (Insert Text)  Physical Therapy Goals  Initiated 2/27/2017 and to be accomplished within 7 day(s)  1. Patient will move from supine to sit and sit to supine , scoot up and down and roll side to side in bed with supervision/set-up. 2. Patient will transfer from bed to chair and chair to bed with minimal assistance/contact guard assist using the least restrictive device. 3. Patient will perform sit to stand with minimal assistance/contact guard assist.  4. Patient will ambulate with minimal assistance/contact guard assist for 50 feet with the least restrictive device. Re-evaluation on 3/6/2017 and to be accomplished within 7 day(s)  1. Patient will move from supine to sit and sit to supine, scoot up and down and roll side to side in bed with supervision/set-up. 2. Patient will transfer from bed to chair and chair to bed with contact guard assist/supervision using the least restrictive device. 3. Patient will perform sit to stand with contact guard assist/supervision. 4. Patient will ambulate with contact guard assist/supervision for 100 feet with the least restrictive device. Re-evaluation on 3/14/2017. Goals to be accomplished within 7 days. 1. Patient will move from supine to sit and sit to supine, scoot up and down and roll side to side in bed with supervision/set-up. 2. Patient will transfer from bed to chair and chair to bed with contact guard assist/supervision using the least restrictive device. 3. Patient will perform sit to stand with contact guard assist/supervision. 4. Patient will ambulate with contact guard assist/supervision for 50 feet with the least restrictive device. 5. Patient will be independent with HEP to maximize strength and muscular endurance.    Outcome: Not Progressing Towards Goal  Pt refused PT due to:  [ ]  Nausea/vomiting  [ ]  Eating  [ ]  Pain  [X]  Pt lethargic  [ ]  Off Unit  RN present  Will f/u later as schedule allows. Thank you.   Yakelin Owens, PTA

## 2017-03-19 NOTE — PROGRESS NOTES
Problem: Mobility Impaired (Adult and Pediatric)  Goal: *Acute Goals and Plan of Care (Insert Text)  Physical Therapy Goals  Initiated 2/27/2017 and to be accomplished within 7 day(s)  1. Patient will move from supine to sit and sit to supine , scoot up and down and roll side to side in bed with supervision/set-up. 2. Patient will transfer from bed to chair and chair to bed with minimal assistance/contact guard assist using the least restrictive device. 3. Patient will perform sit to stand with minimal assistance/contact guard assist.  4. Patient will ambulate with minimal assistance/contact guard assist for 50 feet with the least restrictive device. Re-evaluation on 3/6/2017 and to be accomplished within 7 day(s)  1. Patient will move from supine to sit and sit to supine, scoot up and down and roll side to side in bed with supervision/set-up. 2. Patient will transfer from bed to chair and chair to bed with contact guard assist/supervision using the least restrictive device. 3. Patient will perform sit to stand with contact guard assist/supervision. 4. Patient will ambulate with contact guard assist/supervision for 100 feet with the least restrictive device. Re-evaluation on 3/14/2017. Goals to be accomplished within 7 days. 1. Patient will move from supine to sit and sit to supine, scoot up and down and roll side to side in bed with supervision/set-up. 2. Patient will transfer from bed to chair and chair to bed with contact guard assist/supervision using the least restrictive device. 3. Patient will perform sit to stand with contact guard assist/supervision. 4. Patient will ambulate with contact guard assist/supervision for 50 feet with the least restrictive device. 5. Patient will be independent with HEP to maximize strength and muscular endurance.    Outcome: Not Progressing Towards Goal  Pt refused PT due to:  [X]  Nausea/vomiting  [ ]  Eating  [ ]  Pain  [ ]  Pt lethargic  [ ]  Off Unit  Will f/u tomorrow. Thank you.   Melva Gee, PTA

## 2017-03-19 NOTE — CONSULTS
[address] [place] [address] Rd    Name:  Simona George  MR#:  [id number]  :  1945  Account #:  [de-identified]  Date of Adm:  2017  Date of Consultation:  2017      INFECTIOUS DISEASE CONSULTATION    REFERRING PHYSICIAN: Lam Sultana DO.    REASON FOR CONSULTATION: MRSA bacteremia. ASSESSMENT:  1. Methicillin-resistant Staphylococcus aureus bacteremia in a 69-year-  old white female admitted on 2017 with respiratory failure. The  patient with 1 out of 2 blood cultures from 2017 positive for  methicillin-resistant Staph aureus, although it is unclear why these  were drawn. The suspected source is a catheter-related bloodstream  infection from the right internal jugular central venous catheter, which  has been removed. Repeat blood cultures from the 12th are currently  no growth. A transthoracic echocardiogram is without any evidence of  vegetation. She has been maintained on vancomycin with adequate  trough levels. 2. Escherichia coli bacteriuria. The patient with Escherichia coli on  urine culture from admission. A concomitant UA was without pyuria,  but with positive nitrites. She completed a 7-day course of ceftriaxone  on 2017 and is currently asymptomatic. 3. Acute respiratory failure secondary to congestive heart failure,  pulmonary hypertension and obstructive sleep apnea. 4. Multiple medical problems including atrial fibrillation and morbid  obesity. RECOMMENDATIONS:  1. Continue vancomycin. 2. Anticipate a 28-day course of therapy from first negative culture. It  is currently day 7 of 28.  3. The patient will need PICC line placement. 4. Prior culture with any fevers. Thank you for allowing us to participate in the care of your the patient. We will continue to follow with you.     HISTORY OF PRESENT ILLNESS: The patient is a very pleasant 66  year-old white female with multiple medical problems including atrial  fibrillation, morbid obesity, diastolic congestive heart failure, pulmonary  hypertension and obstructive sleep apnea, who was admitted on  02/25/2017 with shortness of breath. The patient had recently been  admitted and discharged from the hospital for the same. She had been  discharged to a SNF facility where she had insidious decline since her  transfer. So, the patient was admitted. She was started on BiPAP. She  was seen by Cardiology, as well as Pulmonary who have  been adjusting her medications and treatment. On 03/09/2017 the  patient had blood cultures obtained for an unclear reason. These  returned positive for methicillin-resistant Staph aureus in 1 out of 2  sets. Given this her central venous catheter on the right IJ was  removed and she underwent a transthoracic echocardiogram on the  15th. This was not a great quality study; however, did not show any  obvious evidence of vegetation. Repeat blood cultures from the 12th  x2 sets have remained no growth to date. The patient has been  maintained on vancomycin therapy with an adequate trough level  of 18.7. She has remained afebrile. Currently, the patient states she is doing okay. She does appear to  have significant anxiety over her respiratory issues. She states that her  biggest issue continues to be her pulmonary status, which is waxing  and waning. She does complain of some nausea without any emesis. She denies any diarrhea. She denies any dysuria or increased urinary  frequency. She does again complain of shortness of breath, but denies  any cough or sputum production. She denies any skin rashes. She  does complain of swelling of her bilateral lower extremities. PAST MEDICAL HISTORY: Significant for:  1. Atrial fibrillation. 2. Arthritis. 3. Chronic obstructive pulmonary disease. 4. Diabetes. 5. Fibromyalgia. 6. Congestive heart failure. 7. Hypertension. PAST SURGICAL HISTORY:  1. Coronary stent placement.   2. Knee replacements bilaterally. FAMILY HISTORY: There is no pertinent family history. SOCIAL HISTORY: The patient is . She denies any tobacco  use. Denies any alcohol use. Denies any illicit drug use. ALLERGIES: INCLUDE:  1. LATEX. 2. ADHESIVE TAPE. 3. BEE STINGS. 4. GARLIC. 5. ZOLOFT. CURRENT MEDICATIONS:  1. Venita Barge. 2. Aspirin. 3. Pulmicort. 4. Tums. 5. Coreg. 6. Digoxin. 7. Diltiazem. 8. Colace. 9. Lasix. 10. Lantus. 11. Humalog. 12. Lidoderm patch. 13. Solu-Medrol. 14. Nystatin. 15. Protonix. 16. Potassium chloride. 17. Seroquel. 18. Zocor. 19. Trazodone. 20. Vancomycin currently at 1.75 grams IV every 24 hours. REVIEW OF SYSTEMS  A 12-point review of systems was obtained and negative except as in  the HPI. PHYSICAL EXAMINATION  VITAL SIGNS: Show a temperature of 97.9 with a T-max of 98.5, pulse  of 58, respirations of 20, blood pressure of 157/80, saturating at 95%  on 3 L per minute. GENERAL: She is a well-developed, morbidly obese white female. She  is awake, alert and oriented x3, in no acute distress. HEENT: Her extraocular muscles were intact. Pupils equally round,  reactive to light. Normocephalic, atraumatic. Mucous membranes are  moist. Sclerae nonicteric. Oropharynx was clear, without thrush. NECK: Supple, without any lymphadenopathy. HEART: Regular rate and rhythm with a soft systolic ejection murmur. LUNGS:  Clear to auscultation bilaterally. I did not appreciate any  rhonchi, rales, or wheezes. ABDOMEN: Soft, obese, nontender, nondistended. I did not appreciate  any hepatosplenomegaly. She did have normoactive bowel sounds. EXTREMITIES: Showed trace edema bilaterally. She did have multiple  small excoriations on her bilateral legs that she states was secondary  to edema. SKIN: Without any other rashes. NEUROLOGIC: Cranial nerves 2 through 12 are grossly intact. PSYCHIATRIC: She had a somewhat anxious mood, but intact recent  and remote memory.     ANCILLARY DATA: White blood cell count of 9.5, hemoglobin of 10.3,  hematocrit of 32.4, platelets of 704. Sodium of 138, potassium 5,  chloride of 104, CO2 of 32, BUN of 30, creatinine of 1.10 with a  glucose of 147. Her urinalysis most recently showed 0-3 white blood  cells with trace leukocyte esterase, negative nitrites, that was on  03/09/2017. Vancomycin trough was 18.7 on the 16th. Culture data 02/25/2017 urine with Escherichia coli x2 different  morphotypes, 03/09/2017 urine was 15,000 mixed christa. Blood x2 with  MRSA in 1 of 2. 03/12/2017 blood x2 no growth to date. RADIOLOGY DATA: A CT of the chest performed on the 16th showed  asymmetric areas of geographic ground-glass density right greater  than left which can be seen in interstitial lung disease. There is also  asymmetric areas of subpleural intralobular septal thickening in the  peripheral right upper lung which can be a manifestation of interstitial  lung disease. She had asymmetric ground-glass density nodules in the  left upper lobe. Findings suspicious for a pleural-based 14 mm nodule  posterior medial right upper lobe, small to moderate right pleural  effusion, aneurysmal dilatation of the proximal descending aorta and  abnormal enlargement of the main and right and left pulmonary  arteries consistent with pulmonary hypertension. A chest x-ray showed pulmonary vascular congestion, possibly with  mild edema, similar to recent. Her echocardiogram again was  performed on the 15th. It did not show any obvious vegetation,  although was not an adequate study to completely exclude the  possibility.         DO ABAD Hoover  D:  03/19/2017   08:03  T:  03/19/2017   08:39  Job #:  [id number]

## 2017-03-19 NOTE — PROGRESS NOTES
Hospitalist Progress Note    Patient: Cornel Pelletier MRN: [id number]  CSN: [id number]    YOB: 1945  Age: 66 y.o. Sex: female    DOA: 2/25/2017 LOS:  LOS: 21 days            Assessment/Plan     1. Acute hypoxic & hypercarbic respiratory failure, multifactorial etiology due to decompensated diastolic CHF, pulmonary HTN, interstitial lung disease IMANI/OHS; off bipap/ high flow an on NC. Clinically improving  2. Afib, rate controlled w therapeutic INR on warfarin  2. MRSA bacteremia from 3/9, suspect due to CVL, repeat cultures negative from 3/11, echo with out veg noted, tolerating vancomycin  3. Ecoli UTI  4. Anxiety- but did not tolerate anxiolytics due to respiratory suppression in past  5.  Morbid obesity    Plan:  - continue steroids, inhaled bronchidlators, oxygen, BIPAP qhs  - for cardiac catheterization on Monday  - mobilize w PT/OT  - awaiting AM labs  - basal/bolus insulin  - add PPI      Patient Active Problem List   Diagnosis Code    Acute on chronic diastolic CHF (congestive heart failure) ([facility]) I50.33    ALEJANDRINA (acute kidney injury) ([facility] N17.9    Acute exacerbation of CHF (congestive heart failure) ([facility]) I50.9    DM (diabetes mellitus) ([facility] E11.9    Hypertension I10    Respiratory failure ([facility]) J96.90    Acute coronary syndrome ([facility]) I24.9    Obstructive sleep apnea, adult G47.33    Paroxysmal atrial fibrillation ([facility]) I48.0    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 ([facility]) Z49.85, E11.65    Dyspnea due to congestive heart failure ([facility]) I50.9    Aspiration pneumonia ([facility]) J69.0    Hyperkalemia E87.5    Elevated troponin R79.89    COPD (chronic obstructive pulmonary disease) ([facility]) J44.9    CHF (congestive heart failure) ([facility]) I50.9    Insufficiency, respiratory, acute ([facility]) R06.89    Infiltrate noted on imaging study R93.8    COPD exacerbation ([facility]) J44.1    Acute on chronic respiratory failure with hypoxia ([facility]) J96.21  Chest pain R07.9    Anxiety F41.9    UTI (urinary tract infection) N39.0    Obesity hypoventilation syndrome (HCC) E66.2    Pulmonary hypertension, moderate to severe (HCC) I27.2               Subjective:    cc: nausea  Pt co nausea, states every morning she experiences this, improves later in day. Toelrating diet, no diarrhea, vomiting    Reports no dyspnea, wheezing or cough  No chest pain      REVIEW OF SYSTEMS:  General: No fevers or chills. Cardiovascular: No chest pain or pressure. No palpitations. Pulmonary: No shortness of breath. Gastrointestinal: No nausea, vomiting. Objective:        Vital signs/Intake and Output:  Visit Vitals    /80 (BP 1 Location: Left arm, BP Patient Position: Head of bed elevated (Comment degrees))    Pulse 72    Temp 97.9 °F (36.6 °C)    Resp 20    Ht 5' 4.96\" (1.65 m)    Wt 107 kg (236 lb)    SpO2 95%    Breastfeeding No    BMI 39.32 kg/m2     Current Shift:     Last three shifts:  03/17 1901 - 03/19 0700  In: 1940 [P.O.:1440; I.V.:500]  Out: 950 [Urine:950]    Body mass index is 39.32 kg/(m^2).     Physical Exam:  GEN: Alert and oriented times three NAD, obese  EYES: conjunctiva normal, lids with out lesions  HEENT: MMM, No thyromegaly, no lymphadenopathy  HEART: R+S1 +S2, no JVD, pulses 2+ distally  LUNGS: CTA B/L no wheezes, rales or rhonchi  ABDOMEN: + BS, soft NT/ND no organomegaly,  No rebound  EXTREMITIES: No edema cyanosis, cap refill normal   SKIN: no rashes or skin breakdown, no nodules, normal turgor  Current Facility-Administered Medications   Medication Dose Route Frequency    methylPREDNISolone (PF) (SOLU-MEDROL) injection 60 mg  60 mg IntraVENous Q24H    furosemide (LASIX) injection 20 mg  20 mg IntraVENous BID    traZODone (DESYREL) tablet 50 mg  50 mg Oral QHS    QUEtiapine (SEROquel) tablet 25 mg  25 mg Oral QHS    Vancomycin - Pharmacy to Dose  1 Each Other Rx Dosing/Monitoring    vancomycin (VANCOCIN) 1,750 mg in 0.9% sodium chloride 500 mL IVPB  1,750 mg IntraVENous Q24H    calcium carbonate (TUMS) chewable tablet 200 mg [elemental]  200 mg Oral QID WITH MEALS    potassium chloride (K-DUR, KLOR-CON) SR tablet 20 mEq  20 mEq Oral DAILY    lidocaine (LIDODERM) 5 % patch 1 Patch  1 Patch TransDERmal Q24H    insulin lispro (HUMALOG) injection 5 Units  5 Units SubCUTAneous TIDAC    HYDROcodone-acetaminophen (NORCO) 5-325 mg per tablet 1 Tab  1 Tab Oral Q6H PRN    insulin glargine (LANTUS) injection 20 Units  20 Units SubCUTAneous QHS    digoxin (LANOXIN) tablet 0.125 mg  0.125 mg Oral DAILY    carvedilol (COREG) tablet 25 mg  25 mg Oral BID WITH MEALS    dilTIAZem (CARDIZEM) IR tablet 30 mg  30 mg Oral TIDAC    ELECTROLYTE REPLACEMENT PROTOCOL  1 Each Other PRN    ELECTROLYTE REPLACEMENT PROTOCOL  1 Each Other PRN    pantoprazole (PROTONIX) tablet 40 mg  40 mg Oral ACB    polyvinyl alcohol (LIQUIFILM TEARS) 1.4 % ophthalmic solution 1 Drop  1 Drop Both Eyes PRN    nystatin (MYCOSTATIN) 100,000 unit/gram powder   Topical BID    docusate sodium (COLACE) capsule 100 mg  100 mg Oral BID    simvastatin (ZOCOR) tablet 10 mg  10 mg Oral QHS    aspirin chewable tablet 81 mg  81 mg Oral DAILY    arformoterol (BROVANA) neb solution 15 mcg  15 mcg Nebulization BID RT    budesonide (PULMICORT) 500 mcg/2 ml nebulizer suspension  500 mcg Nebulization BID RT    albuterol-ipratropium (DUO-NEB) 2.5 MG-0.5 MG/3 ML  3 mL Nebulization Q4H PRN    ondansetron (ZOFRAN) injection 4 mg  4 mg IntraVENous Q6H PRN    acetaminophen (TYLENOL) tablet 650 mg  650 mg Oral Q4H PRN    insulin lispro (HUMALOG) injection   SubCUTAneous AC&HS    glucose chewable tablet 16 g  4 Tab Oral PRN    glucagon (GLUCAGEN) injection 1 mg  1 mg IntraMUSCular PRN    dextrose (D50W) injection syrg 12.5-25 g  25-50 mL IntraVENous PRN    warfarin pharmacy to dose   Other PRN         All the patient's labs over the past 24 hours were reviewed both during my initial daily workflow process and at the time notated as \"note time\" in [facility]. (It is not time stamped separately in this workflow.)  Select labs are listed below.         Labs: Results:       Chemistry Recent Labs      03/18/17   0655 03/17/17 0331   GLU  147*  133*   NA  138  145   K  5.0  5.3   CL  104  105   CO2  32  33*   BUN  30*  21*   CREA  1.10  1.01   CA  9.0  8.8   AGAP  2*  7   BUCR  27*  21*              Coagulation Recent Labs      03/18/17   0655  03/17/17 0331   PTP  22.6*  21.3*   INR  2.1*  1.9*                   Thyroid Studies Lab Results   Component Value Date/Time    TSH 0.31 01/01/2016 10:20 PM        Procedures/imaging: see electronic medical records for all procedures/Xrays and details which were not copied into this note but were reviewed prior to creation of Carmine [address], DO  Internal Medicine/Geriatrics

## 2017-03-20 LAB
ALBUMIN SERPL BCP-MCNC: 3 G/DL (ref 3.4–5)
ALBUMIN/GLOB SERPL: 1 {RATIO} (ref 0.8–1.7)
ALP SERPL-CCNC: 77 U/L (ref 45–117)
ALT SERPL-CCNC: 20 U/L (ref 13–56)
ANION GAP BLD CALC-SCNC: 5 MMOL/L (ref 3–18)
AST SERPL W P-5'-P-CCNC: 13 U/L (ref 15–37)
BILIRUB SERPL-MCNC: 0.6 MG/DL (ref 0.2–1)
BUN SERPL-MCNC: 35 MG/DL (ref 7–18)
BUN/CREAT SERPL: 30 (ref 12–20)
CALCIUM SERPL-MCNC: 8.7 MG/DL (ref 8.5–10.1)
CHLORIDE SERPL-SCNC: 107 MMOL/L (ref 100–108)
CO2 SERPL-SCNC: 32 MMOL/L (ref 21–32)
CREAT SERPL-MCNC: 1.15 MG/DL (ref 0.6–1.3)
ERYTHROCYTE [DISTWIDTH] IN BLOOD BY AUTOMATED COUNT: 17.4 % (ref 11.6–14.5)
GLOBULIN SER CALC-MCNC: 3.1 G/DL (ref 2–4)
GLUCOSE BLD STRIP.AUTO-MCNC: 105 MG/DL (ref 70–110)
GLUCOSE BLD STRIP.AUTO-MCNC: 109 MG/DL (ref 70–110)
GLUCOSE BLD STRIP.AUTO-MCNC: 278 MG/DL (ref 70–110)
GLUCOSE BLD STRIP.AUTO-MCNC: 294 MG/DL (ref 70–110)
GLUCOSE SERPL-MCNC: 127 MG/DL (ref 74–99)
HCT VFR BLD AUTO: 32.5 % (ref 35–45)
HGB BLD-MCNC: 10.3 G/DL (ref 12–16)
INR PPP: 2.2 (ref 0.8–1.2)
MAGNESIUM SERPL-MCNC: 2.1 MG/DL (ref 1.8–2.4)
MCH RBC QN AUTO: 31.6 PG (ref 24–34)
MCHC RBC AUTO-ENTMCNC: 31.7 G/DL (ref 31–37)
MCV RBC AUTO: 99.7 FL (ref 74–97)
PLATELET # BLD AUTO: 192 K/UL (ref 135–420)
PMV BLD AUTO: 11.3 FL (ref 9.2–11.8)
POTASSIUM SERPL-SCNC: 4.9 MMOL/L (ref 3.5–5.5)
PROT SERPL-MCNC: 6.1 G/DL (ref 6.4–8.2)
PROTHROMBIN TIME: 23.3 SEC (ref 11.5–15.2)
RBC # BLD AUTO: 3.26 M/UL (ref 4.2–5.3)
SODIUM SERPL-SCNC: 144 MMOL/L (ref 136–145)
WBC # BLD AUTO: 8.7 K/UL (ref 4.6–13.2)

## 2017-03-20 PROCEDURE — 74011250637 HC RX REV CODE- 250/637: Performed by: INTERNAL MEDICINE

## 2017-03-20 PROCEDURE — 74011250636 HC RX REV CODE- 250/636: Performed by: INTERNAL MEDICINE

## 2017-03-20 PROCEDURE — 74011250637 HC RX REV CODE- 250/637: Performed by: HOSPITALIST

## 2017-03-20 PROCEDURE — 80053 COMPREHEN METABOLIC PANEL: CPT | Performed by: INTERNAL MEDICINE

## 2017-03-20 PROCEDURE — 77010033678 HC OXYGEN DAILY

## 2017-03-20 PROCEDURE — 83735 ASSAY OF MAGNESIUM: CPT | Performed by: INTERNAL MEDICINE

## 2017-03-20 PROCEDURE — 85027 COMPLETE CBC AUTOMATED: CPT | Performed by: INTERNAL MEDICINE

## 2017-03-20 PROCEDURE — 94660 CPAP INITIATION&MGMT: CPT

## 2017-03-20 PROCEDURE — 74011000250 HC RX REV CODE- 250: Performed by: INTERNAL MEDICINE

## 2017-03-20 PROCEDURE — 94760 N-INVAS EAR/PLS OXIMETRY 1: CPT

## 2017-03-20 PROCEDURE — 74011250636 HC RX REV CODE- 250/636: Performed by: HOSPITALIST

## 2017-03-20 PROCEDURE — 65660000000 HC RM CCU STEPDOWN

## 2017-03-20 PROCEDURE — 82962 GLUCOSE BLOOD TEST: CPT

## 2017-03-20 PROCEDURE — 85610 PROTHROMBIN TIME: CPT | Performed by: INTERNAL MEDICINE

## 2017-03-20 PROCEDURE — 94640 AIRWAY INHALATION TREATMENT: CPT

## 2017-03-20 PROCEDURE — 36415 COLL VENOUS BLD VENIPUNCTURE: CPT | Performed by: INTERNAL MEDICINE

## 2017-03-20 RX ORDER — LIDOCAINE HYDROCHLORIDE 10 MG/ML
3-30 INJECTION INFILTRATION; PERINEURAL
Status: CANCELLED | OUTPATIENT
Start: 2017-03-20

## 2017-03-20 RX ORDER — MIDAZOLAM HYDROCHLORIDE 1 MG/ML
.5-2 INJECTION, SOLUTION INTRAMUSCULAR; INTRAVENOUS
Status: CANCELLED | OUTPATIENT
Start: 2017-03-20

## 2017-03-20 RX ORDER — FENTANYL CITRATE 50 UG/ML
25-100 INJECTION, SOLUTION INTRAMUSCULAR; INTRAVENOUS
Status: CANCELLED | OUTPATIENT
Start: 2017-03-20

## 2017-03-20 RX ORDER — HYDROCODONE BITARTRATE AND ACETAMINOPHEN 5; 325 MG/1; MG/1
1 TABLET ORAL
Status: DISCONTINUED | OUTPATIENT
Start: 2017-03-20 | End: 2017-03-24 | Stop reason: HOSPADM

## 2017-03-20 RX ORDER — HEPARIN SODIUM 200 [USP'U]/100ML
500 INJECTION, SOLUTION INTRAVENOUS
Status: CANCELLED | OUTPATIENT
Start: 2017-03-20

## 2017-03-20 RX ADMIN — CARVEDILOL 25 MG: 12.5 TABLET, FILM COATED ORAL at 10:04

## 2017-03-20 RX ADMIN — BUDESONIDE 500 MCG: 0.5 INHALANT RESPIRATORY (INHALATION) at 07:29

## 2017-03-20 RX ADMIN — METHYLPREDNISOLONE SODIUM SUCCINATE 60 MG: 125 INJECTION, POWDER, FOR SOLUTION INTRAMUSCULAR; INTRAVENOUS at 10:05

## 2017-03-20 RX ADMIN — ASPIRIN 81 MG CHEWABLE TABLET 81 MG: 81 TABLET CHEWABLE at 10:05

## 2017-03-20 RX ADMIN — ANTACID TABLETS 200 MG: 500 TABLET, CHEWABLE ORAL at 10:05

## 2017-03-20 RX ADMIN — QUETIAPINE FUMARATE 25 MG: 25 TABLET, FILM COATED ORAL at 22:05

## 2017-03-20 RX ADMIN — ONDANSETRON HYDROCHLORIDE 4 MG: 2 INJECTION INTRAMUSCULAR; INTRAVENOUS at 16:29

## 2017-03-20 RX ADMIN — NYSTATIN: 100000 POWDER TOPICAL at 22:03

## 2017-03-20 RX ADMIN — HYDROCODONE BITARTRATE AND ACETAMINOPHEN 1 TABLET: 5; 325 TABLET ORAL at 16:29

## 2017-03-20 RX ADMIN — FUROSEMIDE 20 MG: 10 INJECTION, SOLUTION INTRAMUSCULAR; INTRAVENOUS at 18:54

## 2017-03-20 RX ADMIN — PANTOPRAZOLE SODIUM 40 MG: 40 TABLET, DELAYED RELEASE ORAL at 04:23

## 2017-03-20 RX ADMIN — INSULIN LISPRO 11 UNITS: 100 INJECTION, SOLUTION INTRAVENOUS; SUBCUTANEOUS at 22:04

## 2017-03-20 RX ADMIN — ONDANSETRON HYDROCHLORIDE 4 MG: 2 INJECTION INTRAMUSCULAR; INTRAVENOUS at 06:23

## 2017-03-20 RX ADMIN — NYSTATIN: 100000 POWDER TOPICAL at 10:06

## 2017-03-20 RX ADMIN — DILTIAZEM HYDROCHLORIDE 30 MG: 30 TABLET, FILM COATED ORAL at 12:57

## 2017-03-20 RX ADMIN — SIMVASTATIN 10 MG: 20 TABLET, FILM COATED ORAL at 22:05

## 2017-03-20 RX ADMIN — VANCOMYCIN HYDROCHLORIDE 1750 MG: 10 INJECTION, POWDER, LYOPHILIZED, FOR SOLUTION INTRAVENOUS at 23:23

## 2017-03-20 RX ADMIN — DILTIAZEM HYDROCHLORIDE 30 MG: 30 TABLET, FILM COATED ORAL at 16:29

## 2017-03-20 RX ADMIN — DIGOXIN 0.12 MG: 0.12 TABLET ORAL at 10:04

## 2017-03-20 RX ADMIN — INSULIN LISPRO 5 UNITS: 100 INJECTION, SOLUTION INTRAVENOUS; SUBCUTANEOUS at 18:00

## 2017-03-20 RX ADMIN — INSULIN GLARGINE 20 UNITS: 100 INJECTION, SOLUTION SUBCUTANEOUS at 22:05

## 2017-03-20 RX ADMIN — HYDROCODONE BITARTRATE AND ACETAMINOPHEN 1 TABLET: 5; 325 TABLET ORAL at 04:23

## 2017-03-20 RX ADMIN — ACETAMINOPHEN 650 MG: 325 TABLET ORAL at 23:44

## 2017-03-20 RX ADMIN — DOCUSATE SODIUM 100 MG: 100 CAPSULE, LIQUID FILLED ORAL at 22:05

## 2017-03-20 RX ADMIN — BUDESONIDE 500 MCG: 0.5 INHALANT RESPIRATORY (INHALATION) at 20:52

## 2017-03-20 RX ADMIN — INSULIN LISPRO 11 UNITS: 100 INJECTION, SOLUTION INTRAVENOUS; SUBCUTANEOUS at 18:00

## 2017-03-20 RX ADMIN — ARFORMOTEROL TARTRATE 15 MCG: 15 SOLUTION RESPIRATORY (INHALATION) at 07:29

## 2017-03-20 RX ADMIN — CARVEDILOL 25 MG: 12.5 TABLET, FILM COATED ORAL at 16:29

## 2017-03-20 RX ADMIN — ANTACID TABLETS 200 MG: 500 TABLET, CHEWABLE ORAL at 22:05

## 2017-03-20 RX ADMIN — ARFORMOTEROL TARTRATE 15 MCG: 15 SOLUTION RESPIRATORY (INHALATION) at 20:52

## 2017-03-20 RX ADMIN — ANTACID TABLETS 200 MG: 500 TABLET, CHEWABLE ORAL at 16:29

## 2017-03-20 RX ADMIN — FUROSEMIDE 20 MG: 10 INJECTION, SOLUTION INTRAMUSCULAR; INTRAVENOUS at 10:05

## 2017-03-20 RX ADMIN — TRAZODONE HYDROCHLORIDE 50 MG: 50 TABLET ORAL at 22:05

## 2017-03-20 RX ADMIN — HYDROCODONE BITARTRATE AND ACETAMINOPHEN 1 TABLET: 5; 325 TABLET ORAL at 10:05

## 2017-03-20 RX ADMIN — DOCUSATE SODIUM 100 MG: 100 CAPSULE, LIQUID FILLED ORAL at 10:04

## 2017-03-20 NOTE — PROGRESS NOTES
Problem: Mobility Impaired (Adult and Pediatric)  Goal: *Acute Goals and Plan of Care (Insert Text)  Physical Therapy Goals  Initiated 2/27/2017 and to be accomplished within 7 day(s)  1. Patient will move from supine to sit and sit to supine , scoot up and down and roll side to side in bed with supervision/set-up. 2. Patient will transfer from bed to chair and chair to bed with minimal assistance/contact guard assist using the least restrictive device. 3. Patient will perform sit to stand with minimal assistance/contact guard assist.  4. Patient will ambulate with minimal assistance/contact guard assist for 50 feet with the least restrictive device. Re-evaluation on 3/6/2017 and to be accomplished within 7 day(s)  1. Patient will move from supine to sit and sit to supine, scoot up and down and roll side to side in bed with supervision/set-up. 2. Patient will transfer from bed to chair and chair to bed with contact guard assist/supervision using the least restrictive device. 3. Patient will perform sit to stand with contact guard assist/supervision. 4. Patient will ambulate with contact guard assist/supervision for 100 feet with the least restrictive device. Re-evaluation on 3/14/2017. Goals to be accomplished within 7 days. 1. Patient will move from supine to sit and sit to supine, scoot up and down and roll side to side in bed with supervision/set-up. 2. Patient will transfer from bed to chair and chair to bed with contact guard assist/supervision using the least restrictive device. 3. Patient will perform sit to stand with contact guard assist/supervision. 4. Patient will ambulate with contact guard assist/supervision for 50 feet with the least restrictive device. 5. Patient will be independent with HEP to maximize strength and muscular endurance. 3/20/17  PT treatment attempted. Pt sitting EOB and requested therapist to \"come back this afternoon\".   Will f/u at later time per pt request.  Alexus Sanchez, PT

## 2017-03-20 NOTE — PROGRESS NOTES
Cardiology Progress Note        Patient: Rebeca Barrera        Sex: female          DOA: 2/25/2017  YOB: 1945      Age:  66 y.o.        LOS:  LOS: 22 days   Assessment/Plan     Patient Active Problem List   Diagnosis Code    Acute on chronic diastolic CHF (congestive heart failure) ([facility]) I50.33    ALEJ[facility]DRINA (acute kidney injury) [facility] N17.9    Acute exacerbation of CHF (congestive heart failure) ([facility]) I50.9    DM (diabetes mellitus) ([facility] E11.9    Hypertension I10    Respiratory failure ([facility]) J96.90    Acute coronary syndrome ([facility]) I24.9    Obstructive sleep apnea, adult G47.33    Paroxysmal atrial fibrillation ([facility]) I48.0    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 ([facility]) D97.00, E11.65    Dyspnea due to congestive heart failure ([facility]) I50.9    Aspiration pneumonia ([facility]) J69.0    Hyperkalemia E87.5    Elevated troponin R79.89    COPD (chronic obstructive pulmonary disease) ([facility]) J44.9    CHF (congestive heart failure) ([facility]) I50.9    Insufficiency, respiratory, acute ([facility]) R06.89    Infiltrate noted on imaging study R93.8    COPD exacerbation ([facility]) J44.1    Acute on chronic respiratory failure with hypoxia ([facility]) J96.21    Chest pain R07.9    Anxiety F41.9    UTI (urinary tract infection) N39.0    Obesity hypoventilation syndrome ([facility]) E66.2    Pulmonary hypertension, moderate to severe ([facility]) I27.2      Permanent atrial fibrillation  Moderate aortic stenosis  Acute hypoxemic respiratory failure     Plan:    Continue management as hospital medicine and pulmonary. Plan was to do RHC but could not find in vein in anti cubital area and she can not lay flat on table. Patient will go for PICC line placement for long term antibiotics. Will ask IR to get deep vein in upper extremity to do RHC.                 Subjective:    cc:   Denies any chest pain or shortness of breath     REVIEW OF SYSTEMS:     General: No fevers or chills. Cardiovascular: No chest pain,No palpitations, No orthopnea, No PND, No leg swelling, No claudication  Pulmonary: Dyspnea  Gastrointestinal: No nausea, vomiting, bleeding  Neurology: No Dizziness    Objective:      Visit Vitals    /49    Pulse 75    Temp 98.2 °F (36.8 °C)    Resp 18    Ht 5' 4.96\" (1.65 m)    Wt 117.5 kg (259 lb 0.7 oz)    SpO2 99%    Breastfeeding No    BMI 43.16 kg/m2     Body mass index is 43.16 kg/(m^2). Physical Exam:  General Appearance: Comfortable, not using accessory muscles of respiration. HEENT: SIRIA. HEAD: Atraumatic  NECK: No JVD, no thyroidomeglay. CAROTIDS: No bruit  LUNGS: Clear bilaterally. HEART: S1+S2 audible, 2/6 systolic murmur in aortic area, no pericardial rub. ABD: Non-tender, BS Audible    EXT: + leg edema, and no cyanosis. VASCULAR EXAM: Pulses are intact. PSYCHIATRIC EXAM: Mood is appropriate. MUSCULOSKELETAL: Grossly no joint deformity. NEUROLOGICAL: AAO times 3, No motor and sensory deficit.   Medication:  Current Facility-Administered Medications   Medication Dose Route Frequency    Warfarin - No Warfarin tonight   Other ONCE    methylPREDNISolone (PF) (SOLU-MEDROL) injection 60 mg  60 mg IntraVENous Q24H    furosemide (LASIX) injection 20 mg  20 mg IntraVENous BID    traZODone (DESYREL) tablet 50 mg  50 mg Oral QHS    QUEtiapine (SEROquel) tablet 25 mg  25 mg Oral QHS    Vancomycin - Pharmacy to Dose  1 Each Other Rx Dosing/Monitoring    vancomycin (VANCOCIN) 1,750 mg in 0.9% sodium chloride 500 mL IVPB  1,750 mg IntraVENous Q24H    calcium carbonate (TUMS) chewable tablet 200 mg [elemental]  200 mg Oral QID WITH MEALS    lidocaine (LIDODERM) 5 % patch 1 Patch  1 Patch TransDERmal Q24H    insulin lispro (HUMALOG) injection 5 Units  5 Units SubCUTAneous TIDAC    HYDROcodone-acetaminophen (NORCO) 5-325 mg per tablet 1 Tab  1 Tab Oral Q6H PRN    insulin glargine (LANTUS) injection 20 Units  20 Units SubCUTAneous QHS  digoxin (LANOXIN) tablet 0.125 mg  0.125 mg Oral DAILY    carvedilol (COREG) tablet 25 mg  25 mg Oral BID WITH MEALS    dilTIAZem (CARDIZEM) IR tablet 30 mg  30 mg Oral TIDAC    ELECTROLYTE REPLACEMENT PROTOCOL  1 Each Other PRN    ELECTROLYTE REPLACEMENT PROTOCOL  1 Each Other PRN    pantoprazole (PROTONIX) tablet 40 mg  40 mg Oral ACB    polyvinyl alcohol (LIQUIFILM TEARS) 1.4 % ophthalmic solution 1 Drop  1 Drop Both Eyes PRN    nystatin (MYCOSTATIN) 100,000 unit/gram powder   Topical BID    docusate sodium (COLACE) capsule 100 mg  100 mg Oral BID    simvastatin (ZOCOR) tablet 10 mg  10 mg Oral QHS    aspirin chewable tablet 81 mg  81 mg Oral DAILY    arformoterol (BROVANA) neb solution 15 mcg  15 mcg Nebulization BID RT    budesonide (PULMICORT) 500 mcg/2 ml nebulizer suspension  500 mcg Nebulization BID RT    albuterol-ipratropium (DUO-NEB) 2.5 MG-0.5 MG/3 ML  3 mL Nebulization Q4H PRN    ondansetron (ZOFRAN) injection 4 mg  4 mg IntraVENous Q6H PRN    acetaminophen (TYLENOL) tablet 650 mg  650 mg Oral Q4H PRN    insulin lispro (HUMALOG) injection   SubCUTAneous AC&HS    glucose chewable tablet 16 g  4 Tab Oral PRN    glucagon (GLUCAGEN) injection 1 mg  1 mg IntraMUSCular PRN    dextrose (D50W) injection syrg 12.5-25 g  25-50 mL IntraVENous PRN    warfarin pharmacy to dose   Other PRN               Lab/Data Reviewed:       Recent Labs      03/20/17   0240  03/19/17   1032   WBC  8.7  10.7   HGB  10.3*  11.3*   HCT  32.5*  36.3   PLT  192  229     Recent Labs      03/20/17   0240  03/19/17   1032  03/18/17   0655   NA  144  141  138   K  4.9  5.3  5.0   CL  107  104  104   CO2  32  32  32   GLU  127*  233*  147*   BUN  35*  30*  30*   CREA  1.15  1.24  1.10   CA  8.7  9.0  9.0       Signed By: Tyrone Hansen MD     March 20, 2017

## 2017-03-20 NOTE — ROUTINE PROCESS
5551: Bedside and Verbal shift change report given to EDITH Moore (oncoming nurse) by Cathy Schofield RN   (offgoing nurse). Report included the following information SBAR, Kardex, Intake/Output, MAR, Recent Results and Cardiac Rhythm A-fib.

## 2017-03-20 NOTE — PROGRESS NOTES
Problem: Mobility Impaired (Adult and Pediatric)  Goal: *Acute Goals and Plan of Care (Insert Text)  Physical Therapy Goals  Initiated 2/27/2017 and to be accomplished within 7 day(s)  1. Patient will move from supine to sit and sit to supine , scoot up and down and roll side to side in bed with supervision/set-up. 2. Patient will transfer from bed to chair and chair to bed with minimal assistance/contact guard assist using the least restrictive device. 3. Patient will perform sit to stand with minimal assistance/contact guard assist.  4. Patient will ambulate with minimal assistance/contact guard assist for 50 feet with the least restrictive device. Re-evaluation on 3/6/2017 and to be accomplished within 7 day(s)  1. Patient will move from supine to sit and sit to supine, scoot up and down and roll side to side in bed with supervision/set-up. 2. Patient will transfer from bed to chair and chair to bed with contact guard assist/supervision using the least restrictive device. 3. Patient will perform sit to stand with contact guard assist/supervision. 4. Patient will ambulate with contact guard assist/supervision for 100 feet with the least restrictive device. Re-evaluation on 3/14/2017. Goals to be accomplished within 7 days. 1. Patient will move from supine to sit and sit to supine, scoot up and down and roll side to side in bed with supervision/set-up. 2. Patient will transfer from bed to chair and chair to bed with contact guard assist/supervision using the least restrictive device. 3. Patient will perform sit to stand with contact guard assist/supervision. 4. Patient will ambulate with contact guard assist/supervision for 50 feet with the least restrictive device. 5. Patient will be independent with HEP to maximize strength and muscular endurance. PT treatment attempted. Pt currently eating dinner. Will f/u as pt schedule allows.   RNba, PT

## 2017-03-20 NOTE — PROGRESS NOTES
Chart reviewed and CM is following for transition of care needs. Care Management Interventions  PCP Verified by CM:  Yes  Transition of Care Consult (CM Consult): Discharge Planning, SNF  Discharge Durable Medical Equipment: No  Physical Therapy Consult: Yes  Occupational Therapy Consult: Yes  Speech Therapy Consult: Yes  Current Support Network: Lives with Spouse  Confirm Follow Up Transport: Family  Plan discussed with Pt/Family/Caregiver: Yes  Freedom of Choice Offered: Yes  Discharge Location  Discharge Placement: Skilled nursing facility

## 2017-03-20 NOTE — PROGRESS NOTES
Hospitalist Progress Note    Patient: Ac Gee MRN: [id number]  CSN: [id number]    YOB: 1945  Age: 66 y.o. Sex: female    DOA: 2/25/2017 LOS:  LOS: 22 days                Assessment/Plan     Patient Active Problem List   Diagnosis Code    Acute on chronic diastolic CHF (congestive heart failure) ([facility]) I50.33    ALEJANDRINA (acute kidney injury) ([facility] N17.9    Acute exacerbation of CHF (congestive heart failure) ([facility]) I50.9    [facility]di[facility] mellitus[facility] E11.9    Hypertension I10    Respiratory failure ([facility]) J96.90    Acute coronary syndrome ([facility]) I24.9    Obstructive sleep apnea, adult G47.33    Paroxysmal atrial fibrillation ([facility]) I48.0    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 ([facility]) M47.42, E11.65    Dyspnea due to congestive heart failure ([facility]) I50.9    Aspiration pneumonia ([facility]) J69.0    Hyperkalemia E87.5    Elevated troponin R79.89    COPD (chronic obstructive pulmonary disease) ([facility]) J44.9    CHF (congestive heart failure) ([facility]) I50.9    Insufficiency, respiratory, acute ([facility]) R06.89    Infiltrate noted on imaging study R93.8    COPD exacerbation ([facility]) J44.1    Acute on chronic respiratory failure with hypoxia ([facility]) J96.21    Chest pain R07.9    Anxiety F41.9    UTI (urinary tract infection) N39.0    Obesity hypoventilation syndrome ([facility]) E66.2    Pulmonary hypertension, moderate to severe ([facility] I27.2            65 yo female admitted for acute respiratory failure              Resp - acute hypoxic hypercapnic respiratory failure, multifactorial, secondary to diastolic CHF, pulmonary HTN, IMANI/OHS, improving. Possible interstitial lung disease, started on solumedrol. Off high flow oxygen, on NC, continue with BiPAP at night.      ID - E-Coli UTI, MRSA bacteremia. Repeat blood and urine cultures negative. Antibiotics - on vancomycin. Complete course on 04/09/2017. Completed 10 days of diflucan for vaginitis.      [facility] - Monitor HD.    A-fib - continue beta blocker and cardizem, started on coumadin, hold for picc tomorrow. Chronic diastolic CHF - continue diuresis, significant improvement in edema  Plan for cath.       Heme/onc - Follow H&H, plts.      Renal - Trend BUN, Cr, follow I/O. Check and replace Mg, K, phos.      Endocrine - Follow FSG, continue with insulin      Neuro/ Pain/ Sedation - pain control, lidoderm patch, on seroquel, complains of anxiety, need to be judicious about narcotics and anxiolytics. She reports that trazodone helps at home.       GI - diabetic diet, .      Prophylaxis - DVT: coumadin, GI: protonix            Review of systems  General: No fevers or chills. Cardiovascular: No chest pain or pressure. No palpitations. Pulmonary: on high flow oxygen   Gastrointestinal: No nausea, vomiting.           Physical Exam:  General: Awake, cooperative, no acute distress, on high flow oxygen.    HEENT: NC, Atraumatic. PERRLA, anicteric sclerae. Lungs: shallow respirations, CTA Bilaterally. No Wheezing/Rhonchi/Rales. Heart: Regular rhythm,  No murmur, No Rubs, No Gallops  Abdomen: Soft, Non distended, Non tender.  +Bowel sounds,   Extremities: Trace edema, bilateral LE ulcers bandaged  Psych:   Not anxious or agitated.   Neurologic:  No acute neurological deficit.          Vital signs/Intake and Output:  Visit Vitals    /44 (BP 1 Location: Right arm, BP Patient Position: At rest)    Pulse 64    Temp 98.2 °F (36.8 °C)    Resp 18    Ht 5' 4.96\" (1.65 m)    Wt 117.5 kg (259 lb 0.7 oz)    SpO2 98%    Breastfeeding No    BMI 43.16 kg/m2     Current Shift:     Last three shifts:  03/19 0701 - 03/20 1900  In: 1600 [P.O.:1080; I.V.:520]  Out: 1450 [Urine:1350; Drains:100]            Labs: Results:       Chemistry Recent Labs      03/20/17   0240  03/19/17   1032  03/18/17   0655   GLU  127*  233*  147*   NA  144  141  138   K  4.9  5.3  5.0   CL  107  104  104   CO2  32  32  32   BUN  35*  30*  30*   CREA  1.15  1.24  1.10   CA 8.7  9.0  9.0   AGAP  5  5  2*   BUCR  30*  24*  27*   AP  77  95   --    TP  6.1*  7.2   --    ALB  3.0*  3.6   --    GLOB  3.1  3.6   --    AGRAT  1.0  1.0   --       CBC w/Diff Recent Labs      03/20/17   0240  03/19/17   1032   WBC  8.7  10.7   RBC  3.26*  3.60*   HGB  10.3*  11.3*   HCT  32.5*  36.3   PLT  192  229   GRANS   --   88*   LYMPH   --   6*   EOS   --   0      Cardiac Enzymes No results for input(s): CPK, CKND1, ALESSANDRA in the last 72 hours. No lab exists for component: CKRMB, TROIP   Coagulation Recent Labs      03/20/17   0240 03/19/17   1032   PTP  23.3*  20.7*   INR  2.2*  1.9*       Lipid Panel No results found for: CHOL, CHOLPOCT, CHOLX, CHLST, CHOLV, [id number], HDL, LDL, NLDLCT, DLDL, LDLC, DLDLP, 424452, VLDLC, VLDL, TGL, TGLX, TRIGL, [id number], TRIGP, TGLPOCT, [id number], CHHD, CHHDX   BNP No results for input(s): BNPP in the last 72 hours.    Liver Enzymes Recent Labs      03/20/17   0240   TP  6.1*   ALB  3.0*   AP  77   SGOT  13*      Thyroid Studies Lab Results   Component Value Date/Time    TSH 0.31 01/01/2016 10:20 PM        Procedures/imaging: see electronic medical records for all procedures/Xrays and details which were not copied into this note but were reviewed prior to creation of Plan

## 2017-03-20 NOTE — PROGRESS NOTES
Problem: Mobility Impaired (Adult and Pediatric)  Goal: *Acute Goals and Plan of Care (Insert Text)  Physical Therapy Goals  Initiated 2/27/2017 and to be accomplished within 7 day(s)  1. Patient will move from supine to sit and sit to supine , scoot up and down and roll side to side in bed with supervision/set-up. 2. Patient will transfer from bed to chair and chair to bed with minimal assistance/contact guard assist using the least restrictive device. 3. Patient will perform sit to stand with minimal assistance/contact guard assist.  4. Patient will ambulate with minimal assistance/contact guard assist for 50 feet with the least restrictive device. Re-evaluation on 3/6/2017 and to be accomplished within 7 day(s)  1. Patient will move from supine to sit and sit to supine, scoot up and down and roll side to side in bed with supervision/set-up. 2. Patient will transfer from bed to chair and chair to bed with contact guard assist/supervision using the least restrictive device. 3. Patient will perform sit to stand with contact guard assist/supervision. 4. Patient will ambulate with contact guard assist/supervision for 100 feet with the least restrictive device. Re-evaluation on 3/14/2017. Goals to be accomplished within 7 days. 1. Patient will move from supine to sit and sit to supine, scoot up and down and roll side to side in bed with supervision/set-up. 2. Patient will transfer from bed to chair and chair to bed with contact guard assist/supervision using the least restrictive device. 3. Patient will perform sit to stand with contact guard assist/supervision. 4. Patient will ambulate with contact guard assist/supervision for 50 feet with the least restrictive device. 5. Patient will be independent with HEP to maximize strength and muscular endurance. 3/20/17  PT attempted. Pt on BSC. Will f/u tomorrow.   Lucia Haas, PT

## 2017-03-20 NOTE — PROGRESS NOTES
8403:  Notified by MT that patient with HR down to 29, then came back up to 50s. Patient then with 2.5 second pause. Patient sleeping in no acute distress. Will continue to monitor.

## 2017-03-20 NOTE — PROGRESS NOTES
Spoke with RN in charge of Anibal Alegria was informed her INR is elevated and needs to be <1.5 for PICC.  She will inform MD.

## 2017-03-20 NOTE — PROGRESS NOTES
Cardiac Cath Lab:  Pre Procedure Chart Check    Patients chart was accessed and reviewed for possible and/or scheduled procedure. Creatinine Clearance:  CREATININE: 1.15 MG/DL (03/20/17 0240)  Estimated creatinine clearance: 56.6 mL/min    Total Contrast  Load:  3 x estimated clearance amount=    169.8   ml    75% of Contrast Load:  0.75 x Total Contrast Load=    127.35    ml    Recent Labs      03/20/17   0240   WBC  8.7   RBC  3.26*   HCT  32.5*   HGB  10.3*   PLT  192   INR  2.2*   PTP  23.3*   NA  144   K  4.9   BUN  35*   CREA  1.15   GFRAA  56*   GFRNA  46*   CA  8.7       BMI: Body mass index is 43.16 kg/(m^2).     ALLERGIES:   Allergies   Allergen Reactions    Latex Hives    Adhesive Tape-Silicones Unknown (comments)    Bees [Sting, Bee] Unknown (comments)    Garlic Nausea and Vomiting    Zoloft [Sertraline] Hives       Lines:        Peripheral IV 03/18/17 Left Antecubital (Active)   Site Assessment Clean, dry, & intact 3/19/2017  8:15 PM   Phlebitis Assessment 0 3/19/2017  8:15 PM   Infiltration Assessment 0 3/19/2017  8:15 PM   Dressing Status Clean, dry, & intact 3/19/2017  8:15 PM   Dressing Type Tape;Transparent 3/19/2017  8:15 PM   Hub Color/Line Status Capped 3/19/2017  8:15 PM   Action Taken Open ports on tubing capped 3/19/2017  8:15 PM   Alcohol Cap Used Yes 3/19/2017  8:15 PM          History:    Past Medical History:   Diagnosis Date    A-fib ([address]     Arthritis     Back injury     Chronic obstructive pulmonary disease ([address]     Diabetes ([address]     Fibromyalgia     Heart failure ([address]     Hypertension     MRSA (methicillin resistant Staphylococcus aureus)     Obesity hypoventilation syndrome ([address] 2/28/2017    Pulmonary hypertension, moderate to severe ([address] 2/28/2017     Past Surgical History:   Procedure Laterality Date    CARDIAC SURG PROCEDURE UNLIST      HX CORONARY STENT PLACEMENT      HX KNEE REPLACEMENT      HX ORTHOPAEDIC      bilateral knee replacement     Patient Active Problem List   Diagnosis Code    Acute on chronic diastolic CHF (congestive heart failure) ([facility]) I50.33    ALEJANDRINA (acute kidney injury) ([facility] N17.9    Acute exacerbation of CHF (congestive heart failure) ([facility]) I50.9    DM (diabetes mellitus) ([facility] E11.9    Hypertension I10    Respiratory failure ([facility]) J96.90    Acute coronary syndrome ([facility]) I24.9    Obstructive sleep apnea, adult G47.33    Paroxysmal atrial fibrillation ([facility]) I48.0    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 ([facility]) S83.35, E11.65    Dyspnea due to congestive heart failure ([facility]) I50.9    Aspiration pneumonia ([facility]) J69.0    Hyperkalemia E87.5    Elevated troponin R79.89    COPD (chronic obstructive pulmonary disease) ([facility]) J44.9    CHF (congestive heart failure) ([facility]) I50.9    Insufficiency, respiratory, acute ([facility]) R06.89    Infiltrate noted on imaging study R93.8    COPD exacerbation ([facility]) J44.1    Acute on chronic respiratory failure with hypoxia ([facility]) J96.21    Chest pain R07.9    Anxiety F41.9    UTI (urinary tract infection) N39.0    Obesity hypoventilation syndrome ([facility]) E66.2    Pulmonary hypertension, moderate to severe ([facility]) I27.2

## 2017-03-20 NOTE — PROGRESS NOTES
2000: Assumed care of patient resting in bed, bed in lowest position, call bell with in reach. Patient demonstrated and verbalized on how to call for assistance. Alarm parameters reviewed, on, and audible. 2119: Patient complains of pain in her lower back 9/10, Norco 5-325mg adminstered by mouth    2219: Patient observed sitting in bed watching television. Patient states pain 4/10 \"I feel ok\", medication effective    0424: Patients complains of low back pain 8/10, Norco 5-325mg adminstered by mouth     0524: Patient observed laying in bed resting quietly, no further complaints of pain at this time, medication effective     0623: Paitient complains of nausea Zofran 4mg administered via IV    7888: Patient observed sitting up in bed watching television, no further complaints of nausea voiced at this time. Medication effective     0709: Shift summary: Patient had two episodes of back pain both relieved with Norco 5-325mg. Patient tolerated BIPAP throghout the night, no signs/symptoms of respiratory distress. Left resting in bed no acute distress, bed in lowest position, call light with in reach.

## 2017-03-20 NOTE — PROGRESS NOTES
vanco  Rec: 1. 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    ==> vanco    ==> picc    ==> complete course iv abx 4/9    ==> d/c picc at end of rx    ==> repeat blood culture ~4/15   Prescriptions in chart   Stable from ID perspective    Subjective: \"nervous\" - for cardiac cath today    \"some nausea\"; No v/d    No cough/sputum    No abd pain     No rash    PE:   Visit Vitals    /50 (BP 1 Location: Right arm, BP Patient Position: Head of bed elevated (Comment degrees))    Pulse 79    Temp 97.8 °F (36.6 °C)    Resp 18    Ht 5' 4.96\" (1.65 m)    Wt 117.5 kg (259 lb 0.7 oz)    SpO2 99%    Breastfeeding No    BMI 43.16 kg/m2     Awake/ alert - oriented x 3   Heent: O/c clear. No icterus. No thrush No ulcer. Neck: Supple   Chest: rare bibasilar crackles No exp wheeze   CV: Nl s1s2 No murmurs   Abd: Soft NTND obese.  no reboun No masses   Ext:    Tr edema No rash    Lab:     Recent Results (from the past 24 hour(s))   MAGNESIUM    Collection Time: 03/19/17 10:32 AM   Result Value Ref Range    Magnesium 2.0 1.8 - 2.4 mg/dL   PROTHROMBIN TIME + INR    Collection Time: 03/19/17 10:32 AM   Result Value Ref Range    Prothrombin time 20.7 (H) 11.5 - 15.2 sec    INR 1.9 (H) 0.8 - 1.2     METABOLIC PANEL, BASIC    Collection Time: 03/19/17 10:32 AM   Result Value Ref Range    Sodium 141 136 - 145 mmol/L    Potassium 5.3 3.5 - 5.5 mmol/L    Chloride 104 100 - 108 mmol/L    CO2 32 21 - 32 mmol/L    Anion gap 5 3.0 - 18 mmol/L    Glucose 233 (H) 74 - 99 mg/dL    BUN 30 (H) 7.0 - 18 MG/DL    Creatinine 1.24 0.6 - 1.3 MG/DL    BUN/Creatinine ratio 24 (H) 12 - 20      GFR est AA 52 (L) >60 ml/min/1.73m2    GFR est non-AA 43 (L) >60 ml/min/1.73m2    Calcium 9.0 8.5 - 10.1 MG/DL   CBC WITH AUTOMATED DIFF    Collection Time: 03/19/17 10:32 AM   Result Value Ref Range    WBC 10.7 4.6 - 13.2 K/uL    RBC 3.60 (L) 4.20 - 5.30 M/uL    HGB 11.3 (L) 12.0 - 16.0 g/dL    HCT 36.3 35.0 - 45.0 %    .8 (H) 74.0 - 97.0 FL    MCH 31.4 24.0 - 34.0 PG    MCHC 31.1 31.0 - 37.0 g/dL    RDW 17.6 (H) 11.6 - 14.5 %    PLATELET 688 175 - 068 K/uL    MPV 11.3 9.2 - 11.8 FL    NEUTROPHILS 88 (H) 42 - 75 %    BAND NEUTROPHILS 1 0 - 5 %    LYMPHOCYTES 6 (L) 20 - 51 %    MONOCYTES 5 2 - 9 %    EOSINOPHILS 0 0 - 5 %    BASOPHILS 0 0 - 3 %    ABS. NEUTROPHILS 9.4 (H) 1.8 - 8.0 K/UL    ABS. LYMPHOCYTES 0.6 (L) 0.8 - 3.5 K/UL    ABS. MONOCYTES 0.5 0 - 1.0 K/UL    ABS. EOSINOPHILS 0.0 0.0 - 0.4 K/UL    ABS. BASOPHILS 0.0 0.0 - 0.1 K/UL    PLATELET COMMENTS ADEQUATE PLATELETS      RBC COMMENTS ANISOCYTOSIS  1+        RBC COMMENTS MACROCYTOSIS  1+        RBC COMMENTS POIKILOCYTOSIS  1+        RBC COMMENTS POLYCHROMASIA  1+        RBC COMMENTS BASOPHILIC STIPPLING  SLIGHT        DF MANUAL     HEPATIC FUNCTION PANEL    Collection Time: 03/19/17 10:32 AM   Result Value Ref Range    Protein, total 7.2 6.4 - 8.2 g/dL    Albumin 3.6 3.4 - 5.0 g/dL    Globulin 3.6 2.0 - 4.0 g/dL    A-G Ratio 1.0 0.8 - 1.7      Bilirubin, total 0.8 0.2 - 1.0 MG/DL    Bilirubin, direct 0.2 0.0 - 0.2 MG/DL    Alk.  phosphatase 95 45 - 117 U/L    AST (SGOT) 13 (L) 15 - 37 U/L    ALT (SGPT) 23 13 - 56 U/L   GLUCOSE, POC    Collection Time: 03/19/17 11:16 AM   Result Value Ref Range    Glucose (POC) 187 (H) 70 - 110 mg/dL   GLUCOSE, POC    Collection Time: 03/19/17  4:16 PM   Result Value Ref Range    Glucose (POC) 202 (H) 70 - 110 mg/dL   GLUCOSE, POC    Collection Time: 03/19/17  9:05 PM   Result Value Ref Range    Glucose (POC) 345 (H) 70 - 110 mg/dL   MAGNESIUM    Collection Time: 03/20/17  2:40 AM   Result Value Ref Range    Magnesium 2.1 1.8 - 2.4 mg/dL   PROTHROMBIN TIME + INR    Collection Time: 03/20/17  2:40 AM   Result Value Ref Range    Prothrombin time 23.3 (H) 11.5 - 15.2 sec    INR 2.2 (H) 0.8 - 1.2     CBC W/O DIFF    Collection Time: 03/20/17  2:40 AM   Result Value Ref Range    WBC 8.7 4.6 - 13.2 K/uL    RBC 3.26 (L) 4.20 - 5.30 M/uL    HGB 10.3 (L) 12.0 - 16.0 g/dL    HCT 32.5 (L) 35.0 - 45.0 %    MCV 99.7 (H) 74.0 - 97.0 FL    MCH 31.6 24.0 - 34.0 PG    MCHC 31.7 31.0 - 37.0 g/dL    RDW 17.4 (H) 11.6 - 14.5 %    PLATELET 677 582 - 941 K/uL    MPV 11.3 9.2 - 93.8 FL   METABOLIC PANEL, COMPREHENSIVE    Collection Time: 03/20/17  2:40 AM   Result Value Ref Range    Sodium 144 136 - 145 mmol/L    Potassium 4.9 3.5 - 5.5 mmol/L    Chloride 107 100 - 108 mmol/L    CO2 32 21 - 32 mmol/L    Anion gap 5 3.0 - 18 mmol/L    Glucose 127 (H) 74 - 99 mg/dL    BUN 35 (H) 7.0 - 18 MG/DL    Creatinine 1.15 0.6 - 1.3 MG/DL    BUN/Creatinine ratio 30 (H) 12 - 20      GFR est AA 56 (L) >60 ml/min/1.73m2    GFR est non-AA 46 (L) >60 ml/min/1.73m2    Calcium 8.7 8.5 - 10.1 MG/DL    Bilirubin, total 0.6 0.2 - 1.0 MG/DL    ALT (SGPT) 20 13 - 56 U/L    AST (SGOT) 13 (L) 15 - 37 U/L    Alk.  phosphatase 77 45 - 117 U/L    Protein, total 6.1 (L) 6.4 - 8.2 g/dL    Albumin 3.0 (L) 3.4 - 5.0 g/dL    Globulin 3.1 2.0 - 4.0 g/dL    A-G Ratio 1.0 0.8 - 1.7     GLUCOSE, POC    Collection Time: 03/20/17  6:21 AM   Result Value Ref Range    Glucose (POC) 105 70 - 110 mg/dL       Meds:     Current Facility-Administered Medications   Medication Dose Route Frequency Provider Last Rate Last Dose    methylPREDNISolone (PF) (SOLU-MEDROL) injection 60 mg  60 mg IntraVENous Q24H Christelle Cruz MD   60 mg at 03/19/17 0902    furosemide (LASIX) injection 20 mg  20 mg IntraVENous BID Christelle Marks MD   20 mg at 03/19/17 1910    traZODone (DESYREL) tablet 50 mg  50 mg Oral QHS Ana Hamilton MD   50 mg at 03/19/17 2125    QUEtiapine (SEROquel) tablet 25 mg  25 mg Oral QHS Trung Pringle MD   25 mg at 03/19/17 2125    Vancomycin - Pharmacy to Dose  1 Each Other Rx Dosing/Monitoring Trung Pringle MD        vancomycin (VANCOCIN) 1,750 mg in 0.9% sodium chloride 500 mL IVPB  1,750 mg IntraVENous Q24H Trung Pringle  mL/hr at 03/19/17 2343 1,750 mg at 03/19/17 2343  calcium carbonate (TUMS) chewable tablet 200 mg [elemental]  200 mg Oral QID WITH MEALS Fátima Kaba MD   200 mg at 03/19/17 2125    lidocaine (LIDODERM) 5 % patch 1 Patch  1 Patch TransDERmal Q24H Maki Conley MD   1 Patch at 03/19/17 0910    insulin lispro (HUMALOG) injection 5 Units  5 Units SubCUTAneous Jamey Pierre MD   5 Units at 03/19/17 1759    HYDROcodone-acetaminophen (NORCO) 5-325 mg per tablet 1 Tab  1 Tab Oral Q6H PRN Gal Reed, DO   1 Tab at 03/20/17 0423    insulin glargine (LANTUS) injection 20 Units  20 Units SubCUTAneous QHS Suzy Long MD   20 Units at 03/19/17 2125    digoxin (LANOXIN) tablet 0.125 mg  0.125 mg Oral DAILY Fátima Kaba MD   0.125 mg at 03/19/17 0900    carvedilol (COREG) tablet 25 mg  25 mg Oral BID WITH MEALS Fátima Kaba MD   25 mg at 03/19/17 1757    dilTIAZem (CARDIZEM) IR tablet 30 mg  30 mg Oral TIDAC Fátima Kaba MD   Stopped at 03/20/17 0730    ELECTROLYTE REPLACEMENT PROTOCOL  1 Each Other PRN Suzy Long MD        ELECTROLYTE REPLACEMENT PROTOCOL  1 Each Other PRN Suzy Long MD        pantoprazole (PROTONIX) tablet 40 mg  40 mg Oral ACB Suzy Long MD   40 mg at 03/20/17 0423    polyvinyl alcohol (LIQUIFILM TEARS) 1.4 % ophthalmic solution 1 Drop  1 Drop Both Eyes PRN Suzy Long MD        nystatin (MYCOSTATIN) 100,000 unit/gram powder   Topical BID Tony MD Edna        docusate sodium (COLACE) capsule 100 mg  100 mg Oral BID Jenaro Du MD   100 mg at 03/19/17 2125    simvastatin (ZOCOR) tablet 10 mg  10 mg Oral QHS Jenaro Du MD   10 mg at 03/19/17 2125    aspirin chewable tablet 81 mg  81 mg Oral DAILY Jenaro Du MD   81 mg at 03/19/17 0909    arformoterol (BROVANA) neb solution 15 mcg  15 mcg Nebulization BID RT Jenaro Du MD   15 mcg at 03/20/17 0729    budesonide (PULMICORT) 500 mcg/2 ml nebulizer suspension  500 mcg Nebulization BID RT Jenaro Du MD 500 mcg at 03/20/17 0729    albuterol-ipratropium (DUO-NEB) 2.5 MG-0.5 MG/3 ML  3 mL Nebulization Q4H PRN Cisco Ramos MD   3 mL at 03/12/17 2016    ondansetron (ZOFRAN) injection 4 mg  4 mg IntraVENous Q6H PRN Faustina Rosales MD   4 mg at 03/20/17 3665    acetaminophen (TYLENOL) tablet 650 mg  650 mg Oral Q4H PRN Faustina Rosales MD   650 mg at 03/12/17 1505    insulin lispro (HUMALOG) injection   SubCUTAneous AC&HS Aamir Fischer MD   Stopped at 03/20/17 0730    glucose chewable tablet 16 g  4 Tab Oral PRN Faustina Rosales MD        glucagon [facility]) injection 1 mg  1 mg IntraMUSCular PRN Faustina Rosales MD        dextrose (D50W) injection syrg 12.5-25 g  25-50 mL IntraVENous PRN Faustina Rosales MD        warfarin pharmacy to dose   Other PRN MD Roxy Barnard  [phone](DM)

## 2017-03-20 NOTE — PROGRESS NOTES
conducted an initial consultation and Spiritual Assessment for Elpidio Caicedo, who is a 66 y.o.,female. Patients Primary Language is: [place]. According to the patients EMR Episcopal Affiliation is: No Adventism. The reason the Patient came to the hospital is:   Patient Active Problem List    Diagnosis Date Noted    Obesity hypoventilation syndrome ([address] 02/28/2017    Pulmonary hypertension, moderate to severe ([address] 02/28/2017    UTI (urinary tract infection) 02/25/2017    Anxiety 02/04/2017    Chest pain 02/03/2017    Acute on chronic respiratory failure with hypoxia (HCC) 02/01/2017    COPD exacerbation ([facility]) 01/31/2017    Hyperkalemia 01/30/2017    Elevated troponin 01/30/2017    COPD (chronic obstructive pulmonary disease) ([facility]) 01/30/2017    CHF (congestive heart failure) ([address] 01/30/2017    Insufficiency, respiratory, acute ([address] 01/30/2017    Infiltrate noted on imaging study 01/30/2017    Aspiration pneumonia ([address] 01/06/2016    Obstructive sleep apnea, adult 01/04/2016    Paroxysmal atrial fibrillation ([address] 01/04/2016    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 ([address] 69/55/9673    Dyspnea due to congestive heart failure ([address] 01/04/2016    Respiratory failure ([address] 01/01/2016    Acute coronary syndrome ([address] 01/01/2016    Hypertension 09/27/2015    DM (diabetes mellitus) ([address] 09/22/2015    ALEJANDRINA (acute kidney injury) ([address] 09/21/2015    Acute exacerbation of CHF (congestive heart failure) ([address] 09/21/2015    Acute on chronic diastolic CHF (congestive heart failure) ([address] 09/03/2014        The  provided the following Interventions:  Initiated a relationship of care and support. Explored issues of roshni, belief, spirituality and Restoration/ritual needs while hospitalized. Listened empathically. Provided chaplaincy education. Provided information about Spiritual Care Services.   Offered prayer and assurance of continued prayers on patient's behalf. Chart reviewed. The following outcomes were achieved:  Patient shared limited information about both their medical narrative and spiritual journey/beliefs. Patient processed feeling about current hospitalization. Patient expressed gratitude for the 's visit. Assessment:  Patient does not have any Yazidi/cultural needs that will affect patients preferences in health care. Patient did not indicate any spiritual or Yazidi issues which require Spiritual Care Services interventions at this time. Plan:  Chaplains will continue to follow and will provide pastoral care on an as needed/requested basis.  recommends bedside caregivers page  on duty if patient shows signs of acute spiritual or emotional distress. ERIC Goodman. [employer]  [phone]

## 2017-03-20 NOTE — PROGRESS NOTES
238 [facility] care of pt from Erasto Burnett RN. Pt resting quietly in bed, receiving neb tx with RT , no signs of distress, call bell within reach. 0830 Cath lab tech and echo tech at bedside [facility], will assess after they are done. 0208 Still at bedside, will assess later. Pt states that she is not in pain right now, but \"might be after they are done with her\"     0930 Spoke with  regarding pt becoming bradycardic overnight, no orders received    1005 Given prn Norco for pain in her lower back    1029 Angio called, unable to do PICC d/t INR, need to be at least 1.5, will let MD and cardiac cath know. 200 IDR  aware that cardiac cath, and PICC placement postponed d/t INR, hold Warfarin tonight, encourage food high in Vitamin K, MD does not want to order PO Vitamin K. MD aware that pt was getting small nose bleeds from her o2 even though she has humidifier placed, okay to give saline nasal spray to see if that will help, as well as increase her Norco frequency to q4hrs instead of q6hrs. 1235 Cardiac cath tech at bedside to try to [facility] again    1346 Pt was NPO this morning, and after second attempt from cath tech, pt able to eat.  okay to change her diet from Cardiac dental soft to Cardiac diet so that she may eat food with vitamin k. Shift Summary: Shift uneventful. Pt unable to have cardiac cath or PICC placement d/t INR, MD's aware. Pt able to ambulate to bedside commode with assistance with walker. Pt would use call bell for assistance. Pt able to eat all missed meals, after being NPO for half of the day. She is aware that she has to remain NPO starting 03/20 at midnight.

## 2017-03-20 NOTE — PROGRESS NOTES
Cardiology Progress Note        Patient: Ghassan Washington        Sex: female          DOA: 2/25/2017  YOB: 1945      Age:  66 y.o.        LOS:  LOS: 21 days    Patient seen and examined. Chart reviewed. Assessment/Plan     Patient Active Problem List   Diagnosis Code    Acute on chronic diastolic CHF (congestive heart failure) ([facility]) I50.33    ALEJANDRINA (acute kidney injury) [facility] N17.9    Acute exacerbation of CHF (congestive heart failure) ([facility]) I50.9    DM [facility]di[facility] mellitus) [facility] E11.9    Hypertension I10    Respiratory failure ([facility]) J96.90    Acute coronary syndrome ([facility]) I24.9    Obstructive sleep apnea, adult G47.33    Paroxysmal atrial fibrillation ([facility]) I48.0    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 ([facility]) W48.05, E11.65    Dyspnea due to congestive heart failure ([facility]) I50.9    Aspiration pneumonia ([facility]) J69.0    Hyperkalemia E87.5    Elevated troponin R79.89    COPD (chronic obstructive pulmonary disease) ([facility]) J44.9    CHF (congestive heart failure) ([facility]) I50.9    Insufficiency, respiratory, acute ([facility]) R06.89    Infiltrate noted on imaging study R93.8    COPD exacerbation ([facility]) J44.1    Acute on chronic respiratory failure with hypoxia ([facility]) J96.21    Chest pain R07.9    Anxiety F41.9    UTI (urinary tract infection) N39.0    Obesity hypoventilation syndrome ([facility]) E66.2    Pulmonary hypertension, moderate to severe ([facility]) I27.2      Hypoxemic respiratory failure  Permanent atrial fibrillation  Moderate aortic stenosis    Plan:  Continue beta blocker, calcium channel blocker and digoxin. Continue Coumadin as per PT/INR. Continue management as per hospital medicine. If clinically indicated would consider RIGHT heart catheterization  Patient is aware about plan.                 Subjective:    cc:  I'm tired  Denies any shortness of breath or chest pain      REVIEW OF SYSTEMS:     General: No fevers or chills. Cardiovascular: No chest pain,No palpitations, No orthopnea, No PND, No leg swelling, No claudication  Pulmonary: No shortness of breath. Gastrointestinal: No nausea, vomiting, bleeding  Neurology: No Dizziness    Objective:      Visit Vitals    /76 (BP 1 Location: Left arm, BP Patient Position: Sitting)    Pulse 84    Temp 98.3 °F (36.8 °C)    Resp 19    Ht 5' 4.96\" (1.65 m)    Wt 107 kg (236 lb)    SpO2 98%    Breastfeeding No    BMI 39.32 kg/m2     Body mass index is 39.32 kg/(m^2). Physical Exam:  General Appearance: Comfortable, not using accessory muscles of respiration. HEENT: SIRIA. HEAD: Atraumatic  NECK: No JVD, no thyroidomeglay. CAROTIDS: No bruit  LUNGS: Clear bilaterally. HEART: S1+S2 audible, irregularly irregular, 2/6 systolic murmur in aortic area, no pericardial rub. ABD: Non-tender, BS Audible    EXT: + leg edema, and no cyanosis. VASCULAR EXAM: Pulses are intact. PSYCHIATRIC EXAM: Mood is appropriate. MUSCULOSKELETAL: Grossly no joint deformity. NEUROLOGICAL: AAO times 3, No motor and sensory deficit.   Medication:  Current Facility-Administered Medications   Medication Dose Route Frequency    methylPREDNISolone (PF) (SOLU-MEDROL) injection 60 mg  60 mg IntraVENous Q24H    furosemide (LASIX) injection 20 mg  20 mg IntraVENous BID    traZODone (DESYREL) tablet 50 mg  50 mg Oral QHS    QUEtiapine (SEROquel) tablet 25 mg  25 mg Oral QHS    Vancomycin - Pharmacy to Dose  1 Each Other Rx Dosing/Monitoring    vancomycin (VANCOCIN) 1,750 mg in 0.9% sodium chloride 500 mL IVPB  1,750 mg IntraVENous Q24H    calcium carbonate (TUMS) chewable tablet 200 mg [elemental]  200 mg Oral QID WITH MEALS    lidocaine (LIDODERM) 5 % patch 1 Patch  1 Patch TransDERmal Q24H    insulin lispro (HUMALOG) injection 5 Units  5 Units SubCUTAneous TIDAC    HYDROcodone-acetaminophen (NORCO) 5-325 mg per tablet 1 Tab  1 Tab Oral Q6H PRN    insulin glargine (LANTUS) injection 20 Units  20 Units SubCUTAneous QHS    digoxin (LANOXIN) tablet 0.125 mg  0.125 mg Oral DAILY    carvedilol (COREG) tablet 25 mg  25 mg Oral BID WITH MEALS    dilTIAZem (CARDIZEM) IR tablet 30 mg  30 mg Oral TIDAC    ELECTROLYTE REPLACEMENT PROTOCOL  1 Each Other PRN    ELECTROLYTE REPLACEMENT PROTOCOL  1 Each Other PRN    pantoprazole (PROTONIX) tablet 40 mg  40 mg Oral ACB    polyvinyl alcohol (LIQUIFILM TEARS) 1.4 % ophthalmic solution 1 Drop  1 Drop Both Eyes PRN    nystatin (MYCOSTATIN) 100,000 unit/gram powder   Topical BID    docusate sodium (COLACE) capsule 100 mg  100 mg Oral BID    simvastatin (ZOCOR) tablet 10 mg  10 mg Oral QHS    aspirin chewable tablet 81 mg  81 mg Oral DAILY    arformoterol (BROVANA) neb solution 15 mcg  15 mcg Nebulization BID RT    budesonide (PULMICORT) 500 mcg/2 ml nebulizer suspension  500 mcg Nebulization BID RT    albuterol-ipratropium (DUO-NEB) 2.5 MG-0.5 MG/3 ML  3 mL Nebulization Q4H PRN    ondansetron (ZOFRAN) injection 4 mg  4 mg IntraVENous Q6H PRN    acetaminophen (TYLENOL) tablet 650 mg  650 mg Oral Q4H PRN    insulin lispro (HUMALOG) injection   SubCUTAneous AC&HS    glucose chewable tablet 16 g  4 Tab Oral PRN    glucagon (GLUCAGEN) injection 1 mg  1 mg IntraMUSCular PRN    dextrose (D50W) injection syrg 12.5-25 g  25-50 mL IntraVENous PRN    warfarin pharmacy to dose   Other PRN               Lab/Data Reviewed:       Recent Labs      03/19/17   1032   WBC  10.7   HGB  11.3*   HCT  36.3   PLT  229     Recent Labs      03/19/17   1032  03/18/17   0655  03/17/17   0331   NA  141  138  145   K  5.3  5.0  5.3   CL  104  104  105   CO2  32  32  33*   GLU  233*  147*  133*   BUN  30*  30*  21*   CREA  1.24  1.10  1.01   CA  9.0  9.0  8.8       Signed By: Mendoza Edwards MD     March 19, 2017

## 2017-03-21 LAB
ANION GAP BLD CALC-SCNC: 5 MMOL/L (ref 3–18)
BUN SERPL-MCNC: 36 MG/DL (ref 7–18)
BUN/CREAT SERPL: 31 (ref 12–20)
CALCIUM SERPL-MCNC: 8.5 MG/DL (ref 8.5–10.1)
CHLORIDE SERPL-SCNC: 105 MMOL/L (ref 100–108)
CO2 SERPL-SCNC: 33 MMOL/L (ref 21–32)
CREAT SERPL-MCNC: 1.15 MG/DL (ref 0.6–1.3)
DATE LAST DOSE: ABNORMAL
GLUCOSE BLD STRIP.AUTO-MCNC: 144 MG/DL (ref 70–110)
GLUCOSE BLD STRIP.AUTO-MCNC: 149 MG/DL (ref 70–110)
GLUCOSE BLD STRIP.AUTO-MCNC: 221 MG/DL (ref 70–110)
GLUCOSE BLD STRIP.AUTO-MCNC: 222 MG/DL (ref 70–110)
GLUCOSE SERPL-MCNC: 107 MG/DL (ref 74–99)
INR PPP: 2.3 (ref 0.8–1.2)
MAGNESIUM SERPL-MCNC: 2.1 MG/DL (ref 1.8–2.4)
POTASSIUM SERPL-SCNC: 4.5 MMOL/L (ref 3.5–5.5)
PROTHROMBIN TIME: 24.4 SEC (ref 11.5–15.2)
REPORTED DOSE,DOSE: ABNORMAL UNITS
REPORTED DOSE/TIME,TMG: 2300
SODIUM SERPL-SCNC: 143 MMOL/L (ref 136–145)
VANCOMYCIN TROUGH SERPL-MCNC: 21.5 UG/ML (ref 10–20)

## 2017-03-21 PROCEDURE — 85610 PROTHROMBIN TIME: CPT | Performed by: INTERNAL MEDICINE

## 2017-03-21 PROCEDURE — 74011250637 HC RX REV CODE- 250/637: Performed by: HOSPITALIST

## 2017-03-21 PROCEDURE — 74011250636 HC RX REV CODE- 250/636: Performed by: INTERNAL MEDICINE

## 2017-03-21 PROCEDURE — 36415 COLL VENOUS BLD VENIPUNCTURE: CPT | Performed by: INTERNAL MEDICINE

## 2017-03-21 PROCEDURE — 74011250637 HC RX REV CODE- 250/637: Performed by: INTERNAL MEDICINE

## 2017-03-21 PROCEDURE — 77010033678 HC OXYGEN DAILY

## 2017-03-21 PROCEDURE — 94660 CPAP INITIATION&MGMT: CPT

## 2017-03-21 PROCEDURE — 65660000000 HC RM CCU STEPDOWN

## 2017-03-21 PROCEDURE — 80048 BASIC METABOLIC PNL TOTAL CA: CPT | Performed by: INTERNAL MEDICINE

## 2017-03-21 PROCEDURE — 97168 OT RE-EVAL EST PLAN CARE: CPT

## 2017-03-21 PROCEDURE — 82962 GLUCOSE BLOOD TEST: CPT

## 2017-03-21 PROCEDURE — 97164 PT RE-EVAL EST PLAN CARE: CPT

## 2017-03-21 PROCEDURE — 74011250636 HC RX REV CODE- 250/636: Performed by: HOSPITALIST

## 2017-03-21 PROCEDURE — 83735 ASSAY OF MAGNESIUM: CPT | Performed by: INTERNAL MEDICINE

## 2017-03-21 PROCEDURE — 74011000250 HC RX REV CODE- 250: Performed by: INTERNAL MEDICINE

## 2017-03-21 PROCEDURE — 80202 ASSAY OF VANCOMYCIN: CPT | Performed by: INTERNAL MEDICINE

## 2017-03-21 PROCEDURE — 94640 AIRWAY INHALATION TREATMENT: CPT

## 2017-03-21 RX ORDER — FUROSEMIDE 10 MG/ML
40 INJECTION INTRAMUSCULAR; INTRAVENOUS 2 TIMES DAILY
Status: DISCONTINUED | OUTPATIENT
Start: 2017-03-22 | End: 2017-03-24

## 2017-03-21 RX ADMIN — INSULIN LISPRO 5 UNITS: 100 INJECTION, SOLUTION INTRAVENOUS; SUBCUTANEOUS at 11:39

## 2017-03-21 RX ADMIN — TRAZODONE HYDROCHLORIDE 50 MG: 50 TABLET ORAL at 21:34

## 2017-03-21 RX ADMIN — FUROSEMIDE 20 MG: 10 INJECTION, SOLUTION INTRAMUSCULAR; INTRAVENOUS at 08:02

## 2017-03-21 RX ADMIN — DOCUSATE SODIUM 100 MG: 100 CAPSULE, LIQUID FILLED ORAL at 08:02

## 2017-03-21 RX ADMIN — ANTACID TABLETS 200 MG: 500 TABLET, CHEWABLE ORAL at 17:13

## 2017-03-21 RX ADMIN — INSULIN LISPRO 5 UNITS: 100 INJECTION, SOLUTION INTRAVENOUS; SUBCUTANEOUS at 17:14

## 2017-03-21 RX ADMIN — PANTOPRAZOLE SODIUM 40 MG: 40 TABLET, DELAYED RELEASE ORAL at 05:27

## 2017-03-21 RX ADMIN — FUROSEMIDE 20 MG: 10 INJECTION, SOLUTION INTRAMUSCULAR; INTRAVENOUS at 18:42

## 2017-03-21 RX ADMIN — ANTACID TABLETS 200 MG: 500 TABLET, CHEWABLE ORAL at 21:33

## 2017-03-21 RX ADMIN — INSULIN GLARGINE 20 UNITS: 100 INJECTION, SOLUTION SUBCUTANEOUS at 21:33

## 2017-03-21 RX ADMIN — ASPIRIN 81 MG CHEWABLE TABLET 81 MG: 81 TABLET CHEWABLE at 08:01

## 2017-03-21 RX ADMIN — INSULIN LISPRO 7 UNITS: 100 INJECTION, SOLUTION INTRAVENOUS; SUBCUTANEOUS at 06:38

## 2017-03-21 RX ADMIN — ANTACID TABLETS 200 MG: 500 TABLET, CHEWABLE ORAL at 08:02

## 2017-03-21 RX ADMIN — DOCUSATE SODIUM 100 MG: 100 CAPSULE, LIQUID FILLED ORAL at 21:34

## 2017-03-21 RX ADMIN — INSULIN LISPRO 5 UNITS: 100 INJECTION, SOLUTION INTRAVENOUS; SUBCUTANEOUS at 08:06

## 2017-03-21 RX ADMIN — SIMVASTATIN 10 MG: 20 TABLET, FILM COATED ORAL at 21:34

## 2017-03-21 RX ADMIN — INSULIN LISPRO 7 UNITS: 100 INJECTION, SOLUTION INTRAVENOUS; SUBCUTANEOUS at 21:53

## 2017-03-21 RX ADMIN — QUETIAPINE FUMARATE 25 MG: 25 TABLET, FILM COATED ORAL at 21:34

## 2017-03-21 RX ADMIN — BUDESONIDE 500 MCG: 0.5 INHALANT RESPIRATORY (INHALATION) at 07:20

## 2017-03-21 RX ADMIN — DIGOXIN 0.12 MG: 0.12 TABLET ORAL at 09:58

## 2017-03-21 RX ADMIN — CARVEDILOL 25 MG: 12.5 TABLET, FILM COATED ORAL at 17:18

## 2017-03-21 RX ADMIN — HYDROCODONE BITARTRATE AND ACETAMINOPHEN 1 TABLET: 5; 325 TABLET ORAL at 06:04

## 2017-03-21 RX ADMIN — HYDROCODONE BITARTRATE AND ACETAMINOPHEN 1 TABLET: 5; 325 TABLET ORAL at 17:13

## 2017-03-21 RX ADMIN — ARFORMOTEROL TARTRATE 15 MCG: 15 SOLUTION RESPIRATORY (INHALATION) at 23:18

## 2017-03-21 RX ADMIN — CARVEDILOL 25 MG: 12.5 TABLET, FILM COATED ORAL at 09:59

## 2017-03-21 RX ADMIN — ANTACID TABLETS 200 MG: 500 TABLET, CHEWABLE ORAL at 11:39

## 2017-03-21 RX ADMIN — BUDESONIDE 500 MCG: 0.5 INHALANT RESPIRATORY (INHALATION) at 23:18

## 2017-03-21 RX ADMIN — ONDANSETRON HYDROCHLORIDE 4 MG: 2 INJECTION INTRAMUSCULAR; INTRAVENOUS at 05:27

## 2017-03-21 RX ADMIN — ONDANSETRON HYDROCHLORIDE 4 MG: 2 INJECTION INTRAMUSCULAR; INTRAVENOUS at 11:39

## 2017-03-21 RX ADMIN — NYSTATIN: 100000 POWDER TOPICAL at 08:03

## 2017-03-21 RX ADMIN — HYDROCODONE BITARTRATE AND ACETAMINOPHEN 1 TABLET: 5; 325 TABLET ORAL at 09:57

## 2017-03-21 RX ADMIN — ARFORMOTEROL TARTRATE 15 MCG: 15 SOLUTION RESPIRATORY (INHALATION) at 07:19

## 2017-03-21 RX ADMIN — METHYLPREDNISOLONE SODIUM SUCCINATE 60 MG: 125 INJECTION, POWDER, FOR SOLUTION INTRAMUSCULAR; INTRAVENOUS at 08:02

## 2017-03-21 RX ADMIN — HYDROCODONE BITARTRATE AND ACETAMINOPHEN 1 TABLET: 5; 325 TABLET ORAL at 21:54

## 2017-03-21 RX ADMIN — NYSTATIN: 100000 POWDER TOPICAL at 21:38

## 2017-03-21 NOTE — PROGRESS NOTES
1930: Assumed care of patient resting in bed, bed in lowest position, call bell with in reach. Patient demonstrated and verbalized on how to call for assistance. Alarm parameters reviewed, on, and audible. 2344: Patient complaining of bilateral leg pain 5/10 Tylenol 650 mg administered by mouth     0045: Patient observed laying in bed resting quietly, no further complaitst of pain voiced at this time. Medication effective     0527: Patient complains of feeling nauseous, Zofran 4mg administered via IV push.    0796: Patient complains of pain 9/10 in her lower back and legs. Norco 5-325mg administered by mouth     0627: Patient denies any further feelings of nausea, medication effective     0704: Patient no longer voices complaints of pain at this time. Pain 0/10, medication effective     0710: Shift summary: Patient experienced pain to lower back and legs through out the night. Pain successfully treated with Tylenol 650mg and Norco 5-325mg. Patient left resting in bed no acute distress, bed in lowest position, call light with in reach.

## 2017-03-21 NOTE — INTERDISCIPLINARY ROUNDS
The Interdisciplinary Team attempted to round in the patients room to discuss the patient's plan of care but she was using the bedside commode.

## 2017-03-21 NOTE — PROGRESS NOTES
NUTRITION FOLLOW-UP    RECOMMENDATIONS/PLAN:   - Continue Cardiac Consistent Carb 1600 kcal diet, appetite improving    NUTRITION ASSESSMENT:   Client Update: 66 yrs old Female with acute respiratory failure, CHF, pulmonary HTN, OHS, UTI, Afib, and bacteremia. Appetite improving over the past several days, appears to be meeting estimated nutrition needs without difficulty at this time. FOOD/NUTRITION INTAKE   Diet Order:  Cardiac Consistent Carb 1600 kcal  Food Allergies: latex/garlic  Average PO Intake:      Patient Vitals for the past 100 hrs:   % Diet Eaten   03/20/17 1800 100 %   03/20/17 1530 100 %   03/20/17 1459 100 %   03/19/17 2228 100 %   03/19/17 1305 50 %   03/19/17 0815 75 %   03/18/17 1757 100 %   03/18/17 0810 100 %   03/17/17 1838 100 %   Pertinent Medications:  [x] Reviewed; lasix, TUMS, colace, norco, solu-medrol, zofran, ppi, warfarin  Insulin:  [x]SSI  [x]Pre-meal   [x]Basal    []Drip  []None  Cultural/Muslim Food Preferences: None Identified ANTHROPOMETRICS  Height: 5' 4.96\" (165 cm)       Weight: 104.5 kg (230 lb 6.4 oz)         BMI: 38.4 kg/m^2 obese (30%-39.9% BMI)   Adm Weight: 254 lbs                Weight change: -24 lbs (fluid loss)  Adjusted Body Weight: 71 kg     NUTRITION-FOCUSED PHYSICAL ASSESSMENT  Skin: intact      GI: last BM 3/21, occasional nausea, no emesis    NUTRITION PRESCRIPTION  Calories: 4751-0258 kcal/day based on 11-14 kcal/kg actual wt  Protein: 113-143 g/day based on 2-2.5 g/kg IBW  CHO: 158 g/day based on 50% of total energy  Fluid: 2925 ml/day based on 1500 + (15 ml/kg >20 kg)     BIOCHEMICAL DATA & MEDICAL TESTS  Pertinent Labs:  [x] Reviewed; POC -345, CO2 33, BUN 36     NUTRITION DIAGNOSES:   1. Altered nutrition related lab values related to DM2 as evidenced by POC BG  mg/dl, HbA1c 7.7 - ongoing  2. Morbid obesity related to h/o excessive energy intake and inadequate physical activity as evidenced by BMI 42.3 kg/m^2 and 203% of IBW.  - ongoing 3- At risk of inadequate oral intake related to chronic respiratory failure as evidenced by PO intake varies 0-100% of meals since adm - progressing     NUTRITION INTERVENTIONS:   INTERVENTIONS:        GOALS:  1. current diet 1. >50% PO intake of meals/ONS, POC BG  mg/dl, prevent skin breakdown by next review 5-7 days     LEARNING NEEDS (Diet, Supplementation, Food/Nutrient-Drug Interaction):   [x] None Identified   [] Education provided/documented      Identified and patient: [] Declined   [] Was not appropriate/indicated        NUTRITION MONITORING /EVALUATION:   Follow PO intake  Monitor renal labs, electrolytes, fluid status  Monitor for additional supplement needs     Previous Recommendations Implemented: yes        Previous Goals Met:  yes      [] Participated in Interdisciplinary Rounds    [x] Interdisciplinary Care Plan Reviewed  DISCHARGE NUTRITION RECOMMENDATIONS ADDRESSED:     [x] Yes- recommended Consistent Carb Low NA diet (see nutrition d/c instructions)     NUTRITION RISK:           [x] At risk                        [] Not currently at risk        Will follow-up per policy.   Jennifer Middleton RD  PAGER:  [phone]

## 2017-03-21 NOTE — PROGRESS NOTES
Hospitalist Progress Note    Patient: Elpidio Caicedo MRN: [id number]  CSN: [id number]    YOB: 1945  Age: 66 y.o. Sex: female    DOA: 2/25/2017 LOS:  LOS: 23 days                Assessment/Plan     Patient Active Problem List   Diagnosis Code    Acute on chronic diastolic CHF (congestive heart failure) ([facility]) I50.33    ALEJANDRINA (acute kidney injury) ([facility] N17.9    Acute exacerbation of CHF (congestive heart failure) ([facility]) I50.9    DM (diabetes mellitus) ([facility] E11.9    Hypertension I10    Respiratory failure ([facility]) J96.90    Acute coronary syndrome ([facility]) I24.9    Obstructive sleep apnea, adult G47.33    Paroxysmal atrial fibrillation ([facility]) I48.0    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 ([facility]) W51.73, E11.65    Dyspnea due to congestive heart failure ([facility]) I50.9    Aspiration pneumonia ([facility]) J69.0    Hyperkalemia E87.5    Elevated troponin R79.89    COPD (chronic obstructive pulmonary disease) ([facility]) J44.9    CHF (congestive heart failure) ([facility]) I50.9    Insufficiency, respiratory, acute ([facility]) R06.89    Infiltrate noted on imaging study R93.8    COPD exacerbation ([facility]) J44.1    Acute on chronic respiratory failure with hypoxia ([facility]) J96.21    Chest pain R07.9    Anxiety F41.9    UTI (urinary tract infection) N39.0    Obesity hypoventilation syndrome ([facility]) E66.2    Pulmonary hypertension, moderate to severe ([facility] I27.2            67 yo female admitted for acute respiratory failure              Resp - acute hypoxic hypercapnic respiratory failure, multifactorial, secondary to diastolic CHF, pulmonary HTN, IMANI/OHS, improving. Possible interstitial lung disease, started on solumedrol. Off high flow oxygen, on NC, continue with BiPAP at night.      ID - E-Coli UTI, MRSA bacteremia. Repeat blood and urine cultures negative. Antibiotics - on vancomycin. Complete course on 04/09/2017. Completed 10 days of diflucan for vaginitis.      [facility] - Monitor HD.    A-fib - continue beta blocker and cardizem, started on coumadin, hold for PICC. Chronic diastolic CHF - continue diuresis, significant improvement in edema  Plan for cath.       Heme/onc - Follow H&H, plts.      Renal - Trend BUN, Cr, follow I/O. Check and replace Mg, K, phos.      Endocrine - Follow FSG, continue with insulin      Neuro/ Pain/ Sedation - pain control, lidoderm patch, on seroquel, complains of anxiety, need to be judicious about narcotics and anxiolytics. She reports that trazodone helps at home.       GI - diabetic diet, .      Prophylaxis - DVT: coumadin, GI: protonix    Plan for PICC, coumadin on hold.             Review of systems  General: No fevers or chills. Cardiovascular: No chest pain or pressure. No palpitations. Pulmonary: on high flow oxygen   Gastrointestinal: No nausea, vomiting.           Physical Exam:  General: Awake, cooperative, no acute distress,     HEENT: NC, Atraumatic. PERRLA, anicteric sclerae. Lungs: CTA Bilaterally. No Wheezing/Rhonchi/Rales. Heart: Regular rhythm,  No murmur, No Rubs, No Gallops  Abdomen: Soft, Non distended, Non tender.  +Bowel sounds,   Extremities: Trace edema, bilateral LE ulcers bandaged  Psych:   Not anxious or agitated.   Neurologic:  No acute neurological deficit.          Vital signs/Intake and Output:  Visit Vitals    /60 (BP 1 Location: Left arm, BP Patient Position: Sitting)    Pulse 75    Temp 96.1 °F (35.6 °C)    Resp 20    Ht 5' 4.96\" (1.65 m)    Wt 104.5 kg (230 lb 6.4 oz)    SpO2 98%    Breastfeeding No    BMI 38.39 kg/m2     Current Shift:  03/21 0701 - 03/21 1900  In: 480 [P.O.:480]  Out: -   Last three shifts:  03/19 1901 - 03/21 0700  In: 2640 [P.O.:1560; I.V.:1080]  Out: 1450 [Urine:1350; Drains:100]            Labs: Results:       Chemistry Recent Labs      03/21/17   0435  03/20/17   0240  03/19/17   1032   GLU  107*  127*  233*   NA  143  144  141   K  4.5  4.9  5.3   CL  105  107  104   CO2  33*  32  32   BUN  36* 35*  30*   CREA  1.15  1.15  1.24   CA  8.5  8.7  9.0   AGAP  5  5  5   BUCR  31*  30*  24*   AP   --   77  95   TP   --   6.1*  7.2   ALB   --   3.0*  3.6   GLOB   --   3.1  3.6   AGRAT   --   1.0  1.0      CBC w/Diff Recent Labs      03/20/17   0240  03/19/17   1032   WBC  8.7  10.7   RBC  3.26*  3.60*   HGB  10.3*  11.3*   HCT  32.5*  36.3   PLT  192  229   GRANS   --   88*   LYMPH   --   6*   EOS   --   0      Cardiac Enzymes No results for input(s): CPK, CKND1, ALESSANDRA in the last 72 hours. No lab exists for component: CKRMB, TROIP   Coagulation Recent Labs      03/21/17   0435  03/20/17   0240   PTP  24.4*  23.3*   INR  2.3*  2.2*       Lipid Panel No results found for: CHOL, CHOLPOCT, CHOLX, CHLST, CHOLV, [id number], HDL, LDL, NLDLCT, DLDL, LDLC, DLDLP, [id number], VLDLC, VLDL, TGL, TGLX, TRIGL, [id number], TRIGP, TGLPOCT, [id number], CHHD, CHHDX   BNP No results for input(s): BNPP in the last 72 hours.    Liver Enzymes Recent Labs      03/20/17   0240   TP  6.1*   ALB  3.0*   AP  77   SGOT  13*      Thyroid Studies Lab Results   Component Value Date/Time    TSH 0.31 01/01/2016 10:20 PM        Procedures/imaging: see electronic medical records for all procedures/Xrays and details which were not copied into this note but were reviewed prior to creation of Plan

## 2017-03-21 NOTE — PROGRESS NOTES
vanco  Rec: 1. 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    ==> vanco    ==> picc    ==> complete course iv abx 4/9    ==> d/c picc at end of rx    ==> repeat blood culture ~4/15   Prescriptions in chart   Stable from ID perspective    Subjective: \"nervous - for cardiac cath when inr normalized\"    \"some nausea\"; No v/d    No cough/sputum    No abd pain     No rash    PE:   Visit Vitals    /60 (BP 1 Location: Left arm, BP Patient Position: Sitting)    Pulse (!) 56    Temp 96.1 °F (35.6 °C)    Resp 20    Ht 5' 4.96\" (1.65 m)    Wt 104.5 kg (230 lb 6.4 oz)    SpO2 98%    Breastfeeding No    BMI 38.39 kg/m2     Awake/ alert - oriented x 3   Heent: O/c clear. No icterus. No thrush No ulcer. Neck: Supple   Chest: rare bibasilar crackles No exp wheeze   CV: Nl s1s2 No murmurs   Abd: Soft NTND obese.  no reboun No masses   Ext:    Tr edema No rash    Lab:     Recent Results (from the past 24 hour(s))   GLUCOSE, POC    Collection Time: 03/20/17  4:11 PM   Result Value Ref Range    Glucose (POC) 294 (H) 70 - 110 mg/dL   GLUCOSE, POC    Collection Time: 03/20/17  9:46 PM   Result Value Ref Range    Glucose (POC) 278 (H) 70 - 110 mg/dL   MAGNESIUM    Collection Time: 03/21/17  4:35 AM   Result Value Ref Range    Magnesium 2.1 1.8 - 2.4 mg/dL   PROTHROMBIN TIME + INR    Collection Time: 03/21/17  4:35 AM   Result Value Ref Range    Prothrombin time 24.4 (H) 11.5 - 15.2 sec    INR 2.3 (H) 0.8 - 1.2     METABOLIC PANEL, BASIC    Collection Time: 03/21/17  4:35 AM   Result Value Ref Range    Sodium 143 136 - 145 mmol/L    Potassium 4.5 3.5 - 5.5 mmol/L    Chloride 105 100 - 108 mmol/L    CO2 33 (H) 21 - 32 mmol/L    Anion gap 5 3.0 - 18 mmol/L    Glucose 107 (H) 74 - 99 mg/dL    BUN 36 (H) 7.0 - 18 MG/DL    Creatinine 1.15 0.6 - 1.3 MG/DL    BUN/Creatinine ratio 31 (H) 12 - 20      GFR est AA 56 (L) >60 ml/min/1.73m2    GFR est non-AA 46 (L) >60 ml/min/1.73m2 Calcium 8.5 8.5 - 10.1 MG/DL   GLUCOSE, POC    Collection Time: 03/21/17  6:31 AM   Result Value Ref Range    Glucose (POC) 222 (H) 70 - 110 mg/dL   GLUCOSE, POC    Collection Time: 03/21/17 11:11 AM   Result Value Ref Range    Glucose (POC) 149 (H) 70 - 110 mg/dL       Meds:     Current Facility-Administered Medications   Medication Dose Route Frequency Provider Last Rate Last Dose    Vancomycin trough due tonight 3/21/17 @ 2230 ( 30 minutes prior to 2300 dose )   1 Each Other [id number] Tisha Walters MD        Warfarin - Hold Warfarin pending PICC placement    Other ONCE Love Lopez MD        HYDROcodone-acetaminophen [facility]) 5-325 mg per tablet 1 Tab  1 Tab Oral Q4H PRN Emma Bowers MD   1 Tab at 03/21/17 0957    sodium chloride (OCEAN) 0.65 % nasal spray 2 Spray  2 Spray Both Nostrils PRN Emma Bowers MD        methylPREDNISolone (PF) (SOLU-MEDROL) injection 60 mg  60 mg IntraVENous Q24H Siva Cruz MD   60 mg at 03/21/17 0802    furosemide (LASIX) injection 20 mg  20 mg IntraVENous BID Dai Bethea MD   20 mg at 03/21/17 0802    traZODone (DESYREL) tablet 50 mg  50 mg Oral QHS Emma Bowers MD   50 mg at 03/20/17 2205    QUEtiapine (SEROquel) tablet 25 mg  25 mg Oral QHS Paula Kendrick MD   25 mg at 03/20/17 2205    Vancomycin - Pharmacy to Dose  1 Each Other Rx Dosing/Monitoring Paula Kendrick MD        vancomycin (VANCOCIN) 1,750 mg in 0.9% sodium chloride 500 mL IVPB  1,750 mg IntraVENous Q24H Paula Kendrick  mL/hr at 03/20/17 2323 1,750 mg at 03/20/17 2323    calcium carbonate (TUMS) chewable tablet 200 mg [elemental]  200 mg Oral QID WITH MEALS Daniel Hodge MD   200 mg at 03/21/17 1139    lidocaine (LIDODERM) 5 % patch 1 Patch  1 Patch TransDERmal Q24H Love Lopez MD   1 Patch at 03/21/17 0950    insulin lispro (HUMALOG) injection 5 Units  5 Units SubCUTAneous Thad Swain MD   5 Units at 03/21/17 1139    insulin glargine (LANTUS) injection 20 Units  20 Units SubCUTAneous QHS Chinyere Denise MD   20 Units at 03/20/17 2205    digoxin (LANOXIN) tablet 0.125 mg  0.125 mg Oral DAILY Abdiel Wei MD   0.125 mg at 03/21/17 9304    carvedilol (COREG) tablet 25 mg  25 mg Oral BID WITH MEALS Abdiel Wei MD   25 mg at 03/21/17 0959    ELECTROLYTE REPLACEMENT PROTOCOL  1 Each Other PRN Chinyere Denise MD        ELECTROLYTE REPLACEMENT PROTOCOL  1 Each Other PRN Chinyere Denise MD        pantoprazole (PROTONIX) tablet 40 mg  40 mg Oral ACB Chinyere Denise MD   40 mg at 03/21/17 2377    polyvinyl alcohol (LIQUIFILM TEARS) 1.4 % ophthalmic solution 1 Drop  1 Drop Both Eyes PRN Chinyere Denise MD        nystatin (MYCOSTATIN) 100,000 unit/gram powder   Topical BID Phyllis Briceno MD        docusate sodium (COLACE) capsule 100 mg  100 mg Oral BID Roz Palm MD   100 mg at 03/21/17 0802    simvastatin (ZOCOR) tablet 10 mg  10 mg Oral QHS Roz Palm MD   10 mg at 03/20/17 2205    aspirin chewable tablet 81 mg  81 mg Oral DAILY Roz Palm MD   81 mg at 03/21/17 0801    arformoterol (BROVANA) neb solution 15 mcg  15 mcg Nebulization BID RT Roz Palm MD   15 mcg at 03/21/17 0719    budesonide (PULMICORT) 500 mcg/2 ml nebulizer suspension  500 mcg Nebulization BID RT Roz Palm MD   500 mcg at 03/21/17 0720    albuterol-ipratropium (DUO-NEB) 2.5 MG-0.5 MG/3 ML  3 mL Nebulization Q4H PRN Suzanne Kidd MD   3 mL at 03/12/17 2016    ondansetron (ZOFRAN) injection 4 mg  4 mg IntraVENous Q6H PRN Roz Palm MD   4 mg at 03/21/17 1139    acetaminophen (TYLENOL) tablet 650 mg  650 mg Oral Q4H PRN Roz Palm MD   650 mg at 03/20/17 2344    insulin lispro (HUMALOG) injection   SubCUTAneous AC&HS Chinyere Denise MD   Stopped at 03/21/17 1140    glucose chewable tablet 16 g  4 Tab Oral PRN Roz Palm MD        glucagon [facility]) injection 1 mg  1 mg IntraMUSCular PRN Roz Palm MD        dextrose (D50W) injection syrg 12.5-25 g  25-50 mL IntraVENous PRN Digna Vega MD        warfarin pharmacy to dose   Other PRN MD Uday Fall  [phone])

## 2017-03-21 NOTE — PROGRESS NOTES
Bedside and Verbal shift change report given to Daniel RN (oncoming nurse) by Mary Lamb RN (offgoing nurse). Report included the following information SBAR, Kardex, ED Summary, Procedure Summary, Intake/Output, MAR, Accordion, Recent Results and Med Rec Status.

## 2017-03-21 NOTE — PROGRESS NOTES
Assumed care; Pt resting in bed; no distress noted; Pt denies pain; bed in low position; Side rails up x 3; Call light and personal belonging within reach. Will continue to monitor. 0730: NO PICC per radiology nurse. INR still elevated. Hold warfarin for today. Recheck in Am.  1652: Pt complains of severe pain. See Mar for intervention.

## 2017-03-21 NOTE — PROGRESS NOTES
Problem: Self Care Deficits Care Plan (Adult)  Goal: *Acute Goals and Plan of Care (Insert Text)  OT STGs Initiated (3/21/17)     1. Patient will perform upper body dressing with supervision/set-up. 2. Patient will perform lower body dressing with minimal assistance/contact guard assist.   3. Patient will perform toilet transfers with supervision/set-up. 4. Patient will perform all aspects of toileting with supervision/set-up. 5. Patient will perform functional activity while standing for 3-5 minutes with CGA     Occupational Therapy Goals    OTG Initiated 3/14/17    1. Patient will perform grooming with supervision/set-up (met)  2. Patient will perform upper body dressing with supervision/set-up. (goal ongoing)  3. Patient will perform lower body dressing with minimal assistance/contact guard assist. (Goal ongoing)  4. Patient will perform toilet transfers with supervision/set-up. (Goal ongoing) CGA  5. Patient will perform all aspects of toileting with supervision/set-up. (Not met)  6. Patient will perform functional activity while standing for 3-5 minutes with CGA (Not addressed)    Occupational Therapy Goals  Initiated 3/6/2017 within 7 day(s). 1. Patient will perform grooming with supervision/set-up   2. Patient will perform upper body dressing with supervision/set-up. 3. Patient will perform lower body dressing with minimal assistance/contact guard assist.  4. Patient will perform toilet transfers with supervision/set-up. 5. Patient will perform all aspects of toileting with supervision/set-up. 6. Patient will participate in upper extremity therapeutic exercise/activities with supervision/set-up for 10 minutes. 7. Patient will utilize energy conservation techniques during functional activities with verbal cues.    8. Patient will perform functional activity while standing for 3-5 minutes with [address] [place] [address]     Patient: Tanya Siddiqui (72 y.o. female)  Date: 3/21/2017  Diagnosis: CHF NYHA class IV, acute, diastolic (HCC)  CHF NYHA class IV, acute, diastolic (HCC) Acute on chronic respiratory failure with hypoxia [facility])       Precautions: Fall      ASSESSMENT :  Based on the objective data described below, the patient presents with acute on chronic respiratory failure with hypoxia. Pt is now on telemetry unit from ICU and has demonstrated improvement with overall function. Pt is now on 3L of nasal canula for oxygen and CGA for transfers and functional mobility using RW. Pt continues to need max assist for LB dressing and is min assist for UB dressing this session. Pt could benefit from continued OT to increase I with ADLs, transfers, mobility, strength and activity tolerance for functional activity. Patient will benefit from skilled intervention to address the above impairments.   Patients rehabilitation potential is considered to be Good  Factors which may influence rehabilitation potential include:   [ ]                None noted  [ ]                Mental ability/status  [ ]                Medical condition  [ ]                Home/family situation and support systems  [ ]                Safety awareness  [ ]                Pain tolerance/management  [ ]                Other:        PLAN :  Recommendations and Planned Interventions:  [X]                  Self Care Training                  [X]           Therapeutic Activities  [X]                  Functional Mobility Training    [ ]           Cognitive Retraining  [X]                  Therapeutic Exercises           [X]           Endurance Activities  [X]                  Balance Training                   [ ]           Neuromuscular Re-Education  [ ]                  Visual/Perceptual Training     [X]      Home Safety Training  [X]                  Patient Education                 [X]           Family Training/Education  [ ]                  Other (comment):     Frequency/Duration: Patient will be followed by occupational therapy 1-2 times per day/4-7 days per week to address goals. Discharge Recommendations: Rehab  Further Equipment Recommendations for Discharge: N/A       SUBJECTIVE:   Patient stated I'm doing better today, still hurting.       OBJECTIVE DATA SUMMARY:   Hospital course since last seen and reason for reevaluation: last re-eval on 3/14/17  Cognitive/Behavioral Status:  Neurologic State: Alert  Orientation Level: Oriented X4  Cognition: Follows commands  Safety/Judgement: Awareness of environment, Fall prevention  Skin: intact  Edema: none noted  Vision/Perceptual:  N/A  Coordination:  Coordination: Generally decreased, functional  Fine Motor Skills-Upper: Left Intact; Right Intact    Gross Motor Skills-Upper: Left Intact; Right Intact  Balance:  Sitting: Intact  Standing: Impaired; With support  Standing - Static: Good  Standing - Dynamic : Good  Strength:  Strength: Generally decreased, functional  Tone & Sensation:  Tone: Normal  Range of Motion:  AROM: Generally decreased, functional  Functional Mobility and Transfers for ADLs:  Bed Mobility:  Supine to Sit: Supervision  Sit to Supine: Minimum assistance  Scooting: Stand-by asssistance  Transfers:  Sit to Stand: Contact guard assistance  ADL Assessment:  Upper Body Dressing: Minimum assistance  Lower Body Dressing: Maximum assistance  ADL Intervention:  Cognitive Retraining  Safety/Judgement: Awareness of environment; Fall prevention     Pain:  Pain Scale 1: Numeric (0 - 10)  Pain Intensity 1: 3  Pain Location 1: Leg  Pain Orientation 1: Left  Pain Description 1: Aching  Pain Intervention(s) 1: Medication (see MAR)  Activity Tolerance:   fair  Please refer to the flowsheet for vital signs taken during this treatment.   After treatment:   [ ] Patient left in no apparent distress sitting up in chair  [X] Patient left in no apparent distress in bed  [X] Call bell left within reach  [ ] Nursing notified  [ ] Caregiver present  [ ] Bed alarm activated COMMUNICATION/EDUCATION:   [X]    Home safety education was provided and the patient/caregiver indicated understanding. [X]    Patient/family have participated as able in goal setting and plan of care. [X]    Patient/family agree to work toward stated goals and plan of care. [ ]    Patient understands intent and goals of therapy, but is neutral about his/her participation. [ ]    Patient is unable to participate in goal setting and plan of care. This patients plan of care is appropriate for delegation to [facility] [place].      Thank you for this referral.  Jag Rodriguez, OTR/L  Time Calculation: 21 mins

## 2017-03-21 NOTE — PROGRESS NOTES
INR = 2.3 today  Continue to hold Warfarin pending PICC placement    [facility]  [phone]

## 2017-03-21 NOTE — ROUTINE PROCESS
8115: Bedside and Verbal shift change report given to BENJI Peters (oncoming nurse) by Divine Alfaro RN   (offgoing nurse). Report included the following information SBAR, Kardex, Intake/Output, MAR, Recent Results and Cardiac Rhythm A-Fib.

## 2017-03-22 ENCOUNTER — APPOINTMENT (OUTPATIENT)
Dept: INTERVENTIONAL RADIOLOGY/VASCULAR | Age: 72
DRG: 286 | End: 2017-03-22
Attending: INTERNAL MEDICINE
Payer: MEDICARE

## 2017-03-22 LAB
ANION GAP BLD CALC-SCNC: 7 MMOL/L (ref 3–18)
BUN SERPL-MCNC: 31 MG/DL (ref 7–18)
BUN/CREAT SERPL: 29 (ref 12–20)
CALCIUM SERPL-MCNC: 8.4 MG/DL (ref 8.5–10.1)
CHLORIDE SERPL-SCNC: 103 MMOL/L (ref 100–108)
CO2 SERPL-SCNC: 33 MMOL/L (ref 21–32)
CREAT SERPL-MCNC: 1.07 MG/DL (ref 0.6–1.3)
GLUCOSE BLD STRIP.AUTO-MCNC: 108 MG/DL (ref 70–110)
GLUCOSE BLD STRIP.AUTO-MCNC: 133 MG/DL (ref 70–110)
GLUCOSE BLD STRIP.AUTO-MCNC: 236 MG/DL (ref 70–110)
GLUCOSE BLD STRIP.AUTO-MCNC: 286 MG/DL (ref 70–110)
GLUCOSE SERPL-MCNC: 113 MG/DL (ref 74–99)
INR PPP: 1.7 (ref 0.8–1.2)
MAGNESIUM SERPL-MCNC: 2 MG/DL (ref 1.8–2.4)
POTASSIUM SERPL-SCNC: 4.3 MMOL/L (ref 3.5–5.5)
PROTHROMBIN TIME: 19.5 SEC (ref 11.5–15.2)
SODIUM SERPL-SCNC: 143 MMOL/L (ref 136–145)

## 2017-03-22 PROCEDURE — 74011000250 HC RX REV CODE- 250: Performed by: INTERNAL MEDICINE

## 2017-03-22 PROCEDURE — 77010033678 HC OXYGEN DAILY

## 2017-03-22 PROCEDURE — C1751 CATH, INF, PER/CENT/MIDLINE: HCPCS

## 2017-03-22 PROCEDURE — 74011250636 HC RX REV CODE- 250/636: Performed by: INTERNAL MEDICINE

## 2017-03-22 PROCEDURE — 74011250637 HC RX REV CODE- 250/637: Performed by: INTERNAL MEDICINE

## 2017-03-22 PROCEDURE — 80048 BASIC METABOLIC PNL TOTAL CA: CPT | Performed by: INTERNAL MEDICINE

## 2017-03-22 PROCEDURE — 4A023N6 MEASUREMENT OF CARDIAC SAMPLING AND PRESSURE, RIGHT HEART, PERCUTANEOUS APPROACH: ICD-10-PCS | Performed by: INTERNAL MEDICINE

## 2017-03-22 PROCEDURE — 94660 CPAP INITIATION&MGMT: CPT

## 2017-03-22 PROCEDURE — 82962 GLUCOSE BLOOD TEST: CPT

## 2017-03-22 PROCEDURE — 02HV33Z INSERTION OF INFUSION DEVICE INTO SUPERIOR VENA CAVA, PERCUTANEOUS APPROACH: ICD-10-PCS | Performed by: RADIOLOGY

## 2017-03-22 PROCEDURE — 36415 COLL VENOUS BLD VENIPUNCTURE: CPT | Performed by: INTERNAL MEDICINE

## 2017-03-22 PROCEDURE — 74011250636 HC RX REV CODE- 250/636: Performed by: HOSPITALIST

## 2017-03-22 PROCEDURE — 85610 PROTHROMBIN TIME: CPT | Performed by: INTERNAL MEDICINE

## 2017-03-22 PROCEDURE — 74011000250 HC RX REV CODE- 250: Performed by: RADIOLOGY

## 2017-03-22 PROCEDURE — 94760 N-INVAS EAR/PLS OXIMETRY 1: CPT

## 2017-03-22 PROCEDURE — 74011250636 HC RX REV CODE- 250/636: Performed by: RADIOLOGY

## 2017-03-22 PROCEDURE — 65660000000 HC RM CCU STEPDOWN

## 2017-03-22 PROCEDURE — 83735 ASSAY OF MAGNESIUM: CPT | Performed by: INTERNAL MEDICINE

## 2017-03-22 PROCEDURE — 74011250637 HC RX REV CODE- 250/637: Performed by: HOSPITALIST

## 2017-03-22 PROCEDURE — 94640 AIRWAY INHALATION TREATMENT: CPT

## 2017-03-22 RX ORDER — HEPARIN SODIUM 200 [USP'U]/100ML
500 INJECTION, SOLUTION INTRAVENOUS
Status: COMPLETED | OUTPATIENT
Start: 2017-03-22 | End: 2017-03-22

## 2017-03-22 RX ORDER — LIDOCAINE HYDROCHLORIDE 10 MG/ML
1-20 INJECTION INFILTRATION; PERINEURAL
Status: COMPLETED | OUTPATIENT
Start: 2017-03-22 | End: 2017-03-22

## 2017-03-22 RX ORDER — ENOXAPARIN SODIUM 100 MG/ML
1 INJECTION SUBCUTANEOUS EVERY 12 HOURS
Status: DISCONTINUED | OUTPATIENT
Start: 2017-03-22 | End: 2017-03-24 | Stop reason: HOSPADM

## 2017-03-22 RX ORDER — LIDOCAINE HYDROCHLORIDE 10 MG/ML
10-30 INJECTION INFILTRATION; PERINEURAL
Status: DISCONTINUED | OUTPATIENT
Start: 2017-03-22 | End: 2017-03-22 | Stop reason: HOSPADM

## 2017-03-22 RX ORDER — MIDAZOLAM HYDROCHLORIDE 1 MG/ML
.5-2 INJECTION, SOLUTION INTRAMUSCULAR; INTRAVENOUS
Status: DISCONTINUED | OUTPATIENT
Start: 2017-03-22 | End: 2017-03-22 | Stop reason: HOSPADM

## 2017-03-22 RX ORDER — HEPARIN SODIUM (PORCINE) LOCK FLUSH IV SOLN 100 UNIT/ML 100 UNIT/ML
500 SOLUTION INTRAVENOUS
Status: COMPLETED | OUTPATIENT
Start: 2017-03-22 | End: 2017-03-22

## 2017-03-22 RX ORDER — BUDESONIDE 0.5 MG/2ML
INHALANT ORAL
Status: DISPENSED
Start: 2017-03-22 | End: 2017-03-22

## 2017-03-22 RX ORDER — HEPARIN SODIUM 200 [USP'U]/100ML
500 INJECTION, SOLUTION INTRAVENOUS
Status: DISCONTINUED | OUTPATIENT
Start: 2017-03-22 | End: 2017-03-22 | Stop reason: HOSPADM

## 2017-03-22 RX ORDER — FENTANYL CITRATE 50 UG/ML
25-100 INJECTION, SOLUTION INTRAMUSCULAR; INTRAVENOUS
Status: DISCONTINUED | OUTPATIENT
Start: 2017-03-22 | End: 2017-03-22 | Stop reason: HOSPADM

## 2017-03-22 RX ADMIN — LIDOCAINE HYDROCHLORIDE 3 ML: 10 INJECTION, SOLUTION INFILTRATION; PERINEURAL at 10:03

## 2017-03-22 RX ADMIN — BUDESONIDE 500 MCG: 0.5 INHALANT RESPIRATORY (INHALATION) at 07:37

## 2017-03-22 RX ADMIN — INSULIN LISPRO 5 UNITS: 100 INJECTION, SOLUTION INTRAVENOUS; SUBCUTANEOUS at 16:51

## 2017-03-22 RX ADMIN — ANTACID TABLETS 200 MG: 500 TABLET, CHEWABLE ORAL at 22:12

## 2017-03-22 RX ADMIN — DOCUSATE SODIUM 100 MG: 100 CAPSULE, LIQUID FILLED ORAL at 08:35

## 2017-03-22 RX ADMIN — INSULIN LISPRO 5 UNITS: 100 INJECTION, SOLUTION INTRAVENOUS; SUBCUTANEOUS at 12:20

## 2017-03-22 RX ADMIN — CARVEDILOL 25 MG: 12.5 TABLET, FILM COATED ORAL at 08:37

## 2017-03-22 RX ADMIN — ARFORMOTEROL TARTRATE 15 MCG: 15 SOLUTION RESPIRATORY (INHALATION) at 07:36

## 2017-03-22 RX ADMIN — HEPARIN SODIUM (PORCINE) LOCK FLUSH IV SOLN 100 UNIT/ML 500 UNITS: 100 SOLUTION at 10:15

## 2017-03-22 RX ADMIN — BUDESONIDE 500 MCG: 0.5 INHALANT RESPIRATORY (INHALATION) at 23:21

## 2017-03-22 RX ADMIN — HYDROCODONE BITARTRATE AND ACETAMINOPHEN 1 TABLET: 5; 325 TABLET ORAL at 06:03

## 2017-03-22 RX ADMIN — HEPARIN SODIUM 1000 UNITS: 200 INJECTION, SOLUTION INTRAVENOUS at 14:51

## 2017-03-22 RX ADMIN — TRAZODONE HYDROCHLORIDE 50 MG: 50 TABLET ORAL at 22:12

## 2017-03-22 RX ADMIN — ANTACID TABLETS 200 MG: 500 TABLET, CHEWABLE ORAL at 12:20

## 2017-03-22 RX ADMIN — DIGOXIN 0.12 MG: 0.12 TABLET ORAL at 08:37

## 2017-03-22 RX ADMIN — ARFORMOTEROL TARTRATE 15 MCG: 15 SOLUTION RESPIRATORY (INHALATION) at 23:20

## 2017-03-22 RX ADMIN — FUROSEMIDE 40 MG: 10 INJECTION, SOLUTION INTRAMUSCULAR; INTRAVENOUS at 18:45

## 2017-03-22 RX ADMIN — ENOXAPARIN SODIUM 100 MG: 100 INJECTION SUBCUTANEOUS at 13:58

## 2017-03-22 RX ADMIN — INSULIN GLARGINE 20 UNITS: 100 INJECTION, SOLUTION SUBCUTANEOUS at 22:12

## 2017-03-22 RX ADMIN — SIMVASTATIN 10 MG: 20 TABLET, FILM COATED ORAL at 22:12

## 2017-03-22 RX ADMIN — LIDOCAINE HYDROCHLORIDE 3 ML: 10 INJECTION, SOLUTION INFILTRATION; PERINEURAL at 15:01

## 2017-03-22 RX ADMIN — CARVEDILOL 25 MG: 12.5 TABLET, FILM COATED ORAL at 16:51

## 2017-03-22 RX ADMIN — INSULIN LISPRO 5 UNITS: 100 INJECTION, SOLUTION INTRAVENOUS; SUBCUTANEOUS at 08:36

## 2017-03-22 RX ADMIN — HYDROCODONE BITARTRATE AND ACETAMINOPHEN 1 TABLET: 5; 325 TABLET ORAL at 16:20

## 2017-03-22 RX ADMIN — NYSTATIN: 100000 POWDER TOPICAL at 08:36

## 2017-03-22 RX ADMIN — INSULIN LISPRO 11 UNITS: 100 INJECTION, SOLUTION INTRAVENOUS; SUBCUTANEOUS at 12:20

## 2017-03-22 RX ADMIN — ASPIRIN 81 MG CHEWABLE TABLET 81 MG: 81 TABLET CHEWABLE at 08:35

## 2017-03-22 RX ADMIN — VANCOMYCIN HYDROCHLORIDE 1500 MG: 10 INJECTION, POWDER, LYOPHILIZED, FOR SOLUTION INTRAVENOUS at 05:17

## 2017-03-22 RX ADMIN — HEPARIN SODIUM 1000 UNITS: 200 INJECTION, SOLUTION INTRAVENOUS at 10:00

## 2017-03-22 RX ADMIN — METHYLPREDNISOLONE SODIUM SUCCINATE 60 MG: 125 INJECTION, POWDER, FOR SOLUTION INTRAMUSCULAR; INTRAVENOUS at 08:36

## 2017-03-22 RX ADMIN — ANTACID TABLETS 200 MG: 500 TABLET, CHEWABLE ORAL at 08:35

## 2017-03-22 RX ADMIN — HYDROCODONE BITARTRATE AND ACETAMINOPHEN 1 TABLET: 5; 325 TABLET ORAL at 22:20

## 2017-03-22 RX ADMIN — FUROSEMIDE 40 MG: 10 INJECTION, SOLUTION INTRAMUSCULAR; INTRAVENOUS at 08:36

## 2017-03-22 RX ADMIN — PANTOPRAZOLE SODIUM 40 MG: 40 TABLET, DELAYED RELEASE ORAL at 05:10

## 2017-03-22 RX ADMIN — QUETIAPINE FUMARATE 25 MG: 25 TABLET, FILM COATED ORAL at 22:12

## 2017-03-22 RX ADMIN — DOCUSATE SODIUM 100 MG: 100 CAPSULE, LIQUID FILLED ORAL at 22:12

## 2017-03-22 RX ADMIN — NYSTATIN: 100000 POWDER TOPICAL at 23:47

## 2017-03-22 RX ADMIN — INSULIN LISPRO 7 UNITS: 100 INJECTION, SOLUTION INTRAVENOUS; SUBCUTANEOUS at 22:13

## 2017-03-22 RX ADMIN — HYDROCODONE BITARTRATE AND ACETAMINOPHEN 1 TABLET: 5; 325 TABLET ORAL at 10:50

## 2017-03-22 RX ADMIN — ANTACID TABLETS 200 MG: 500 TABLET, CHEWABLE ORAL at 16:51

## 2017-03-22 NOTE — PROGRESS NOTES
Problem: Mobility Impaired (Adult and Pediatric)  Goal: *Acute Goals and Plan of Care (Insert Text)  Physical Therapy Goals  Initiated 2/27/2017 and to be accomplished within 7 day(s)  1. Patient will move from supine to sit and sit to supine , scoot up and down and roll side to side in bed with supervision/set-up. 2. Patient will transfer from bed to chair and chair to bed with minimal assistance/contact guard assist using the least restrictive device. 3. Patient will perform sit to stand with minimal assistance/contact guard assist.  4. Patient will ambulate with minimal assistance/contact guard assist for 50 feet with the least restrictive device. Re-evaluation on 3/6/2017 and to be accomplished within 7 day(s)  1. Patient will move from supine to sit and sit to supine, scoot up and down and roll side to side in bed with supervision/set-up. 2. Patient will transfer from bed to chair and chair to bed with contact guard assist/supervision using the least restrictive device. 3. Patient will perform sit to stand with contact guard assist/supervision. 4. Patient will ambulate with contact guard assist/supervision for 100 feet with the least restrictive device. Re-evaluation on 3/14/2017. Goals to be accomplished within 7 days. 1. Patient will move from supine to sit and sit to supine, scoot up and down and roll side to side in bed with supervision/set-up. 2. Patient will transfer from bed to chair and chair to bed with contact guard assist/supervision using the least restrictive device. 3. Patient will perform sit to stand with contact guard assist/supervision. 4. Patient will ambulate with contact guard assist/supervision for 50 feet with the least restrictive device. 5. Patient will be independent with HEP to maximize strength and muscular endurance. Attempting PT x 2 today. At [phone] pt refuses due to being tired from receiving PICC.  At 1401 pt being transferred off floor for cardiac cath. Will f/u tomorrow.

## 2017-03-22 NOTE — PROGRESS NOTES
Cardiology Progress Note        Patient: Karen Banegas        Sex: female          DOA: 2/25/2017  YOB: 1945      Age:  66 y.o.        LOS:  LOS: 23 days    Patient seen and examined. Chart reviewed. Assessment/Plan      Acute hypoxic respiratory failure  Moderate Aortic stenosis  Permanent atrial fibrillation  Hypertension  Pulmonary hypertension  Obesity     Plan:   Patient is in positive balance, change Lasix to 40 mg IV BID  Continue beta blocker,digoxin, aspirin and statin. CMP, Mg and PT/INR tomorrow                  Subjective:    cc:  Denies any chest pain or shortness of breath       REVIEW OF SYSTEMS:     General: No fevers or chills. Cardiovascular: Positive leg swelling, No chest pain,No palpitations, No orthopnea, No PND, No claudication  Pulmonary: Positive dyspnea. Gastrointestinal: No nausea, vomiting, bleeding  Neurology: No Dizziness    Objective:      Visit Vitals    /46 (BP 1 Location: Left arm, BP Patient Position: Sitting)    Pulse 77    Temp 96 °F (35.6 °C)    Resp 20    Ht 5' 4.96\" (1.65 m)    Wt 104.5 kg (230 lb 6.4 oz)    SpO2 96%    Breastfeeding No    BMI 38.39 kg/m2     Body mass index is 38.39 kg/(m^2). Physical Exam:  General Appearance: Comfortable,obese, not using accessory muscles of respiration. HEENT: SIRIA. HEAD: Atraumatic  NECK: No JVD, no thyroidomeglay. CAROTIDS: No bruit  LUNGS: Clear bilaterally. HEART: S1+S2 audible, irregularly irregular, 2/6 systolic murmur in aortic area, no pericardial rub. ABD: Non-tender, BS Audible    EXT: ++ leg edema, and no cyanosis. VASCULAR EXAM: Pulses are intact. PSYCHIATRIC EXAM: Mood is appropriate. MUSCULOSKELETAL: Grossly no joint deformity. NEUROLOGICAL: AAO times 3, No motor and sensory deficit.   Medication:  Current Facility-Administered Medications   Medication Dose Route Frequency    Vancomycin trough due tonight 3/21/17 @ 2230 ( 30 minutes prior to 2300 dose )   1 Each Other ONCE    [START ON 3/22/2017] furosemide (LASIX) injection 40 mg  40 mg IntraVENous BID    HYDROcodone-acetaminophen (NORCO) 5-325 mg per tablet 1 Tab  1 Tab Oral Q4H PRN    sodium chloride (OCEAN) 0.65 % nasal spray 2 Spray  2 Spray Both Nostrils PRN    methylPREDNISolone (PF) (SOLU-MEDROL) injection 60 mg  60 mg IntraVENous Q24H    traZODone (DESYREL) tablet 50 mg  50 mg Oral QHS    QUEtiapine (SEROquel) tablet 25 mg  25 mg Oral QHS    Vancomycin - Pharmacy to Dose  1 Each Other Rx Dosing/Monitoring    vancomycin (VANCOCIN) 1,750 mg in 0.9% sodium chloride 500 mL IVPB  1,750 mg IntraVENous Q24H    calcium carbonate (TUMS) chewable tablet 200 mg [elemental]  200 mg Oral QID WITH MEALS    lidocaine (LIDODERM) 5 % patch 1 Patch  1 Patch TransDERmal Q24H    insulin lispro (HUMALOG) injection 5 Units  5 Units SubCUTAneous TIDAC    insulin glargine (LANTUS) injection 20 Units  20 Units SubCUTAneous QHS    digoxin (LANOXIN) tablet 0.125 mg  0.125 mg Oral DAILY    carvedilol (COREG) tablet 25 mg  25 mg Oral BID WITH MEALS    ELECTROLYTE REPLACEMENT PROTOCOL  1 Each Other PRN    ELECTROLYTE REPLACEMENT PROTOCOL  1 Each Other PRN    pantoprazole (PROTONIX) tablet 40 mg  40 mg Oral ACB    polyvinyl alcohol (LIQUIFILM TEARS) 1.4 % ophthalmic solution 1 Drop  1 Drop Both Eyes PRN    nystatin (MYCOSTATIN) 100,000 unit/gram powder   Topical BID    docusate sodium (COLACE) capsule 100 mg  100 mg Oral BID    simvastatin (ZOCOR) tablet 10 mg  10 mg Oral QHS    aspirin chewable tablet 81 mg  81 mg Oral DAILY    arformoterol (BROVANA) neb solution 15 mcg  15 mcg Nebulization BID RT    budesonide (PULMICORT) 500 mcg/2 ml nebulizer suspension  500 mcg Nebulization BID RT    albuterol-ipratropium (DUO-NEB) 2.5 MG-0.5 MG/3 ML  3 mL Nebulization Q4H PRN    ondansetron (ZOFRAN) injection 4 mg  4 mg IntraVENous Q6H PRN    acetaminophen (TYLENOL) tablet 650 mg  650 mg Oral Q4H PRN    insulin lispro (HUMALOG) injection   SubCUTAneous AC&HS    glucose chewable tablet 16 g  4 Tab Oral PRN    glucagon (GLUCAGEN) injection 1 mg  1 mg IntraMUSCular PRN    dextrose (D50W) injection syrg 12.5-25 g  25-50 mL IntraVENous PRN    warfarin pharmacy to dose   Other PRN               Lab/Data Reviewed:       Recent Labs      03/20/17   0240  03/19/17   1032   WBC  8.7  10.7   HGB  10.3*  11.3*   HCT  32.5*  36.3   PLT  192  229     Recent Labs      03/21/17   0435  03/20/17   0240  03/19/17   1032   NA  143  144  141   K  4.5  4.9  5.3   CL  105  107  104   CO2  33*  32  32   GLU  107*  127*  233*   BUN  36*  35*  30*   CREA  1.15  1.15  1.24   CA  8.5  8.7  9.0       Signed By: Lauro Horvath MD     March 21, 2017

## 2017-03-22 NOTE — H&P
Date of Surgery Update:  Car Brooke was seen and examined. History and physical has been reviewed. The patient has been examined.  There have been no significant clinical changes since the completion of the originally dated History and Physical.    Signed By: Willy Simms MD     March 22, 2017 3:41 PM

## 2017-03-22 NOTE — DIABETES MGMT
Diabetes Patient/Family Education Record    Factors That  May Influence Patients Ability  to Learn or  Comply With  Recommendations:    []   Language barrier    []   Cultural needs   [x]   Motivation    []   Cognitive limitation    []   Physical   []   Education    []   Physiological factors   []   Hearing/vision/speaking impairment   []   Confucianist beliefs    []   Financial factors   []  Other:   []  No factors identified at this time.      Person Instructed:   [x]   Patient   []   Family   []  Other     Preference for Learning:   [x]   Verbal   [x]   Written   []  Demonstration     Level of Comprehension & Competence:    []  Good                                      [] Fair                                     []  Poor                             []  Needs Reinforcement   []  Teachback completed    Education Component:   [x]  Medication management, attempted confirmation of home regimen   [x]  Nutritional management, encouraged consistent intake   []  Exercise   []  Signs, symptoms, and treatment of hyperglycemia and hypoglycemia   []  Prevention, recognition and treatment of hyperglycemia and hypoglycemia   []  Importance of blood glucose monitoring and how to obtain a blood glucose meter    []  Instruction on use of blood glucose meter   [x]  Discuss the importance of HbA1C monitoring and provide patient with  results   []  Sick day guidelines   []  Proper use and disposal of lancets, needles, syringes or insulin pens (if appropriate)   []  Potential long-term complications (retinopathy, kidney disease, neuropathy, heart disease, stroke, vascular disease, foot care)   [] Provide emergency contact number and contact number for more information    [x]  Goal:  Patient/family will demonstrate understanding of Diabetes Self Management Skills by: (date) __4/15/17___  Plan for post-discharge education or self-management support:    [x] Outpatient class schedule provided            [] Patient Declined    [] Scheduled for outpatient classes (date) _______           M.  Ronny Matthews, MPH, RD

## 2017-03-22 NOTE — PROGRESS NOTES
Problem: Mobility Impaired (Adult and Pediatric)  Goal: *Acute Goals and Plan of Care (Insert Text)  Physical Therapy Goals  Initiated 2/27/2017 and to be accomplished within 7 day(s)  1. Patient will move from supine to sit and sit to supine , scoot up and down and roll side to side in bed with supervision/set-up. 2. Patient will transfer from bed to chair and chair to bed with minimal assistance/contact guard assist using the least restrictive device. 3. Patient will perform sit to stand with minimal assistance/contact guard assist.  4. Patient will ambulate with minimal assistance/contact guard assist for 50 feet with the least restrictive device. Re-evaluation on 3/6/2017 and to be accomplished within 7 day(s)  1. Patient will move from supine to sit and sit to supine, scoot up and down and roll side to side in bed with supervision/set-up. 2. Patient will transfer from bed to chair and chair to bed with contact guard assist/supervision using the least restrictive device. 3. Patient will perform sit to stand with contact guard assist/supervision. 4. Patient will ambulate with contact guard assist/supervision for 100 feet with the least restrictive device. Re-evaluation on 3/14/2017. Goals to be accomplished within 7 days. 1. Patient will move from supine to sit and sit to supine, scoot up and down and roll side to side in bed with supervision/set-up. 2. Patient will transfer from bed to chair and chair to bed with contact guard assist/supervision using the least restrictive device. 3. Patient will perform sit to stand with contact guard assist/supervision. 4. Patient will ambulate with contact guard assist/supervision for 50 feet with the least restrictive device. 5. Patient will be independent with HEP to maximize strength and muscular endurance.    Outcome: Progressing Towards Goal  PHYSICAL THERAPY RE-EVALUATION AND TREATMENT     Patient: Renu Garcia (89 y.o. female)  Date: 3/21/2017 (late entry)  Diagnosis: CHF NYHA class IV, acute, diastolic (HCC)  CHF NYHA class IV, acute, diastolic (HCC) Acute on chronic respiratory failure with hypoxia (HCC)  Precautions: Fall      ASSESSMENT:  Distance amb increased. Pt eager to progress. Patient's progression toward goals since last assessment:        PLAN:  Goals have been updated based on progression since last assessment. Patient continues to benefit from skilled intervention to address the above impairments. Continue to follow the patient daily to address goals. Planned Interventions:  [X]     Bed Mobility Training          [ ]     Neuromuscular Re-Education  [X]     Transfer Training                [ ]    Orthotic/Prosthetic Training  [X]     Gait Training                       [ ]     Modalities  [X]     Therapeutic Exercises       [ ]     Edema Management/Control  [X]     Therapeutic Activities         [X]     Patient and Family Training/Education  [ ]     Other (comment):  Discharge Recommendations: Andrés Nelson  Further Equipment Recommendations for Discharge: N/A       SUBJECTIVE:   Patient stated I'm ready.       OBJECTIVE DATA SUMMARY:   Critical Behavior:  Neurologic State: Alert  Orientation Level: Oriented X4  Cognition: Follows commands  Safety/Judgement: Awareness of environment, Fall prevention  Functional Mobility:  Bed Mobility:  Supine to Sit: Supervision  Sit to Supine: Minimum assistance  Scooting: Stand-by asssistance  Transfers:  Sit to Stand: Contact guard assistance  Stand to Sit: Contact guard assistance  Balance:   Sitting: Intact  Standing: Impaired; With support  Standing - Static: Good  Standing - Dynamic : Good  Ambulation/Gait Training:  Distance (ft): 60 Feet (ft) (30ft x 2)  Assistive Device: Gait belt;Walker, rolling  Ambulation - Level of Assistance: Contact guard assistance  Gait Abnormalities: Decreased step clearance  Base of Support: Widened  Speed/Kristin: Slow  Step Length: Right shortened;Left shortened  Swing Pattern: Left asymmetrical;Right asymmetrical  Interventions: Safety awareness training;Verbal cues  Pain:  Pain Scale 1: Numeric (0 - 10)  Pain Intensity 1: 0  Pain Location 1: Leg  Pain Orientation 1: Left  Pain Description 1: Aching  Pain Intervention(s) 1: Medication (see MAR)  Activity Tolerance:   fair  Please refer to the flowsheet for vital signs taken during this treatment.   After treatment:   [ ]  Patient left in no apparent distress sitting up in chair  [X]  Patient left in no apparent distress in bed  [X]  Call bell left within reach  [ ]  Nursing notified  [ ]  Caregiver present  [ ]  Bed alarm activated     Lindsey Quan PT   Time Calculation: 21 mins

## 2017-03-22 NOTE — PROGRESS NOTES
Assumed care; Pt sitting in bed, ongoing breathing treatment; no distress noted; Pt denies pain; bed in low position; Side rails up x 3; Call light and personal belonging within reach. Will continue to monitor. 0800: Pt in chair, eating breakfast. Pt encourage to eat meal in chair. 0900: Report received. Ok to get PICC. Pt notified. Assisted to [facility], and back to bed.

## 2017-03-22 NOTE — ROUTINE PROCESS
TRANSFER - OUT REPORT:  Verbal report given to [facility] RN on Paulino Posey being transferred to [facility] for routine post - op   Report consisted of patients Situation, Background, Assessment and   Recommendations(SBAR). Information from the following report(s) SBAR was reviewed with the receiving nurse. Cath Lab Report:    Procedure:  [ ] LHC  [x ] RHC  [ ] PTCA   [ ] Peripheral   [ ] Pacemaker [ ] EMMANUELLE  [ ] [address]    Access site:   [ ] Radial     [x ] Brachial     [ ] Femoral    [ ] Jugular   [ ] Chest Wall       Sheath:           [x ] Pulled in Cath Lab   [ ] In place   [ ] To be pulled after:         Closure:          [ ] TR Band [ ] Radial Band  [ ] Right [ ] Left    [x ] Manual Pressure     [ ] Angio Seal     [ ] Star Close    [ ] Per Close    [ ] Safe Guard    Site Assessment:   [x ] Clean, Dry, No bleeding    [ ] Minor oozing          [ ] Hematoma: Description:    Lines:                Patient Vitals for the past 4 hrs:   Temp Pulse Resp BP SpO2   03/22/17 1538 98.1 °F (36.7 °C) 82 28 (!) 216/86 94 %   03/22/17 1421 98.3 °F (36.8 °C) 72 24 (!) 206/84 96 %         Extended / Orthostatic Vitals:    Vital Signs  Level of Consciousness: Alert (03/22/17 1538)  Temp: 98.1 °F (36.7 °C) (03/22/17 1538)  Temp Source: Oral (03/22/17 1538)  Pulse (Heart Rate): 82 (03/22/17 1538)  Heart Rate Source: Monitor (03/22/17 1538)  Cardiac Rhythm: Atrial fibrillation (03/22/17 1538)  Resp Rate: 28 (03/22/17 1538)  BP: (!) 216/86 (03/22/17 1538)  MAP (Calculated): 129 (03/22/17 1538)  BP 1 Location: Right leg (03/22/17 1538)  BP 1 Method: Automatic (03/22/17 1538)  BP Patient Position: Supine; Head of bed elevated (Comment degrees) (30) (03/22/17 1538)  MEWS Score: 4 (03/22/17 1538)         Oxygen Therapy  O2 Sat (%): 94 % (03/22/17 1538)  Pulse via Oximetry: 75 beats per minute (03/21/17 0722)  O2 Device: Nasal cannula (03/22/17 1538)  O2 Flow Rate (L/min): 3 l/min (03/22/17 1538)  FIO2 (%): 40 % (03/22/17 3332)          Opportunity for questions and clarification was provided.

## 2017-03-22 NOTE — ROUTINE PROCESS
TRANSFER - IN REPORT:    Verbal report received from [facility] RN on Lydia Apa  being received from Rohan Beaulieu for ordered procedure      Report consisted of patients Situation, Background, Assessment and   Recommendations(SBAR). Information from the following report(s) SBAR was reviewed with the receiving nurse. Opportunity for questions and clarification was provided. Assessment completed upon patients arrival to unit and care assumed.        Sheath:                            Peripheral Intravenous Line:  Peripheral IV 03/18/17 Left Antecubital (Active)   Site Assessment Clean, dry, & intact 3/22/2017  8:30 AM   Phlebitis Assessment 0 3/22/2017  8:30 AM   Infiltration Assessment 0 3/22/2017  8:30 AM   Dressing Status Clean, dry, & intact 3/22/2017  8:30 AM   Dressing Type Transparent 3/22/2017  8:30 AM   Hub Color/Line Status Flushed;Capped 3/22/2017  8:30 AM   Action Taken Open ports on tubing capped 3/20/2017  7:40 PM   Alcohol Cap Used Yes 3/22/2017  8:30 AM       Extended / Orthostatic Vitals:    Vital Signs  Level of Consciousness: Alert (03/22/17 1104)  Temp: 97.6 °F (36.4 °C) (03/22/17 1104)  Temp Source: Oral (03/22/17 1104)  Pulse (Heart Rate): 83 (03/22/17 1104)  Heart Rate Source: Monitor (03/22/17 0706)  Cardiac Rhythm: Atrial fibrillation (03/22/17 0830)  Resp Rate: 16 (03/22/17 1104)  BP: 156/54 (03/22/17 0706)  MAP (Calculated): 88 (03/22/17 0706)  BP 1 Location: Left arm (03/22/17 1104)  BP 1 Method: Automatic (03/22/17 1104)  BP Patient Position: Sitting (03/22/17 1104)  MEWS Score: 1 (03/22/17 0706)         Oxygen Therapy  O2 Sat (%): 98 % (03/22/17 1104)  Pulse via Oximetry: 75 beats per minute (03/21/17 0722)  O2 Device: Nasal cannula (03/22/17 1104)  O2 Flow Rate (L/min): 3 l/min (03/22/17 1104)  FIO2 (%): 40 % (03/22/17 0052)    Accompanied by Cath Lab [facility]

## 2017-03-22 NOTE — DIABETES MGMT
NUTRITION / GLYCEMIC CONTROL PLAN OF CARE        Diabetes Management:    - recommend: continue current regimen, monitor trends, may benefit from adjusting mealtime dose (continues to require corrective coverage)   - education: given per JACKY LOCO RD (3/22)   - goals: progressing   - BG rene be in target range of 140-180 mg/dl by 3/27/17   - PO intake will average at least 75% of meals offered (met)   - subjective: pt reports appetite is good, states is nervous about PICC line this AM, also stated wants to go to [facility] instead of Rehab      - TDD: 49 units (Lantus 20 units + Humalog AC 15 units + Humalog 14 units custom scale corrective coverage)  - BG range: 107 mg/dl-222 mg/dl  - Hypo: none  - BG in target range (non-ICU: <140; ICU<180): [] Yes  [x] No  - Steroids: IV Solu-medrol 60 mg q 24 hours  - Intake: good     Patient Vitals for the past 100 hrs:   % Diet Eaten   03/21/17 1802 100 %   03/21/17 1139 100 %   03/21/17 0801 100 %   03/21/17 0546 0 %   03/20/17 1800 100 %   03/20/17 1530 100 %   03/20/17 1459 100 %   03/20/17 0915 0 %   03/19/17 2228 100 %   03/19/17 1305 50 %   03/19/17 0815 75 %   03/18/17 1757 100 %   03/18/17 0810 100 %        Current Insulin regimen:   - Lantus 20 units daily  - Humalog 5 units TID AC  - Humalog Custom very insulin resistant sliding scale    Home medication/insulin regimen:  - Lantus 25 units daily  - Humalog AC + HS sliding scale    Recent Glucose Results:   Lab Results   Component Value Date/Time     (H) 03/22/2017 04:40 AM    GLUCPOC 108 03/22/2017 06:05 AM    GLUCPOC 221 (H) 03/21/2017 09:15 PM    GLUCPOC 144 (H) 03/21/2017 04:08 PM             Adequate glycemic control PTA:  [] Yes  [x] No    HbA1c: equivalent to ave BGlucose of 174 mg/dl for 2-3 months prior to admission    Lab Results   Component Value Date/Time    Hemoglobin A1c 7.7 02/25/2017 04:21 PM       Diet:   Active Orders   Diet    DIET CARDIAC Regular; Consistent Carb 1500-1600kcal       BELINDA Larsen Pauline Saunders, MPH, RD  Glycemic Control Team

## 2017-03-22 NOTE — PROGRESS NOTES
Pharmacy Dosing Services: Vancomycin    Consult for Vancomycin Dosing by Pharmacy by Dr. Diego Bravo provided for this 66y.o. year old female , for indication of bloodstream infection. Day of Therapy 10    Ht Readings from Last 1 Encounters:   03/14/17 165 cm (64.96\")        Wt Readings from Last 1 Encounters:   03/21/17 104.5 kg (230 lb 6.4 oz)      Previous Regimen Vancomycin 1750 mg IV every 24 hours   Last Level Trough = 21.5 mcg/mL at 2230 on 3/21/17   Serum Creatinine Lab Results   Component Value Date/Time    Creatinine 1.15 03/21/2017 04:35 AM      Creatinine Clearance Estimated Creatinine Clearance: 53 mL/min (based on Cr of 1.15). BUN Lab Results   Component Value Date/Time    BUN 36 03/21/2017 04:35 AM      WBC Lab Results   Component Value Date/Time    WBC 8.7 03/20/2017 02:40 AM      H/H Lab Results   Component Value Date/Time    HGB 10.3 03/20/2017 02:40 AM      Platelets Lab Results   Component Value Date/Time    PLATELET 338 58/75/2759 02:40 AM      Temp 97.8 °F (36.6 °C)     Follow with maintenance dose of Vancomycin 1500 mg IV every 24 hours starting at 0500 on 3/22/17    Dose calculated to approximate a therapeutic trough of 15-20 mcg/mL. Pharmacy to follow daily and will make changes to dose and/or frequency based on clinical status.     Pharmacist Sean Coronado, [address]

## 2017-03-22 NOTE — PROGRESS NOTES
Hansa Muller TRANSFER - OUT REPORT:    Verbal report given to Catina Onofre on Ac Gee  being transferred to [address] [place] [address](unit) for routine post - op       Report consisted of patients Situation, Background, Assessment and   Recommendations(SBAR). Information from the following report(s) SBAR, Kardex and MAR was reviewed with the receiving nurse. Lines:   PICC Double Lumen 74/05/63 Right;Basilic (Active)       Peripheral IV 03/18/17 Left Antecubital (Active)   Site Assessment Clean, dry, & intact 3/22/2017  8:30 AM   Phlebitis Assessment 0 3/22/2017  8:30 AM   Infiltration Assessment 0 3/22/2017  8:30 AM   Dressing Status Clean, dry, & intact 3/22/2017  8:30 AM   Dressing Type Transparent 3/22/2017  8:30 AM   Hub Color/Line Status Flushed;Capped 3/22/2017  8:30 AM   Action Taken Open ports on tubing capped 3/20/2017  7:40 PM   Alcohol Cap Used Yes 3/22/2017  8:30 AM        Opportunity for questions and clarification was provided. PICC ready to use.     Patient transported with:   [employer]

## 2017-03-22 NOTE — PROGRESS NOTES
Hospitalist Progress Note    Patient: Fatimah Bautista MRN: [id number]  CSN: [id number]    YOB: 1945  Age: 66 y.o. Sex: female    DOA: 2/25/2017 LOS:  LOS: 24 days                Assessment/Plan     Patient Active Problem List   Diagnosis Code    Acute on chronic diastolic CHF (congestive heart failure) ([facility]) I50.33    ALEJANDRINA (acute kidney injury) ([facility] N17.9    Acute exacerbation of CHF (congestive heart failure) ([facility]) I50.9    DM (diabetes mellitus) ([facility] E11.9    Hypertension I10    Respiratory failure ([facility]) J96.90    Acute coronary syndrome ([facility]) I24.9    Obstructive sleep apnea, adult G47.33    Paroxysmal atrial fibrillation ([facility]) I48.0    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 ([facility]) B82.65, E11.65    Dyspnea due to congestive heart failure ([facility]) I50.9    Aspiration pneumonia ([facility]) J69.0    Hyperkalemia E87.5    Elevated troponin R79.89    COPD (chronic obstructive pulmonary disease) ([facility]) J44.9    CHF (congestive heart failure) ([facility]) I50.9    Insufficiency, respiratory, acute ([facility]) R06.89    Infiltrate noted on imaging study R93.8    COPD exacerbation ([facility]) J44.1    Acute on chronic respiratory failure with hypoxia ([facility]) J96.21    Chest pain R07.9    Anxiety F41.9    UTI (urinary tract infection) N39.0    Obesity hypoventilation syndrome ([facility]) E66.2    Pulmonary hypertension, moderate to severe ([facility] I27.2            67 yo female admitted for acute respiratory failure              Resp - acute hypoxic hypercapnic respiratory failure, multifactorial, secondary to diastolic CHF, pulmonary HTN, IMANI/OHS, improving. Possible interstitial lung disease, started on solumedrol. Off high flow oxygen, on NC at [place], continue with BiPAP at night.      ID - E-Coli UTI, MRSA bacteremia. Repeat blood and urine cultures negative. Antibiotics - on vancomycin. Complete course on 04/09/2017.   Completed 10 days of diflucan for vaginitis.      [facility] - Monitor HD.   A-fib - continue beta blocker and cardizem, started on lovenox, bridge to coumadin which was on hold for PICC. Chronic diastolic CHF - continue diuresis, significant improvement in edema  s/p right heart cath, RHC revealed PCWP 40 mm hg and Mean Pulmonary artery pressure 51 mm hg.  Cardiac out put normal .       Heme/onc - Follow H&H, plts.      Renal - Trend BUN, Cr, follow I/O. Check and replace Mg, K, phos.      Endocrine - Follow FSG, continue with insulin      Neuro/ Pain/ Sedation - pain control, lidoderm patch, on seroquel, complains of anxiety, need to be judicious about narcotics and anxiolytics.     GI - diabetic diet, .      Prophylaxis - DVT: lovenox/coumadin, GI: protonix. Called [facility], spoke with Dr. Chrissy Reyna (pulmonary), who recommended outpatient appointment. The hospitalist Dr. Flako Ruiz will not accept the patient. Discharge planning to rehab, patient needs to be on BiPAP at night and this is the major factor for her multiple admissions to the hospital.                   Review of systems  General: No fevers or chills. Cardiovascular: No chest pain or pressure. No palpitations. Pulmonary: on high flow oxygen   Gastrointestinal: No nausea, vomiting.           Physical Exam:  General: Awake, cooperative, no acute distress,     HEENT: NC, Atraumatic. PERRLA, anicteric sclerae. Lungs: CTA Bilaterally. No Wheezing/Rhonchi/Rales. Heart: Regular rhythm,  No murmur, No Rubs, No Gallops  Abdomen: Soft, Non distended, Non tender.  +Bowel sounds,   Extremities: Trace edema, bilateral LE ulcers bandaged  Psych:   Not anxious or agitated.   Neurologic:  No acute neurological deficit.             Vital signs/Intake and Output:  Visit Vitals    /53 (BP 1 Location: Left leg)    Pulse 72    Temp 98 °F (36.7 °C)    Resp 20    Ht 5' 4.96\" (1.65 m)    Wt 104.3 kg (229 lb 15 oz)    SpO2 99%    Breastfeeding No    BMI 38.31 kg/m2     Current Shift: 03/22 0701 - 03/22 1900  In: 360 [P.O.:360]  Out: 775 [Urine:775]  Last three shifts:  03/20 1901 - 03/22 0700  In: 1700 [P.O.:1140; I.V.:560]  Out: 1000 [Urine:1000]            Labs: Results:       Chemistry Recent Labs      03/22/17 0440 03/21/17   0435  03/20/17 0240   GLU  113*  107*  127*   NA  143  143  144   K  4.3  4.5  4.9   CL  103  105  107   CO2  33*  33*  32   BUN  31*  36*  35*   CREA  1.07  1.15  1.15   CA  8.4*  8.5  8.7   AGAP  7  5  5   BUCR  29*  31*  30*   AP   --    --   77   TP   --    --   6.1*   ALB   --    --   3.0*   GLOB   --    --   3.1   AGRAT   --    --   1.0      CBC w/Diff Recent Labs      03/20/17 0240   WBC  8.7   RBC  3.26*   HGB  10.3*   HCT  32.5*   PLT  192      Cardiac Enzymes No results for input(s): CPK, CKND1, ALESSANDRA in the last 72 hours. No lab exists for component: CKRMB, TROIP   Coagulation Recent Labs      03/22/17 0440 03/21/17 0435   PTP  19.5*  24.4*   INR  1.7*  2.3*       Lipid Panel No results found for: CHOL, CHOLPOCT, CHOLX, CHLST, CHOLV, [id number], HDL, LDL, NLDLCT, DLDL, LDLC, DLDLP, 326340, VLDLC, VLDL, TGL, TGLX, TRIGL, [id number], TRIGP, TGLPOCT, [id number], CHHD, CHHDX   BNP No results for input(s): BNPP in the last 72 hours.    Liver Enzymes Recent Labs      03/20/17 0240   TP  6.1*   ALB  3.0*   AP  77   SGOT  13*      Thyroid Studies Lab Results   Component Value Date/Time    TSH 0.31 01/01/2016 10:20 PM        Procedures/imaging: see electronic medical records for all procedures/Xrays and details which were not copied into this note but were reviewed prior to creation of Plan

## 2017-03-22 NOTE — PROCEDURES
Right heart catheterization done via right anticubital vein. Patient tolerated procedure. RHC revealed PCWP 40 mm hg and Mean Pulmonary artery pressure 51 mm hg.  Cardiac out put normal.    Patient tolerated procedure. No specimen removed. Plan discussed with patient and .

## 2017-03-22 NOTE — PROGRESS NOTES
Pharmacy Dosing Services: Warfarin    Consult provided for this 66 y.o.  female , for indication of Atrial Fibrillation. Dose to achieve an INR goal of 2-3  Patient had heart catheterization today. Warfarin currently on HOLD today 3/22/17 (per Dr. Britta High / Dr. Juana Vogel, as communicated by RN, [facility])  Patient has current order for:  Enoxaparin 100 mg SQ q12h     LABS    PT/INR Lab Results   Component Value Date/Time    INR 1.7 03/22/2017 04:40 AM      Platelets Lab Results   Component Value Date/Time    PLATELET 702 82/52/7974 02:40 AM      H/H     Lab Results   Component Value Date/Time    HGB 10.3 03/20/2017 02:40 AM        Warfarin Administrations (last 168 hours)       Date/Time Action Medication Dose      03/21/17 1800 Given    Warfarin - Hold Warfarin pending PICC placement      03/19/17 1757 Given    warfarin (COUMADIN) tablet 3 mg 3 mg    03/18/17 1739 Given    warfarin (COUMADIN) tablet 2.5 mg 2.5 mg    03/17/17 1838 Given    warfarin (COUMADIN) tablet 2.5 mg 2.5 mg    03/16/17 1840 Given    warfarin (COUMADIN) tablet 2.5 mg 2.5 mg    03/15/17 1722 Given    warfarin (COUMADIN) tablet 2 mg 2 mg          Pharmacy to follow daily and will provide subsequent Warfarin dosing based on clinical status.   YA Blancas  Contact information [phone]

## 2017-03-22 NOTE — ROUTINE PROCESS
Bedside and Verbal shift change report given to Kilo Arce RN (oncoming nurse) by Renan Cortez RN (offgoing nurse). Report included the following information SBAR, Kardex and MAR. Patient had no acute events overnight. Currently resting in NAD.

## 2017-03-22 NOTE — PROGRESS NOTES
OT note: Hold pt for now as pt transferred off floor for procedure. Will re-attempt treatment again tomorrow.  Thank you Karla Pitts OTR/L

## 2017-03-22 NOTE — ROUTINE PROCESS
Cardiac Cath Lab:  Pre Procedure Chart Check    Patients chart was accessed and reviewed for possible and/or scheduled procedure. Creatinine Clearance:  CREATININE: 1.07 MG/DL (03/22/17 0440)  Estimated creatinine clearance: 56.9 mL/min    Total Contrast  Load:  3 x estimated clearance amount=   170.7    ml    75% of Contrast Load:  0.75 x Total Contrast Load=  128.0      ml    Recent Labs      03/22/17   0440   03/20/17   0240   WBC   --    --   8.7   RBC   --    --   3.26*   HCT   --    --   32.5*   HGB   --    --   10.3*   PLT   --    --   192   INR  1.7*   < >  2.2*   PTP  19.5*   < >  23.3*   NA  143   < >  144   K  4.3   < >  4.9   BUN  31*   < >  35*   CREA  1.07   < >  1.15   GFRAA  >60   < >  56*   GFRNA  50*   < >  46*   CA  8.4*   < >  8.7    < > = values in this interval not displayed. BMI: Body mass index is 38.31 kg/(m^2).     ALLERGIES:   Allergies   Allergen Reactions    Latex Hives    Adhesive Tape-Silicones Unknown (comments)    Bees [Sting, Bee] Unknown (comments)    Garlic Nausea and Vomiting    Zoloft [Sertraline] Hives       Lines:     PICC Double Lumen 62/29/05 Right;Basilic (Active)     Peripheral IV 03/18/17 Left Antecubital (Active)   Site Assessment Clean, dry, & intact 3/22/2017  8:30 AM   Phlebitis Assessment 0 3/22/2017  8:30 AM   Infiltration Assessment 0 3/22/2017  8:30 AM   Dressing Status Clean, dry, & intact 3/22/2017  8:30 AM   Dressing Type Transparent 3/22/2017  8:30 AM   Hub Color/Line Status Flushed;Capped 3/22/2017  8:30 AM   Action Taken Open ports on tubing capped 3/20/2017  7:40 PM   Alcohol Cap Used Yes 3/22/2017  8:30 AM          History:    Past Medical History:   Diagnosis Date    [facility]fib [facility]     Arthritis     Back injury     Chronic obstructive pulmonary disease (HCC)     Diabetes ([facility]     Fibromyalgia     Heart failure ([facility]     Hypertension     MRSA (methicillin resistant Staphylococcus aureus)     Obesity hypoventilation syndrome ([facility] 2/28/2017    Pulmonary hypertension, moderate to severe ([facility] 2/28/2017     Past Surgical History:   Procedure Laterality Date    CARDIAC SURG PROCEDURE UNLIST      HX CORONARY STENT PLACEMENT      HX KNEE REPLACEMENT      HX ORTHOPAEDIC      bilateral knee replacement     Patient Active Problem List   Diagnosis Code    Acute on chronic diastolic CHF (congestive heart failure) ([facility]) I50.33    ALEJANDRINA (acute kidney injury) ([facility] N17.9    Acute exacerbation of CHF (congestive heart failure) ([facility]) I50.9    DM (diabetes mellitus) ([facility] E11.9    Hypertension I10    Respiratory failure ([facility]) J96.90    Acute coronary syndrome ([facility]) I24.9    Obstructive sleep apnea, adult G47.33    Paroxysmal atrial fibrillation ([facility]) I48.0    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 ([facility]) S54.61, E11.65    Dyspnea due to congestive heart failure ([facility]) I50.9    Aspiration pneumonia ([facility]) J69.0    Hyperkalemia E87.5    Elevated troponin R79.89    COPD (chronic obstructive pulmonary disease) ([facility]) J44.9    CHF (congestive heart failure) ([facility]) I50.9    Insufficiency, respiratory, acute ([facility]) R06.89    Infiltrate noted on imaging study R93.8    COPD exacerbation ([facility]) J44.1    Acute on chronic respiratory failure with hypoxia ([facility]) J96.21    Chest pain R07.9    Anxiety F41.9    UTI (urinary tract infection) N39.0    Obesity hypoventilation syndrome ([facility]) E66.2    Pulmonary hypertension, moderate to severe ([facility]) I27.2

## 2017-03-23 LAB
ANION GAP BLD CALC-SCNC: 2 MMOL/L (ref 3–18)
BUN SERPL-MCNC: 32 MG/DL (ref 7–18)
BUN/CREAT SERPL: 30 (ref 12–20)
CALCIUM SERPL-MCNC: 8.4 MG/DL (ref 8.5–10.1)
CHLORIDE SERPL-SCNC: 104 MMOL/L (ref 100–108)
CO2 SERPL-SCNC: 35 MMOL/L (ref 21–32)
CREAT SERPL-MCNC: 1.06 MG/DL (ref 0.6–1.3)
GLUCOSE BLD STRIP.AUTO-MCNC: 110 MG/DL (ref 70–110)
GLUCOSE BLD STRIP.AUTO-MCNC: 126 MG/DL (ref 70–110)
GLUCOSE BLD STRIP.AUTO-MCNC: 170 MG/DL (ref 70–110)
GLUCOSE BLD STRIP.AUTO-MCNC: 289 MG/DL (ref 70–110)
GLUCOSE SERPL-MCNC: 198 MG/DL (ref 74–99)
HCT VFR BLD AUTO: 34.1 % (ref 35–45)
HGB BLD-MCNC: 10.8 G/DL (ref 12–16)
INR PPP: 1.5 (ref 0.8–1.2)
MAGNESIUM SERPL-MCNC: 1.9 MG/DL (ref 1.8–2.4)
PLATELET # BLD AUTO: 241 K/UL (ref 135–420)
POTASSIUM SERPL-SCNC: 3.9 MMOL/L (ref 3.5–5.5)
PROTHROMBIN TIME: 17.2 SEC (ref 11.5–15.2)
SODIUM SERPL-SCNC: 141 MMOL/L (ref 136–145)

## 2017-03-23 PROCEDURE — 85049 AUTOMATED PLATELET COUNT: CPT | Performed by: INTERNAL MEDICINE

## 2017-03-23 PROCEDURE — 83735 ASSAY OF MAGNESIUM: CPT | Performed by: INTERNAL MEDICINE

## 2017-03-23 PROCEDURE — 82962 GLUCOSE BLOOD TEST: CPT

## 2017-03-23 PROCEDURE — 74011250637 HC RX REV CODE- 250/637: Performed by: HOSPITALIST

## 2017-03-23 PROCEDURE — 74011250636 HC RX REV CODE- 250/636: Performed by: INTERNAL MEDICINE

## 2017-03-23 PROCEDURE — 94760 N-INVAS EAR/PLS OXIMETRY 1: CPT

## 2017-03-23 PROCEDURE — 97116 GAIT TRAINING THERAPY: CPT

## 2017-03-23 PROCEDURE — 74011250637 HC RX REV CODE- 250/637: Performed by: INTERNAL MEDICINE

## 2017-03-23 PROCEDURE — 97110 THERAPEUTIC EXERCISES: CPT

## 2017-03-23 PROCEDURE — 80048 BASIC METABOLIC PNL TOTAL CA: CPT | Performed by: INTERNAL MEDICINE

## 2017-03-23 PROCEDURE — 94640 AIRWAY INHALATION TREATMENT: CPT

## 2017-03-23 PROCEDURE — 74011000250 HC RX REV CODE- 250: Performed by: INTERNAL MEDICINE

## 2017-03-23 PROCEDURE — 77010033678 HC OXYGEN DAILY

## 2017-03-23 PROCEDURE — 65660000000 HC RM CCU STEPDOWN

## 2017-03-23 PROCEDURE — 94660 CPAP INITIATION&MGMT: CPT

## 2017-03-23 PROCEDURE — 36415 COLL VENOUS BLD VENIPUNCTURE: CPT | Performed by: INTERNAL MEDICINE

## 2017-03-23 PROCEDURE — 85610 PROTHROMBIN TIME: CPT | Performed by: INTERNAL MEDICINE

## 2017-03-23 PROCEDURE — 85018 HEMOGLOBIN: CPT | Performed by: INTERNAL MEDICINE

## 2017-03-23 PROCEDURE — 74011250636 HC RX REV CODE- 250/636: Performed by: HOSPITALIST

## 2017-03-23 RX ORDER — FUROSEMIDE 40 MG/1
40 TABLET ORAL 2 TIMES DAILY
Qty: 120 TAB | Refills: 1 | Status: SHIPPED | OUTPATIENT
Start: 2017-03-23

## 2017-03-23 RX ORDER — INSULIN GLARGINE 100 [IU]/ML
20 INJECTION, SOLUTION SUBCUTANEOUS
Qty: 1 VIAL | Refills: 3 | Status: SHIPPED
Start: 2017-03-23

## 2017-03-23 RX ORDER — QUETIAPINE FUMARATE 25 MG/1
25 TABLET, FILM COATED ORAL
Qty: 90 TAB | Refills: 1 | Status: SHIPPED | OUTPATIENT
Start: 2017-03-23

## 2017-03-23 RX ORDER — TRAZODONE HYDROCHLORIDE 50 MG/1
50 TABLET ORAL
Qty: 60 TAB | Refills: 1 | Status: SHIPPED | OUTPATIENT
Start: 2017-03-23

## 2017-03-23 RX ORDER — WARFARIN 4 MG/1
4 TABLET ORAL ONCE
Qty: 60 TAB | Refills: 0 | Status: SHIPPED
Start: 2017-03-23 | End: 2017-03-23

## 2017-03-23 RX ORDER — LIDOCAINE 50 MG/G
PATCH TOPICAL
Qty: 30 EACH | Refills: 0 | Status: SHIPPED
Start: 2017-03-23

## 2017-03-23 RX ORDER — WARFARIN 4 MG/1
4 TABLET ORAL ONCE
Status: COMPLETED | OUTPATIENT
Start: 2017-03-23 | End: 2017-03-23

## 2017-03-23 RX ORDER — DIGOXIN 125 MCG
0.12 TABLET ORAL DAILY
Qty: 90 TAB | Refills: 1 | Status: SHIPPED | OUTPATIENT
Start: 2017-03-23

## 2017-03-23 RX ORDER — BUTALBITAL, ACETAMINOPHEN, CAFFEINE AND CODEINE PHOSPHATE 50; 325; 40; 30 MG/1; MG/1; MG/1; MG/1
1 CAPSULE ORAL
Qty: 30 CAP | Refills: 1 | Status: SHIPPED | OUTPATIENT
Start: 2017-03-23

## 2017-03-23 RX ORDER — HYDROCODONE BITARTRATE AND ACETAMINOPHEN 5; 325 MG/1; MG/1
1 TABLET ORAL
Qty: 45 TAB | Refills: 0 | Status: SHIPPED | OUTPATIENT
Start: 2017-03-23

## 2017-03-23 RX ADMIN — HYDROCODONE BITARTRATE AND ACETAMINOPHEN 1 TABLET: 5; 325 TABLET ORAL at 09:06

## 2017-03-23 RX ADMIN — INSULIN LISPRO 5 UNITS: 100 INJECTION, SOLUTION INTRAVENOUS; SUBCUTANEOUS at 17:17

## 2017-03-23 RX ADMIN — CARVEDILOL 25 MG: 12.5 TABLET, FILM COATED ORAL at 09:06

## 2017-03-23 RX ADMIN — HYDROCODONE BITARTRATE AND ACETAMINOPHEN 1 TABLET: 5; 325 TABLET ORAL at 22:03

## 2017-03-23 RX ADMIN — DOCUSATE SODIUM 100 MG: 100 CAPSULE, LIQUID FILLED ORAL at 09:06

## 2017-03-23 RX ADMIN — ANTACID TABLETS 200 MG: 500 TABLET, CHEWABLE ORAL at 12:08

## 2017-03-23 RX ADMIN — INSULIN LISPRO 5 UNITS: 100 INJECTION, SOLUTION INTRAVENOUS; SUBCUTANEOUS at 12:09

## 2017-03-23 RX ADMIN — ONDANSETRON HYDROCHLORIDE 4 MG: 2 INJECTION INTRAMUSCULAR; INTRAVENOUS at 09:07

## 2017-03-23 RX ADMIN — INSULIN GLARGINE 20 UNITS: 100 INJECTION, SOLUTION SUBCUTANEOUS at 21:55

## 2017-03-23 RX ADMIN — NYSTATIN: 100000 POWDER TOPICAL at 21:58

## 2017-03-23 RX ADMIN — INSULIN LISPRO 11 UNITS: 100 INJECTION, SOLUTION INTRAVENOUS; SUBCUTANEOUS at 12:08

## 2017-03-23 RX ADMIN — WARFARIN SODIUM 4 MG: 4 TABLET ORAL at 17:16

## 2017-03-23 RX ADMIN — ENOXAPARIN SODIUM 100 MG: 100 INJECTION SUBCUTANEOUS at 03:11

## 2017-03-23 RX ADMIN — FUROSEMIDE 40 MG: 10 INJECTION, SOLUTION INTRAMUSCULAR; INTRAVENOUS at 09:06

## 2017-03-23 RX ADMIN — VANCOMYCIN HYDROCHLORIDE 1500 MG: 10 INJECTION, POWDER, LYOPHILIZED, FOR SOLUTION INTRAVENOUS at 05:33

## 2017-03-23 RX ADMIN — ACETAMINOPHEN 650 MG: 325 TABLET ORAL at 06:19

## 2017-03-23 RX ADMIN — ANTACID TABLETS 200 MG: 500 TABLET, CHEWABLE ORAL at 09:06

## 2017-03-23 RX ADMIN — ARFORMOTEROL TARTRATE 15 MCG: 15 SOLUTION RESPIRATORY (INHALATION) at 23:35

## 2017-03-23 RX ADMIN — ENOXAPARIN SODIUM 100 MG: 100 INJECTION SUBCUTANEOUS at 13:53

## 2017-03-23 RX ADMIN — SIMVASTATIN 10 MG: 20 TABLET, FILM COATED ORAL at 21:55

## 2017-03-23 RX ADMIN — METHYLPREDNISOLONE SODIUM SUCCINATE 60 MG: 125 INJECTION, POWDER, FOR SOLUTION INTRAMUSCULAR; INTRAVENOUS at 09:06

## 2017-03-23 RX ADMIN — ANTACID TABLETS 200 MG: 500 TABLET, CHEWABLE ORAL at 17:16

## 2017-03-23 RX ADMIN — ASPIRIN 81 MG CHEWABLE TABLET 81 MG: 81 TABLET CHEWABLE at 09:06

## 2017-03-23 RX ADMIN — ARFORMOTEROL TARTRATE 15 MCG: 15 SOLUTION RESPIRATORY (INHALATION) at 07:05

## 2017-03-23 RX ADMIN — PANTOPRAZOLE SODIUM 40 MG: 40 TABLET, DELAYED RELEASE ORAL at 05:33

## 2017-03-23 RX ADMIN — FUROSEMIDE 40 MG: 10 INJECTION, SOLUTION INTRAMUSCULAR; INTRAVENOUS at 20:39

## 2017-03-23 RX ADMIN — DIGOXIN 0.12 MG: 0.12 TABLET ORAL at 09:06

## 2017-03-23 RX ADMIN — HYDROCODONE BITARTRATE AND ACETAMINOPHEN 1 TABLET: 5; 325 TABLET ORAL at 17:27

## 2017-03-23 RX ADMIN — NYSTATIN: 100000 POWDER TOPICAL at 12:10

## 2017-03-23 RX ADMIN — BUDESONIDE 500 MCG: 0.5 INHALANT RESPIRATORY (INHALATION) at 07:05

## 2017-03-23 RX ADMIN — ANTACID TABLETS 200 MG: 500 TABLET, CHEWABLE ORAL at 21:55

## 2017-03-23 RX ADMIN — BUDESONIDE 500 MCG: 0.5 INHALANT RESPIRATORY (INHALATION) at 23:35

## 2017-03-23 RX ADMIN — HYDROCODONE BITARTRATE AND ACETAMINOPHEN 1 TABLET: 5; 325 TABLET ORAL at 03:11

## 2017-03-23 RX ADMIN — CARVEDILOL 25 MG: 12.5 TABLET, FILM COATED ORAL at 17:16

## 2017-03-23 RX ADMIN — HYDROCODONE BITARTRATE AND ACETAMINOPHEN 1 TABLET: 5; 325 TABLET ORAL at 13:53

## 2017-03-23 RX ADMIN — QUETIAPINE FUMARATE 25 MG: 25 TABLET, FILM COATED ORAL at 21:55

## 2017-03-23 RX ADMIN — DOCUSATE SODIUM 100 MG: 100 CAPSULE, LIQUID FILLED ORAL at 21:55

## 2017-03-23 RX ADMIN — TRAZODONE HYDROCHLORIDE 50 MG: 50 TABLET ORAL at 21:56

## 2017-03-23 NOTE — DISCHARGE INSTRUCTIONS
Atrial Fibrillation: Care Instructions  Your Care Instructions    Atrial fibrillation is an irregular and often fast heartbeat. Treating this condition is important for several reasons. It can cause blood clots, which can travel from your heart to your brain and cause a stroke. If you have a fast heartbeat, you may feel lightheaded, dizzy, and weak. An irregular heartbeat can also increase your risk for heart failure. Atrial fibrillation is often the result of another heart condition, such as high blood pressure or coronary artery disease. Making changes to improve your heart condition will help you stay healthy and active. Follow-up care is a key part of your treatment and safety. Be sure to make and go to all appointments, and call your doctor if you are having problems. It's also a good idea to know your test results and keep a list of the medicines you take. How can you care for yourself at home? Medicines  · Take your medicines exactly as prescribed. Call your doctor if you think you are having a problem with your medicine. You will get more details on the specific medicines your doctor prescribes. · If your doctor has given you a blood thinner to prevent a stroke, be sure you get instructions about how to take your medicine safely. Blood thinners can cause serious bleeding problems. · Do not take any vitamins, over-the-counter drugs, or herbal products without talking to your doctor first.  Lifestyle changes  · Do not smoke. Smoking can increase your chance of a stroke and heart attack. If you need help quitting, talk to your doctor about stop-smoking programs and medicines. These can increase your chances of quitting for good. · Eat a heart-healthy diet. · Stay at a healthy weight. Lose weight if you need to. · Limit alcohol to 2 drinks a day for men and 1 drink a day for women. Too much alcohol can cause health problems. · Avoid colds and flu. Get a pneumococcal vaccine shot.  If you have had one before, ask your doctor whether you need another dose. Get a flu shot every year. If you must be around people with colds or flu, wash your hands often. Activity  · If your doctor recommends it, get more exercise. Walking is a good choice. Bit by bit, increase the amount you walk every day. Try for at least 30 minutes on most days of the week. You also may want to swim, bike, or do other activities. Your doctor may suggest that you join a cardiac rehabilitation program so that you can have help increasing your physical activity safely. · Start light exercise if your doctor says it is okay. Even a small amount will help you get stronger, have more energy, and manage stress. Walking is an easy way to get exercise. Start out by walking a little more than you did in the hospital. Gradually increase the amount you walk. · When you exercise, watch for signs that your heart is working too hard. You are pushing too hard if you cannot talk while you are exercising. If you become short of breath or dizzy or have chest pain, sit down and rest immediately. · Check your pulse regularly. Place two fingers on the artery at the palm side of your wrist, in line with your thumb. If your heartbeat seems uneven or fast, talk to your doctor. When should you call for help? Call 911 anytime you think you may need emergency care. For example, call if:  · You have symptoms of a heart attack. These may include:  ¨ Chest pain or pressure, or a strange feeling in the chest.  ¨ Sweating. ¨ Shortness of breath. ¨ Nausea or vomiting. ¨ Pain, pressure, or a strange feeling in the back, neck, jaw, or upper belly or in one or both shoulders or arms. ¨ Lightheadedness or sudden weakness. ¨ A fast or irregular heartbeat. After you call 911, the  may tell you to chew 1 adult-strength or 2 to 4 low-dose aspirin. Wait for an ambulance. Do not try to drive yourself. · You have symptoms of a stroke.  These may include:  ¨ Sudden numbness, tingling, weakness, or loss of movement in your face, arm, or leg, especially on only one side of your body. ¨ Sudden vision changes. ¨ Sudden trouble speaking. ¨ Sudden confusion or trouble understanding simple statements. ¨ Sudden problems with walking or balance. ¨ A sudden, severe headache that is different from past headaches. · You passed out (lost consciousness). Call your doctor now or seek immediate medical care if:  · You have new or increased shortness of breath. · You feel dizzy or lightheaded, or you feel like you may faint. · Your heart rate becomes irregular. · You can feel your heart flutter in your chest or skip heartbeats. Tell your doctor if these symptoms are new or worse. Watch closely for changes in your health, and be sure to contact your doctor if you have any problems. Where can you learn more? Go to http://perez-surjit.info/. Enter U020 in the search box to learn more about \"Atrial Fibrillation: Care Instructions. \"  Current as of: January 27, 2016  Content Version: 11.1  © 0463-4005 [employer]. Care instructions adapted under license by [employer] (which disclaims liability or warranty for this information). If you have questions about a medical condition or this instruction, always ask your healthcare professional. [address] any warranty or liability for your use of this information. Cardiac Rehabilitation: Care Instructions  Your Care Instructions    Cardiac rehabilitation is a program for people who have a heart problem, such as a heart attack, heart failure, or a heart valve disease. The program includes exercise, lifestyle changes, education, and emotional support. Cardiac rehab can help you improve the quality of your life through better overall health. It can help you lose weight and feel better about yourself.   On your cardiac rehab team, you may have your doctor, a nurse specialist, a dietitian, and a physical therapist. They will design your cardiac rehab program specifically for you. You will learn how to reduce your risk for heart problems, how to manage stress, and how to eat a heart-healthy diet. By the end of the program, you will be ready to maintain a healthier lifestyle on your own. Follow-up care is a key part of your treatment and safety. Be sure to make and go to all appointments, and call your doctor if you are having problems. It's also a good idea to know your test results and keep a list of the medicines you take. How can you care for yourself at home? · Take your medicines exactly as prescribed. Call your doctor if you think you are having a problem with your medicine. You will get more details on the specific medicines your doctor prescribes. · Weigh yourself every day if your doctor tells you to. Watch for sudden weight gain. Weigh yourself on the same scale with the same amount of clothing at the same time of day. · Plan your meals so that you are eating heart-healthy foods. ¨ Eat a variety of foods daily. Fresh fruits and vegetables and whole-grains are good choices. ¨ Limit your fat intake, especially saturated and trans fat. ¨ Limit salt (sodium). ¨ Increase fiber in your diet. ¨ Limit alcohol. · Learn how to take your pulse so that you can track your heart rate during exercise. · Always check with your doctor before you begin a new exercise program.  · Warm up before you exercise and cool down afterward for at least 15 minutes each. This will help your heart gradually prepare for and recover from exercise and avoid pushing your heart too hard. · Stop exercising if you have any unusual discomfort, such as chest pain. · Do not smoke. Smoking can make heart problems worse. If you need help quitting, talk to your doctor about stop-smoking programs and medicines. These can increase your chances of quitting for good.   When should you call for help?  Call 911 anytime you think you may need emergency care. For example, call if:  · You have severe trouble breathing. · You cough up pink, foamy mucus and you have trouble breathing. · You have symptoms of a heart attack. These may include:  ¨ Chest pain or pressure, or a strange feeling in the chest.  ¨ Sweating. ¨ Shortness of breath. ¨ Nausea or vomiting. ¨ Pain, pressure, or a strange feeling in the back, neck, jaw, or upper belly or in one or both shoulders or arms. ¨ Lightheadedness or sudden weakness. ¨ A fast or irregular heartbeat. After you call 911, the  may tell you to chew 1 adult-strength or 2 to 4 low-dose aspirin. Wait for an ambulance. Do not try to drive yourself. · You have angina symptoms (such as chest pain or pressure) that do not go away with rest or are not getting better within 5 minutes after you take a dose of nitroglycerin. · You have symptoms of a stroke. These may include:  ¨ Sudden numbness, tingling, weakness, or loss of movement in your face, arm, or leg, especially on only one side of your body. ¨ Sudden vision changes. ¨ Sudden trouble speaking. ¨ Sudden confusion or trouble understanding simple statements. ¨ Sudden problems with walking or balance. ¨ A sudden, severe headache that is different from past headaches. · You passed out (lost consciousness). Call your doctor now or seek immediate medical care if:  · You have new or increased shortness of breath. · You are dizzy or lightheaded, or you feel like you may faint. · You gain weight suddenly, such as 3 pounds or more in 2 to 3 days. (Your doctor may suggest a different range of weight gain.)  · You have increased swelling in your legs, ankles, or feet. Watch closely for changes in your health, and be sure to contact your doctor if you have any problems. Where can you learn more? Go to http://yanni.info/.   Enter Z214 in the search box to learn more about \"Cardiac Rehabilitation: Care Instructions. \"  Current as of: May 5, 2016  Content Version: 11.1  © 1089-7491 [employer]. Care instructions adapted under license by [employer] (which disclaims liability or warranty for this information). If you have questions about a medical condition or this instruction, always ask your healthcare professional. [place] [address] any warranty or liability for your use of this information. Nutrition discharge recommendations: Low sodium, consistent carb diet    Lab Results   Component Value Date/Time    Hemoglobin A1c 7.7 02/25/2017 04:21 PM       This lab test reflects that your blood sugar averaged  174 mg/dl over the past 3 months. It is important to follow up with your provider on a routine basis to continue to evaluate your blood sugar and discuss any necessary changes in treatment.

## 2017-03-23 NOTE — PROGRESS NOTES
Daily Progress Note: 3/23/2017 2:33 PM   Admit Date: 2/25/2017    Patient seen in follow up for multiple medical problems as listed below:  Patient Active Problem List   Diagnosis Code    Acute on chronic diastolic CHF (congestive heart failure) ([facility]) I50.33    ALEJANDRINA (acute kidney injury) ([facility] N17.9    Acute exacerbation of CHF (congestive heart failure) ([facility]) I50.9    DM [facility]diabetes mellitus) ([facility] E11.9    Hypertension I10    Respiratory failure ([facility]) J96.90    Acute coronary syndrome ([facility]) I24.9    Obstructive sleep apnea, adult G47.33    Paroxysmal atrial fibrillation ([facility]) I48.0    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 ([facility]) C44.32, E11.65    Dyspnea due to congestive heart failure ([facility]) I50.9    Aspiration pneumonia ([facility]) J69.0    Hyperkalemia E87.5    Elevated troponin R79.89    COPD (chronic obstructive pulmonary disease) ([facility]) J44.9    CHF (congestive heart failure) ([facility]) I50.9    Insufficiency, respiratory, acute ([facility]) R06.89    Infiltrate noted on imaging study R93.8    COPD exacerbation ([facility]) J44.1    Acute on chronic respiratory failure with hypoxia ([facility]) J96.21    Chest pain R07.9    Anxiety F41.9    UTI (urinary tract infection) N39.0    Obesity hypoventilation syndrome ([facility]) E66.2    Pulmonary hypertension, moderate to severe ([facility]) I27.2       Assesment     66 y.o. female with mulitple medical problems listed below including moderate pulm htn, hermelinda/ohs and copd was recently discharged from this facility to SNF. Pt was discharged on BIPAP qhs. Pt reports that she was not able to get Her bipap therapy from SNF. Day prior to admission, she finally had bipap but was not functioning. Also noted increase in leg swellings Pt reports her breathing status continue to declined henc brought to ED. Pt diagnosed with diastolic heart failure acute on chronic, and hypercapnic respiratory failure due to OHS and and moderate pulmonary hypertension.  was placed on bipap overnight and admitted to the ICU. TTE showed preserved EF and diastolic dysfunction however left atrial dilation consistent with hypervolemia. Initially with UTI treated with rocephin x 7 days. She was later found to have MRSA Bacteremia and will require IV Vancomycin until 4/9 per ID consultation. TTE showed no vegitations. She was given a 1 weeks course of IV solumedrol for possible ILD per Dr Donna Leger which did appear to help the patient she was down to 2L 02 (reports 3L at home normaly). She needs to continue Bipap qHS at settings below. She is severely deconditioned and requires continued PT to strengthen herself. She is on optimal cardiac regimen with digoxin, coreg, and now PO lasix 40mg BID. INR subtheraputic due to coumadin hold for her PICC placed on 3/22. Right heart catheterization performed 3/22 revealed PCWP 40 mm hg and Mean Pulmonary artery pressure 51 mm hg. Cardiac output normal.    · Acute hypoxic respiratory failure, likely due to pulmonary congestion - resolved  · Likely severe HFPEF given severe LA dilation and CXR - improved  · Moderate pulmonary Hypertension, likely combined group 2 (HFPEF) and group 3 (OHS)  · OHS, decompensated resulting in hypercapnic respiratory failure - improved  · Acute diastolic Heart failure - treated. · UTI- 7d rocephin Tx  · Afib.-rate controlled  · Recurrent admissions for resp failure (10x this year)  · Home O2 @3L/min - currently @ 2    DVT Protocol Active: yes  Code Status:  Full Code     Disposition:Appropriate for DC to LTAC>SNF    Subjective:     CC: Respiratory Distress    Interval History: [facility] refused admission yesterday which was appropriate. Patient is ready for discharge. Discussed this with patient and .  not willing to accept however patient more understanding or her cardiac and pulmonary disease. She needs close pulmonary and cardiac follow-up. Down to 2L 02 and no LE edema.     She is weak and needs rehab or possibly LTAC.       Objective:     Visit Vitals    /70    Pulse 84    Temp 97.9 °F (36.6 °C)    Resp 16    Ht 5' 4.96\" (1.65 m)    Wt 106.7 kg (235 lb 3.7 oz)    SpO2 96%    Breastfeeding No    BMI 39.19 kg/m2       Temp (24hrs), Av.9 °F (36.6 °C), Min:97.7 °F (36.5 °C), Max:98.2 °F (36.8 °C)        Intake/Output Summary (Last 24 hours) at 17 1433  Last data filed at 17 1339   Gross per 24 hour   Intake              720 ml   Output             1800 ml   Net            -1080 ml       Gen: AOx3, NAD  HEENT:  JALEN, EOMI. Neck: No Bruits/JVD   Lungs:   CTAB. Good respiratory effort  Heart:   iRR S1 S2 without M/R/G  Abdomen: ND,NT, BSX4,   Extremities:   No LE edema. No cyanosis. Skin:  no jaundice. Wounds with bandages on legs.        Data Review:     Meds/Labs/Tests reviewed    Current Shift:  701 - 1900  In: 720 [P.O.:720]  Out: 850 [Urine:850]  Last three shifts:  1901 -  07  In: 360 [P.O.:360]  Out: 1925 [Urine:1475; Drains:450]  Recent Labs      17   HGB  10.8*   HCT  34.1*   PLT  241       Recent Labs      17   0227  17   0440  17   0435   BUN  32*  31*  36*   CREA  1.06  1.07  1.15   CA  8.4*  8.4*  8.5   K  3.9  4.3  4.5   NA  141  143  143   CL  104  103  105   CO2  35*  33*  33*   GLU  198*  113*  107*        Lab Results   Component Value Date/Time    Glucose 198 2017 02:27 AM    Glucose 113 2017 04:40 AM    Glucose 107 2017 04:35 AM    Glucose 127 2017 02:40 AM    Glucose 233 2017 10:32 AM          Care coordination with Nursing/Consultants/staff: 5  Prior history, labs, and charting reviewed: `5    Procedures/Imaging:  See DC summary  RHC and PICC 3/22    Total time spent with chart review, patient examination/education, discussion with staff on case,documentation and medication management / adjustment  :  39 Minutes      Dr [facility]  Hospitalist Division  Pager: [phone]

## 2017-03-23 NOTE — PROGRESS NOTES
1915:  Bedside and Verbal shift change report received from Lesia Kraus RN (offgoing nurse) to Nae Khan RN (oncoming nurse). Report included the following information SBAR, Kardex, Intake/Output, MAR, Recent Results and Cardiac Rhythm SR. Pt resting in bed. Appears to be in no distress at this time. Call bell within reach. Zone phone number given to pt. Pt instructed to call zone phone or call bell if needed. Pt instructed to call for assistance before ambulating. Shift summary:  Pt rested comfortably throughout the night.

## 2017-03-23 NOTE — PROGRESS NOTES
Chart reviewed, noted plan for SNF; noted pt had been accepted at [place] [address] [facility] Rehab; noted plan for IV abx for another 17 days; noted pt with PICC; noted pt with need for BiPAP at night. For continuity of care, referral placed to [facility].  Per return call from [facility] / Nestor Barcenas ([phone]), they could accept pt today. Met with pt and her sister (pt agreeable to speaking in her sister's presence). Discussed with them that pt had been accepted at [facility]; provided them with address and additional info. Will cont to follow. Addendum: pt has been discharged and is aware of bed availability at [facility] / [facility].  CMgrSpecialist  met with pt to provide Medicare Important notice; pt indicated plan to appeal discharge. CMgr Specialist  provided pt with info sheet along with number pt needs to call to initiate her appear with [facility] [employer] [facility]. Once pt calls [facility] for M/M, they will in turn call the [facility] CMgr office; once [facility] CMgr office receives official notice of pt's appeal from [facility] for M/M, then we will Fed Ex pt's chart to them and await their decision as to whether they support [facility]'s plan for d/c or pt's request to stay.  Will await call from [facility] for M/M

## 2017-03-23 NOTE — DISCHARGE SUMMARY
2 [facility]  Hospitalist Division    Discharge Summary      Patient: Sirena Duenas MRN: [id number]  CSN: [id number]    YOB: 1945  Age: 66 y.o.   Sex: female    DOA: 2/25/2017 LOS:  LOS: 26 days   Discharge Date: 03/24/17     PCP:  Beth Howard MD    Chief Complaint:    Chief Complaint   Patient presents with    Respiratory Distress     Acute on chronic respiratory failure with hypoxia [facility])    Admission Diagnosis:   Hospital Problems as of 3/24/2017  Date Reviewed: 3/22/2017          Codes Class Noted - Resolved POA    Obesity hypoventilation syndrome ([facility] ICD-10-CM: J44.0  ICD-9-CM: 278.03  2/28/2017 - Present Yes        Pulmonary hypertension, moderate to severe ([facility] ICD-10-CM: I27.2  ICD-9-CM: 416.8  2/28/2017 - Present Yes        UTI (urinary tract infection) ICD-10-CM: N39.0  ICD-9-CM: 599.0  2/25/2017 - Present Yes        * (Principal)Acute on chronic respiratory failure with hypoxia (HCC) ICD-10-CM: J96.21  ICD-9-CM: 518.84, 799.02  2/1/2017 - Present Yes        Elevated troponin ICD-10-CM: R79.89  ICD-9-CM: 790.6  1/30/2017 - Present Yes        COPD (chronic obstructive pulmonary disease) ([facility] ICD-10-CM: J44.9  ICD-9-CM: 496  1/30/2017 - Present Yes        Obstructive sleep apnea, adult ICD-10-CM: G47.33  ICD-9-CM: 327.23  1/4/2016 - Present Yes    Overview Signed 1/4/2016  3:33 PM by Stephanie Rowe MD     CPAP/BIPAP intolerant             Paroxysmal atrial fibrillation ([facility] ICD-10-CM: I48.0  ICD-9-CM: 427.31  1/4/2016 - Present Yes        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 ([facility] XCI-86-AM: E11.29, E11.65  ICD-9-CM: 250.40  1/4/2016 - Present Yes        Hypertension (Chronic) ICD-10-CM: I10  ICD-9-CM: 401.9  9/27/2015 - Present Yes        DM (diabetes mellitus) ([facility] (Chronic) ICD-10-CM: E11.9  ICD-9-CM: 250.00  9/22/2015 - Present Yes        Acute on chronic diastolic CHF (congestive heart failure) ([facility] ICD-10-CM: I50.33  ICD-9-CM: 428.33, 428.0  9/3/2014 - Present Yes              Discharge Diagnoses:    · Acute hypoxic respiratory failure, likely due to pulmonary congestion - improved  · Likely severe HFPEF given severe LA dilation and CXR - improved  · Moderate pulmonary Hypertension, likely combined group 2 (HFPEF) and group 3 (OHS)  · OHS, decompensated resulting in hypercapnic respiratory failure - improved  · Acute diastolic Heart failure  · UTI  · Afib. · Recurrent admissions for resp failure (10x this year)  · Home O2 @3L/min  Hospital Course:   66 y.o. female with mulitple medical problems listed below including moderate pulm htn, hermelinda/ohs and copd was recently discharged from this facility to SNF. Pt was discharged on BIPAP qhs. Pt reports that she was not able to get Her bipap therapy from SNF. Day prior to admission, she finally had bipap but was not functioning. Also noted increase in leg swellings Pt reports her breathing status continue to declined henc brought to ED. Pt diagnosed with diastolic heart failure acute on chronic, and hypercapnic respiratory failure due to OHS and and moderate pulmonary hypertension. was placed on bipap overnight and admitted to the ICU. TTE showed preserved EF and diastolic dysfunction however left atrial dilation consistent with hypervolemia. Initially with UTI treated with rocephin x 7 days. She was later found to have MRSA Bacteremia and will require IV Vancomycin until 4/9 per ID consultation. TTE showed no vegitations. She was given a 1 weeks course of IV solumedrol for possible ILD per Dr Yeni Galdamez which did appear to help the patient she was down to 2L 02 (reports 3L at home normaly). She needs to continue Bipap qHS at settings below. She is severely deconditioned and requires continued PT to strengthen herself. She is on optimal cardiac regimen with digoxin, coreg, and now PO lasix 40mg BID. INR subtheraputic due to coumadin hold for her PICC placed on 3/22.  Right heart catheterization performed 3/22 revealed PCWP 40 mm hg and Mean Pulmonary artery pressure 51 mm hg. Cardiac out put normal.    Significant Diagnostic Studies:  TTE 3/15 preserved EF no vegitations  CT chest 3/16  MBS 3/3  Many CXRs  Last ABG 3/14    Operative Procedures:  R heart Cath 3/22 -  PCWP 40 mm hg and Mean Pulmonary artery pressure 51 mm hg. PICC 3/22    Consults:  Ilan Pimentel  Cardiology-[place]  ID    Diet:  Cardiac low favian  Activity:  Up with assist  Discharge Condition:   Great. On 2L 02. Equipment needed  See PT/OT notes  Bipap 20/4 backup rate 12 Fi02 40%  Wound Care:   Change leg bandages q72h        Discharge Medications:    Discharge Medication List as of 3/24/2017 12:53 PM      START taking these medications    Details   vancomycin 10 gram 1,500 mg IVPB 1,500 mg by IntraVENous route every twenty-four (24) hours for 17 days. Indications: MRSA bacteremia. Pharmacy to dose, Print, Disp-17 Dose, R-0      lidocaine (LIDODERM) 5 % Apply patch to the affected area for 12 hours a day and remove for 12 hours a day., No Print, Disp-30 Each, R-0      digoxin (LANOXIN) 0.125 mg tablet Take 1 Tab by mouth daily. , Print, Disp-90 Tab, R-1      QUEtiapine (SEROQUEL) 25 mg tablet Take 1 Tab by mouth nightly. , Print, Disp-90 Tab, R-1      HYDROcodone-acetaminophen (NORCO) 5-325 mg per tablet Take 1 Tab by mouth every six (6) hours as needed. Max Daily Amount: 4 Tabs., Print, Disp-45 Tab, R-0         CONTINUE these medications which have CHANGED    Details   warfarin (COUMADIN) 4 mg tablet Take 1 Tab by mouth once for 1 dose., No Print, Disp-60 Tab, R-0      furosemide (LASIX) 40 mg tablet Take 1 Tab by mouth two (2) times a day., Print, Disp-120 Tab, R-1      codeine-butalbital-acetaminophen-caffeine (FIORICET WITH CODEINE) -88-30 mg per capsule Take 1 Cap by mouth every six (6) hours as needed for Headache or Migraine.  Max Daily Amount: 4 Caps., Print, Disp-30 Cap, R-1      traZODone (DESYREL) 50 mg tablet Take 1 Tab by mouth nightly. , Print, Disp-60 Tab, R-1      insulin glargine (LANTUS) 100 unit/mL injection 20 Units by SubCUTAneous route nightly., No Print, Disp-1 Vial, R-3         CONTINUE these medications which have NOT CHANGED    Details   arformoterol (BROVANA) 15 mcg/2 mL nebu neb solution 2 mL by Nebulization route two (2) times a day., Print, Disp-1 Vial, R-3      budesonide (PULMICORT) 0.5 mg/2 mL nbsp 2 mL by Nebulization route two (2) times a day., Print, Disp-1 Each, R-3      docusate sodium (COLACE) 100 mg capsule Take 1 Cap by mouth two (2) times a day for 90 days. , No Print, Disp-60 Cap, R-2      insulin lispro (HUMALOG) 100 unit/mL injection AC and HS  Indications: type 2 diabetes mellitus, No Print, Disp-1 Vial, R-3      pantoprazole (PROTONIX) 40 mg tablet Take 1 Tab by mouth Daily (before breakfast). , No Print, Disp-60 Tab, R-1      simvastatin (ZOCOR) 10 mg tablet Take 1 Tab by mouth nightly., No Print, Disp-90 Tab, R-3      albuterol-ipratropium (DUO-NEB) 2.5 mg-0.5 mg/3 ml nebulizer solution 3 mL by Nebulization route every four (4) hours as needed for Shortness of Breath., Print, Disp-30 Vial, R-0      pregabalin (LYRICA) 75 mg capsule Take  by mouth., Historical Med      carvedilol (COREG) 25 mg tablet Take 25 mg by mouth two (2) times daily (with meals). , Historical Med      aspirin 81 mg tablet Take 81 mg by mouth.  , Historical Med      CYANOCOBALAMIN, VITAMIN B-12, (VITAMIN B-12 PO) Take  by mouth.  , Historical Med      FERROUS SULFATE by Does Not Apply route.  , Historical Med         STOP taking these medications       clonazePAM (KLONOPIN) 0.5 mg tablet Comments:   Reason for Stopping:         dilTIAZem (CARDIZEM) 60 mg tablet Comments:   Reason for Stopping:         oxyCODONE-acetaminophen (PERCOCET) 5-325 mg per tablet Comments:   Reason for Stopping: Follow-Up And Discharge Instructions:    Follow-up Information     Follow up With Details Comments Contact Farzaneh Leggett MD   [facility] [phone] [facility]      Anna Davila MD Call in 2 weeks cardiology follow-up [address]      Jasmine Mayo MD Call in 2 weeks pulmonology follow-up [place] [address] is responsible for follow-up appts.  [address] [place] [address]                [facility]  Hospitalist Division        Time Spent:  55m    Cc: Raven Richter MD

## 2017-03-23 NOTE — PROGRESS NOTES
Problem: Mobility Impaired (Adult and Pediatric)  Goal: *Acute Goals and Plan of Care (Insert Text)  Physical Therapy Goals  Initiated 2/27/2017 and to be accomplished within 7 day(s)  1. Patient will move from supine to sit and sit to supine , scoot up and down and roll side to side in bed with supervision/set-up. 2. Patient will transfer from bed to chair and chair to bed with minimal assistance/contact guard assist using the least restrictive device. 3. Patient will perform sit to stand with minimal assistance/contact guard assist.  4. Patient will ambulate with minimal assistance/contact guard assist for 50 feet with the least restrictive device. Re-evaluation on 3/6/2017 and to be accomplished within 7 day(s)  1. Patient will move from supine to sit and sit to supine, scoot up and down and roll side to side in bed with supervision/set-up. 2. Patient will transfer from bed to chair and chair to bed with contact guard assist/supervision using the least restrictive device. 3. Patient will perform sit to stand with contact guard assist/supervision. 4. Patient will ambulate with contact guard assist/supervision for 100 feet with the least restrictive device. Re-evaluation on 3/14/2017. Goals to be accomplished within 7 days. 1. Patient will move from supine to sit and sit to supine, scoot up and down and roll side to side in bed with supervision/set-up. 2. Patient will transfer from bed to chair and chair to bed with contact guard assist/supervision using the least restrictive device. 3. Patient will perform sit to stand with contact guard assist/supervision. 4. Patient will ambulate with contact guard assist/supervision for 50 feet with the least restrictive device. 5. Patient will be independent with HEP to maximize strength and muscular endurance.    Outcome: Progressing Towards Goal  PHYSICAL THERAPY TREATMENT     Patient: Lydia Issa (76 y.o. female)  Date: 3/23/2017  Diagnosis: CHF NYHA class IV, acute, diastolic (HCC)  CHF NYHA class IV, acute, diastolic (HCC) Acute on chronic respiratory failure with hypoxia (HCC)  Precautions: Fall   Chart, physical therapy assessment, plan of care and goals were reviewed. ASSESSMENT:  Patient found sitting in b/s chair willing to work with PT. Pt seen with OT as pt has poor activity tolerance and to maximize safety of pt and staff. Pt ambulated in room X 2 to doorway. Pt's O2 sat drops to 86-87%  After each ambulation attempt and requires about 45 seconds to recover to 90%. Pt performed UE and LE therex in b/s chair post ambulation. Pt with good recollection of seated therex. Pt left with needs in reach. Education: therex, gait  Progression toward goals:  [ ]      Improving appropriately and progressing toward goals  [X]      Improving slowly and progressing toward goals  [ ]      Not making progress toward goals and plan of care will be adjusted       PLAN:  Patient continues to benefit from skilled intervention to address the above impairments. Continue treatment per established plan of care. Discharge Recommendations:  Rehab  Further Equipment Recommendations for Discharge:  rolling walker       SUBJECTIVE:   Patient stated I'll do what I can.       OBJECTIVE DATA SUMMARY:   Critical Behavior:  Neurologic State: Alert  Orientation Level: Oriented X4  Cognition: Follows commands  Safety/Judgement: Fall prevention  Functional Mobility Training:  Transfers:  Sit to Stand: Stand-by asssistance;Contact guard assistance  Stand to Sit: Stand-by asssistance;Contact guard assistance  Balance:  Sitting: Intact  Standing: Intact; With support  Ambulation/Gait Training:  Distance (ft): 40 Feet (ft)  Assistive Device: Walker, rolling;Gait belt  Ambulation - Level of Assistance: Contact guard assistance;Stand-by asssistance  Gait Abnormalities: Antalgic; Path deviations  Base of Support: Widened  Stance: Weight shift  Speed/Kristin: Slow  Step Length: Left shortened;Right shortened  Therapeutic Exercises:   LAQ, seated marches, ankle pumps, overhead reach, UE punches (shld protraction), elbow flex, digit flex/ext. X 10 bilaterally. Pain:  Pre tx pain: 9  Post tx pain: 9  Pain Scale 1: Numeric (0 - 10)  Pain Intensity 1: 9  Pain Location 1: Back;Hip;Leg  Pain Orientation 1: Left;Right  Activity Tolerance:   Fair-  Please refer to the flowsheet for vital signs taken during this treatment.   After treatment:   [X] Patient left in no apparent distress sitting up in chair  [ ] Patient left in no apparent distress in bed  [X] Call bell left within reach  [ ] Nursing notified  [ ] Caregiver present  [ ] Bed alarm activated      Bridget Richard PTA   Time Calculation: 24 mins

## 2017-03-23 NOTE — ROUTINE PROCESS
Bedside and Verbal shift change report given to David Mcdaniels RN (oncoming nurse) by Samira Oliveira RN (offgoing nurse). Report included the following information SBAR, Kardex, Intake/Output, MAR, Recent Results, Med Rec Status and Cardiac Rhythm Afib.

## 2017-03-23 NOTE — PROGRESS NOTES
Problem: Self Care Deficits Care Plan (Adult)  Goal: *Acute Goals and Plan of Care (Insert Text)  OT STGs Initiated (3/21/17)     1. Patient will perform upper body dressing with supervision/set-up. 2. Patient will perform lower body dressing with minimal assistance/contact guard assist.   3. Patient will perform toilet transfers with supervision/set-up. 4. Patient will perform all aspects of toileting with supervision/set-up. 5. Patient will perform functional activity while standing for 3-5 minutes with CGA     Occupational Therapy Goals    OTG Initiated 3/14/17    1. Patient will perform grooming with supervision/set-up (met)  2. Patient will perform upper body dressing with supervision/set-up. (goal ongoing)  3. Patient will perform lower body dressing with minimal assistance/contact guard assist. (Goal ongoing)  4. Patient will perform toilet transfers with supervision/set-up. (Goal ongoing) CGA  5. Patient will perform all aspects of toileting with supervision/set-up. (Not met)  6. Patient will perform functional activity while standing for 3-5 minutes with CGA (Not addressed)    Occupational Therapy Goals  Initiated 3/6/2017 within 7 day(s). 1. Patient will perform grooming with supervision/set-up   2. Patient will perform upper body dressing with supervision/set-up. 3. Patient will perform lower body dressing with minimal assistance/contact guard assist.  4. Patient will perform toilet transfers with supervision/set-up. 5. Patient will perform all aspects of toileting with supervision/set-up. 6. Patient will participate in upper extremity therapeutic exercise/activities with supervision/set-up for 10 minutes. 7. Patient will utilize energy conservation techniques during functional activities with verbal cues. 8. Patient will perform functional activity while standing for 3-5 minutes with CGA   Outcome: Progressing Towards Goal  OCCUPATIONAL THERAPY TREATMENT     Patient: Sirena Herveer (4 y.o. female)  Date: 3/23/2017  Diagnosis: CHF NYHA class IV, acute, diastolic (HCC)  CHF NYHA class IV, acute, diastolic (HCC) Acute on chronic respiratory failure with hypoxia [facility])       Precautions: Fall  Chart, occupational therapy assessment, plan of care, and goals were reviewed. ASSESSMENT:  Pt seated in chair upon entering, agreeable to therapy. Pt donned robe with min A. Pt completed multiple sit to stand transfers with SBA-CGA. Pt completed functional mobility within room with CGA using RW. While seated EOB, pt required max A to adjust socks. Pt completed UE exercises while seated in chair to improve UE strength and overall activity tolerance. EDUCATION: energy conservation, importance of continued UE exercises  Progression toward goals:  [ ]          Improving appropriately and progressing toward goals  [X]          Improving slowly and progressing toward goals  [ ]          Not making progress toward goals and plan of care will be adjusted       PLAN:  Patient continues to benefit from skilled intervention to address the above impairments. Continue treatment per established plan of care. Discharge Recommendations:  Andrés Nelson   Further Equipment Recommendations for Discharge:  TBD by next level of care       SUBJECTIVE:   Patient stated I can't walk today.       OBJECTIVE DATA SUMMARY:      G CODES:      Cognitive/Behavioral Status:  Neurologic State: Alert  Orientation Level: Oriented X4  Cognition: Follows commands  Safety/Judgement: Fall prevention  Functional Mobility and Transfers for ADLs:                          Transfers:  Sit to Stand: Stand-by asssistance;Contact guard assistance      Balance:  Sitting: Intact  Standing: Intact; With support  ADL Intervention:  Upper Body Dressing Assistance  Dressing Assistance: Minimum assistance  Front Opened Shirt: Minimum assistance     Lower Body Dressing Assistance  Dressing Assistance: Maximum assistance  Socks: Maximum assistance Cognitive Retraining  Safety/Judgement: Fall prevention     Therapeutic Exercises:   BUE AROM x10 reps: shoulder flexion, chest presses, elbow flexion/extension, finger flexion/extension     Pain:  Pre Treatment:9  Post Treatment:9     Activity Tolerance:    good  Please refer to the flowsheet for vital signs taken during this treatment.   After treatment:   [X]  Patient left in no apparent distress sitting up in chair  [ ]  Patient left in no apparent distress in bed  [X]  Call bell left within reach  [X]  Nursing notified/present   [ ]  Caregiver present  [ ]  Bed alarm activated     Serena Rubin MS OTR/L  Time Calculation: 21 mins

## 2017-03-23 NOTE — PROGRESS NOTES
Pharmacy Dosing Services: Warfarin    Re- Consult for Warfarin Dosing by Pharmacy by Dr. Erwin Schaumann provided for this 66 y.o.  female , for indication of Atrial Fibrillation. Dose to achieve an INR goal of 2-3    Order entered for  Warfarin 4 mg to be given today at 18:00. Patient has current order for: Enoxaparin 100 mg SQ q12h     Significant drug interactions:  aspirin 81 mg  LABS    PT/INR Lab Results   Component Value Date/Time    INR 1.5 03/23/2017 02:27 AM      Platelets Lab Results   Component Value Date/Time    PLATELET 448 03/38/1490 02:27 AM      H/H     Lab Results   Component Value Date/Time    HGB 10.8 03/23/2017 02:27 AM        Warfarin Administrations (last 168 hours)       Date/Time Action Medication Dose      03/21/17 1800 Given    Warfarin - Hold Warfarin pending PICC placement      03/19/17 1757 Given    warfarin (COUMADIN) tablet 3 mg 3 mg    03/18/17 1739 Given    warfarin (COUMADIN) tablet 2.5 mg 2.5 mg    03/17/17 1838 Given    warfarin (COUMADIN) tablet 2.5 mg 2.5 mg    03/16/17 1840 Given    warfarin (COUMADIN) tablet 2.5 mg 2.5 mg          Pharmacy to follow daily and will provide subsequent Warfarin dosing based on clinical status.   YA Bowles  Contact information [phone]

## 2017-03-24 VITALS
DIASTOLIC BLOOD PRESSURE: 56 MMHG | SYSTOLIC BLOOD PRESSURE: 156 MMHG | WEIGHT: 258.38 LBS | BODY MASS INDEX: 43.05 KG/M2 | HEART RATE: 66 BPM | RESPIRATION RATE: 18 BRPM | TEMPERATURE: 97.2 F | HEIGHT: 65 IN | OXYGEN SATURATION: 100 %

## 2017-03-24 LAB
ANION GAP BLD CALC-SCNC: 6 MMOL/L (ref 3–18)
BUN SERPL-MCNC: 27 MG/DL (ref 7–18)
BUN/CREAT SERPL: 28 (ref 12–20)
CALCIUM SERPL-MCNC: 8.7 MG/DL (ref 8.5–10.1)
CHLORIDE SERPL-SCNC: 101 MMOL/L (ref 100–108)
CO2 SERPL-SCNC: 37 MMOL/L (ref 21–32)
CREAT SERPL-MCNC: 0.96 MG/DL (ref 0.6–1.3)
GLUCOSE BLD STRIP.AUTO-MCNC: 133 MG/DL (ref 70–110)
GLUCOSE BLD STRIP.AUTO-MCNC: 235 MG/DL (ref 70–110)
GLUCOSE SERPL-MCNC: 179 MG/DL (ref 74–99)
INR PPP: 1.3 (ref 0.8–1.2)
MAGNESIUM SERPL-MCNC: 1.9 MG/DL (ref 1.8–2.4)
POTASSIUM SERPL-SCNC: 4.1 MMOL/L (ref 3.5–5.5)
PROTHROMBIN TIME: 15.4 SEC (ref 11.5–15.2)
SODIUM SERPL-SCNC: 144 MMOL/L (ref 136–145)

## 2017-03-24 PROCEDURE — 74011250636 HC RX REV CODE- 250/636: Performed by: INTERNAL MEDICINE

## 2017-03-24 PROCEDURE — 74011250637 HC RX REV CODE- 250/637: Performed by: HOSPITALIST

## 2017-03-24 PROCEDURE — 82962 GLUCOSE BLOOD TEST: CPT

## 2017-03-24 PROCEDURE — 74011250637 HC RX REV CODE- 250/637: Performed by: INTERNAL MEDICINE

## 2017-03-24 PROCEDURE — 94760 N-INVAS EAR/PLS OXIMETRY 1: CPT

## 2017-03-24 PROCEDURE — 74011000250 HC RX REV CODE- 250: Performed by: INTERNAL MEDICINE

## 2017-03-24 PROCEDURE — 94640 AIRWAY INHALATION TREATMENT: CPT

## 2017-03-24 PROCEDURE — 74011250636 HC RX REV CODE- 250/636: Performed by: HOSPITALIST

## 2017-03-24 PROCEDURE — 77010033678 HC OXYGEN DAILY

## 2017-03-24 PROCEDURE — 85610 PROTHROMBIN TIME: CPT | Performed by: INTERNAL MEDICINE

## 2017-03-24 PROCEDURE — 80048 BASIC METABOLIC PNL TOTAL CA: CPT | Performed by: INTERNAL MEDICINE

## 2017-03-24 PROCEDURE — 83735 ASSAY OF MAGNESIUM: CPT | Performed by: INTERNAL MEDICINE

## 2017-03-24 RX ORDER — FUROSEMIDE 40 MG/1
40 TABLET ORAL 2 TIMES DAILY
Status: DISCONTINUED | OUTPATIENT
Start: 2017-03-24 | End: 2017-03-24 | Stop reason: HOSPADM

## 2017-03-24 RX ORDER — LORAZEPAM 2 MG/ML
1 INJECTION, SOLUTION INTRAMUSCULAR; INTRAVENOUS ONCE
Status: DISCONTINUED | OUTPATIENT
Start: 2017-03-24 | End: 2017-03-24 | Stop reason: HOSPADM

## 2017-03-24 RX ORDER — WARFARIN 4 MG/1
4 TABLET ORAL EVERY EVENING
Status: DISCONTINUED | OUTPATIENT
Start: 2017-03-24 | End: 2017-03-24

## 2017-03-24 RX ADMIN — ASPIRIN 81 MG CHEWABLE TABLET 81 MG: 81 TABLET CHEWABLE at 09:17

## 2017-03-24 RX ADMIN — INSULIN LISPRO 5 UNITS: 100 INJECTION, SOLUTION INTRAVENOUS; SUBCUTANEOUS at 09:15

## 2017-03-24 RX ADMIN — BUDESONIDE 500 MCG: 0.5 INHALANT RESPIRATORY (INHALATION) at 07:09

## 2017-03-24 RX ADMIN — VANCOMYCIN HYDROCHLORIDE 1500 MG: 10 INJECTION, POWDER, LYOPHILIZED, FOR SOLUTION INTRAVENOUS at 04:19

## 2017-03-24 RX ADMIN — ONDANSETRON HYDROCHLORIDE 4 MG: 2 INJECTION INTRAMUSCULAR; INTRAVENOUS at 06:33

## 2017-03-24 RX ADMIN — NYSTATIN: 100000 POWDER TOPICAL at 09:18

## 2017-03-24 RX ADMIN — ARFORMOTEROL TARTRATE 15 MCG: 15 SOLUTION RESPIRATORY (INHALATION) at 07:08

## 2017-03-24 RX ADMIN — METHYLPREDNISOLONE SODIUM SUCCINATE 60 MG: 125 INJECTION, POWDER, FOR SOLUTION INTRAMUSCULAR; INTRAVENOUS at 09:16

## 2017-03-24 RX ADMIN — CARVEDILOL 25 MG: 12.5 TABLET, FILM COATED ORAL at 09:17

## 2017-03-24 RX ADMIN — PANTOPRAZOLE SODIUM 40 MG: 40 TABLET, DELAYED RELEASE ORAL at 04:19

## 2017-03-24 RX ADMIN — HYDROCODONE BITARTRATE AND ACETAMINOPHEN 1 TABLET: 5; 325 TABLET ORAL at 09:33

## 2017-03-24 RX ADMIN — FUROSEMIDE 40 MG: 40 TABLET ORAL at 09:17

## 2017-03-24 RX ADMIN — ENOXAPARIN SODIUM 100 MG: 100 INJECTION SUBCUTANEOUS at 04:19

## 2017-03-24 RX ADMIN — DOCUSATE SODIUM 100 MG: 100 CAPSULE, LIQUID FILLED ORAL at 09:17

## 2017-03-24 RX ADMIN — ANTACID TABLETS 200 MG: 500 TABLET, CHEWABLE ORAL at 09:17

## 2017-03-24 RX ADMIN — DIGOXIN 0.12 MG: 0.12 TABLET ORAL at 09:17

## 2017-03-24 RX ADMIN — HYDROCODONE BITARTRATE AND ACETAMINOPHEN 1 TABLET: 5; 325 TABLET ORAL at 04:23

## 2017-03-24 NOTE — PROGRESS NOTES
Cardiology Progress Note        Patient: Fredo Schaffer        Sex: female          DOA: 2/25/2017  YOB: 1945      Age:  66 y.o.        LOS:  LOS: 26 days    Patient seen and examined. Chart reviewed. Assessment/Plan     Patient Active Problem List   Diagnosis Code    Acute on chronic diastolic CHF (congestive heart failure) ([facility]) I50.33    ALEJANDRINA (acute kidney injury) ([facility] N17.9    Acute exacerbation of CHF (congestive heart failure) ([facility]) I50.9    DM (diabetes mellitus) ([facility] E11.9    Hypertension I10    Respiratory failure ([facility]) J96.90    Acute coronary syndrome ([facility]) I24.9    Obstructive sleep apnea, adult G47.33    Paroxysmal atrial fibrillation ([facility]) I48.0    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 ([facility]) I70.04, E11.65    Dyspnea due to congestive heart failure ([facility]) I50.9    Aspiration pneumonia ([facility]) J69.0    Hyperkalemia E87.5    Elevated troponin R79.89    COPD (chronic obstructive pulmonary disease) ([facility]) J44.9    CHF (congestive heart failure) ([facility]) I50.9    Insufficiency, respiratory, acute ([facility]) R06.89    Infiltrate noted on imaging study R93.8    COPD exacerbation ([facility]) J44.1    Acute on chronic respiratory failure with hypoxia ([facility]) J96.21    Chest pain R07.9    Anxiety F41.9    UTI (urinary tract infection) N39.0    Obesity hypoventilation syndrome ([facility]) E66.2    Pulmonary hypertension, moderate to severe ([facility]) I27.2      Moderate Aortic stenosis  permanent atrial fibrillation   Plan:    Continue PO Lasix 40 mg BID  Do BMP in 1 week to adjust dose of lasix. Continue Lovenox till INR is therapeutic Goal 2-3  Continue management as per hospital medicine. Cardiology sign off. Subjective:    cc:  Denies any shortness of breath       REVIEW OF SYSTEMS:     General: No fevers or chills.   Cardiovascular: Positive leg swelling, No chest pain,No palpitations, No orthopnea, No PND,  No claudication  Pulmonary: No shortness of breath. Gastrointestinal: No nausea, vomiting, bleeding  Neurology: No Dizziness    Objective:      Visit Vitals    /56 (BP 1 Location: Left arm, BP Patient Position: At rest)    Pulse 66    Temp 97.2 °F (36.2 °C)    Resp 18    Ht 5' 4.96\" (1.65 m)    Wt 117.2 kg (258 lb 6.1 oz)    SpO2 100%    Breastfeeding No    BMI 43.05 kg/m2     Body mass index is 43.05 kg/(m^2). Physical Exam:  General Appearance: Comfortable, not using accessory muscles of respiration. HEENT: SIRIA. HEAD: Atraumatic  NECK: No JVD, no thyroidomeglay. CAROTIDS: No bruit  LUNGS: Clear bilaterally. HEART: S1+S2 audible, irregularly irregular, 2/6 systolic murmur in aortic area, no pericardial rub. ABD: Non-tender, BS Audible    EXT: + leg edema, and no cyanosis. VASCULAR EXAM: Pulses are intact. PSYCHIATRIC EXAM: Mood is appropriate. MUSCULOSKELETAL: Grossly no joint deformity.   NEUROLOGICAL: AAO times 3, No motor and sensory deficit,  Medication:  Current Facility-Administered Medications   Medication Dose Route Frequency    furosemide (LASIX) tablet 40 mg  40 mg Oral BID    warfarin (COUMADIN) tablet 4 mg  4 mg Oral QPM    enoxaparin (LOVENOX) injection 100 mg  1 mg/kg SubCUTAneous Q12H    vancomycin (VANCOCIN) 1,500 mg in 0.9% sodium chloride 500 mL IVPB  1,500 mg IntraVENous Q24H    HYDROcodone-acetaminophen (NORCO) 5-325 mg per tablet 1 Tab  1 Tab Oral Q4H PRN    sodium chloride (OCEAN) 0.65 % nasal spray 2 Spray  2 Spray Both Nostrils PRN    methylPREDNISolone (PF) (SOLU-MEDROL) injection 60 mg  60 mg IntraVENous Q24H    traZODone (DESYREL) tablet 50 mg  50 mg Oral QHS    QUEtiapine (SEROquel) tablet 25 mg  25 mg Oral QHS    Vancomycin - Pharmacy to Dose  1 Each Other Rx Dosing/Monitoring    calcium carbonate (TUMS) chewable tablet 200 mg [elemental]  200 mg Oral QID WITH MEALS    lidocaine (LIDODERM) 5 % patch 1 Patch  1 Patch TransDERmal Q24H    insulin lispro (HUMALOG) injection 5 Units  5 Units SubCUTAneous TIDAC    insulin glargine (LANTUS) injection 20 Units  20 Units SubCUTAneous QHS    digoxin (LANOXIN) tablet 0.125 mg  0.125 mg Oral DAILY    carvedilol (COREG) tablet 25 mg  25 mg Oral BID WITH MEALS    ELECTROLYTE REPLACEMENT PROTOCOL  1 Each Other PRN    ELECTROLYTE REPLACEMENT PROTOCOL  1 Each Other PRN    pantoprazole (PROTONIX) tablet 40 mg  40 mg Oral ACB    polyvinyl alcohol (LIQUIFILM TEARS) 1.4 % ophthalmic solution 1 Drop  1 Drop Both Eyes PRN    nystatin (MYCOSTATIN) 100,000 unit/gram powder   Topical BID    docusate sodium (COLACE) capsule 100 mg  100 mg Oral BID    simvastatin (ZOCOR) tablet 10 mg  10 mg Oral QHS    aspirin chewable tablet 81 mg  81 mg Oral DAILY    arformoterol (BROVANA) neb solution 15 mcg  15 mcg Nebulization BID RT    budesonide (PULMICORT) 500 mcg/2 ml nebulizer suspension  500 mcg Nebulization BID RT    albuterol-ipratropium (DUO-NEB) 2.5 MG-0.5 MG/3 ML  3 mL Nebulization Q4H PRN    ondansetron (ZOFRAN) injection 4 mg  4 mg IntraVENous Q6H PRN    acetaminophen (TYLENOL) tablet 650 mg  650 mg Oral Q4H PRN    insulin lispro (HUMALOG) injection   SubCUTAneous AC&HS    glucose chewable tablet 16 g  4 Tab Oral PRN    glucagon (GLUCAGEN) injection 1 mg  1 mg IntraMUSCular PRN    dextrose (D50W) injection syrg 12.5-25 g  25-50 mL IntraVENous PRN    warfarin pharmacy to dose   Other PRN               Lab/Data Reviewed:       Recent Labs      03/23/17 0227   HGB  10.8*   HCT  34.1*   PLT  241     Recent Labs      03/24/17   0405  03/23/17   0227  03/22/17   0440   NA  144  141  143   K  4.1  3.9  4.3   CL  101  104  103   CO2  37*  35*  33*   GLU  179*  198*  113*   BUN  27*  32*  31*   CREA  0.96  1.06  1.07   CA  8.7  8.4*  8.4*       Signed By: Janes Irving MD     March 24, 2017

## 2017-03-24 NOTE — PROGRESS NOTES
DC plan: LTAC    At this time, CM office has not received any request for appeal, dc order was not cancelled anticipate Pt will dc today to an accepting facility. MD rounding please order for dc again. Should patient refuse to leave a HINN letter will be provided that informs patient we will no longer be billing her Medicare and that should she desire to remain she will be covering the cost of her hospitalization.

## 2017-03-24 NOTE — PROGRESS NOTES
1293  Received report. Patient A/OX4. Excoriation to bilateral breast folds. Nystatin applied to site. Bruising noted  to BLE with meplix in place. Denies pain. No acute distress noted. Call light within reach. [address] called to [facility] at [phone]. Patient resting in bed. No acute distress noted. 1238  Patient discharged in stable condition. PICC line remain in per MD order. Transported to facility by EMT.

## 2017-03-24 NOTE — PROGRESS NOTES
Received message from [facility] / Nurse Mgr that patient and her  were agreeable to pt going to [facility].  Met with pt who reported that her  had to leave to go to work. Pt stated she was agreeable to going to [facility] today and requested a room with a view of the water if possible. Pt stated that she would go by stretcher ambulance. Per staff, stretcher ambulance set up for 12:00 today; pt aware and stated she would notify her . T/C [facility] / Louise Joseph with pt's room request and amb time. Louise Joseph stated all in place for them to accept pt today. Plan: today, leave for [facility], [address], [address] via stretcher ambulance. Pt will need her Discharge Summary, Med Rec, and scripts for controlled meds to accompany her.   Pt's nurse to call report to [facility] at: [phone]

## 2017-03-24 NOTE — PROGRESS NOTES
[address] [place] [address] care of pt from [facility], RN. Pt resting comfortably in bed eating breakfast , no signs of distress, call bell within reach. 9405 Assessment completed, pt rates pain in her bilateral lower extremities an 8 out of 10 denies shortness of breath, states she feels \"okay, nervous about going home\"     1430 Spoke with  feeling apprehensive about discharge, no orders changed. Álvar[address] 19 () states that pt is appealing discharge, however bed is available at [address] from medical transport called and states son requested pt be picked up and taken to heart center at [facility]. Asked pt if that's what she wanted, pt had no idea that was happening. Unit managers and nurse supervisor aware of situation.  to come to floor to talk to them    1819  at bedside, extremely aggravated at transition process. Roxannaan at bedside, to help devise a plan of action. Shift Summary- Shift uneventful.  Pt rested comfortably throughout shift, however was apprehensive about leaving hospital because she felt she was \"not ready, and has been here so long, shes unsure of what is to come\" Oncoming nurse made aware of situation

## 2017-03-24 NOTE — PROGRESS NOTES
Daily Progress Note: 3/24/2017 2:33 PM   Admit Date: 2/25/2017    Patient seen in follow up for multiple medical problems as listed below:  Patient Active Problem List   Diagnosis Code    Acute on chronic diastolic CHF (congestive heart failure) ([facility]) I50.33    [facility]) [facility] N17.9    Acute exacerbation of CHF (congestive heart failure) ([facility]) I50.9    DM (diabetes mellitus) [facility] E11.9    Hypertension I10    Respiratory failure ([facility]) J96.90    Acute coronary syndrome ([facility]) I24.9    Obstructive sleep apnea, adult G47.33    Paroxysmal atrial fibrillation ([facility]) I48.0    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 ([facility]) I33.29, E11.65    Dyspnea due to congestive heart failure ([facility]) I50.9    Aspiration pneumonia ([facility]) J69.0    Hyperkalemia E87.5    Elevated troponin R79.89    COPD (chronic obstructive pulmonary disease) ([facility]) J44.9    CHF (congestive heart failure) ([facility]) I50.9    Insufficiency, respiratory, acute ([facility]) R06.89    Infiltrate noted on imaging study R93.8    COPD exacerbation ([facility]) J44.1    Acute on chronic respiratory failure with hypoxia ([facility]) J96.21    Chest pain R07.9    Anxiety F41.9    UTI (urinary tract infection) N39.0    Obesity hypoventilation syndrome ([facility]) E66.2    Pulmonary hypertension, moderate to severe ([facility]) I27.2       Assesment     66 y.o. female with mulitple medical problems listed below including moderate pulm htn, hermelinda/ohs and copd was recently discharged from this facility to SNF. Pt was discharged on BIPAP qhs. Pt reports that she was not able to get Her bipap therapy from SNF. Day prior to admission, she finally had bipap but was not functioning. Also noted increase in leg swellings Pt reports her breathing status continue to declined henc brought to ED. Pt diagnosed with diastolic heart failure acute on chronic, and hypercapnic respiratory failure due to OHS and and moderate pulmonary hypertension.  was placed on bipap overnight and admitted to the ICU. TTE showed preserved EF and diastolic dysfunction however left atrial dilation consistent with hypervolemia. Initially with UTI treated with rocephin x 7 days. She was later found to have MRSA Bacteremia and will require IV Vancomycin until 4/9 per ID consultation. TTE showed no vegitations. She was given a 1 weeks course of IV solumedrol for possible ILD per Dr Aayush Hickey which did appear to help the patient she was down to 2L 02 (reports 3L at home normaly). She needs to continue Bipap qHS at settings below. She is severely deconditioned and requires continued PT to strengthen herself. She is on optimal cardiac regimen with digoxin, coreg, and now PO lasix 40mg BID. INR subtheraputic due to coumadin hold for her PICC placed on 3/22. Right heart catheterization performed 3/22 revealed PCWP 40 mm hg and Mean Pulmonary artery pressure 51 mm hg. Cardiac output normal.  Appealed discharge 3/23 and stayed to 3/24. · Acute hypoxic respiratory failure, likely due to pulmonary congestion - resolved  · Likely severe HFPEF given severe LA dilation and CXR - improved  · Moderate pulmonary Hypertension, likely combined group 2 (HFPEF) and group 3 (OHS)  · OHS, decompensated resulting in hypercapnic respiratory failure - improved  · Acute diastolic Heart failure - treated. · UTI- 7d rocephin Tx  · Afib.-rate controlled-was to restart coumadin 3/23 qpm in discharge meds but patient appealed. On [facility] bridge while in hospital.   · Recurrent admissions for resp failure (10x this year)  · Home O2 @3L/min - currently @ 2    DVT Protocol Active: yes  Code Status:  Full Code     Disposition:Appropriate for DC to LTAC>SNF    Subjective:     CC: Respiratory Distress    Interval History: Has appealed my discharge. significant anxiety this morning which patient acknowledges.   Have added IV ativan x1  Patient is upset with me but will to talk and be examined. She is upset that her prior doctors promised to \"fix her\" when she had initially requested to be transferred to [facility] weeks ago. Refusing PT which is ironic given the patient complains she is too weak and unable to care for herself. She has refused PT multiple times this hospitalization which is consistent with her history of non-compliance with other medical therapies. She had no major overnight events aside from anxiety and chest tightness this am. Telemetry normal   approached me at nursing desk asking me if \"i had a problem with him\". Labs discontinued other than PT/INR as she is appropriate for discharge. Objective:     Visit Vitals    /56 (BP 1 Location: Left arm, BP Patient Position: At rest)    Pulse 66    Temp 97.2 °F (36.2 °C)    Resp 18    Ht 5' 4.96\" (1.65 m)    Wt 117.2 kg (258 lb 6.1 oz)    SpO2 100%    Breastfeeding No    BMI 43.05 kg/m2       Temp (24hrs), Av.5 °F (36.4 °C), Min:97.2 °F (36.2 °C), Max:98.1 °F (36.7 °C)        Intake/Output Summary (Last 24 hours) at 17 1141  Last data filed at 17 0927   Gross per 24 hour   Intake             1100 ml   Output              100 ml   Net             1000 ml       Gen: AOx3, NAD  HEENT:  JOSE RAO. Neck: No Bruits/JVD   Lungs:   CTAB. Good respiratory effort  Heart:   iRR S1 S2 without M/R/G  Abdomen: ND,NT, BSX4,   Extremities:   No LE edema. No cyanosis. Skin:  no jaundice. Wounds with bandages on legs. Data Review:     Meds/Labs/Tests reviewed    Current Shift:   07 - 1900  In: 120 [P.O.:120]  Out: -   Last three shifts:  1901 -  0700  In: 1700 [P.O.:1200;  I.V.:500]  Out: 1700 [Urine:1250; Drains:450]  Recent Labs      17   HGB  10.8*   HCT  34.1*   PLT  241       Recent Labs      17   0405  17   0227  17   0440   BUN  27*  32*  31*   CREA  0.96  1.06  1.07   CA  8.7  8.4*  8.4*   K  4.1  3.9  4.3   NA  144  141  143   CL  101 104  103   CO2  37*  35*  33*   GLU  179*  198*  113*        Lab Results   Component Value Date/Time    Glucose 179 03/24/2017 04:05 AM    Glucose 198 03/23/2017 02:27 AM    Glucose 113 03/22/2017 04:40 AM    Glucose 107 03/21/2017 04:35 AM    Glucose 127 03/20/2017 02:40 AM          Care coordination with Nursing/Consultants/staff: 5  Prior history, labs, and charting reviewed: `5    Procedures/Imaging:  See DC summary  RHC and PICC 3/22    Total time spent with chart review, patient examination/education, discussion with staff on case,documentation and medication management / adjustment  :  39 Minutes      Dr Madelyn Spencer  Pager: [phone]

## 2017-03-24 NOTE — PROGRESS NOTES
Problem: Mobility Impaired (Adult and Pediatric)  Goal: *Acute Goals and Plan of Care (Insert Text)  Physical Therapy Goals  Initiated 2/27/2017 and to be accomplished within 7 day(s)  1. Patient will move from supine to sit and sit to supine , scoot up and down and roll side to side in bed with supervision/set-up. 2. Patient will transfer from bed to chair and chair to bed with minimal assistance/contact guard assist using the least restrictive device. 3. Patient will perform sit to stand with minimal assistance/contact guard assist.  4. Patient will ambulate with minimal assistance/contact guard assist for 50 feet with the least restrictive device. Re-evaluation on 3/6/2017 and to be accomplished within 7 day(s)  1. Patient will move from supine to sit and sit to supine, scoot up and down and roll side to side in bed with supervision/set-up. 2. Patient will transfer from bed to chair and chair to bed with contact guard assist/supervision using the least restrictive device. 3. Patient will perform sit to stand with contact guard assist/supervision. 4. Patient will ambulate with contact guard assist/supervision for 100 feet with the least restrictive device. Re-evaluation on 3/14/2017. Goals to be accomplished within 7 days. 1. Patient will move from supine to sit and sit to supine, scoot up and down and roll side to side in bed with supervision/set-up. 2. Patient will transfer from bed to chair and chair to bed with contact guard assist/supervision using the least restrictive device. 3. Patient will perform sit to stand with contact guard assist/supervision. 4. Patient will ambulate with contact guard assist/supervision for 50 feet with the least restrictive device. 5. Patient will be independent with HEP to maximize strength and muscular endurance. 1005 - Pt refusing PT at this time. Pt recently back to b/s chair from commode with nursing.  Pt refuses encouragement for therex in b/s chair. Will f/u later in day.

## 2017-03-24 NOTE — PROGRESS NOTES
Bedside and Verbal shift change report given to JAVIER Rey RN (oncoming nurse) by Shilpa Lara RN (offgoing nurse). Report included the following information SBAR, Kardex, ED Summary, Procedure Summary, Intake/Output, MAR, Accordion, Recent Results and Med Rec Status.

## 2017-03-24 NOTE — PROGRESS NOTES
Cardiology Progress Note        Patient: Lydia Issa        Sex: female          DOA: 2/25/2017  YOB: 1945      Age:  66 y.o.        LOS:  LOS: 26 days   Assessment/Plan     Acute hypoxic respiratory failure  Moderate Aortic stenosis  Permanent atrial fibrillation  Hypertension  Pulmonary hypertension  Obesity      Plan:    Change Lasix to 40 mg PO BID  Continue beta blocker,digoxin, aspirin and statin. Continue Lovenox and Coumadin till INR is therapeutic. INR goal 2-3. Continue management as per hospital medicine. Subjective:    cc:  Denies any chest pain or shortness of breath       REVIEW OF SYSTEMS:     General: No fevers or chills. Cardiovascular: Positive leg swelling, No chest pain,No palpitations, No orthopnea, No PND, No claudication  Pulmonary: Positive dyspnea   Gastrointestinal: No nausea, vomiting, bleeding  Neurology: No Dizziness    Objective:      Visit Vitals    /85    Pulse 84    Temp 98.1 °F (36.7 °C)    Resp 16    Ht 5' 4.96\" (1.65 m)    Wt 106.7 kg (235 lb 3.7 oz)    SpO2 97%    Breastfeeding No    BMI 39.19 kg/m2     Body mass index is 39.19 kg/(m^2). Physical Exam:  General Appearance: Comfortable, not using accessory muscles of respiration. HEENT: SIRIA. HEAD: Atraumatic  NECK: No JVD, no thyroidomeglay. CAROTIDS: no bruit  LUNGS: Clear bilaterally. HEART: S1+S2 [id number],2/2 systolic murmur in aortic area, no pericardial rub. ABD: Non-tender, BS Audible    EXT: + leg edema, and no cyanosis. VASCULAR EXAM: Pulses are intact. PSYCHIATRIC EXAM: Mood is appropriate. MUSCULOSKELETAL: Grossly no joint deformity.   NEUROLOGICAL: AAO times 3, No motor and sensory deficit  Medication:  Current Facility-Administered Medications   Medication Dose Route Frequency    enoxaparin (LOVENOX) injection 100 mg  1 mg/kg SubCUTAneous Q12H    furosemide (LASIX) injection 40 mg  40 mg IntraVENous BID    vancomycin (VANCOCIN) 1,500 mg in 0.9% sodium chloride 500 mL IVPB  1,500 mg IntraVENous Q24H    HYDROcodone-acetaminophen (NORCO) 5-325 mg per tablet 1 Tab  1 Tab Oral Q4H PRN    sodium chloride (OCEAN) 0.65 % nasal spray 2 Spray  2 Spray Both Nostrils PRN    methylPREDNISolone (PF) (SOLU-MEDROL) injection 60 mg  60 mg IntraVENous Q24H    traZODone (DESYREL) tablet 50 mg  50 mg Oral QHS    QUEtiapine (SEROquel) tablet 25 mg  25 mg Oral QHS    Vancomycin - Pharmacy to Dose  1 Each Other Rx Dosing/Monitoring    calcium carbonate (TUMS) chewable tablet 200 mg [elemental]  200 mg Oral QID WITH MEALS    lidocaine (LIDODERM) 5 % patch 1 Patch  1 Patch TransDERmal Q24H    insulin lispro (HUMALOG) injection 5 Units  5 Units SubCUTAneous TIDAC    insulin glargine (LANTUS) injection 20 Units  20 Units SubCUTAneous QHS    digoxin (LANOXIN) tablet 0.125 mg  0.125 mg Oral DAILY    carvedilol (COREG) tablet 25 mg  25 mg Oral BID WITH MEALS    ELECTROLYTE REPLACEMENT PROTOCOL  1 Each Other PRN    ELECTROLYTE REPLACEMENT PROTOCOL  1 Each Other PRN    pantoprazole (PROTONIX) tablet 40 mg  40 mg Oral ACB    polyvinyl alcohol (LIQUIFILM TEARS) 1.4 % ophthalmic solution 1 Drop  1 Drop Both Eyes PRN    nystatin (MYCOSTATIN) 100,000 unit/gram powder   Topical BID    docusate sodium (COLACE) capsule 100 mg  100 mg Oral BID    simvastatin (ZOCOR) tablet 10 mg  10 mg Oral QHS    aspirin chewable tablet 81 mg  81 mg Oral DAILY    arformoterol (BROVANA) neb solution 15 mcg  15 mcg Nebulization BID RT    budesonide (PULMICORT) 500 mcg/2 ml nebulizer suspension  500 mcg Nebulization BID RT    albuterol-ipratropium (DUO-NEB) 2.5 MG-0.5 MG/3 ML  3 mL Nebulization Q4H PRN    ondansetron (ZOFRAN) injection 4 mg  4 mg IntraVENous Q6H PRN    acetaminophen (TYLENOL) tablet 650 mg  650 mg Oral Q4H PRN    insulin lispro (HUMALOG) injection   SubCUTAneous AC&HS    glucose chewable tablet 16 g  4 Tab Oral PRN    glucagon (GLUCAGEN) injection 1 mg  1 mg IntraMUSCular PRN    dextrose (D50W) injection syrg 12.5-25 g  25-50 mL IntraVENous PRN    warfarin pharmacy to dose   Other PRN               Lab/Data Reviewed:       Recent Labs      03/23/17 0227   HGB  10.8*   HCT  34.1*   PLT  241     Recent Labs      03/23/17 0227 03/22/17   0440  03/21/17   0435   NA  141  143  143   K  3.9  4.3  4.5   CL  104  103  105   CO2  35*  33*  33*   GLU  198*  113*  107*   BUN  32*  31*  36*   CREA  1.06  1.07  1.15   CA  8.4*  8.4*  8.5       Signed By: Tyrone Hansen MD     March 24, 2017

## 2017-04-03 NOTE — WOUND CARE
Pt seen by wound care, no changes to previous skin assessment, wound care will continue to monitor pt daily while in ICU. 172.72

## 2017-12-18 NOTE — PROGRESS NOTES
Bedside and Verbal shift change report received from ZAHRA Robertson RN (offgoing nurse). Report included the following information SBAR, Kardex, Intake/Output, MAR and Recent Results. Alarm parameters reviewed and opportunities for questions provided. 2030 Shift assessment completed, see EMR    0000 Reassessment completed, see EMR    0345 Reassessment completed ,see EMR    0600 CHG bath given with full linen change. All patient's dressing to BLE was changed. Problem: Patient Care Overview (Adult)  Goal: Plan of Care Review  Outcome: Ongoing (interventions implemented as appropriate)    12/18/17 1009   Coping/Psychosocial Response Interventions   Plan Of Care Reviewed With patient   Patient Care Overview   Progress improving   Outcome Evaluation   Outcome Summary/Follow up Plan PT: Pt requires significant time to complete moblity. Pt was able to ambulate with walker TDWB with assist of one plus one for chair x 10 feet. If pt returns home to her home, she has ramp to enter home and could perform most activities from wheelchair level. Pt states her  has a wheelchair at home but does use it at times. Pt would benefit from wheelchair with elevated legs at discharge. Pt would need to be able to ambulate short distances and complete transfers independently prior to returning home.

## 2018-11-03 NOTE — WOUND CARE
Pt seen by wound care. Assisted bedside staff in getting pt off of bedside commode. Skin to sacrum intact. Mepilex borders intact to lower extremities. Wound care will continue to follow pt daily while in ICU. No longer able to hear apical heart beat

## 2021-08-03 PROBLEM — I50.9 CHF (CONGESTIVE HEART FAILURE) (HCC): Status: RESOLVED | Noted: 2017-01-30 | Resolved: 2021-08-03

## 2023-09-19 NOTE — WOUND CARE
Pt seen by wound care at this time, pt turned and assessed, no skin issues noted at this time. Wound care will continue to follow pt daily while in ICU. flu-like symptoms